# Patient Record
Sex: FEMALE | Race: WHITE | Employment: OTHER | URBAN - NONMETROPOLITAN AREA
[De-identification: names, ages, dates, MRNs, and addresses within clinical notes are randomized per-mention and may not be internally consistent; named-entity substitution may affect disease eponyms.]

---

## 2017-01-30 ENCOUNTER — OFFICE VISIT (OUTPATIENT)
Dept: CARDIOLOGY | Age: 72
End: 2017-01-30
Payer: MEDICARE

## 2017-01-30 VITALS
HEIGHT: 63 IN | SYSTOLIC BLOOD PRESSURE: 130 MMHG | WEIGHT: 96.2 LBS | DIASTOLIC BLOOD PRESSURE: 80 MMHG | HEART RATE: 78 BPM | BODY MASS INDEX: 17.05 KG/M2

## 2017-01-30 DIAGNOSIS — I15.9 SECONDARY HYPERTENSION: Primary | ICD-10-CM

## 2017-01-30 PROCEDURE — 93000 ELECTROCARDIOGRAM COMPLETE: CPT | Performed by: INTERNAL MEDICINE

## 2017-01-30 PROCEDURE — 99215 OFFICE O/P EST HI 40 MIN: CPT | Performed by: INTERNAL MEDICINE

## 2017-01-30 RX ORDER — LEVOTHYROXINE SODIUM 0.07 MG/1
50 TABLET ORAL
COMMUNITY
Start: 2016-12-29 | End: 2017-12-04 | Stop reason: DRUGHIGH

## 2017-03-14 ENCOUNTER — OFFICE VISIT (OUTPATIENT)
Dept: CARDIOTHORACIC SURGERY | Age: 72
End: 2017-03-14
Payer: MEDICARE

## 2017-03-14 VITALS
HEART RATE: 81 BPM | SYSTOLIC BLOOD PRESSURE: 192 MMHG | OXYGEN SATURATION: 96 % | BODY MASS INDEX: 17.01 KG/M2 | DIASTOLIC BLOOD PRESSURE: 110 MMHG | HEIGHT: 63 IN | WEIGHT: 96 LBS

## 2017-03-14 DIAGNOSIS — J90 UNSPECIFIED PLEURAL EFFUSION: Primary | ICD-10-CM

## 2017-03-14 PROCEDURE — 99212 OFFICE O/P EST SF 10 MIN: CPT | Performed by: THORACIC SURGERY (CARDIOTHORACIC VASCULAR SURGERY)

## 2017-05-01 ENCOUNTER — HOSPITAL ENCOUNTER (OUTPATIENT)
Dept: VASCULAR LAB | Age: 72
Discharge: HOME | End: 2017-05-01
Payer: MEDICARE

## 2017-05-01 ENCOUNTER — OFFICE VISIT (OUTPATIENT)
Dept: VASCULAR SURGERY | Age: 72
End: 2017-05-01
Payer: MEDICARE

## 2017-05-01 VITALS
HEART RATE: 74 BPM | TEMPERATURE: 98.6 F | SYSTOLIC BLOOD PRESSURE: 140 MMHG | OXYGEN SATURATION: 88 % | DIASTOLIC BLOOD PRESSURE: 88 MMHG

## 2017-05-01 DIAGNOSIS — I73.9 PVD (PERIPHERAL VASCULAR DISEASE) (HCC): ICD-10-CM

## 2017-05-01 DIAGNOSIS — I73.9 PVD (PERIPHERAL VASCULAR DISEASE) (HCC): Primary | ICD-10-CM

## 2017-05-01 PROCEDURE — 93923 UPR/LXTR ART STDY 3+ LVLS: CPT

## 2017-05-01 PROCEDURE — 99213 OFFICE O/P EST LOW 20 MIN: CPT | Performed by: PHYSICIAN ASSISTANT

## 2017-05-31 ENCOUNTER — OFFICE VISIT (OUTPATIENT)
Dept: CARDIOLOGY | Age: 72
End: 2017-05-31
Payer: MEDICARE

## 2017-05-31 VITALS
WEIGHT: 94.2 LBS | HEART RATE: 86 BPM | DIASTOLIC BLOOD PRESSURE: 80 MMHG | SYSTOLIC BLOOD PRESSURE: 136 MMHG | BODY MASS INDEX: 16.69 KG/M2 | HEIGHT: 63 IN

## 2017-05-31 DIAGNOSIS — I15.9 SECONDARY HYPERTENSION: Primary | ICD-10-CM

## 2017-05-31 PROCEDURE — 99214 OFFICE O/P EST MOD 30 MIN: CPT | Performed by: INTERNAL MEDICINE

## 2017-05-31 PROCEDURE — 93000 ELECTROCARDIOGRAM COMPLETE: CPT | Performed by: INTERNAL MEDICINE

## 2017-05-31 ASSESSMENT — ENCOUNTER SYMPTOMS: SHORTNESS OF BREATH: 1

## 2017-07-19 ENCOUNTER — HOSPITAL ENCOUNTER (OUTPATIENT)
Dept: WOUND CARE | Age: 72
Discharge: HOME | End: 2017-07-19
Payer: MEDICARE

## 2017-07-19 VITALS
HEIGHT: 63 IN | HEART RATE: 71 BPM | BODY MASS INDEX: 16.66 KG/M2 | TEMPERATURE: 98.6 F | SYSTOLIC BLOOD PRESSURE: 165 MMHG | WEIGHT: 94 LBS | RESPIRATION RATE: 16 BRPM | DIASTOLIC BLOOD PRESSURE: 71 MMHG

## 2017-07-19 DIAGNOSIS — I70.238 ATHEROSCLEROSIS OF NATIVE ARTERIES OF RIGHT LEG WITH ULCERATION OF OTHER PART OF LOWER RIGHT LEG: ICD-10-CM

## 2017-07-19 DIAGNOSIS — I70.233 ATHEROSCLEROSIS OF NATIVE ARTERY OF RIGHT LOWER EXTREMITY WITH ULCERATION OF ANKLE (HCC): ICD-10-CM

## 2017-07-19 DIAGNOSIS — L97.312 NON-PRESSURE CHRONIC ULCER OF RIGHT ANKLE WITH FAT LAYER EXPOSED (HCC): ICD-10-CM

## 2017-07-19 PROCEDURE — G0463 HOSPITAL OUTPT CLINIC VISIT: HCPCS

## 2017-07-19 PROCEDURE — 99999 PR OFFICE/OUTPT VISIT,PROCEDURE ONLY: CPT | Performed by: SURGERY

## 2017-07-19 PROCEDURE — 97597 DBRDMT OPN WND 1ST 20 CM/<: CPT

## 2017-07-19 PROCEDURE — 97597 DBRDMT OPN WND 1ST 20 CM/<: CPT | Performed by: SURGERY

## 2017-07-19 PROCEDURE — 99214 OFFICE O/P EST MOD 30 MIN: CPT

## 2017-07-19 ASSESSMENT — PAIN SCALES - GENERAL: PAINLEVEL_OUTOF10: 0

## 2017-07-26 ENCOUNTER — OFFICE VISIT (OUTPATIENT)
Dept: OTOLARYNGOLOGY | Age: 72
End: 2017-07-26
Payer: MEDICARE

## 2017-07-26 VITALS
RESPIRATION RATE: 20 BRPM | DIASTOLIC BLOOD PRESSURE: 70 MMHG | OXYGEN SATURATION: 96 % | BODY MASS INDEX: 16.87 KG/M2 | SYSTOLIC BLOOD PRESSURE: 126 MMHG | HEART RATE: 70 BPM | WEIGHT: 95.2 LBS | HEIGHT: 63 IN

## 2017-07-26 DIAGNOSIS — R22.1 MASS OF RIGHT SIDE OF NECK: Primary | ICD-10-CM

## 2017-07-26 DIAGNOSIS — H61.21 IMPACTED CERUMEN OF RIGHT EAR: ICD-10-CM

## 2017-07-26 PROCEDURE — 31575 DIAGNOSTIC LARYNGOSCOPY: CPT | Performed by: OTOLARYNGOLOGY

## 2017-07-26 PROCEDURE — 99203 OFFICE O/P NEW LOW 30 MIN: CPT | Performed by: OTOLARYNGOLOGY

## 2017-07-26 ASSESSMENT — ENCOUNTER SYMPTOMS
RESPIRATORY NEGATIVE: 1
ALLERGIC/IMMUNOLOGIC NEGATIVE: 1
EYES NEGATIVE: 1
GASTROINTESTINAL NEGATIVE: 1

## 2017-07-31 RX ORDER — LISINOPRIL 10 MG/1
10 TABLET ORAL DAILY
Qty: 30 TABLET | Refills: 3 | Status: ON HOLD | OUTPATIENT
Start: 2017-07-31 | End: 2018-01-22 | Stop reason: HOSPADM

## 2017-07-31 RX ORDER — METOPROLOL TARTRATE 100 MG/1
50 TABLET ORAL 2 TIMES DAILY
Qty: 60 TABLET | Refills: 3 | Status: SHIPPED | OUTPATIENT
Start: 2017-07-31 | End: 2017-12-04 | Stop reason: SDUPTHER

## 2017-08-04 ENCOUNTER — HOSPITAL ENCOUNTER (OUTPATIENT)
Dept: ULTRASOUND IMAGING | Age: 72
Discharge: HOME | End: 2017-08-04
Payer: MEDICARE

## 2017-08-04 DIAGNOSIS — R22.1 MASS OF RIGHT SIDE OF NECK: ICD-10-CM

## 2017-08-04 PROCEDURE — 60100 BIOPSY OF THYROID: CPT

## 2017-08-18 ENCOUNTER — TELEPHONE (OUTPATIENT)
Dept: OTOLARYNGOLOGY | Age: 72
End: 2017-08-18

## 2017-08-22 ENCOUNTER — OFFICE VISIT (OUTPATIENT)
Dept: OTOLARYNGOLOGY | Age: 72
End: 2017-08-22
Payer: MEDICARE

## 2017-08-22 VITALS
DIASTOLIC BLOOD PRESSURE: 70 MMHG | HEIGHT: 63 IN | WEIGHT: 94 LBS | SYSTOLIC BLOOD PRESSURE: 130 MMHG | BODY MASS INDEX: 16.66 KG/M2

## 2017-08-22 DIAGNOSIS — R22.1 MASS OF RIGHT SIDE OF NECK: Primary | ICD-10-CM

## 2017-08-22 PROCEDURE — 99213 OFFICE O/P EST LOW 20 MIN: CPT | Performed by: OTOLARYNGOLOGY

## 2017-08-23 ENCOUNTER — HOSPITAL ENCOUNTER (OUTPATIENT)
Dept: WOUND CARE | Age: 72
Discharge: HOME | End: 2017-08-23

## 2017-09-06 ENCOUNTER — HOSPITAL ENCOUNTER (OUTPATIENT)
Dept: WOUND CARE | Age: 72
Discharge: HOME | End: 2017-09-06
Payer: MEDICARE

## 2017-09-06 VITALS
SYSTOLIC BLOOD PRESSURE: 148 MMHG | HEART RATE: 90 BPM | DIASTOLIC BLOOD PRESSURE: 98 MMHG | BODY MASS INDEX: 16.66 KG/M2 | TEMPERATURE: 97.6 F | HEIGHT: 63 IN | WEIGHT: 94 LBS | RESPIRATION RATE: 18 BRPM

## 2017-09-06 DIAGNOSIS — I70.233 ATHEROSCLEROSIS OF NATIVE ARTERY OF RIGHT LOWER EXTREMITY WITH ULCERATION OF ANKLE (HCC): ICD-10-CM

## 2017-09-06 DIAGNOSIS — I70.238 ATHEROSCLEROSIS OF NATIVE ARTERIES OF RIGHT LEG WITH ULCERATION OF OTHER PART OF LOWER RIGHT LEG: ICD-10-CM

## 2017-09-06 DIAGNOSIS — L97.312 NON-PRESSURE CHRONIC ULCER OF RIGHT ANKLE WITH FAT LAYER EXPOSED (HCC): ICD-10-CM

## 2017-09-06 PROCEDURE — 99212 OFFICE O/P EST SF 10 MIN: CPT | Performed by: SURGERY

## 2017-09-06 PROCEDURE — 99212 OFFICE O/P EST SF 10 MIN: CPT

## 2017-10-25 ENCOUNTER — OFFICE VISIT (OUTPATIENT)
Dept: OTOLARYNGOLOGY | Age: 72
End: 2017-10-25
Payer: MEDICARE

## 2017-10-25 VITALS
TEMPERATURE: 96 F | BODY MASS INDEX: 17.01 KG/M2 | HEART RATE: 80 BPM | DIASTOLIC BLOOD PRESSURE: 74 MMHG | OXYGEN SATURATION: 92 % | WEIGHT: 96 LBS | SYSTOLIC BLOOD PRESSURE: 130 MMHG | RESPIRATION RATE: 20 BRPM | HEIGHT: 63 IN

## 2017-10-25 DIAGNOSIS — R22.1 NECK MASS: Primary | ICD-10-CM

## 2017-10-25 PROCEDURE — 1123F ACP DISCUSS/DSCN MKR DOCD: CPT | Performed by: OTOLARYNGOLOGY

## 2017-10-25 PROCEDURE — G8400 PT W/DXA NO RESULTS DOC: HCPCS | Performed by: OTOLARYNGOLOGY

## 2017-10-25 PROCEDURE — 1036F TOBACCO NON-USER: CPT | Performed by: OTOLARYNGOLOGY

## 2017-10-25 PROCEDURE — 4040F PNEUMOC VAC/ADMIN/RCVD: CPT | Performed by: OTOLARYNGOLOGY

## 2017-10-25 PROCEDURE — 99213 OFFICE O/P EST LOW 20 MIN: CPT | Performed by: OTOLARYNGOLOGY

## 2017-10-25 PROCEDURE — G8598 ASA/ANTIPLAT THER USED: HCPCS | Performed by: OTOLARYNGOLOGY

## 2017-10-25 PROCEDURE — G8484 FLU IMMUNIZE NO ADMIN: HCPCS | Performed by: OTOLARYNGOLOGY

## 2017-10-25 PROCEDURE — 1090F PRES/ABSN URINE INCON ASSESS: CPT | Performed by: OTOLARYNGOLOGY

## 2017-10-25 PROCEDURE — G8427 DOCREV CUR MEDS BY ELIG CLIN: HCPCS | Performed by: OTOLARYNGOLOGY

## 2017-10-25 PROCEDURE — G8418 CALC BMI BLW LOW PARAM F/U: HCPCS | Performed by: OTOLARYNGOLOGY

## 2017-10-25 PROCEDURE — 3014F SCREEN MAMMO DOC REV: CPT | Performed by: OTOLARYNGOLOGY

## 2017-10-25 PROCEDURE — 3017F COLORECTAL CA SCREEN DOC REV: CPT | Performed by: OTOLARYNGOLOGY

## 2017-11-02 ENCOUNTER — HOSPITAL ENCOUNTER (OUTPATIENT)
Dept: VASCULAR LAB | Age: 72
Discharge: HOME | End: 2017-11-02
Payer: MEDICARE

## 2017-11-02 ENCOUNTER — OFFICE VISIT (OUTPATIENT)
Dept: VASCULAR SURGERY | Age: 72
End: 2017-11-02
Payer: MEDICARE

## 2017-11-02 VITALS
HEART RATE: 60 BPM | RESPIRATION RATE: 18 BRPM | TEMPERATURE: 97.3 F | DIASTOLIC BLOOD PRESSURE: 60 MMHG | SYSTOLIC BLOOD PRESSURE: 100 MMHG

## 2017-11-02 DIAGNOSIS — I65.23 BILATERAL CAROTID ARTERY STENOSIS: ICD-10-CM

## 2017-11-02 DIAGNOSIS — I73.9 PVD (PERIPHERAL VASCULAR DISEASE) (HCC): ICD-10-CM

## 2017-11-02 DIAGNOSIS — I70.213 ATHEROSCLEROSIS OF NATIVE ARTERY OF BOTH LOWER EXTREMITIES WITH INTERMITTENT CLAUDICATION (HCC): Primary | ICD-10-CM

## 2017-11-02 PROCEDURE — G8484 FLU IMMUNIZE NO ADMIN: HCPCS | Performed by: NURSE PRACTITIONER

## 2017-11-02 PROCEDURE — 1090F PRES/ABSN URINE INCON ASSESS: CPT | Performed by: NURSE PRACTITIONER

## 2017-11-02 PROCEDURE — G8400 PT W/DXA NO RESULTS DOC: HCPCS | Performed by: NURSE PRACTITIONER

## 2017-11-02 PROCEDURE — G8418 CALC BMI BLW LOW PARAM F/U: HCPCS | Performed by: NURSE PRACTITIONER

## 2017-11-02 PROCEDURE — G8598 ASA/ANTIPLAT THER USED: HCPCS | Performed by: NURSE PRACTITIONER

## 2017-11-02 PROCEDURE — 4040F PNEUMOC VAC/ADMIN/RCVD: CPT | Performed by: NURSE PRACTITIONER

## 2017-11-02 PROCEDURE — 93923 UPR/LXTR ART STDY 3+ LVLS: CPT

## 2017-11-02 PROCEDURE — 99212 OFFICE O/P EST SF 10 MIN: CPT | Performed by: NURSE PRACTITIONER

## 2017-11-02 PROCEDURE — 3014F SCREEN MAMMO DOC REV: CPT | Performed by: NURSE PRACTITIONER

## 2017-11-02 PROCEDURE — G8427 DOCREV CUR MEDS BY ELIG CLIN: HCPCS | Performed by: NURSE PRACTITIONER

## 2017-11-02 PROCEDURE — 3017F COLORECTAL CA SCREEN DOC REV: CPT | Performed by: NURSE PRACTITIONER

## 2017-11-02 PROCEDURE — 1123F ACP DISCUSS/DSCN MKR DOCD: CPT | Performed by: NURSE PRACTITIONER

## 2017-11-02 PROCEDURE — 1036F TOBACCO NON-USER: CPT | Performed by: NURSE PRACTITIONER

## 2017-11-02 NOTE — PROGRESS NOTES
Patient Care Team:  Angle Gibson MD as PCP - General (Internal Medicine)  Liliana Cabrera MD (Cardiology)  Gavi Ortega MD (Rheumatology)  Ambreen Guerrero MD as Consulting Physician (Vascular Surgery)  Roxana Romero MD as Consulting Physician (Interventional Cardiology)      Jennifer Mistry has a history of peripheral vascular disease of the lower extremities. She has had this for 1 - 5 years. Current treatment includes ASA EC daily. Arian Mistry has not had new wounds. Recently, she reports claudication at a distance which varies. Arian Mistry reports that the right leg is equal to the left. She reports claudication is not changed and is mostly in the form of crampy type pain starting in the calves. She has a short recovery time. This is reproducible in nature. She reports ischemic rest pain 0 times per night. She reports walking with cart does not help. She presents for follow up of carotid artery stenosis. She has a known history of carotid artery stenosis for 1 - 5 years. Her current treatment includes ASA EC daily. She denies a history of CVA. She reports no TIA's, episodes of lateralizing weakness and episodes of amaurosis fugax.       Marv Mosqueda is a 67 y.o. female with the following history reviewed and recorded in [facility]:  Patient Active Problem List    Diagnosis Date Noted    Acute superficial venous thrombosis of right lower extremity 04/25/2016     Priority: High     With large RLE bulla lateral foot skin violaceous discoloration, acutely POA (venous backpressure)      Severe mitral stenosis by prior echocardiogram 03/29/2016     Priority: High    Severe mitral regurgitation by prior echocardiogram 03/29/2016     Priority: High    PUD (peptic ulcer disease) 03/29/2016     Priority: Medium    Atherosclerosis of native artery of extremity with intermittent claudication ([address] 07/25/2011     Priority: Low    Wound of sacral region 06/16/2016    Elevated TSH 06/16/2016    Acute blood loss anemia 06/15/2016    CHF (congestive heart failure) ([address] 06/15/2016    CKD (chronic kidney disease), stage III 06/15/2016    Pulmonary emphysema ([address] 06/15/2016    COPD, severe (HCC)     PVD (peripheral vascular disease) ([address]     Atherosclerosis of native artery of right lower extremity with ulceration of ankle ([address] 06/05/2016    Atherosclerosis of native arteries of right leg with ulceration of other part of lower right leg ([address] 06/05/2016     Replacing Inactive Diagnoses      Atherosclerosis of native arteries of right leg with ulceration of other part of foot 06/05/2016     Replacing Inactive Diagnoses      Nonhealing ulcer of right lower leg with fat layer exposed ([address] 06/05/2016    Non-pressure chronic ulcer of right ankle with fat layer exposed ([address] 06/05/2016    Neuropathic ulcer of right foot with fat layer exposed ([address] 06/05/2016    Hypervolemia     Nonhealing nonsurgical wound with fat layer exposed 06/03/2016    Acute blood loss anemia     Atherosclerosis of native arteries of left leg with ulceration of calf (HCC)     Severe malnutrition (HCC)     Diastolic dysfunction     Anemia in chronic kidney disease (CKD)     Non-pressure chronic ulcer of right calf with fat layer exposed ([address] 05/27/2016    Decubitus ulcer of right buttock, stage 3 ([address] 05/27/2016    Nocturnal hypoxia 03/30/2016     With associated mitral stenosis / regurgitation and pulmonary hypertension      Pulmonary hypertension 03/29/2016    PAD (peripheral artery disease) (HCC)     Calculus of gallbladder without cholecystitis without obstruction     Carotid artery stenosis 02/08/2012    Atherosclerosis of native artery of extremity with intermittent claudication ([address] 07/25/2011     Replacing Inactive Diagnoses      Hyperlipidemia     Hypertension     Arthritis     CAD (coronary artery disease)      Current Outpatient Prescriptions   Medication Sig Dispense Refill    No blood in stool. No severe constipation, diarrhea, nausea, or vomiting. Genitourinary  No difficulty urinating, dysuria, frequency, or urgency. No flank pain or hematuria. Musculoskeletal  no back pain, gait disturbance, or myalgia. Skin  no color change, rash, pallor, or new wound. Neurologic  no dizziness, facial asymmetry, or light headedness. No seizures. No speech difficulty or lateralizing weakness. Hematologic  no easy bruising or excessive bleeding. Psychiatric  no severe anxiety or nervousness. No confusion. All other review of systems are negative. Physical Exam    /60 (Site: Left Arm, Position: Sitting, Cuff Size: Medium Adult) Comment: left  Pulse 60   Temp 97.3 °F (36.3 °C)   Resp 18     Constitutional  well developed, well nourished. No diaphoresis or acute distress. HENT  head normocephalic. Right external ear canal appears normal.  Left external ear canal appears normal.  Septum appears midline. Eyes  conjunctiva normal.  EOMS normal.  No exudate. No icterus. Neck- ROM appears normal, no tracheal deviation. Cardiovascular  Regular rate and rhythm. Heart sounds are normal.  No murmur, rub, or gallop. Carotid pulses are 2+ to palpation bilaterally without bruit. Extremities - Radial and brachial pulses are 2+ to palpation bilaterally. Right femoral pulse: present 2+; Right popliteal pulse: absent Right DP: absent; Right PT absent; Left femoral pulse: present 2+; Left popliteal pulse: absent; Left DP: absent; Left PT: absent No cyanosis, clubbing, or significant edema. No signs atheroembolic event. Pulmonary  effort appears normal.  No respiratory distress. Lungs - Breath sounds normal. No wheezes or rales. GI - Abdomen  soft, non tender, bowel sounds X 4 quadrants. No guarding or rebound tenderness. No distension or palpable mass. Genitourinary  deferred. Musculoskeletal  ROM appears normal.  No significant edema.   Neurologic  alert and oriented X 3. Physiologic. Skin  warm, dry, and intact. No rash, erythema, or pallor. Psychiatric  mood, affect, and behavior appear normal.  Judgment and thought processes appear normal.    Risk factors for atherosclerosis of all vascular beds have been reviewed with the patient including:  Family history, tobacco abuse in all forms, elevated cholesterol, hyperlipidemia, and diabetes. Lower extremity arterial study: Right MARGY 1.11, Left MARGY 0.94  Individual films reviewed: Yes. Test results were reviewed with the patient. Disease process is stable    Options have been discussed with the patient including continued medical management. Patient has opted to proceed with continued medical management. Assessment    1. Atherosclerosis of native artery of both lower extremities with intermittent claudication ([address]    2.  Bilateral carotid artery stenosis          Plan    Start/Continue ASA EC 81 mg daily  Education about caludication causes and treatment discussed  Call with any new wound development or progressive claudication  Walking program  Leg elevation  Good moisturization  Good skin care    Recommend no smoking  Strongly encourage statin therapy  Family to bring films from [facility] reports from [facility] about \"lump\" in neck

## 2017-11-09 ENCOUNTER — TELEPHONE (OUTPATIENT)
Dept: VASCULAR SURGERY | Age: 72
End: 2017-11-09

## 2017-11-09 DIAGNOSIS — I65.23 BILATERAL CAROTID ARTERY STENOSIS: Primary | ICD-10-CM

## 2017-11-16 ENCOUNTER — HOSPITAL ENCOUNTER (OUTPATIENT)
Dept: VASCULAR LAB | Age: 72
Discharge: HOME | End: 2017-11-16
Payer: MEDICARE

## 2017-11-16 ENCOUNTER — OFFICE VISIT (OUTPATIENT)
Dept: VASCULAR SURGERY | Age: 72
End: 2017-11-16
Payer: MEDICARE

## 2017-11-16 VITALS
HEART RATE: 84 BPM | TEMPERATURE: 96.3 F | SYSTOLIC BLOOD PRESSURE: 130 MMHG | RESPIRATION RATE: 18 BRPM | DIASTOLIC BLOOD PRESSURE: 80 MMHG

## 2017-11-16 DIAGNOSIS — I65.23 BILATERAL CAROTID ARTERY STENOSIS: ICD-10-CM

## 2017-11-16 PROCEDURE — 4040F PNEUMOC VAC/ADMIN/RCVD: CPT | Performed by: NURSE PRACTITIONER

## 2017-11-16 PROCEDURE — 1036F TOBACCO NON-USER: CPT | Performed by: NURSE PRACTITIONER

## 2017-11-16 PROCEDURE — G8400 PT W/DXA NO RESULTS DOC: HCPCS | Performed by: NURSE PRACTITIONER

## 2017-11-16 PROCEDURE — G8427 DOCREV CUR MEDS BY ELIG CLIN: HCPCS | Performed by: NURSE PRACTITIONER

## 2017-11-16 PROCEDURE — G8598 ASA/ANTIPLAT THER USED: HCPCS | Performed by: NURSE PRACTITIONER

## 2017-11-16 PROCEDURE — 93880 EXTRACRANIAL BILAT STUDY: CPT

## 2017-11-16 PROCEDURE — 1123F ACP DISCUSS/DSCN MKR DOCD: CPT | Performed by: NURSE PRACTITIONER

## 2017-11-16 PROCEDURE — 3014F SCREEN MAMMO DOC REV: CPT | Performed by: NURSE PRACTITIONER

## 2017-11-16 PROCEDURE — 99213 OFFICE O/P EST LOW 20 MIN: CPT | Performed by: NURSE PRACTITIONER

## 2017-11-16 PROCEDURE — 1090F PRES/ABSN URINE INCON ASSESS: CPT | Performed by: NURSE PRACTITIONER

## 2017-11-16 PROCEDURE — G8418 CALC BMI BLW LOW PARAM F/U: HCPCS | Performed by: NURSE PRACTITIONER

## 2017-11-16 PROCEDURE — 3017F COLORECTAL CA SCREEN DOC REV: CPT | Performed by: NURSE PRACTITIONER

## 2017-11-16 PROCEDURE — G8484 FLU IMMUNIZE NO ADMIN: HCPCS | Performed by: NURSE PRACTITIONER

## 2017-11-19 PROBLEM — I65.23 BILATERAL CAROTID ARTERY STENOSIS: Status: ACTIVE | Noted: 2017-11-19

## 2017-11-19 NOTE — PROGRESS NOTES
Patient Care Team:  Sebastian Merlin, MD as PCP - General (Internal Medicine)  Jimena Montes MD (Cardiology)  Alexander Saldaña MD (Rheumatology)  Deonna Cabrera MD as Consulting Physician (Vascular Surgery)  Jonathon Bridges MD as Consulting Physician (Interventional Cardiology)      History and Physical    She presents for follow-up of carotid occlusive disease. She has prior history of carotid artery stenosis for 1 - 5 years. Her current treatment includes ASA EC po daily. She denies a history of CVA. She had a Right Carotid Endarterectomy done. She reports no TIA's, episodes of amaurosis fugax and episodes of lateralizing weakness.     Dom Georges is a 67 y.o. female with the following history reviewed and recorded in [facility]:  Patient Active Problem List    Diagnosis Date Noted    Acute superficial venous thrombosis of right lower extremity 04/25/2016     Priority: High     With large RLE bulla lateral foot skin violaceous discoloration, acutely POA (venous backpressure)      Severe mitral stenosis by prior echocardiogram 03/29/2016     Priority: High    Severe mitral regurgitation by prior echocardiogram 03/29/2016     Priority: High    PUD (peptic ulcer disease) 03/29/2016     Priority: Medium    Atherosclerosis of native artery of extremity with intermittent claudication ([address] 07/25/2011     Priority: Low    Bilateral carotid artery stenosis 11/19/2017    Wound of sacral region 06/16/2016    Elevated TSH 06/16/2016    Acute blood loss anemia 06/15/2016    CHF (congestive heart failure) ([address] 06/15/2016    CKD (chronic kidney disease), stage III 06/15/2016    Pulmonary emphysema ([address] 06/15/2016    COPD, severe ([address]     PVD (peripheral vascular disease) ([address]     Atherosclerosis of native artery of right lower extremity with ulceration of ankle ([address] 06/05/2016    Atherosclerosis of native arteries of right leg with ulceration of other part of lower right leg ([address] nebulization every 8 hours as needed for Wheezing      atorvastatin (LIPITOR) 40 MG tablet Take 40 mg by mouth daily      aspirin EC 81 MG EC tablet Take 81 mg by mouth daily. No current facility-administered medications for this visit. Allergies: Levaquin [levofloxacin in d5w]  Past Medical History:   Diagnosis Date    Aortic stenosis     Arthritis     Atherosclerosis of native arteries of the extremities with intermittent claudication 7/25/2011    CAD (coronary artery disease)     Carotid artery occlusion     CHF (congestive heart failure) ([facility])     History of blood transfusion     Hyperlipidemia     Hypertension     MI (myocardial infarction) 2009    x2    Mitral valve stenosis     PUD (peptic ulcer disease) 2009    Renal failure 2009    Wound infection after surgery     right foot     Past Surgical History:   Procedure Laterality Date    ABDOMEN SURGERY  2009    perforated ulcer resection of 1/3 stomach    CARDIAC SURGERY      CAROTID ENDARTERECTOMY  11/08/07 [facility]    Right  with Dacron patch angioplasty    CARPAL TUNNEL RELEASE Right early 1990's    long ago   [address] [facility]  2 weeks ago    DILATION AND CURETTAGE OF UTERUS      ILIO-FEMORAL BYPASS GRAFT N/A 6/2/2016    OPEN TRANSLUMINAL BALLOON ANGIOPLASTY AND STENTING OF RIGHT COMMON AND EXTERNAL  ILIAC ARTERIES; RIGHT FEMORAL ENDARTERECTOMY WITH VEIN PATCH ANGIOPLASTY performed by Mónica Noyola MD at [facility] N/A 4/7/2016    MITRAL VALVE  REPLACEMENT LUONG-MAZE ABLATION WITH CRYO PROCEDURE, CORONARY ARTERY BYPASS GRAFT X 1 WITH ENDOSCOPIC VEIN HARVESTING WITH PERFUSION TRANSESOPHAGEAL ECHOCARDIOGRAM performed by Bhavani Soni MD at [address] [place] [address] [facility]      SKIN GRAFT Right 7/22/2016    Skin graft split thickness foot,ankle,and leg. Right leg 26x8cm and 12x6cm total area 280cm squared. TJR    UPPER GASTROINTESTINAL ENDOSCOPY  3/15/16    Dr Rebecca Rosa ENDOSCOPY  3/15/2016    EGD BIOPSY performed by Radha Waggoner DO at [facility]    VASCULAR SURGERY  5/27/16 [facility]    Aortagram and right leg runoff,right leg runoff,right common iliac artery selection for right leg run off views.  VASCULAR SURGERY      . Open transluminal angioplasty and stenting of the external iliac artery. TJR     Family History   Problem Relation Age of Onset    Diabetes Mother     Heart Disease Mother     Heart Failure Mother     Diabetes Sister     Heart Disease Sister     High Blood Pressure Sister      Social History   Substance Use Topics    Smoking status: Former Smoker     Years: 2.00     Quit date: 1/1/1980    Smokeless tobacco: Never Used    Alcohol use Yes      Comment: rarely maybe twice a year           Review of Systems    Constitutional  no significant activity change, appetite change, or unexpected weight change. No fever or chills. No diaphoresis or significant fatigue. HENT  no significant rhinorrhea or epistaxis. No tinnitus or significant hearing loss. Eyes  no sudden vision change or amaurosis. Respiratory  no significant shortness of breath, wheezing, or stridor. No apnea, cough, or chest tightness associated with shortness of breath. Cardiovascular  no chest pain, syncope, or significant dizziness. No palpitations or significant leg swelling. No claudication. Gastrointestinal  no abdominal swelling or pain. No blood in stool. No severe constipation, diarrhea, nausea, or vomiting. Genitourinary  No difficulty urinating, dysuria, frequency, or urgency. No flank pain or hematuria. Musculoskeletal  no back pain, gait disturbance, or myalgia. Skin  no color change, rash, pallor, or new wound. Neurologic  no dizziness, facial asymmetry, or light headedness. No seizures. No speech difficulty or lateralizing weakness.   Hematologic  no easy bruising or excessive patient. Disease process is stable     Options have been discussed with the patient including continued medical management and CTA. Patient has opted to proceed with CTA. Assessment    1. Bilateral carotid artery stenosis          Plan    CTA neck -   There is high-grade stenosis of the left internal carotid artery at   and just distal to the left carotid bifurcation. There is high-grade   stenosis at the origin of the external carotid artery as well on the   left side. There is left common carotid artery plaque with no   significant left common carotid artery stenosis. 2. Prior carotid endarterectomy on the right side. 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 There is plaque in the right carotid   bifurcation. The distal right common carotid artery is narrowed about   20%. There is an ulcerative plaque within the proximal right internal   carotid artery. There appears to be less than 30% diameter narrowing   of the proximal right internal carotid artery. 3. High-grade stenosis at the origin of the right vertebral artery. There is also at least 50-60% diameter narrowing of the right   vertebral artery at the C6 level. There is intermittent left vertebral   artery plaque without significant stenosis seen. 4. There appears to be a small aneurysm projecting posteriorly from   the tip of the basilar artery measuring about 3 mm in size. 5. There is plaque involving the cavernous carotid arteries   bilaterally intracranially with less than 50% diameter stenosis seen. 6. There is mediastinal lymphadenopathy. There is a large right   pleural effusion. Follow-up chest CT is recommended to further   evaluate and rule out malignancy. Groundglass opacity in both   visualized lungs may be result of poor inspiration. Pertinent results were discussed with [facility] [place].      Individual images were reviewed by myself and

## 2017-11-21 DIAGNOSIS — I65.23 BILATERAL CAROTID ARTERY STENOSIS: Primary | ICD-10-CM

## 2017-11-27 ENCOUNTER — HOSPITAL ENCOUNTER (OUTPATIENT)
Dept: CT IMAGING | Age: 72
Discharge: HOME | End: 2017-11-27
Payer: MEDICARE

## 2017-11-27 DIAGNOSIS — I65.23 BILATERAL CAROTID ARTERY STENOSIS: ICD-10-CM

## 2017-11-27 LAB
GFR NON-AFRICAN AMERICAN: 49
PERFORMED ON: ABNORMAL
POC CREATININE: 1.1 MG/DL (ref 0.3–1.3)
POC SAMPLE TYPE: ABNORMAL

## 2017-11-27 PROCEDURE — 70498 CT ANGIOGRAPHY NECK: CPT

## 2017-11-27 PROCEDURE — 6360000004 HC RX CONTRAST MEDICATION: Performed by: NURSE PRACTITIONER

## 2017-11-27 PROCEDURE — 82565 ASSAY OF CREATININE: CPT

## 2017-11-27 RX ADMIN — IOPAMIDOL 90 ML: 755 INJECTION, SOLUTION INTRAVENOUS at 10:17

## 2017-11-30 ENCOUNTER — TELEPHONE (OUTPATIENT)
Dept: VASCULAR SURGERY | Age: 72
End: 2017-11-30

## 2017-12-01 ENCOUNTER — TELEPHONE (OUTPATIENT)
Dept: VASCULAR SURGERY | Age: 72
End: 2017-12-01

## 2017-12-01 NOTE — TELEPHONE ENCOUNTER
Zachary Fuller with 's office called stating that [facility] took a look at the patient's CT scan and determined that they needed to repeat another CT scan and compare the two. [facility] stated that the patient would need to proceed with any Vascular surgery first, that anything related to the pt's Carotid would take priority over this CT scan and follow up with Pulmonology. This message was routed to [facility] for review.

## 2017-12-04 ENCOUNTER — OFFICE VISIT (OUTPATIENT)
Dept: CARDIOLOGY | Age: 72
End: 2017-12-04
Payer: MEDICARE

## 2017-12-04 VITALS
HEIGHT: 64 IN | DIASTOLIC BLOOD PRESSURE: 64 MMHG | WEIGHT: 95 LBS | BODY MASS INDEX: 16.22 KG/M2 | HEART RATE: 80 BPM | SYSTOLIC BLOOD PRESSURE: 134 MMHG

## 2017-12-04 DIAGNOSIS — I05.0 SEVERE MITRAL STENOSIS BY PRIOR ECHOCARDIOGRAM: Primary | ICD-10-CM

## 2017-12-04 PROCEDURE — 4040F PNEUMOC VAC/ADMIN/RCVD: CPT | Performed by: INTERNAL MEDICINE

## 2017-12-04 PROCEDURE — 3014F SCREEN MAMMO DOC REV: CPT | Performed by: INTERNAL MEDICINE

## 2017-12-04 PROCEDURE — G8419 CALC BMI OUT NRM PARAM NOF/U: HCPCS | Performed by: INTERNAL MEDICINE

## 2017-12-04 PROCEDURE — 1123F ACP DISCUSS/DSCN MKR DOCD: CPT | Performed by: INTERNAL MEDICINE

## 2017-12-04 PROCEDURE — G8427 DOCREV CUR MEDS BY ELIG CLIN: HCPCS | Performed by: INTERNAL MEDICINE

## 2017-12-04 PROCEDURE — G8484 FLU IMMUNIZE NO ADMIN: HCPCS | Performed by: INTERNAL MEDICINE

## 2017-12-04 PROCEDURE — 99214 OFFICE O/P EST MOD 30 MIN: CPT | Performed by: INTERNAL MEDICINE

## 2017-12-04 PROCEDURE — 1036F TOBACCO NON-USER: CPT | Performed by: INTERNAL MEDICINE

## 2017-12-04 PROCEDURE — 1090F PRES/ABSN URINE INCON ASSESS: CPT | Performed by: INTERNAL MEDICINE

## 2017-12-04 PROCEDURE — 3017F COLORECTAL CA SCREEN DOC REV: CPT | Performed by: INTERNAL MEDICINE

## 2017-12-04 PROCEDURE — 93000 ELECTROCARDIOGRAM COMPLETE: CPT | Performed by: INTERNAL MEDICINE

## 2017-12-04 PROCEDURE — G8400 PT W/DXA NO RESULTS DOC: HCPCS | Performed by: INTERNAL MEDICINE

## 2017-12-04 PROCEDURE — G8598 ASA/ANTIPLAT THER USED: HCPCS | Performed by: INTERNAL MEDICINE

## 2017-12-04 RX ORDER — METOPROLOL TARTRATE 50 MG/1
50 TABLET, FILM COATED ORAL 2 TIMES DAILY
Qty: 60 TABLET | Refills: 3 | Status: SHIPPED | OUTPATIENT
Start: 2017-12-04 | End: 2018-02-19 | Stop reason: DRUGHIGH

## 2017-12-04 RX ORDER — LEVOTHYROXINE SODIUM 50 MCG
50 TABLET ORAL DAILY
COMMUNITY
End: 2019-09-25

## 2017-12-04 ASSESSMENT — ENCOUNTER SYMPTOMS: SHORTNESS OF BREATH: 1

## 2017-12-04 NOTE — PROGRESS NOTES
Dear Dr. Cristel Joseph MD,    Thank you for allowing me to participate in the care of Ms. Justina Aggarwal. She presents today at the [address] [place] [address] in the LT[facility] located [facility] With her daughter. As you know, Ms. Demar Crandall is a 67 y.o. female with history of hypertension, hyperlipidemia,coronary artery disease s/p MI and CABG ( 1V PDA), grade 3 diastolic dysfunction and MVR ( 23 mm [employer])  who presents with the chief complaint of chronic cardiac issues. She had a one-vessel bypass surgery and mitral valve replacement for mitral stenosis in April 2016. She follows with Dr. Rosemarie Allison as she had a pulmonary function tests that she says showed restrictive physiology assuming related to her heart surgery. Since last seen, she says that the albuterol nebulizers have improved her shortness of air but the other inhalers she's not sure about. She has had no chest pain. Her blood pressures been controlled in the 120s to low 130s. She was found to have significant stenosis of her left internal carotid. She's been followed by vascular. She is able to do majority what she wants to do. She can be limited by her breathing. She denies chest pain, palpitations, PND, orthopnea, near-syncope, or syncope. She has no other complaints. Review of Systems   Constitutional: Negative for malaise/fatigue. Respiratory: Positive for shortness of breath. Cardiovascular: Negative for chest pain. Neurological: Negative for weakness. All other systems reviewed and are negative.         Past Medical History:   Diagnosis Date    Aortic stenosis     Arthritis     Atherosclerosis of native arteries of the extremities with intermittent claudication 7/25/2011    CAD (coronary artery disease)     Carotid artery occlusion     [facility]     History of blood transfusion     Hyperlipidemia     Hypertension     MI (myocardial infarction) 2009    x2    Mitral of children: N/A    Years of education: N/A     Occupational History    Not on file. Social History Main Topics    Smoking status: Former Smoker     Years: 2.00     Quit date: 1/1/1980    Smokeless tobacco: Never Used    Alcohol use Yes      Comment: rarely maybe twice a year    Drug use: No    Sexual activity: Not on file     Other Topics Concern    Not on file     Social History Narrative    No narrative on file       Allergies   Allergen Reactions    Levaquin [Levofloxacin In D5w] Nausea And Vomiting       Current Outpatient Prescriptions   Medication Sig Dispense Refill    levothyroxine (SYNTHROID) 50 MCG tablet Take 50 mcg by mouth Daily      lisinopril (PRINIVIL;ZESTRIL) 10 MG tablet Take 1 tablet by mouth daily 30 tablet 3    metoprolol (LOPRESSOR) 100 MG tablet Take 0.5 tablets by mouth 2 times daily 60 tablet 3    Probiotic Product (PROBIOTIC ADVANCED PO) Take by mouth daily      Biotin 5000 MCG TABS Take by mouth daily      levalbuterol (XOPENEX) 0.31 MG/3ML nebulization Take 1 ampule by nebulization every 8 hours as needed for Wheezing      atorvastatin (LIPITOR) 40 MG tablet Take 40 mg by mouth daily      aspirin EC 81 MG EC tablet Take 81 mg by mouth daily. No current facility-administered medications for this visit. PE:  Vitals:    12/04/17 0902   BP: 134/64   Pulse: 80       Estimated body mass index is 16.56 kg/m² as calculated from the following:    Height as of this encounter: 5' 3.5\" (1.613 m). Weight as of this encounter: 95 lb (43.1 kg).     Constitutional - No acute distress  Eyes - PERRL  ENMT - Mucous membranes moist, oropharynx clear  Cardio - No jugular venous distension or bruit,                 Clear s1 s2, no gallop, rub, murmur                 No edema, normal pulses  Resp - Normal effort, Clear to auscultation bilaterally  GI - abdomen soft, non-tender, non-distended  Skin - warm and dry  Neuro - No focal defect      Lab Results   Component Value Date    CREATININE 1.1 11/27/2017    CREATININE 1.2 08/29/2016    CREATININE 1.5 08/01/2016    CREATININE 0.9 07/26/2016    CREATININE 1.1 05/03/2012    HGB 10.7 08/29/2016    HGB 8.1 08/01/2016    HGB 9.2 07/26/2016    PROBNP 6,493 06/06/2016    PROBNP 6,039 06/06/2016    PROBNP 8,980 06/05/2016       ECG 12/4/17   Sinus rhythm, occasional PAC     TTE May 2016  1. Concentric LVH  2. Normal LV systolic function (estimated EF, 55-60%)  3. Grade 3 diastolic dysfunction  4. Normal functioning bioprosthetic valve in the mitral position. 5. Echolucency near preserved posterior mitral leaflet, likely chordae  6. Large pleural effusion  7. Moderate tricuspid regurgitation; estimated RVSP of at least 72 mmHg    Cath April 2016  1. Coronary angiography. This is the right dominant system. The patient had previously undergone stent placement of the right coronary artery. 2. The left main coronary artery bifurcates into the LAD and left circumflex coronary arteries without obstruction. 3. The LAD was a relatively large vessel giving rise to 2 moderate sized diagonal branches. The LAD proper contained mild plaque. The 1st diagonal branch contained tandem 60% and 50% stenosis proximally. The 2nd diagonal branch contained a 70% ostial stenosis. 4. The left circumflex coronary artery gave rise to total 4 obtuse marginal branches. The 1st 2 were very small. The 3rd and 4th were both moderate. There was moderate plaque. No high-grade obstruction of the left circumflex  distribution. 5. The right coronary was a dominant vessel of normal caliber. The previous stent was widely patent. The right coronary artery contained a 70% ostial stenosis. There was 1 moderate size acute marginal branch. The distal rightcoronary artery angulated into a normal size PDA.      Assessment, Recommendations, & Plan[de-identified]  67 y.o. female with coronary artery disease s/p one-vessel CABG, mitral valve replacement, diastolic dysfunction, hypertension    CAD - patient is currently on aspirin and statin, she is asymptomatic. She does have unrevascularized disease of diagonal arteries. Asymptomatic. Monitor. No changes    MVR - I explained that they will need antibiotic prophylaxis for bacterial endocarditis for procedures including dental cleaning 1 hour prior to the procedure (Amoxil 2gm x1 or Clindamycin 600mg x1 [if pen allergic]). We will obtain a transthoracic echo in May 2021, and yearly thereafter. Diastolic dysfunction - currently on ACE inhibitor and beta blocker for cardiac remodeling, continue, no changes  (we will change the formulation of the metoprolol from 100 mg tablets to 50 mg tablets as she's taking 50 mg twice daily and she is having a hard time cutting the tablets in half)    Hypertension - well controlled no changes    Disposition - return to clinic in 9 months or sooner if needed    Thank you very much for allowing me to participate in this patient's care. Please do not hesitate to contact me for any questions or concerns. Sincerely yours,    Rashad Allen MD, MSc  Structural Heart Disease Interventions  [facility]

## 2017-12-12 ENCOUNTER — OFFICE VISIT (OUTPATIENT)
Dept: VASCULAR SURGERY | Age: 72
End: 2017-12-12
Payer: MEDICARE

## 2017-12-12 VITALS
SYSTOLIC BLOOD PRESSURE: 159 MMHG | TEMPERATURE: 96.3 F | HEART RATE: 105 BPM | RESPIRATION RATE: 18 BRPM | DIASTOLIC BLOOD PRESSURE: 91 MMHG

## 2017-12-12 DIAGNOSIS — I65.23 BILATERAL CAROTID ARTERY STENOSIS: Primary | ICD-10-CM

## 2017-12-12 PROCEDURE — 1123F ACP DISCUSS/DSCN MKR DOCD: CPT | Performed by: NURSE PRACTITIONER

## 2017-12-12 PROCEDURE — 1090F PRES/ABSN URINE INCON ASSESS: CPT | Performed by: NURSE PRACTITIONER

## 2017-12-12 PROCEDURE — G8427 DOCREV CUR MEDS BY ELIG CLIN: HCPCS | Performed by: NURSE PRACTITIONER

## 2017-12-12 PROCEDURE — 1036F TOBACCO NON-USER: CPT | Performed by: NURSE PRACTITIONER

## 2017-12-12 PROCEDURE — G8484 FLU IMMUNIZE NO ADMIN: HCPCS | Performed by: NURSE PRACTITIONER

## 2017-12-12 PROCEDURE — 99213 OFFICE O/P EST LOW 20 MIN: CPT | Performed by: NURSE PRACTITIONER

## 2017-12-12 PROCEDURE — G8400 PT W/DXA NO RESULTS DOC: HCPCS | Performed by: NURSE PRACTITIONER

## 2017-12-12 PROCEDURE — 3014F SCREEN MAMMO DOC REV: CPT | Performed by: NURSE PRACTITIONER

## 2017-12-12 PROCEDURE — G8598 ASA/ANTIPLAT THER USED: HCPCS | Performed by: NURSE PRACTITIONER

## 2017-12-12 PROCEDURE — G8419 CALC BMI OUT NRM PARAM NOF/U: HCPCS | Performed by: NURSE PRACTITIONER

## 2017-12-12 PROCEDURE — 3017F COLORECTAL CA SCREEN DOC REV: CPT | Performed by: NURSE PRACTITIONER

## 2017-12-12 PROCEDURE — 4040F PNEUMOC VAC/ADMIN/RCVD: CPT | Performed by: NURSE PRACTITIONER

## 2017-12-12 NOTE — PROGRESS NOTES
 atorvastatin (LIPITOR) 40 MG tablet Take 40 mg by mouth daily      aspirin EC 81 MG EC tablet Take 81 mg by mouth daily. No current facility-administered medications for this visit. Allergies: Levaquin [levofloxacin in d5w]  Past Medical History:   Diagnosis Date    Aortic stenosis     Arthritis     Atherosclerosis of native arteries of the extremities with intermittent claudication 7/25/2011    CAD (coronary artery disease)     Carotid artery occlusion     CH[facility]congestive [facility] failure) [facility])     History of blood transfusion     Hyperlipidemia     Hypertension     MI (myocardial infarction) 2009    x2    Mitral valve stenosis     PUD (peptic ulcer disease) 2009    Renal failure 2009    Wound infection after surgery     right foot     Past Surgical History:   Procedure Laterality Date    ABDOMEN SURGERY  2009    perforated ulcer resection of 1/3 stomach    CARDIAC SURGERY      CAROTID ENDARTERECTOMY  11/08/07 [facility]    Right  with Dacron patch angioplasty    CARPAL TUNNEL RELEASE Right early 1990's    long ago   [address] [facility]  2 weeks ago    DILATION AND CURETTAGE OF UTERUS      ILIO-FEMORAL BYPASS GRAFT N/A 6/2/2016    OPEN TRANSLUMINAL BALLOON ANGIOPLASTY AND STENTING OF RIGHT COMMON AND EXTERNAL  ILIAC ARTERIES; RIGHT FEMORAL ENDARTERECTOMY WITH VEIN PATCH ANGIOPLASTY performed by Eder Nielson MD at [facility] N/A 4/7/2016    MITRAL VALVE  REPLACEMENT LUONG-MAZE ABLATION WITH CRYO PROCEDURE, CORONARY ARTERY BYPASS GRAFT X 1 WITH ENDOSCOPIC VEIN HARVESTING WITH PERFUSION TRANSESOPHAGEAL ECHOCARDIOGRAM performed by Amol Miranda MD at [address] [place] [address] Road [facility]      SKIN GRAFT Right 7/22/2016    Skin graft split thickness foot,ankle,and leg. Right leg 26x8cm and 12x6cm total area 280cm squared. TJR    UPPER GASTROINTESTINAL ENDOSCOPY  3/15/16    Dr Hyacinth Andre GASTROINTESTINAL ENDOSCOPY  3/15/2016    EGD BIOPSY performed by Nani Lafleur DO at [facility]    VASCULAR SURGERY  5/27/16 [facility]    Aortagram and right leg runoff,right leg runoff,right common iliac artery selection for right leg run off views.  VASCULAR SURGERY      . Open transluminal angioplasty and stenting of the external iliac artery. TJR     Family History   Problem Relation Age of Onset    Diabetes Mother     Heart Disease Mother     Heart Failure Mother     Diabetes Sister     Heart Disease Sister     High Blood Pressure Sister      Social History   Substance Use Topics    Smoking status: Former Smoker     Years: 2.00     Quit date: 1/1/1980    Smokeless tobacco: Never Used    Alcohol use Yes      Comment: rarely maybe twice a year           Review of Systems    Constitutional  no significant activity change, appetite change, or unexpected weight change. No fever or chills. No diaphoresis or significant fatigue. HENT  no significant rhinorrhea or epistaxis. No tinnitus or significant hearing loss. Eyes  no sudden vision change or amaurosis. Respiratory  no significant shortness of breath, wheezing, or stridor. No apnea, cough, or chest tightness associated with shortness of breath. Cardiovascular  no chest pain, syncope, or significant dizziness. No palpitations or significant leg swelling. No claudication. Gastrointestinal  no abdominal swelling or pain. No blood in stool. No severe constipation, diarrhea, nausea, or vomiting. Genitourinary  No difficulty urinating, dysuria, frequency, or urgency. No flank pain or hematuria. Musculoskeletal  no back pain, gait disturbance, or myalgia. Skin  no color change, rash, pallor, or new wound. Neurologic  no dizziness, facial asymmetry, or light headedness. No seizures. No speech difficulty or lateralizing weakness. Hematologic  no easy bruising or excessive bleeding. Psychiatric  no severe anxiety or nervousness. carotid artery at   and just distal to the left carotid bifurcation. There is high-grade   stenosis at the origin of the external carotid artery as well on the   left side. There is left common carotid artery plaque with no   significant left common carotid artery stenosis. 2. Prior carotid endarterectomy on the right side. 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 There is plaque in the right carotid   bifurcation. The distal right common carotid artery is narrowed about   20%. There is an ulcerative plaque within the proximal right internal   carotid artery. There appears to be less than 30% diameter narrowing   of the proximal right internal carotid artery. 3. High-grade stenosis at the origin of the right vertebral artery. There is also at least 50-60% diameter narrowing of the right   vertebral artery at the C6 level. There is intermittent left vertebral   artery plaque without significant stenosis seen. 4. There appears to be a small aneurysm projecting posteriorly from   the tip of the basilar artery measuring about 3 mm in size. 5. There is plaque involving the cavernous carotid arteries   bilaterally intracranially with less than 50% diameter stenosis seen. 6. There is mediastinal lymphadenopathy. There is a large right   pleural effusion. Follow-up chest CT is recommended to further   evaluate and rule out malignancy. Groundglass opacity in both   visualized lungs may be result of poor inspiration. Individual films reviewed: Yes. Results were reviewed with the patient. Seen and examined with Dr. Baudilio Velasco have been discussed with the patient including continued medical management and operative intervention. Patient has opted to proceed with operative intervention.   Risks have been discussed with the patient including but not limited to MI, death, stroke, nerve injury, infection and bleed.    Assessment    1.  Bilateral carotid artery stenosis          Plan    Proceed with left CE  Recommend no smoking  Will await CT from Dr. 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

## 2017-12-21 ENCOUNTER — PREP FOR PROCEDURE (OUTPATIENT)
Dept: VASCULAR SURGERY | Age: 72
End: 2017-12-21

## 2017-12-21 ENCOUNTER — TELEPHONE (OUTPATIENT)
Dept: VASCULAR SURGERY | Age: 72
End: 2017-12-21

## 2017-12-21 RX ORDER — SODIUM CHLORIDE 0.9 % (FLUSH) 0.9 %
10 SYRINGE (ML) INJECTION EVERY 12 HOURS SCHEDULED
Status: CANCELLED | OUTPATIENT
Start: 2017-12-21

## 2017-12-21 RX ORDER — SODIUM CHLORIDE 0.9 % (FLUSH) 0.9 %
10 SYRINGE (ML) INJECTION PRN
Status: CANCELLED | OUTPATIENT
Start: 2017-12-21

## 2017-12-21 RX ORDER — ASPIRIN 81 MG/1
81 TABLET ORAL ONCE
Status: CANCELLED | OUTPATIENT
Start: 2017-12-21 | End: 2017-12-21

## 2017-12-21 RX ORDER — CLOPIDOGREL BISULFATE 75 MG/1
75 TABLET ORAL DAILY
Qty: 30 TABLET | Refills: 1 | Status: SHIPPED | OUTPATIENT
Start: 2017-12-21 | End: 2018-01-25 | Stop reason: SDUPTHER

## 2017-12-21 NOTE — TELEPHONE ENCOUNTER
----- Message from Massiel Shannon LPN sent at 17/81/2005 11:38 AM CST -----  This is the conversation I had with 's nurse giving the \"okay\" to proceed.

## 2017-12-26 ENCOUNTER — TELEPHONE (OUTPATIENT)
Dept: VASCULAR SURGERY | Age: 72
End: 2017-12-26

## 2017-12-26 ENCOUNTER — TELEPHONE (OUTPATIENT)
Dept: OTOLARYNGOLOGY | Age: 72
End: 2017-12-26

## 2017-12-26 DIAGNOSIS — I72.5 BASILAR ARTERY ANEURYSM (HCC): Primary | ICD-10-CM

## 2017-12-26 NOTE — TELEPHONE ENCOUNTER
Spoke with patient, informed her of appointment with Jin Vilchis NP at Dr. Jose Daniel Heaton office on 1/17/18 at 10:30am. She will need to arrive at 10:00am for paperwork. Patient was informed of appointment date, time, location, and phone number. Informed her that their office will be giving her a call.

## 2017-12-26 NOTE — TELEPHONE ENCOUNTER
----- Message from BEBO Weber sent at 12/26/2017  7:24 AM CST -----  Refer to Dr. Trung Chakraborty for basilar aneurysm.   Can be after CE 1/11

## 2018-01-03 ENCOUNTER — HOSPITAL ENCOUNTER (OUTPATIENT)
Dept: GENERAL RADIOLOGY | Age: 73
Discharge: HOME | End: 2018-01-03
Payer: MEDICARE

## 2018-01-03 ENCOUNTER — HOSPITAL ENCOUNTER (OUTPATIENT)
Dept: PREADMISSION TESTING | Age: 73
Discharge: HOME | End: 2018-01-03
Payer: MEDICARE

## 2018-01-03 VITALS — WEIGHT: 97 LBS | HEIGHT: 63 IN | BODY MASS INDEX: 17.19 KG/M2

## 2018-01-03 LAB
ANION GAP SERPL CALCULATED.3IONS-SCNC: 13 MMOL/L (ref 7–19)
APTT: 32.5 SEC (ref 26–36.2)
BASOPHILS ABSOLUTE: 0.1 K/UL (ref 0–0.2)
BASOPHILS RELATIVE PERCENT: 0.9 % (ref 0–1)
BUN BLDV-MCNC: 20 MG/DL (ref 8–23)
CALCIUM SERPL-MCNC: 9.1 MG/DL (ref 8.8–10.2)
CHLORIDE BLD-SCNC: 94 MMOL/L (ref 98–111)
CO2: 27 MMOL/L (ref 22–29)
CREAT SERPL-MCNC: 1.1 MG/DL (ref 0.5–0.9)
EKG P AXIS: -3 DEGREES
EKG P-R INTERVAL: 158 MS
EKG Q-T INTERVAL: 358 MS
EKG QRS DURATION: 84 MS
EKG QTC CALCULATION (BAZETT): 435 MS
EKG T AXIS: 48 DEGREES
EOSINOPHILS ABSOLUTE: 0.4 K/UL (ref 0–0.6)
EOSINOPHILS RELATIVE PERCENT: 3.8 % (ref 0–5)
GFR NON-AFRICAN AMERICAN: 49
GLUCOSE BLD-MCNC: 84 MG/DL (ref 74–109)
HCT VFR BLD CALC: 39.2 % (ref 37–47)
HEMOGLOBIN: 11.7 G/DL (ref 12–16)
INR BLD: 1.1 (ref 0.88–1.18)
LYMPHOCYTES ABSOLUTE: 1.1 K/UL (ref 1.1–4.5)
LYMPHOCYTES RELATIVE PERCENT: 12.3 % (ref 20–40)
MCH RBC QN AUTO: 24.6 PG (ref 27–31)
MCHC RBC AUTO-ENTMCNC: 29.8 G/DL (ref 33–37)
MCV RBC AUTO: 82.5 FL (ref 81–99)
MONOCYTES ABSOLUTE: 1.1 K/UL (ref 0–0.9)
MONOCYTES RELATIVE PERCENT: 11.5 % (ref 0–10)
NEUTROPHILS ABSOLUTE: 6.5 K/UL (ref 1.5–7.5)
NEUTROPHILS RELATIVE PERCENT: 71.1 % (ref 50–65)
PDW BLD-RTO: 17.5 % (ref 11.5–14.5)
PLATELET # BLD: 370 K/UL (ref 130–400)
PMV BLD AUTO: 9.5 FL (ref 9.4–12.3)
POTASSIUM SERPL-SCNC: 4.7 MMOL/L (ref 3.5–5)
PROTHROMBIN TIME: 14.1 SEC (ref 12–14.6)
RBC # BLD: 4.75 M/UL (ref 4.2–5.4)
SODIUM BLD-SCNC: 134 MMOL/L (ref 136–145)
WBC # BLD: 9.2 K/UL (ref 4.8–10.8)

## 2018-01-03 PROCEDURE — 93005 ELECTROCARDIOGRAM TRACING: CPT

## 2018-01-03 PROCEDURE — 85730 THROMBOPLASTIN TIME PARTIAL: CPT

## 2018-01-03 PROCEDURE — 85610 PROTHROMBIN TIME: CPT

## 2018-01-03 PROCEDURE — 85025 COMPLETE CBC W/AUTO DIFF WBC: CPT

## 2018-01-03 PROCEDURE — 80048 BASIC METABOLIC PNL TOTAL CA: CPT

## 2018-01-03 PROCEDURE — 71046 X-RAY EXAM CHEST 2 VIEWS: CPT

## 2018-01-03 RX ORDER — GLUCOSAMINE SULFATE 500 MG
500 CAPSULE ORAL DAILY
Status: ON HOLD | COMMUNITY
End: 2019-07-11

## 2018-01-03 NOTE — TELEPHONE ENCOUNTER
Have patient come in and see me now for exam and then will repeat US at 6 months when insurance will allow. Make appt.   Kim Handler

## 2018-01-11 ENCOUNTER — HOSPITAL ENCOUNTER (INPATIENT)
Age: 73
LOS: 2 days | Discharge: HOME | DRG: 038 | End: 2018-01-13
Attending: SURGERY | Admitting: SURGERY
Payer: MEDICARE

## 2018-01-11 ENCOUNTER — ANESTHESIA (OUTPATIENT)
Dept: OPERATING ROOM | Age: 73
DRG: 038 | End: 2018-01-11
Payer: MEDICARE

## 2018-01-11 ENCOUNTER — ANESTHESIA EVENT (OUTPATIENT)
Dept: OPERATING ROOM | Age: 73
DRG: 038 | End: 2018-01-11
Payer: MEDICARE

## 2018-01-11 VITALS
RESPIRATION RATE: 2 BRPM | SYSTOLIC BLOOD PRESSURE: 92 MMHG | OXYGEN SATURATION: 100 % | DIASTOLIC BLOOD PRESSURE: 29 MMHG

## 2018-01-11 VITALS — TEMPERATURE: 98 F | OXYGEN SATURATION: 90 % | RESPIRATION RATE: 1 BRPM

## 2018-01-11 DIAGNOSIS — I65.23 BILATERAL CAROTID ARTERY STENOSIS: Primary | ICD-10-CM

## 2018-01-11 PROBLEM — I65.22: Status: ACTIVE | Noted: 2018-01-11

## 2018-01-11 LAB
ABO/RH: NORMAL
ABO/RH: NORMAL
ANTIBODY SCREEN: NORMAL
HCT VFR BLD CALC: 34.1 % (ref 37–47)
HEMOGLOBIN: 10.3 G/DL (ref 12–16)
MCH RBC QN AUTO: 24.9 PG (ref 27–31)
MCHC RBC AUTO-ENTMCNC: 30.2 G/DL (ref 33–37)
MCV RBC AUTO: 82.6 FL (ref 81–99)
PDW BLD-RTO: 17.6 % (ref 11.5–14.5)
PLATELET # BLD: 337 K/UL (ref 130–400)
PMV BLD AUTO: 9.4 FL (ref 9.4–12.3)
RBC # BLD: 4.13 M/UL (ref 4.2–5.4)
WBC # BLD: 10.8 K/UL (ref 4.8–10.8)

## 2018-01-11 PROCEDURE — 85027 COMPLETE CBC AUTOMATED: CPT

## 2018-01-11 PROCEDURE — 36415 COLL VENOUS BLD VENIPUNCTURE: CPT

## 2018-01-11 PROCEDURE — 86900 BLOOD TYPING SEROLOGIC ABO: CPT

## 2018-01-11 PROCEDURE — 03CN0ZZ EXTIRPATION OF MATTER FROM LEFT EXTERNAL CAROTID ARTERY, OPEN APPROACH: ICD-10-PCS | Performed by: SURGERY

## 2018-01-11 PROCEDURE — 6360000002 HC RX W HCPCS: Performed by: NURSE ANESTHETIST, CERTIFIED REGISTERED

## 2018-01-11 PROCEDURE — 2580000003 HC RX 258: Performed by: SURGERY

## 2018-01-11 PROCEDURE — 6360000002 HC RX W HCPCS: Performed by: NURSE PRACTITIONER

## 2018-01-11 PROCEDURE — 7100000001 HC PACU RECOVERY - ADDTL 15 MIN: Performed by: SURGERY

## 2018-01-11 PROCEDURE — 2780000010 HC IMPLANT OTHER: Performed by: SURGERY

## 2018-01-11 PROCEDURE — C1729 CATH, DRAINAGE: HCPCS | Performed by: SURGERY

## 2018-01-11 PROCEDURE — 94664 DEMO&/EVAL PT USE INHALER: CPT

## 2018-01-11 PROCEDURE — 3700000000 HC ANESTHESIA ATTENDED CARE: Performed by: SURGERY

## 2018-01-11 PROCEDURE — 6360000002 HC RX W HCPCS: Performed by: SURGERY

## 2018-01-11 PROCEDURE — 6360000002 HC RX W HCPCS: Performed by: ANESTHESIOLOGY

## 2018-01-11 PROCEDURE — 3700000001 HC ADD 15 MINUTES (ANESTHESIA): Performed by: SURGERY

## 2018-01-11 PROCEDURE — 3600000015 HC SURGERY LEVEL 5 ADDTL 15MIN: Performed by: SURGERY

## 2018-01-11 PROCEDURE — 6370000000 HC RX 637 (ALT 250 FOR IP): Performed by: SURGERY

## 2018-01-11 PROCEDURE — 03CL0ZZ EXTIRPATION OF MATTER FROM LEFT INTERNAL CAROTID ARTERY, OPEN APPROACH: ICD-10-PCS | Performed by: SURGERY

## 2018-01-11 PROCEDURE — 6370000000 HC RX 637 (ALT 250 FOR IP): Performed by: ANESTHESIOLOGY

## 2018-01-11 PROCEDURE — 2700000000 HC OXYGEN THERAPY PER DAY

## 2018-01-11 PROCEDURE — 35800 EXPLORE NECK VESSELS: CPT | Performed by: SURGERY

## 2018-01-11 PROCEDURE — 3600000005 HC SURGERY LEVEL 5 BASE: Performed by: SURGERY

## 2018-01-11 PROCEDURE — 6360000002 HC RX W HCPCS

## 2018-01-11 PROCEDURE — 35301 RECHANNELING OF ARTERY: CPT | Performed by: SURGERY

## 2018-01-11 PROCEDURE — 2580000003 HC RX 258: Performed by: ANESTHESIOLOGY

## 2018-01-11 PROCEDURE — 2500000003 HC RX 250 WO HCPCS: Performed by: ANESTHESIOLOGY

## 2018-01-11 PROCEDURE — 2500000003 HC RX 250 WO HCPCS: Performed by: NURSE ANESTHETIST, CERTIFIED REGISTERED

## 2018-01-11 PROCEDURE — 0W360ZZ CONTROL BLEEDING IN NECK, OPEN APPROACH: ICD-10-PCS | Performed by: SURGERY

## 2018-01-11 PROCEDURE — 2720000001 HC MISC SURG SUPPLY STERILE $51-500: Performed by: SURGERY

## 2018-01-11 PROCEDURE — 93882 EXTRACRANIAL UNI/LTD STUDY: CPT

## 2018-01-11 PROCEDURE — 2500000003 HC RX 250 WO HCPCS: Performed by: SURGERY

## 2018-01-11 PROCEDURE — 7100000000 HC PACU RECOVERY - FIRST 15 MIN: Performed by: SURGERY

## 2018-01-11 PROCEDURE — 03CJ0ZZ EXTIRPATION OF MATTER FROM LEFT COMMON CAROTID ARTERY, OPEN APPROACH: ICD-10-PCS | Performed by: SURGERY

## 2018-01-11 PROCEDURE — 86850 RBC ANTIBODY SCREEN: CPT

## 2018-01-11 PROCEDURE — 6370000000 HC RX 637 (ALT 250 FOR IP): Performed by: NURSE PRACTITIONER

## 2018-01-11 PROCEDURE — 86901 BLOOD TYPING SEROLOGIC RH(D): CPT

## 2018-01-11 PROCEDURE — 1210000000 HC MED SURG R&B

## 2018-01-11 RX ORDER — ACETAMINOPHEN 325 MG/1
650 TABLET ORAL EVERY 4 HOURS PRN
Status: DISCONTINUED | OUTPATIENT
Start: 2018-01-11 | End: 2018-01-13 | Stop reason: HOSPADM

## 2018-01-11 RX ORDER — DIPHENHYDRAMINE HYDROCHLORIDE 50 MG/ML
12.5 INJECTION INTRAMUSCULAR; INTRAVENOUS
Status: DISCONTINUED | OUTPATIENT
Start: 2018-01-11 | End: 2018-01-11 | Stop reason: HOSPADM

## 2018-01-11 RX ORDER — METOCLOPRAMIDE HYDROCHLORIDE 5 MG/ML
10 INJECTION INTRAMUSCULAR; INTRAVENOUS
Status: DISCONTINUED | OUTPATIENT
Start: 2018-01-11 | End: 2018-01-11 | Stop reason: HOSPADM

## 2018-01-11 RX ORDER — PROMETHAZINE HYDROCHLORIDE 25 MG/ML
6.25 INJECTION, SOLUTION INTRAMUSCULAR; INTRAVENOUS
Status: DISCONTINUED | OUTPATIENT
Start: 2018-01-11 | End: 2018-01-11 | Stop reason: HOSPADM

## 2018-01-11 RX ORDER — MORPHINE SULFATE 4 MG/ML
2 INJECTION, SOLUTION INTRAMUSCULAR; INTRAVENOUS EVERY 5 MIN PRN
Status: DISCONTINUED | OUTPATIENT
Start: 2018-01-11 | End: 2018-01-11 | Stop reason: HOSPADM

## 2018-01-11 RX ORDER — LISINOPRIL 10 MG/1
10 TABLET ORAL DAILY
Status: DISCONTINUED | OUTPATIENT
Start: 2018-01-11 | End: 2018-01-11

## 2018-01-11 RX ORDER — ALBUTEROL SULFATE 2.5 MG/3ML
0.63 SOLUTION RESPIRATORY (INHALATION) 3 TIMES DAILY PRN
Status: DISCONTINUED | OUTPATIENT
Start: 2018-01-11 | End: 2018-01-13 | Stop reason: HOSPADM

## 2018-01-11 RX ORDER — MORPHINE SULFATE 4 MG/ML
4 INJECTION, SOLUTION INTRAMUSCULAR; INTRAVENOUS EVERY 5 MIN PRN
Status: DISCONTINUED | OUTPATIENT
Start: 2018-01-11 | End: 2018-01-11 | Stop reason: HOSPADM

## 2018-01-11 RX ORDER — HYDROMORPHONE HCL 110MG/55ML
0.25 PATIENT CONTROLLED ANALGESIA SYRINGE INTRAVENOUS EVERY 5 MIN PRN
Status: DISCONTINUED | OUTPATIENT
Start: 2018-01-11 | End: 2018-01-11 | Stop reason: HOSPADM

## 2018-01-11 RX ORDER — LIDOCAINE HYDROCHLORIDE 10 MG/ML
INJECTION, SOLUTION INFILTRATION; PERINEURAL PRN
Status: DISCONTINUED | OUTPATIENT
Start: 2018-01-11 | End: 2018-01-11 | Stop reason: SDUPTHER

## 2018-01-11 RX ORDER — HYDROCODONE BITARTRATE AND ACETAMINOPHEN 5; 325 MG/1; MG/1
1 TABLET ORAL EVERY 4 HOURS PRN
Status: DISCONTINUED | OUTPATIENT
Start: 2018-01-11 | End: 2018-01-13 | Stop reason: HOSPADM

## 2018-01-11 RX ORDER — MORPHINE SULFATE 10 MG/ML
INJECTION, SOLUTION INTRAMUSCULAR; INTRAVENOUS PRN
Status: DISCONTINUED | OUTPATIENT
Start: 2018-01-11 | End: 2018-01-11 | Stop reason: SDUPTHER

## 2018-01-11 RX ORDER — PROPOFOL 10 MG/ML
INJECTION, EMULSION INTRAVENOUS PRN
Status: DISCONTINUED | OUTPATIENT
Start: 2018-01-11 | End: 2018-01-11 | Stop reason: SDUPTHER

## 2018-01-11 RX ORDER — HYDRALAZINE HYDROCHLORIDE 20 MG/ML
5 INJECTION INTRAMUSCULAR; INTRAVENOUS EVERY 10 MIN PRN
Status: DISCONTINUED | OUTPATIENT
Start: 2018-01-11 | End: 2018-01-11 | Stop reason: HOSPADM

## 2018-01-11 RX ORDER — SODIUM CHLORIDE 9 MG/ML
INJECTION, SOLUTION INTRAVENOUS CONTINUOUS
Status: DISCONTINUED | OUTPATIENT
Start: 2018-01-11 | End: 2018-01-13 | Stop reason: HOSPADM

## 2018-01-11 RX ORDER — CLONIDINE HYDROCHLORIDE 0.1 MG/1
0.1 TABLET ORAL EVERY 4 HOURS PRN
Status: DISCONTINUED | OUTPATIENT
Start: 2018-01-11 | End: 2018-01-13 | Stop reason: HOSPADM

## 2018-01-11 RX ORDER — METOPROLOL TARTRATE 50 MG/1
50 TABLET, FILM COATED ORAL 2 TIMES DAILY
Status: DISCONTINUED | OUTPATIENT
Start: 2018-01-11 | End: 2018-01-13 | Stop reason: HOSPADM

## 2018-01-11 RX ORDER — SODIUM CHLORIDE, SODIUM LACTATE, POTASSIUM CHLORIDE, CALCIUM CHLORIDE 600; 310; 30; 20 MG/100ML; MG/100ML; MG/100ML; MG/100ML
INJECTION, SOLUTION INTRAVENOUS CONTINUOUS
Status: DISCONTINUED | OUTPATIENT
Start: 2018-01-11 | End: 2018-01-11

## 2018-01-11 RX ORDER — SODIUM CHLORIDE 0.9 % (FLUSH) 0.9 %
10 SYRINGE (ML) INJECTION EVERY 12 HOURS SCHEDULED
Status: DISCONTINUED | OUTPATIENT
Start: 2018-01-11 | End: 2018-01-11 | Stop reason: HOSPADM

## 2018-01-11 RX ORDER — SODIUM CHLORIDE 0.9 % (FLUSH) 0.9 %
10 SYRINGE (ML) INJECTION PRN
Status: DISCONTINUED | OUTPATIENT
Start: 2018-01-11 | End: 2018-01-11 | Stop reason: HOSPADM

## 2018-01-11 RX ORDER — HEPARIN SODIUM 1000 [USP'U]/ML
INJECTION, SOLUTION INTRAVENOUS; SUBCUTANEOUS PRN
Status: DISCONTINUED | OUTPATIENT
Start: 2018-01-11 | End: 2018-01-11 | Stop reason: SDUPTHER

## 2018-01-11 RX ORDER — CLOPIDOGREL BISULFATE 75 MG/1
75 TABLET ORAL DAILY
Status: DISCONTINUED | OUTPATIENT
Start: 2018-01-12 | End: 2018-01-13 | Stop reason: HOSPADM

## 2018-01-11 RX ORDER — MIDAZOLAM HYDROCHLORIDE 1 MG/ML
2 INJECTION INTRAMUSCULAR; INTRAVENOUS
Status: COMPLETED | OUTPATIENT
Start: 2018-01-11 | End: 2018-01-11

## 2018-01-11 RX ORDER — MEPERIDINE HYDROCHLORIDE 50 MG/ML
12.5 INJECTION INTRAMUSCULAR; INTRAVENOUS; SUBCUTANEOUS EVERY 5 MIN PRN
Status: DISCONTINUED | OUTPATIENT
Start: 2018-01-11 | End: 2018-01-11 | Stop reason: HOSPADM

## 2018-01-11 RX ORDER — EPHEDRINE SULFATE 50 MG/ML
INJECTION, SOLUTION INTRAVENOUS PRN
Status: DISCONTINUED | OUTPATIENT
Start: 2018-01-11 | End: 2018-01-11 | Stop reason: SDUPTHER

## 2018-01-11 RX ORDER — DEXAMETHASONE SODIUM PHOSPHATE 4 MG/ML
INJECTION, SOLUTION INTRA-ARTICULAR; INTRALESIONAL; INTRAMUSCULAR; INTRAVENOUS; SOFT TISSUE PRN
Status: DISCONTINUED | OUTPATIENT
Start: 2018-01-11 | End: 2018-01-11 | Stop reason: SDUPTHER

## 2018-01-11 RX ORDER — MORPHINE SULFATE 4 MG/ML
4 INJECTION, SOLUTION INTRAMUSCULAR; INTRAVENOUS ONCE
Status: DISCONTINUED | OUTPATIENT
Start: 2018-01-11 | End: 2018-01-13 | Stop reason: HOSPADM

## 2018-01-11 RX ORDER — LABETALOL HYDROCHLORIDE 5 MG/ML
10 INJECTION, SOLUTION INTRAVENOUS EVERY 4 HOURS PRN
Status: DISCONTINUED | OUTPATIENT
Start: 2018-01-11 | End: 2018-01-13 | Stop reason: HOSPADM

## 2018-01-11 RX ORDER — DIPHENHYDRAMINE HYDROCHLORIDE 50 MG/ML
25 INJECTION INTRAMUSCULAR; INTRAVENOUS EVERY 6 HOURS PRN
Status: DISCONTINUED | OUTPATIENT
Start: 2018-01-11 | End: 2018-01-13 | Stop reason: HOSPADM

## 2018-01-11 RX ORDER — FENTANYL CITRATE 50 UG/ML
50 INJECTION, SOLUTION INTRAMUSCULAR; INTRAVENOUS
Status: DISCONTINUED | OUTPATIENT
Start: 2018-01-11 | End: 2018-01-11 | Stop reason: HOSPADM

## 2018-01-11 RX ORDER — LABETALOL HYDROCHLORIDE 5 MG/ML
5 INJECTION, SOLUTION INTRAVENOUS EVERY 10 MIN PRN
Status: DISCONTINUED | OUTPATIENT
Start: 2018-01-11 | End: 2018-01-11 | Stop reason: HOSPADM

## 2018-01-11 RX ORDER — HYDROCODONE BITARTRATE AND ACETAMINOPHEN 5; 325 MG/1; MG/1
2 TABLET ORAL EVERY 4 HOURS PRN
Status: DISCONTINUED | OUTPATIENT
Start: 2018-01-11 | End: 2018-01-13 | Stop reason: HOSPADM

## 2018-01-11 RX ORDER — ONDANSETRON 2 MG/ML
4 INJECTION INTRAMUSCULAR; INTRAVENOUS EVERY 6 HOURS PRN
Status: DISCONTINUED | OUTPATIENT
Start: 2018-01-11 | End: 2018-01-13 | Stop reason: HOSPADM

## 2018-01-11 RX ORDER — LISINOPRIL 10 MG/1
10 TABLET ORAL DAILY
Status: DISCONTINUED | OUTPATIENT
Start: 2018-01-11 | End: 2018-01-13 | Stop reason: HOSPADM

## 2018-01-11 RX ORDER — LIDOCAINE HYDROCHLORIDE 10 MG/ML
INJECTION, SOLUTION EPIDURAL; INFILTRATION; INTRACAUDAL; PERINEURAL PRN
Status: DISCONTINUED | OUTPATIENT
Start: 2018-01-11 | End: 2018-01-11 | Stop reason: HOSPADM

## 2018-01-11 RX ORDER — FENTANYL CITRATE 50 UG/ML
INJECTION, SOLUTION INTRAMUSCULAR; INTRAVENOUS PRN
Status: DISCONTINUED | OUTPATIENT
Start: 2018-01-11 | End: 2018-01-11 | Stop reason: SDUPTHER

## 2018-01-11 RX ORDER — ASPIRIN 81 MG/1
81 TABLET ORAL DAILY
Status: DISCONTINUED | OUTPATIENT
Start: 2018-01-11 | End: 2018-01-13 | Stop reason: HOSPADM

## 2018-01-11 RX ORDER — SCOLOPAMINE TRANSDERMAL SYSTEM 1 MG/1
1 PATCH, EXTENDED RELEASE TRANSDERMAL ONCE
Status: DISCONTINUED | OUTPATIENT
Start: 2018-01-11 | End: 2018-01-11

## 2018-01-11 RX ORDER — LEVOTHYROXINE SODIUM 0.05 MG/1
50 TABLET ORAL DAILY
Status: DISCONTINUED | OUTPATIENT
Start: 2018-01-12 | End: 2018-01-13 | Stop reason: HOSPADM

## 2018-01-11 RX ORDER — DIPHENHYDRAMINE HYDROCHLORIDE 50 MG/ML
INJECTION INTRAMUSCULAR; INTRAVENOUS
Status: COMPLETED
Start: 2018-01-11 | End: 2018-01-11

## 2018-01-11 RX ORDER — ONDANSETRON 2 MG/ML
INJECTION INTRAMUSCULAR; INTRAVENOUS PRN
Status: DISCONTINUED | OUTPATIENT
Start: 2018-01-11 | End: 2018-01-11 | Stop reason: SDUPTHER

## 2018-01-11 RX ORDER — METOPROLOL TARTRATE 50 MG/1
50 TABLET, FILM COATED ORAL 2 TIMES DAILY
Status: DISCONTINUED | OUTPATIENT
Start: 2018-01-11 | End: 2018-01-11

## 2018-01-11 RX ORDER — HYDROMORPHONE HCL 110MG/55ML
0.5 PATIENT CONTROLLED ANALGESIA SYRINGE INTRAVENOUS EVERY 5 MIN PRN
Status: DISCONTINUED | OUTPATIENT
Start: 2018-01-11 | End: 2018-01-11 | Stop reason: HOSPADM

## 2018-01-11 RX ORDER — LIDOCAINE HYDROCHLORIDE 10 MG/ML
1 INJECTION, SOLUTION EPIDURAL; INFILTRATION; INTRACAUDAL; PERINEURAL
Status: COMPLETED | OUTPATIENT
Start: 2018-01-11 | End: 2018-01-11

## 2018-01-11 RX ORDER — BUPIVACAINE HYDROCHLORIDE 5 MG/ML
INJECTION, SOLUTION PERINEURAL PRN
Status: DISCONTINUED | OUTPATIENT
Start: 2018-01-11 | End: 2018-01-11 | Stop reason: HOSPADM

## 2018-01-11 RX ORDER — SODIUM CHLORIDE 0.9 % (FLUSH) 0.9 %
10 SYRINGE (ML) INJECTION EVERY 12 HOURS SCHEDULED
Status: DISCONTINUED | OUTPATIENT
Start: 2018-01-11 | End: 2018-01-13 | Stop reason: HOSPADM

## 2018-01-11 RX ORDER — ASPIRIN 81 MG/1
81 TABLET ORAL ONCE
Status: COMPLETED | OUTPATIENT
Start: 2018-01-11 | End: 2018-01-11

## 2018-01-11 RX ORDER — SODIUM CHLORIDE 0.9 % (FLUSH) 0.9 %
10 SYRINGE (ML) INJECTION PRN
Status: DISCONTINUED | OUTPATIENT
Start: 2018-01-11 | End: 2018-01-13 | Stop reason: HOSPADM

## 2018-01-11 RX ORDER — ROCURONIUM BROMIDE 10 MG/ML
INJECTION, SOLUTION INTRAVENOUS PRN
Status: DISCONTINUED | OUTPATIENT
Start: 2018-01-11 | End: 2018-01-11 | Stop reason: SDUPTHER

## 2018-01-11 RX ORDER — ATORVASTATIN CALCIUM 40 MG/1
40 TABLET, FILM COATED ORAL DAILY
Status: DISCONTINUED | OUTPATIENT
Start: 2018-01-11 | End: 2018-01-13 | Stop reason: HOSPADM

## 2018-01-11 RX ORDER — MORPHINE SULFATE 4 MG/ML
INJECTION, SOLUTION INTRAMUSCULAR; INTRAVENOUS
Status: DISPENSED
Start: 2018-01-11 | End: 2018-01-12

## 2018-01-11 RX ORDER — MORPHINE SULFATE 4 MG/ML
4 INJECTION, SOLUTION INTRAMUSCULAR; INTRAVENOUS
Status: DISCONTINUED | OUTPATIENT
Start: 2018-01-11 | End: 2018-01-11 | Stop reason: HOSPADM

## 2018-01-11 RX ADMIN — HYDRALAZINE HYDROCHLORIDE 5 MG: 20 INJECTION INTRAMUSCULAR; INTRAVENOUS at 20:53

## 2018-01-11 RX ADMIN — ASPIRIN 81 MG: 81 TABLET, COATED ORAL at 08:31

## 2018-01-11 RX ADMIN — ROCURONIUM BROMIDE 40 MG: 10 INJECTION INTRAVENOUS at 11:05

## 2018-01-11 RX ADMIN — DIPHENHYDRAMINE HYDROCHLORIDE 25 MG: 50 INJECTION INTRAMUSCULAR; INTRAVENOUS at 17:54

## 2018-01-11 RX ADMIN — PROPOFOL 20 MG: 10 INJECTION, EMULSION INTRAVENOUS at 13:22

## 2018-01-11 RX ADMIN — HYDROMORPHONE HYDROCHLORIDE 0.5 MG: 2 INJECTION INTRAMUSCULAR; INTRAVENOUS; SUBCUTANEOUS at 21:05

## 2018-01-11 RX ADMIN — HYDROMORPHONE HYDROCHLORIDE 0.5 MG: 2 INJECTION INTRAMUSCULAR; INTRAVENOUS; SUBCUTANEOUS at 20:59

## 2018-01-11 RX ADMIN — EPHEDRINE SULFATE 10 MG: 50 INJECTION, SOLUTION INTRAMUSCULAR; INTRAVENOUS; SUBCUTANEOUS at 13:14

## 2018-01-11 RX ADMIN — SODIUM CHLORIDE: 9 INJECTION, SOLUTION INTRAVENOUS at 16:59

## 2018-01-11 RX ADMIN — FENTANYL CITRATE 100 MCG: 50 INJECTION, SOLUTION INTRAMUSCULAR; INTRAVENOUS at 11:05

## 2018-01-11 RX ADMIN — LIDOCAINE HYDROCHLORIDE 50 ML: 10 INJECTION, SOLUTION INFILTRATION; PERINEURAL at 11:05

## 2018-01-11 RX ADMIN — ONDANSETRON HYDROCHLORIDE 4 MG: 2 SOLUTION INTRAMUSCULAR; INTRAVENOUS at 20:19

## 2018-01-11 RX ADMIN — SODIUM CHLORIDE, SODIUM LACTATE, POTASSIUM CHLORIDE, AND CALCIUM CHLORIDE: 600; 310; 30; 20 INJECTION, SOLUTION INTRAVENOUS at 12:55

## 2018-01-11 RX ADMIN — EPHEDRINE SULFATE 5 MG: 50 INJECTION, SOLUTION INTRAMUSCULAR; INTRAVENOUS; SUBCUTANEOUS at 13:18

## 2018-01-11 RX ADMIN — CLONIDINE HYDROCHLORIDE 0.1 MG: 0.1 TABLET ORAL at 16:44

## 2018-01-11 RX ADMIN — ONDANSETRON 4 MG: 2 INJECTION INTRAMUSCULAR; INTRAVENOUS at 17:00

## 2018-01-11 RX ADMIN — PROPOFOL 20 MG: 10 INJECTION, EMULSION INTRAVENOUS at 13:35

## 2018-01-11 RX ADMIN — LISINOPRIL 10 MG: 10 TABLET ORAL at 17:33

## 2018-01-11 RX ADMIN — PROPOFOL 100 MG: 10 INJECTION, EMULSION INTRAVENOUS at 19:33

## 2018-01-11 RX ADMIN — ONDANSETRON HYDROCHLORIDE 4 MG: 2 SOLUTION INTRAMUSCULAR; INTRAVENOUS at 12:33

## 2018-01-11 RX ADMIN — EPHEDRINE SULFATE 5 MG: 50 INJECTION, SOLUTION INTRAMUSCULAR; INTRAVENOUS; SUBCUTANEOUS at 13:10

## 2018-01-11 RX ADMIN — CEFAZOLIN SODIUM 2 G: 2 SOLUTION INTRAVENOUS at 11:09

## 2018-01-11 RX ADMIN — MORPHINE SULFATE 5 MG: 10 INJECTION INTRAMUSCULAR; INTRAVENOUS; SUBCUTANEOUS at 13:00

## 2018-01-11 RX ADMIN — PHENYLEPHRINE HYDROCHLORIDE 50 MCG: 10 INJECTION INTRAVENOUS at 12:01

## 2018-01-11 RX ADMIN — METOPROLOL TARTRATE 50 MG: 50 TABLET, FILM COATED ORAL at 17:02

## 2018-01-11 RX ADMIN — CEFAZOLIN 1 G: 1 INJECTION, POWDER, FOR SOLUTION INTRAMUSCULAR; INTRAVENOUS at 17:00

## 2018-01-11 RX ADMIN — MORPHINE SULFATE 4 MG: 4 INJECTION, SOLUTION INTRAMUSCULAR; INTRAVENOUS at 20:46

## 2018-01-11 RX ADMIN — FENTANYL CITRATE 25 MCG: 50 INJECTION, SOLUTION INTRAMUSCULAR; INTRAVENOUS at 19:45

## 2018-01-11 RX ADMIN — PHENYLEPHRINE HYDROCHLORIDE 100 MCG: 10 INJECTION INTRAVENOUS at 11:46

## 2018-01-11 RX ADMIN — DEXAMETHASONE SODIUM PHOSPHATE 10 MG: 4 INJECTION, SOLUTION INTRAMUSCULAR; INTRAVENOUS at 11:17

## 2018-01-11 RX ADMIN — SODIUM CHLORIDE: 9 INJECTION, SOLUTION INTRAVENOUS at 23:01

## 2018-01-11 RX ADMIN — EPHEDRINE SULFATE 5 MG: 50 INJECTION, SOLUTION INTRAMUSCULAR; INTRAVENOUS; SUBCUTANEOUS at 11:12

## 2018-01-11 RX ADMIN — SODIUM CHLORIDE, SODIUM LACTATE, POTASSIUM CHLORIDE, AND CALCIUM CHLORIDE: 600; 310; 30; 20 INJECTION, SOLUTION INTRAVENOUS at 08:31

## 2018-01-11 RX ADMIN — MIDAZOLAM 1 MG: 1 INJECTION INTRAMUSCULAR; INTRAVENOUS at 09:54

## 2018-01-11 RX ADMIN — ROCURONIUM BROMIDE 10 MG: 10 INJECTION INTRAVENOUS at 12:32

## 2018-01-11 RX ADMIN — ATORVASTATIN CALCIUM 40 MG: 40 TABLET, FILM COATED ORAL at 17:15

## 2018-01-11 RX ADMIN — DIPHENHYDRAMINE HYDROCHLORIDE 25 MG: 50 INJECTION, SOLUTION INTRAMUSCULAR; INTRAVENOUS at 17:54

## 2018-01-11 RX ADMIN — SUGAMMADEX 100 MG: 100 INJECTION, SOLUTION INTRAVENOUS at 12:50

## 2018-01-11 RX ADMIN — PROPOFOL 150 MG: 10 INJECTION, EMULSION INTRAVENOUS at 11:05

## 2018-01-11 RX ADMIN — LIDOCAINE HYDROCHLORIDE 5 ML: 10 INJECTION, SOLUTION INFILTRATION; PERINEURAL at 19:33

## 2018-01-11 RX ADMIN — EPHEDRINE SULFATE 5 MG: 50 INJECTION, SOLUTION INTRAMUSCULAR; INTRAVENOUS; SUBCUTANEOUS at 11:10

## 2018-01-11 RX ADMIN — MORPHINE SULFATE 5 MG: 10 INJECTION INTRAMUSCULAR; INTRAVENOUS; SUBCUTANEOUS at 13:14

## 2018-01-11 RX ADMIN — HEPARIN SODIUM 5000 UNITS: 1000 INJECTION, SOLUTION INTRAVENOUS; SUBCUTANEOUS at 11:46

## 2018-01-11 RX ADMIN — LIDOCAINE HYDROCHLORIDE 1 ML: 10 INJECTION, SOLUTION EPIDURAL; INFILTRATION; INTRACAUDAL; PERINEURAL at 08:31

## 2018-01-11 RX ADMIN — FENTANYL CITRATE 50 MCG: 50 INJECTION, SOLUTION INTRAMUSCULAR; INTRAVENOUS at 11:17

## 2018-01-11 RX ADMIN — CEFAZOLIN 1 G: 1 INJECTION, POWDER, FOR SOLUTION INTRAMUSCULAR; INTRAVENOUS at 23:48

## 2018-01-11 ASSESSMENT — PAIN SCALES - GENERAL
PAINLEVEL_OUTOF10: 7
PAINLEVEL_OUTOF10: 0
PAINLEVEL_OUTOF10: 0
PAINLEVEL_OUTOF10: 7
PAINLEVEL_OUTOF10: 7
PAINLEVEL_OUTOF10: 0
PAINLEVEL_OUTOF10: 7
PAINLEVEL_OUTOF10: 0
PAINLEVEL_OUTOF10: 0

## 2018-01-11 ASSESSMENT — ENCOUNTER SYMPTOMS
SHORTNESS OF BREATH: 0
SHORTNESS OF BREATH: 0

## 2018-01-11 ASSESSMENT — LIFESTYLE VARIABLES
SMOKING_STATUS: 0
SMOKING_STATUS: 0

## 2018-01-11 NOTE — ANESTHESIA PRE PROCEDURE
Allergies   Allergen Reactions    Levaquin [Levofloxacin In D5w] Nausea And Vomiting       Problem List:    Patient Active Problem List   Diagnosis Code    Hyperlipidemia E78.5    Hypertension I10    Arthritis M19.90    CAD (coronary artery disease) I25.10    Carotid artery stenosis I65.29    Calculus of gallbladder without cholecystitis without obstruction K80.20    Severe mitral stenosis by prior echocardiogram I05.0    Severe mitral regurgitation by prior echocardiogram I34.0    PUD (peptic ulcer disease) K27.9    Atherosclerosis of native artery of extremity with intermittent claudication ([facility]) I70.219    Pulmonary hypertension I27.20    PAD (peripheral artery disease) ([facility]) I73.9    Nocturnal hypoxia G47.34    Acute superficial venous thrombosis of right lower extremity I82.811    Non-pressure chronic ulcer of right calf with fat layer exposed ([address] L97.212    Decubitus ulcer of right buttock, stage 3 ([facility]) L89.313    Severe malnutrition ([address] Z17    Diastolic dysfunction B82.7    Anemia in chronic kidney disease (CKD) N18.9, D63.1    Acute blood loss anemia D62    Nonhealing nonsurgical wound with fat layer exposed T14. 8XXA    Atherosclerosis of native arteries of left leg with ulceration of calf ([facility]) I70.242    Atherosclerosis of native artery of right lower extremity with ulceration of ankle ([facility]) I70.233    Atherosclerosis of native arteries of right leg with ulceration of other part of lower right leg ([facility]) I70.238    Atherosclerosis of native arteries of right leg with ulceration of other part of foot I70.235    Nonhealing ulcer of right lower leg with fat layer exposed ([address] L97.912    Non-pressure chronic ulcer of right ankle with fat layer exposed ([address] L97.312    Neuropathic ulcer of right foot with fat layer exposed ([address] L97.512    Hypervolemia E87.70    Acute blood loss anemia D62    Atherosclerosis of native artery of extremity with intermittent claudication 113 08/29/2016     08/29/2016    ALT 61 08/29/2016       POC Tests: No results for input(s): POCGLU, POCNA, POCK, POCCL, POCBUN, POCHEMO, POCHCT in the last 72 hours. Coags:   Lab Results   Component Value Date    PROTIME 14.1 01/03/2018    INR 1.10 01/03/2018    APTT 32.5 01/03/2018       HCG (If Applicable): No results found for: PREGTESTUR, PREGSERUM, HCG, HCGQUANT     ABGs:   Lab Results   Component Value Date    PHART 7.380 06/02/2016    PO2ART 72.0 06/02/2016    MIT9ILZ 46.0 06/02/2016    ZFQ9ENU 27.2 06/02/2016    BEART 1.7 06/02/2016    J6HLCRYV 94.3 06/02/2016        Type & Screen (If Applicable):  No results found for: LABABO, [address]    Anesthesia Evaluation  Patient summary reviewed and Nursing notes reviewed no history of anesthetic complications:   Airway: Mallampati: II  TM distance: >3 FB   Neck ROM: full  Mouth opening: > = 3 FB Dental: normal exam   (+) upper dentures and lower dentures      Pulmonary:Negative Pulmonary ROS and normal exam  breath sounds clear to auscultation  (+) COPD:      (-) shortness of breath and not a current smoker          Patient did not smoke on day of surgery. ROS comment:   Mechanical Ventilation Data  SETTINGS (Comprehensive)          ABG   No results found for: PH, PCO2, PO2, HCO3, O2SAT  No results found for: IFIO2, MODE, SETTIDVOL, SETPEEP       Cardiovascular:    (+) valvular problems/murmurs: MR, past MI:, CAD: obstructive, CABG/stent:, dysrhythmias:, pulmonary hypertension:, hyperlipidemia    (-) hypertension,  angina and  CHF    NYHA Classification: I  ECG reviewed  Rhythm: regular  Rate: normal  Echocardiogram reviewed  Stress test reviewed  Cleared by cardiology     Beta Blocker:  Dose within 24 Hrs      ROS comment:  Conclusions      Summary   1. Concentric LVH   2. Normal LV systolic function (estimated EF, 55-60%)   3. Grade 3 diastolic dysfunction   4. Normal functioning bioprosthetic valve in the mitral position.    5. Echolucency near preserved posterior mitral leaflet, likely chordae   6. Large pleural effusion   7. Moderate tricuspid regurgitation; estimated RVSP of at least 72 mmHg. Recommendation     Neuro/Psych:   Negative Neuro/Psych ROS     (-) seizures, CVA and depression/anxiety            GI/Hepatic/Renal: Neg GI/Hepatic/Renal ROS  (+) PUD, renal disease: CRI,      (-) hiatal hernia and GERD       Endo/Other: Negative Endo/Other ROS   (+) Type II DM, , hypothyroidism::., .          Pt had PAT visit. Abdominal:       Abdomen: soft. Vascular:                                      Anesthesia Plan      general     ASA 4       Induction: intravenous. arterial line    Anesthetic plan and risks discussed with patient. Plan discussed with CRNA.                   Macho Dowell MD   1/11/2018

## 2018-01-11 NOTE — H&P (VIEW-ONLY)
 atorvastatin (LIPITOR) 40 MG tablet Take 40 mg by mouth daily      aspirin EC 81 MG EC tablet Take 81 mg by mouth daily. No current facility-administered medications for this visit. Allergies: Levaquin [levofloxacin in d5w]  Past Medical History:   Diagnosis Date    Aortic stenosis     Arthritis     Atherosclerosis of native arteries of the extremities with intermittent claudication 7/25/2011    CAD (coronary artery disease)     Carotid artery occlusion     [facility]     History of blood transfusion     Hyperlipidemia     Hypertension     MI (myocardial infarction) 2009    x2    Mitral valve stenosis     PUD (peptic ulcer disease) 2009    Renal failure 2009    Wound infection after surgery     right foot     Past Surgical History:   Procedure Laterality Date    ABDOMEN SURGERY  2009    perforated ulcer resection of 1/3 stomach    CARDIAC SURGERY      CAROTID ENDARTERECTOMY  11/08/07 [facility]    Right  with Dacron patch angioplasty    CARPAL TUNNEL RELEASE Right early 1990's    long ago   [facility]  2 weeks ago    DILATION AND CURETTAGE OF UTERUS      ILIO-FEMORAL BYPASS GRAFT N/A 6/2/2016    OPEN TRANSLUMINAL BALLOON ANGIOPLASTY AND STENTING OF RIGHT COMMON AND EXTERNAL  ILIAC ARTERIES; RIGHT FEMORAL ENDARTERECTOMY WITH VEIN PATCH ANGIOPLASTY performed by Karen Cuellar MD at [address] [facility] N/A 4/7/2016    MITRAL VALVE  REPLACEMENT LUONG-MAZE ABLATION WITH CRYO PROCEDURE, CORONARY ARTERY BYPASS GRAFT X 1 WITH ENDOSCOPIC VEIN HARVESTING WITH PERFUSION TRANSESOPHAGEAL ECHOCARDIOGRAM performed by Jaclyn Navarrete MD at [address] [place] [address] Road [facility]      SKIN GRAFT Right 7/22/2016    Skin graft split thickness foot,ankle,and leg. Right leg 26x8cm and 12x6cm total area 280cm squared. TJR    UPPER GASTROINTESTINAL ENDOSCOPY  3/15/16    Dr Carlos Casillas GASTROINTESTINAL ENDOSCOPY  3/15/2016    EGD BIOPSY performed by Josephine Pierre DO at [facility]    VASCULAR SURGERY  5/27/16 [facility]    Aortagram and right leg runoff,right leg runoff,right common iliac artery selection for right leg run off views.  VASCULAR SURGERY      . Open transluminal angioplasty and stenting of the external iliac artery. TJR     Family History   Problem Relation Age of Onset    Diabetes Mother     Heart Disease Mother     Heart Failure Mother     Diabetes Sister     Heart Disease Sister     High Blood Pressure Sister      Social History   Substance Use Topics    Smoking status: Former Smoker     Years: 2.00     Quit date: 1/1/1980    Smokeless tobacco: Never Used    Alcohol use Yes      Comment: rarely maybe twice a year           Review of Systems    Constitutional  no significant activity change, appetite change, or unexpected weight change. No fever or chills. No diaphoresis or significant fatigue. HENT  no significant rhinorrhea or epistaxis. No tinnitus or significant hearing loss. Eyes  no sudden vision change or amaurosis. Respiratory  no significant shortness of breath, wheezing, or stridor. No apnea, cough, or chest tightness associated with shortness of breath. Cardiovascular  no chest pain, syncope, or significant dizziness. No palpitations or significant leg swelling. No claudication. Gastrointestinal  no abdominal swelling or pain. No blood in stool. No severe constipation, diarrhea, nausea, or vomiting. Genitourinary  No difficulty urinating, dysuria, frequency, or urgency. No flank pain or hematuria. Musculoskeletal  no back pain, gait disturbance, or myalgia. Skin  no color change, rash, pallor, or new wound. Neurologic  no dizziness, facial asymmetry, or light headedness. No seizures. No speech difficulty or lateralizing weakness. Hematologic  no easy bruising or excessive bleeding. Psychiatric  no severe anxiety or nervousness. No confusion. All other review of systems are negative. Physical Exam    BP (!) 159/91 Comment: right  Pulse 105   Temp 96.3 °F (35.7 °C)   Resp 18     Constitutional  well developed, well nourished. No diaphoresis or acute distress. HENT  head normocephalic. Right external ear canal appears normal.  Left external ear canal appears normal.  Septum appears midline. Eyes  conjunctiva normal.  EOMS normal.  No exudate. No icterus. Neck- ROM appears normal, no tracheal deviation. Cardiovascular  Regular rate and rhythm. Heart sounds are normal.  No murmur, rub, or gallop. Carotid pulses are 2+ to palpation bilaterally without bruit. Extremities - Radial and brachial pulses are 2+ to palpation bilaterally. Right femoral pulse: present 2+; Right popliteal pulse: absent Right DP: absent; Right PT absent; Left femoral pulse: present 2+; Left popliteal pulse: absent; Left DP: absent; Left PT: absent  No cyanosis, clubbing, or significant edema. No signs atheroembolic event. Pulmonary  effort appears normal.  No respiratory distress. Lungs - Breath sounds normal. No wheezes or rales. GI - Abdomen  soft, non tender, bowel sounds X 4 quadrants. No guarding or rebound tenderness. No distension or palpable mass. Genitourinary  deferred. Musculoskeletal  ROM appears normal.  No significant edema. Neurologic  alert and oriented X 3. Physiologic. Tongue midline. Face symmetric. Skin  warm, dry, and intact. No rash, erythema, or pallor. Psychiatric  mood, affect, and behavior appear normal.  Judgment and thought processes appear normal.    Doppler results:   Right CCA/ICA <50% stenotic  Left CCA/ICA 70-99% stenotic  Right vertebral artery flow is antegrade  Left vertebral artery flow is antegrade  Individual velocities reviewed: Yes. Results were reviewed with the patient. Disease process is unstable       Radiology results:     There is high-grade stenosis of the left internal

## 2018-01-11 NOTE — ANESTHESIA POSTPROCEDURE EVALUATION
Department of Anesthesiology  Postprocedure Note    Patient: Kristen Farrell  MRN: [id number]  YOB: 1945  Date of evaluation: 1/11/2018  Time:  2:00 PM     Procedure Summary     Date:  01/11/18 Room / Location:  [facility] OR  / [facility] OR    Anesthesia Start:  1056 Anesthesia Stop:      Procedure:  LEFT CAROTID ENDARTERECTOMY WITH EEG MONITORING AND COMPLETION DUPLEX ULTRASOUND (Left ) Diagnosis:  (R51.07)    Surgeon:  Caridad Alston MD Responsible Provider:  Verito Lafleur CRNA    Anesthesia Type:  general ASA Status:  4          Anesthesia Type: general    Latesha Phase I: Latesha Score: 10    Ltaesha Phase II:      Last vitals: Reviewed and per EMR flowsheets.        Anesthesia Post Evaluation    Patient location during evaluation: PACU  Patient participation: complete - patient participated  Level of consciousness: awake and responsive to verbal stimuli  Pain score: 0  Airway patency: patent  Nausea & Vomiting: no nausea and no vomiting  Complications: no  Cardiovascular status: blood pressure returned to baseline and hemodynamically stable  Respiratory status: acceptable, nasal cannula and spontaneous ventilation  Hydration status: stable

## 2018-01-12 LAB
ANION GAP SERPL CALCULATED.3IONS-SCNC: 14 MMOL/L (ref 7–19)
BASOPHILS ABSOLUTE: 0 K/UL (ref 0–0.2)
BASOPHILS RELATIVE PERCENT: 0.1 % (ref 0–1)
BUN BLDV-MCNC: 26 MG/DL (ref 8–23)
CALCIUM SERPL-MCNC: 8.3 MG/DL (ref 8.8–10.2)
CHLORIDE BLD-SCNC: 99 MMOL/L (ref 98–111)
CO2: 24 MMOL/L (ref 22–29)
CREAT SERPL-MCNC: 1.2 MG/DL (ref 0.5–0.9)
EOSINOPHILS ABSOLUTE: 0 K/UL (ref 0–0.6)
EOSINOPHILS RELATIVE PERCENT: 0 % (ref 0–5)
GFR NON-AFRICAN AMERICAN: 44
GLUCOSE BLD-MCNC: 107 MG/DL (ref 74–109)
HCT VFR BLD CALC: 29.4 % (ref 37–47)
HCT VFR BLD CALC: 29.5 % (ref 37–47)
HEMOGLOBIN: 8.3 G/DL (ref 12–16)
HEMOGLOBIN: 8.5 G/DL (ref 12–16)
LYMPHOCYTES ABSOLUTE: 0.9 K/UL (ref 1.1–4.5)
LYMPHOCYTES RELATIVE PERCENT: 6.7 % (ref 20–40)
MCH RBC QN AUTO: 24.3 PG (ref 27–31)
MCH RBC QN AUTO: 24.4 PG (ref 27–31)
MCHC RBC AUTO-ENTMCNC: 28.2 G/DL (ref 33–37)
MCHC RBC AUTO-ENTMCNC: 28.8 G/DL (ref 33–37)
MCV RBC AUTO: 84.8 FL (ref 81–99)
MCV RBC AUTO: 86.2 FL (ref 81–99)
MONOCYTES ABSOLUTE: 1.9 K/UL (ref 0–0.9)
MONOCYTES RELATIVE PERCENT: 14.3 % (ref 0–10)
NEUTROPHILS ABSOLUTE: 10.3 K/UL (ref 1.5–7.5)
NEUTROPHILS RELATIVE PERCENT: 78.4 % (ref 50–65)
PDW BLD-RTO: 17.8 % (ref 11.5–14.5)
PDW BLD-RTO: 17.9 % (ref 11.5–14.5)
PLATELET # BLD: 289 K/UL (ref 130–400)
PLATELET # BLD: 296 K/UL (ref 130–400)
PMV BLD AUTO: 9.1 FL (ref 9.4–12.3)
PMV BLD AUTO: 9.1 FL (ref 9.4–12.3)
POTASSIUM SERPL-SCNC: 4.7 MMOL/L (ref 3.5–5)
RBC # BLD: 3.41 M/UL (ref 4.2–5.4)
RBC # BLD: 3.48 M/UL (ref 4.2–5.4)
SODIUM BLD-SCNC: 137 MMOL/L (ref 136–145)
WBC # BLD: 13.1 K/UL (ref 4.8–10.8)
WBC # BLD: 15.3 K/UL (ref 4.8–10.8)

## 2018-01-12 PROCEDURE — 1210000000 HC MED SURG R&B

## 2018-01-12 PROCEDURE — 85027 COMPLETE CBC AUTOMATED: CPT

## 2018-01-12 PROCEDURE — 6360000002 HC RX W HCPCS: Performed by: SURGERY

## 2018-01-12 PROCEDURE — 36415 COLL VENOUS BLD VENIPUNCTURE: CPT

## 2018-01-12 PROCEDURE — 99024 POSTOP FOLLOW-UP VISIT: CPT | Performed by: SURGERY

## 2018-01-12 PROCEDURE — 85025 COMPLETE CBC W/AUTO DIFF WBC: CPT

## 2018-01-12 PROCEDURE — 6370000000 HC RX 637 (ALT 250 FOR IP): Performed by: SURGERY

## 2018-01-12 PROCEDURE — 2700000000 HC OXYGEN THERAPY PER DAY

## 2018-01-12 PROCEDURE — 80048 BASIC METABOLIC PNL TOTAL CA: CPT

## 2018-01-12 RX ADMIN — Medication 81 MG: at 10:00

## 2018-01-12 RX ADMIN — ATORVASTATIN CALCIUM 40 MG: 40 TABLET, FILM COATED ORAL at 10:00

## 2018-01-12 RX ADMIN — LEVOTHYROXINE SODIUM 50 MCG: 50 TABLET ORAL at 05:53

## 2018-01-12 RX ADMIN — ONDANSETRON 4 MG: 2 INJECTION INTRAMUSCULAR; INTRAVENOUS at 13:17

## 2018-01-12 RX ADMIN — CLOPIDOGREL BISULFATE 75 MG: 75 TABLET ORAL at 09:59

## 2018-01-12 RX ADMIN — ONDANSETRON 4 MG: 2 INJECTION INTRAMUSCULAR; INTRAVENOUS at 21:58

## 2018-01-12 ASSESSMENT — PAIN SCALES - GENERAL
PAINLEVEL_OUTOF10: 0

## 2018-01-12 NOTE — ANESTHESIA PRE PROCEDURE
[MAR Hold] albuterol (PROVENTIL) nebulizer solution 0.63 mg  0.63 mg Nebulization TID PRN Adolfo Klein MD        [MAR Hold] levothyroxine (SYNTHROID) tablet 50 mcg  50 mcg Oral Daily Adolfo Klein MD        [facility] Hold] 0.9 % sodium chloride infusion   Intravenous Continuous Adolfo Klein MD 50 mL/hr at 01/11/18 1736      [MAR Hold] sodium chloride flush 0.9 % injection 10 mL  10 mL Intravenous PRN Adolfo Klein MD        [MAR Hold] acetaminophen (TYLENOL) tablet 650 mg  650 mg Oral Q4H PRN Adolfo Klein MD        [facility] Hold] HYDROcodone-acetaminophen (NORCO) 5-325 MG per tablet 1 tablet  1 tablet Oral Q4H PRN Adolfo Klein MD        Or   [facility] Hold] HYDROcodone-acetaminophen (NORCO) 5-325 MG per tablet 2 tablet  2 tablet Oral Q4H PRN Adolfo Klein MD        [facility] Hold] sodium chloride flush 0.9 % injection 10 mL  10 mL Intravenous 2 times per day Adolfo Klein MD       [facility] Hold] ceFAZolin (ANCEF) 1 g in sterile water 10 mL IV syringe  1 g Intravenous Q8H Adolfo Klein MD   1 g at 01/11/18 1700    [MAR Hold] enoxaparin (LOVENOX) injection 40 mg  40 mg Subcutaneous Daily dAolfo Klein MD        [facility] Hold] cloNIDine (CATAPRES) tablet 0.1 mg  0.1 mg Oral Q4H PRN Adolfo Klein MD   0.1 mg at 01/11/18 1644    [MAR Hold] labetalol (NORMODYNE;TRANDATE) injection 10 mg  10 mg Intravenous Q4H PRN Adolfo Klein MD        [facility] Hold] metoprolol tartrate (LOPRESSOR) tablet 50 mg  50 mg Oral BID Adolfo Klein MD   50 mg at 01/11/18 1702    [MAR Hold] ondansetron (ZOFRAN) injection 4 mg  4 mg Intravenous Q6H PRN Adolfo Klein MD   4 mg at 01/11/18 1700    [MAR Hold] lisinopril (PRINIVIL;ZESTRIL) tablet 10 mg  10 mg Oral Daily Adolfo Klein MD   10 mg at 01/11/18 1733    [MAR Hold] morphine injection 4 mg  4 mg Intravenous Once Adolfo Klein MD        [MAR Hold] diphenhydrAMINE (BENADRYL) injection 25 mg  25 mg Intravenous Q6H PRN Adolfo Klein MD 05/03/2012    CL 94 01/03/2018     05/03/2012    CO2 27 01/03/2018    BUN 20 01/03/2018    CREATININE 1.1 01/03/2018    CREATININE 1.1 05/03/2012    LABGLOM 49 01/03/2018    GLUCOSE 84 01/03/2018    PROT 8.4 08/29/2016    CALCIUM 9.1 01/03/2018    BILITOT 0.5 08/29/2016    ALKPHOS 113 08/29/2016     08/29/2016    ALT 61 08/29/2016       POC Tests: No results for input(s): POCGLU, POCNA, POCK, POCCL, POCBUN, POCHEMO, POCHCT in the last 72 hours. Coags:   Lab Results   Component Value Date    PROTIME 14.1 01/03/2018    INR 1.10 01/03/2018    APTT 32.5 01/03/2018       HCG (If Applicable): No results found for: PREGTESTUR, PREGSERUM, HCG, HCGQUANT     ABGs:   Lab Results   Component Value Date    PHART 7.380 06/02/2016    PO2ART 72.0 06/02/2016    MYT6XDF 46.0 06/02/2016    VIZ8SZN 27.2 06/02/2016    BEART 1.7 06/02/2016    U8XGIXXD 94.3 06/02/2016        Type & Screen (If Applicable):  No results found for: LABABO, [address]    Anesthesia Evaluation  Patient summary reviewed and Nursing notes reviewed no history of anesthetic complications:   Airway: Mallampati: II  TM distance: >3 FB   Neck ROM: full  Mouth opening: > = 3 FB Dental: normal exam   (+) upper dentures and lower dentures      Pulmonary:Negative Pulmonary ROS and normal exam  breath sounds clear to auscultation  (+) COPD:      (-) shortness of breath and not a current smoker          Patient did not smoke on day of surgery.                 ROS comment:   Mechanical Ventilation Data  SETTINGS (Comprehensive)          ABG   No results found for: PH, PCO2, PO2, HCO3, O2SAT  No results found for: IFIO2, MODE, SETTIDVOL, SETPEEP       Cardiovascular:    (+) valvular problems/murmurs: MR, past MI:, CAD: obstructive, CABG/stent:, dysrhythmias:, pulmonary hypertension:, hyperlipidemia    (-) hypertension,  angina and  CHF    NYHA Classification: I  ECG reviewed  Rhythm: regular  Rate: normal  Echocardiogram reviewed  Stress test reviewed  Cleared by cardiology     Beta Blocker:  Dose within 24 Hrs      ROS comment:  Conclusions      Summary   1. Concentric LVH   2. Normal LV systolic function (estimated EF, 55-60%)   3. Grade 3 diastolic dysfunction   4. Normal functioning bioprosthetic valve in the mitral position. 5. Echolucency near preserved posterior mitral leaflet, likely chordae   6. Large pleural effusion   7. Moderate tricuspid regurgitation; estimated RVSP of at least 72 mmHg. Recommendation     Neuro/Psych:   Negative Neuro/Psych ROS     (-) seizures, CVA and depression/anxiety            GI/Hepatic/Renal: Neg GI/Hepatic/Renal ROS  (+) PUD, renal disease: CRI,      (-) hiatal hernia and GERD       Endo/Other: Negative Endo/Other ROS   (+) Type II DM, , hypothyroidism::., .          Pt had PAT visit. Abdominal:       Abdomen: soft. Vascular:                                          Anesthesia Plan      general     ASA 4 - emergent     (No airway compromise, )  Induction: intravenous. arterial line    Anesthetic plan and risks discussed with patient. Plan discussed with CRNA.                   Sheree Morales MD   1/11/2018

## 2018-01-12 NOTE — PLAN OF CARE
Problem: Nutrition  Goal: Optimal nutrition therapy  Nutrition Problem: Underweight  Intervention: Food and/or Nutrient Delivery: Continue current diet  Nutritional Goals: po intake of 50% or more.    Outcome: Ongoing

## 2018-01-12 NOTE — PROGRESS NOTES
Dr. Yani Ortiz at bedside. Notified of neck measuring 16 inches, which is 1 inch larger than previous measurement. EDILIA drain milked and output recorded. Mepilex to neck changed. Manual blood pressure repeat was 150/82. Plan is to take patient back to OR. Will continue to monitor.

## 2018-01-12 NOTE — PLAN OF CARE
Problem: Pain:  Goal: Pain level will decrease  Pain level will decrease   Outcome: Ongoing    Goal: Control of acute pain  Control of acute pain   Outcome: Ongoing    Goal: Control of chronic pain  Control of chronic pain   Outcome: Ongoing      Problem: Falls - Risk of  Goal: Absence of falls  Outcome: Ongoing      Problem: SAFETY  Goal: Free from accidental physical injury  Outcome: Ongoing    Goal: Free from intentional harm  Outcome: Ongoing      Problem: DAILY CARE  Goal: Daily care needs are met  Outcome: Ongoing      Problem: PAIN  Goal: Patient's pain/discomfort is manageable  Outcome: Ongoing      Problem: SKIN INTEGRITY  Goal: Skin integrity is maintained or improved  Outcome: Ongoing      Problem: KNOWLEDGE DEFICIT  Goal: Patient/S.O. demonstrates understanding of disease process, treatment plan, medications, and discharge instructions.   Outcome: Ongoing      Problem: DISCHARGE BARRIERS  Goal: Patient's continuum of care needs are met  Outcome: Ongoing

## 2018-01-12 NOTE — OP NOTE
to look for any  bleeding points. We found a couple of small areas of oozing, particularly  in the upper portion of dissection near the digastric muscle. These were  carefully clipped with attention to make sure that we did not disturb the  hypoglossal or the vagus nerve. We then looked through the remainder of the wound. There was a little bit  of oozing from the omohyoid muscle which had been cut for exposure. Multiple clips were placed here as well. We did not find any obvious  bleeding sources other than literally minute oozing places. I then  irrigated out with copious amounts of saline and then we used sterile water  to make sure that we had no other areas of bleeding. I then used some snow  with thrombin around the areas where the carotid had been dissected. I  also used some Avitene powder. A 10 mm fully fluted Silastic drain was  placed in the deep portion of the wound. The wound was then closed with the platysmal layer of buried 3-0 Vicryl  sutures. I then used some dermal sutures of 4-0 Vicryl and skin staples to  reapproximate the skin. The patient was awoke, at neurologic baseline, and able to be taken to the  recovery room in stable condition. FINDINGS OF THE CASE:  No obvious source of bleeding, just multiple tiny  areas of oozing.         Dilma Hoang MD    D: 01/11/2018 21:39:03       T: 01/11/2018 21:41:25     [email or web address]  Job#: [id number]     Doc#: [id number]    CC:

## 2018-01-12 NOTE — OP NOTE
the  patient. I elected to do an eversion-type endarterectomy. Therefore, I  disconnected the internal carotid artery from the common carotid artery at  the bifurcation and then proceeded to do an eversion endarterectomy, where  the plaque feathered out very nicely about 2 cm beyond the flow divider. I  cleaned up all with some media. I then slightly extended my incision in  the common carotid artery distally and performed an endarterectomy of the  distal common carotid artery as well as an eversion endarterectomy of the  external carotid artery. We cleaned up all with some media. I then  reanastomosed the internal carotid artery to the common carotid using a  two-suture technique of 6-0 Prolene quartering the anastomosis. Just prior  to completing the anastomosis, we allowed for backbleeding and flushing and  then tied our sutures. I did allow the internal carotid to backbleed any air as I was throwing the  last few sutures of our arterotomy closure so as to remove any air from the  endarterectomy site. Finger pressure was held at the internal carotid and then the external  carotid was opened for 10 beats _____ common carotid artery and then  finally we opened up the internal carotid. Again, we had no changes in our  EEG monitoring. At this point, we brought the intraoperative duplex probe into the field  and examined the endarterectomy site and color flow duplex imaging  gray-scale and with Doppler waveforms, all these were satisfactory showing  no technical defects. At this point, we closed the wound over a 10 mm Silastic drain brought out  through a separate stab incision at the base of neck. We closed the wound  with a deep interrupted layer of 3-0 Vicryl in the platysma layer and a  running subcuticular stitch of 4-0 Vicryl. A 0.5% Marcaine with  epinephrine was injected in the skin edges to help with postoperative pain  control as well as skin edge oozing.     Upon awakening, it was noted

## 2018-01-13 ENCOUNTER — APPOINTMENT (OUTPATIENT)
Dept: GENERAL RADIOLOGY | Age: 73
DRG: 871 | End: 2018-01-13
Payer: MEDICARE

## 2018-01-13 ENCOUNTER — HOSPITAL ENCOUNTER (INPATIENT)
Age: 73
LOS: 9 days | Discharge: HOME HEALTH | DRG: 871 | End: 2018-01-22
Attending: EMERGENCY MEDICINE | Admitting: HOSPITALIST
Payer: MEDICARE

## 2018-01-13 VITALS
TEMPERATURE: 97.4 F | RESPIRATION RATE: 18 BRPM | HEART RATE: 65 BPM | HEIGHT: 63 IN | BODY MASS INDEX: 17.19 KG/M2 | DIASTOLIC BLOOD PRESSURE: 61 MMHG | WEIGHT: 97 LBS | OXYGEN SATURATION: 90 % | SYSTOLIC BLOOD PRESSURE: 147 MMHG

## 2018-01-13 DIAGNOSIS — I21.4 NSTEMI (NON-ST ELEVATED MYOCARDIAL INFARCTION) (HCC): Primary | ICD-10-CM

## 2018-01-13 DIAGNOSIS — R74.8 ELEVATED LIVER ENZYMES: ICD-10-CM

## 2018-01-13 DIAGNOSIS — N17.9 ACUTE RENAL FAILURE, UNSPECIFIED ACUTE RENAL FAILURE TYPE (HCC): ICD-10-CM

## 2018-01-13 DIAGNOSIS — J18.9 PNEUMONIA DUE TO ORGANISM: ICD-10-CM

## 2018-01-13 DIAGNOSIS — R55 SYNCOPE AND COLLAPSE: ICD-10-CM

## 2018-01-13 DIAGNOSIS — R00.1 BRADYCARDIA: ICD-10-CM

## 2018-01-13 DIAGNOSIS — E87.5 HYPERKALEMIA: ICD-10-CM

## 2018-01-13 PROBLEM — R65.20 SEPSIS WITH ORGAN DYSFUNCTION (HCC): Status: ACTIVE | Noted: 2018-01-13

## 2018-01-13 PROBLEM — A41.9 SEPSIS WITH ORGAN DYSFUNCTION (HCC): Status: ACTIVE | Noted: 2018-01-13

## 2018-01-13 LAB
ABO/RH: NORMAL
ALBUMIN SERPL-MCNC: 3.6 G/DL (ref 3.5–5.2)
ALP BLD-CCNC: 90 U/L (ref 35–104)
ALT SERPL-CCNC: 443 U/L (ref 5–33)
ANION GAP SERPL CALCULATED.3IONS-SCNC: 15 MMOL/L (ref 7–19)
ANION GAP SERPL CALCULATED.3IONS-SCNC: 16 MMOL/L (ref 7–19)
ANION GAP SERPL CALCULATED.3IONS-SCNC: 18 MMOL/L (ref 7–19)
ANISOCYTOSIS: ABNORMAL
ANTIBODY SCREEN: NORMAL
APTT: 32.8 SEC (ref 26–36.2)
AST SERPL-CCNC: 1201 U/L (ref 5–32)
BASOPHILS ABSOLUTE: 0 K/UL (ref 0–0.2)
BASOPHILS MANUAL: 0 %
BASOPHILS RELATIVE PERCENT: 0 % (ref 0–1)
BILIRUB SERPL-MCNC: 0.7 MG/DL (ref 0.2–1.2)
BUN BLDV-MCNC: 48 MG/DL (ref 8–23)
BUN BLDV-MCNC: 59 MG/DL (ref 8–23)
BUN BLDV-MCNC: 62 MG/DL (ref 8–23)
CALCIUM SERPL-MCNC: 7.9 MG/DL (ref 8.8–10.2)
CALCIUM SERPL-MCNC: 8.2 MG/DL (ref 8.8–10.2)
CALCIUM SERPL-MCNC: 8.2 MG/DL (ref 8.8–10.2)
CHLORIDE BLD-SCNC: 96 MMOL/L (ref 98–111)
CHLORIDE BLD-SCNC: 99 MMOL/L (ref 98–111)
CHLORIDE BLD-SCNC: 99 MMOL/L (ref 98–111)
CO2: 19 MMOL/L (ref 22–29)
CO2: 20 MMOL/L (ref 22–29)
CO2: 22 MMOL/L (ref 22–29)
CREAT SERPL-MCNC: 2.6 MG/DL (ref 0.5–0.9)
CREAT SERPL-MCNC: 2.9 MG/DL (ref 0.5–0.9)
CREAT SERPL-MCNC: 2.9 MG/DL (ref 0.5–0.9)
EOSINOPHILS ABSOLUTE: 0 K/UL (ref 0–0.6)
EOSINOPHILS RELATIVE PERCENT: 0 % (ref 0–5)
GFR NON-AFRICAN AMERICAN: 16
GFR NON-AFRICAN AMERICAN: 16
GFR NON-AFRICAN AMERICAN: 18
GLUCOSE BLD-MCNC: 107 MG/DL (ref 74–109)
GLUCOSE BLD-MCNC: 110 MG/DL (ref 74–109)
GLUCOSE BLD-MCNC: 118 MG/DL (ref 74–109)
HCT VFR BLD CALC: 28.8 % (ref 37–47)
HCT VFR BLD CALC: 28.9 % (ref 37–47)
HEMOGLOBIN: 8.5 G/DL (ref 12–16)
HEMOGLOBIN: 8.6 G/DL (ref 12–16)
HYPOCHROMIA: ABNORMAL
INR BLD: 1.74 (ref 0.88–1.18)
LACTIC ACID: 1.2 MMOL/L (ref 0.5–1.9)
LACTIC ACID: 3.9 MMOL/L (ref 0.5–1.9)
LIPASE: 38 U/L (ref 13–60)
LYMPHOCYTES ABSOLUTE: 2 K/UL (ref 1.1–4.5)
LYMPHOCYTES RELATIVE PERCENT: 10 % (ref 20–40)
MCH RBC QN AUTO: 24.8 PG (ref 27–31)
MCH RBC QN AUTO: 24.9 PG (ref 27–31)
MCHC RBC AUTO-ENTMCNC: 29.4 G/DL (ref 33–37)
MCHC RBC AUTO-ENTMCNC: 29.9 G/DL (ref 33–37)
MCV RBC AUTO: 83.2 FL (ref 81–99)
MCV RBC AUTO: 84.3 FL (ref 81–99)
MONOCYTES ABSOLUTE: 1.4 K/UL (ref 0–0.9)
MONOCYTES RELATIVE PERCENT: 7 % (ref 0–10)
NEUTROPHILS ABSOLUTE: 16.5 K/UL (ref 1.5–7.5)
NEUTROPHILS MANUAL: 83 %
NEUTROPHILS RELATIVE PERCENT: 83 % (ref 50–65)
OVALOCYTES: ABNORMAL
PDW BLD-RTO: 17.8 % (ref 11.5–14.5)
PDW BLD-RTO: 17.9 % (ref 11.5–14.5)
PERFORMED ON: ABNORMAL
PLATELET # BLD: 283 K/UL (ref 130–400)
PLATELET # BLD: 309 K/UL (ref 130–400)
PLATELET SLIDE REVIEW: ADEQUATE
PMV BLD AUTO: 10.1 FL (ref 9.4–12.3)
PMV BLD AUTO: 9.9 FL (ref 9.4–12.3)
POC TROPONIN I: 5.27 NG/ML (ref 0–0.08)
POLYCHROMASIA: ABNORMAL
POTASSIUM SERPL-SCNC: 4.8 MMOL/L (ref 3.5–5)
POTASSIUM SERPL-SCNC: 5.5 MMOL/L (ref 3.5–5)
POTASSIUM SERPL-SCNC: 5.9 MMOL/L (ref 3.5–5)
PROTHROMBIN TIME: 20.4 SEC (ref 12–14.6)
RBC # BLD: 3.43 M/UL (ref 4.2–5.4)
RBC # BLD: 3.46 M/UL (ref 4.2–5.4)
SODIUM BLD-SCNC: 134 MMOL/L (ref 136–145)
SODIUM BLD-SCNC: 134 MMOL/L (ref 136–145)
SODIUM BLD-SCNC: 136 MMOL/L (ref 136–145)
TEAR DROP CELLS: ABNORMAL
TOTAL PROTEIN: 6.5 G/DL (ref 6.6–8.7)
TROPONIN: 1.29 NG/ML (ref 0–0.03)
TSH SERPL DL<=0.05 MIU/L-ACNC: 1.22 UIU/ML (ref 0.27–4.2)
WBC # BLD: 19 K/UL (ref 4.8–10.8)
WBC # BLD: 19.9 K/UL (ref 4.8–10.8)

## 2018-01-13 PROCEDURE — 84443 ASSAY THYROID STIM HORMONE: CPT

## 2018-01-13 PROCEDURE — 99024 POSTOP FOLLOW-UP VISIT: CPT | Performed by: SURGERY

## 2018-01-13 PROCEDURE — 85027 COMPLETE CBC AUTOMATED: CPT

## 2018-01-13 PROCEDURE — 2100000000 HC CCU R&B

## 2018-01-13 PROCEDURE — 86850 RBC ANTIBODY SCREEN: CPT

## 2018-01-13 PROCEDURE — 99223 1ST HOSP IP/OBS HIGH 75: CPT | Performed by: INTERNAL MEDICINE

## 2018-01-13 PROCEDURE — 99233 SBSQ HOSP IP/OBS HIGH 50: CPT | Performed by: INTERNAL MEDICINE

## 2018-01-13 PROCEDURE — 83690 ASSAY OF LIPASE: CPT

## 2018-01-13 PROCEDURE — 2580000003 HC RX 258: Performed by: EMERGENCY MEDICINE

## 2018-01-13 PROCEDURE — 83605 ASSAY OF LACTIC ACID: CPT

## 2018-01-13 PROCEDURE — 93005 ELECTROCARDIOGRAM TRACING: CPT

## 2018-01-13 PROCEDURE — 85025 COMPLETE CBC W/AUTO DIFF WBC: CPT

## 2018-01-13 PROCEDURE — 6370000000 HC RX 637 (ALT 250 FOR IP): Performed by: INTERNAL MEDICINE

## 2018-01-13 PROCEDURE — 85730 THROMBOPLASTIN TIME PARTIAL: CPT

## 2018-01-13 PROCEDURE — 99291 CRITICAL CARE FIRST HOUR: CPT

## 2018-01-13 PROCEDURE — 2700000000 HC OXYGEN THERAPY PER DAY

## 2018-01-13 PROCEDURE — 87040 BLOOD CULTURE FOR BACTERIA: CPT

## 2018-01-13 PROCEDURE — 86900 BLOOD TYPING SEROLOGIC ABO: CPT

## 2018-01-13 PROCEDURE — 6360000002 HC RX W HCPCS: Performed by: EMERGENCY MEDICINE

## 2018-01-13 PROCEDURE — 84484 ASSAY OF TROPONIN QUANT: CPT

## 2018-01-13 PROCEDURE — 71045 X-RAY EXAM CHEST 1 VIEW: CPT

## 2018-01-13 PROCEDURE — 6370000000 HC RX 637 (ALT 250 FOR IP): Performed by: EMERGENCY MEDICINE

## 2018-01-13 PROCEDURE — 94640 AIRWAY INHALATION TREATMENT: CPT

## 2018-01-13 PROCEDURE — 96374 THER/PROPH/DIAG INJ IV PUSH: CPT

## 2018-01-13 PROCEDURE — 86901 BLOOD TYPING SEROLOGIC RH(D): CPT

## 2018-01-13 PROCEDURE — 85610 PROTHROMBIN TIME: CPT

## 2018-01-13 PROCEDURE — 6370000000 HC RX 637 (ALT 250 FOR IP): Performed by: SURGERY

## 2018-01-13 PROCEDURE — 80053 COMPREHEN METABOLIC PANEL: CPT

## 2018-01-13 PROCEDURE — 99291 CRITICAL CARE FIRST HOUR: CPT | Performed by: EMERGENCY MEDICINE

## 2018-01-13 PROCEDURE — 36415 COLL VENOUS BLD VENIPUNCTURE: CPT

## 2018-01-13 PROCEDURE — 2580000003 HC RX 258: Performed by: INTERNAL MEDICINE

## 2018-01-13 RX ORDER — SODIUM POLYSTYRENE SULFONATE 15 G/60ML
15 SUSPENSION ORAL; RECTAL ONCE
Status: COMPLETED | OUTPATIENT
Start: 2018-01-13 | End: 2018-01-13

## 2018-01-13 RX ORDER — LEVOFLOXACIN 5 MG/ML
250 INJECTION, SOLUTION INTRAVENOUS ONCE
Status: DISCONTINUED | OUTPATIENT
Start: 2018-01-13 | End: 2018-01-13

## 2018-01-13 RX ORDER — IPRATROPIUM BROMIDE AND ALBUTEROL SULFATE 2.5; .5 MG/3ML; MG/3ML
1 SOLUTION RESPIRATORY (INHALATION) 4 TIMES DAILY
Status: DISCONTINUED | OUTPATIENT
Start: 2018-01-13 | End: 2018-01-14

## 2018-01-13 RX ORDER — DEXTROSE MONOHYDRATE 25 G/50ML
25 INJECTION, SOLUTION INTRAVENOUS PRN
Status: DISCONTINUED | OUTPATIENT
Start: 2018-01-13 | End: 2018-01-22 | Stop reason: HOSPADM

## 2018-01-13 RX ORDER — SODIUM CHLORIDE 9 MG/ML
INJECTION, SOLUTION INTRAVENOUS CONTINUOUS
Status: DISCONTINUED | OUTPATIENT
Start: 2018-01-13 | End: 2018-01-18

## 2018-01-13 RX ORDER — DOPAMINE HYDROCHLORIDE 160 MG/100ML
5 INJECTION, SOLUTION INTRAVENOUS CONTINUOUS
Status: DISCONTINUED | OUTPATIENT
Start: 2018-01-13 | End: 2018-01-15

## 2018-01-13 RX ORDER — 0.9 % SODIUM CHLORIDE 0.9 %
1000 INTRAVENOUS SOLUTION INTRAVENOUS ONCE
Status: COMPLETED | OUTPATIENT
Start: 2018-01-13 | End: 2018-01-13

## 2018-01-13 RX ORDER — ATROPINE SULFATE 0.1 MG/ML
0.5 INJECTION INTRAVENOUS ONCE
Status: DISCONTINUED | OUTPATIENT
Start: 2018-01-13 | End: 2018-01-15

## 2018-01-13 RX ORDER — HEPARIN SODIUM 5000 [USP'U]/ML
5000 INJECTION, SOLUTION INTRAVENOUS; SUBCUTANEOUS EVERY 8 HOURS SCHEDULED
Status: DISCONTINUED | OUTPATIENT
Start: 2018-01-13 | End: 2018-01-14

## 2018-01-13 RX ORDER — SODIUM CHLORIDE 0.9 % (FLUSH) 0.9 %
10 SYRINGE (ML) INJECTION PRN
Status: DISCONTINUED | OUTPATIENT
Start: 2018-01-13 | End: 2018-01-22 | Stop reason: HOSPADM

## 2018-01-13 RX ORDER — ACETAMINOPHEN 325 MG/1
650 TABLET ORAL EVERY 4 HOURS PRN
Status: DISCONTINUED | OUTPATIENT
Start: 2018-01-13 | End: 2018-01-22 | Stop reason: HOSPADM

## 2018-01-13 RX ORDER — CALCIUM GLUCONATE 94 MG/ML
1 INJECTION, SOLUTION INTRAVENOUS ONCE
Status: COMPLETED | OUTPATIENT
Start: 2018-01-13 | End: 2018-01-13

## 2018-01-13 RX ORDER — ASPIRIN 81 MG/1
324 TABLET, CHEWABLE ORAL ONCE
Status: COMPLETED | OUTPATIENT
Start: 2018-01-13 | End: 2018-01-13

## 2018-01-13 RX ORDER — ONDANSETRON 2 MG/ML
4 INJECTION INTRAMUSCULAR; INTRAVENOUS EVERY 6 HOURS PRN
Status: DISCONTINUED | OUTPATIENT
Start: 2018-01-13 | End: 2018-01-22 | Stop reason: HOSPADM

## 2018-01-13 RX ORDER — SODIUM CHLORIDE 0.9 % (FLUSH) 0.9 %
10 SYRINGE (ML) INJECTION EVERY 12 HOURS SCHEDULED
Status: DISCONTINUED | OUTPATIENT
Start: 2018-01-13 | End: 2018-01-22 | Stop reason: HOSPADM

## 2018-01-13 RX ADMIN — CALCIUM GLUCONATE 1 G: 94 INJECTION, SOLUTION INTRAVENOUS at 18:28

## 2018-01-13 RX ADMIN — METOPROLOL TARTRATE 50 MG: 50 TABLET, FILM COATED ORAL at 10:28

## 2018-01-13 RX ADMIN — PIPERACILLIN SODIUM,TAZOBACTAM SODIUM 2.25 G: 2; .25 INJECTION, POWDER, FOR SOLUTION INTRAVENOUS at 18:43

## 2018-01-13 RX ADMIN — DEXTROSE MONOHYDRATE 25 G: 25 INJECTION, SOLUTION INTRAVENOUS at 18:24

## 2018-01-13 RX ADMIN — CLOPIDOGREL BISULFATE 75 MG: 75 TABLET ORAL at 09:11

## 2018-01-13 RX ADMIN — ATORVASTATIN CALCIUM 40 MG: 40 TABLET, FILM COATED ORAL at 09:11

## 2018-01-13 RX ADMIN — DOPAMINE HYDROCHLORIDE 5 MCG/KG/MIN: 160 INJECTION, SOLUTION INTRAVENOUS at 17:38

## 2018-01-13 RX ADMIN — SODIUM CHLORIDE 1000 ML: 9 INJECTION, SOLUTION INTRAVENOUS at 18:17

## 2018-01-13 RX ADMIN — IPRATROPIUM BROMIDE AND ALBUTEROL SULFATE 1 AMPULE: .5; 3 SOLUTION RESPIRATORY (INHALATION) at 19:58

## 2018-01-13 RX ADMIN — Medication 10 ML: at 21:14

## 2018-01-13 RX ADMIN — Medication 81 MG: at 09:11

## 2018-01-13 RX ADMIN — LISINOPRIL 10 MG: 10 TABLET ORAL at 09:11

## 2018-01-13 RX ADMIN — INSULIN HUMAN 10 UNITS: 100 INJECTION, SOLUTION PARENTERAL at 18:27

## 2018-01-13 RX ADMIN — SODIUM POLYSTYRENE SULFONATE 15 G: 15 SUSPENSION ORAL; RECTAL at 18:40

## 2018-01-13 RX ADMIN — HYDROCODONE BITARTRATE AND ACETAMINOPHEN 2 TABLET: 5; 325 TABLET ORAL at 01:36

## 2018-01-13 RX ADMIN — ASPIRIN 81 MG CHEWABLE TABLET 324 MG: 81 TABLET CHEWABLE at 17:39

## 2018-01-13 RX ADMIN — LEVOTHYROXINE SODIUM 50 MCG: 50 TABLET ORAL at 01:36

## 2018-01-13 ASSESSMENT — PAIN SCALES - GENERAL
PAINLEVEL_OUTOF10: 0
PAINLEVEL_OUTOF10: 0
PAINLEVEL_OUTOF10: 3
PAINLEVEL_OUTOF10: 0
PAINLEVEL_OUTOF10: 0
PAINLEVEL_OUTOF10: 7
PAINLEVEL_OUTOF10: 0

## 2018-01-13 ASSESSMENT — ENCOUNTER SYMPTOMS
DIARRHEA: 0
SHORTNESS OF BREATH: 0
VOMITING: 0
CHEST TIGHTNESS: 0
BACK PAIN: 0
COUGH: 0
ABDOMINAL PAIN: 1
WHEEZING: 0

## 2018-01-13 ASSESSMENT — PAIN SCALES - WONG BAKER: WONGBAKER_NUMERICALRESPONSE: 2

## 2018-01-13 NOTE — ED NOTES
Pt to ED per EMS and placed in room Pt begins having Bradycardia at rate of 35-39 EKG repeated physician notified      Leann Jackson RN  01/13/18 [phone]

## 2018-01-13 NOTE — CONSULTS
[facility]       Cardiology Consultation      Date of Admission:  1/13/2018  4:38 PM    Date of Initially Being Seen / Consultation:  1/13/18    Cardiologist:  Vanesa Butt MD     Cardiology Attending: [facility] Attending: Hospitalist Service      PCP:  Silas Santos MD    Reason for Consultation or Admission / Chief Complaint:  dizzyness / weakness after getting out of car from leaving hospital today     [address]:    Source of the history:  Patient, family, previous inpatient and outpatient records in [address] is a 67 y.o. female who presents to [facility] ED with symptoms / signs / problem or diagnosis of weakness. She was just discharged today after a CEA. She had to go back to the OR with the CEA for a hematoma and perhaps got reintubated. The weakness occurred after she was getting out of the car. She reported abdominal discomfort at that time and also some vague left shoulder discomfort and also a head ache. Upon arrival to the ED, the initial ekg showed NSR rate 75, LBBB and a MT of 189. The second EKG showed (only 9 minutes later) NSR, rate 37, LBBB, and a MT of 455.   She is not particularly conversational now but does not report chest discomfort    Family present:  yes      CARDIAC RISK PROFILE:    Risk Factor Yes / No / Unknown       Gender Female   Cigarette Use No, but did use in the past    Family History of Cardiovascular Disease Yes: diabetes, heart disease, heart failure, high blood pressure   Diabetes Mellitus no   Hypercholesteremia yes   Hypertension yes          Cardiac Specific Problems:    Specialty Problems        Cardiology Problems    Severe mitral regurgitation by prior echocardiogram        Severe mitral stenosis by prior echocardiogram        Acute superficial venous thrombosis of right lower extremity        Atherosclerosis of native artery of extremity with intermittent claudication ulcer resection of 1/3 stomach    CARDIAC SURGERY      artificial valve and bypass    CAROTID ENDARTERECTOMY      Right  with Dacron patch angioplasty    CARPAL TUNNEL RELEASE Right early 1990's    long ago   [facility]      CORONARY ARTERY BYPASS GRAFT  2 weeks ago    DILATION AND CURETTAGE OF UTERUS      ILIO-FEMORAL BYPASS GRAFT N/A 6/2/2016    OPEN TRANSLUMINAL BALLOON ANGIOPLASTY AND STENTING OF RIGHT COMMON AND EXTERNAL  ILIAC ARTERIES; RIGHT FEMORAL ENDARTERECTOMY WITH VEIN PATCH ANGIOPLASTY performed by Jessee Bill MD at [facility] N/A 4/7/2016    MITRAL VALVE  REPLACEMENT LUONG-MAZE ABLATION WITH CRYO PROCEDURE, CORONARY ARTERY BYPASS GRAFT X 1 WITH ENDOSCOPIC VEIN HARVESTING WITH PERFUSION TRANSESOPHAGEAL ECHOCARDIOGRAM performed by Debby Thrasher MD at [address]      [place] REOPER, CAROTID ENDARTEC>1 MON Left 1/11/2018    REMOVAL OF HEMATOMA, LEFT CAROTID ARTERY performed by Jessee Bill MD at [address] [place] Left 1/11/2018    LEFT CAROTID ENDARTERECTOMY WITH EEG MONITORING AND COMPLETION DUPLEX ULTRASOUND performed by Jessee Bill MD at [facility]      SKIN GRAFT Right 7/22/2016    Skin graft split thickness foot,ankle,and leg. Right leg 26x8cm and 12x6cm total area 280cm squared. TJR    UPPER GASTROINTESTINAL ENDOSCOPY  3/15/16    Dr Rachel Goodwin  3/15/2016    EGD BIOPSY performed by Chilo Harris DO at [address] [facility]    VASCULAR SURGERY  5/27/16 TJR    Aortagram and right leg runoff,right leg runoff,right common iliac artery selection for right leg run off views.  VASCULAR SURGERY      . Open transluminal angioplasty and stenting of the external iliac artery. TJR         Home Medications:   Prior to Admission medications    Medication Sig Start Date End Date Taking?  Authorizing Provider   Glucosamine 500 MG CAPS Take 500 mg by mouth   Yes PT/INR:   Recent Labs      01/13/18   1645   PROTIME  20.4*   INR  1.74*     APTT:   Recent Labs      01/13/18   1645   APTT  32.8     Liver Profile:  Lab Results   Component Value Date    AST 1,201 01/13/2018     01/13/2018    BILIDIR 0.6 03/16/2016    BILITOT 0.7 01/13/2018    ALKPHOS 90 01/13/2018     03/14/2016     03/14/2016     Lab Results   Component Value Date    CHOL 125 03/15/2016    HDL 11 03/15/2016    TRIG 123 03/15/2016     TSH:  Lab Results   Component Value Date    TSH 11.40 06/16/2016     UA:   Lab Results   Component Value Date    COLORU YELLOW 08/02/2016    PHUR 7.0 08/02/2016    LABCAST 0-1 Hyaline 03/14/2016    WBCUA 1 08/02/2016    RBCUA 1 08/02/2016    MUCUS 1+ 03/14/2016    BACTERIA NEGATIVE 08/02/2016    CLARITYU Clear 08/02/2016    SPECGRAV 1.008 08/02/2016    LEUKOCYTESUR Negative 08/02/2016    UROBILINOGEN 0.2 08/02/2016    BILIRUBINUR Negative 08/02/2016    BLOODU Negative 08/02/2016    GLUCOSEU Negative 08/02/2016    AMORPHOUS Rare 03/14/2016             ALL THE CARDIOLOGY PROBLEMS ARE LISTED ABOVE; HOWEVER, THE FOLLOWING SPECIFIC CARDIAC PROBLEMS WERE ADDRESSED AND TREATED DURING THE HOSPITAL VISIT TODAY:                                                                                                                                                                                                                                            MEDICAL DECISION MAKING             Cardiac Specific Problem / Diagnosis  Discussion and Data Reviewed Diagnostic Procedures Ordered Management Options Selected           1. Presenting problem / symptom    Weakness  are worsening   Just discharged from the hospital today    Quite bradycardic, will begin dopamine Yes: echo Continue current medications:     No           2.  Elevated troponin Initial presentation during this evaluation   Review and summation of old records:    3/16/2016  Echo  Severe MS, moderate to severe MR, RVSP

## 2018-01-13 NOTE — ED NOTES
Pacer pads placed per order Atropine at bedside Pt continues talking with staff and family      Cornel Ge RN  01/13/18 [phone]

## 2018-01-13 NOTE — PROGRESS NOTES
Imelda Patterson M.D. Daily Progress Note    Pt Name: [id number] Record Number: [id number]  Date of Birth 1945   Today's Date: 1/13/2018        SUBJECTIVE:     Patient was seen and examined. Pain is  controlled  has not had TIA, CVA, or amaurosis symptoms  has not difficulty swallowing  has not hoarse voice  No complaints     OBJECTIVE:     Patient Vitals for the past 24 hrs:   BP Temp Temp src Pulse Resp SpO2   01/13/18 0908 - - - 72 - 94 %   01/13/18 0741 (!) 100/58 97.8 °F (36.6 °C) Temporal 74 18 (!) 75 %   01/12/18 2359 (!) 109/59 98.5 °F (36.9 °C) Temporal 71 20 93 %   01/12/18 1934 (!) 107/49 98.1 °F (36.7 °C) Temporal 74 20 -   01/12/18 1120 (!) 97/47 97.8 °F (36.6 °C) - 71 18 96 %       In: 1180.8 [P.O.:100;  I.V.:1080.8]  Out: 302.5 [Urine:300; Drains:2.5]    Wt Readings from Last 3 Encounters:   01/12/18 97 lb (44 kg)   01/03/18 97 lb (44 kg)   12/04/17 95 lb (43.1 kg)              MEDS:     Scheduled Meds:   pneumococcal 13-valent conjugate  0.5 mL Intramuscular Once    influenza virus vaccine  0.5 mL Intramuscular Once    aspirin EC  81 mg Oral Daily    atorvastatin  40 mg Oral Daily    clopidogrel  75 mg Oral Daily    levothyroxine  50 mcg Oral Daily    sodium chloride flush  10 mL Intravenous 2 times per day    metoprolol tartrate  50 mg Oral BID    lisinopril  10 mg Oral Daily    morphine  4 mg Intravenous Once     Continuous Infusions:   sodium chloride 50 mL/hr at 01/13/18 0430     PRN Meds:  albuterol 0.63 mg TID PRN   sodium chloride flush 10 mL PRN   acetaminophen 650 mg Q4H PRN   HYDROcodone 5 mg - acetaminophen 1 tablet Q4H PRN   Or     HYDROcodone 5 mg - acetaminophen 2 tablet Q4H PRN   cloNIDine 0.1 mg Q4H PRN   labetalol 10 mg Q4H PRN   ondansetron 4 mg Q6H PRN   diphenhydrAMINE 25 mg Q6H PRN         PHYSICAL EXAM:     CONSTITUTIONAL: awake, alert, cooperative, no apparent distress, and appears stated age and Alert and oriented times 3, no of right ankle with fat layer exposed ([facility] [address]    Neuropathic ulcer of right foot with fat layer exposed ([address]    Hypervolemia    Acute blood loss anemia    Atherosclerosis of native artery of extremity with intermittent claudication (HCC)    CHF (congestive heart failure) (HCC)    CKD (chronic kidney disease), stage III    Pulmonary emphysema (HCC)    COPD, severe (HCC)    [facility]peripheral vascular disease) [facility]    Wound of sacral region    Elevated TSH    Bilateral carotid artery stenosis    Obstruction of left carotid artery   2. PLAN:      1. Remove EDILIA drain  2. Ambulate  3. Discharge home later today if ambulating and no bleeding after EDILIA removed  4. Instructions given. No driving one week. Riding in car OK. No strenuous activity 2 weeks. Shower OK tomorrow. Bandaid daily over EDILIA drain site. No dressing needed over CEA scar. 5. Call for symptoms of TIA, CVA or amaurosis fugax, increased swelling in right neck, or signs of infection. 6. F/U in office 2-3 weeks. 7. Continue current medications. Rx given for pain. Lavone Olszewski, M.D.    Electronically signed 1/13/2018 at 10:30 AM

## 2018-01-13 NOTE — ED PROVIDER NOTES
Musculoskeletal: Negative for back pain. Neurological: Positive for syncope, weakness and headaches. All other systems reviewed and are negative.            PAST MEDICAL HISTORY     Past Medical History:   Diagnosis Date    Aortic stenosis     Arthritis     Atherosclerosis of native arteries of the extremities with intermittent claudication 2011    CAD (coronary artery disease)     Carotid artery occlusion     CHF (congestive heart failure) ([facility] [address]     History of blood transfusion 2016    Post op, was taking blood thinner    Hyperlipidemia     Hypertension     MI (myocardial infarction) 2009    x2    Mitral valve stenosis     PUD (peptic ulcer disease) 2009    Renal failure 2009    Wound infection after surgery     right foot         SURGICAL HISTORY       Past Surgical History:   Procedure Laterality Date    ABDOMEN SURGERY  2009    perforated ulcer resection of 1/3 stomach    CARDIAC SURGERY      artificial valve and bypass    CAROTID ENDARTERECTOMY      Right  with Dacron patch angioplasty    CARPAL TUNNEL RELEASE Right early 's    long ago     SECTION  1972    COLONOSCOPY      CORONARY ARTERY BYPASS GRAFT  2 weeks ago    DILATION AND CURETTAGE OF UTERUS      ILIO-FEMORAL BYPASS GRAFT N/A 2016    OPEN TRANSLUMINAL BALLOON ANGIOPLASTY AND STENTING OF RIGHT COMMON AND EXTERNAL  ILIAC ARTERIES; RIGHT FEMORAL ENDARTERECTOMY WITH VEIN PATCH ANGIOPLASTY performed by Brenda Thapa MD at [address] [place] [address] N/A 2016    MITRAL VALVE  REPLACEMENT LUONG-MAZE ABLATION WITH CRYO PROCEDURE, CORONARY ARTERY BYPASS GRAFT X 1 WITH ENDOSCOPIC VEIN HARVESTING WITH PERFUSION TRANSESOPHAGEAL ECHOCARDIOGRAM performed by Bia Hines MD at [address] [facility], CAROTID ENDARTEC>1 MON Left 2018    REMOVAL OF HEMATOMA, LEFT CAROTID ARTERY performed by Brenda Thapa MD at [facility] Left 2018 LEFT CAROTID ENDARTERECTOMY WITH EEG MONITORING AND COMPLETION DUPLEX ULTRASOUND performed by Brian De Paz MD at [facility]      SKIN GRAFT Right 7/22/2016    Skin graft split thickness foot,ankle,and leg. Right leg 26x8cm and 12x6cm total area 280cm squared. TJR    UPPER GASTROINTESTINAL ENDOSCOPY  3/15/16    Dr Rosendo Camp  3/15/2016    EGD BIOPSY performed by Ralph Harris DO at [address] [facility]    VASCULAR SURGERY  5/27/16 TJR    Aortagram and right leg runoff,right leg runoff,right common iliac artery selection for right leg run off views.  VASCULAR SURGERY      . Open transluminal angioplasty and stenting of the external iliac artery. TJR         CURRENT MEDICATIONS       Previous Medications    ASPIRIN EC 81 MG EC TABLET    Take 81 mg by mouth daily.       ATORVASTATIN (LIPITOR) 40 MG TABLET    Take 40 mg by mouth daily    BIOTIN 5000 MCG TABS    Take by mouth daily    CLOPIDOGREL (PLAVIX) 75 MG TABLET    Take 1 tablet by mouth daily    GLUCOSAMINE 500 MG CAPS    Take 500 mg by mouth    LEVALBUTEROL (XOPENEX) 0.31 MG/3ML NEBULIZATION    Take 1 ampule by nebulization every 8 hours as needed for Wheezing    LEVOTHYROXINE (SYNTHROID) 50 MCG TABLET    Take 50 mcg by mouth Daily    LISINOPRIL (PRINIVIL;ZESTRIL) 10 MG TABLET    Take 1 tablet by mouth daily    METOPROLOL (LOPRESSOR) 50 MG TABLET    Take 1 tablet by mouth 2 times daily    PROBIOTIC PRODUCT (PROBIOTIC ADVANCED PO)    Take by mouth daily       ALLERGIES     Levaquin [levofloxacin in d5w] and Morphine    FAMILY HISTORY       Family History   Problem Relation Age of Onset    Diabetes Mother     Heart Disease Mother     Heart Failure Mother     Diabetes Sister     Heart Disease Sister     High Blood Pressure Sister           SOCIAL HISTORY       Social History     Social History    Marital status:      Spouse name: N/A    Number of children: N/A    Years of education: N/A     Social History Co-signs this chart in the absence of a cardiologist.    [id number]: NSR @ 75, no ST changes  1647: Sinus Hai @ 37    RADIOLOGY:   Non-plain film images such as CT, Ultrasound and MRI are read by the radiologist. Plain radiographic images are visualized and preliminarily interpreted by the emergency physician with the below findings:        XR CHEST PORTABLE   Final Result   Increasing left basilar atelectasis with persistent right   basilar consolidation and effusion, probable underlying pneumonia. There is evidence of pulmonary venous hypertension with mild   cardiomegaly. A new staple line is seen over the base of the left   neck. No other changes seen. Signed by Dr Alon Black on 1/13/2018 5:31 PM              LABS:  Labs Reviewed   CBC WITH AUTO DIFFERENTIAL - Abnormal; Notable for the following:        Result Value    WBC 19.9 (*)     RBC 3.46 (*)     Hemoglobin 8.6 (*)     Hematocrit 28.8 (*)     MCH 24.9 (*)     MCHC 29.9 (*)     RDW 17.9 (*)     Neutrophils % 83.0 (*)     Lymphocytes % 10.0 (*)     Neutrophils # 16.5 (*)     Monocytes # 1.40 (*)     Anisocytosis 1+ (*)     Polychromasia 1+ (*)     Hypochromia 2+ (*)     Ovalocytes Occasional (*)     Tear Drop Cells Occasional (*)     All other components within normal limits   COMPREHENSIVE METABOLIC PANEL - Abnormal; Notable for the following:     Sodium 134 (*)     Potassium 5.9 (*)     Chloride 96 (*)     CO2 20 (*)     Glucose 110 (*)     BUN 59 (*)     CREATININE 2.9 (*)     GFR Non-African American 16 (*)     Calcium 8.2 (*)     Total Protein 6.5 (*)      (*)     AST 1,201 (*)     All other components within normal limits   LACTIC ACID, PLASMA - Abnormal; Notable for the following:     Lactic Acid 3.9 (*)     All other components within normal limits    Narrative:     CALL  [facility]  MAGGIE tel. ,  Chemistry results called to and read back by Alan Hall RN in ED, 01/13/2018  17:51, by Phil Daniel - Abnormal; Notable for the following: Protime 20.4 (*)     INR 1.74 (*)     All other components within normal limits   POCT VENOUS - Abnormal; Notable for the following:     POC Troponin I 5.27 (*)     All other components within normal limits   CULTURE BLOOD #1   CULTURE BLOOD #2   LIPASE   APTT   TSH WITHOUT REFLEX   POCT TROPONIN   TYPE AND SCREEN       All other labs were within normal range or not returned as of this dictation. EMERGENCY DEPARTMENT COURSE and DIFFERENTIAL DIAGNOSIS/MDM:   Vitals:    Vitals:    01/13/18 1632   BP: (!) 147/48   Pulse: 81   Resp: 20   Temp: 98 °F (36.7 °C)   TempSrc: Tympanic   SpO2: 90%   Weight: 98 lb (44.5 kg)   Height: 5' 3\" (1.6 m)       MDM  Number of Diagnoses or Management Options     Amount and/or Complexity of Data Reviewed  Clinical lab tests: ordered and reviewed  Tests in the radiology section of CPT®: ordered and reviewed  Tests in the medicine section of CPT®: ordered and reviewed  Obtain history from someone other than the patient: yes  Review and summarize past medical records: yes  Discuss the patient with other providers: yes  Independent visualization of images, tracings, or specimens: yes    Risk of Complications, Morbidity, and/or Mortality  Presenting problems: high  Diagnostic procedures: high  Management options: high    Critical Care  Total time providing critical care: 30-74 minutes    CRITICAL CARE TIME   Total Critical Care time was 39 minutes, excluding separately reportable procedures. There was a high probability of clinically significant/life threatening deterioration in the patient's condition which required my urgent intervention. Patient arrived to the ED with complaints of generalized weakness and syncope, postoperative day 2. I was called to patient's bedside for acute onset of bradycardia with a heart rate in the 30s.  I immediately began to direct resuscitation to include establishment of additional IV access, cardiac monitoring monitoring, rapid attainment of EKG and

## 2018-01-13 NOTE — ED NOTES
Bed: 11  Expected date:   Expected time:   Means of arrival:   Comments:  Herman Steven RN  01/13/18 9543

## 2018-01-13 NOTE — ED TRIAGE NOTES
Pt to ED with c/o lethargy and possible AMS post carotid surgery 1/11/2018.  Pt released from this facility today Pt family reports pt c/o pain in left arm and stomach Pt reports headache

## 2018-01-13 NOTE — PLAN OF CARE
Problem: Pain:  Goal: Pain level will decrease  Pain level will decrease   Outcome: Met This Shift      Problem: Falls - Risk of  Goal: Absence of falls  Outcome: Met This Shift      Problem: SAFETY  Goal: Free from accidental physical injury  Outcome: Met This Shift    Goal: Free from intentional harm  Outcome: Met This Shift      Problem: DAILY CARE  Goal: Daily care needs are met  Outcome: Met This Shift      Problem: PAIN  Goal: Patient's pain/discomfort is manageable  Outcome: Met This Shift      Problem: SKIN INTEGRITY  Goal: Skin integrity is maintained or improved  Outcome: Met This Shift      Problem: KNOWLEDGE DEFICIT  Goal: Patient/S.O. demonstrates understanding of disease process, treatment plan, medications, and discharge instructions. Outcome: Met This Shift      Problem: DISCHARGE BARRIERS  Goal: Patient's continuum of care needs are met  Outcome: Met This Shift      Problem: Nutrition  Goal: Optimal nutrition therapy  Nutrition Problem: Underweight  Intervention: Food and/or Nutrient Delivery: Continue current diet  Nutritional Goals: po intake of 50% or more.     Outcome: Met This Shift

## 2018-01-14 PROBLEM — I21.4 NSTEMI (NON-ST ELEVATED MYOCARDIAL INFARCTION) (HCC): Status: ACTIVE | Noted: 2018-01-14

## 2018-01-14 PROBLEM — N17.9 ACUTE RENAL FAILURE (ARF) (HCC): Status: ACTIVE | Noted: 2018-01-14

## 2018-01-14 PROBLEM — J18.9 HCAP (HEALTHCARE-ASSOCIATED PNEUMONIA): Status: ACTIVE | Noted: 2018-01-14

## 2018-01-14 LAB
BASOPHILS ABSOLUTE: 0 K/UL (ref 0–0.2)
BASOPHILS RELATIVE PERCENT: 0.1 % (ref 0–1)
EOSINOPHILS ABSOLUTE: 0 K/UL (ref 0–0.6)
EOSINOPHILS RELATIVE PERCENT: 0 % (ref 0–5)
FERRITIN: 359.7 NG/ML (ref 13–150)
FOLATE: >20 NG/ML (ref 4.8–37.3)
HCT VFR BLD CALC: 23.1 % (ref 37–47)
HCT VFR BLD CALC: 23.5 % (ref 37–47)
HCT VFR BLD CALC: 24.5 % (ref 37–47)
HEMOGLOBIN: 7 G/DL (ref 12–16)
HEMOGLOBIN: 7.2 G/DL (ref 12–16)
IRON SATURATION: 7 % (ref 14–50)
IRON: 18 UG/DL (ref 37–145)
LV EF: 45 %
LVEF MODALITY: NORMAL
LYMPHOCYTES ABSOLUTE: 0.9 K/UL (ref 1.1–4.5)
LYMPHOCYTES RELATIVE PERCENT: 5.6 % (ref 20–40)
MCH RBC QN AUTO: 24.9 PG (ref 27–31)
MCHC RBC AUTO-ENTMCNC: 30.3 G/DL (ref 33–37)
MCV RBC AUTO: 82.2 FL (ref 81–99)
MONOCYTES ABSOLUTE: 1.5 K/UL (ref 0–0.9)
MONOCYTES RELATIVE PERCENT: 9.6 % (ref 0–10)
NEUTROPHILS ABSOLUTE: 13.2 K/UL (ref 1.5–7.5)
NEUTROPHILS RELATIVE PERCENT: 83.9 % (ref 50–65)
PDW BLD-RTO: 17.7 % (ref 11.5–14.5)
PLATELET # BLD: 220 K/UL (ref 130–400)
PMV BLD AUTO: 9.8 FL (ref 9.4–12.3)
RBC # BLD: 2.81 M/UL (ref 4.2–5.4)
RETICULOCYTE ABSOLUTE COUNT: 0.06 M/UL (ref 0.03–0.12)
RETICULOCYTE COUNT PCT: 2.06 % (ref 0.5–1.5)
TOTAL IRON BINDING CAPACITY: 249 UG/DL (ref 250–400)
TROPONIN: 1.29 NG/ML (ref 0–0.03)
TROPONIN: 1.32 NG/ML (ref 0–0.03)
TROPONIN: 1.37 NG/ML (ref 0–0.03)
TROPONIN: 1.44 NG/ML (ref 0–0.03)
VANCOMYCIN RANDOM: 7.9 UG/ML
VITAMIN B-12: >2000 PG/ML (ref 211–946)
WBC # BLD: 15.8 K/UL (ref 4.8–10.8)

## 2018-01-14 PROCEDURE — 82746 ASSAY OF FOLIC ACID SERUM: CPT

## 2018-01-14 PROCEDURE — 6370000000 HC RX 637 (ALT 250 FOR IP): Performed by: INTERNAL MEDICINE

## 2018-01-14 PROCEDURE — 6360000002 HC RX W HCPCS: Performed by: INTERNAL MEDICINE

## 2018-01-14 PROCEDURE — 85025 COMPLETE CBC W/AUTO DIFF WBC: CPT

## 2018-01-14 PROCEDURE — 94640 AIRWAY INHALATION TREATMENT: CPT

## 2018-01-14 PROCEDURE — 85045 AUTOMATED RETICULOCYTE COUNT: CPT

## 2018-01-14 PROCEDURE — 93306 TTE W/DOPPLER COMPLETE: CPT

## 2018-01-14 PROCEDURE — 80202 ASSAY OF VANCOMYCIN: CPT

## 2018-01-14 PROCEDURE — 84484 ASSAY OF TROPONIN QUANT: CPT

## 2018-01-14 PROCEDURE — 36415 COLL VENOUS BLD VENIPUNCTURE: CPT

## 2018-01-14 PROCEDURE — 82728 ASSAY OF FERRITIN: CPT

## 2018-01-14 PROCEDURE — 85014 HEMATOCRIT: CPT

## 2018-01-14 PROCEDURE — 6360000002 HC RX W HCPCS: Performed by: HOSPITALIST

## 2018-01-14 PROCEDURE — 99291 CRITICAL CARE FIRST HOUR: CPT | Performed by: HOSPITALIST

## 2018-01-14 PROCEDURE — 99232 SBSQ HOSP IP/OBS MODERATE 35: CPT | Performed by: INTERNAL MEDICINE

## 2018-01-14 PROCEDURE — 6370000000 HC RX 637 (ALT 250 FOR IP): Performed by: HOSPITALIST

## 2018-01-14 PROCEDURE — 2700000000 HC OXYGEN THERAPY PER DAY

## 2018-01-14 PROCEDURE — 83540 ASSAY OF IRON: CPT

## 2018-01-14 PROCEDURE — 82607 VITAMIN B-12: CPT

## 2018-01-14 PROCEDURE — 2580000003 HC RX 258: Performed by: INTERNAL MEDICINE

## 2018-01-14 PROCEDURE — 2100000000 HC CCU R&B

## 2018-01-14 PROCEDURE — 99024 POSTOP FOLLOW-UP VISIT: CPT | Performed by: SURGERY

## 2018-01-14 PROCEDURE — 85018 HEMOGLOBIN: CPT

## 2018-01-14 PROCEDURE — 2580000003 HC RX 258: Performed by: HOSPITALIST

## 2018-01-14 PROCEDURE — 83550 IRON BINDING TEST: CPT

## 2018-01-14 RX ORDER — GUAIFENESIN 600 MG/1
600 TABLET, EXTENDED RELEASE ORAL 4 TIMES DAILY
Status: DISCONTINUED | OUTPATIENT
Start: 2018-01-14 | End: 2018-01-22 | Stop reason: HOSPADM

## 2018-01-14 RX ORDER — LISINOPRIL 10 MG/1
10 TABLET ORAL DAILY
Status: DISCONTINUED | OUTPATIENT
Start: 2018-01-14 | End: 2018-01-15

## 2018-01-14 RX ORDER — CLOPIDOGREL BISULFATE 75 MG/1
75 TABLET ORAL DAILY
Status: DISCONTINUED | OUTPATIENT
Start: 2018-01-14 | End: 2018-01-22 | Stop reason: HOSPADM

## 2018-01-14 RX ORDER — LEVALBUTEROL INHALATION SOLUTION 0.63 MG/3ML
0.5 SOLUTION RESPIRATORY (INHALATION) EVERY 8 HOURS PRN
Status: DISCONTINUED | OUTPATIENT
Start: 2018-01-14 | End: 2018-01-15

## 2018-01-14 RX ORDER — ATORVASTATIN CALCIUM 40 MG/1
40 TABLET, FILM COATED ORAL DAILY
Status: DISCONTINUED | OUTPATIENT
Start: 2018-01-14 | End: 2018-01-22 | Stop reason: HOSPADM

## 2018-01-14 RX ORDER — LEVOTHYROXINE SODIUM 0.05 MG/1
50 TABLET ORAL DAILY
Status: DISCONTINUED | OUTPATIENT
Start: 2018-01-14 | End: 2018-01-22 | Stop reason: HOSPADM

## 2018-01-14 RX ORDER — METOPROLOL TARTRATE 50 MG/1
50 TABLET, FILM COATED ORAL 2 TIMES DAILY
Status: DISCONTINUED | OUTPATIENT
Start: 2018-01-14 | End: 2018-01-22 | Stop reason: HOSPADM

## 2018-01-14 RX ORDER — LEVALBUTEROL INHALATION SOLUTION 0.31 MG/3ML
1 SOLUTION RESPIRATORY (INHALATION) EVERY 8 HOURS PRN
Status: DISCONTINUED | OUTPATIENT
Start: 2018-01-14 | End: 2018-01-14

## 2018-01-14 RX ORDER — ASPIRIN 81 MG/1
81 TABLET ORAL DAILY
Status: DISCONTINUED | OUTPATIENT
Start: 2018-01-14 | End: 2018-01-22 | Stop reason: HOSPADM

## 2018-01-14 RX ADMIN — LISINOPRIL 10 MG: 10 TABLET ORAL at 11:13

## 2018-01-14 RX ADMIN — GUAIFENESIN 600 MG: 600 TABLET, EXTENDED RELEASE ORAL at 20:47

## 2018-01-14 RX ADMIN — Medication 10 ML: at 20:47

## 2018-01-14 RX ADMIN — IPRATROPIUM BROMIDE AND ALBUTEROL SULFATE 1 AMPULE: .5; 3 SOLUTION RESPIRATORY (INHALATION) at 06:31

## 2018-01-14 RX ADMIN — Medication 81 MG: at 11:13

## 2018-01-14 RX ADMIN — METOPROLOL TARTRATE 50 MG: 50 TABLET ORAL at 11:13

## 2018-01-14 RX ADMIN — LEVALBUTEROL INHALATION 0.63MG/3ML 0.63 MG: 0.63 SOLUTION RESPIRATORY (INHALATION) at 22:01

## 2018-01-14 RX ADMIN — CLOPIDOGREL BISULFATE 75 MG: 75 TABLET ORAL at 11:13

## 2018-01-14 RX ADMIN — ATORVASTATIN CALCIUM 40 MG: 40 TABLET, FILM COATED ORAL at 11:13

## 2018-01-14 RX ADMIN — PIPERACILLIN SODIUM,TAZOBACTAM SODIUM 2.25 G: 2; .25 INJECTION, POWDER, FOR SOLUTION INTRAVENOUS at 21:39

## 2018-01-14 RX ADMIN — VANCOMYCIN HYDROCHLORIDE 750 MG: 10 INJECTION, POWDER, LYOPHILIZED, FOR SOLUTION INTRAVENOUS at 05:11

## 2018-01-14 RX ADMIN — LEVOTHYROXINE SODIUM 50 MCG: 100 TABLET ORAL at 11:14

## 2018-01-14 RX ADMIN — SODIUM CHLORIDE: 9 INJECTION, SOLUTION INTRAVENOUS at 15:49

## 2018-01-14 RX ADMIN — ACETAMINOPHEN 650 MG: 325 TABLET, FILM COATED ORAL at 11:16

## 2018-01-14 RX ADMIN — PIPERACILLIN SODIUM,TAZOBACTAM SODIUM 2.25 G: 2; .25 INJECTION, POWDER, FOR SOLUTION INTRAVENOUS at 07:46

## 2018-01-14 RX ADMIN — METOPROLOL TARTRATE 50 MG: 50 TABLET ORAL at 20:47

## 2018-01-14 RX ADMIN — GUAIFENESIN 600 MG: 600 TABLET, EXTENDED RELEASE ORAL at 19:01

## 2018-01-14 ASSESSMENT — ENCOUNTER SYMPTOMS
VOMITING: 0
COUGH: 0
EYE PAIN: 0
BACK PAIN: 0
DOUBLE VISION: 0
ABDOMINAL PAIN: 0
BLURRED VISION: 0
PHOTOPHOBIA: 0
NAUSEA: 0
HEMOPTYSIS: 0
ORTHOPNEA: 0
HEARTBURN: 0

## 2018-01-14 ASSESSMENT — PAIN SCALES - GENERAL
PAINLEVEL_OUTOF10: 6
PAINLEVEL_OUTOF10: 0

## 2018-01-14 NOTE — PROGRESS NOTES
Hospitalist Progress Note  1/14/2018 10:26 AM  Subjective:   Admit Date: 1/13/2018  PCP: Rodrigo Fair MD    Chief Complaint: Weak and passed out    Subjective: Went home yesterday and when trying to get out of the car at home was lightheaded, fainted, had mild HA, mild abdominal pain (chronic)and was brought to ER. She feels better now, is hoarse since her surgery, her L neck feels OK after OR x 2 1/11/18. No other complaints at present, no NVD, CP, SOA. Interval History:    66 yo female readmitted via [facility] to Hospitalists 1/13/18 for syncope on getting out of her car after arriving at home and collapsing into a family member's arms, HA, abdominal pain, encephalopathy, and weakness having been discharged from the Vascular service that day following L CEA 1/11/18 with same day return to OR for bleeding / hematoma at operative site. She reported L shoulder discomfort, HA. In ER had EKG with bradycardia of 37 just 9 minutes after initial EKG with rate of 75, her [place] interval had increased to .45 from . 19 on her tracings I viewed. She has bioprosthetic MVR for MS, RVSP 73, EF preserved by echo 5/7/16, DD III, BP mitral valve, pleural effusion . Cath 3/31/16: .7 RCA, EF normal.    ROS: 14 point review of systems is negative except as specifically addressed above.     Diet NPO Effective Now    Intake/Output Summary (Last 24 hours) at 01/14/18 1026  Last data filed at 01/14/18 0400   Gross per 24 hour   Intake              750 ml   Output                0 ml   Net              750 ml     Medications:   DOPamine 0.75 mcg/kg/min (01/13/18 2057)    sodium chloride 150 mL/hr at 01/14/18 0848     Current Facility-Administered Medications   Medication Dose Route Frequency Provider Last Rate Last Dose    piperacillin-tazobactam (ZOSYN) 2.25 g in dextrose 5 % 50 mL IVPB (mini-bag)  2.25 g Intravenous Pop Mari MD   Stopped at 01/14/18 0816    aspirin EC tablet 81 mg  81 mg Oral Daily Fermin Toro MD  atorvastatin (LIPITOR) tablet 40 mg  40 mg Oral Daily Dar Swanson MD        clopidogrel (PLAVIX) tablet 75 mg  75 mg Oral Daily Dar Swanson MD        levothyroxine (SYNTHROID) tablet 50 mcg  50 mcg Oral Daily Dar Swanson MD        lisinopril (PRINIVIL;ZESTRIL) tablet 10 mg  10 mg Oral Daily Dar Swanson MD        metoprolol tartrate (LOPRESSOR) tablet 50 mg  50 mg Oral BID Dar Swanson MD        levalbuterol [facility] [place]) nebulization 0.315 mg  0.5 ampule Nebulization Q8H PRN Dar Swanson MD        guaiFENesin [facility]) extended release tablet 600 mg  600 mg Oral 4x Daily Dar Swanson MD        atropine injection 0.5 mg  0.5 mg Intravenous Once Norris Sevilla MD        DOPamine (INTROPIN) 400 mg in dextrose 5 % 250 mL infusion  5 mcg/kg/min Intravenous Continuous Norris Sevilla MD 1.3 mL/hr at 01/13/18 2057 0.75 mcg/kg/min at 01/13/18 2057    dextrose 50 % solution 25 g  25 g Intravenous PRN Norris Sevilla MD   25 g at 01/13/18 1824    sodium chloride flush 0.9 % injection 10 mL  10 mL Intravenous 2 times per day Arya Aldrich MD   10 mL at 01/13/18 2114    sodium chloride flush 0.9 % injection 10 mL  10 mL Intravenous PRN Arya Aldrich MD        acetaminophen (TYLENOL) tablet 650 mg  650 mg Oral Q4H PRN Kike Mahoney MD        ondansetron (ZOFRAN) injection 4 mg  4 mg Intravenous Q6H PRN Arya Aldrich MD        vancomycin (VANCOCIN) intermittent dosing (placeholder)   Other RX Placeholder Arya Aldrich MD        0.9 % sodium chloride infusion   Intravenous Continuous Dar Swanson  mL/hr at 01/14/18 0848          Labs:     Recent Labs      01/13/18   0443  01/13/18   1705  01/14/18   0135  01/14/18   0713  01/14/18   0815   WBC  19.0*  19.9*  15.8*   --    --    RBC  3.43*  3.46*  2.81*   --    --    HGB  8.5*  8.6*  7.0*  7.2*   --    HCT  28.9*  28.8*  23.1*  23.5*  24.5*   MCV  84.3  83.2  82.2   --    --    MCH  24.8*  24.9* position. 5. Echolucency near preserved posterior mitral leaflet, likely chordae   6. Large pleural effusion   7. Moderate tricuspid regurgitation; estimated RVSP of at least 72 mmHg. Recommendation: MELISSA for better evaluation of echolucency if clinically indicated   Electronically signed by Kristina Ricketts MD (Interpreting physician) on 05/09/2016 01:05 AM      Objective:   Vitals: BP (!) 132/57   Pulse 77   Temp 98 °F (36.7 °C) (Temporal)   Resp 13   Ht 5' 3\" (1.6 m)   Wt 106 lb (48.1 kg)   SpO2 (!) 86%   BMI 18.78 kg/m²   24HR INTAKE/OUTPUT:      Intake/Output Summary (Last 24 hours) at 01/14/18 1026  Last data filed at 01/14/18 0400   Gross per 24 hour   Intake              750 ml   Output                0 ml   Net              750 ml     General appearance: alert and cooperative with exam, pleasant, no distress, mildly hoarse  HEENT: atraumatic, eyes with clear conjunctiva and normal lids, pupils and irises normal, external ears and nose are normal, lips normal. Neck without masses, lympadenopathy, bruit, thyroid normal  Lungs: no increased work of breathing, \"clear to auscultation bilaterally\" without rales, rhonchi or wheezes  Heart: regular rate and rhythm, S1, S2 normal, no murmur, click, rub or gallop  Abdomen: soft, non-tender; bowel sounds normal; no masses,  no organomegaly  Extremities: extremities normal, atraumatic, no cyanosis or edema  Neurologic: No focal neurologic deficits, normal sensation, alert and oriented, affect and mood appropriate. Skin: no rashes, nodules, L CEA incision looks just fine, no palpable hematioma.     Assessment and Plan:   Principal Problem:    Sepsis with organ dysfunction - treat initially with Zosyn Vancomycin pending cultures  Active Problems:    Elevated tropoponin - Cardiology consulted, Trend troponins, Echo requested    HCAP - Zosyn / Marino Arreola (consider Anthony Holly / Marino Arreola) empirically and trene - her exam is (-)    Acute renal failure - trend, may need renal consult

## 2018-01-14 NOTE — PLAN OF CARE
Problem: Airway Clearance - Ineffective:  Goal: Ability to maintain a clear airway will improve  Ability to maintain a clear airway will improve   Outcome: Ongoing

## 2018-01-14 NOTE — PROGRESS NOTES
[address]    Daily HOSPITAL Progress Note                            Date:  1/14/18  Patient: Juventino Mabry  Admission:  1/13/2018  4:38 PM  Admit DX: Sepsis with organ dysfunction ([email or web address] [A41.9, R65.20]  Age:  67 y.o., 1945     LOS: 1 day       Reason for initial evaluation or the patient's initial complaint    bradycardia      SUBJECTIVE:      1/13/2018    Chief Complaint / Reason for the Visit   Follow up of:  Weakness / bradycardia and elevated troponin and multiple metabolic abnormalities    Family present:  no      Specialty Problems        Cardiology Problems    Severe mitral regurgitation by prior echocardiogram        Severe mitral stenosis by prior echocardiogram        Acute superficial venous thrombosis of right lower extremity        Atherosclerosis of native artery of extremity with intermittent claudication (HCC)        Atherosclerosis of native artery of extremity with intermittent claudication ([facility])        CAD (coronary artery disease)        Hypertension        Carotid artery stenosis        PAD (peripheral artery disease) ([facility])        Pulmonary hypertension        Atherosclerosis of native arteries of left leg with ulceration of calf (HCC)        Atherosclerosis of native arteries of right leg with ulceration of other part of lower right leg (HCC)        Atherosclerosis of native arteries of right leg with ulceration of other part of foot        Atherosclerosis of native artery of right lower extremity with ulceration of ankle (HCC)        CHF (congestive heart failure) (HCC)        PVD (peripheral vascular disease) ([facility])        Bilateral carotid artery stenosis        Obstruction of left carotid artery        NSTEMI (non-ST elevated myocardial infarction) ([email or web address]        Syncope and collapse              Current Status Today According to the patient:  \"better\"    Subjective:  Ms. Juventino Mabry is generally feeling unchanged.   Creatinine is up from 2.6 to right leg ([facility] 06/05/2016     Priority: Low    Atherosclerosis of native arteries of right leg with ulceration of other part of foot 06/05/2016     Priority: Low    Nonhealing ulcer of right lower leg with fat layer exposed ([facility] 06/05/2016     Priority: Low    Non-pressure chronic ulcer of right ankle with fat layer exposed ([facility] 06/05/2016     Priority: Low    Neuropathic ulcer of right foot with fat layer exposed ([facility] 06/05/2016     Priority: Low    Hypervolemia      Priority: Low    Nonhealing nonsurgical wound with fat layer exposed 06/03/2016     Priority: Low    Acute blood loss anemia      Priority: Low    Atherosclerosis of native arteries of left leg with ulceration of calf (HCC)      Priority: Low    Severe malnutrition (HCC)      Priority: Low    Diastolic dysfunction      Priority: Low    Anemia in chronic kidney disease (CKD)      Priority: Low    Non-pressure chronic ulcer of right calf with fat layer exposed ([facility] 05/27/2016     Priority: Low    Decubitus ulcer of right buttock, stage 3 ([facility] 05/27/2016     Priority: Low    Nocturnal hypoxia 03/30/2016     Priority: Low    Pulmonary hypertension 03/29/2016     Priority: Low    PAD (peripheral artery disease) (HCC)      Priority: Low    Calculus of gallbladder without cholecystitis without obstruction      Priority: Low    Carotid artery stenosis 02/08/2012     Priority: Low    Atherosclerosis of native artery of extremity with intermittent claudication (HCC) 07/25/2011     Priority: Low    Atherosclerosis of native artery of extremity with intermittent claudication [facility] 07/25/2011     Priority: Low    Hyperlipidemia      Priority: Low    Hypertension      Priority: Low    Arthritis      Priority: Low    CAD (coronary artery disease)      Priority: Low     Current Facility-Administered Medications   Medication Dose Route Frequency Provider Last Rate Last Dose    piperacillin-tazobactam (ZOSYN) 2.25 g in dextrose 5 % 50 mL IVPB (mini-bag)  2.25 g Intravenous Q8H Spenser Martinez MD   Stopped at 01/14/18 0816    aspirin EC tablet 81 mg  81 mg Oral Daily Magnolia Del Rio MD        atorvastatin (LIPITOR) tablet 40 mg  40 mg Oral Daily Magnolia Del Rio MD        clopidogrel (PLAVIX) tablet 75 mg  75 mg Oral Daily Magnolia Del Rio MD        levothyroxine (SYNTHROID) tablet 50 mcg  50 mcg Oral Daily Magnolia Del Rio MD        lisinopril (PRINIVIL;ZESTRIL) tablet 10 mg  10 mg Oral Daily Magnolia Del Rio MD        metoprolol tartrate (LOPRESSOR) tablet 50 mg  50 mg Oral BID Magnolia Del Rio MD        levalbuterol Lucio Costain) nebulization 0.315 mg  0.5 ampule Nebulization Q8H PRN Magnolia Del Rio MD        guaiFENesin [facility]) extended release tablet 600 mg  600 mg Oral 4x Daily Magnolia Del Rio MD        atropine injection 0.5 mg  0.5 mg Intravenous Once Zeus Prather MD        DOPamine (INTROPIN) 400 mg in dextrose 5 % 250 mL infusion  5 mcg/kg/min Intravenous Continuous Zeus Prather MD 1.3 mL/hr at 01/13/18 2057 0.75 mcg/kg/min at 01/13/18 2057    dextrose 50 % solution 25 g  25 g Intravenous PRN Zeus Prather MD   25 g at 01/13/18 1824    sodium chloride flush 0.9 % injection 10 mL  10 mL Intravenous 2 times per day Spenser Martinez MD   10 mL at 01/13/18 2114    sodium chloride flush 0.9 % injection 10 mL  10 mL Intravenous PRN Spenser Martinez MD        acetaminophen (TYLENOL) tablet 650 mg  650 mg Oral Q4H PRN Kike Mahoney MD        ondansetron (ZOFRAN) injection 4 mg  4 mg Intravenous Q6H PRN Spenser Martinez MD        vancomycin (VANCOCIN) intermittent dosing (placeholder)   Other RX Placeholder Spenser Martinez MD        0.9 % sodium chloride infusion   Intravenous Continuous Magnolia Del Rio  mL/hr at 01/14/18 0848       Allergies: Levaquin [levofloxacin in d5w] and Morphine  Past Medical History:   Diagnosis Date    Aortic stenosis     Arthritis     Atherosclerosis of native arteries of the extremities with intermittent claudication 7/25/2011    CAD (coronary artery disease)     Carotid artery occlusion     CHF (congestive heart failure) ([facility]     History of blood transfusion 2016    Post op, was taking blood thinner    Hyperlipidemia     Hypertension     MI (myocardial infarction) 2009    x2    Mitral valve stenosis     PUD (peptic ulcer disease) 2009    Renal failure 2009    Wound infection after surgery     right foot     Past Surgical History:   Procedure Laterality Date    ABDOMEN SURGERY  2009    perforated ulcer resection of 1/3 stomach    CARDIAC SURGERY      artificial valve and bypass    CAROTID ENDARTERECTOMY      Right  with Dacron patch angioplasty    CAROTID ENDARTERECTOMY Left     CARPAL TUNNEL RELEASE Right early 1990's    long ago   [address] GRAFT  2 weeks ago    DILATION AND CURETTAGE OF UTERUS      ILIO-FEMORAL BYPASS GRAFT N/A 6/2/2016    OPEN TRANSLUMINAL BALLOON ANGIOPLASTY AND STENTING OF RIGHT COMMON AND EXTERNAL  ILIAC ARTERIES; RIGHT FEMORAL ENDARTERECTOMY WITH VEIN PATCH ANGIOPLASTY performed by Rufino De La Rosa MD at [address] N/A 4/7/2016    MITRAL VALVE  REPLACEMENT LUONG-MAZE ABLATION WITH CRYO PROCEDURE, CORONARY ARTERY BYPASS GRAFT X 1 WITH ENDOSCOPIC VEIN HARVESTING WITH PERFUSION TRANSESOPHAGEAL ECHOCARDIOGRAM performed by Sharron Lugo MD at [address] [facility], CAROTID ENDARTEC>1 MON Left 1/11/2018    REMOVAL OF HEMATOMA, LEFT CAROTID ARTERY performed by Rufino De La Rosa MD at [address] Left 1/11/2018    LEFT CAROTID ENDARTERECTOMY WITH EEG MONITORING AND COMPLETION DUPLEX ULTRASOUND performed by Rufino De La Rosa MD at [address] [facility]      SKIN GRAFT Right 7/22/2016    Skin graft split thickness foot,ankle,and leg. Right leg 26x8cm and 12x6cm total area 280cm squared. TJR    UPPER rebound, no hepatomegaly or splenomegaly  MUSCULOSKELETAL   Prone/Supine, digitals and nails are without clubbing or cyanosis  EXTREMITIES - trace edema  NEUROLOGIC - cranial nerves, II-XII, are normal  SKIN - turgor is normal, no rash  PSYCHIATRIC - normal mood and affect, alert and orientated x 3, judgement and insight appear appropriate      ASSESSMENT:    ALL THE CARDIOLOGY PROBLEMS ARE LISTED ABOVE; HOWEVER, THE FOLLOWING SPECIFIC CARDIAC PROBLEMS / CONDITIONS WERE ADDRESSED AND TREATED DURING THE OFFICE VISIT TODAY:                                                                                            MEDICAL DECISION MAKING                   Cardiac Specific Problem / Diagnosis   Discussion and Data Reviewed Diagnostic Procedures Ordered Management Options Selected                 1. Presenting problem / symptom     Weakness  are worsening Just discharged from the hospital today     Quite bradycardic, will begin dopamine Yes: echo Continue current medications:      No                 2. Elevated troponin Initial presentation during this evaluation Review and summation of old records:     3/16/2016  Echo  Severe MS, moderate to severe MR, RVSP 73 mmHg, normal LVFX  3/31/2016  Cath  70% osteal RCA, severe MR, MVA 1.9, normal LVFX  4/7//2016   MVR (23 mm [employer] Mosaic) VG-PDAMichaeleen Blew)  5/7/2016  Echo  Normal LVFX, large pleural effusion, echolucency near preserved posterior Mitral leaflet, likely chordae, RVSP 72 mmHg    Yes: ekgs and troponin Continue current medications:     Yes:                  3. Multiple metabolic abnormalities Initial presentation during this evaluation Elevated creatinine, K, liver enzymes Yes: as per hospitalist Continue current medications:        yes      4. Pneumonia ? 5. Elevated WBC        PLAN:    1. Continue present medications except for changes as noted above  2. Continue to monitor rhythm  3. Further orders per clinical course.   4. Will check echo

## 2018-01-14 NOTE — PROGRESS NOTES
 Pulmonary emphysema (HCC)    COPD, severe (HCC)    PVD (peripheral vascular disease) (HCC)    Wound of sacral region    Elevated TSH    Bilateral carotid artery stenosis    Obstruction of left carotid artery    Bradycardia    Sepsis with organ dysfunction (HCC)       Allergies:  Levaquin [levofloxacin in d5w] and Morphine     Temp max: 98.3    Recent Labs      01/13/18   0443  01/13/18   1645   BUN  48*  59*       Recent Labs      01/13/18   0443  01/13/18   1645   CREATININE  2.6*  2.9*       Recent Labs      01/13/18   0443  01/13/18   1705   WBC  19.0*  19.9*       No intake or output data in the 24 hours ending 01/13/18 1948    Culture Date Source Results   01/13/18 Blood Sent       Ht Readings from Last 1 Encounters:   01/13/18 5' 3\" (1.6 m)        Wt Readings from Last 1 Encounters:   01/13/18 98 lb (44.5 kg)         Body mass index is 17.36 kg/m². CrCl cannot be calculated (Unknown ideal weight. ). Assessment/Plan:  Will initiate vancomycin pulse dosing. Will give 750 mg IV x 1. Timing of trough level will be determined based on culture results, renal function, and clinical response. Thank you for the consult. Will continue to follow.     Electronically signed by Antonella Steward [facility] on 1/13/2018 at 7:48 PM

## 2018-01-14 NOTE — PROGRESS NOTES
Pt arrived from ER via stretcher. No acute distress noted. Pt A&O x3. Denies pain/N, some dyspnea. Pt states she hasnt had her breathing treatments for a few days. Daughters at bedside. Dopamine gtt @ 2.9 mcg/kg/min /74, HR NSR 80's.  .Electronically signed by Gabino Houston RN on 1/13/2018 at 9:06 PM

## 2018-01-15 ENCOUNTER — APPOINTMENT (OUTPATIENT)
Dept: GENERAL RADIOLOGY | Age: 73
DRG: 871 | End: 2018-01-15
Payer: MEDICARE

## 2018-01-15 LAB
ANION GAP SERPL CALCULATED.3IONS-SCNC: 11 MMOL/L (ref 7–19)
BASOPHILS ABSOLUTE: 0 K/UL (ref 0–0.2)
BASOPHILS RELATIVE PERCENT: 0.1 % (ref 0–1)
BLOOD BANK DISPENSE STATUS: NORMAL
BLOOD BANK PRODUCT CODE: NORMAL
BPU ID: NORMAL
BUN BLDV-MCNC: 54 MG/DL (ref 8–23)
CALCIUM SERPL-MCNC: 7.4 MG/DL (ref 8.8–10.2)
CHLORIDE BLD-SCNC: 104 MMOL/L (ref 98–111)
CO2: 21 MMOL/L (ref 22–29)
CREAT SERPL-MCNC: 1.8 MG/DL (ref 0.5–0.9)
DESCRIPTION BLOOD BANK: NORMAL
EKG P AXIS: 101 DEGREES
EKG P AXIS: 85 DEGREES
EKG P-R INTERVAL: 182 MS
EKG P-R INTERVAL: 396 MS
EKG Q-T INTERVAL: 382 MS
EKG Q-T INTERVAL: 536 MS
EKG QRS DURATION: 104 MS
EKG QRS DURATION: 114 MS
EKG QTC CALCULATION (BAZETT): 434 MS
EKG QTC CALCULATION (BAZETT): 496 MS
EKG T AXIS: -38 DEGREES
EKG T AXIS: 80 DEGREES
EOSINOPHILS ABSOLUTE: 0 K/UL (ref 0–0.6)
EOSINOPHILS RELATIVE PERCENT: 0.3 % (ref 0–5)
GFR NON-AFRICAN AMERICAN: 28
GLUCOSE BLD-MCNC: 131 MG/DL (ref 74–109)
HCT VFR BLD CALC: 25.3 % (ref 37–47)
HEMOGLOBIN: 7.3 G/DL (ref 12–16)
LYMPHOCYTES ABSOLUTE: 1 K/UL (ref 1.1–4.5)
LYMPHOCYTES RELATIVE PERCENT: 8.3 % (ref 20–40)
MCH RBC QN AUTO: 24.6 PG (ref 27–31)
MCHC RBC AUTO-ENTMCNC: 28.9 G/DL (ref 33–37)
MCV RBC AUTO: 85.2 FL (ref 81–99)
MONOCYTES ABSOLUTE: 1.6 K/UL (ref 0–0.9)
MONOCYTES RELATIVE PERCENT: 13.6 % (ref 0–10)
NEUTROPHILS ABSOLUTE: 8.8 K/UL (ref 1.5–7.5)
NEUTROPHILS RELATIVE PERCENT: 77 % (ref 50–65)
PDW BLD-RTO: 18.3 % (ref 11.5–14.5)
PLATELET # BLD: 203 K/UL (ref 130–400)
PMV BLD AUTO: 10.8 FL (ref 9.4–12.3)
POTASSIUM SERPL-SCNC: 4.4 MMOL/L (ref 3.5–5)
RBC # BLD: 2.97 M/UL (ref 4.2–5.4)
SODIUM BLD-SCNC: 136 MMOL/L (ref 136–145)
TROPONIN: 1.39 NG/ML (ref 0–0.03)
TROPONIN: 1.43 NG/ML (ref 0–0.03)
TROPONIN: 1.46 NG/ML (ref 0–0.03)
WBC # BLD: 11.4 K/UL (ref 4.8–10.8)

## 2018-01-15 PROCEDURE — 6360000002 HC RX W HCPCS

## 2018-01-15 PROCEDURE — 80048 BASIC METABOLIC PNL TOTAL CA: CPT

## 2018-01-15 PROCEDURE — 2500000003 HC RX 250 WO HCPCS: Performed by: INTERNAL MEDICINE

## 2018-01-15 PROCEDURE — 99291 CRITICAL CARE FIRST HOUR: CPT | Performed by: HOSPITALIST

## 2018-01-15 PROCEDURE — 87077 CULTURE AEROBIC IDENTIFY: CPT

## 2018-01-15 PROCEDURE — 2100000000 HC CCU R&B

## 2018-01-15 PROCEDURE — 6360000002 HC RX W HCPCS: Performed by: INTERNAL MEDICINE

## 2018-01-15 PROCEDURE — 6370000000 HC RX 637 (ALT 250 FOR IP): Performed by: INTERNAL MEDICINE

## 2018-01-15 PROCEDURE — 36430 TRANSFUSION BLD/BLD COMPNT: CPT

## 2018-01-15 PROCEDURE — 99231 SBSQ HOSP IP/OBS SF/LOW 25: CPT | Performed by: INTERNAL MEDICINE

## 2018-01-15 PROCEDURE — 87070 CULTURE OTHR SPECIMN AEROBIC: CPT

## 2018-01-15 PROCEDURE — 93005 ELECTROCARDIOGRAM TRACING: CPT | Performed by: NURSE PRACTITIONER

## 2018-01-15 PROCEDURE — 87205 SMEAR GRAM STAIN: CPT

## 2018-01-15 PROCEDURE — P9016 RBC LEUKOCYTES REDUCED: HCPCS

## 2018-01-15 PROCEDURE — 94640 AIRWAY INHALATION TREATMENT: CPT

## 2018-01-15 PROCEDURE — 2580000003 HC RX 258: Performed by: INTERNAL MEDICINE

## 2018-01-15 PROCEDURE — 85025 COMPLETE CBC W/AUTO DIFF WBC: CPT

## 2018-01-15 PROCEDURE — 2700000000 HC OXYGEN THERAPY PER DAY

## 2018-01-15 PROCEDURE — 6370000000 HC RX 637 (ALT 250 FOR IP): Performed by: HOSPITALIST

## 2018-01-15 PROCEDURE — 36415 COLL VENOUS BLD VENIPUNCTURE: CPT

## 2018-01-15 PROCEDURE — 87086 URINE CULTURE/COLONY COUNT: CPT

## 2018-01-15 PROCEDURE — 84484 ASSAY OF TROPONIN QUANT: CPT

## 2018-01-15 PROCEDURE — 99024 POSTOP FOLLOW-UP VISIT: CPT | Performed by: SURGERY

## 2018-01-15 PROCEDURE — 6360000002 HC RX W HCPCS: Performed by: HOSPITALIST

## 2018-01-15 PROCEDURE — 87186 SC STD MICRODIL/AGAR DIL: CPT

## 2018-01-15 PROCEDURE — 71046 X-RAY EXAM CHEST 2 VIEWS: CPT

## 2018-01-15 RX ORDER — ATROPINE SULFATE 0.4 MG/ML
0.4 AMPUL (ML) INJECTION
Status: ACTIVE | OUTPATIENT
Start: 2018-01-15 | End: 2018-01-15

## 2018-01-15 RX ORDER — LEVALBUTEROL INHALATION SOLUTION 0.63 MG/3ML
0.5 SOLUTION RESPIRATORY (INHALATION) EVERY 6 HOURS
Status: DISCONTINUED | OUTPATIENT
Start: 2018-01-15 | End: 2018-01-22 | Stop reason: HOSPADM

## 2018-01-15 RX ORDER — 0.9 % SODIUM CHLORIDE 0.9 %
250 INTRAVENOUS SOLUTION INTRAVENOUS ONCE
Status: DISCONTINUED | OUTPATIENT
Start: 2018-01-15 | End: 2018-01-22 | Stop reason: HOSPADM

## 2018-01-15 RX ORDER — LISINOPRIL 10 MG/1
10 TABLET ORAL 2 TIMES DAILY
Status: DISCONTINUED | OUTPATIENT
Start: 2018-01-15 | End: 2018-01-18

## 2018-01-15 RX ORDER — LEVALBUTEROL INHALATION SOLUTION 0.63 MG/3ML
SOLUTION RESPIRATORY (INHALATION)
Status: COMPLETED
Start: 2018-01-15 | End: 2018-01-15

## 2018-01-15 RX ORDER — NITROGLYCERIN 20 MG/100ML
5 INJECTION INTRAVENOUS CONTINUOUS
Status: DISCONTINUED | OUTPATIENT
Start: 2018-01-15 | End: 2018-01-18

## 2018-01-15 RX ADMIN — GUAIFENESIN 600 MG: 600 TABLET, EXTENDED RELEASE ORAL at 14:30

## 2018-01-15 RX ADMIN — GUAIFENESIN 600 MG: 600 TABLET, EXTENDED RELEASE ORAL at 20:16

## 2018-01-15 RX ADMIN — CLOPIDOGREL BISULFATE 75 MG: 75 TABLET ORAL at 09:07

## 2018-01-15 RX ADMIN — ACETAMINOPHEN 650 MG: 325 TABLET, FILM COATED ORAL at 10:24

## 2018-01-15 RX ADMIN — ACETAMINOPHEN 650 MG: 325 TABLET, FILM COATED ORAL at 00:10

## 2018-01-15 RX ADMIN — NITROGLYCERIN 5 MCG/MIN: 20 INJECTION INTRAVENOUS at 01:35

## 2018-01-15 RX ADMIN — GUAIFENESIN 600 MG: 600 TABLET, EXTENDED RELEASE ORAL at 17:15

## 2018-01-15 RX ADMIN — PIPERACILLIN SODIUM,TAZOBACTAM SODIUM 2.25 G: 2; .25 INJECTION, POWDER, FOR SOLUTION INTRAVENOUS at 21:51

## 2018-01-15 RX ADMIN — METOPROLOL TARTRATE 50 MG: 50 TABLET ORAL at 20:16

## 2018-01-15 RX ADMIN — LISINOPRIL 10 MG: 10 TABLET ORAL at 09:07

## 2018-01-15 RX ADMIN — ATORVASTATIN CALCIUM 40 MG: 40 TABLET, FILM COATED ORAL at 09:07

## 2018-01-15 RX ADMIN — VANCOMYCIN HYDROCHLORIDE 750 MG: 500 INJECTION, POWDER, LYOPHILIZED, FOR SOLUTION INTRAVENOUS at 00:06

## 2018-01-15 RX ADMIN — LEVOTHYROXINE SODIUM 50 MCG: 100 TABLET ORAL at 05:20

## 2018-01-15 RX ADMIN — GUAIFENESIN 600 MG: 600 TABLET, EXTENDED RELEASE ORAL at 09:07

## 2018-01-15 RX ADMIN — LISINOPRIL 10 MG: 10 TABLET ORAL at 20:16

## 2018-01-15 RX ADMIN — LEVALBUTEROL HYDROCHLORIDE 0.32 MG: 0.63 SOLUTION RESPIRATORY (INHALATION) at 10:36

## 2018-01-15 RX ADMIN — Medication 81 MG: at 09:07

## 2018-01-15 RX ADMIN — PIPERACILLIN SODIUM,TAZOBACTAM SODIUM 2.25 G: 2; .25 INJECTION, POWDER, FOR SOLUTION INTRAVENOUS at 05:20

## 2018-01-15 RX ADMIN — LEVALBUTEROL HYDROCHLORIDE 0.32 MG: 0.63 SOLUTION RESPIRATORY (INHALATION) at 14:18

## 2018-01-15 RX ADMIN — PIPERACILLIN SODIUM,TAZOBACTAM SODIUM 2.25 G: 2; .25 INJECTION, POWDER, FOR SOLUTION INTRAVENOUS at 15:00

## 2018-01-15 RX ADMIN — LEVALBUTEROL HYDROCHLORIDE 0.63 MG: 0.63 SOLUTION RESPIRATORY (INHALATION) at 23:37

## 2018-01-15 RX ADMIN — LEVALBUTEROL 0.63 MG: 0.63 SOLUTION RESPIRATORY (INHALATION) at 18:54

## 2018-01-15 RX ADMIN — LEVALBUTEROL HYDROCHLORIDE 0.63 MG: 0.63 SOLUTION RESPIRATORY (INHALATION) at 18:54

## 2018-01-15 RX ADMIN — LEVALBUTEROL INHALATION 0.63MG/3ML 0.32 MG: 0.63 SOLUTION RESPIRATORY (INHALATION) at 06:26

## 2018-01-15 RX ADMIN — METOPROLOL TARTRATE 50 MG: 50 TABLET ORAL at 09:07

## 2018-01-15 RX ADMIN — Medication 10 ML: at 21:52

## 2018-01-15 ASSESSMENT — PAIN SCALES - GENERAL
PAINLEVEL_OUTOF10: 6
PAINLEVEL_OUTOF10: 5
PAINLEVEL_OUTOF10: 3

## 2018-01-15 NOTE — PROGRESS NOTES
Nutrition Assessment    Type and Reason for Visit: Initial    Nutrition Recommendations: start ONS    Malnutrition Assessment:  · Malnutrition Status: At risk for malnutrition  · Context: Acute illness or injury    Nutrition Diagnosis:   · Problem: Increased nutrient needs  · Etiology: related to Increased demand for energy/nutrients due to     Signs and symptoms:  as evidenced by  (dx-sepsis)    Nutrition Assessment:  · Subjective Assessment: Positive nutrition screen for decreased po intake. PO intake is decreased intake of 0-25%. Will start Ensure with B & D  · Nutrition-Focused Physical Findings: Thin appearing female  · Wound Type: Surgical Wound  · Current Nutrition Therapies:  · Oral Diet Orders: General   · Oral Diet intake: 1-25%  · Oral Nutrition Supplement (ONS) Orders: None-start Ensure Enlive at B & D  · ONS intake:   NA    · Anthropometric Measures:  · Ht: 5' 3\" (160 cm)   · Current Body Wt: 106 lb (48.1 kg)  · Admission Body Wt: 97 lb (44 kg) (stated)  · Ideal Body Wt: 115 lb (52.2 kg),  · BMI Classification: BMI 18.5 - 24.9 Normal Weight    Estimated Intake vs Estimated Needs: Intake Less Than Needs    Nutrition Risk Level: High    Nutrition Interventions:   Continue current diet, Start ONS  Continued Inpatient Monitoring    Nutrition Evaluation:   · Evaluation: Goals set   · Goals: po intake 50% or greater. Weight stable    · Monitoring: Meal Intake, Supplement Intake, Diet Tolerance, Skin Integrity, Wound Healing, Weight, Pertinent Labs    See Adult Nutrition Doc Flowsheet for more detail.      Electronically signed by Opal Day MS, RD, LD on 1/15/18 at 1:20 PM    Contact Number: [phone]

## 2018-01-15 NOTE — PROGRESS NOTES
1/14/18  Summary   The mitral valve leaflets are severely thickened with depressed mobility.   There appears to be at least moderate mitral stenosis   Aortic valve leaflets are severely thickened.   Left ventricular ejection fraction is visually estimated at 45%. Electronically signed by Reynaldo Cedillo MD (Interpreting physician) on 01/15/2018 09:02 AM    Prior Echo 5/9/16  Summary   1. Concentric LVH   2. Normal LV systolic function (estimated EF, 55-60%)   3. Grade 3 diastolic dysfunction   4. Normal functioning bioprosthetic valve in the mitral position. 5. Echolucency near preserved posterior mitral leaflet, likely chordae   6. Large pleural effusion   7. Moderate tricuspid regurgitation; estimated RVSP of at least 72 mmHg.     Recommendation: MELISSA for better evaluation of echolucency if clinically indicated   Electronically signed by Vikash Chester MD (Interpreting physician) on 05/09/2016 01:05 AM      Objective:   Vitals: BP (!) 127/44   Pulse 75   Temp 98.4 °F (36.9 °C) (Temporal)   Resp 12   Ht 5' 3\" (1.6 m)   Wt 106 lb (48.1 kg)   SpO2 97%   BMI 18.78 kg/m²   24HR INTAKE/OUTPUT:      Intake/Output Summary (Last 24 hours) at 01/15/18 1224  Last data filed at 01/15/18 0400   Gross per 24 hour   Intake          3463.63 ml   Output              725 ml   Net          2738.63 ml     Net I/O since admission (+2.73L)    General appearance: alert and cooperative with exam, pleasant, no distress, mildly hoarse  HEENT: atraumatic, eyes with clear conjunctiva and normal lids, pupils and irises normal, external ears and nose are normal, lips normal. Neck without masses, lympadenopathy, bruit, thyroid normal  Lungs: no increased work of breathing on O2, she has R sided rhonchi anteriorly and posteriorly - more prominent  Heart: regular rate and rhythm, S1, S2 normal, no murmur, click, rub or gallop  Abdomen: soft, non-tender; bowel sounds normal; no masses,  no organomegaly  Extremities: extremities normal, WHETHER SHE MAY HAVE HAD A CARDIAC EVENT - APPRECIATE CARDIOLOGY INPUT. SUPPORT FOR NOW. Reviewed with Dr Chastity Bradford and shared concerns with him at Nursing station.      Critical Care exceeds 30 minutes    Oxana Jimenez MD  Rounding Hospitalist    Adjust nebs, increase lisinopril, check CXR

## 2018-01-15 NOTE — DISCHARGE SUMMARY
Physician Discharge Summary          Patient ID:  Ambreen Valencia  [id number]  54 y.o.  1945    Date of Admission:  1/11/2018    Date of Discharge:  1/13/2018  2:34 PM     Admitting Physician:  Angelina Carolina M.D. Consults:  none    Primary Diagnosis:    1. Asymptomatic left Carotid Stenosis    Other Diagnoses:    1. HTN  2. CAD  3. CHF  4. PUD  5. Hyperlipidemia    Operative Procedures:    DATE OF OPERATION:  01/11/2018     PREOPERATIVE DIAGNOSIS:  Critical left internal carotid stenosis.     POSTOPERATIVE DIAGNOSIS:  Critical left internal carotid stenosis.     PROCEDURE PERFORMED:  Left carotid endarterectomy (eversion type).     SURGEON:  Marisol Landa MD     FIRST ASSISTANT:  Jaclyn Tran.     PROCEDURE:  The patient was brought into the operative room. Appropriate  checklist was performed. Prophylactic antibiotics were given and  monitoring lines were in place. General endotracheal anesthesia was  induced. The left side of her neck, chest and shoulder area were all  prepped and draped in usual sterile manner using ChloraPrep and Ioban.     We initially made an incision along the anterior border of the  sternocleidomastoid muscle using sharp and Bovie dissection. We carried  this down and entered the carotid sheath. Crossing veins were doubly  ligated and divided with 3-0 silk. We gained circumferential control of  the common carotid artery at the level of the omohyoid muscle. Her  bifurcation was so low we had to cut the omohyoid muscle. We then gained  circumferential control of the superior thyroid, external carotid artery  and internal carotid artery. The patient was given 5000 units of  intravenous heparin, which was allowed to circulate. We then occluded the  internal, external and common carotid as well as the superior thyroid.     We had no changes in our EEG monitoring.   When I checked for backbleeding  in internal carotid artery, it was vigorous and we did not shunt taken to the  recovery room in stable condition.     FINDINGS OF THE CASE:  No obvious source of bleeding, just multiple tiny  areas of oozing. History and Physical:    See History and Physical on admission without additions nor deletions. Hospital Course:    She was admitted after the above stated procedure (please see operative note for details of procedure). The EDILIA drain was removed on POD#2 with minimal output. On physical exam, Her voice sounds ok after CEA and general endotracheal anesthesia. She is alert and oriented and Her speech is normal,  Her left neck wound is c/d/i without hematoma,  PERRL, EOMI, face symmetric, tongue midline, DOMINGUEZ 5/5 strength. Discharge Instructions:    Discharge instructions were discussed with the patient prior to discharge. The patient was also given written discharge instructions. Discharge Medications:    See Epic for discharge medication list, including any new prescriptions. Follow-Up:    She will follow-up with our office in 2-3 weeks. She can call with any questions or concerns.      Signed:  Sara Newby  1/15/2018  9:08 AM

## 2018-01-16 LAB
ANION GAP SERPL CALCULATED.3IONS-SCNC: 11 MMOL/L (ref 7–19)
BASOPHILS ABSOLUTE: 0 K/UL (ref 0–0.2)
BASOPHILS RELATIVE PERCENT: 0.3 % (ref 0–1)
BUN BLDV-MCNC: 32 MG/DL (ref 8–23)
CALCIUM SERPL-MCNC: 7.5 MG/DL (ref 8.8–10.2)
CHLORIDE BLD-SCNC: 107 MMOL/L (ref 98–111)
CO2: 21 MMOL/L (ref 22–29)
CREAT SERPL-MCNC: 1.1 MG/DL (ref 0.5–0.9)
EOSINOPHILS ABSOLUTE: 0.1 K/UL (ref 0–0.6)
EOSINOPHILS RELATIVE PERCENT: 1 % (ref 0–5)
GFR NON-AFRICAN AMERICAN: 49
GLUCOSE BLD-MCNC: 123 MG/DL (ref 74–109)
HCT VFR BLD CALC: 29 % (ref 37–47)
HEMOGLOBIN: 8.8 G/DL (ref 12–16)
LYMPHOCYTES ABSOLUTE: 0.9 K/UL (ref 1.1–4.5)
LYMPHOCYTES RELATIVE PERCENT: 8.2 % (ref 20–40)
MAGNESIUM: 1.9 MG/DL (ref 1.6–2.4)
MCH RBC QN AUTO: 25.7 PG (ref 27–31)
MCHC RBC AUTO-ENTMCNC: 30.3 G/DL (ref 33–37)
MCV RBC AUTO: 84.5 FL (ref 81–99)
MONOCYTES ABSOLUTE: 1.8 K/UL (ref 0–0.9)
MONOCYTES RELATIVE PERCENT: 16.6 % (ref 0–10)
NEUTROPHILS ABSOLUTE: 7.9 K/UL (ref 1.5–7.5)
NEUTROPHILS RELATIVE PERCENT: 73.1 % (ref 50–65)
PDW BLD-RTO: 17.1 % (ref 11.5–14.5)
PLATELET # BLD: 193 K/UL (ref 130–400)
PMV BLD AUTO: 9.9 FL (ref 9.4–12.3)
POTASSIUM SERPL-SCNC: 3.5 MMOL/L (ref 3.5–5)
RBC # BLD: 3.43 M/UL (ref 4.2–5.4)
SODIUM BLD-SCNC: 139 MMOL/L (ref 136–145)
VANCOMYCIN RANDOM: 9.5 UG/ML
WBC # BLD: 10.8 K/UL (ref 4.8–10.8)

## 2018-01-16 PROCEDURE — 2700000000 HC OXYGEN THERAPY PER DAY

## 2018-01-16 PROCEDURE — 2500000003 HC RX 250 WO HCPCS: Performed by: INTERNAL MEDICINE

## 2018-01-16 PROCEDURE — 2100000000 HC CCU R&B

## 2018-01-16 PROCEDURE — 6370000000 HC RX 637 (ALT 250 FOR IP): Performed by: HOSPITALIST

## 2018-01-16 PROCEDURE — 80202 ASSAY OF VANCOMYCIN: CPT

## 2018-01-16 PROCEDURE — 6370000000 HC RX 637 (ALT 250 FOR IP): Performed by: INTERNAL MEDICINE

## 2018-01-16 PROCEDURE — 6360000002 HC RX W HCPCS: Performed by: HOSPITALIST

## 2018-01-16 PROCEDURE — 83735 ASSAY OF MAGNESIUM: CPT

## 2018-01-16 PROCEDURE — 6360000002 HC RX W HCPCS: Performed by: INTERNAL MEDICINE

## 2018-01-16 PROCEDURE — 2580000003 HC RX 258: Performed by: INTERNAL MEDICINE

## 2018-01-16 PROCEDURE — 80048 BASIC METABOLIC PNL TOTAL CA: CPT

## 2018-01-16 PROCEDURE — 99231 SBSQ HOSP IP/OBS SF/LOW 25: CPT | Performed by: INTERNAL MEDICINE

## 2018-01-16 PROCEDURE — 99233 SBSQ HOSP IP/OBS HIGH 50: CPT | Performed by: INTERNAL MEDICINE

## 2018-01-16 PROCEDURE — 36415 COLL VENOUS BLD VENIPUNCTURE: CPT

## 2018-01-16 PROCEDURE — 85025 COMPLETE CBC W/AUTO DIFF WBC: CPT

## 2018-01-16 PROCEDURE — 94640 AIRWAY INHALATION TREATMENT: CPT

## 2018-01-16 RX ORDER — NIFEDIPINE 30 MG/1
30 TABLET, EXTENDED RELEASE ORAL ONCE
Status: COMPLETED | OUTPATIENT
Start: 2018-01-16 | End: 2018-01-16

## 2018-01-16 RX ORDER — DOXYCYCLINE HYCLATE 100 MG/1
100 CAPSULE ORAL EVERY 12 HOURS SCHEDULED
Status: COMPLETED | OUTPATIENT
Start: 2018-01-16 | End: 2018-01-19

## 2018-01-16 RX ORDER — NIFEDIPINE 30 MG/1
30 TABLET, EXTENDED RELEASE ORAL DAILY
Status: DISCONTINUED | OUTPATIENT
Start: 2018-01-16 | End: 2018-01-17

## 2018-01-16 RX ORDER — HYDRALAZINE HYDROCHLORIDE 20 MG/ML
5 INJECTION INTRAMUSCULAR; INTRAVENOUS ONCE
Status: COMPLETED | OUTPATIENT
Start: 2018-01-16 | End: 2018-01-16

## 2018-01-16 RX ADMIN — GUAIFENESIN 600 MG: 600 TABLET, EXTENDED RELEASE ORAL at 08:23

## 2018-01-16 RX ADMIN — GUAIFENESIN 600 MG: 600 TABLET, EXTENDED RELEASE ORAL at 17:05

## 2018-01-16 RX ADMIN — METOPROLOL TARTRATE 50 MG: 50 TABLET ORAL at 08:22

## 2018-01-16 RX ADMIN — NIFEDIPINE 30 MG: 30 TABLET, FILM COATED, EXTENDED RELEASE ORAL at 08:23

## 2018-01-16 RX ADMIN — GUAIFENESIN 600 MG: 600 TABLET, EXTENDED RELEASE ORAL at 12:39

## 2018-01-16 RX ADMIN — LISINOPRIL 10 MG: 10 TABLET ORAL at 08:23

## 2018-01-16 RX ADMIN — GUAIFENESIN 600 MG: 600 TABLET, EXTENDED RELEASE ORAL at 20:13

## 2018-01-16 RX ADMIN — NIFEDIPINE 30 MG: 30 TABLET, FILM COATED, EXTENDED RELEASE ORAL at 17:05

## 2018-01-16 RX ADMIN — LEVALBUTEROL HYDROCHLORIDE 0.63 MG: 0.63 SOLUTION RESPIRATORY (INHALATION) at 19:25

## 2018-01-16 RX ADMIN — Medication 81 MG: at 08:23

## 2018-01-16 RX ADMIN — ATORVASTATIN CALCIUM 40 MG: 40 TABLET, FILM COATED ORAL at 08:23

## 2018-01-16 RX ADMIN — PIPERACILLIN SODIUM,TAZOBACTAM SODIUM 2.25 G: 2; .25 INJECTION, POWDER, FOR SOLUTION INTRAVENOUS at 05:59

## 2018-01-16 RX ADMIN — CLOPIDOGREL BISULFATE 75 MG: 75 TABLET ORAL at 08:23

## 2018-01-16 RX ADMIN — LEVALBUTEROL HYDROCHLORIDE 0.63 MG: 0.63 SOLUTION RESPIRATORY (INHALATION) at 23:22

## 2018-01-16 RX ADMIN — LEVALBUTEROL HYDROCHLORIDE 0.32 MG: 0.63 SOLUTION RESPIRATORY (INHALATION) at 06:26

## 2018-01-16 RX ADMIN — Medication 10 ML: at 20:15

## 2018-01-16 RX ADMIN — LISINOPRIL 10 MG: 10 TABLET ORAL at 20:13

## 2018-01-16 RX ADMIN — Medication 10 ML: at 08:23

## 2018-01-16 RX ADMIN — VANCOMYCIN HYDROCHLORIDE 750 MG: 10 INJECTION, POWDER, LYOPHILIZED, FOR SOLUTION INTRAVENOUS at 04:19

## 2018-01-16 RX ADMIN — LEVALBUTEROL HYDROCHLORIDE 0.32 MG: 0.63 SOLUTION RESPIRATORY (INHALATION) at 10:12

## 2018-01-16 RX ADMIN — SODIUM CHLORIDE 1 G: 9 INJECTION INTRAMUSCULAR; INTRAVENOUS; SUBCUTANEOUS at 12:40

## 2018-01-16 RX ADMIN — NITROGLYCERIN 20 MCG/MIN: 20 INJECTION INTRAVENOUS at 21:18

## 2018-01-16 RX ADMIN — LEVOTHYROXINE SODIUM 50 MCG: 100 TABLET ORAL at 06:00

## 2018-01-16 RX ADMIN — HYDRALAZINE HYDROCHLORIDE 5 MG: 20 INJECTION INTRAMUSCULAR; INTRAVENOUS at 04:18

## 2018-01-16 RX ADMIN — METOPROLOL TARTRATE 50 MG: 50 TABLET ORAL at 20:13

## 2018-01-16 RX ADMIN — DOXYCYCLINE HYCLATE 100 MG: 100 CAPSULE, GELATIN COATED ORAL at 12:39

## 2018-01-16 RX ADMIN — DOXYCYCLINE HYCLATE 100 MG: 100 CAPSULE, GELATIN COATED ORAL at 20:13

## 2018-01-16 ASSESSMENT — PAIN SCALES - GENERAL: PAINLEVEL_OUTOF10: 0

## 2018-01-16 NOTE — PROGRESS NOTES
[facility]  Daily Progress Note      Chief Complaint: f/u NSTEMI, bradycardia    Interval Hx: No chest pain or SOA. No bradycardia.  Feeling better today after transfusion    ROS:  The rest of the systems are negative except Interval Hx    Current Inpatient Medications:   NIFEdipine  30 mg Oral Daily    cefTRIAXone (ROCEPHIN) IV  1 g Intravenous Q24H    doxycycline hyclate  100 mg Oral 2 times per day    lisinopril  10 mg Oral BID    levalbuterol  0.5 ampule Nebulization Q6H    sodium chloride  250 mL Intravenous Once    aspirin EC  81 mg Oral Daily    atorvastatin  40 mg Oral Daily    clopidogrel  75 mg Oral Daily    levothyroxine  50 mcg Oral Daily    metoprolol tartrate  50 mg Oral BID    guaiFENesin  600 mg Oral 4x Daily    sodium chloride flush  10 mL Intravenous 2 times per day       IV Infusions (if any):   nitroGLYCERIN 25 mcg/min (01/16/18 0800)    sodium chloride 25 mL/hr at 01/15/18 1226       Physical Exam:   BP (!) 161/57   Pulse 72   Temp 98.5 °F (36.9 °C) (Temporal)   Resp 14   Ht 5' 3\" (1.6 m)   Wt 106 lb (48.1 kg)   SpO2 97%   BMI 18.78 kg/m²       Intake/Output Summary (Last 24 hours) at 01/16/18 1324  Last data filed at 01/16/18 1000   Gross per 24 hour   Intake          2303.82 ml   Output             1500 ml   Net           803.82 ml       Gen - NAD, pale  Neck - Supple  ENMT - MMM, OP Clear  Cardio - No JVD     Clear s1 s2, no gallop, rub, murmur                No edema, 2+ radials  Resp - Normal effort, CTA Bilat  GI - soft, non-tender, no HSM  Psych - A+O x 3, normal affect     Diagnostics:      Recent Labs      01/14/18   0135  01/14/18   0713  01/15/18   0039  01/16/18   0248   WBC  15.8*   --   11.4*  10.8   HGB  7.0*  7.2*  7.3*  8.8*   PLT  220   --   203  193      Recent Labs      01/13/18   2248  01/15/18   0039  01/16/18   0853   NA  136  136  139   K  4.8  4.4  3.5   CL  99  104  107   CO2  22  21*  21*   BUN  62*  54*  32*   CREATININE  2.9*

## 2018-01-16 NOTE — PROGRESS NOTES
Daily Ant Healy MD   75 mg at 01/16/18 6772    levothyroxine (SYNTHROID) tablet 50 mcg  50 mcg Oral Daily Ant Healy MD   50 mcg at 01/16/18 0600    metoprolol tartrate (LOPRESSOR) tablet 50 mg  50 mg Oral BID Ant Healy MD   50 mg at 01/16/18 0000    guaiFENesin [facility]) extended release tablet 600 mg  600 mg Oral 4x Daily Ant Healy MD   600 mg at 01/16/18 7922    dextrose 50 % solution 25 g  25 g Intravenous PRN Leidy Kelley MD   25 g at 01/13/18 1824    sodium chloride flush 0.9 % injection 10 mL  10 mL Intravenous 2 times per day Lori Spencer MD   10 mL at 01/16/18 7240    sodium chloride flush 0.9 % injection 10 mL  10 mL Intravenous PRN Lori Spencer MD        acetaminophen (TYLENOL) tablet 650 mg  650 mg Oral Q4H PRN Lori Spencer MD   650 mg at 01/15/18 1024    ondansetron (ZOFRAN) injection 4 mg  4 mg Intravenous Q6H PRN Lori Spencer MD        vancomycin (VANCOCIN) intermittent dosing (placeholder)   Other RX Placeholder Lori Spencer MD        0.9 % sodium chloride infusion   Intravenous Continuous Ant Healy MD 25 mL/hr at 01/15/18 1226          Labs:     Recent Labs      01/14/18   0135  01/14/18   0713  01/14/18   0815  01/15/18   0039  01/16/18   0248   WBC  15.8*   --    --   11.4*  10.8   RBC  2.81*   --    --   2.97*  3.43*   HGB  7.0*  7.2*   --   7.3*  8.8*   HCT  23.1*  23.5*  24.5*  25.3*  29.0*   MCV  82.2   --    --   85.2  84.5   MCH  24.9*   --    --   24.6*  25.7*   MCHC  30.3*   --    --   28.9*  30.3*   PLT  220   --    --   203  193     Recent Labs      01/13/18   1645  01/13/18   2248  01/15/18   0039   NA  134*  136  136   K  5.9*  4.8  4.4   ANIONGAP  18  15  11   CL  96*  99  104   CO2  20*  22  21*   BUN  59*  62*  54*   CREATININE  2.9*  2.9*  1.8*   GLUCOSE  110*  118*  131*   CALCIUM  8.2*  8.2*  7.4*     No results for input(s): MG, PHOS in the last 72 hours.   Recent Labs      01/13/18   1645   AST  1,201*

## 2018-01-16 NOTE — PROGRESS NOTES
[facility]  Daily Progress Note      Chief Complaint: f/u NSTEMI, bradycardia    Interval Hx: No chest pain or SOA. No bradycardia. Feeling weak today.     ROS:  The rest of the systems are negative except Interval Hx    Current Inpatient Medications:   lisinopril  10 mg Oral BID    levalbuterol  0.5 ampule Nebulization Q6H    sodium chloride  250 mL Intravenous Once    piperacillin-tazobactam  2.25 g Intravenous Q8H    aspirin EC  81 mg Oral Daily    atorvastatin  40 mg Oral Daily    clopidogrel  75 mg Oral Daily    levothyroxine  50 mcg Oral Daily    metoprolol tartrate  50 mg Oral BID    guaiFENesin  600 mg Oral 4x Daily    sodium chloride flush  10 mL Intravenous 2 times per day    vancomycin (VANCOCIN) intermittent dosing (placeholder)   Other RX Placeholder       IV Infusions (if any):   nitroGLYCERIN 20 mcg/min (01/16/18 0300)    sodium chloride 25 mL/hr at 01/15/18 1226       Physical Exam:   BP (!) 183/82   Pulse 70   Temp 98.4 °F (36.9 °C) (Temporal)   Resp 15   Ht 5' 3\" (1.6 m)   Wt 106 lb (48.1 kg)   SpO2 97%   BMI 18.78 kg/m²       Intake/Output Summary (Last 24 hours) at 01/16/18 0726  Last data filed at 01/16/18 0300   Gross per 24 hour   Intake          2046.82 ml   Output             1800 ml   Net           246.82 ml       Gen - NAD, pale  Neck - Supple  ENMT - MMM, OP Clear  Cardio - No JVD     Clear s1 s2, no gallop, rub, murmur                No edema, 2+ radials  Resp - Normal effort, CTA Bilat  GI - soft, non-tender, no HSM  Psych - A+O x 3, normal affect     Diagnostics:      Recent Labs      01/14/18   0135  01/14/18   0713  01/15/18   0039  01/16/18   0248   WBC  15.8*   --   11.4*  10.8   HGB  7.0*  7.2*  7.3*  8.8*   PLT  220   --   203  193      Recent Labs      01/13/18   1645  01/13/18   2248  01/15/18   0039   NA  134*  136  136   K  5.9*  4.8  4.4   CL  96*  99  104   CO2  20*  22  21*   BUN  59*  62*  54*   CREATININE  2.9*  2.9*  1.8*   LABGLOM 16*  16*  28*   CALCIUM  8.2*  8.2*  7.4*      Recent Labs      01/15/18   0039  01/15/18   0811  01/15/18   1251   TROPONINI  1.43*  1.39*  1.46*            Assessment:     67 y.o. female with NSTEMI, bradycardia, likely shock liver, CAD, ALEXIS, recent CEA with hematoma, COPD    Plan:     NSTEMI - continue DAPT and atorvstatin, hold beta-blocker, has unrevascularized disease. Will need a cath at some time when renal function and HGb improves, will give 1U pRBCs today    Bradycardia - Either RCA stenosis, pressure on the [place] receptors from hematoma, or hypotension was cause of this. Monitor. Hold beta-blocker. CEA - Spoke with Dr. Jacinta Monae and he said it is okay to anti-coagulate    Continue to support care for now till renal function improves.     Rafiq Robins MD  [facility] [place]

## 2018-01-17 LAB
ANION GAP SERPL CALCULATED.3IONS-SCNC: 11 MMOL/L (ref 7–19)
BASOPHILS ABSOLUTE: 0 K/UL (ref 0–0.2)
BASOPHILS RELATIVE PERCENT: 0.3 % (ref 0–1)
BUN BLDV-MCNC: 25 MG/DL (ref 8–23)
CALCIUM SERPL-MCNC: 7.5 MG/DL (ref 8.8–10.2)
CHLORIDE BLD-SCNC: 107 MMOL/L (ref 98–111)
CO2: 21 MMOL/L (ref 22–29)
CREAT SERPL-MCNC: 1.2 MG/DL (ref 0.5–0.9)
EOSINOPHILS ABSOLUTE: 0.2 K/UL (ref 0–0.6)
EOSINOPHILS RELATIVE PERCENT: 2.2 % (ref 0–5)
GFR NON-AFRICAN AMERICAN: 44
GLUCOSE BLD-MCNC: 126 MG/DL (ref 74–109)
HCT VFR BLD CALC: 26.2 % (ref 37–47)
HEMOGLOBIN: 8.2 G/DL (ref 12–16)
LYMPHOCYTES ABSOLUTE: 1.2 K/UL (ref 1.1–4.5)
LYMPHOCYTES RELATIVE PERCENT: 10.8 % (ref 20–40)
MCH RBC QN AUTO: 26.3 PG (ref 27–31)
MCHC RBC AUTO-ENTMCNC: 31.3 G/DL (ref 33–37)
MCV RBC AUTO: 84 FL (ref 81–99)
MONOCYTES ABSOLUTE: 1.9 K/UL (ref 0–0.9)
MONOCYTES RELATIVE PERCENT: 17.2 % (ref 0–10)
NEUTROPHILS ABSOLUTE: 7.4 K/UL (ref 1.5–7.5)
NEUTROPHILS RELATIVE PERCENT: 68.7 % (ref 50–65)
PDW BLD-RTO: 17.6 % (ref 11.5–14.5)
PLATELET # BLD: 205 K/UL (ref 130–400)
PMV BLD AUTO: 9.8 FL (ref 9.4–12.3)
POTASSIUM SERPL-SCNC: 3.6 MMOL/L (ref 3.5–5)
RBC # BLD: 3.12 M/UL (ref 4.2–5.4)
SODIUM BLD-SCNC: 139 MMOL/L (ref 136–145)
URINE CULTURE, ROUTINE: NORMAL
WBC # BLD: 10.8 K/UL (ref 4.8–10.8)

## 2018-01-17 PROCEDURE — 2580000003 HC RX 258: Performed by: INTERNAL MEDICINE

## 2018-01-17 PROCEDURE — 6360000002 HC RX W HCPCS: Performed by: HOSPITALIST

## 2018-01-17 PROCEDURE — 6360000002 HC RX W HCPCS: Performed by: INTERNAL MEDICINE

## 2018-01-17 PROCEDURE — 6370000000 HC RX 637 (ALT 250 FOR IP): Performed by: INTERNAL MEDICINE

## 2018-01-17 PROCEDURE — 6370000000 HC RX 637 (ALT 250 FOR IP): Performed by: HOSPITALIST

## 2018-01-17 PROCEDURE — 99231 SBSQ HOSP IP/OBS SF/LOW 25: CPT | Performed by: INTERNAL MEDICINE

## 2018-01-17 PROCEDURE — 2140000000 HC CCU INTERMEDIATE R&B

## 2018-01-17 PROCEDURE — 99232 SBSQ HOSP IP/OBS MODERATE 35: CPT | Performed by: INTERNAL MEDICINE

## 2018-01-17 PROCEDURE — 80048 BASIC METABOLIC PNL TOTAL CA: CPT

## 2018-01-17 PROCEDURE — 2700000000 HC OXYGEN THERAPY PER DAY

## 2018-01-17 PROCEDURE — 85025 COMPLETE CBC W/AUTO DIFF WBC: CPT

## 2018-01-17 PROCEDURE — 36415 COLL VENOUS BLD VENIPUNCTURE: CPT

## 2018-01-17 PROCEDURE — 94640 AIRWAY INHALATION TREATMENT: CPT

## 2018-01-17 RX ORDER — NIFEDIPINE 60 MG/1
60 TABLET, EXTENDED RELEASE ORAL 2 TIMES DAILY
Status: DISCONTINUED | OUTPATIENT
Start: 2018-01-17 | End: 2018-01-22 | Stop reason: HOSPADM

## 2018-01-17 RX ORDER — FERROUS SULFATE 325(65) MG
325 TABLET ORAL 2 TIMES DAILY WITH MEALS
Status: DISCONTINUED | OUTPATIENT
Start: 2018-01-17 | End: 2018-01-17

## 2018-01-17 RX ORDER — FERROUS SULFATE 325(65) MG
325 TABLET ORAL 2 TIMES DAILY WITH MEALS
Status: DISCONTINUED | OUTPATIENT
Start: 2018-01-20 | End: 2018-01-22 | Stop reason: HOSPADM

## 2018-01-17 RX ADMIN — LEVOTHYROXINE SODIUM 50 MCG: 100 TABLET ORAL at 06:06

## 2018-01-17 RX ADMIN — DOXYCYCLINE HYCLATE 100 MG: 100 CAPSULE, GELATIN COATED ORAL at 20:41

## 2018-01-17 RX ADMIN — NIFEDIPINE 60 MG: 60 TABLET, FILM COATED, EXTENDED RELEASE ORAL at 20:41

## 2018-01-17 RX ADMIN — FERROUS SULFATE TAB 325 MG (65 MG ELEMENTAL FE) 325 MG: 325 (65 FE) TAB at 07:53

## 2018-01-17 RX ADMIN — ATORVASTATIN CALCIUM 40 MG: 40 TABLET, FILM COATED ORAL at 07:51

## 2018-01-17 RX ADMIN — Medication 81 MG: at 07:51

## 2018-01-17 RX ADMIN — LEVALBUTEROL HYDROCHLORIDE: 0.63 SOLUTION RESPIRATORY (INHALATION) at 19:20

## 2018-01-17 RX ADMIN — CLOPIDOGREL BISULFATE 75 MG: 75 TABLET ORAL at 07:51

## 2018-01-17 RX ADMIN — LEVALBUTEROL HYDROCHLORIDE: 0.63 SOLUTION RESPIRATORY (INHALATION) at 22:39

## 2018-01-17 RX ADMIN — IRON SUCROSE 200 MG: 20 INJECTION, SOLUTION INTRAVENOUS at 09:59

## 2018-01-17 RX ADMIN — GUAIFENESIN 600 MG: 600 TABLET, EXTENDED RELEASE ORAL at 17:20

## 2018-01-17 RX ADMIN — GUAIFENESIN 600 MG: 600 TABLET, EXTENDED RELEASE ORAL at 08:00

## 2018-01-17 RX ADMIN — METOPROLOL TARTRATE 50 MG: 50 TABLET ORAL at 08:00

## 2018-01-17 RX ADMIN — LEVALBUTEROL HYDROCHLORIDE 0.32 MG: 0.63 SOLUTION RESPIRATORY (INHALATION) at 06:44

## 2018-01-17 RX ADMIN — LISINOPRIL 10 MG: 10 TABLET ORAL at 20:41

## 2018-01-17 RX ADMIN — METOPROLOL TARTRATE 50 MG: 50 TABLET ORAL at 20:41

## 2018-01-17 RX ADMIN — NIFEDIPINE 60 MG: 60 TABLET, FILM COATED, EXTENDED RELEASE ORAL at 08:00

## 2018-01-17 RX ADMIN — DOXYCYCLINE HYCLATE 100 MG: 100 CAPSULE, GELATIN COATED ORAL at 08:30

## 2018-01-17 RX ADMIN — GUAIFENESIN 600 MG: 600 TABLET, EXTENDED RELEASE ORAL at 20:41

## 2018-01-17 RX ADMIN — SODIUM CHLORIDE 1 G: 9 INJECTION INTRAMUSCULAR; INTRAVENOUS; SUBCUTANEOUS at 12:17

## 2018-01-17 RX ADMIN — GUAIFENESIN 600 MG: 600 TABLET, EXTENDED RELEASE ORAL at 12:13

## 2018-01-17 RX ADMIN — LEVALBUTEROL HYDROCHLORIDE 0.32 MG: 0.63 SOLUTION RESPIRATORY (INHALATION) at 10:43

## 2018-01-17 RX ADMIN — LISINOPRIL 10 MG: 10 TABLET ORAL at 08:00

## 2018-01-17 RX ADMIN — Medication 10 ML: at 20:42

## 2018-01-17 ASSESSMENT — PAIN SCALES - GENERAL
PAINLEVEL_OUTOF10: 0

## 2018-01-17 NOTE — PROGRESS NOTES
[facility]  Daily Progress Note      Chief Complaint: f/u NSTEMI, bradycardia    Interval Hx: No chest pain or SOA. No bradycardia.  Continues to feel \"like a truck hit\" her    ROS:  The rest of the systems are negative except Interval Hx    Current Inpatient Medications:   ferrous sulfate  325 mg Oral BID WC    NIFEdipine  60 mg Oral BID    cefTRIAXone (ROCEPHIN) IV  1 g Intravenous Q24H    doxycycline hyclate  100 mg Oral 2 times per day    lisinopril  10 mg Oral BID    levalbuterol  0.5 ampule Nebulization Q6H    sodium chloride  250 mL Intravenous Once    aspirin EC  81 mg Oral Daily    atorvastatin  40 mg Oral Daily    clopidogrel  75 mg Oral Daily    levothyroxine  50 mcg Oral Daily    metoprolol tartrate  50 mg Oral BID    guaiFENesin  600 mg Oral 4x Daily    sodium chloride flush  10 mL Intravenous 2 times per day       IV Infusions (if any):   nitroGLYCERIN 20 mcg/min (01/16/18 2118)    sodium chloride 25 mL/hr at 01/15/18 1226       Physical Exam:   BP (!) 167/52   Pulse 79   Temp 98.2 °F (36.8 °C) (Temporal)   Resp 18   Ht 5' 3\" (1.6 m)   Wt 106 lb (48.1 kg)   SpO2 95%   BMI 18.78 kg/m²       Intake/Output Summary (Last 24 hours) at 01/17/18 0824  Last data filed at 01/17/18 0400   Gross per 24 hour   Intake          1079.95 ml   Output             1500 ml   Net          -420.05 ml       Gen - NAD, pale  Neck - Supple  ENMT - MMM, OP Clear  Cardio - No JVD     Clear s1 s2, no gallop, rub, murmur                No edema, 2+ radials  Resp - Normal effort, CTA Bilat  GI - soft, non-tender, no HSM  Psych - A+O x 3, normal affect     Diagnostics:      Recent Labs      01/15/18   0039  01/16/18   0248  01/17/18   0212   WBC  11.4*  10.8  10.8   HGB  7.3*  8.8*  8.2*   PLT  203  193  205      Recent Labs      01/15/18   0039  01/16/18   0853  01/17/18   0212   NA  136  139  139   K  4.4  3.5  3.6   CL  104  107  107   CO2  21*  21*  21*   BUN  54*  32*  25*   CREATININE

## 2018-01-17 NOTE — PROGRESS NOTES
Nutrition Assessment    Type and Reason for Visit: Reassess    Nutrition Recommendations: continue current nutritional interventions    Malnutrition Assessment:  · Malnutrition Status: At risk for malnutrition  · Context: Acute illness or injury    Nutrition Diagnosis:   · Problem: Increased nutrient needs  · Etiology: related to Increased demand for energy/nutrients due to     Signs and symptoms:  as evidenced by  (dx-sepsis)    Nutrition Assessment:  · Subjective Assessment: PO intake remains decreased 0-25%. Will take a sip or tow of ONS  · Nutrition-Focused Physical Findings: Thin appearing female  · Wound Type: Surgical Wound  · Current Nutrition Therapies:  · Oral Diet Orders: General   · Oral Diet intake: 1-25%  · Oral Nutrition Supplement (ONS) Orders: Standard High Calorie Oral Supplement  · ONS intake: 1-25%     · Anthropometric Measures:  · Ht: 5' 3\" (160 cm)   · Current Body Wt: 106 lb (48.1 kg)  · Admission Body Wt: 97 lb (44 kg) (stated)  · Ideal Body Wt: 115 lb (52.2 kg),   · BMI Classification: BMI 18.5 - 24.9 Normal Weight    Estimated Intake vs Estimated Needs: Intake Less Than Needs    Nutrition Risk Level: High    Nutrition Interventions:   Continue current diet, Continue current ONS  Continued Inpatient Monitoring    Nutrition Evaluation:   · Evaluation: No progress toward goals   · Goals: po intake 50% or greater. Weight stable    · Monitoring: Meal Intake, Supplement Intake, Diet Tolerance, Skin Integrity, Wound Healing, Weight, Pertinent Labs    See Adult Nutrition Doc Flowsheet for more detail.      Electronically signed by Timmy Matt MS, RD, LD on 1/17/18 at 11:29 AM    Contact Number: [phone]

## 2018-01-17 NOTE — PROGRESS NOTES
Dr. Claudia Ma in unit; advised of pt -365'E systolic. Dr. Claudia Ma will review and possibly order prn blood pressure medication. Gave nifedipine around 1700 (see MAR), however, bp still 229'D systolic. PT warm, dry, asymptomatic.

## 2018-01-18 LAB
ANION GAP SERPL CALCULATED.3IONS-SCNC: 13 MMOL/L (ref 7–19)
BASOPHILS ABSOLUTE: 0 K/UL (ref 0–0.2)
BASOPHILS RELATIVE PERCENT: 0.4 % (ref 0–1)
BLOOD CULTURE, ROUTINE: NORMAL
BUN BLDV-MCNC: 18 MG/DL (ref 8–23)
CALCIUM SERPL-MCNC: 8.1 MG/DL (ref 8.8–10.2)
CHLORIDE BLD-SCNC: 107 MMOL/L (ref 98–111)
CO2: 20 MMOL/L (ref 22–29)
CREAT SERPL-MCNC: 1 MG/DL (ref 0.5–0.9)
CULTURE, BLOOD 2: NORMAL
EOSINOPHILS ABSOLUTE: 0.3 K/UL (ref 0–0.6)
EOSINOPHILS RELATIVE PERCENT: 3.1 % (ref 0–5)
GFR NON-AFRICAN AMERICAN: 54
GLUCOSE BLD-MCNC: 103 MG/DL (ref 74–109)
HCT VFR BLD CALC: 28.7 % (ref 37–47)
HEMOGLOBIN: 8.7 G/DL (ref 12–16)
LYMPHOCYTES ABSOLUTE: 1.1 K/UL (ref 1.1–4.5)
LYMPHOCYTES RELATIVE PERCENT: 10.7 % (ref 20–40)
MAGNESIUM: 1.8 MG/DL (ref 1.6–2.4)
MCH RBC QN AUTO: 26.3 PG (ref 27–31)
MCHC RBC AUTO-ENTMCNC: 30.3 G/DL (ref 33–37)
MCV RBC AUTO: 86.7 FL (ref 81–99)
MONOCYTES ABSOLUTE: 1.9 K/UL (ref 0–0.9)
MONOCYTES RELATIVE PERCENT: 17.6 % (ref 0–10)
NEUTROPHILS ABSOLUTE: 7.1 K/UL (ref 1.5–7.5)
NEUTROPHILS RELATIVE PERCENT: 67.3 % (ref 50–65)
PDW BLD-RTO: 18.4 % (ref 11.5–14.5)
PLATELET # BLD: 247 K/UL (ref 130–400)
PMV BLD AUTO: 10.1 FL (ref 9.4–12.3)
POTASSIUM SERPL-SCNC: 3.6 MMOL/L (ref 3.5–5)
RBC # BLD: 3.31 M/UL (ref 4.2–5.4)
SODIUM BLD-SCNC: 140 MMOL/L (ref 136–145)
WBC # BLD: 10.6 K/UL (ref 4.8–10.8)

## 2018-01-18 PROCEDURE — 99231 SBSQ HOSP IP/OBS SF/LOW 25: CPT | Performed by: INTERNAL MEDICINE

## 2018-01-18 PROCEDURE — 2700000000 HC OXYGEN THERAPY PER DAY

## 2018-01-18 PROCEDURE — 2140000000 HC CCU INTERMEDIATE R&B

## 2018-01-18 PROCEDURE — 6370000000 HC RX 637 (ALT 250 FOR IP): Performed by: INTERNAL MEDICINE

## 2018-01-18 PROCEDURE — 2580000003 HC RX 258: Performed by: INTERNAL MEDICINE

## 2018-01-18 PROCEDURE — 6360000002 HC RX W HCPCS: Performed by: INTERNAL MEDICINE

## 2018-01-18 PROCEDURE — 6370000000 HC RX 637 (ALT 250 FOR IP): Performed by: HOSPITALIST

## 2018-01-18 PROCEDURE — 80048 BASIC METABOLIC PNL TOTAL CA: CPT

## 2018-01-18 PROCEDURE — 99232 SBSQ HOSP IP/OBS MODERATE 35: CPT | Performed by: INTERNAL MEDICINE

## 2018-01-18 PROCEDURE — 36415 COLL VENOUS BLD VENIPUNCTURE: CPT

## 2018-01-18 PROCEDURE — 94640 AIRWAY INHALATION TREATMENT: CPT

## 2018-01-18 PROCEDURE — 6360000002 HC RX W HCPCS: Performed by: HOSPITALIST

## 2018-01-18 PROCEDURE — 2580000003 HC RX 258: Performed by: HOSPITALIST

## 2018-01-18 PROCEDURE — 85025 COMPLETE CBC W/AUTO DIFF WBC: CPT

## 2018-01-18 PROCEDURE — 83735 ASSAY OF MAGNESIUM: CPT

## 2018-01-18 RX ORDER — HYDROCHLOROTHIAZIDE 25 MG/1
25 TABLET ORAL DAILY
Status: DISCONTINUED | OUTPATIENT
Start: 2018-01-18 | End: 2018-01-22 | Stop reason: HOSPADM

## 2018-01-18 RX ORDER — LISINOPRIL 20 MG/1
20 TABLET ORAL 2 TIMES DAILY
Status: DISCONTINUED | OUTPATIENT
Start: 2018-01-18 | End: 2018-01-22 | Stop reason: HOSPADM

## 2018-01-18 RX ORDER — FUROSEMIDE 10 MG/ML
40 INJECTION INTRAMUSCULAR; INTRAVENOUS ONCE
Status: COMPLETED | OUTPATIENT
Start: 2018-01-18 | End: 2018-01-18

## 2018-01-18 RX ADMIN — DOXYCYCLINE HYCLATE 100 MG: 100 CAPSULE, GELATIN COATED ORAL at 08:13

## 2018-01-18 RX ADMIN — ATORVASTATIN CALCIUM 40 MG: 40 TABLET, FILM COATED ORAL at 08:13

## 2018-01-18 RX ADMIN — SODIUM CHLORIDE 1 G: 9 INJECTION INTRAMUSCULAR; INTRAVENOUS; SUBCUTANEOUS at 12:23

## 2018-01-18 RX ADMIN — LISINOPRIL 10 MG: 10 TABLET ORAL at 08:12

## 2018-01-18 RX ADMIN — IRON SUCROSE 200 MG: 20 INJECTION, SOLUTION INTRAVENOUS at 08:12

## 2018-01-18 RX ADMIN — LEVALBUTEROL HYDROCHLORIDE: 0.63 SOLUTION RESPIRATORY (INHALATION) at 22:49

## 2018-01-18 RX ADMIN — METOPROLOL TARTRATE 50 MG: 50 TABLET ORAL at 08:13

## 2018-01-18 RX ADMIN — FUROSEMIDE 40 MG: 10 INJECTION, SOLUTION INTRAVENOUS at 12:23

## 2018-01-18 RX ADMIN — GUAIFENESIN 600 MG: 600 TABLET, EXTENDED RELEASE ORAL at 17:36

## 2018-01-18 RX ADMIN — Medication 10 ML: at 08:13

## 2018-01-18 RX ADMIN — LEVALBUTEROL HYDROCHLORIDE 0.32 MG: 0.63 SOLUTION RESPIRATORY (INHALATION) at 06:59

## 2018-01-18 RX ADMIN — Medication 10 ML: at 21:07

## 2018-01-18 RX ADMIN — LISINOPRIL 20 MG: 20 TABLET ORAL at 21:05

## 2018-01-18 RX ADMIN — Medication 81 MG: at 08:12

## 2018-01-18 RX ADMIN — GUAIFENESIN 600 MG: 600 TABLET, EXTENDED RELEASE ORAL at 08:13

## 2018-01-18 RX ADMIN — METOPROLOL TARTRATE 50 MG: 50 TABLET ORAL at 21:05

## 2018-01-18 RX ADMIN — GUAIFENESIN 600 MG: 600 TABLET, EXTENDED RELEASE ORAL at 12:23

## 2018-01-18 RX ADMIN — LEVALBUTEROL HYDROCHLORIDE 0.32 MG: 0.63 SOLUTION RESPIRATORY (INHALATION) at 11:03

## 2018-01-18 RX ADMIN — SODIUM CHLORIDE: 9 INJECTION, SOLUTION INTRAVENOUS at 08:19

## 2018-01-18 RX ADMIN — DOXYCYCLINE HYCLATE 100 MG: 100 CAPSULE, GELATIN COATED ORAL at 21:05

## 2018-01-18 RX ADMIN — GUAIFENESIN 600 MG: 600 TABLET, EXTENDED RELEASE ORAL at 21:05

## 2018-01-18 RX ADMIN — NIFEDIPINE 60 MG: 60 TABLET, FILM COATED, EXTENDED RELEASE ORAL at 08:12

## 2018-01-18 RX ADMIN — LEVALBUTEROL HYDROCHLORIDE: 0.63 SOLUTION RESPIRATORY (INHALATION) at 18:58

## 2018-01-18 RX ADMIN — HYDROCHLOROTHIAZIDE 25 MG: 25 TABLET ORAL at 15:11

## 2018-01-18 RX ADMIN — CLOPIDOGREL BISULFATE 75 MG: 75 TABLET ORAL at 08:12

## 2018-01-18 RX ADMIN — LEVOTHYROXINE SODIUM 50 MCG: 100 TABLET ORAL at 06:18

## 2018-01-18 RX ADMIN — NIFEDIPINE 60 MG: 60 TABLET, FILM COATED, EXTENDED RELEASE ORAL at 21:05

## 2018-01-18 ASSESSMENT — PAIN SCALES - GENERAL
PAINLEVEL_OUTOF10: 0

## 2018-01-18 NOTE — PROGRESS NOTES
MD Zahira   40 mg at 01/18/18 0813    clopidogrel (PLAVIX) tablet 75 mg  75 mg Oral Daily Princess Scott MD   75 mg at 01/18/18 6010    levothyroxine (SYNTHROID) tablet 50 mcg  50 mcg Oral Daily Princess Scott MD   50 mcg at 01/18/18 0618    metoprolol tartrate (LOPRESSOR) tablet 50 mg  50 mg Oral BID Princess Scott MD   50 mg at 01/18/18 0813    guaiFENesin [facility]) extended release tablet 600 mg  600 mg Oral 4x Daily Princess Scott MD   600 mg at 01/18/18 0813    dextrose 50 % solution 25 g  25 g Intravenous PRN Srinivasan Olivas MD   25 g at 01/13/18 1824    sodium chloride flush 0.9 % injection 10 mL  10 mL Intravenous 2 times per day Nydia Ibarra MD   10 mL at 01/18/18 0813    sodium chloride flush 0.9 % injection 10 mL  10 mL Intravenous PRN Nydia Ibarra MD        acetaminophen (TYLENOL) tablet 650 mg  650 mg Oral Q4H PRN Nydia Ibarra MD   650 mg at 01/15/18 1024    ondansetron (ZOFRAN) injection 4 mg  4 mg Intravenous Q6H PRN Nydia Ibarra MD        0.9 % sodium chloride infusion   Intravenous Continuous Princess Scott MD 25 mL/hr at 01/18/18 0819          Labs:     Recent Labs      01/16/18   0248  01/17/18   0212  01/18/18   0211   WBC  10.8  10.8  10.6   RBC  3.43*  3.12*  3.31*   HGB  8.8*  8.2*  8.7*   HCT  29.0*  26.2*  28.7*   MCV  84.5  84.0  86.7   MCH  25.7*  26.3*  26.3*   MCHC  30.3*  31.3*  30.3*   PLT  193  205  247     Recent Labs      01/16/18   0853  01/17/18   0212  01/18/18   0211   NA  139  139  140   K  3.5  3.6  3.6   ANIONGAP  11  11  13   CL  107  107  107   CO2  21*  21*  20*   BUN  32*  25*  18   CREATININE  1.1*  1.2*  1.0*   GLUCOSE  123*  126*  103   CALCIUM  7.5*  7.5*  8.1*     Recent Labs      01/16/18   0853  01/18/18   0211   MG  1.9  1.8     No results for input(s): AST, ALT, ALB, BILITOT, ALKPHOS, ALB in the last 72 hours.   ABGs:No results for input(s): PHART, FFQ3IFY, PO2ART, UJC5DJX, BEART, HGBAE, O6NYESPH, CARBOXHGBART,

## 2018-01-19 ENCOUNTER — APPOINTMENT (OUTPATIENT)
Dept: GENERAL RADIOLOGY | Age: 73
DRG: 871 | End: 2018-01-19
Payer: MEDICARE

## 2018-01-19 LAB
ALBUMIN SERPL-MCNC: 2.8 G/DL (ref 3.5–5.2)
ALP BLD-CCNC: 102 U/L (ref 35–104)
ALT SERPL-CCNC: 127 U/L (ref 5–33)
ANION GAP SERPL CALCULATED.3IONS-SCNC: 12 MMOL/L (ref 7–19)
AST SERPL-CCNC: 88 U/L (ref 5–32)
BASOPHILS ABSOLUTE: 0.1 K/UL (ref 0–0.2)
BASOPHILS RELATIVE PERCENT: 0.4 % (ref 0–1)
BILIRUB SERPL-MCNC: 0.5 MG/DL (ref 0.2–1.2)
BUN BLDV-MCNC: 16 MG/DL (ref 8–23)
CALCIUM SERPL-MCNC: 8.6 MG/DL (ref 8.8–10.2)
CHLORIDE BLD-SCNC: 104 MMOL/L (ref 98–111)
CO2: 24 MMOL/L (ref 22–29)
CREAT SERPL-MCNC: 0.9 MG/DL (ref 0.5–0.9)
CULTURE, RESPIRATORY: ABNORMAL
EOSINOPHILS ABSOLUTE: 0.4 K/UL (ref 0–0.6)
EOSINOPHILS RELATIVE PERCENT: 3.5 % (ref 0–5)
GFR NON-AFRICAN AMERICAN: >60
GLUCOSE BLD-MCNC: 102 MG/DL (ref 74–109)
GRAM STAIN RESULT: ABNORMAL
HCT VFR BLD CALC: 29.9 % (ref 37–47)
HEMOGLOBIN: 9.2 G/DL (ref 12–16)
LYMPHOCYTES ABSOLUTE: 1.1 K/UL (ref 1.1–4.5)
LYMPHOCYTES RELATIVE PERCENT: 8.7 % (ref 20–40)
MAGNESIUM: 1.6 MG/DL (ref 1.6–2.4)
MCH RBC QN AUTO: 25.5 PG (ref 27–31)
MCHC RBC AUTO-ENTMCNC: 30.8 G/DL (ref 33–37)
MCV RBC AUTO: 82.8 FL (ref 81–99)
MONOCYTES ABSOLUTE: 2.1 K/UL (ref 0–0.9)
MONOCYTES RELATIVE PERCENT: 16.8 % (ref 0–10)
NEUTROPHILS ABSOLUTE: 8.7 K/UL (ref 1.5–7.5)
NEUTROPHILS RELATIVE PERCENT: 69.2 % (ref 50–65)
ORGANISM: ABNORMAL
ORGANISM: ABNORMAL
PDW BLD-RTO: 19.1 % (ref 11.5–14.5)
PLATELET # BLD: 272 K/UL (ref 130–400)
PMV BLD AUTO: 9.4 FL (ref 9.4–12.3)
POTASSIUM REFLEX MAGNESIUM: 3.1 MMOL/L (ref 3.5–5)
RBC # BLD: 3.61 M/UL (ref 4.2–5.4)
SODIUM BLD-SCNC: 140 MMOL/L (ref 136–145)
TOTAL PROTEIN: 5.8 G/DL (ref 6.6–8.7)
WBC # BLD: 12.6 K/UL (ref 4.8–10.8)

## 2018-01-19 PROCEDURE — 85025 COMPLETE CBC W/AUTO DIFF WBC: CPT

## 2018-01-19 PROCEDURE — 94664 DEMO&/EVAL PT USE INHALER: CPT

## 2018-01-19 PROCEDURE — 83735 ASSAY OF MAGNESIUM: CPT

## 2018-01-19 PROCEDURE — 2700000000 HC OXYGEN THERAPY PER DAY

## 2018-01-19 PROCEDURE — 6370000000 HC RX 637 (ALT 250 FOR IP): Performed by: INTERNAL MEDICINE

## 2018-01-19 PROCEDURE — 99233 SBSQ HOSP IP/OBS HIGH 50: CPT | Performed by: INTERNAL MEDICINE

## 2018-01-19 PROCEDURE — 2580000003 HC RX 258: Performed by: INTERNAL MEDICINE

## 2018-01-19 PROCEDURE — 6360000002 HC RX W HCPCS: Performed by: INTERNAL MEDICINE

## 2018-01-19 PROCEDURE — 36415 COLL VENOUS BLD VENIPUNCTURE: CPT

## 2018-01-19 PROCEDURE — 2140000000 HC CCU INTERMEDIATE R&B

## 2018-01-19 PROCEDURE — 6360000002 HC RX W HCPCS: Performed by: HOSPITALIST

## 2018-01-19 PROCEDURE — 97116 GAIT TRAINING THERAPY: CPT

## 2018-01-19 PROCEDURE — G8978 MOBILITY CURRENT STATUS: HCPCS

## 2018-01-19 PROCEDURE — G8979 MOBILITY GOAL STATUS: HCPCS

## 2018-01-19 PROCEDURE — 80053 COMPREHEN METABOLIC PANEL: CPT

## 2018-01-19 PROCEDURE — 99231 SBSQ HOSP IP/OBS SF/LOW 25: CPT | Performed by: INTERNAL MEDICINE

## 2018-01-19 PROCEDURE — 94640 AIRWAY INHALATION TREATMENT: CPT

## 2018-01-19 PROCEDURE — 97161 PT EVAL LOW COMPLEX 20 MIN: CPT

## 2018-01-19 PROCEDURE — 6370000000 HC RX 637 (ALT 250 FOR IP): Performed by: HOSPITALIST

## 2018-01-19 PROCEDURE — 71046 X-RAY EXAM CHEST 2 VIEWS: CPT

## 2018-01-19 RX ORDER — FUROSEMIDE 10 MG/ML
40 INJECTION INTRAMUSCULAR; INTRAVENOUS ONCE
Status: COMPLETED | OUTPATIENT
Start: 2018-01-19 | End: 2018-01-19

## 2018-01-19 RX ORDER — HYDRALAZINE HYDROCHLORIDE 25 MG/1
25 TABLET, FILM COATED ORAL EVERY 8 HOURS SCHEDULED
Status: DISCONTINUED | OUTPATIENT
Start: 2018-01-19 | End: 2018-01-21

## 2018-01-19 RX ORDER — POTASSIUM CHLORIDE 20 MEQ/1
40 TABLET, EXTENDED RELEASE ORAL ONCE
Status: COMPLETED | OUTPATIENT
Start: 2018-01-19 | End: 2018-01-19

## 2018-01-19 RX ADMIN — GUAIFENESIN 600 MG: 600 TABLET, EXTENDED RELEASE ORAL at 08:33

## 2018-01-19 RX ADMIN — LEVALBUTEROL HYDROCHLORIDE: 0.63 SOLUTION RESPIRATORY (INHALATION) at 07:42

## 2018-01-19 RX ADMIN — HYDRALAZINE HYDROCHLORIDE 25 MG: 25 TABLET, FILM COATED ORAL at 14:17

## 2018-01-19 RX ADMIN — ATORVASTATIN CALCIUM 40 MG: 40 TABLET, FILM COATED ORAL at 08:33

## 2018-01-19 RX ADMIN — Medication 10 ML: at 08:34

## 2018-01-19 RX ADMIN — LEVALBUTEROL HYDROCHLORIDE 0.32 MG: 0.63 SOLUTION RESPIRATORY (INHALATION) at 20:30

## 2018-01-19 RX ADMIN — GUAIFENESIN 600 MG: 600 TABLET, EXTENDED RELEASE ORAL at 14:17

## 2018-01-19 RX ADMIN — POTASSIUM CHLORIDE 40 MEQ: 20 TABLET, EXTENDED RELEASE ORAL at 14:17

## 2018-01-19 RX ADMIN — METOPROLOL TARTRATE 50 MG: 50 TABLET ORAL at 20:04

## 2018-01-19 RX ADMIN — LISINOPRIL 20 MG: 20 TABLET ORAL at 08:34

## 2018-01-19 RX ADMIN — NIFEDIPINE 60 MG: 60 TABLET, FILM COATED, EXTENDED RELEASE ORAL at 20:04

## 2018-01-19 RX ADMIN — Medication 81 MG: at 08:33

## 2018-01-19 RX ADMIN — LISINOPRIL 20 MG: 20 TABLET ORAL at 20:04

## 2018-01-19 RX ADMIN — DOXYCYCLINE HYCLATE 100 MG: 100 CAPSULE, GELATIN COATED ORAL at 08:33

## 2018-01-19 RX ADMIN — HYDRALAZINE HYDROCHLORIDE 25 MG: 25 TABLET, FILM COATED ORAL at 20:03

## 2018-01-19 RX ADMIN — SODIUM CHLORIDE 1 G: 9 INJECTION INTRAMUSCULAR; INTRAVENOUS; SUBCUTANEOUS at 14:17

## 2018-01-19 RX ADMIN — METOPROLOL TARTRATE 50 MG: 50 TABLET ORAL at 08:34

## 2018-01-19 RX ADMIN — FUROSEMIDE 40 MG: 10 INJECTION, SOLUTION INTRAMUSCULAR; INTRAVENOUS at 19:01

## 2018-01-19 RX ADMIN — LEVOTHYROXINE SODIUM 50 MCG: 100 TABLET ORAL at 06:10

## 2018-01-19 RX ADMIN — HYDROCHLOROTHIAZIDE 25 MG: 25 TABLET ORAL at 08:33

## 2018-01-19 RX ADMIN — DOXYCYCLINE HYCLATE 100 MG: 100 CAPSULE, GELATIN COATED ORAL at 20:04

## 2018-01-19 RX ADMIN — LEVALBUTEROL HYDROCHLORIDE: 0.63 SOLUTION RESPIRATORY (INHALATION) at 11:32

## 2018-01-19 RX ADMIN — Medication 10 ML: at 20:05

## 2018-01-19 RX ADMIN — NIFEDIPINE 60 MG: 60 TABLET, FILM COATED, EXTENDED RELEASE ORAL at 08:34

## 2018-01-19 RX ADMIN — CLOPIDOGREL BISULFATE 75 MG: 75 TABLET ORAL at 08:33

## 2018-01-19 RX ADMIN — IRON SUCROSE 200 MG: 20 INJECTION, SOLUTION INTRAVENOUS at 08:34

## 2018-01-19 ASSESSMENT — PAIN SCALES - GENERAL
PAINLEVEL_OUTOF10: 0

## 2018-01-19 NOTE — PROGRESS NOTES
Progress Note  1/19/2018 10:01 AM  Subjective:   Admit Date: 1/13/2018  PCP: Silas Santos MD      Subjective: pt seen and examined. Still JONES. Still feels weak but has not been up out of bed much. ROS: 14 point review of systems is negative except as specifically addressed above. DIET GENERAL;     Intake/Output Summary (Last 24 hours) at 01/19/18 1001  Last data filed at 01/19/18 0914   Gross per 24 hour   Intake             1440 ml   Output             5425 ml   Net            -3985 ml     Medications:     Current Facility-Administered Medications   Medication Dose Route Frequency Provider Last Rate Last Dose    potassium chloride (KLOR-CON M) extended release tablet 40 mEq  40 mEq Oral Once Pedro Murphy DO        lisinopril (PRINIVIL;ZESTRIL) tablet 20 mg  20 mg Oral BID Darrel Bence, MD   20 mg at 01/19/18 0834    hydrochlorothiazide (HYDRODIURIL) tablet 25 mg  25 mg Oral Daily Alejandro Murphy DO   25 mg at 01/19/18 3545    NIFEdipine (PROCARDIA XL) extended release tablet 60 mg  60 mg Oral BID Darrel Bence, MD   60 mg at 01/19/18 0834    [START ON 1/20/2018] ferrous sulfate tablet 325 mg  325 mg Oral BID WC Alejandro Murphy DO        cefTRIAXone (ROCEPHIN) 1 g in sodium chloride (PF) 10 mL IV syringe  1 g Intravenous Q24H Alejandro Murphy DO   1 g at 01/18/18 1223    doxycycline hyclate (VIBRAMYCIN) capsule 100 mg  100 mg Oral 2 times per day Emir Choi DO   100 mg at 01/19/18 7212    levalbuterol (XOPENEX) nebulization 0.315 mg  0.5 ampule Nebulization Q6H Memo Phelps MD        0.9 % sodium chloride bolus  250 mL Intravenous Once Darrel Bence, MD        aspirin EC tablet 81 mg  81 mg Oral Daily Memo Phelps MD   81 mg at 01/19/18 5424    atorvastatin (LIPITOR) tablet 40 mg  40 mg Oral Daily Memo Phelps MD   40 mg at 01/19/18 2223    clopidogrel (PLAVIX) tablet 75 mg  75 mg Oral Daily Memo Phelps MD   75 mg at 01/19/18 9034    levothyroxine (SYNTHROID) tablet 50 mcg  50 mcg Oral Daily Anusha Joseph MD   50 mcg at 01/19/18 0610    metoprolol tartrate (LOPRESSOR) tablet 50 mg  50 mg Oral BID Anusha Joseph MD   50 mg at 01/19/18 1985    guaiFENesin [facility]) extended release tablet 600 mg  600 mg Oral 4x Daily Anusha Joseph MD   600 mg at 01/19/18 3079    dextrose 50 % solution 25 g  25 g Intravenous PRN Saleem Rand MD   25 g at 01/13/18 1824    sodium chloride flush 0.9 % injection 10 mL  10 mL Intravenous 2 times per day Dyllan Garcia MD   10 mL at 01/19/18 0834    sodium chloride flush 0.9 % injection 10 mL  10 mL Intravenous PRN Dyllan Garcia MD        acetaminophen (TYLENOL) tablet 650 mg  650 mg Oral Q4H PRN Dyllan Garcia MD   650 mg at 01/15/18 1024    ondansetron (ZOFRAN) injection 4 mg  4 mg Intravenous Q6H PRN Dyllan Garcia MD            Labs:     Recent Labs      01/17/18   0212  01/18/18   0211  01/19/18   0246   WBC  10.8  10.6  12.6*   RBC  3.12*  3.31*  3.61*   HGB  8.2*  8.7*  9.2*   HCT  26.2*  28.7*  29.9*   MCV  84.0  86.7  82.8   MCH  26.3*  26.3*  25.5*   MCHC  31.3*  30.3*  30.8*   PLT  205  247  272     Recent Labs      01/17/18   0212  01/18/18   0211  01/19/18   0246   NA  139  140  140   K  3.6  3.6   --    ANIONGAP  11  13  12   CL  107  107  104   CO2  21*  20*  24   BUN  25*  18  16   CREATININE  1.2*  1.0*  0.9   GLUCOSE  126*  103  102   CALCIUM  7.5*  8.1*  8.6*     Recent Labs      01/18/18   0211  01/19/18   0246   MG  1.8  1.6     Recent Labs      01/19/18   0246   AST  88*   ALT  127*   BILITOT  0.5   ALKPHOS  102     ABGs:No results for input(s): PHART, VJP4JIM, PO2ART, PZJ0VJE, BEART, HGBAE, A9WNZMRB, CARBOXHGBART, 02THERAPY in the last 72 hours. HgBA1c: No results for input(s): LABA1C in the last 72 hours.   FLP:    Lab Results   Component Value Date    TRIG 123 03/15/2016    HDL 11 03/15/2016    LDLCALC 89 03/15/2016     TSH:    Lab Results   Component Value Date    TSH 1.220 01/13/2018 initially called possible PNA by xray on arrival w/ atelectasis and pleural effusion, repeat not much changed but read as some vasc congestion and no mention of pna. Pt has chronic RLL pleural effusion. Has been afebrile. No change in cough or sputum from chronic copd. Cont abx as mentioned above. Will f/u on repeat CXR      Acute renal failure (ARF) ([address] - renal function improved. Will give another dose of lasix and monitor. Syncope and collapse - likely due to anemia and hypovolemia - resolved, cards following    Acute blood loss anemia - hematoma evac x 2 post CEA, given unit of blood with h/h improvement, and now stable/increasing. Iron studies low from the other day. On day 3/3 of venofer 200 daily. Start oral iron tomorrow. Essential htn - bp improved with addition of HCTZ, continue along with Nifedipine, bb, and ACEi. COPD - chronic, stable, no exacerbation.        Advance Directive: Full Code    Awaiting PT eval    Pt not yet ready for d/c      Electronically signed by Chauncey Johnson DO on 1/19/2018 at 10:01 AM

## 2018-01-19 NOTE — PROGRESS NOTES
Physical Therapy  Name: Sanam Husain  MRN:  [id number]  Date of service:  1/19/2018 01/19/18 1525   General   Family / Caregiver Present Yes   Subjective   Subjective Yeah let's go (walk)   Oxygen Therapy   O2 Device Nasal cannula   O2 Flow Rate (L/min) 3 L/min   Transfers   Sit to Stand Stand by assistance   Stand to sit Stand by assistance   Bed to Chair Stand by assistance   Ambulation   Ambulation? Yes   Ambulation 1   Surface level tile   Device No Device   Other Apparatus O2   Assistance Stand by assistance;Contact guard assistance   Quality of Gait quick cadance to start and slower at end. Distance 175'   Balance   Sitting - Static Good   Sitting - Dynamic Good   Standing - Static Good   Standing - Dynamic Good;-   Short term goals   Time Frame for Short term goals 14 DAYS   Short term goal 1 BED MOB INDEPENDENT   Short term goal 2 TRANSFERS INDEPENDENT   Short term goal 3 ' INDEPENDENT   Conditions Requiring Skilled Therapeutic Intervention   Body structures, Functions, Activity limitations Decreased functional mobility ; Decreased balance   REQUIRES PT FOLLOW UP Yes   Discharge Recommendations Continue to assess pending progress   Activity Tolerance   Activity Tolerance Patient Tolerated treatment well   Plan   Times per week AT LEAST 6-7   Current Treatment Recommendations Balance Training;Functional Mobility Training;Transfer Training;Gait Training;Patient/Caregiver Education & Training; Safety Education & Training   Safety Devices   Type of devices Left in chair;Call light within reach   PT Whiteboard Notes   Therapy Whiteboard RE 2/2  NSTEMI, CEA, O2       Electronically signed by Froylan Singleton PTA on 1/19/2018 at 3:29 PM

## 2018-01-19 NOTE — PROGRESS NOTES
Physical Therapy    Liza Onofre  [id number]       01/19/18 1311   General   Diagnosis NSTEMI, CEA   Subjective   Subjective Pt willing to walk in [place] and sit up in chair   Transfers   Sit to Stand Stand by assistance   Bed to Chair Stand by assistance   Ambulation 1   Device No Device   Other Apparatus O2   Assistance Stand by assistance;Contact guard assistance   Quality of Gait GOOD PACE, NO LOB   Distance 150'   Balance   Sitting - Dynamic Good   Standing - Dynamic Good;-   Short term goals   Time Frame for Short term goals 14 DAYS   Short term goal 1 BED MOB INDEPENDENT   Short term goal 2 TRANSFERS INDEPENDENT   Short term goal 3 ' INDEPENDENT   Conditions Requiring Skilled Therapeutic Intervention   Body structures, Functions, Activity limitations Decreased functional mobility ; Decreased balance   Assessment 1-2 slight sways in r[place] but no LOB. Up to chair for lunch. Discharge Recommendations Continue to assess pending progress   Activity Tolerance   Activity Tolerance Patient Tolerated treatment well   Plan   Times per week AT LEAST 6-7   Current Treatment Recommendations Balance Training;Functional Mobility Training;Transfer Training;Gait Training;Patient/Caregiver Education & Training; Safety Education & Training   Safety Devices   Type of devices Left in chair;Call light within reach     Electronically signed by Valentina Ramsey PT on 1/19/2018 at 1:19 PM

## 2018-01-20 ENCOUNTER — APPOINTMENT (OUTPATIENT)
Dept: GENERAL RADIOLOGY | Age: 73
DRG: 871 | End: 2018-01-20
Payer: MEDICARE

## 2018-01-20 LAB
ANION GAP SERPL CALCULATED.3IONS-SCNC: 14 MMOL/L (ref 7–19)
BASOPHILS ABSOLUTE: 0 K/UL (ref 0–0.2)
BASOPHILS RELATIVE PERCENT: 0.3 % (ref 0–1)
BUN BLDV-MCNC: 18 MG/DL (ref 8–23)
CALCIUM SERPL-MCNC: 9.3 MG/DL (ref 8.8–10.2)
CHLORIDE BLD-SCNC: 97 MMOL/L (ref 98–111)
CO2: 29 MMOL/L (ref 22–29)
CREAT SERPL-MCNC: 1.1 MG/DL (ref 0.5–0.9)
EOSINOPHILS ABSOLUTE: 0.4 K/UL (ref 0–0.6)
EOSINOPHILS RELATIVE PERCENT: 2.9 % (ref 0–5)
GFR NON-AFRICAN AMERICAN: 49
GLUCOSE BLD-MCNC: 96 MG/DL (ref 74–109)
HCT VFR BLD CALC: 32 % (ref 37–47)
HEMOGLOBIN: 9.8 G/DL (ref 12–16)
LYMPHOCYTES ABSOLUTE: 1.5 K/UL (ref 1.1–4.5)
LYMPHOCYTES RELATIVE PERCENT: 10.3 % (ref 20–40)
MAGNESIUM: 1.5 MG/DL (ref 1.6–2.4)
MCH RBC QN AUTO: 25.8 PG (ref 27–31)
MCHC RBC AUTO-ENTMCNC: 30.6 G/DL (ref 33–37)
MCV RBC AUTO: 84.2 FL (ref 81–99)
MONOCYTES ABSOLUTE: 2.4 K/UL (ref 0–0.9)
MONOCYTES RELATIVE PERCENT: 16.1 % (ref 0–10)
NEUTROPHILS ABSOLUTE: 10 K/UL (ref 1.5–7.5)
NEUTROPHILS RELATIVE PERCENT: 68.8 % (ref 50–65)
PDW BLD-RTO: 19.9 % (ref 11.5–14.5)
PLATELET # BLD: 306 K/UL (ref 130–400)
PMV BLD AUTO: 9.4 FL (ref 9.4–12.3)
POTASSIUM SERPL-SCNC: 3.7 MMOL/L (ref 3.5–5)
RBC # BLD: 3.8 M/UL (ref 4.2–5.4)
SODIUM BLD-SCNC: 140 MMOL/L (ref 136–145)
WBC # BLD: 14.6 K/UL (ref 4.8–10.8)

## 2018-01-20 PROCEDURE — 80048 BASIC METABOLIC PNL TOTAL CA: CPT

## 2018-01-20 PROCEDURE — 94640 AIRWAY INHALATION TREATMENT: CPT

## 2018-01-20 PROCEDURE — 36415 COLL VENOUS BLD VENIPUNCTURE: CPT

## 2018-01-20 PROCEDURE — 2140000000 HC CCU INTERMEDIATE R&B

## 2018-01-20 PROCEDURE — 6370000000 HC RX 637 (ALT 250 FOR IP): Performed by: INTERNAL MEDICINE

## 2018-01-20 PROCEDURE — 85025 COMPLETE CBC W/AUTO DIFF WBC: CPT

## 2018-01-20 PROCEDURE — 6360000002 HC RX W HCPCS: Performed by: INTERNAL MEDICINE

## 2018-01-20 PROCEDURE — 2580000003 HC RX 258: Performed by: INTERNAL MEDICINE

## 2018-01-20 PROCEDURE — 99231 SBSQ HOSP IP/OBS SF/LOW 25: CPT | Performed by: INTERNAL MEDICINE

## 2018-01-20 PROCEDURE — 71045 X-RAY EXAM CHEST 1 VIEW: CPT

## 2018-01-20 PROCEDURE — 6360000002 HC RX W HCPCS: Performed by: HOSPITALIST

## 2018-01-20 PROCEDURE — 97116 GAIT TRAINING THERAPY: CPT

## 2018-01-20 PROCEDURE — 99232 SBSQ HOSP IP/OBS MODERATE 35: CPT | Performed by: INTERNAL MEDICINE

## 2018-01-20 PROCEDURE — 83735 ASSAY OF MAGNESIUM: CPT

## 2018-01-20 PROCEDURE — 2700000000 HC OXYGEN THERAPY PER DAY

## 2018-01-20 PROCEDURE — 6370000000 HC RX 637 (ALT 250 FOR IP): Performed by: HOSPITALIST

## 2018-01-20 RX ORDER — MAGNESIUM OXIDE 400 MG/1
800 TABLET ORAL ONCE
Status: DISCONTINUED | OUTPATIENT
Start: 2018-01-20 | End: 2018-01-20 | Stop reason: SDUPTHER

## 2018-01-20 RX ORDER — FUROSEMIDE 10 MG/ML
40 INJECTION INTRAMUSCULAR; INTRAVENOUS ONCE
Status: COMPLETED | OUTPATIENT
Start: 2018-01-20 | End: 2018-01-20

## 2018-01-20 RX ADMIN — Medication 10 ML: at 21:40

## 2018-01-20 RX ADMIN — HYDRALAZINE HYDROCHLORIDE 25 MG: 25 TABLET, FILM COATED ORAL at 06:10

## 2018-01-20 RX ADMIN — HYDROCHLOROTHIAZIDE 25 MG: 25 TABLET ORAL at 09:34

## 2018-01-20 RX ADMIN — FERROUS SULFATE TAB 325 MG (65 MG ELEMENTAL FE) 325 MG: 325 (65 FE) TAB at 16:38

## 2018-01-20 RX ADMIN — LEVALBUTEROL HYDROCHLORIDE 0.63 MG: 0.63 SOLUTION RESPIRATORY (INHALATION) at 19:10

## 2018-01-20 RX ADMIN — HYDRALAZINE HYDROCHLORIDE 25 MG: 25 TABLET, FILM COATED ORAL at 22:00

## 2018-01-20 RX ADMIN — LEVOTHYROXINE SODIUM 50 MCG: 100 TABLET ORAL at 06:10

## 2018-01-20 RX ADMIN — NIFEDIPINE 60 MG: 60 TABLET, FILM COATED, EXTENDED RELEASE ORAL at 09:34

## 2018-01-20 RX ADMIN — FUROSEMIDE 40 MG: 10 INJECTION, SOLUTION INTRAMUSCULAR; INTRAVENOUS at 16:38

## 2018-01-20 RX ADMIN — NIFEDIPINE 60 MG: 60 TABLET, FILM COATED, EXTENDED RELEASE ORAL at 21:40

## 2018-01-20 RX ADMIN — LEVALBUTEROL HYDROCHLORIDE: 0.63 SOLUTION RESPIRATORY (INHALATION) at 11:15

## 2018-01-20 RX ADMIN — LISINOPRIL 20 MG: 20 TABLET ORAL at 09:34

## 2018-01-20 RX ADMIN — LISINOPRIL 20 MG: 20 TABLET ORAL at 21:40

## 2018-01-20 RX ADMIN — CLOPIDOGREL BISULFATE 75 MG: 75 TABLET ORAL at 09:34

## 2018-01-20 RX ADMIN — ATORVASTATIN CALCIUM 40 MG: 40 TABLET, FILM COATED ORAL at 09:34

## 2018-01-20 RX ADMIN — LEVALBUTEROL HYDROCHLORIDE 0.32 MG: 0.63 SOLUTION RESPIRATORY (INHALATION) at 00:18

## 2018-01-20 RX ADMIN — Medication 800 MG: at 21:00

## 2018-01-20 RX ADMIN — LEVALBUTEROL HYDROCHLORIDE: 0.63 SOLUTION RESPIRATORY (INHALATION) at 07:13

## 2018-01-20 RX ADMIN — Medication 81 MG: at 09:34

## 2018-01-20 RX ADMIN — METOPROLOL TARTRATE 50 MG: 50 TABLET ORAL at 21:40

## 2018-01-20 RX ADMIN — Medication 10 ML: at 09:36

## 2018-01-20 RX ADMIN — GUAIFENESIN 600 MG: 600 TABLET, EXTENDED RELEASE ORAL at 21:40

## 2018-01-20 RX ADMIN — METOPROLOL TARTRATE 50 MG: 50 TABLET ORAL at 09:34

## 2018-01-20 RX ADMIN — FERROUS SULFATE TAB 325 MG (65 MG ELEMENTAL FE) 325 MG: 325 (65 FE) TAB at 09:34

## 2018-01-20 ASSESSMENT — ENCOUNTER SYMPTOMS
HEMOPTYSIS: 0
ABDOMINAL PAIN: 0
BLURRED VISION: 0
NAUSEA: 0
VOMITING: 0
ORTHOPNEA: 0

## 2018-01-20 ASSESSMENT — PAIN SCALES - GENERAL: PAINLEVEL_OUTOF10: 0

## 2018-01-20 NOTE — PROGRESS NOTES
Progress Note  1/20/2018 2:27 PM  Subjective:   Admit Date: 1/13/2018  PCP: Jareth Roach MD      Subjective: pt seen and examined. Feeling a little better today. Was -3L I/o yesterday after lasix. 02 requirements down today. No chest pain. No fever/chills. ROS: 14 point review of systems is negative except as specifically addressed above. DIET GENERAL;     Intake/Output Summary (Last 24 hours) at 01/20/18 1427  Last data filed at 01/20/18 1202   Gross per 24 hour   Intake             1320 ml   Output             5200 ml   Net            -3880 ml     Medications:     Current Facility-Administered Medications   Medication Dose Route Frequency Provider Last Rate Last Dose    furosemide (LASIX) injection 40 mg  40 mg Intravenous Once Pedro Murphy DO        hydrALAZINE (APRESOLINE) tablet 25 mg  25 mg Oral 3 times per day Nuris Toledo MD   25 mg at 01/20/18 0610    lisinopril (PRINIVIL;ZESTRIL) tablet 20 mg  20 mg Oral BID Nuris Toledo MD   20 mg at 01/20/18 0934    hydrochlorothiazide (HYDRODIURIL) tablet 25 mg  25 mg Oral Daily Antwon Murphy DO   25 mg at 01/20/18 0934    NIFEdipine (PROCARDIA XL) extended release tablet 60 mg  60 mg Oral BID Nuris Toledo MD   60 mg at 01/20/18 0934    ferrous sulfate tablet 325 mg  325 mg Oral BID  Antwon Murphy DO   325 mg at 01/20/18 1592    levalbuterol (XOPENEX) nebulization 0.315 mg  0.5 ampule Nebulization Q6H Sintia Claudio MD        0.9 % sodium chloride bolus  250 mL Intravenous Once Nuris Toledo MD        aspirin EC tablet 81 mg  81 mg Oral Daily Sintia Claudio MD   81 mg at 01/20/18 0934    atorvastatin (LIPITOR) tablet 40 mg  40 mg Oral Daily Sintia Claudio MD   40 mg at 01/20/18 0934    clopidogrel (PLAVIX) tablet 75 mg  75 mg Oral Daily Sintia Claudio MD   75 mg at 01/20/18 0934    levothyroxine (SYNTHROID) tablet 50 mcg  50 mcg Oral Daily Sintia Claudio MD   50 mcg at 01/20/18 0610    metoprolol tartrate (LOPRESSOR)

## 2018-01-20 NOTE — PROGRESS NOTES
18 1113   General   Chart Reviewed Yes   Family / Caregiver Present Yes   Subjective   Subjective Patient in chair agrees to walk   Pain Screening   Patient Currently in Pain No   Vital Signs   Level of Consciousness 0   Oxygen Therapy   O2 Device Nasal cannula   O2 Flow Rate (L/min) 2 L/min   Orientation   Overall Orientation Status WNL   Transfers   Sit to Stand Independent   Stand to sit Independent   Ambulation   Ambulation? Yes   Ambulation 1   Surface level tile   Device No Device   Other Apparatus O2   Assistance Supervision   Quality of Gait good pace and steady   Distance 350'   Comments Patient returned to chair post gait   Balance   Sitting - Static Good   Sitting - Dynamic Good   Standing - Static Good   Standing - Dynamic Good;-   Short term goals   Time Frame for Short term goals 14 DAYS   Short term goal 1 BED MOB INDEPENDENT   Short term goal 2 TRANSFERS INDEPENDENT   Short term goal 3 ' INDEPENDENT   Conditions Requiring Skilled Therapeutic Intervention   Body structures, Functions, Activity limitations Decreased functional mobility ; Decreased balance   Activity Tolerance   Activity Tolerance Patient Tolerated treatment well   Safety Devices   Type of devices Left in chair;Call light within reach   Physical Therapy  Facility/Department: [facility] PROGRESSIVE CARE  Daily Treatment Note  NAME: Onel Kirkland  : 1945  MRN: [id number]    Date of Service: 2018    Patient Diagnosis(es):   Patient Active Problem List    Diagnosis Date Noted    Bradycardia      Priority: High    Acute superficial venous thrombosis of right lower extremity 2016     Priority: High    Severe mitral stenosis by prior echocardiogram 2016     Priority: High    Severe mitral regurgitation by prior echocardiogram 2016     Priority: High    Elevated troponin 2016     Priority: Medium    PUD (peptic ulcer disease) 2016     Priority: Medium    Atherosclerosis of native without cholecystitis without obstruction     Carotid artery stenosis 02/08/2012    Atherosclerosis of native artery of extremity with intermittent claudication ([facility] [address] 07/25/2011    Hyperlipidemia     Hypertension     Arthritis     CAD (coronary artery disease)        Past Medical History:   Diagnosis Date    Aortic stenosis     Arthritis     Atherosclerosis of native arteries of the extremities with intermittent claudication 7/25/2011    CAD (coronary artery disease)     Carotid artery occlusion     CHF (congestive heart failure) ([facility] [address]     History of blood transfusion 2016    Post op, was taking blood thinner    Hyperlipidemia     Hypertension     MI (myocardial infarction) 2009    x2    Mitral valve stenosis     PUD (peptic ulcer disease) 2009    Renal failure 2009    Wound infection after surgery     right foot     Past Surgical History:   Procedure Laterality Date    ABDOMEN SURGERY  2009    perforated ulcer resection of 1/3 stomach    CARDIAC SURGERY      artificial valve and bypass    CAROTID ENDARTERECTOMY      Right  with Dacron patch angioplasty    CAROTID ENDARTERECTOMY Left     CARPAL TUNNEL RELEASE Right early 1990's    long ago   [address] [facility]  2 weeks ago    DILATION AND CURETTAGE OF UTERUS      ILIO-FEMORAL BYPASS GRAFT N/A 6/2/2016    OPEN TRANSLUMINAL BALLOON ANGIOPLASTY AND STENTING OF RIGHT COMMON AND EXTERNAL  ILIAC ARTERIES; RIGHT FEMORAL ENDARTERECTOMY WITH VEIN PATCH ANGIOPLASTY performed by Tomasa Villar MD at [address] [facility] N/A 4/7/2016    MITRAL VALVE  REPLACEMENT LUONG-MAZE ABLATION WITH CRYO PROCEDURE, CORONARY ARTERY BYPASS GRAFT X 1 WITH ENDOSCOPIC VEIN HARVESTING WITH PERFUSION TRANSESOPHAGEAL ECHOCARDIOGRAM performed by Mychal Jang MD at [address] [facility], CAROTID ENDARTEC>1 MON Left 1/11/2018    REMOVAL OF HEMATOMA, LEFT CAROTID ARTERY performed by Cindy Mcclain MD at [address] Left 1/11/2018    LEFT CAROTID ENDARTERECTOMY WITH EEG MONITORING AND COMPLETION DUPLEX ULTRASOUND performed by Cindy Mcclain MD at [address] [facility]      SKIN GRAFT Right 7/22/2016    Skin graft split thickness foot,ankle,and leg. Right leg 26x8cm and 12x6cm total area 280cm squared. TJR    UPPER GASTROINTESTINAL ENDOSCOPY  3/15/16    Dr Gemma Bang  3/15/2016    EGD BIOPSY performed by Maurisio Harris DO at [facility]    VASCULAR SURGERY  5/27/16 TJR    Aortagram and right leg runoff,right leg runoff,right common iliac artery selection for right leg run off views.  VASCULAR SURGERY      . Open transluminal angioplasty and stenting of the external iliac artery. TJR       Restrictions     Subjective   General  Chart Reviewed: (P) Yes  Family / Caregiver Present: (P) Yes  Subjective  Subjective: (P) Patient in chair agrees to walk  Pain Screening  Patient Currently in Pain: (P) No  Vital Signs  Level of Consciousness: (P) Alert  Patient Currently in Pain: (P) No  Oxygen Therapy  O2 Device: (P) Nasal cannula  O2 Flow Rate (L/min): (P) 2 L/min       Orientation  Orientation  Overall Orientation Status: (P) Within Normal Limits  Objective      Transfers  Sit to Stand: (P) Independent  Stand to sit: (P) Independent  Ambulation  Ambulation?: (P) Yes  Ambulation 1  Surface: (P) level tile  Device: (P) No Device  Other Apparatus: (P) O2  Assistance: (P) Supervision  Quality of Gait: (P) good pace and steady  Distance: (P) 350'  Comments: (P) Patient returned to chair post gait     Balance  Sitting - Static: (P) Good  Sitting - Dynamic: (P) Good  Standing - Static: (P) Good  Standing - Dynamic: (P) Good;-                           Assessment   Body structures, Functions, Activity limitations: (P) Decreased functional mobility ; Decreased balance  Activity Tolerance  Activity Tolerance: (P) Patient Tolerated treatment well       Discharge Recommendations:  Continue to assess pending progress    G-Code     OutComes Score                                                    AM-PAC Score             Goals  Short term goals  Time Frame for Short term goals: (P) 14 DAYS  Short term goal 1: (P) BED MOB INDEPENDENT  Short term goal 2: (P) TRANSFERS INDEPENDENT  Short term goal 3: (P) ' [address]  Times per week: AT LEAST 6-7  Current Treatment Recommendations: Balance Training, Functional Mobility Training, Transfer Training, Gait Training, Patient/Caregiver Education & Training, Safety Education & Training  Safety Devices  Type of devices: (P) Left in chair, Call light within reach     Therapy Time   Individual Concurrent Group Co-treatment   Time In           Time Out           Minutes                   Nick Solorio PTA     Electronically signed by Nick Solorio PTA on 1/20/2018 at 11:18 AM

## 2018-01-20 NOTE — PROGRESS NOTES
for input(s): Steffanie Lopez in the last 72 hours. Tele - No events    Assessment:     67 y.o. female with NSTEMI, JONES, bradycardia, likely shock liver, CAD, ALEXIS, recent CEA with hematoma, COPD    Plan:     NSTEMI - continue DAPT and atorvstatin, beta-blocker, has unrevascularized disease. Hgb back up to 9.8 . I do think she needs a cath, but we may wait due to her anemia. I discussed this with her and she is in agreement. The MI likely is a Type II MI related to hypotension, especially in light the of the worsening LFTs and ALEXIS    Bradycardia - Has been stable on home dose of Lopressor, continue to monitor. CEA - We are holding anti-coagulation due to anemia, though this could be related to FE deficiency    HTN - not controlled, continue nifedipine to 60mg BID,  lisinopril to 20mg BID monitor, will add hydralazine 25mg TID    JONES - Patient has mild LV Systolic dysfunction, mild Mitral stenosis of the bioprosthesis, mild aortic stenosis, and unrevascularized CAD. She also has restrictive lung disease and WBC going up. Chest x-ray suggestive of venous congestion as well as moderate size pleural effusion on the right. She received Lasix IV yesterday and is now net -3 L. The patient has requested the pulmonology be on board. She is followed by Dr. Marcial Walker.   I've taken liberty  to place a consult    Shalini Sunshine MD  [facility] [place]

## 2018-01-21 LAB
ANION GAP SERPL CALCULATED.3IONS-SCNC: 9 MMOL/L (ref 7–19)
BASOPHILS ABSOLUTE: 0.1 K/UL (ref 0–0.2)
BASOPHILS RELATIVE PERCENT: 0.4 % (ref 0–1)
BUN BLDV-MCNC: 23 MG/DL (ref 8–23)
CALCIUM SERPL-MCNC: 9.1 MG/DL (ref 8.8–10.2)
CHLORIDE BLD-SCNC: 92 MMOL/L (ref 98–111)
CO2: 36 MMOL/L (ref 22–29)
CREAT SERPL-MCNC: 1.2 MG/DL (ref 0.5–0.9)
EOSINOPHILS ABSOLUTE: 0.5 K/UL (ref 0–0.6)
EOSINOPHILS RELATIVE PERCENT: 3.7 % (ref 0–5)
GFR NON-AFRICAN AMERICAN: 44
GLUCOSE BLD-MCNC: 94 MG/DL (ref 74–109)
HCT VFR BLD CALC: 31.1 % (ref 37–47)
HEMOGLOBIN: 9.6 G/DL (ref 12–16)
LYMPHOCYTES ABSOLUTE: 1.6 K/UL (ref 1.1–4.5)
LYMPHOCYTES RELATIVE PERCENT: 11.5 % (ref 20–40)
MAGNESIUM: 1.5 MG/DL (ref 1.6–2.4)
MCH RBC QN AUTO: 26.2 PG (ref 27–31)
MCHC RBC AUTO-ENTMCNC: 30.9 G/DL (ref 33–37)
MCV RBC AUTO: 85 FL (ref 81–99)
MONOCYTES ABSOLUTE: 2.2 K/UL (ref 0–0.9)
MONOCYTES RELATIVE PERCENT: 15.6 % (ref 0–10)
NEUTROPHILS ABSOLUTE: 9.6 K/UL (ref 1.5–7.5)
NEUTROPHILS RELATIVE PERCENT: 67.6 % (ref 50–65)
PDW BLD-RTO: 20.5 % (ref 11.5–14.5)
PHOSPHORUS: 3.5 MG/DL (ref 2.5–4.5)
PLATELET # BLD: 318 K/UL (ref 130–400)
PMV BLD AUTO: 9.8 FL (ref 9.4–12.3)
POTASSIUM SERPL-SCNC: 3.3 MMOL/L (ref 3.5–5)
RBC # BLD: 3.66 M/UL (ref 4.2–5.4)
SODIUM BLD-SCNC: 137 MMOL/L (ref 136–145)
WBC # BLD: 14.2 K/UL (ref 4.8–10.8)

## 2018-01-21 PROCEDURE — 6370000000 HC RX 637 (ALT 250 FOR IP): Performed by: HOSPITALIST

## 2018-01-21 PROCEDURE — 36415 COLL VENOUS BLD VENIPUNCTURE: CPT

## 2018-01-21 PROCEDURE — 6370000000 HC RX 637 (ALT 250 FOR IP): Performed by: INTERNAL MEDICINE

## 2018-01-21 PROCEDURE — 85025 COMPLETE CBC W/AUTO DIFF WBC: CPT

## 2018-01-21 PROCEDURE — 84100 ASSAY OF PHOSPHORUS: CPT

## 2018-01-21 PROCEDURE — 94761 N-INVAS EAR/PLS OXIMETRY MLT: CPT

## 2018-01-21 PROCEDURE — 80048 BASIC METABOLIC PNL TOTAL CA: CPT

## 2018-01-21 PROCEDURE — 99232 SBSQ HOSP IP/OBS MODERATE 35: CPT | Performed by: INTERNAL MEDICINE

## 2018-01-21 PROCEDURE — 99231 SBSQ HOSP IP/OBS SF/LOW 25: CPT | Performed by: INTERNAL MEDICINE

## 2018-01-21 PROCEDURE — 94640 AIRWAY INHALATION TREATMENT: CPT

## 2018-01-21 PROCEDURE — 2580000003 HC RX 258: Performed by: INTERNAL MEDICINE

## 2018-01-21 PROCEDURE — 2140000000 HC CCU INTERMEDIATE R&B

## 2018-01-21 PROCEDURE — 83735 ASSAY OF MAGNESIUM: CPT

## 2018-01-21 PROCEDURE — 6360000002 HC RX W HCPCS: Performed by: HOSPITALIST

## 2018-01-21 PROCEDURE — 2700000000 HC OXYGEN THERAPY PER DAY

## 2018-01-21 PROCEDURE — 97116 GAIT TRAINING THERAPY: CPT

## 2018-01-21 RX ORDER — POTASSIUM CHLORIDE 20 MEQ/1
40 TABLET, EXTENDED RELEASE ORAL ONCE
Status: COMPLETED | OUTPATIENT
Start: 2018-01-21 | End: 2018-01-21

## 2018-01-21 RX ORDER — HYDRALAZINE HYDROCHLORIDE 25 MG/1
50 TABLET, FILM COATED ORAL EVERY 8 HOURS SCHEDULED
Status: DISCONTINUED | OUTPATIENT
Start: 2018-01-21 | End: 2018-01-22 | Stop reason: HOSPADM

## 2018-01-21 RX ADMIN — Medication 10 ML: at 08:52

## 2018-01-21 RX ADMIN — ACETAMINOPHEN 650 MG: 325 TABLET, FILM COATED ORAL at 09:25

## 2018-01-21 RX ADMIN — GUAIFENESIN 600 MG: 600 TABLET, EXTENDED RELEASE ORAL at 21:07

## 2018-01-21 RX ADMIN — GUAIFENESIN 600 MG: 600 TABLET, EXTENDED RELEASE ORAL at 08:52

## 2018-01-21 RX ADMIN — GUAIFENESIN 600 MG: 600 TABLET, EXTENDED RELEASE ORAL at 17:47

## 2018-01-21 RX ADMIN — Medication 800 MG: at 13:22

## 2018-01-21 RX ADMIN — LEVALBUTEROL HYDROCHLORIDE 0.63 MG: 0.63 SOLUTION RESPIRATORY (INHALATION) at 23:50

## 2018-01-21 RX ADMIN — HYDRALAZINE HYDROCHLORIDE 50 MG: 25 TABLET, FILM COATED ORAL at 21:07

## 2018-01-21 RX ADMIN — LEVOTHYROXINE SODIUM 50 MCG: 100 TABLET ORAL at 06:40

## 2018-01-21 RX ADMIN — METOPROLOL TARTRATE 50 MG: 50 TABLET ORAL at 21:11

## 2018-01-21 RX ADMIN — LEVALBUTEROL HYDROCHLORIDE 0.32 MG: 0.63 SOLUTION RESPIRATORY (INHALATION) at 07:15

## 2018-01-21 RX ADMIN — HYDROCHLOROTHIAZIDE 25 MG: 25 TABLET ORAL at 08:53

## 2018-01-21 RX ADMIN — LEVALBUTEROL HYDROCHLORIDE 0.63 MG: 0.63 SOLUTION RESPIRATORY (INHALATION) at 19:02

## 2018-01-21 RX ADMIN — FERROUS SULFATE TAB 325 MG (65 MG ELEMENTAL FE) 325 MG: 325 (65 FE) TAB at 08:52

## 2018-01-21 RX ADMIN — HYDRALAZINE HYDROCHLORIDE 25 MG: 25 TABLET, FILM COATED ORAL at 06:40

## 2018-01-21 RX ADMIN — HYDRALAZINE HYDROCHLORIDE 50 MG: 25 TABLET, FILM COATED ORAL at 13:20

## 2018-01-21 RX ADMIN — ATORVASTATIN CALCIUM 40 MG: 40 TABLET, FILM COATED ORAL at 08:52

## 2018-01-21 RX ADMIN — CLOPIDOGREL BISULFATE 75 MG: 75 TABLET ORAL at 08:52

## 2018-01-21 RX ADMIN — Medication 81 MG: at 08:52

## 2018-01-21 RX ADMIN — NIFEDIPINE 60 MG: 60 TABLET, FILM COATED, EXTENDED RELEASE ORAL at 21:07

## 2018-01-21 RX ADMIN — NIFEDIPINE 60 MG: 60 TABLET, FILM COATED, EXTENDED RELEASE ORAL at 08:52

## 2018-01-21 RX ADMIN — GUAIFENESIN 600 MG: 600 TABLET, EXTENDED RELEASE ORAL at 13:20

## 2018-01-21 RX ADMIN — FERROUS SULFATE TAB 325 MG (65 MG ELEMENTAL FE) 325 MG: 325 (65 FE) TAB at 17:47

## 2018-01-21 RX ADMIN — LEVALBUTEROL HYDROCHLORIDE 0.32 MG: 0.63 SOLUTION RESPIRATORY (INHALATION) at 11:19

## 2018-01-21 RX ADMIN — POTASSIUM CHLORIDE 40 MEQ: 20 TABLET, EXTENDED RELEASE ORAL at 13:22

## 2018-01-21 RX ADMIN — METOPROLOL TARTRATE 50 MG: 50 TABLET ORAL at 08:52

## 2018-01-21 RX ADMIN — Medication 10 ML: at 21:08

## 2018-01-21 RX ADMIN — LISINOPRIL 20 MG: 20 TABLET ORAL at 08:52

## 2018-01-21 RX ADMIN — LISINOPRIL 20 MG: 20 TABLET ORAL at 21:07

## 2018-01-21 ASSESSMENT — PAIN SCALES - GENERAL: PAINLEVEL_OUTOF10: 7

## 2018-01-21 NOTE — PROGRESS NOTES
07/25/2011     Priority: Low    Benign essential HTN     NSTEMI (non-ST elevated myocardial infarction) ([address] 01/14/2018    HCAP (healthcare-associated pneumonia) 01/14/2018    Acute renal failure (ARF) ([address] 01/14/2018    Syncope and collapse     Sepsis with organ dysfunction ([address] 01/13/2018    Obstruction of left carotid artery 01/11/2018    Bilateral carotid artery stenosis 11/19/2017    Wound of sacral region 06/16/2016    Elevated TSH 06/16/2016    Acute blood loss anemia 06/15/2016    CHF (congestive heart failure) ([address] 06/15/2016    CKD (chronic kidney disease), stage III 06/15/2016    Pulmonary emphysema ([address] 06/15/2016    COPD without exacerbation (HCC)     PVD (peripheral vascular disease) ([address]     Atherosclerosis of native artery of right lower extremity with ulceration of ankle ([address] 06/05/2016    Atherosclerosis of native arteries of right leg with ulceration of other part of lower right leg ([address] 06/05/2016    Atherosclerosis of native arteries of right leg with ulceration of other part of foot 06/05/2016    Nonhealing ulcer of right lower leg with fat layer exposed ([address] 06/05/2016    Non-pressure chronic ulcer of right ankle with fat layer exposed ([address] 06/05/2016    Neuropathic ulcer of right foot with fat layer exposed ([address] 06/05/2016    Hypervolemia     Nonhealing nonsurgical wound with fat layer exposed 06/03/2016    Acute blood loss anemia     Atherosclerosis of native arteries of left leg with ulceration of calf (HCC)     Severe malnutrition ([facility])     Diastolic dysfunction     Anemia in chronic kidney disease (CKD)     Non-pressure chronic ulcer of right calf with fat layer exposed ([address] 05/27/2016    Decubitus ulcer of right buttock, stage 3 ([address] 05/27/2016    Nocturnal hypoxia 03/30/2016    Pulmonary hypertension 03/29/2016    PAD (peripheral artery disease) (HCC)     Calculus of gallbladder without cholecystitis without obstruction     Carotid artery

## 2018-01-21 NOTE — PROGRESS NOTES
Progress Note  1/21/2018 11:08 AM  Subjective:   Admit Date: 1/13/2018  PCP: Caity Flores MD      Subjective: pt seen and examined. Feeling a little better today. -5L after 3 days of diuretics. Feeling much better today with 02 requirements down. No chest pain. No fever/chills. ROS: 14 point review of systems is negative except as specifically addressed above. DIET GENERAL;     Intake/Output Summary (Last 24 hours) at 01/21/18 1108  Last data filed at 01/21/18 2875   Gross per 24 hour   Intake             1020 ml   Output             2850 ml   Net            -1830 ml     Medications:     Current Facility-Administered Medications   Medication Dose Route Frequency Provider Last Rate Last Dose    hydrALAZINE (APRESOLINE) tablet 50 mg  50 mg Oral 3 times per day Zach Burciaga MD        potassium chloride (KLOR-CON M) extended release tablet 40 mEq  40 mEq Oral Once Pedro Murphy DO        magnesium oxide (MAG-OX) tablet 800 mg  800 mg Oral Once Pedro Murphy DO        lisinopril (PRINIVIL;ZESTRIL) tablet 20 mg  20 mg Oral BID Zach Burciaga MD   20 mg at 01/21/18 4984    hydrochlorothiazide (HYDRODIURIL) tablet 25 mg  25 mg Oral Daily Aida Murphy DO   25 mg at 01/21/18 0853    NIFEdipine (PROCARDIA XL) extended release tablet 60 mg  60 mg Oral BID Zach Burciaga MD   60 mg at 01/21/18 4294    ferrous sulfate tablet 325 mg  325 mg Oral BID  Aida Murphy DO   325 mg at 01/21/18 0015    levalbuterol (XOPENEX) nebulization 0.315 mg  0.5 ampule Nebulization Q6H Oscar Madden MD   0.315 mg at 01/21/18 0715    0.9 % sodium chloride bolus  250 mL Intravenous Once Zach Burciaga MD        aspirin EC tablet 81 mg  81 mg Oral Daily Oscar Madden MD   81 mg at 01/21/18 5084    atorvastatin (LIPITOR) tablet 40 mg  40 mg Oral Daily Oscar Madden MD   40 mg at 01/21/18 5271    clopidogrel (PLAVIX) tablet 75 mg  75 mg Oral Daily Oscar Madden MD   75 mg at 01/21/18 8135    levothyroxine (SYNTHROID) tablet 50 mcg  50 mcg Oral Daily Kiara Stevenson MD   50 mcg at 01/21/18 0640    metoprolol tartrate (LOPRESSOR) tablet 50 mg  50 mg Oral BID Kiara Stevenson MD   50 mg at 01/21/18 6851    guaiFENesin [facility]) extended release tablet 600 mg  600 mg Oral 4x Daily Kiara Stevenson MD   600 mg at 01/21/18 0429    dextrose 50 % solution 25 g  25 g Intravenous PRN Laly Quintanilla MD   25 g at 01/13/18 1824    sodium chloride flush 0.9 % injection 10 mL  10 mL Intravenous 2 times per day Miles Conde MD   10 mL at 01/21/18 3309    sodium chloride flush 0.9 % injection 10 mL  10 mL Intravenous PRN Miles Conde MD        acetaminophen (TYLENOL) tablet 650 mg  650 mg Oral Q4H PRN Miles Conde MD   650 mg at 01/15/18 1024    ondansetron (ZOFRAN) injection 4 mg  4 mg Intravenous Q6H PRN Miles Conde MD            Labs:     Recent Labs      01/19/18   0246  01/20/18   0209  01/21/18   0150   WBC  12.6*  14.6*  14.2*   RBC  3.61*  3.80*  3.66*   HGB  9.2*  9.8*  9.6*   HCT  29.9*  32.0*  31.1*   MCV  82.8  84.2  85.0   MCH  25.5*  25.8*  26.2*   MCHC  30.8*  30.6*  30.9*   PLT  272  306  318     Recent Labs      01/19/18   0246  01/20/18   0209  01/21/18   0150   NA  140  140  137   K   --   3.7  3.3*   ANIONGAP  12  14  9   CL  104  97*  92*   CO2  24  29  36*   BUN  16  18  23   CREATININE  0.9  1.1*  1.2*   GLUCOSE  102  96  94   CALCIUM  8.6*  9.3  9.1     Recent Labs      01/19/18   0246  01/20/18   0209  01/21/18   0150   MG  1.6  1.5*  1.5*   PHOS   --    --   3.5     Recent Labs      01/19/18   0246   AST  88*   ALT  127*   BILITOT  0.5   ALKPHOS  102     ABGs:No results for input(s): PHART, REF1QRG, PO2ART, LBG5TIU, BEART, HGBAE, N1LAOTAG, CARBOXHGBART, 02THERAPY in the last 72 hours. HgBA1c: No results for input(s): LABA1C in the last 72 hours.   FLP:    Lab Results   Component Value Date    TRIG 123 03/15/2016    HDL 11 03/15/2016    LDLCALC 89 03/15/2016

## 2018-01-21 NOTE — PROGRESS NOTES
Pulmonary and Critical Care Progress Note [address] [facility]    Patient: Ning Purvis  1945   MR# [id number]   Acct# [de-identified]  01/21/18   1:43 PM  Referring Provider: Dean Quiñonez DO  Problem list:   Patient Active Problem List   Diagnosis    Hyperlipidemia    Hypertension    Arthritis    CAD (coronary artery disease)    Carotid artery stenosis    Calculus of gallbladder without cholecystitis without obstruction    Severe mitral stenosis by prior echocardiogram    Severe mitral regurgitation by prior echocardiogram    PUD (peptic ulcer disease)    Atherosclerosis of native artery of extremity with intermittent claudication ([address]    Pulmonary hypertension    PAD (peripheral artery disease) (HCC)    Nocturnal hypoxia    Elevated troponin    Acute superficial venous thrombosis of right lower extremity    Non-pressure chronic ulcer of right calf with fat layer exposed ([address]    Decubitus ulcer of right buttock, stage 3 (HCC)    Severe malnutrition (HCC)    Diastolic dysfunction    Anemia in chronic kidney disease (CKD)    Acute blood loss anemia    Nonhealing nonsurgical wound with fat layer exposed    Atherosclerosis of native arteries of left leg with ulceration of calf ([address]    Atherosclerosis of native artery of right lower extremity with ulceration of ankle ([address]    Atherosclerosis of native arteries of right leg with ulceration of other part of lower right leg ([address]    Atherosclerosis of native arteries of right leg with ulceration of other part of foot    Nonhealing ulcer of right lower leg with fat layer exposed ([address]    Non-pressure chronic ulcer of right ankle with fat layer exposed ([address]    Neuropathic ulcer of right foot with fat layer exposed ([address]    Hypervolemia    Acute blood loss anemia    Atherosclerosis of native artery of extremity with intermittent claudication ([address]    CHF (congestive heart failure) ([address]    CKD (chronic kidney disease), stage

## 2018-01-21 NOTE — CONSULTS
mcg Oral Daily   metoprolol tartrate 50 mg Oral BID   guaiFENesin 600 mg Oral 4x Daily   sodium chloride flush 10 mL Intravenous 2 times per day        Social History   reports that she quit smoking about 38 years ago. She quit after 2.00 years of use. She has never used smokeless tobacco. She reports that she drinks alcohol. She reports that she does not use drugs. Family History  family history includes Diabetes in her mother and sister; Heart Disease in her mother and sister; Heart Failure in her mother; High Blood Pressure in her sister. Review of Systems:  Review of Systems   Constitutional: Negative for chills, fever and malaise/fatigue. HENT: Negative for hearing loss and nosebleeds. Eyes: Negative for blurred vision. Respiratory: Negative for hemoptysis. Cardiovascular: Negative for chest pain and orthopnea. Gastrointestinal: Negative for abdominal pain, nausea and vomiting. Genitourinary: Negative. Musculoskeletal: Negative. Skin: Negative for rash. Neurological: Negative. Psychiatric/Behavioral: Negative. Physical Exam:   height is 5' 3\" (1.6 m) and weight is 106 lb 12.8 oz (48.4 kg). Her temperature is 98.6 °F (37 °C). Her blood pressure is 124/48 (abnormal) and her pulse is 82. Her respiration is 16 and oxygen saturation is 92%. Intake/Output Summary (Last 24 hours) at 01/20/18 1836  Last data filed at 01/20/18 1433   Gross per 24 hour   Intake             1080 ml   Output             3900 ml   Net            -2820 ml     Physical Exam   Constitutional: She appears well-developed and well-nourished. No distress. HENT:   Head: Normocephalic and atraumatic. Eyes: EOM are normal. Pupils are equal, round, and reactive to light. No scleral icterus. Cardiovascular: Normal rate and regular rhythm. Pulmonary/Chest: No tachypnea. No respiratory distress.  She has decreased breath sounds in the right middle field, the right lower field, the left middle field and the left

## 2018-01-22 VITALS
DIASTOLIC BLOOD PRESSURE: 62 MMHG | HEART RATE: 91 BPM | HEIGHT: 63 IN | WEIGHT: 99 LBS | TEMPERATURE: 98 F | RESPIRATION RATE: 20 BRPM | OXYGEN SATURATION: 93 % | BODY MASS INDEX: 17.54 KG/M2 | SYSTOLIC BLOOD PRESSURE: 158 MMHG

## 2018-01-22 LAB
ANION GAP SERPL CALCULATED.3IONS-SCNC: 9 MMOL/L (ref 7–19)
ANISOCYTOSIS: ABNORMAL
ATYPICAL LYMPHOCYTE RELATIVE PERCENT: 1 % (ref 0–8)
BASOPHILS ABSOLUTE: 0.4 K/UL (ref 0–0.2)
BASOPHILS MANUAL: 3 %
BASOPHILS RELATIVE PERCENT: 3 % (ref 0–1)
BUN BLDV-MCNC: 26 MG/DL (ref 8–23)
CALCIUM SERPL-MCNC: 9 MG/DL (ref 8.8–10.2)
CHLORIDE BLD-SCNC: 95 MMOL/L (ref 98–111)
CO2: 34 MMOL/L (ref 22–29)
CREAT SERPL-MCNC: 1 MG/DL (ref 0.5–0.9)
EOSINOPHILS ABSOLUTE: 0.97 K/UL (ref 0–0.6)
EOSINOPHILS RELATIVE PERCENT: 7 % (ref 0–5)
GFR NON-AFRICAN AMERICAN: 54
GLUCOSE BLD-MCNC: 99 MG/DL (ref 74–109)
HCT VFR BLD CALC: 31.8 % (ref 37–47)
HEMOGLOBIN: 9.7 G/DL (ref 12–16)
HYPOCHROMIA: ABNORMAL
LYMPHOCYTES ABSOLUTE: 1.4 K/UL (ref 1.1–4.5)
LYMPHOCYTES RELATIVE PERCENT: 9 % (ref 20–40)
MAGNESIUM: 1.6 MG/DL (ref 1.6–2.4)
MCH RBC QN AUTO: 26.3 PG (ref 27–31)
MCHC RBC AUTO-ENTMCNC: 30.5 G/DL (ref 33–37)
MCV RBC AUTO: 86.2 FL (ref 81–99)
MONOCYTES ABSOLUTE: 2.1 K/UL (ref 0–0.9)
MONOCYTES RELATIVE PERCENT: 15 % (ref 0–10)
NEUTROPHILS ABSOLUTE: 9 K/UL (ref 1.5–7.5)
NEUTROPHILS MANUAL: 65 %
NEUTROPHILS RELATIVE PERCENT: 65 % (ref 50–65)
PDW BLD-RTO: 21.1 % (ref 11.5–14.5)
PLATELET # BLD: 317 K/UL (ref 130–400)
PLATELET SLIDE REVIEW: ADEQUATE
PMV BLD AUTO: 9.7 FL (ref 9.4–12.3)
POTASSIUM SERPL-SCNC: 4.3 MMOL/L (ref 3.5–5)
RBC # BLD: 3.69 M/UL (ref 4.2–5.4)
SODIUM BLD-SCNC: 138 MMOL/L (ref 136–145)
WBC # BLD: 13.9 K/UL (ref 4.8–10.8)

## 2018-01-22 PROCEDURE — 97116 GAIT TRAINING THERAPY: CPT

## 2018-01-22 PROCEDURE — 83735 ASSAY OF MAGNESIUM: CPT

## 2018-01-22 PROCEDURE — 2580000003 HC RX 258: Performed by: INTERNAL MEDICINE

## 2018-01-22 PROCEDURE — 80048 BASIC METABOLIC PNL TOTAL CA: CPT

## 2018-01-22 PROCEDURE — 2700000000 HC OXYGEN THERAPY PER DAY

## 2018-01-22 PROCEDURE — 6370000000 HC RX 637 (ALT 250 FOR IP): Performed by: HOSPITALIST

## 2018-01-22 PROCEDURE — 6370000000 HC RX 637 (ALT 250 FOR IP): Performed by: INTERNAL MEDICINE

## 2018-01-22 PROCEDURE — 36415 COLL VENOUS BLD VENIPUNCTURE: CPT

## 2018-01-22 PROCEDURE — 94640 AIRWAY INHALATION TREATMENT: CPT

## 2018-01-22 PROCEDURE — 85025 COMPLETE CBC W/AUTO DIFF WBC: CPT

## 2018-01-22 PROCEDURE — 6360000002 HC RX W HCPCS: Performed by: HOSPITALIST

## 2018-01-22 PROCEDURE — 99239 HOSP IP/OBS DSCHRG MGMT >30: CPT | Performed by: INTERNAL MEDICINE

## 2018-01-22 RX ORDER — HYDROCHLOROTHIAZIDE 25 MG/1
25 TABLET ORAL DAILY
Qty: 30 TABLET | Refills: 0 | Status: SHIPPED | OUTPATIENT
Start: 2018-01-23 | End: 2018-02-19 | Stop reason: SDUPTHER

## 2018-01-22 RX ORDER — FERROUS SULFATE 325(65) MG
325 TABLET ORAL 2 TIMES DAILY WITH MEALS
Qty: 60 TABLET | Refills: 0 | Status: SHIPPED | OUTPATIENT
Start: 2018-01-22 | End: 2018-02-19 | Stop reason: ALTCHOICE

## 2018-01-22 RX ORDER — HYDRALAZINE HYDROCHLORIDE 50 MG/1
50 TABLET, FILM COATED ORAL EVERY 8 HOURS SCHEDULED
Qty: 90 TABLET | Refills: 0 | Status: SHIPPED | OUTPATIENT
Start: 2018-01-22 | End: 2018-02-19

## 2018-01-22 RX ORDER — NIFEDIPINE 60 MG/1
60 TABLET, FILM COATED, EXTENDED RELEASE ORAL 2 TIMES DAILY
Qty: 60 TABLET | Refills: 0 | Status: SHIPPED | OUTPATIENT
Start: 2018-01-22 | End: 2018-02-19

## 2018-01-22 RX ORDER — LISINOPRIL 20 MG/1
20 TABLET ORAL 2 TIMES DAILY
Qty: 60 TABLET | Refills: 0 | Status: SHIPPED | OUTPATIENT
Start: 2018-01-22 | End: 2018-02-19 | Stop reason: DRUGHIGH

## 2018-01-22 RX ORDER — FUROSEMIDE 20 MG/1
20 TABLET ORAL DAILY
Qty: 30 TABLET | Refills: 0 | Status: SHIPPED | OUTPATIENT
Start: 2018-01-22 | End: 2018-02-19 | Stop reason: ALTCHOICE

## 2018-01-22 RX ADMIN — ATORVASTATIN CALCIUM 40 MG: 40 TABLET, FILM COATED ORAL at 07:56

## 2018-01-22 RX ADMIN — HYDRALAZINE HYDROCHLORIDE 50 MG: 25 TABLET, FILM COATED ORAL at 06:10

## 2018-01-22 RX ADMIN — METOPROLOL TARTRATE 50 MG: 50 TABLET ORAL at 07:57

## 2018-01-22 RX ADMIN — LEVALBUTEROL HYDROCHLORIDE 0.32 MG: 0.63 SOLUTION RESPIRATORY (INHALATION) at 10:42

## 2018-01-22 RX ADMIN — LEVALBUTEROL HYDROCHLORIDE 0.32 MG: 0.63 SOLUTION RESPIRATORY (INHALATION) at 07:25

## 2018-01-22 RX ADMIN — HYDROCHLOROTHIAZIDE 25 MG: 25 TABLET ORAL at 07:56

## 2018-01-22 RX ADMIN — LEVOTHYROXINE SODIUM 50 MCG: 100 TABLET ORAL at 06:11

## 2018-01-22 RX ADMIN — HYDRALAZINE HYDROCHLORIDE 50 MG: 25 TABLET, FILM COATED ORAL at 13:25

## 2018-01-22 RX ADMIN — Medication 10 ML: at 07:57

## 2018-01-22 RX ADMIN — NIFEDIPINE 60 MG: 60 TABLET, FILM COATED, EXTENDED RELEASE ORAL at 08:01

## 2018-01-22 RX ADMIN — FERROUS SULFATE TAB 325 MG (65 MG ELEMENTAL FE) 325 MG: 325 (65 FE) TAB at 07:56

## 2018-01-22 RX ADMIN — CLOPIDOGREL BISULFATE 75 MG: 75 TABLET ORAL at 07:57

## 2018-01-22 RX ADMIN — GUAIFENESIN 600 MG: 600 TABLET, EXTENDED RELEASE ORAL at 13:25

## 2018-01-22 RX ADMIN — GUAIFENESIN 600 MG: 600 TABLET, EXTENDED RELEASE ORAL at 07:56

## 2018-01-22 RX ADMIN — LISINOPRIL 20 MG: 20 TABLET ORAL at 07:56

## 2018-01-22 RX ADMIN — Medication 81 MG: at 07:56

## 2018-01-22 ASSESSMENT — PAIN SCALES - GENERAL
PAINLEVEL_OUTOF10: 0

## 2018-01-22 NOTE — PROGRESS NOTES
%.    Intake/Output Summary (Last 24 hours) at 01/22/18 1144  Last data filed at 01/22/18 0857   Gross per 24 hour   Intake             1070 ml   Output             1200 ml   Net             -130 ml     Physical Exam   Constitutional: She appears well-developed and well-nourished. No distress. She is thin. HENT: NC O2 in place. Steri-strips to left neck with healing post surgical wound. Head: Normocephalic and atraumatic. Eyes: EOM are normal. Pupils are equal, round, and reactive to light. No scleral icterus. Cardiovascular: Normal rate and regular rhythm. Pulmonary/Chest: No tachypnea. No respiratory distress. She has decreased breath sounds in the right middle field, the right lower field, the left middle field and the left lower field. She has no wheezes. She exhibits no tenderness. Abdominal: Soft. Bowel sounds are normal.   Musculoskeletal: Normal range of motion. She exhibits no edema. Neurological: Moves all extremities. Generalized weakness. Skin: Skin is warm and dry. Psychiatric: She has a normal mood and affect. Vitals reviewed. Recent Labs      01/20/18   0209 01/21/18   0150  01/22/18 0226   WBC  14.6*  14.2*  13.9*   RBC  3.80*  3.66*  3.69*   HGB  9.8*  9.6*  9.7*   HCT  32.0*  31.1*  31.8*   PLT  306  318  317   MCV  84.2  85.0  86.2   MCH  25.8*  26.2*  26.3*   MCHC  30.6*  30.9*  30.5*   RDW  19.9*  20.5*  21.1*      Recent Labs      01/20/18   0209 01/21/18   0150  01/22/18 0226   NA  140  137  138   K  3.7  3.3*  4.3   CL  97*  92*  95*   CO2  29  36*  34*   BUN  18  23  26*   CREATININE  1.1*  1.2*  1.0*   CALCIUM  9.3  9.1  9.0   GLUCOSE  96  94  99   MG  1.5*  1.5*  1.6   PHOS   --   3.5   --       No results for input(s): BC, LABGRAM, CULTRESP, BFCX in the last 72 hours. Radiograph:   My radiograph interpretation: No new today    Pulmonary Assessment:  1. COPD with ongoing dyspnea   2. S/p left carotid endarterectomy  3.  Chronic right sided pleural effusion

## 2018-01-22 NOTE — PROGRESS NOTES
claudication ([address] 07/25/2011     Priority: Low    Benign essential HTN     NSTEMI (non-ST elevated myocardial infarction) ([address] 01/14/2018    HCAP (healthcare-associated pneumonia) 01/14/2018    Acute renal failure (ARF) ([address] 01/14/2018    Syncope and collapse     Sepsis with organ dysfunction ([address] 01/13/2018    Obstruction of left carotid artery 01/11/2018    Bilateral carotid artery stenosis 11/19/2017    Wound of sacral region 06/16/2016    Elevated TSH 06/16/2016    Acute blood loss anemia 06/15/2016    CHF (congestive heart failure) ([address] 06/15/2016    CKD (chronic kidney disease), stage III 06/15/2016    Pulmonary emphysema ([address] 06/15/2016    COPD without exacerbation (HCC)     PVD (peripheral vascular disease) ([facility])     Atherosclerosis of native artery of right lower extremity with ulceration of ankle ([address] 06/05/2016    Atherosclerosis of native arteries of right leg with ulceration of other part of lower right leg ([address] 06/05/2016    Atherosclerosis of native arteries of right leg with ulceration of other part of foot 06/05/2016    Nonhealing ulcer of right lower leg with fat layer exposed ([address] 06/05/2016    Non-pressure chronic ulcer of right ankle with fat layer exposed ([address] 06/05/2016    Neuropathic ulcer of right foot with fat layer exposed ([address] 06/05/2016    Hypervolemia     Nonhealing nonsurgical wound with fat layer exposed 06/03/2016    Acute blood loss anemia     Atherosclerosis of native arteries of left leg with ulceration of calf (HCC)     Severe malnutrition ([facility])     Diastolic dysfunction     Anemia in chronic kidney disease (CKD)     Non-pressure chronic ulcer of right calf with fat layer exposed ([address] 05/27/2016    Decubitus ulcer of right buttock, stage 3 ([address] 05/27/2016    Nocturnal hypoxia 03/30/2016    Pulmonary hypertension 03/29/2016    PAD (peripheral artery disease) (HCC)     Calculus of gallbladder without cholecystitis without obstruction  Carotid artery stenosis 02/08/2012    Atherosclerosis of native artery of extremity with intermittent claudication ([facility] 07/25/2011    Hyperlipidemia     Hypertension     Arthritis     CAD (coronary artery disease)        Past Medical History:   Diagnosis Date    Aortic stenosis     Arthritis     Atherosclerosis of native arteries of the extremities with intermittent claudication 7/25/2011    CAD (coronary artery disease)     Carotid artery occlusion     CHF (congestive heart failure) ([address]     History of blood transfusion 2016    Post op, was taking blood thinner    Hyperlipidemia     Hypertension     MI (myocardial infarction) 2009    x2    Mitral valve stenosis     PUD (peptic ulcer disease) 2009    Renal failure 2009    Wound infection after surgery     right foot     Past Surgical History:   Procedure Laterality Date    ABDOMEN SURGERY  2009    perforated ulcer resection of 1/3 stomach    CARDIAC SURGERY      artificial valve and bypass    CAROTID ENDARTERECTOMY      Right  with Dacron patch angioplasty    CAROTID ENDARTERECTOMY Left     CARPAL TUNNEL RELEASE Right early 1990's    long ago   [facility]  2 weeks ago    DILATION AND CURETTAGE OF UTERUS      ILIO-FEMORAL BYPASS GRAFT N/A 6/2/2016    OPEN TRANSLUMINAL BALLOON ANGIOPLASTY AND STENTING OF RIGHT COMMON AND EXTERNAL  ILIAC ARTERIES; RIGHT FEMORAL ENDARTERECTOMY WITH VEIN PATCH ANGIOPLASTY performed by Leanne Anand MD at [facility] N/A 4/7/2016    MITRAL VALVE  REPLACEMENT LUONG-MAZE ABLATION WITH CRYO PROCEDURE, CORONARY ARTERY BYPASS GRAFT X 1 WITH ENDOSCOPIC VEIN HARVESTING WITH PERFUSION TRANSESOPHAGEAL ECHOCARDIOGRAM performed by Alana Vidal MD at [address] [facility], CAROTID ENDARTEC>1 MON Left 1/11/2018    REMOVAL OF HEMATOMA, LEFT CAROTID ARTERY performed by Leanne Anand MD at [facility] 43 THROMBOENDARTECTMY NECK,NECK INCIS Left 1/11/2018    LEFT CAROTID ENDARTERECTOMY WITH EEG MONITORING AND COMPLETION DUPLEX ULTRASOUND performed by Earl Nuñez MD at [address] [place] [address] [facility]      SKIN GRAFT Right 7/22/2016    Skin graft split thickness foot,ankle,and leg. Right leg 26x8cm and 12x6cm total area 280cm squared. TJR    UPPER GASTROINTESTINAL ENDOSCOPY  3/15/16    Dr Bob Gleason  3/15/2016    EGD BIOPSY performed by Makenna Harris DO at [facility]    VASCULAR SURGERY  5/27/16 TJR    Aortagram and right leg runoff,right leg runoff,right common iliac artery selection for right leg run off views.  VASCULAR SURGERY      . Open transluminal angioplasty and stenting of the external iliac artery. TJR       Restrictions     Subjective   General  Chart Reviewed: (P) Yes  Family / Caregiver Present: (P) No  Subjective  Subjective: (P) Patient up in chair agrees to walk  Pain Screening  Patient Currently in Pain: (P) No  Vital Signs  Patient Currently in Pain: (P) No  Oxygen Therapy  SpO2: 96 %  O2 Device: (P) Nasal cannula  O2 Flow Rate (L/min): (P) 2 L/min       Orientation  Orientation  Overall Orientation Status: (P) Within Normal Limits  Objective      Transfers  Sit to Stand: (P) Independent  Stand to sit: (P) Independent  Ambulation  Ambulation?: (P) Yes  Ambulation 1  Surface: (P) level tile  Device: (P) No Device  Other Apparatus: (P) O2  Assistance: (P) Supervision  Quality of Gait: (P) good pace and steady  Distance: (P) 375'  Comments: (P) Patient in chair post gait     Balance  Posture: (P) Good  Sitting - Static: (P) Good  Sitting - Dynamic: (P) Good  Standing - Static: (P) Good  Standing - Dynamic: (P) Good                           Assessment   Body structures, Functions, Activity limitations: (P) Decreased functional mobility ; Decreased balance  Activity Tolerance  Activity Tolerance: (P) Patient Tolerated treatment well       Discharge Recommendations:  Continue to assess pending progress    G-Code     OutComes Score                                                    AM-PAC Score             Goals  Short term goals  Time Frame for Short term goals: (P) 14 DAYS  Short term goal 1: (P) BED MOB INDEPENDENT  Short term goal 2: (P) TRANSFERS INDEPENDENT  Short term goal 3: (P) ' [address] per week: AT LEAST 6-7  Current Treatment Recommendations: Balance Training, Functional Mobility Training, Transfer Training, Gait Training, Patient/Caregiver Education & Training, Safety Education & Training  Safety Devices  Type of devices: (P) Left in chair, Call light within reach     Therapy Time   Individual Concurrent Group Co-treatment   Time In           Time Out           Minutes                   Teodoro Fuentes PTA    Electronically signed by Teodoro Fuentes PTA on 1/22/2018 at 2:55 PM

## 2018-01-22 NOTE — PLAN OF CARE
Problem: Cardiac Output - Decreased:  Goal: Hemodynamic stability will improve  Hemodynamic stability will improve   Outcome: Met This Shift      Problem: Pain:  Goal: Pain level will decrease  Pain level will decrease   Outcome: Met This Shift      Problem: Fluid Volume:  Goal: Ability to maintain a balanced intake and output will improve  Ability to maintain a balanced intake and output will improve   Outcome: Met This Shift

## 2018-01-22 NOTE — DISCHARGE SUMMARY
01/22/2018    BUN 26 01/22/2018    CREATININE 1.0 01/22/2018    CREATININE 1.1 05/03/2012    CALCIUM 9.0 01/22/2018    PHOS 3.5 01/21/2018       Discharge Medications:   Current Discharge Medication List           Details   hydrALAZINE (APRESOLINE) 50 MG tablet Take 1 tablet by mouth every 8 hours  Qty: 90 tablet, Refills: 0      NIFEdipine (ADALAT CC) 60 MG extended release tablet Take 1 tablet by mouth 2 times daily  Qty: 60 tablet, Refills: 0      hydrochlorothiazide (HYDRODIURIL) 25 MG tablet Take 1 tablet by mouth daily  Qty: 30 tablet, Refills: 0      ferrous sulfate 325 (65 Fe) MG tablet Take 1 tablet by mouth 2 times daily (with meals)  Qty: 60 tablet, Refills: 0              Details   lisinopril (PRINIVIL;ZESTRIL) 20 MG tablet Take 1 tablet by mouth 2 times daily  Qty: 60 tablet, Refills: 0              Details   Glucosamine 500 MG CAPS Take 500 mg by mouth      clopidogrel (PLAVIX) 75 MG tablet Take 1 tablet by mouth daily  Qty: 30 tablet, Refills: 1      levothyroxine (SYNTHROID) 50 MCG tablet Take 50 mcg by mouth Daily      metoprolol (LOPRESSOR) 50 MG tablet Take 1 tablet by mouth 2 times daily  Qty: 60 tablet, Refills: 3      Probiotic Product (PROBIOTIC ADVANCED PO) Take by mouth daily      Biotin 5000 MCG TABS Take by mouth daily      levalbuterol (XOPENEX) 0.31 MG/3ML nebulization Take 1 ampule by nebulization every 8 hours as needed for Wheezing      atorvastatin (LIPITOR) 40 MG tablet Take 40 mg by mouth daily      aspirin EC 81 MG EC tablet Take 81 mg by mouth daily.              Current Facility-Administered Medications   Medication Dose Route Frequency Provider Last Rate Last Dose    hydrALAZINE (APRESOLINE) tablet 50 mg  50 mg Oral 3 times per day Nuris Toledo MD   50 mg at 01/22/18 0610    lisinopril (PRINIVIL;ZESTRIL) tablet 20 mg  20 mg Oral BID Nuris Toledo MD   20 mg at 01/22/18 0756    hydrochlorothiazide (HYDRODIURIL) tablet 25 mg  25 mg Oral Daily Pedro Murphy DO   25 mg at to contact me at [phone]

## 2018-01-22 NOTE — CARE COORDINATION
Pt intends to dc home with her spouse and refuses any further rehab services. Per PT notes, pt ambulated 350' with supervision only and no device. Pt does require home 02 temporarily and agrees to [facility] as the provider. KAYLEY faxed referral to [employer] and requested equipment to be delivered to [facility] for pt dc.      [facility] Ph: [phone]  Fa: [phone]

## 2018-01-22 NOTE — PROGRESS NOTES
18 1123   Subjective   Subjective Patient up in chair agrees to walk   Pain Screening   Patient Currently in Pain No   Oxygen Therapy   SpO2 96 %   O2 Device Nasal cannula   O2 Flow Rate (L/min) 2 L/min   Orientation   Overall Orientation Status WNL   Transfers   Sit to Stand Independent   Stand to sit Independent   Ambulation   Ambulation? Yes   Ambulation 1   Surface level tile   Device No Device   Other Apparatus O2   Assistance Supervision   Quality of Gait good pace and steady   Distance 350'   Comments Patient in chair post gait   Balance   Posture Good   Sitting - Static Good   Sitting - Dynamic Good   Standing - Static Good   Standing - Dynamic Good   Short term goals   Time Frame for Short term goals 14 DAYS   Short term goal 1 BED MOB INDEPENDENT   Short term goal 2 TRANSFERS INDEPENDENT   Short term goal 3 ' INDEPENDENT   Conditions Requiring Skilled Therapeutic Intervention   Body structures, Functions, Activity limitations Decreased functional mobility ; Decreased balance   Safety Devices   Type of devices Left in chair;Call light within reach   Physical Therapy  Facility/Department: [facility] PROGRESSIVE CARE  Daily Treatment Note  NAME: Kristen Farrell  : 1945  MRN: [id number]    Date of Service: 2018    Patient Diagnosis(es):   Patient Active Problem List    Diagnosis Date Noted    Bradycardia      Priority: High    Acute superficial venous thrombosis of right lower extremity 2016     Priority: High    Severe mitral stenosis by prior echocardiogram 2016     Priority: High    Severe mitral regurgitation by prior echocardiogram 2016     Priority: High    Elevated troponin 2016     Priority: Medium    PUD (peptic ulcer disease) 2016     Priority: Medium    Atherosclerosis of native artery of extremity with intermittent claudication ([facility] 2011     Priority: Low    Benign essential HTN     NSTEMI (non-ST elevated myocardial infarction)

## 2018-02-01 ENCOUNTER — OFFICE VISIT (OUTPATIENT)
Dept: VASCULAR SURGERY | Age: 73
End: 2018-02-01

## 2018-02-01 VITALS
HEART RATE: 87 BPM | DIASTOLIC BLOOD PRESSURE: 62 MMHG | TEMPERATURE: 97.1 F | RESPIRATION RATE: 18 BRPM | SYSTOLIC BLOOD PRESSURE: 120 MMHG

## 2018-02-01 DIAGNOSIS — I65.23 BILATERAL CAROTID ARTERY STENOSIS: Primary | ICD-10-CM

## 2018-02-01 PROCEDURE — 99024 POSTOP FOLLOW-UP VISIT: CPT | Performed by: PHYSICIAN ASSISTANT

## 2018-02-19 ENCOUNTER — OFFICE VISIT (OUTPATIENT)
Dept: CARDIOLOGY | Age: 73
End: 2018-02-19
Payer: MEDICARE

## 2018-02-19 VITALS
HEIGHT: 63 IN | HEART RATE: 98 BPM | BODY MASS INDEX: 16.48 KG/M2 | SYSTOLIC BLOOD PRESSURE: 120 MMHG | WEIGHT: 93 LBS | DIASTOLIC BLOOD PRESSURE: 60 MMHG

## 2018-02-19 DIAGNOSIS — N18.30 CKD (CHRONIC KIDNEY DISEASE), STAGE III (HCC): ICD-10-CM

## 2018-02-19 DIAGNOSIS — T82.03XD PROSTHETIC MITRAL VALVE REGURGITATION, SUBSEQUENT ENCOUNTER: ICD-10-CM

## 2018-02-19 DIAGNOSIS — I05.0 MILD MITRAL STENOSIS: ICD-10-CM

## 2018-02-19 DIAGNOSIS — I35.0 MILD AORTIC STENOSIS: ICD-10-CM

## 2018-02-19 DIAGNOSIS — I27.20 PULMONARY HYPERTENSION (HCC): ICD-10-CM

## 2018-02-19 DIAGNOSIS — I10 ESSENTIAL HYPERTENSION: ICD-10-CM

## 2018-02-19 DIAGNOSIS — I51.89 DIASTOLIC DYSFUNCTION: ICD-10-CM

## 2018-02-19 DIAGNOSIS — I50.22 CHRONIC SYSTOLIC CONGESTIVE HEART FAILURE (HCC): Primary | ICD-10-CM

## 2018-02-19 PROCEDURE — 1111F DSCHRG MED/CURRENT MED MERGE: CPT | Performed by: NURSE PRACTITIONER

## 2018-02-19 PROCEDURE — G8427 DOCREV CUR MEDS BY ELIG CLIN: HCPCS | Performed by: NURSE PRACTITIONER

## 2018-02-19 PROCEDURE — G8419 CALC BMI OUT NRM PARAM NOF/U: HCPCS | Performed by: NURSE PRACTITIONER

## 2018-02-19 PROCEDURE — 93000 ELECTROCARDIOGRAM COMPLETE: CPT | Performed by: NURSE PRACTITIONER

## 2018-02-19 PROCEDURE — G8484 FLU IMMUNIZE NO ADMIN: HCPCS | Performed by: NURSE PRACTITIONER

## 2018-02-19 PROCEDURE — 1123F ACP DISCUSS/DSCN MKR DOCD: CPT | Performed by: NURSE PRACTITIONER

## 2018-02-19 PROCEDURE — 1090F PRES/ABSN URINE INCON ASSESS: CPT | Performed by: NURSE PRACTITIONER

## 2018-02-19 PROCEDURE — G8400 PT W/DXA NO RESULTS DOC: HCPCS | Performed by: NURSE PRACTITIONER

## 2018-02-19 PROCEDURE — 1036F TOBACCO NON-USER: CPT | Performed by: NURSE PRACTITIONER

## 2018-02-19 PROCEDURE — 3017F COLORECTAL CA SCREEN DOC REV: CPT | Performed by: NURSE PRACTITIONER

## 2018-02-19 PROCEDURE — 99214 OFFICE O/P EST MOD 30 MIN: CPT | Performed by: NURSE PRACTITIONER

## 2018-02-19 PROCEDURE — 3014F SCREEN MAMMO DOC REV: CPT | Performed by: NURSE PRACTITIONER

## 2018-02-19 PROCEDURE — G8598 ASA/ANTIPLAT THER USED: HCPCS | Performed by: NURSE PRACTITIONER

## 2018-02-19 PROCEDURE — 4040F PNEUMOC VAC/ADMIN/RCVD: CPT | Performed by: NURSE PRACTITIONER

## 2018-02-19 RX ORDER — METOPROLOL TARTRATE 50 MG/1
75 TABLET, FILM COATED ORAL 2 TIMES DAILY
Qty: 60 TABLET | Refills: 3 | Status: SHIPPED | OUTPATIENT
Start: 2018-02-19 | End: 2018-03-19 | Stop reason: DRUGHIGH

## 2018-02-19 RX ORDER — LISINOPRIL 20 MG/1
10 TABLET ORAL 2 TIMES DAILY
Qty: 60 TABLET | Refills: 0 | Status: SHIPPED | OUTPATIENT
Start: 2018-02-19 | End: 2018-03-19 | Stop reason: DRUGHIGH

## 2018-02-19 RX ORDER — HYDRALAZINE HYDROCHLORIDE 50 MG/1
50 TABLET, FILM COATED ORAL 3 TIMES DAILY
COMMUNITY
End: 2018-02-19

## 2018-02-19 RX ORDER — HYDROCHLOROTHIAZIDE 25 MG/1
25 TABLET ORAL DAILY
Qty: 30 TABLET | Refills: 2 | Status: SHIPPED | OUTPATIENT
Start: 2018-02-19 | End: 2018-04-26 | Stop reason: SDUPTHER

## 2018-02-19 NOTE — PROGRESS NOTES
Dear Alfonso Donald MD & Rachel Fischer MD,    Thank you for allowing me to participate in the care of Ms. Juliette Vilchis. She presents today at the [address] [place] [address] in the [facility]. As you know, Ms. Simona Diaz is a 67 y.o. female with history of hypertension, hyperlipidemia, renal failure, PUD, Mitral Valve stenosis s/p MVR, carotid artery stenosis s/p CEA, aortic stenosis, iron def anemia, restrictive lung disease, diastolic dysfunction, s/p Hicks Maze , NSTEMI and CAD s/p 1V CABG who presents with the chief complaint of hospital follow up. She is a patient of Dr. Anjelica Ho. She was hospitalized from 1/13/18-1/22/18 and treated for sepsis with organ dysfunction, HLD, Pulmonary Hypertension, Diastolic dysfunction, NSTEMI, HCAP, Acute renal failure, HTN, and syncope. Prior to this last hospitalization she underwent a L CEA and then ended up having 2 evacuations of hematomas post procedure. She presented back with complaints of fatigue, weakness, and possibly syncopal event. She was felt to be be in acute on chronic diastolic HF and was diuresed. She has known RLL pleural effusion she has dealt with for 2 years. Her Echo showed EF 45%, severely thickened and depressed mobility of the mitral valve leaflets, and severely thickened aortic valve leaflets. She was placed on hydralazine and nifedipine for which she stopped on her own a week ago. She was getting dizzy and light headed. Her BP has been running 100/40 prior to stopping the medications. Her BP last night was 93/43. Her HR has been running >100. She takes the Lasix now PRN and has not had any swelling. She is weak and was told her hemoglobin was still low. She was advised per Dr. Stephanie frazier to stop Plavix as she was having constant nose bleeds. She has had a \"twinge\" or cramp of chest pain that lasts only a few seconds when moving. No associated symptoms.      She otherwise denies SOA, JONES, PND, UTERUS      ILIO-FEMORAL BYPASS GRAFT N/A 6/2/2016    OPEN TRANSLUMINAL BALLOON ANGIOPLASTY AND STENTING OF RIGHT COMMON AND EXTERNAL  ILIAC ARTERIES; RIGHT FEMORAL ENDARTERECTOMY WITH VEIN PATCH ANGIOPLASTY performed by Drew Michael MD at [address] N/A 4/7/2016    MITRAL VALVE  REPLACEMENT ULONG-MAZE ABLATION WITH CRYO PROCEDURE, CORONARY ARTERY BYPASS GRAFT X 1 WITH ENDOSCOPIC VEIN HARVESTING WITH PERFUSION TRANSESOPHAGEAL ECHOCARDIOGRAM performed by Maria Dolores Kothari MD at [address]      [place] REOPER, CAROTID ENDARTEC>1 MON Left 1/11/2018    REMOVAL OF HEMATOMA, LEFT CAROTID ARTERY performed by Drew Michael MD at [address] Left 1/11/2018    LEFT CAROTID ENDARTERECTOMY WITH EEG MONITORING AND COMPLETION DUPLEX ULTRASOUND performed by Drew Michael MD at [facility]      SKIN GRAFT Right 7/22/2016    Skin graft split thickness foot,ankle,and leg. Right leg 26x8cm and 12x6cm total area 280cm squared. TJR    UPPER GASTROINTESTINAL ENDOSCOPY  3/15/16    Dr Derek Boothe  3/15/2016    EGD BIOPSY performed by Micheal Harris DO at [facility]    VASCULAR SURGERY  5/27/16 TJR    Aortagram and right leg runoff,right leg runoff,right common iliac artery selection for right leg run off views.  VASCULAR SURGERY      . Open transluminal angioplasty and stenting of the external iliac artery. TJR       Family History   Problem Relation Age of Onset    Diabetes Mother     Heart Disease Mother     Heart Failure Mother     Diabetes Sister     Heart Disease Sister     High Blood Pressure Sister        Social History     Social History    Marital status:      Spouse name: N/A    Number of children: N/A    Years of education: N/A     Occupational History    Not on file.      Social History Main Topics    Smoking status: Former Smoker     Years: 2.00     Quit date: 1/1/1980    Smokeless

## 2018-02-20 PROBLEM — I35.0 MILD AORTIC STENOSIS: Status: ACTIVE | Noted: 2018-02-20

## 2018-03-19 ENCOUNTER — OFFICE VISIT (OUTPATIENT)
Dept: CARDIOLOGY | Age: 73
End: 2018-03-19
Payer: MEDICARE

## 2018-03-19 VITALS
HEART RATE: 84 BPM | HEIGHT: 63 IN | DIASTOLIC BLOOD PRESSURE: 58 MMHG | SYSTOLIC BLOOD PRESSURE: 108 MMHG | BODY MASS INDEX: 16.83 KG/M2 | WEIGHT: 95 LBS

## 2018-03-19 DIAGNOSIS — I51.89 DIASTOLIC DYSFUNCTION: ICD-10-CM

## 2018-03-19 DIAGNOSIS — I05.0 MILD MITRAL STENOSIS: Primary | ICD-10-CM

## 2018-03-19 DIAGNOSIS — I10 ESSENTIAL HYPERTENSION: ICD-10-CM

## 2018-03-19 DIAGNOSIS — I25.10 CORONARY ARTERY DISEASE INVOLVING NATIVE CORONARY ARTERY OF NATIVE HEART WITHOUT ANGINA PECTORIS: ICD-10-CM

## 2018-03-19 PROCEDURE — 99213 OFFICE O/P EST LOW 20 MIN: CPT | Performed by: NURSE PRACTITIONER

## 2018-03-19 PROCEDURE — G8598 ASA/ANTIPLAT THER USED: HCPCS | Performed by: NURSE PRACTITIONER

## 2018-03-19 PROCEDURE — G8484 FLU IMMUNIZE NO ADMIN: HCPCS | Performed by: NURSE PRACTITIONER

## 2018-03-19 PROCEDURE — 1123F ACP DISCUSS/DSCN MKR DOCD: CPT | Performed by: NURSE PRACTITIONER

## 2018-03-19 PROCEDURE — 1036F TOBACCO NON-USER: CPT | Performed by: NURSE PRACTITIONER

## 2018-03-19 PROCEDURE — 93000 ELECTROCARDIOGRAM COMPLETE: CPT | Performed by: NURSE PRACTITIONER

## 2018-03-19 PROCEDURE — G8419 CALC BMI OUT NRM PARAM NOF/U: HCPCS | Performed by: NURSE PRACTITIONER

## 2018-03-19 PROCEDURE — G8400 PT W/DXA NO RESULTS DOC: HCPCS | Performed by: NURSE PRACTITIONER

## 2018-03-19 PROCEDURE — 3017F COLORECTAL CA SCREEN DOC REV: CPT | Performed by: NURSE PRACTITIONER

## 2018-03-19 PROCEDURE — 3014F SCREEN MAMMO DOC REV: CPT | Performed by: NURSE PRACTITIONER

## 2018-03-19 PROCEDURE — 4040F PNEUMOC VAC/ADMIN/RCVD: CPT | Performed by: NURSE PRACTITIONER

## 2018-03-19 PROCEDURE — G8427 DOCREV CUR MEDS BY ELIG CLIN: HCPCS | Performed by: NURSE PRACTITIONER

## 2018-03-19 PROCEDURE — 1090F PRES/ABSN URINE INCON ASSESS: CPT | Performed by: NURSE PRACTITIONER

## 2018-03-19 RX ORDER — LISINOPRIL 5 MG/1
5 TABLET ORAL 2 TIMES DAILY
Qty: 60 TABLET | Refills: 3 | Status: SHIPPED | OUTPATIENT
Start: 2018-03-19 | End: 2018-06-20 | Stop reason: SDUPTHER

## 2018-03-19 RX ORDER — METOPROLOL TARTRATE 100 MG/1
100 TABLET ORAL 2 TIMES DAILY
Qty: 60 TABLET | Refills: 3 | Status: SHIPPED | OUTPATIENT
Start: 2018-03-19 | End: 2018-06-20 | Stop reason: SDUPTHER

## 2018-04-18 ENCOUNTER — OFFICE VISIT (OUTPATIENT)
Dept: CARDIOLOGY | Age: 73
End: 2018-04-18
Payer: MEDICARE

## 2018-04-18 VITALS
SYSTOLIC BLOOD PRESSURE: 122 MMHG | BODY MASS INDEX: 16.66 KG/M2 | WEIGHT: 94 LBS | HEART RATE: 68 BPM | HEIGHT: 63 IN | DIASTOLIC BLOOD PRESSURE: 54 MMHG

## 2018-04-18 DIAGNOSIS — I05.0 MILD MITRAL STENOSIS: Primary | ICD-10-CM

## 2018-04-18 PROCEDURE — 1090F PRES/ABSN URINE INCON ASSESS: CPT | Performed by: INTERNAL MEDICINE

## 2018-04-18 PROCEDURE — G8400 PT W/DXA NO RESULTS DOC: HCPCS | Performed by: INTERNAL MEDICINE

## 2018-04-18 PROCEDURE — 99214 OFFICE O/P EST MOD 30 MIN: CPT | Performed by: INTERNAL MEDICINE

## 2018-04-18 PROCEDURE — G8419 CALC BMI OUT NRM PARAM NOF/U: HCPCS | Performed by: INTERNAL MEDICINE

## 2018-04-18 PROCEDURE — 3014F SCREEN MAMMO DOC REV: CPT | Performed by: INTERNAL MEDICINE

## 2018-04-18 PROCEDURE — 4040F PNEUMOC VAC/ADMIN/RCVD: CPT | Performed by: INTERNAL MEDICINE

## 2018-04-18 PROCEDURE — 93000 ELECTROCARDIOGRAM COMPLETE: CPT | Performed by: INTERNAL MEDICINE

## 2018-04-18 PROCEDURE — 3017F COLORECTAL CA SCREEN DOC REV: CPT | Performed by: INTERNAL MEDICINE

## 2018-04-18 PROCEDURE — 1123F ACP DISCUSS/DSCN MKR DOCD: CPT | Performed by: INTERNAL MEDICINE

## 2018-04-18 PROCEDURE — G8598 ASA/ANTIPLAT THER USED: HCPCS | Performed by: INTERNAL MEDICINE

## 2018-04-18 PROCEDURE — 1036F TOBACCO NON-USER: CPT | Performed by: INTERNAL MEDICINE

## 2018-04-18 PROCEDURE — G8427 DOCREV CUR MEDS BY ELIG CLIN: HCPCS | Performed by: INTERNAL MEDICINE

## 2018-04-18 ASSESSMENT — ENCOUNTER SYMPTOMS: SHORTNESS OF BREATH: 0

## 2018-04-26 RX ORDER — HYDROCHLOROTHIAZIDE 25 MG/1
TABLET ORAL
Qty: 90 TABLET | Refills: 3 | Status: SHIPPED | OUTPATIENT
Start: 2018-04-26 | End: 2019-05-22 | Stop reason: SDUPTHER

## 2018-05-07 ENCOUNTER — HOSPITAL ENCOUNTER (OUTPATIENT)
Dept: NON INVASIVE DIAGNOSTICS | Age: 73
Discharge: HOME | End: 2018-05-07
Payer: MEDICARE

## 2018-05-07 ENCOUNTER — OFFICE VISIT (OUTPATIENT)
Dept: VASCULAR SURGERY | Age: 73
End: 2018-05-07
Payer: MEDICARE

## 2018-05-07 VITALS
BODY MASS INDEX: 17.72 KG/M2 | HEART RATE: 88 BPM | SYSTOLIC BLOOD PRESSURE: 138 MMHG | DIASTOLIC BLOOD PRESSURE: 84 MMHG | HEIGHT: 63 IN | RESPIRATION RATE: 18 BRPM | WEIGHT: 100 LBS

## 2018-05-07 DIAGNOSIS — I65.23 BILATERAL CAROTID ARTERY STENOSIS: Primary | ICD-10-CM

## 2018-05-07 DIAGNOSIS — I65.23 BILATERAL CAROTID ARTERY STENOSIS: ICD-10-CM

## 2018-05-07 PROCEDURE — 93880 EXTRACRANIAL BILAT STUDY: CPT

## 2018-05-07 PROCEDURE — 1036F TOBACCO NON-USER: CPT | Performed by: PHYSICIAN ASSISTANT

## 2018-05-07 PROCEDURE — 3017F COLORECTAL CA SCREEN DOC REV: CPT | Performed by: PHYSICIAN ASSISTANT

## 2018-05-07 PROCEDURE — 1123F ACP DISCUSS/DSCN MKR DOCD: CPT | Performed by: PHYSICIAN ASSISTANT

## 2018-05-07 PROCEDURE — 4040F PNEUMOC VAC/ADMIN/RCVD: CPT | Performed by: PHYSICIAN ASSISTANT

## 2018-05-07 PROCEDURE — 1090F PRES/ABSN URINE INCON ASSESS: CPT | Performed by: PHYSICIAN ASSISTANT

## 2018-05-07 PROCEDURE — G8418 CALC BMI BLW LOW PARAM F/U: HCPCS | Performed by: PHYSICIAN ASSISTANT

## 2018-05-07 PROCEDURE — G8598 ASA/ANTIPLAT THER USED: HCPCS | Performed by: PHYSICIAN ASSISTANT

## 2018-05-07 PROCEDURE — G8400 PT W/DXA NO RESULTS DOC: HCPCS | Performed by: PHYSICIAN ASSISTANT

## 2018-05-07 PROCEDURE — G8427 DOCREV CUR MEDS BY ELIG CLIN: HCPCS | Performed by: PHYSICIAN ASSISTANT

## 2018-05-07 PROCEDURE — 99213 OFFICE O/P EST LOW 20 MIN: CPT | Performed by: PHYSICIAN ASSISTANT

## 2018-06-20 RX ORDER — METOPROLOL TARTRATE 100 MG/1
100 TABLET ORAL 2 TIMES DAILY
Qty: 60 TABLET | Refills: 5 | Status: SHIPPED | OUTPATIENT
Start: 2018-06-20 | End: 2018-11-19 | Stop reason: SDUPTHER

## 2018-06-20 RX ORDER — LISINOPRIL 5 MG/1
5 TABLET ORAL 2 TIMES DAILY
Qty: 60 TABLET | Refills: 5 | Status: SHIPPED | OUTPATIENT
Start: 2018-06-20 | End: 2018-11-19 | Stop reason: SDUPTHER

## 2018-08-22 ENCOUNTER — OFFICE VISIT (OUTPATIENT)
Dept: CARDIOLOGY | Age: 73
End: 2018-08-22
Payer: MEDICARE

## 2018-08-22 VITALS
DIASTOLIC BLOOD PRESSURE: 70 MMHG | HEART RATE: 78 BPM | HEIGHT: 64 IN | WEIGHT: 99 LBS | SYSTOLIC BLOOD PRESSURE: 120 MMHG | RESPIRATION RATE: 18 BRPM | BODY MASS INDEX: 16.9 KG/M2

## 2018-08-22 DIAGNOSIS — I34.0 SEVERE MITRAL REGURGITATION BY PRIOR ECHOCARDIOGRAM: Primary | ICD-10-CM

## 2018-08-22 DIAGNOSIS — I35.0 MILD AORTIC STENOSIS: ICD-10-CM

## 2018-08-22 DIAGNOSIS — I10 BENIGN ESSENTIAL HTN: ICD-10-CM

## 2018-08-22 DIAGNOSIS — I05.0 MILD MITRAL STENOSIS: ICD-10-CM

## 2018-08-22 PROCEDURE — 3017F COLORECTAL CA SCREEN DOC REV: CPT | Performed by: INTERNAL MEDICINE

## 2018-08-22 PROCEDURE — G8400 PT W/DXA NO RESULTS DOC: HCPCS | Performed by: INTERNAL MEDICINE

## 2018-08-22 PROCEDURE — 1090F PRES/ABSN URINE INCON ASSESS: CPT | Performed by: INTERNAL MEDICINE

## 2018-08-22 PROCEDURE — 93000 ELECTROCARDIOGRAM COMPLETE: CPT | Performed by: INTERNAL MEDICINE

## 2018-08-22 PROCEDURE — 1036F TOBACCO NON-USER: CPT | Performed by: INTERNAL MEDICINE

## 2018-08-22 PROCEDURE — G8419 CALC BMI OUT NRM PARAM NOF/U: HCPCS | Performed by: INTERNAL MEDICINE

## 2018-08-22 PROCEDURE — 1123F ACP DISCUSS/DSCN MKR DOCD: CPT | Performed by: INTERNAL MEDICINE

## 2018-08-22 PROCEDURE — 99214 OFFICE O/P EST MOD 30 MIN: CPT | Performed by: INTERNAL MEDICINE

## 2018-08-22 PROCEDURE — 4040F PNEUMOC VAC/ADMIN/RCVD: CPT | Performed by: INTERNAL MEDICINE

## 2018-08-22 PROCEDURE — 1101F PT FALLS ASSESS-DOCD LE1/YR: CPT | Performed by: INTERNAL MEDICINE

## 2018-08-22 PROCEDURE — G8598 ASA/ANTIPLAT THER USED: HCPCS | Performed by: INTERNAL MEDICINE

## 2018-08-22 PROCEDURE — G8427 DOCREV CUR MEDS BY ELIG CLIN: HCPCS | Performed by: INTERNAL MEDICINE

## 2018-08-22 NOTE — PROGRESS NOTES
Dear Dr. Lakhwinder Griffin MD,    Thank you for allowing me to participate in the care of Ms. Haresh Birmingham. She presents today at the [address] [place] [address] in the [facility] with her daughter. As you know, Ms. Rob Aguirre is a 67 y.o. female with history of hypertension, hyperlipidemia,coronary artery disease s/p MI and CABG ( 1V PDA), grade 3 diastolic dysfunction and MVR ( 23 mm [employer] Mosaic)  who presents with the chief complaint of chronic cardiac issues. Since last being seen, she is done well. She's had an active summer mowing her yard and traveling. She was able to walk around [facility] access hospital without any limitations. She's been compliant with her medications and her blood pressures been controlled. She denies chest pain, palpitations, PND, orthopnea, near-syncope, or syncope. She has no other complaints. Review of Systems   Constitutional: Negative for malaise/fatigue. Respiratory: Negative for shortness of breath. Cardiovascular: Negative for chest pain. Neurological: Negative for weakness. All other systems reviewed and are negative.        Past Medical History:   Diagnosis Date    Aortic stenosis     Arthritis     Atherosclerosis of native arteries of the extremities with intermittent claudication 7/25/2011    CAD (coronary artery disease)     Carotid artery occlusion     CHF (congestive heart failure) ([address]     History of blood transfusion 2016    Post op, was taking blood thinner    Hyperlipidemia     Hypertension     MI (myocardial infarction) ([address] 2009    x2    Mitral valve stenosis     PUD (peptic ulcer disease) 2009    Renal failure 2009    Wound infection after surgery     right foot       Past Surgical History:   Procedure Laterality Date    ABDOMEN SURGERY  2009    perforated ulcer resection of 1/3 stomach    CARDIAC SURGERY      artificial valve and bypass    CAROTID ENDARTERECTOMY      Right  with Dacron patch angioplasty normal pulses  Resp - Normal effort, Clear to auscultation bilaterally  GI - abdomen soft, non-tender, non-distended  Skin - warm and dry  Neuro - No focal defect      Lab Results   Component Value Date    CREATININE 1.0 01/22/2018    CREATININE 1.2 01/21/2018    CREATININE 1.1 01/20/2018    CREATININE 1.1 05/03/2012    HGB 9.7 01/22/2018    HGB 9.6 01/21/2018    HGB 9.8 01/20/2018    PROBNP 6,493 06/06/2016    PROBNP 6,039 06/06/2016    PROBNP 8,980 06/05/2016       ECG 8/22/18   Sinus rhythm, occasional PAC     TTE May 2016  1. Concentric LVH  2. Normal LV systolic function (estimated EF, 55-60%)  3. Grade 3 diastolic dysfunction  4. Normal functioning bioprosthetic valve in the mitral position. 5. Echolucency near preserved posterior mitral leaflet, likely chordae  6. Large pleural effusion  7. Moderate tricuspid regurgitation; estimated RVSP of at least 72 mmHg    TTE 1/18   The mitral valve leaflets are severely thickened with depressed mobility.   There appears to be at least moderate mitral stenosis   Aortic valve leaflets are severely thickened.   Left ventricular ejection fraction is visually estimated at 45%. Cath April 2016  1. Coronary angiography. This is the right dominant system. The patient had previously undergone stent placement of the right coronary artery. 2. The left main coronary artery bifurcates into the LAD and left circumflex coronary arteries without obstruction. 3. The LAD was a relatively large vessel giving rise to 2 moderate sized diagonal branches. The LAD proper contained mild plaque. The 1st diagonal branch contained tandem 60% and 50% stenosis proximally. The 2nd diagonal branch contained a 70% ostial stenosis. 4. The left circumflex coronary artery gave rise to total 4 obtuse marginal branches. The 1st 2 were very small. The 3rd and 4th were both moderate. There was moderate plaque. No high-grade obstruction of the left circumflex  distribution.   5. The right coronary was

## 2018-09-06 ENCOUNTER — APPOINTMENT (OUTPATIENT)
Dept: LAB | Facility: HOSPITAL | Age: 73
End: 2018-09-06
Attending: INTERNAL MEDICINE

## 2018-09-06 ENCOUNTER — TRANSCRIBE ORDERS (OUTPATIENT)
Dept: ADMINISTRATIVE | Facility: HOSPITAL | Age: 73
End: 2018-09-06

## 2018-09-06 DIAGNOSIS — I73.9 PERIPHERAL VASCULAR DISEASE (HCC): ICD-10-CM

## 2018-09-06 DIAGNOSIS — E03.9 HYPOTHYROIDISM, ADULT: ICD-10-CM

## 2018-09-06 DIAGNOSIS — E63.8 NUTRITION DEFICIENCY DUE TO A PARTICULAR KIND OF FOOD: ICD-10-CM

## 2018-09-06 DIAGNOSIS — N18.30 CHRONIC KIDNEY DISEASE, STAGE III (MODERATE) (HCC): ICD-10-CM

## 2018-09-06 DIAGNOSIS — Z00.00 ENCOUNTER FOR GENERAL ADULT MEDICAL EXAMINATION W/O ABNORMAL FINDINGS: ICD-10-CM

## 2018-09-06 DIAGNOSIS — I25.10 CVD (CARDIOVASCULAR DISEASE): ICD-10-CM

## 2018-09-06 DIAGNOSIS — I65.21 OCCLUSION AND STENOSIS OF RIGHT CAROTID ARTERY: ICD-10-CM

## 2018-09-06 DIAGNOSIS — I12.9 HYPERTENSIVE CHRONIC KIDNEY DISEASE W STG 1-4/UNSP CHR KDNY: Primary | ICD-10-CM

## 2018-09-06 DIAGNOSIS — I34.9 NONRHEUMATIC MITRAL VALVE DISORDER: ICD-10-CM

## 2018-09-06 DIAGNOSIS — I65.22 OCCLUSION AND STENOSIS OF LEFT CAROTID ARTERY: ICD-10-CM

## 2018-09-06 LAB
ALBUMIN SERPL-MCNC: 4.6 G/DL (ref 3.5–5)
ALBUMIN/GLOB SERPL: 1.2 G/DL (ref 1.1–2.5)
ALP SERPL-CCNC: 105 U/L (ref 24–120)
ALT SERPL W P-5'-P-CCNC: 21 U/L (ref 0–54)
ANION GAP SERPL CALCULATED.3IONS-SCNC: 10 MMOL/L (ref 4–13)
ARTICHOKE IGE QN: 111 MG/DL (ref 0–99)
AST SERPL-CCNC: 40 U/L (ref 7–45)
BACTERIA UR QL AUTO: ABNORMAL /HPF
BASOPHILS # BLD AUTO: 0.1 10*3/MM3 (ref 0–0.2)
BASOPHILS NFR BLD AUTO: 1 % (ref 0–2)
BILIRUB SERPL-MCNC: 0.7 MG/DL (ref 0.1–1)
BILIRUB UR QL STRIP: NEGATIVE
BUN BLD-MCNC: 29 MG/DL (ref 5–21)
BUN/CREAT SERPL: 24.2 (ref 7–25)
CALCIUM SPEC-SCNC: 10.1 MG/DL (ref 8.4–10.4)
CHLORIDE SERPL-SCNC: 97 MMOL/L (ref 98–110)
CHOLEST SERPL-MCNC: 214 MG/DL (ref 130–200)
CLARITY UR: CLEAR
CO2 SERPL-SCNC: 31 MMOL/L (ref 24–31)
COLOR UR: YELLOW
CREAT BLD-MCNC: 1.2 MG/DL (ref 0.5–1.4)
DEPRECATED RDW RBC AUTO: 49.4 FL (ref 40–54)
EOSINOPHIL # BLD AUTO: 0.55 10*3/MM3 (ref 0–0.7)
EOSINOPHIL NFR BLD AUTO: 5.4 % (ref 0–4)
ERYTHROCYTE [DISTWIDTH] IN BLOOD BY AUTOMATED COUNT: 16.6 % (ref 12–15)
GFR SERPL CREATININE-BSD FRML MDRD: 44 ML/MIN/1.73
GLOBULIN UR ELPH-MCNC: 4 GM/DL
GLUCOSE BLD-MCNC: 87 MG/DL (ref 70–100)
GLUCOSE UR STRIP-MCNC: NEGATIVE MG/DL
HCT VFR BLD AUTO: 40.7 % (ref 37–47)
HDLC SERPL-MCNC: 61 MG/DL
HGB BLD-MCNC: 12.9 G/DL (ref 12–16)
HGB UR QL STRIP.AUTO: ABNORMAL
HYALINE CASTS UR QL AUTO: ABNORMAL /LPF
IMM GRANULOCYTES # BLD: 0.04 10*3/MM3 (ref 0–0.03)
IMM GRANULOCYTES NFR BLD: 0.4 % (ref 0–5)
KETONES UR QL STRIP: NEGATIVE
LDLC/HDLC SERPL: 2.18 {RATIO}
LEUKOCYTE ESTERASE UR QL STRIP.AUTO: ABNORMAL
LYMPHOCYTES # BLD AUTO: 1.58 10*3/MM3 (ref 0.72–4.86)
LYMPHOCYTES NFR BLD AUTO: 15.5 % (ref 15–45)
MCH RBC QN AUTO: 26.2 PG (ref 28–32)
MCHC RBC AUTO-ENTMCNC: 31.7 G/DL (ref 33–36)
MCV RBC AUTO: 82.7 FL (ref 82–98)
MONOCYTES # BLD AUTO: 1.38 10*3/MM3 (ref 0.19–1.3)
MONOCYTES NFR BLD AUTO: 13.6 % (ref 4–12)
NEUTROPHILS # BLD AUTO: 6.53 10*3/MM3 (ref 1.87–8.4)
NEUTROPHILS NFR BLD AUTO: 64.1 % (ref 39–78)
NITRITE UR QL STRIP: NEGATIVE
NRBC BLD MANUAL-RTO: 0 /100 WBC (ref 0–0)
PH UR STRIP.AUTO: 7 [PH] (ref 5–8)
PLATELET # BLD AUTO: 323 10*3/MM3 (ref 130–400)
PMV BLD AUTO: 9.7 FL (ref 6–12)
POTASSIUM BLD-SCNC: 4.3 MMOL/L (ref 3.5–5.3)
PROT SERPL-MCNC: 8.6 G/DL (ref 6.3–8.7)
PROT UR QL STRIP: NEGATIVE
RBC # BLD AUTO: 4.92 10*6/MM3 (ref 4.2–5.4)
RBC # UR: ABNORMAL /HPF
REF LAB TEST METHOD: ABNORMAL
SODIUM BLD-SCNC: 138 MMOL/L (ref 135–145)
SP GR UR STRIP: 1.01 (ref 1–1.03)
SQUAMOUS #/AREA URNS HPF: ABNORMAL /HPF
T4 FREE SERPL-MCNC: 2.19 NG/DL (ref 0.78–2.19)
TRIGL SERPL-MCNC: 100 MG/DL (ref 0–149)
TSH SERPL DL<=0.05 MIU/L-ACNC: 0.13 MIU/ML (ref 0.47–4.68)
UROBILINOGEN UR QL STRIP: ABNORMAL
WBC NRBC COR # BLD: 10.18 10*3/MM3 (ref 4.8–10.8)
WBC UR QL AUTO: ABNORMAL /HPF

## 2018-09-06 PROCEDURE — 80053 COMPREHEN METABOLIC PANEL: CPT | Performed by: INTERNAL MEDICINE

## 2018-09-06 PROCEDURE — 81001 URINALYSIS AUTO W/SCOPE: CPT | Performed by: INTERNAL MEDICINE

## 2018-09-06 PROCEDURE — 36415 COLL VENOUS BLD VENIPUNCTURE: CPT

## 2018-09-06 PROCEDURE — 84443 ASSAY THYROID STIM HORMONE: CPT | Performed by: INTERNAL MEDICINE

## 2018-09-06 PROCEDURE — 84439 ASSAY OF FREE THYROXINE: CPT | Performed by: INTERNAL MEDICINE

## 2018-09-06 PROCEDURE — 80061 LIPID PANEL: CPT | Performed by: INTERNAL MEDICINE

## 2018-09-06 PROCEDURE — 85025 COMPLETE CBC W/AUTO DIFF WBC: CPT | Performed by: INTERNAL MEDICINE

## 2018-10-18 RX ORDER — GLUCOSAMINE SULFATE 500 MG
500 CAPSULE ORAL
COMMUNITY
End: 2019-05-01

## 2018-10-18 RX ORDER — ASPIRIN 81 MG/1
81 TABLET ORAL
COMMUNITY

## 2018-10-18 RX ORDER — ATORVASTATIN CALCIUM 40 MG/1
40 TABLET, FILM COATED ORAL
COMMUNITY

## 2018-10-18 RX ORDER — LEVALBUTEROL INHALATION SOLUTION 0.31 MG/3ML
SOLUTION RESPIRATORY (INHALATION)
COMMUNITY
End: 2019-05-01 | Stop reason: SDUPTHER

## 2018-10-18 RX ORDER — HYDROCHLOROTHIAZIDE 25 MG/1
TABLET ORAL
COMMUNITY
Start: 2018-04-26 | End: 2020-01-02 | Stop reason: ALTCHOICE

## 2018-10-18 RX ORDER — LISINOPRIL 5 MG/1
5 TABLET ORAL DAILY
COMMUNITY
Start: 2018-06-20 | End: 2020-09-04 | Stop reason: ALTCHOICE

## 2018-10-18 RX ORDER — LEVOTHYROXINE SODIUM 0.05 MG/1
TABLET ORAL
COMMUNITY
End: 2020-01-02 | Stop reason: SDUPTHER

## 2018-10-18 RX ORDER — ANTIARTHRITIC COMBINATION NO.2 900 MG
TABLET ORAL
COMMUNITY

## 2018-10-18 RX ORDER — METOPROLOL TARTRATE 100 MG/1
50 TABLET ORAL 2 TIMES DAILY
COMMUNITY
Start: 2018-06-20

## 2018-10-30 ENCOUNTER — OFFICE VISIT (OUTPATIENT)
Dept: PULMONOLOGY | Facility: CLINIC | Age: 73
End: 2018-10-30

## 2018-10-30 VITALS
OXYGEN SATURATION: 95 % | BODY MASS INDEX: 17.42 KG/M2 | DIASTOLIC BLOOD PRESSURE: 88 MMHG | RESPIRATION RATE: 16 BRPM | WEIGHT: 102 LBS | HEART RATE: 76 BPM | HEIGHT: 64 IN | SYSTOLIC BLOOD PRESSURE: 152 MMHG

## 2018-10-30 DIAGNOSIS — R63.6 UNDERWEIGHT: ICD-10-CM

## 2018-10-30 DIAGNOSIS — Z95.2 H/O PROSTHETIC HEART VALVE: ICD-10-CM

## 2018-10-30 DIAGNOSIS — J44.9 STAGE 3 SEVERE COPD BY GOLD CLASSIFICATION (HCC): Primary | ICD-10-CM

## 2018-10-30 PROCEDURE — 99213 OFFICE O/P EST LOW 20 MIN: CPT | Performed by: INTERNAL MEDICINE

## 2018-10-30 NOTE — PATIENT INSTRUCTIONS
The patient is counseled regarding her low BMI.  She may use over-the-counter Flonase or Nasonex nasal spray for her rhinitis complaints.  She can also use over-the-counter antihistamine such as Claritin plain or Zyrtec plain.

## 2018-10-31 NOTE — PROGRESS NOTES
Subjective   Steffi Michelle is a 73 y.o. female.     Chief Complaint   Patient presents with   • COPD        History of Present Illness   The patient returns today for follow-up.  She is doing very well this time.  She is gaining weight and her appetite is improving and she has minimal dyspnea at present.    Medical History   has a past medical history of Arthritis; CAD (coronary artery disease); Colon polyp; Elevated cholesterol; History of heart artery stent; and Hypertension.  family history includes Cancer in her other; Diabetes in her mother; Drug abuse in her other; Heart disease in her mother and other; Hypertension in her other.   reports that she has never smoked. She has never used smokeless tobacco. She reports that she does not drink alcohol or use drugs.  Review of Systems   Constitutional: Positive for appetite change. Negative for activity change.        Her appetite is improving and she is gaining weight.   HENT: Negative for congestion.    Eyes: Negative for visual disturbance.   Respiratory: Positive for shortness of breath.         She has mild dyspnea but is dramatically improved over the past several months and she is actually mowing her lawn with a riding mower.   Gastrointestinal: Negative for vomiting.   Genitourinary: Negative for difficulty urinating.   Musculoskeletal: Negative for arthralgias.   Skin: Negative for rash.   Neurological: Negative for headache.   Psychiatric/Behavioral: The patient is not nervous/anxious.      ------------------------------------  Objective   General: She is thin white female who appears in no acute distress.  HEENT: She is normocephalic.  Neck: No JVD noted.  Physical Exam  General: She is thin white female who appears in no acute distress.  HENT: She is normocephalic.   Eyes: Pupils are equal round and reactive to light.  Neck: No JVD noted.  She does have a left carotid bruit.  Chest: He has fair air movement bilaterally with no adventitious sounds  heard.  Cardiac: She has a 2/6 systolic murmur heard along the left sternal border.  Abdomen: Soft.  Extremities: No cyanosis clubbing or edema noted.  Musculoskeletal: No joint deformities noted.  Dermatologic: She has a scar from a skin graft on the right ankle.  Are logical: No focal deficits noted.  Psychiatric: She has appropriate mood and affect.  Lymphatic: No adenopathy palpated.  Assessment/Plan   Steffi was seen today for copd.    Diagnoses and all orders for this visit:    Stage 3 severe COPD by GOLD classification (CMS/[facility])    H/O prosthetic heart valve    Underweight    Other orders  -     Full Pulmonary Function Test Without Bronchodilator; Future      Patient's Body mass index is 17.51 kg/m². BMI is below normal parameters. Recommendations include: treating the underlying disease process.  Again, her appetite is improving and she is actually gaining weight.  She will return for follow-up with pulmonary functions in 6 months.  She is on long-acting bronchodilators in the form of Spiriva.

## 2018-11-07 ENCOUNTER — HOSPITAL ENCOUNTER (OUTPATIENT)
Dept: VASCULAR LAB | Age: 73
Discharge: HOME | End: 2018-11-07
Payer: MEDICARE

## 2018-11-07 ENCOUNTER — OFFICE VISIT (OUTPATIENT)
Dept: VASCULAR SURGERY | Age: 73
End: 2018-11-07
Payer: MEDICARE

## 2018-11-07 VITALS
DIASTOLIC BLOOD PRESSURE: 84 MMHG | OXYGEN SATURATION: 98 % | SYSTOLIC BLOOD PRESSURE: 168 MMHG | HEART RATE: 77 BPM | RESPIRATION RATE: 18 BRPM

## 2018-11-07 DIAGNOSIS — I65.23 BILATERAL CAROTID ARTERY STENOSIS: ICD-10-CM

## 2018-11-07 DIAGNOSIS — I70.213 ATHEROSCLEROSIS OF NATIVE ARTERY OF BOTH LOWER EXTREMITIES WITH INTERMITTENT CLAUDICATION (HCC): ICD-10-CM

## 2018-11-07 DIAGNOSIS — I65.23 BILATERAL CAROTID ARTERY STENOSIS: Primary | ICD-10-CM

## 2018-11-07 PROCEDURE — 99212 OFFICE O/P EST SF 10 MIN: CPT | Performed by: NURSE PRACTITIONER

## 2018-11-07 PROCEDURE — 1090F PRES/ABSN URINE INCON ASSESS: CPT | Performed by: NURSE PRACTITIONER

## 2018-11-07 PROCEDURE — G8419 CALC BMI OUT NRM PARAM NOF/U: HCPCS | Performed by: NURSE PRACTITIONER

## 2018-11-07 PROCEDURE — 93880 EXTRACRANIAL BILAT STUDY: CPT

## 2018-11-07 PROCEDURE — G8598 ASA/ANTIPLAT THER USED: HCPCS | Performed by: NURSE PRACTITIONER

## 2018-11-07 PROCEDURE — 4040F PNEUMOC VAC/ADMIN/RCVD: CPT | Performed by: NURSE PRACTITIONER

## 2018-11-07 PROCEDURE — 1101F PT FALLS ASSESS-DOCD LE1/YR: CPT | Performed by: NURSE PRACTITIONER

## 2018-11-07 PROCEDURE — G8484 FLU IMMUNIZE NO ADMIN: HCPCS | Performed by: NURSE PRACTITIONER

## 2018-11-07 PROCEDURE — 3017F COLORECTAL CA SCREEN DOC REV: CPT | Performed by: NURSE PRACTITIONER

## 2018-11-07 PROCEDURE — G8400 PT W/DXA NO RESULTS DOC: HCPCS | Performed by: NURSE PRACTITIONER

## 2018-11-07 PROCEDURE — G8427 DOCREV CUR MEDS BY ELIG CLIN: HCPCS | Performed by: NURSE PRACTITIONER

## 2018-11-07 PROCEDURE — 1123F ACP DISCUSS/DSCN MKR DOCD: CPT | Performed by: NURSE PRACTITIONER

## 2018-11-07 PROCEDURE — 1036F TOBACCO NON-USER: CPT | Performed by: NURSE PRACTITIONER
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
## 2018-11-19 RX ORDER — METOPROLOL TARTRATE 100 MG/1
100 TABLET ORAL 2 TIMES DAILY
Qty: 60 TABLET | Refills: 5 | Status: SHIPPED | OUTPATIENT
Start: 2018-11-19 | End: 2018-12-17 | Stop reason: SDUPTHER

## 2018-11-19 RX ORDER — LISINOPRIL 5 MG/1
5 TABLET ORAL 2 TIMES DAILY
Qty: 60 TABLET | Refills: 5 | Status: SHIPPED | OUTPATIENT
Start: 2018-11-19 | End: 2018-12-17 | Stop reason: SDUPTHER

## 2018-12-17 RX ORDER — METOPROLOL TARTRATE 100 MG/1
100 TABLET ORAL 2 TIMES DAILY
Qty: 60 TABLET | Refills: 5 | Status: SHIPPED | OUTPATIENT
Start: 2018-12-17 | End: 2019-05-22 | Stop reason: SDUPTHER

## 2018-12-17 RX ORDER — LISINOPRIL 5 MG/1
5 TABLET ORAL 2 TIMES DAILY
Qty: 60 TABLET | Refills: 5 | Status: SHIPPED | OUTPATIENT
Start: 2018-12-17 | End: 2019-05-22 | Stop reason: SDUPTHER

## 2019-03-18 ENCOUNTER — TELEPHONE (OUTPATIENT)
Dept: PULMONOLOGY | Facility: CLINIC | Age: 74
End: 2019-03-18

## 2019-03-18 DIAGNOSIS — J44.9 STAGE 3 SEVERE COPD BY GOLD CLASSIFICATION (HCC): Primary | ICD-10-CM

## 2019-03-18 RX ORDER — CEFDINIR 300 MG/1
300 CAPSULE ORAL 2 TIMES DAILY
Qty: 20 CAPSULE | Refills: 0 | Status: SHIPPED | OUTPATIENT
Start: 2019-03-18 | End: 2019-05-01

## 2019-03-18 NOTE — TELEPHONE ENCOUNTER
Okay with me to send her in generic Omnicef 300 mg twice daily for 10 days.  If you will fill out the prescription information and send it back to me I will approve it.

## 2019-03-18 NOTE — TELEPHONE ENCOUNTER
Patient is coughing up clear sputum. Chest is hurting and no fever. She is using her nebulizer three times a day. Her daughter states you usually send her some antibiotics.    Daughter notified to check with pharmacy around 4:30.

## 2019-03-20 ENCOUNTER — OFFICE VISIT (OUTPATIENT)
Dept: CARDIOLOGY | Age: 74
End: 2019-03-20
Payer: MEDICARE

## 2019-03-20 VITALS
HEIGHT: 63 IN | SYSTOLIC BLOOD PRESSURE: 134 MMHG | HEART RATE: 75 BPM | BODY MASS INDEX: 18.07 KG/M2 | WEIGHT: 102 LBS | DIASTOLIC BLOOD PRESSURE: 64 MMHG

## 2019-03-20 DIAGNOSIS — I51.89 DIASTOLIC DYSFUNCTION: ICD-10-CM

## 2019-03-20 DIAGNOSIS — I25.10 CORONARY ARTERY DISEASE INVOLVING NATIVE CORONARY ARTERY OF NATIVE HEART WITHOUT ANGINA PECTORIS: Primary | ICD-10-CM

## 2019-03-20 DIAGNOSIS — I35.0 MILD AORTIC STENOSIS: ICD-10-CM

## 2019-03-20 DIAGNOSIS — E78.2 MIXED HYPERLIPIDEMIA: ICD-10-CM

## 2019-03-20 DIAGNOSIS — I05.0 MILD MITRAL STENOSIS: ICD-10-CM

## 2019-03-20 DIAGNOSIS — I10 ESSENTIAL HYPERTENSION: ICD-10-CM

## 2019-03-20 DIAGNOSIS — I10 BENIGN ESSENTIAL HTN: ICD-10-CM

## 2019-03-20 PROCEDURE — 4040F PNEUMOC VAC/ADMIN/RCVD: CPT | Performed by: NURSE PRACTITIONER

## 2019-03-20 PROCEDURE — G8598 ASA/ANTIPLAT THER USED: HCPCS | Performed by: NURSE PRACTITIONER

## 2019-03-20 PROCEDURE — G8427 DOCREV CUR MEDS BY ELIG CLIN: HCPCS | Performed by: NURSE PRACTITIONER

## 2019-03-20 PROCEDURE — 1036F TOBACCO NON-USER: CPT | Performed by: NURSE PRACTITIONER

## 2019-03-20 PROCEDURE — 3017F COLORECTAL CA SCREEN DOC REV: CPT | Performed by: NURSE PRACTITIONER

## 2019-03-20 PROCEDURE — G8484 FLU IMMUNIZE NO ADMIN: HCPCS | Performed by: NURSE PRACTITIONER

## 2019-03-20 PROCEDURE — G8419 CALC BMI OUT NRM PARAM NOF/U: HCPCS | Performed by: NURSE PRACTITIONER

## 2019-03-20 PROCEDURE — G8400 PT W/DXA NO RESULTS DOC: HCPCS | Performed by: NURSE PRACTITIONER

## 2019-03-20 PROCEDURE — 1090F PRES/ABSN URINE INCON ASSESS: CPT | Performed by: NURSE PRACTITIONER

## 2019-03-20 PROCEDURE — 1101F PT FALLS ASSESS-DOCD LE1/YR: CPT | Performed by: NURSE PRACTITIONER

## 2019-03-20 PROCEDURE — 1123F ACP DISCUSS/DSCN MKR DOCD: CPT | Performed by: NURSE PRACTITIONER

## 2019-03-20 PROCEDURE — 99213 OFFICE O/P EST LOW 20 MIN: CPT | Performed by: NURSE PRACTITIONER

## 2019-03-20 PROCEDURE — 93000 ELECTROCARDIOGRAM COMPLETE: CPT | Performed by: NURSE PRACTITIONER

## 2019-04-04 DIAGNOSIS — J44.9 STAGE 3 SEVERE COPD BY GOLD CLASSIFICATION (HCC): Primary | ICD-10-CM

## 2019-04-04 RX ORDER — LEVALBUTEROL INHALATION SOLUTION 1.25 MG/3ML
3 SOLUTION RESPIRATORY (INHALATION) 4 TIMES DAILY
Qty: 360 ML | Refills: 11 | Status: SHIPPED | OUTPATIENT
Start: 2019-04-04 | End: 2020-05-26

## 2019-04-15 ENCOUNTER — TELEPHONE (OUTPATIENT)
Dept: PULMONOLOGY | Facility: CLINIC | Age: 74
End: 2019-04-15

## 2019-04-15 RX ORDER — DOXYCYCLINE HYCLATE 100 MG/1
100 CAPSULE ORAL 2 TIMES DAILY
Qty: 20 CAPSULE | Refills: 0 | Status: SHIPPED | OUTPATIENT
Start: 2019-04-15 | End: 2019-05-01

## 2019-04-15 NOTE — TELEPHONE ENCOUNTER
Advised the patient we can call in another round of antibiotics and we can try different one such as doxycycline to see if that works better than the Omnicef.  If she would prefer the doxycycline the dose is 100 mg twice daily and if he can put the order and I will approve or get the office.  Otherwise she would need to go to urgent care or emergency room if she worsens.

## 2019-04-15 NOTE — TELEPHONE ENCOUNTER
Patient does want to try the antibiotics. Per Dr. Syed send in doxycycline 100mg bid x 10 days.  Script sent to [facility].

## 2019-04-15 NOTE — TELEPHONE ENCOUNTER
Patient called to say she has had a problem with her breathing. She is short of air with short distances. She states she has a productive cough with green sputum. She thinks she may have had fever Friday and Saturday.  She finished all of the Omnicef at the end of March.

## 2019-05-01 ENCOUNTER — OFFICE VISIT (OUTPATIENT)
Dept: PULMONOLOGY | Facility: CLINIC | Age: 74
End: 2019-05-01

## 2019-05-01 VITALS
BODY MASS INDEX: 16.9 KG/M2 | HEIGHT: 64 IN | SYSTOLIC BLOOD PRESSURE: 132 MMHG | WEIGHT: 99 LBS | HEART RATE: 71 BPM | DIASTOLIC BLOOD PRESSURE: 68 MMHG | OXYGEN SATURATION: 91 %

## 2019-05-01 DIAGNOSIS — J44.9 STAGE 3 SEVERE COPD BY GOLD CLASSIFICATION (HCC): Primary | ICD-10-CM

## 2019-05-01 DIAGNOSIS — Z95.2 H/O PROSTHETIC HEART VALVE: ICD-10-CM

## 2019-05-01 DIAGNOSIS — J44.1 COPD EXACERBATION (HCC): ICD-10-CM

## 2019-05-01 DIAGNOSIS — R63.6 UNDERWEIGHT: ICD-10-CM

## 2019-05-01 LAB
DIFF CAP.CO: NORMAL ML/MMHG SEC
FEV1/FVC: NORMAL %
FEV1: NORMAL LITERS
FVC VOL RESPIRATORY: NORMAL LITERS
TLC: NORMAL LITERS

## 2019-05-01 PROCEDURE — 94010 BREATHING CAPACITY TEST: CPT | Performed by: NURSE PRACTITIONER

## 2019-05-01 PROCEDURE — 94727 GAS DIL/WSHOT DETER LNG VOL: CPT | Performed by: NURSE PRACTITIONER

## 2019-05-01 PROCEDURE — 94729 DIFFUSING CAPACITY: CPT | Performed by: NURSE PRACTITIONER

## 2019-05-01 PROCEDURE — 99214 OFFICE O/P EST MOD 30 MIN: CPT | Performed by: NURSE PRACTITIONER

## 2019-05-01 RX ORDER — ARFORMOTEROL TARTRATE 15 UG/2ML
15 SOLUTION RESPIRATORY (INHALATION)
Qty: 160 ML | Refills: 0 | COMMUNITY
Start: 2019-05-01 | End: 2019-06-05 | Stop reason: SDUPTHER

## 2019-05-01 RX ORDER — PREDNISOLONE ACETATE 10 MG/ML
SUSPENSION/ DROPS OPHTHALMIC
COMMUNITY
Start: 2019-02-14 | End: 2020-01-02 | Stop reason: ALTCHOICE

## 2019-05-01 RX ORDER — LEVALBUTEROL INHALATION SOLUTION 1.25 MG/3ML
3 SOLUTION RESPIRATORY (INHALATION) 4 TIMES DAILY
Qty: 125 AMPULE | Refills: 11 | Status: SHIPPED | OUTPATIENT
Start: 2019-05-01 | End: 2019-05-31

## 2019-05-01 RX ORDER — NEPAFENAC 0.3 %
SUSPENSION, DROPS(FINAL DOSAGE FORM)(ML) OPHTHALMIC (EYE)
COMMUNITY
Start: 2019-02-14 | End: 2020-01-02 | Stop reason: ALTCHOICE

## 2019-05-01 NOTE — PATIENT INSTRUCTIONS
START MUCINEX TWICE A DAY     Arformoterol nebulizer solution  What is this medicine?  ARFORMOTEROL (AR for DAXA ter ol) is a slow-acting bronchodilator. It helps to open up the airways of your lungs. This medicine is used to treat COPD. It should not be used alone for asthma. Do NOT use for an acute asthma attack. Do NOT use for a COPD attack.  This medicine may be used for other purposes; ask your health care provider or pharmacist if you have questions.  COMMON BRAND NAME(S): Brown  What should I tell my health care provider before I take this medicine?  They need to know if you have any of the following conditions:  -diabetes  -have asthma and are not taking any other asthma medicine  -heart disease or irregular heartbeat  -high blood pressure  -pheochromocytoma  -seizures  -thyroid disease  -worsening asthma  -an unusual or allergic reaction to arformoterol, other medicines, food, dyes, or preservatives  -pregnant or trying to get pregnant  -breast-feeding  How should I use this medicine?  This medicine is used in a nebulizer. Nebulizers make a liquid into an aerosol so that you can breathe it in through your mouth and nose into your lungs. You will be taught how to use your nebulizer. Follow the directions on your prescription label. Take your medicine at regular intervals. Do not use more often than directed.  A special MedGuide will be given to you by the pharmacist with each prescription and refill. Be sure to read this information carefully each time.  Talk to your pediatrician regarding the use of this medicine in children. This medicine is not approved for use in children.  Overdosage: If you think you have taken too much of this medicine contact a poison control center or emergency room at once.  NOTE: This medicine is only for you. Do not share this medicine with others.  What if I miss a dose?  If you miss a dose, take it as soon as you can. If it is almost time for your next dose, take only that  dose. Do not take double or extra doses.  What may interact with this medicine?  Do not take this medicine with any of the following medications:  -MAOIs like Carbex, Eldepryl, Marplan, Nardil, and Parnate  -procarbazine  This medicine may also interact with the following medications:  -caffeine  -diuretics  -formoterol  -medicines for colds  -medicines for depression, anxiety, or psychotic disturbances  -medicines for weight loss including some herbal products  -methadone  -salmeterol  -some antibiotics like clarithromycin, erythromycin, levofloxacin, and linezolid  -some heart medicines  -steroid hormones like dexamethasone, cortisone, hydrocortisone  -theophylline  This list may not describe all possible interactions. Give your health care provider a list of all the medicines, herbs, non-prescription drugs, or dietary supplements you use. Also tell them if you smoke, drink alcohol, or use illegal drugs. Some items may interact with your medicine.  What should I watch for while using this medicine?  Visit your doctor for regular check ups. Tell your doctor or health care professional if your symptoms do not get better. This medicine may increase the possibility of dying from breathing problems. If your symptoms get worse or if you need your short-acting inhalers more often, call your doctor right away. Do not use this medicine more than every 12 hours. NEVER use this medicine for an acute bronchospasm.  If you are going to have surgery tell your doctor or health care professional that you are using this medicine.  What side effects may I notice from receiving this medicine?  Side effects that you should report to your doctor or health care professional as soon as possible:  -allergic reactions such as skin rash or itching, hives, swelling of the face, lips or tongue  -chest pain  -difficulty breathing or wheezing that increases or does not go away  -dizziness or fainting  -fever  -high blood pressure  -irregular  heartbeat  -nervousness  -tremors  -unusually weak or tired  Side effects that usually do not require medical attention (report to your doctor or health care professional if they continue or are bothersome):  -cough  -headache  -nausea, vomiting  -sore throat  -stuffy nose  -upset stomach  This list may not describe all possible side effects. Call your doctor for medical advice about side effects. You may report side effects to FDA at 1-951-HKE-5261.  Where should I keep my medicine?  Keep out of the reach of children.  Ideally, store in the refrigerator between 2 and 8 degrees C (36 and 46 degrees F) before use. Keep this medicine in the foil pouches until you are ready to use. The medicine should be clear. Do not use any discolored solution. You may store this medicine at room temperature between 20 and 25 degrees C (68 and 77 degrees F) for up to 6 weeks. Throw away any unused medicine after the expiration date.  NOTE: This sheet is a summary. It may not cover all possible information. If you have questions about this medicine, talk to your doctor, pharmacist, or health care provider.  © 2019 [employer] (2010-07-22 21:53:44)

## 2019-05-02 ENCOUNTER — TELEPHONE (OUTPATIENT)
Dept: PULMONOLOGY | Facility: CLINIC | Age: 74
End: 2019-05-02

## 2019-05-02 DIAGNOSIS — J44.1 COPD EXACERBATION (HCC): ICD-10-CM

## 2019-05-02 NOTE — TELEPHONE ENCOUNTER
Yolis called to ask about patient's chest xray report. And she went to the pharmacy and picked her prescription up  And it was filled wrong again.    Gave patient chest xray results. No problems with the Brovana so far.    Daughter is wanting you to call patient.

## 2019-05-03 NOTE — TELEPHONE ENCOUNTER
Please call the patient and check on her.  Tell her I apologize for not being able to call her back at the end of the week but we were very shorthanded and busy.  Ask her what was wrong with the prescription that I sent and see how she is doing on the Brovana that I sent her home with.  Thank you.

## 2019-05-06 NOTE — TELEPHONE ENCOUNTER
Patient states she is doing much better with the Brovana.   She said her insurance will only let her get #75 of the Xopenex at a time instead of the #120 you wrote. But since she is doing so well she is only using the Xopenex twice a day.

## 2019-05-06 NOTE — TELEPHONE ENCOUNTER
There's nothing we can do about the Xopenex then. I'm glad she's doing better with the addition of Brovana.

## 2019-05-15 ENCOUNTER — HOSPITAL ENCOUNTER (OUTPATIENT)
Dept: VASCULAR LAB | Age: 74
Discharge: HOME | End: 2019-05-15
Payer: MEDICARE

## 2019-05-15 DIAGNOSIS — I70.213 ATHEROSCLEROSIS OF NATIVE ARTERY OF BOTH LOWER EXTREMITIES WITH INTERMITTENT CLAUDICATION (HCC): ICD-10-CM

## 2019-05-15 DIAGNOSIS — I65.23 BILATERAL CAROTID ARTERY STENOSIS: ICD-10-CM

## 2019-05-15 PROCEDURE — 93923 UPR/LXTR ART STDY 3+ LVLS: CPT

## 2019-05-15 PROCEDURE — 93880 EXTRACRANIAL BILAT STUDY: CPT

## 2019-05-20 ENCOUNTER — TELEPHONE (OUTPATIENT)
Dept: VASCULAR SURGERY | Age: 74
End: 2019-05-20

## 2019-05-20 NOTE — TELEPHONE ENCOUNTER
----- Message from BEBO Nguyen sent at 5/16/2019  5:58 AM CDT -----  Let her know there has been a little change in her studies. We would like to see her. Please schedule her on a Tuesday morning when Dr. Patricia Guevara is there. ?  This tuesday

## 2019-05-20 NOTE — TELEPHONE ENCOUNTER
----- Message from BEBO Matthew sent at 5/16/2019  5:58 AM CDT -----  Let her know there has been a little change in her studies. We would like to see her. Please schedule her on a Tuesday morning when Dr. Greg Lockwood is there. ?  This tuesday

## 2019-05-20 NOTE — TELEPHONE ENCOUNTER
Tried to call Mrs Noah Roberts and the person and answered stated she was her daughter and that the number I needed to call was [phone].

## 2019-05-21 ENCOUNTER — OFFICE VISIT (OUTPATIENT)
Dept: VASCULAR SURGERY | Age: 74
End: 2019-05-21
Payer: MEDICARE

## 2019-05-21 VITALS
HEART RATE: 76 BPM | RESPIRATION RATE: 18 BRPM | DIASTOLIC BLOOD PRESSURE: 74 MMHG | OXYGEN SATURATION: 98 % | SYSTOLIC BLOOD PRESSURE: 150 MMHG

## 2019-05-21 DIAGNOSIS — Z01.818 PRE-OP TESTING: Primary | ICD-10-CM

## 2019-05-21 DIAGNOSIS — I72.0 PSEUDOANEURYSM OF CAROTID ARTERY (HCC): ICD-10-CM

## 2019-05-21 DIAGNOSIS — I65.23 BILATERAL CAROTID ARTERY STENOSIS: Primary | ICD-10-CM

## 2019-05-21 PROCEDURE — 99213 OFFICE O/P EST LOW 20 MIN: CPT | Performed by: NURSE PRACTITIONER

## 2019-05-21 PROCEDURE — G8427 DOCREV CUR MEDS BY ELIG CLIN: HCPCS | Performed by: NURSE PRACTITIONER

## 2019-05-21 PROCEDURE — G8400 PT W/DXA NO RESULTS DOC: HCPCS | Performed by: NURSE PRACTITIONER

## 2019-05-21 PROCEDURE — 4040F PNEUMOC VAC/ADMIN/RCVD: CPT | Performed by: NURSE PRACTITIONER

## 2019-05-21 PROCEDURE — 1036F TOBACCO NON-USER: CPT | Performed by: NURSE PRACTITIONER

## 2019-05-21 PROCEDURE — 1123F ACP DISCUSS/DSCN MKR DOCD: CPT | Performed by: NURSE PRACTITIONER

## 2019-05-21 PROCEDURE — G8419 CALC BMI OUT NRM PARAM NOF/U: HCPCS | Performed by: NURSE PRACTITIONER

## 2019-05-21 PROCEDURE — 3017F COLORECTAL CA SCREEN DOC REV: CPT | Performed by: NURSE PRACTITIONER

## 2019-05-21 PROCEDURE — 1090F PRES/ABSN URINE INCON ASSESS: CPT | Performed by: NURSE PRACTITIONER

## 2019-05-21 PROCEDURE — G8598 ASA/ANTIPLAT THER USED: HCPCS | Performed by: NURSE PRACTITIONER

## 2019-05-21 RX ORDER — ARFORMOTEROL TARTRATE 15 UG/2ML
15 SOLUTION RESPIRATORY (INHALATION) 2 TIMES DAILY
Status: ON HOLD | COMMUNITY
Start: 2019-05-01 | End: 2019-07-11

## 2019-05-21 RX ORDER — CLOPIDOGREL BISULFATE 75 MG/1
75 TABLET ORAL DAILY
Qty: 30 TABLET | Refills: 0 | Status: SHIPPED | OUTPATIENT
Start: 2019-05-21 | End: 2019-05-22 | Stop reason: SDUPTHER

## 2019-05-22 RX ORDER — METOPROLOL TARTRATE 100 MG/1
100 TABLET ORAL 2 TIMES DAILY
Qty: 60 TABLET | Refills: 5 | Status: ON HOLD | OUTPATIENT
Start: 2019-05-22 | End: 2019-11-20 | Stop reason: SDUPTHER

## 2019-05-22 RX ORDER — LISINOPRIL 5 MG/1
5 TABLET ORAL 2 TIMES DAILY
Qty: 60 TABLET | Refills: 5 | Status: ON HOLD | OUTPATIENT
Start: 2019-05-22 | End: 2019-11-16 | Stop reason: SDUPTHER

## 2019-05-22 RX ORDER — HYDROCHLOROTHIAZIDE 25 MG/1
TABLET ORAL
Qty: 90 TABLET | Refills: 3 | Status: ON HOLD | OUTPATIENT
Start: 2019-05-22 | End: 2019-07-11

## 2019-05-22 RX ORDER — CLOPIDOGREL BISULFATE 75 MG/1
75 TABLET ORAL DAILY
Qty: 30 TABLET | Refills: 5 | Status: SHIPPED | OUTPATIENT
Start: 2019-05-22 | End: 2019-09-25

## 2019-05-22 RX ORDER — ATORVASTATIN CALCIUM 40 MG/1
40 TABLET, FILM COATED ORAL DAILY
Qty: 30 TABLET | Refills: 0 | Status: ON HOLD | OUTPATIENT
Start: 2019-05-22 | End: 2019-11-20 | Stop reason: SDUPTHER
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
## 2019-06-05 DIAGNOSIS — J44.9 STAGE 3 SEVERE COPD BY GOLD CLASSIFICATION (HCC): ICD-10-CM

## 2019-06-05 RX ORDER — ARFORMOTEROL TARTRATE 15 UG/2ML
15 SOLUTION RESPIRATORY (INHALATION)
Qty: 120 ML | Refills: 11 | Status: SHIPPED | OUTPATIENT
Start: 2019-06-05 | End: 2020-01-02 | Stop reason: ALTCHOICE

## 2019-06-05 NOTE — TELEPHONE ENCOUNTER
Patient has been using the Brovana and states she feels like it is helping. Please send in script to [facility].

## 2019-06-06 ENCOUNTER — TELEPHONE (OUTPATIENT)
Dept: PULMONOLOGY | Facility: CLINIC | Age: 74
End: 2019-06-06

## 2019-06-06 NOTE — TELEPHONE ENCOUNTER
"Received a fax from [facility]. Patient needs a PA on the [employer] that was sent in. Per pharmacy she has an \"all inclusive Medicare\" and does not have part B.  Per Kwabena patient does not have the lung capacity to do inhalers. She also has arthritis and is no longer able to \"work\" the inhalers.  Try to do the PA.  "

## 2019-06-21 ENCOUNTER — HOSPITAL ENCOUNTER (OUTPATIENT)
Dept: GENERAL RADIOLOGY | Age: 74
Discharge: HOME | End: 2019-06-21
Payer: MEDICARE

## 2019-06-21 ENCOUNTER — TELEPHONE (OUTPATIENT)
Dept: VASCULAR SURGERY | Age: 74
End: 2019-06-21

## 2019-06-21 ENCOUNTER — HOSPITAL ENCOUNTER (OUTPATIENT)
Dept: PREADMISSION TESTING | Age: 74
Discharge: HOME | End: 2019-06-25
Payer: MEDICARE

## 2019-06-21 VITALS — WEIGHT: 98 LBS | BODY MASS INDEX: 17.36 KG/M2 | HEIGHT: 63 IN

## 2019-06-21 DIAGNOSIS — Z01.818 PRE-OP TESTING: ICD-10-CM

## 2019-06-21 LAB
ANION GAP SERPL CALCULATED.3IONS-SCNC: 14 MMOL/L (ref 7–19)
APTT: 30.3 SEC (ref 26–36.2)
BUN BLDV-MCNC: 29 MG/DL (ref 8–23)
CALCIUM SERPL-MCNC: 10.4 MG/DL (ref 8.8–10.2)
CHLORIDE BLD-SCNC: 92 MMOL/L (ref 98–111)
CO2: 29 MMOL/L (ref 22–29)
CREAT SERPL-MCNC: 1.2 MG/DL (ref 0.5–0.9)
EKG P AXIS: 86 DEGREES
EKG P-R INTERVAL: 150 MS
EKG Q-T INTERVAL: 408 MS
EKG QRS DURATION: 88 MS
EKG QTC CALCULATION (BAZETT): 436 MS
EKG T AXIS: 83 DEGREES
GFR NON-AFRICAN AMERICAN: 44
GLUCOSE BLD-MCNC: 92 MG/DL (ref 74–109)
HCT VFR BLD CALC: 36.6 % (ref 37–47)
HEMOGLOBIN: 11 G/DL (ref 12–16)
INR BLD: 1.2 (ref 0.88–1.18)
MCH RBC QN AUTO: 25.9 PG (ref 27–31)
MCHC RBC AUTO-ENTMCNC: 30.1 G/DL (ref 33–37)
MCV RBC AUTO: 86.3 FL (ref 81–99)
PDW BLD-RTO: 16.5 % (ref 11.5–14.5)
PLATELET # BLD: 306 K/UL (ref 130–400)
PMV BLD AUTO: 9.7 FL (ref 9.4–12.3)
POTASSIUM SERPL-SCNC: 4.1 MMOL/L (ref 3.5–5)
PROTHROMBIN TIME: 14.6 SEC (ref 12–14.6)
RBC # BLD: 4.24 M/UL (ref 4.2–5.4)
SODIUM BLD-SCNC: 135 MMOL/L (ref 136–145)
WBC # BLD: 9.8 K/UL (ref 4.8–10.8)

## 2019-06-21 PROCEDURE — 85610 PROTHROMBIN TIME: CPT

## 2019-06-21 PROCEDURE — 85730 THROMBOPLASTIN TIME PARTIAL: CPT

## 2019-06-21 PROCEDURE — 80048 BASIC METABOLIC PNL TOTAL CA: CPT

## 2019-06-21 PROCEDURE — 93005 ELECTROCARDIOGRAM TRACING: CPT

## 2019-06-21 PROCEDURE — 71046 X-RAY EXAM CHEST 2 VIEWS: CPT

## 2019-06-21 PROCEDURE — 85027 COMPLETE CBC AUTOMATED: CPT

## 2019-06-21 PROCEDURE — 87081 CULTURE SCREEN ONLY: CPT

## 2019-06-21 NOTE — TELEPHONE ENCOUNTER
----- Message from BEBO Borjas sent at 6/21/2019  3:07 PM CDT -----  Please forward labs to Dr. Juan Larson

## 2019-06-22 LAB — MRSA CULTURE ONLY: NORMAL

## 2019-06-24 NOTE — PROGRESS NOTES
Dr. Rachid Chakraborty reviewed patient's cardiac read EKG done in [facility] and stated patient is ok to proceed with anesthesia.

## 2019-07-10 ENCOUNTER — PREP FOR PROCEDURE (OUTPATIENT)
Dept: VASCULAR SURGERY | Age: 74
End: 2019-07-10

## 2019-07-10 RX ORDER — SODIUM CHLORIDE 0.9 % (FLUSH) 0.9 %
10 SYRINGE (ML) INJECTION EVERY 12 HOURS SCHEDULED
Status: CANCELLED | OUTPATIENT
Start: 2019-07-10

## 2019-07-10 RX ORDER — SODIUM CHLORIDE 0.9 % (FLUSH) 0.9 %
10 SYRINGE (ML) INJECTION PRN
Status: CANCELLED | OUTPATIENT
Start: 2019-07-10

## 2019-07-10 RX ORDER — ASPIRIN 81 MG/1
81 TABLET ORAL ONCE
Status: CANCELLED | OUTPATIENT
Start: 2019-07-10 | End: 2019-07-10
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
## 2019-07-10 NOTE — H&P (VIEW-ONLY)
Patient Care Team:  López Mobley MD as PCP - General (Internal Medicine)  Vadim Morocho MD (Rheumatology)  Vivian Cisse MD as Consulting Physician (Vascular Surgery)        History and Physical     She presents for follow-up of carotid occlusive disease. She has prior history of carotid artery stenosis for > 5 years. Her current treatment includes ASA EC po daily. She denies a history of CVA. She had a Right Carotid Endarterectomy done and had a Left Carotid Endarterectomy done .  She reports no TIA's, episodes of amaurosis fugax and episodes of lateralizing weakness.     Sampson Maldonado is a 68 y.o. female with the following history reviewed and [facility] in [facility]:        Patient Active Problem List     Diagnosis Date Noted    Bradycardia         Priority: High    Acute superficial venous thrombosis of right lower extremity 04/25/2016       Priority: High       With large RLE bulla lateral foot skin violaceous discoloration, acutely POA (venous backpressure)       Mild mitral stenosis 03/29/2016       Priority: High    Severe mitral regurgitation by prior echocardiogram 03/29/2016       Priority: High    PUD (peptic ulcer disease) 03/29/2016       Priority: Medium    Atherosclerosis of native artery of extremity with intermittent claudication (HCC) 07/25/2011       Priority: Low    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    Mild aortic stenosis 02/20/2018    Benign essential HTN      NSTEMI (non-ST elevated myocardial infarction) ([address] 01/14/2018    HCAP (healthcare-associated pneumonia) 01/14/2018    Acute renal failure (ARF) ([address] 01/14/2018    Syncope and collapse      Sepsis with organ dysfunction ([address] 01/13/2018    Obstruction of left carotid artery 01/11/2018    Bilateral carotid artery stenosis 11/19/2017    Wound of sacral region 06/16/2016    Elevated TSH 06/16/2016    Acute blood loss anemia 06/15/2016    CHF (congestive heart failure) ([address] transfusion 2016     Post op, was taking blood thinner    Hyperlipidemia      Hypertension      MI (myocardial infarction) ([address] 2009     x2    Mitral valve stenosis      PUD (peptic ulcer disease) 2009    Renal failure 2009    Wound infection after surgery       right foot         Past Surgical History         Past Surgical History:   Procedure Laterality Date    ABDOMEN SURGERY   2009     perforated ulcer resection of 1/3 stomach    CARDIAC SURGERY         artificial valve and bypass    CAROTID ENDARTERECTOMY         Right  with Dacron patch angioplasty    CAROTID ENDARTERECTOMY Left      CARPAL TUNNEL RELEASE Right early 's     long ago     SECTION   1972    COLONOSCOPY        CORONARY ARTERY BYPASS GRAFT   2 weeks ago    DILATION AND CURETTAGE OF UTERUS        ILIO-FEMORAL BYPASS GRAFT N/A 2016     OPEN TRANSLUMINAL BALLOON ANGIOPLASTY AND STENTING OF RIGHT COMMON AND EXTERNAL  ILIAC ARTERIES; RIGHT FEMORAL ENDARTERECTOMY WITH VEIN PATCH ANGIOPLASTY performed by Song Centeno MD at [facility] N/A 2016     MITRAL VALVE  REPLACEMENT LUONG-MAZE ABLATION WITH CRYO PROCEDURE, CORONARY ARTERY BYPASS GRAFT X 1 WITH ENDOSCOPIC VEIN HARVESTING WITH PERFUSION TRANSESOPHAGEAL ECHOCARDIOGRAM performed by Andre Day MD at [address]        [place] RE[place], CAROTID ENDARTEC>1 MON Left 2018     REMOVAL OF HEMATOMA, LEFT CAROTID ARTERY performed by Song Centeno MD at [address] Left 2018     LEFT CAROTID ENDARTERECTOMY WITH EEG MONITORING AND COMPLETION DUPLEX ULTRASOUND performed by Song Centeno MD at [address]        SKIN GRAFT Right 2016     Skin graft split thickness foot,ankle,and leg. Right leg 26x8cm and 12x6cm total area 280cm squared. TJR    UPPER GASTROINTESTINAL ENDOSCOPY   3/15/16     Dr Josep Patterson   3/15/2016     EGD BIOPSY performed by Dae Vazquez DO at [facility]    VASCULAR SURGERY   16 [address]     Aortagram and right leg runoff,right leg runoff,right common iliac artery selection for right leg run off views.  VASCULAR SURGERY         .  Open transluminal angioplasty and stenting of the external iliac artery. TJR         Family History         Family History   Problem Relation Age of Onset    Diabetes Mother      Heart Disease Mother      Heart Failure Mother      Diabetes Sister      Heart Disease Sister      High Blood Pressure Sister           Social History            Tobacco Use    Smoking status: Former Smoker       Years: 2.00       Last attempt to quit: 1980       Years since quittin.4    Smokeless tobacco: Never Used   Substance Use Topics    Alcohol use: Yes       Comment: rarely maybe twice a year               Review of Systems     Constitutional - no significant activity change, appetite change, or unexpected weight change. No fever or chills. No diaphoresis or significant fatigue. HENT - no significant rhinorrhea or epistaxis. No tinnitus or significant hearing loss. Eyes - no sudden vision change or amaurosis. Respiratory - no significant shortness of breath, wheezing, or stridor. No apnea, cough, or chest tightness associated with shortness of breath. Cardiovascular - no chest pain, syncope, or significant dizziness. No palpitations or significant leg swelling. No claudication. Gastrointestinal - no abdominal swelling or pain. No blood in stool. No severe constipation, diarrhea, nausea, or vomiting. Genitourinary - No difficulty urinating, dysuria, frequency, or urgency. No flank pain or hematuria. Musculoskeletal - no back pain, gait disturbance, or myalgia. Skin - no color change, rash, pallor, or new wound. Neurologic - no dizziness, facial asymmetry, or light headedness. No seizures. No speech difficulty or lateralizing weakness.   Hematologic - no easy bruising or excessive recurrent stenosis or    residual plaque.    There is normal antegrade flow in the bilateral vertebral artery(ies).           Right vertebral artery flow is antegrade  Left vertebral artery flow is antegrade  Individual velocities reviewed: Yes. Results were reviewed with the patient. Disease process is unstable        Lower extremity arterial study:   Based on ankle-brachial indices and doppler waveforms, the patient has    relatively normal flow to the right lower extremity arterial system at   [facility] on ankle brachial indices and doppler waveforms, the patient has    mildly diminished flow to the left lower extremity arterial system at   [facility] on segmental pressures and doppler waveforms, the patient likely has    disease in the left tibial arterial segments.         Individual films reviewed: Yes. These results were reviewed with the patient. Disease process is stable           Options have been discussed with the patient including continued medical management and operative intervention. Patient has opted to proceed with operative intervention. Risks have been discussed with the patient including but not limited to MI, death, stroke, nerve injury, infection and bleed.     Assessment     1. Bilateral carotid artery stenosis    2. 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HCC)             Plan     Proceed with resection of right CCA pseudoaneurysm.   She wants to wait until July due to family obligations  Will start plavix 4-5 days prior to    Recommend no smoking

## 2019-07-11 ENCOUNTER — ANESTHESIA (OUTPATIENT)
Dept: OPERATING ROOM | Age: 74
DRG: 253 | End: 2019-07-11
Payer: MEDICARE

## 2019-07-11 ENCOUNTER — ANESTHESIA EVENT (OUTPATIENT)
Dept: OPERATING ROOM | Age: 74
DRG: 253 | End: 2019-07-11
Payer: MEDICARE

## 2019-07-11 ENCOUNTER — HOSPITAL ENCOUNTER (INPATIENT)
Age: 74
LOS: 2 days | Discharge: HOME | DRG: 253 | End: 2019-07-13
Attending: SURGERY | Admitting: SURGERY
Payer: MEDICARE

## 2019-07-11 VITALS — TEMPERATURE: 95.5 F | OXYGEN SATURATION: 100 % | RESPIRATION RATE: 3 BRPM

## 2019-07-11 DIAGNOSIS — I65.23 BILATERAL CAROTID ARTERY STENOSIS: Primary | ICD-10-CM

## 2019-07-11 PROBLEM — I72.0 CAROTID ANEURYSM, RIGHT (HCC): Status: ACTIVE | Noted: 2019-07-11

## 2019-07-11 LAB
ABO/RH: NORMAL
ANTIBODY SCREEN: NORMAL
HCT VFR BLD CALC: 28.6 % (ref 37–47)
HEMOGLOBIN: 8.7 G/DL (ref 12–16)
MCH RBC QN AUTO: 26.4 PG (ref 27–31)
MCHC RBC AUTO-ENTMCNC: 30.4 G/DL (ref 33–37)
MCV RBC AUTO: 86.7 FL (ref 81–99)
PDW BLD-RTO: 16.3 % (ref 11.5–14.5)
PLATELET # BLD: 344 K/UL (ref 130–400)
PMV BLD AUTO: 9.7 FL (ref 9.4–12.3)
RBC # BLD: 3.3 M/UL (ref 4.2–5.4)
WBC # BLD: 13.4 K/UL (ref 4.8–10.8)

## 2019-07-11 PROCEDURE — 36415 COLL VENOUS BLD VENIPUNCTURE: CPT

## 2019-07-11 PROCEDURE — 93882 EXTRACRANIAL UNI/LTD STUDY: CPT

## 2019-07-11 PROCEDURE — 2500000003 HC RX 250 WO HCPCS: Performed by: NURSE ANESTHETIST, CERTIFIED REGISTERED

## 2019-07-11 PROCEDURE — 87075 CULTR BACTERIA EXCEPT BLOOD: CPT

## 2019-07-11 PROCEDURE — 6360000002 HC RX W HCPCS

## 2019-07-11 PROCEDURE — 86850 RBC ANTIBODY SCREEN: CPT

## 2019-07-11 PROCEDURE — 3600000005 HC SURGERY LEVEL 5 BASE: Performed by: SURGERY

## 2019-07-11 PROCEDURE — 031H09J BYPASS RIGHT COMMON CAROTID ARTERY TO RIGHT EXTRACRANIAL ARTERY WITH AUTOLOGOUS VENOUS TISSUE, OPEN APPROACH: ICD-10-PCS | Performed by: SURGERY

## 2019-07-11 PROCEDURE — 06BQ0ZZ EXCISION OF LEFT SAPHENOUS VEIN, OPEN APPROACH: ICD-10-PCS | Performed by: SURGERY

## 2019-07-11 PROCEDURE — 7100000000 HC PACU RECOVERY - FIRST 15 MIN: Performed by: SURGERY

## 2019-07-11 PROCEDURE — 2500000003 HC RX 250 WO HCPCS: Performed by: SURGERY

## 2019-07-11 PROCEDURE — 6360000002 HC RX W HCPCS: Performed by: NURSE ANESTHETIST, CERTIFIED REGISTERED

## 2019-07-11 PROCEDURE — 2580000003 HC RX 258: Performed by: SURGERY

## 2019-07-11 PROCEDURE — 03RH07Z REPLACEMENT OF RIGHT COMMON CAROTID ARTERY WITH AUTOLOGOUS TISSUE SUBSTITUTE, OPEN APPROACH: ICD-10-PCS | Performed by: SURGERY

## 2019-07-11 PROCEDURE — 3700000000 HC ANESTHESIA ATTENDED CARE: Performed by: SURGERY

## 2019-07-11 PROCEDURE — 86900 BLOOD TYPING SEROLOGIC ABO: CPT

## 2019-07-11 PROCEDURE — 7100000001 HC PACU RECOVERY - ADDTL 15 MIN: Performed by: SURGERY

## 2019-07-11 PROCEDURE — 1210000000 HC MED SURG R&B

## 2019-07-11 PROCEDURE — 86901 BLOOD TYPING SEROLOGIC RH(D): CPT

## 2019-07-11 PROCEDURE — 35501 ART BYP GRFT IPSILAT CAROTID: CPT | Performed by: SURGERY

## 2019-07-11 PROCEDURE — 2580000003 HC RX 258: Performed by: NURSE ANESTHETIST, CERTIFIED REGISTERED

## 2019-07-11 PROCEDURE — 6370000000 HC RX 637 (ALT 250 FOR IP): Performed by: SURGERY

## 2019-07-11 PROCEDURE — 6370000000 HC RX 637 (ALT 250 FOR IP): Performed by: NURSE PRACTITIONER

## 2019-07-11 PROCEDURE — 03PY0JZ REMOVAL OF SYNTHETIC SUBSTITUTE FROM UPPER ARTERY, OPEN APPROACH: ICD-10-PCS | Performed by: SURGERY

## 2019-07-11 PROCEDURE — 6360000002 HC RX W HCPCS: Performed by: SURGERY

## 2019-07-11 PROCEDURE — 2580000003 HC RX 258: Performed by: ANESTHESIOLOGY

## 2019-07-11 PROCEDURE — 85027 COMPLETE CBC AUTOMATED: CPT

## 2019-07-11 PROCEDURE — 87070 CULTURE OTHR SPECIMN AEROBIC: CPT

## 2019-07-11 PROCEDURE — 6360000002 HC RX W HCPCS: Performed by: NURSE PRACTITIONER

## 2019-07-11 PROCEDURE — 2709999900 HC NON-CHARGEABLE SUPPLY: Performed by: SURGERY

## 2019-07-11 PROCEDURE — 3600000015 HC SURGERY LEVEL 5 ADDTL 15MIN: Performed by: SURGERY

## 2019-07-11 PROCEDURE — 2700000000 HC OXYGEN THERAPY PER DAY

## 2019-07-11 PROCEDURE — 3700000001 HC ADD 15 MINUTES (ANESTHESIA): Performed by: SURGERY

## 2019-07-11 RX ORDER — HYDROCHLOROTHIAZIDE 25 MG/1
25 TABLET ORAL DAILY
Status: ON HOLD | COMMUNITY
End: 2019-11-16 | Stop reason: HOSPADM

## 2019-07-11 RX ORDER — SODIUM CHLORIDE, SODIUM LACTATE, POTASSIUM CHLORIDE, CALCIUM CHLORIDE 600; 310; 30; 20 MG/100ML; MG/100ML; MG/100ML; MG/100ML
INJECTION, SOLUTION INTRAVENOUS CONTINUOUS
Status: DISCONTINUED | OUTPATIENT
Start: 2019-07-11 | End: 2019-07-11

## 2019-07-11 RX ORDER — SODIUM CHLORIDE 0.9 % (FLUSH) 0.9 %
10 SYRINGE (ML) INJECTION EVERY 12 HOURS SCHEDULED
Status: DISCONTINUED | OUTPATIENT
Start: 2019-07-11 | End: 2019-07-11 | Stop reason: HOSPADM

## 2019-07-11 RX ORDER — LABETALOL 20 MG/4 ML (5 MG/ML) INTRAVENOUS SYRINGE
5 EVERY 10 MIN PRN
Status: DISCONTINUED | OUTPATIENT
Start: 2019-07-11 | End: 2019-07-11 | Stop reason: HOSPADM

## 2019-07-11 RX ORDER — FENTANYL CITRATE 50 UG/ML
INJECTION, SOLUTION INTRAMUSCULAR; INTRAVENOUS PRN
Status: DISCONTINUED | OUTPATIENT
Start: 2019-07-11 | End: 2019-07-11 | Stop reason: SDUPTHER

## 2019-07-11 RX ORDER — ONDANSETRON 2 MG/ML
INJECTION INTRAMUSCULAR; INTRAVENOUS PRN
Status: DISCONTINUED | OUTPATIENT
Start: 2019-07-11 | End: 2019-07-11 | Stop reason: SDUPTHER

## 2019-07-11 RX ORDER — LIDOCAINE HYDROCHLORIDE 10 MG/ML
INJECTION, SOLUTION EPIDURAL; INFILTRATION; INTRACAUDAL; PERINEURAL PRN
Status: DISCONTINUED | OUTPATIENT
Start: 2019-07-11 | End: 2019-07-11 | Stop reason: HOSPADM

## 2019-07-11 RX ORDER — HEPARIN SODIUM 1000 [USP'U]/ML
INJECTION, SOLUTION INTRAVENOUS; SUBCUTANEOUS PRN
Status: DISCONTINUED | OUTPATIENT
Start: 2019-07-11 | End: 2019-07-11 | Stop reason: SDUPTHER

## 2019-07-11 RX ORDER — HYDROCODONE BITARTRATE AND ACETAMINOPHEN 5; 325 MG/1; MG/1
1 TABLET ORAL EVERY 4 HOURS PRN
Status: DISCONTINUED | OUTPATIENT
Start: 2019-07-11 | End: 2019-07-13 | Stop reason: HOSPADM

## 2019-07-11 RX ORDER — SODIUM CHLORIDE 0.9 % (FLUSH) 0.9 %
10 SYRINGE (ML) INJECTION PRN
Status: DISCONTINUED | OUTPATIENT
Start: 2019-07-11 | End: 2019-07-11 | Stop reason: HOSPADM

## 2019-07-11 RX ORDER — CLONIDINE HYDROCHLORIDE 0.1 MG/1
0.1 TABLET ORAL
Status: DISCONTINUED | OUTPATIENT
Start: 2019-07-11 | End: 2019-07-13 | Stop reason: HOSPADM

## 2019-07-11 RX ORDER — MEPERIDINE HYDROCHLORIDE 50 MG/ML
12.5 INJECTION INTRAMUSCULAR; INTRAVENOUS; SUBCUTANEOUS EVERY 5 MIN PRN
Status: DISCONTINUED | OUTPATIENT
Start: 2019-07-11 | End: 2019-07-11 | Stop reason: HOSPADM

## 2019-07-11 RX ORDER — PROMETHAZINE HYDROCHLORIDE 25 MG/ML
6.25 INJECTION, SOLUTION INTRAMUSCULAR; INTRAVENOUS
Status: DISCONTINUED | OUTPATIENT
Start: 2019-07-11 | End: 2019-07-11 | Stop reason: HOSPADM

## 2019-07-11 RX ORDER — HYDROCHLOROTHIAZIDE 25 MG/1
25 TABLET ORAL DAILY
Status: DISCONTINUED | OUTPATIENT
Start: 2019-07-11 | End: 2019-07-13 | Stop reason: HOSPADM

## 2019-07-11 RX ORDER — HYDRALAZINE HYDROCHLORIDE 20 MG/ML
5 INJECTION INTRAMUSCULAR; INTRAVENOUS EVERY 10 MIN PRN
Status: DISCONTINUED | OUTPATIENT
Start: 2019-07-11 | End: 2019-07-11 | Stop reason: HOSPADM

## 2019-07-11 RX ORDER — ENALAPRILAT 2.5 MG/2ML
1.25 INJECTION INTRAVENOUS
Status: DISCONTINUED | OUTPATIENT
Start: 2019-07-11 | End: 2019-07-11 | Stop reason: HOSPADM

## 2019-07-11 RX ORDER — LEVALBUTEROL INHALATION SOLUTION 0.63 MG/3ML
0.31 SOLUTION RESPIRATORY (INHALATION) EVERY 8 HOURS PRN
Status: DISCONTINUED | OUTPATIENT
Start: 2019-07-11 | End: 2019-07-12

## 2019-07-11 RX ORDER — MIDAZOLAM HYDROCHLORIDE 1 MG/ML
2 INJECTION INTRAMUSCULAR; INTRAVENOUS ONCE
Status: DISCONTINUED | OUTPATIENT
Start: 2019-07-11 | End: 2019-07-11

## 2019-07-11 RX ORDER — ASPIRIN 81 MG/1
81 TABLET ORAL DAILY
Status: DISCONTINUED | OUTPATIENT
Start: 2019-07-11 | End: 2019-07-12

## 2019-07-11 RX ORDER — ATORVASTATIN CALCIUM 40 MG/1
40 TABLET, FILM COATED ORAL DAILY
Status: DISCONTINUED | OUTPATIENT
Start: 2019-07-11 | End: 2019-07-13 | Stop reason: HOSPADM

## 2019-07-11 RX ORDER — ACETAMINOPHEN 325 MG/1
650 TABLET ORAL EVERY 4 HOURS PRN
Status: DISCONTINUED | OUTPATIENT
Start: 2019-07-11 | End: 2019-07-13 | Stop reason: HOSPADM

## 2019-07-11 RX ORDER — KETAMINE HYDROCHLORIDE 100 MG/ML
INJECTION, SOLUTION INTRAMUSCULAR; INTRAVENOUS PRN
Status: DISCONTINUED | OUTPATIENT
Start: 2019-07-11 | End: 2019-07-11 | Stop reason: SDUPTHER

## 2019-07-11 RX ORDER — NITROGLYCERIN 20 MG/100ML
INJECTION INTRAVENOUS PRN
Status: DISCONTINUED | OUTPATIENT
Start: 2019-07-11 | End: 2019-07-11 | Stop reason: SDUPTHER

## 2019-07-11 RX ORDER — MIDAZOLAM HYDROCHLORIDE 1 MG/ML
INJECTION INTRAMUSCULAR; INTRAVENOUS
Status: COMPLETED
Start: 2019-07-11 | End: 2019-07-11

## 2019-07-11 RX ORDER — HYDRALAZINE HYDROCHLORIDE 20 MG/ML
INJECTION INTRAMUSCULAR; INTRAVENOUS PRN
Status: DISCONTINUED | OUTPATIENT
Start: 2019-07-11 | End: 2019-07-11 | Stop reason: SDUPTHER

## 2019-07-11 RX ORDER — BUPIVACAINE HYDROCHLORIDE AND EPINEPHRINE 5; 5 MG/ML; UG/ML
INJECTION, SOLUTION PERINEURAL PRN
Status: DISCONTINUED | OUTPATIENT
Start: 2019-07-11 | End: 2019-07-11 | Stop reason: HOSPADM

## 2019-07-11 RX ORDER — SCOLOPAMINE TRANSDERMAL SYSTEM 1 MG/1
1 PATCH, EXTENDED RELEASE TRANSDERMAL ONCE
Status: DISCONTINUED | OUTPATIENT
Start: 2019-07-11 | End: 2019-07-11 | Stop reason: HOSPADM

## 2019-07-11 RX ORDER — SODIUM CHLORIDE 0.9 % (FLUSH) 0.9 %
10 SYRINGE (ML) INJECTION PRN
Status: DISCONTINUED | OUTPATIENT
Start: 2019-07-11 | End: 2019-07-13 | Stop reason: HOSPADM

## 2019-07-11 RX ORDER — SODIUM CHLORIDE 9 MG/ML
INJECTION, SOLUTION INTRAVENOUS CONTINUOUS
Status: DISCONTINUED | OUTPATIENT
Start: 2019-07-11 | End: 2019-07-13 | Stop reason: HOSPADM

## 2019-07-11 RX ORDER — LISINOPRIL 5 MG/1
5 TABLET ORAL 2 TIMES DAILY
Status: DISCONTINUED | OUTPATIENT
Start: 2019-07-11 | End: 2019-07-13 | Stop reason: HOSPADM

## 2019-07-11 RX ORDER — ASPIRIN 81 MG/1
81 TABLET ORAL ONCE
Status: COMPLETED | OUTPATIENT
Start: 2019-07-11 | End: 2019-07-11

## 2019-07-11 RX ORDER — DIPHENHYDRAMINE HYDROCHLORIDE 50 MG/ML
12.5 INJECTION INTRAMUSCULAR; INTRAVENOUS
Status: DISCONTINUED | OUTPATIENT
Start: 2019-07-11 | End: 2019-07-11 | Stop reason: HOSPADM

## 2019-07-11 RX ORDER — PROPOFOL 10 MG/ML
INJECTION, EMULSION INTRAVENOUS PRN
Status: DISCONTINUED | OUTPATIENT
Start: 2019-07-11 | End: 2019-07-11 | Stop reason: SDUPTHER

## 2019-07-11 RX ORDER — METOCLOPRAMIDE HYDROCHLORIDE 5 MG/ML
10 INJECTION INTRAMUSCULAR; INTRAVENOUS
Status: DISCONTINUED | OUTPATIENT
Start: 2019-07-11 | End: 2019-07-11 | Stop reason: HOSPADM

## 2019-07-11 RX ORDER — LIDOCAINE HYDROCHLORIDE 10 MG/ML
1 INJECTION, SOLUTION EPIDURAL; INFILTRATION; INTRACAUDAL; PERINEURAL
Status: DISCONTINUED | OUTPATIENT
Start: 2019-07-11 | End: 2019-07-11 | Stop reason: HOSPADM

## 2019-07-11 RX ORDER — LEVOTHYROXINE SODIUM 0.05 MG/1
50 TABLET ORAL DAILY
Status: DISCONTINUED | OUTPATIENT
Start: 2019-07-11 | End: 2019-07-13 | Stop reason: HOSPADM

## 2019-07-11 RX ORDER — ONDANSETRON 2 MG/ML
4 INJECTION INTRAMUSCULAR; INTRAVENOUS EVERY 6 HOURS PRN
Status: DISCONTINUED | OUTPATIENT
Start: 2019-07-11 | End: 2019-07-13 | Stop reason: HOSPADM

## 2019-07-11 RX ORDER — LABETALOL 20 MG/4 ML (5 MG/ML) INTRAVENOUS SYRINGE
10
Status: DISCONTINUED | OUTPATIENT
Start: 2019-07-11 | End: 2019-07-13 | Stop reason: HOSPADM

## 2019-07-11 RX ORDER — SODIUM CHLORIDE 0.9 % (FLUSH) 0.9 %
10 SYRINGE (ML) INJECTION EVERY 12 HOURS SCHEDULED
Status: DISCONTINUED | OUTPATIENT
Start: 2019-07-11 | End: 2019-07-13 | Stop reason: HOSPADM

## 2019-07-11 RX ORDER — FENTANYL CITRATE 50 UG/ML
50 INJECTION, SOLUTION INTRAMUSCULAR; INTRAVENOUS
Status: DISCONTINUED | OUTPATIENT
Start: 2019-07-11 | End: 2019-07-11 | Stop reason: HOSPADM

## 2019-07-11 RX ORDER — CLOPIDOGREL BISULFATE 75 MG/1
75 TABLET ORAL DAILY
Status: DISCONTINUED | OUTPATIENT
Start: 2019-07-11 | End: 2019-07-12

## 2019-07-11 RX ORDER — DEXAMETHASONE SODIUM PHOSPHATE 10 MG/ML
INJECTION INTRAMUSCULAR; INTRAVENOUS PRN
Status: DISCONTINUED | OUTPATIENT
Start: 2019-07-11 | End: 2019-07-11 | Stop reason: SDUPTHER

## 2019-07-11 RX ORDER — HYDROCODONE BITARTRATE AND ACETAMINOPHEN 5; 325 MG/1; MG/1
2 TABLET ORAL EVERY 4 HOURS PRN
Status: DISCONTINUED | OUTPATIENT
Start: 2019-07-11 | End: 2019-07-13 | Stop reason: HOSPADM

## 2019-07-11 RX ORDER — LIDOCAINE HYDROCHLORIDE 10 MG/ML
INJECTION, SOLUTION INFILTRATION; PERINEURAL PRN
Status: DISCONTINUED | OUTPATIENT
Start: 2019-07-11 | End: 2019-07-11 | Stop reason: SDUPTHER

## 2019-07-11 RX ORDER — ROCURONIUM BROMIDE 10 MG/ML
INJECTION, SOLUTION INTRAVENOUS PRN
Status: DISCONTINUED | OUTPATIENT
Start: 2019-07-11 | End: 2019-07-11 | Stop reason: SDUPTHER

## 2019-07-11 RX ORDER — MIDAZOLAM HYDROCHLORIDE 1 MG/ML
2 INJECTION INTRAMUSCULAR; INTRAVENOUS
Status: DISCONTINUED | OUTPATIENT
Start: 2019-07-11 | End: 2019-07-11 | Stop reason: HOSPADM

## 2019-07-11 RX ORDER — METOPROLOL TARTRATE 50 MG/1
100 TABLET, FILM COATED ORAL 2 TIMES DAILY
Status: DISCONTINUED | OUTPATIENT
Start: 2019-07-11 | End: 2019-07-13 | Stop reason: HOSPADM

## 2019-07-11 RX ORDER — PROTAMINE SULFATE 10 MG/ML
INJECTION, SOLUTION INTRAVENOUS PRN
Status: DISCONTINUED | OUTPATIENT
Start: 2019-07-11 | End: 2019-07-11 | Stop reason: SDUPTHER

## 2019-07-11 RX ADMIN — HEPARIN SODIUM 5000 UNITS: 1000 INJECTION, SOLUTION INTRAVENOUS; SUBCUTANEOUS at 08:21

## 2019-07-11 RX ADMIN — ROCURONIUM BROMIDE 50 MG: 10 INJECTION INTRAVENOUS at 07:45

## 2019-07-11 RX ADMIN — SODIUM CHLORIDE, SODIUM LACTATE, POTASSIUM CHLORIDE, AND CALCIUM CHLORIDE: 600; 310; 30; 20 INJECTION, SOLUTION INTRAVENOUS at 09:00

## 2019-07-11 RX ADMIN — NITROGLYCERIN 50 MCG: 20 INJECTION INTRAVENOUS at 08:46

## 2019-07-11 RX ADMIN — HYDROMORPHONE HYDROCHLORIDE 0.5 MG: 1 INJECTION, SOLUTION INTRAMUSCULAR; INTRAVENOUS; SUBCUTANEOUS at 10:48

## 2019-07-11 RX ADMIN — NITROGLYCERIN 20 MCG: 20 INJECTION INTRAVENOUS at 09:58

## 2019-07-11 RX ADMIN — HYDROCODONE BITARTRATE AND ACETAMINOPHEN 2 TABLET: 5; 325 TABLET ORAL at 13:38

## 2019-07-11 RX ADMIN — ONDANSETRON 4 MG: 2 INJECTION INTRAMUSCULAR; INTRAVENOUS at 15:51

## 2019-07-11 RX ADMIN — ASPIRIN 81 MG: 81 TABLET, COATED ORAL at 06:56

## 2019-07-11 RX ADMIN — SODIUM CHLORIDE, SODIUM LACTATE, POTASSIUM CHLORIDE, AND CALCIUM CHLORIDE: 600; 310; 30; 20 INJECTION, SOLUTION INTRAVENOUS at 06:58

## 2019-07-11 RX ADMIN — PROTAMINE SULFATE 10 MG: 10 INJECTION, SOLUTION INTRAVENOUS at 10:09

## 2019-07-11 RX ADMIN — FENTANYL CITRATE 50 MCG: 50 INJECTION INTRAMUSCULAR; INTRAVENOUS at 08:08

## 2019-07-11 RX ADMIN — ATORVASTATIN CALCIUM 40 MG: 40 TABLET, FILM COATED ORAL at 21:27

## 2019-07-11 RX ADMIN — LISINOPRIL 5 MG: 5 TABLET ORAL at 13:38

## 2019-07-11 RX ADMIN — HYDROCODONE BITARTRATE AND ACETAMINOPHEN 1 TABLET: 5; 325 TABLET ORAL at 21:27

## 2019-07-11 RX ADMIN — ONDANSETRON HYDROCHLORIDE 4 MG: 2 INJECTION, SOLUTION INTRAMUSCULAR; INTRAVENOUS at 10:09

## 2019-07-11 RX ADMIN — PHENYLEPHRINE HYDROCHLORIDE 25 MCG/MIN: 10 INJECTION INTRAVENOUS at 07:50

## 2019-07-11 RX ADMIN — PROPOFOL 100 MG: 10 INJECTION, EMULSION INTRAVENOUS at 07:45

## 2019-07-11 RX ADMIN — MIDAZOLAM HYDROCHLORIDE: 2 INJECTION, SOLUTION INTRAMUSCULAR; INTRAVENOUS at 06:58

## 2019-07-11 RX ADMIN — DEXAMETHASONE SODIUM PHOSPHATE 10 MG: 10 INJECTION INTRAMUSCULAR; INTRAVENOUS at 07:53

## 2019-07-11 RX ADMIN — HYDRALAZINE HYDROCHLORIDE 5 MG: 20 INJECTION INTRAMUSCULAR; INTRAVENOUS at 09:58

## 2019-07-11 RX ADMIN — LISINOPRIL 5 MG: 5 TABLET ORAL at 21:27

## 2019-07-11 RX ADMIN — METOPROLOL TARTRATE 100 MG: 50 TABLET, FILM COATED ORAL at 21:27

## 2019-07-11 RX ADMIN — SODIUM CHLORIDE: 9 INJECTION, SOLUTION INTRAVENOUS at 16:29

## 2019-07-11 RX ADMIN — Medication 10 MG: at 08:47

## 2019-07-11 RX ADMIN — CLONIDINE HYDROCHLORIDE 0.1 MG: 0.1 TABLET ORAL at 21:27

## 2019-07-11 RX ADMIN — Medication 2 G: at 07:51

## 2019-07-11 RX ADMIN — NITROGLYCERIN 50 MCG: 20 INJECTION INTRAVENOUS at 08:55

## 2019-07-11 RX ADMIN — LIDOCAINE HYDROCHLORIDE 50 MG: 10 INJECTION, SOLUTION INFILTRATION; PERINEURAL at 07:45

## 2019-07-11 RX ADMIN — ROCURONIUM BROMIDE 20 MG: 10 INJECTION INTRAVENOUS at 09:17

## 2019-07-11 RX ADMIN — HYDROMORPHONE HYDROCHLORIDE 0.5 MG: 1 INJECTION, SOLUTION INTRAMUSCULAR; INTRAVENOUS; SUBCUTANEOUS at 10:41

## 2019-07-11 RX ADMIN — FENTANYL CITRATE 50 MCG: 50 INJECTION INTRAMUSCULAR; INTRAVENOUS at 08:57

## 2019-07-11 RX ADMIN — FENTANYL CITRATE 50 MCG: 50 INJECTION INTRAMUSCULAR; INTRAVENOUS at 07:39

## 2019-07-11 RX ADMIN — Medication 20 MG: at 07:45

## 2019-07-11 RX ADMIN — SUGAMMADEX 200 MG: 100 INJECTION, SOLUTION INTRAVENOUS at 10:22

## 2019-07-11 RX ADMIN — FENTANYL CITRATE 50 MCG: 50 INJECTION INTRAMUSCULAR; INTRAVENOUS at 07:45

## 2019-07-11 ASSESSMENT — PAIN SCALES - GENERAL
PAINLEVEL_OUTOF10: 9
PAINLEVEL_OUTOF10: 1
PAINLEVEL_OUTOF10: 9

## 2019-07-11 ASSESSMENT — PAIN SCALES - WONG BAKER: WONGBAKER_NUMERICALRESPONSE: 0

## 2019-07-11 ASSESSMENT — LIFESTYLE VARIABLES: SMOKING_STATUS: 0

## 2019-07-11 NOTE — PROGRESS NOTES
Patient is post op resection of right CCA pseudoaneurysm and repair with reversed left GSV common carotid to ICA bypass graft per  earlier today  Right neck dressing is CDI, EDILIA X 1 with bloody drainage noted in bulb. Left thigh with Mepilex dressing CDI. Patient A&O X3, moving all extremities without difficulty, speech clear.

## 2019-07-11 NOTE — PROGRESS NOTES
4 Eyes Skin Assessment    Cecelia Jordan is being assessed upon: Admission    I agree that Cande Avalos, along with Nawaf Duggan (either 2 RN's or 1 LPN and 1 RN) have performed a thorough Head to Toe Skin Assessment on the patient. ALL assessment sites listed below have been assessed. Areas assessed by both nurses:     [x]   Head, Face, and Ears   [x]   Shoulders, Back, and Chest  [x]   Arms, Elbows, and Hands   [x]   Coccyx, Sacrum, and Ischium  [x]   Legs, Feet, and Heels    Does the Patient have Skin Breakdown? Yes, wound(s) noted upon assessment. It is the responsibility of the Primary Nurse to assure that the following documentation, preventions, orders, and consults are complete on the above noted wound(s): Wound LDA initiated. LDA Flowsheet Documentation includes the Denise-wound, Wound Assessment, Measurements, Dressing Treatment, Drainage, and Color.     Gabino Prevention initiated: No  Wound Care Orders initiated: No    WOC nurse consulted for Pressure Injury (Stage 3,4, Unstageable, DTI, NWPT, and Complex wounds) and New or Established Ostomies: No        Primary Nurse eSignature: Carrol Brady RN on 7/11/2019 at 1:20 PM      Co-Signer eSignature: Electronically signed by Nawaf Duggan RN on 7/11/19 at 2:58 PM

## 2019-07-11 NOTE — ANESTHESIA PRE PROCEDURE
external iliac artery. TJR       Social History:    Social History     Tobacco Use    Smoking status: Former Smoker     Years: 2.00     Last attempt to quit: 1980     Years since quittin.5    Smokeless tobacco: Never Used   Substance Use Topics    Alcohol use: Yes     Comment: rarely maybe twice a year                                Counseling given: Not Answered      Vital Signs (Current): There were no vitals filed for this visit. BP Readings from Last 3 Encounters:   19 (!) 150/74   19 134/64   18 (!) 168/84       NPO Status:                                                                                 BMI:   Wt Readings from Last 3 Encounters:   19 98 lb (44.5 kg)   19 102 lb (46.3 kg)   18 99 lb (44.9 kg)     There is no height or weight on file to calculate BMI.    CBC:   Lab Results   Component Value Date    WBC 9.8 2019    RBC 4.24 2019    HGB 11.0 2019    HCT 36.6 2019    HCT 35.8 2012    MCV 86.3 2019    RDW 16.5 2019     2019     2012       CMP:   Lab Results   Component Value Date     2019     2012    K 4.1 2019    K 3.1 2018    K 4.0 2012    CL 92 2019     2012    CO2 29 2019    BUN 29 2019    CREATININE 1.2 2019    CREATININE 1.1 2012    LABGLOM 44 2019    GLUCOSE 92 2019    PROT 5.8 2018    CALCIUM 10.4 2019    BILITOT 0.5 2018    ALKPHOS 102 2018    AST 88 2018     2018       POC Tests: No results for input(s): POCGLU, POCNA, POCK, POCCL, POCBUN, POCHEMO, POCHCT in the last 72 hours.     Coags:   Lab Results   Component Value Date    PROTIME 14.6 2019    INR 1.20 2019    APTT 30.3 2019       HCG (If Applicable): No results found for: PREGTESTUR, PREGSERUM, HCG, HCGQUANT     ABGs:   Lab Results   Component Value Date    PHART 7.380 06/02/2016    PO2ART 72.0 06/02/2016    RFA1VCC 46.0 06/02/2016    YJB6KAA 27.2 06/02/2016    BEART 1.7 06/02/2016    W7SPXNXQ 94.3 06/02/2016        Type & Screen (If Applicable):  No results found for: [facility]    Anesthesia Evaluation  Patient summary reviewed and Nursing notes reviewed no history of anesthetic complications:   Airway: Mallampati: I  TM distance: >3 FB   Neck ROM: full  Mouth opening: > = 3 FB Dental:    (+) upper dentures and lower dentures      Pulmonary:normal exam    (+) COPD:      (-) not a current smoker                           Cardiovascular:    (+) hypertension:, past MI:, CAD:, CABG/stent:,       ECG reviewed               Beta Blocker:  Dose within 24 Hrs         Neuro/Psych:      (-) seizures and CVA           GI/Hepatic/Renal:   (+) PUD,      (-) GERD, liver disease and no renal disease       Endo/Other:    (+) hypothyroidism::., .    (-) diabetes mellitus               Abdominal:           Vascular:   + PVD, aortic or cerebral, . Anesthesia Plan      general     ASA 3       Induction: intravenous. arterial line  MIPS: Postoperative opioids intended and Prophylactic antiemetics administered. Anesthetic plan and risks discussed with patient. Plan discussed with CRNA.                   Carina Mullins MD   7/11/2019

## 2019-07-12 PROBLEM — I35.0 MILD AORTIC STENOSIS: Chronic | Status: ACTIVE | Noted: 2018-02-20

## 2019-07-12 PROBLEM — D62 POSTOPERATIVE ANEMIA DUE TO ACUTE BLOOD LOSS: Status: ACTIVE | Noted: 2019-07-12

## 2019-07-12 LAB
ANION GAP SERPL CALCULATED.3IONS-SCNC: 10 MMOL/L (ref 7–19)
BASOPHILS ABSOLUTE: 0 K/UL (ref 0–0.2)
BASOPHILS RELATIVE PERCENT: 0.1 % (ref 0–1)
BLOOD BANK DISPENSE STATUS: NORMAL
BLOOD BANK PRODUCT CODE: NORMAL
BPU ID: NORMAL
BUN BLDV-MCNC: 34 MG/DL (ref 8–23)
CALCIUM SERPL-MCNC: 8.5 MG/DL (ref 8.8–10.2)
CHLORIDE BLD-SCNC: 95 MMOL/L (ref 98–111)
CO2: 28 MMOL/L (ref 22–29)
CREAT SERPL-MCNC: 1.2 MG/DL (ref 0.5–0.9)
DESCRIPTION BLOOD BANK: NORMAL
EOSINOPHILS ABSOLUTE: 0 K/UL (ref 0–0.6)
EOSINOPHILS RELATIVE PERCENT: 0 % (ref 0–5)
GFR NON-AFRICAN AMERICAN: 44
GLUCOSE BLD-MCNC: 126 MG/DL (ref 74–109)
HCT VFR BLD CALC: 20.4 % (ref 37–47)
HEMOGLOBIN: 6.2 G/DL (ref 12–16)
LYMPHOCYTES ABSOLUTE: 1.2 K/UL (ref 1.1–4.5)
LYMPHOCYTES RELATIVE PERCENT: 9.1 % (ref 20–40)
MCH RBC QN AUTO: 26.8 PG (ref 27–31)
MCHC RBC AUTO-ENTMCNC: 30.4 G/DL (ref 33–37)
MCV RBC AUTO: 88.3 FL (ref 81–99)
MONOCYTES ABSOLUTE: 1.9 K/UL (ref 0–0.9)
MONOCYTES RELATIVE PERCENT: 15.1 % (ref 0–10)
NEUTROPHILS ABSOLUTE: 9.6 K/UL (ref 1.5–7.5)
NEUTROPHILS RELATIVE PERCENT: 75.2 % (ref 50–65)
PDW BLD-RTO: 16.3 % (ref 11.5–14.5)
PLATELET # BLD: 241 K/UL (ref 130–400)
PMV BLD AUTO: 9.9 FL (ref 9.4–12.3)
POTASSIUM SERPL-SCNC: 4.2 MMOL/L (ref 3.5–5)
RBC # BLD: 2.31 M/UL (ref 4.2–5.4)
SODIUM BLD-SCNC: 133 MMOL/L (ref 136–145)
WBC # BLD: 12.8 K/UL (ref 4.8–10.8)

## 2019-07-12 PROCEDURE — 36430 TRANSFUSION BLD/BLD COMPNT: CPT

## 2019-07-12 PROCEDURE — 2700000000 HC OXYGEN THERAPY PER DAY

## 2019-07-12 PROCEDURE — 36415 COLL VENOUS BLD VENIPUNCTURE: CPT

## 2019-07-12 PROCEDURE — 85025 COMPLETE CBC W/AUTO DIFF WBC: CPT

## 2019-07-12 PROCEDURE — 6370000000 HC RX 637 (ALT 250 FOR IP): Performed by: SURGERY

## 2019-07-12 PROCEDURE — 6360000002 HC RX W HCPCS: Performed by: SURGERY

## 2019-07-12 PROCEDURE — 99024 POSTOP FOLLOW-UP VISIT: CPT | Performed by: NURSE PRACTITIONER

## 2019-07-12 PROCEDURE — 2580000003 HC RX 258: Performed by: SURGERY

## 2019-07-12 PROCEDURE — 6360000002 HC RX W HCPCS: Performed by: NURSE PRACTITIONER

## 2019-07-12 PROCEDURE — 80048 BASIC METABOLIC PNL TOTAL CA: CPT

## 2019-07-12 PROCEDURE — P9016 RBC LEUKOCYTES REDUCED: HCPCS

## 2019-07-12 PROCEDURE — 94640 AIRWAY INHALATION TREATMENT: CPT

## 2019-07-12 PROCEDURE — 86923 COMPATIBILITY TEST ELECTRIC: CPT

## 2019-07-12 PROCEDURE — 1210000000 HC MED SURG R&B

## 2019-07-12 RX ORDER — LEVALBUTEROL INHALATION SOLUTION 0.63 MG/3ML
0.31 SOLUTION RESPIRATORY (INHALATION)
Status: DISCONTINUED | OUTPATIENT
Start: 2019-07-12 | End: 2019-07-12

## 2019-07-12 RX ORDER — LEVALBUTEROL INHALATION SOLUTION 0.63 MG/3ML
0.31 SOLUTION RESPIRATORY (INHALATION) EVERY 6 HOURS
Status: DISCONTINUED | OUTPATIENT
Start: 2019-07-12 | End: 2019-07-13 | Stop reason: HOSPADM

## 2019-07-12 RX ORDER — 0.9 % SODIUM CHLORIDE 0.9 %
250 INTRAVENOUS SOLUTION INTRAVENOUS ONCE
Status: COMPLETED | OUTPATIENT
Start: 2019-07-12 | End: 2019-07-12

## 2019-07-12 RX ADMIN — ATORVASTATIN CALCIUM 40 MG: 40 TABLET, FILM COATED ORAL at 08:34

## 2019-07-12 RX ADMIN — SODIUM CHLORIDE: 9 INJECTION, SOLUTION INTRAVENOUS at 05:26

## 2019-07-12 RX ADMIN — LISINOPRIL 5 MG: 5 TABLET ORAL at 21:07

## 2019-07-12 RX ADMIN — LISINOPRIL 5 MG: 5 TABLET ORAL at 08:34

## 2019-07-12 RX ADMIN — LEVALBUTEROL 0.31 MG: 0.63 SOLUTION RESPIRATORY (INHALATION) at 15:06

## 2019-07-12 RX ADMIN — HYDROCODONE BITARTRATE AND ACETAMINOPHEN 1 TABLET: 5; 325 TABLET ORAL at 08:40

## 2019-07-12 RX ADMIN — SODIUM CHLORIDE 250 ML: 9 INJECTION, SOLUTION INTRAVENOUS at 10:42

## 2019-07-12 RX ADMIN — HYDROCHLOROTHIAZIDE 25 MG: 25 TABLET ORAL at 08:34

## 2019-07-12 RX ADMIN — HYDROCODONE BITARTRATE AND ACETAMINOPHEN 1 TABLET: 5; 325 TABLET ORAL at 21:08

## 2019-07-12 RX ADMIN — LEVALBUTEROL 0.63 MG: 0.63 SOLUTION RESPIRATORY (INHALATION) at 07:09

## 2019-07-12 RX ADMIN — METOPROLOL TARTRATE 100 MG: 50 TABLET, FILM COATED ORAL at 21:07

## 2019-07-12 RX ADMIN — LEVALBUTEROL: 0.63 SOLUTION RESPIRATORY (INHALATION) at 18:39

## 2019-07-12 RX ADMIN — LEVOTHYROXINE SODIUM 50 MCG: 50 TABLET ORAL at 05:24

## 2019-07-12 RX ADMIN — METOPROLOL TARTRATE 100 MG: 50 TABLET, FILM COATED ORAL at 08:34

## 2019-07-12 ASSESSMENT — PAIN SCALES - GENERAL
PAINLEVEL_OUTOF10: 8
PAINLEVEL_OUTOF10: 5

## 2019-07-12 NOTE — PLAN OF CARE
Problem: Bleeding:  Goal: Will show no signs and symptoms of excessive bleeding  Description  Will show no signs and symptoms of excessive bleeding  Outcome: Ongoing     Problem: Falls - Risk of:  Goal: Will remain free from falls  Description  Will remain free from falls  Outcome: Ongoing  Goal: Absence of physical injury  Description  Absence of physical injury  Outcome: Ongoing     Problem: Nutritional:  Goal: Nutritional status will improve  Description  Nutritional status will improve  Outcome: Ongoing     Problem: Skin Integrity:  Goal: Demonstration of wound healing without infection will improve  Description  Demonstration of wound healing without infection will improve  Outcome: Ongoing  Goal: Complications related to intravenous access or infusion will be avoided or minimized  Description  Complications related to intravenous access or infusion will be avoided or minimized  Outcome: Ongoing     Problem: Pain:  Goal: Pain level will decrease  Description  Pain level will decrease  Outcome: Ongoing  Goal: Control of acute pain  Description  Control of acute pain  Outcome: Ongoing  Goal: Control of chronic pain  Description  Control of chronic pain  Outcome: Ongoing

## 2019-07-12 NOTE — PROGRESS NOTES
Vascular Surgery Rounding Note                                                     Dr.Timothy Ospina    7/12/2019  7:14 AM    Post op day #1  Resection of the pseudoaneurysm of the right common carotid artery with removal of all of the Dacron patch from old endarterectomy site and interpositional bypass from the right common carotid artery to the right internal carotid artery. Subjective: Resting in bed. Complaining of soreness, left inner thigh. Objective: Invalid input(s): 24H    Intake/Output Summary (Last 24 hours) at 7/12/2019 0714  Last data filed at 7/12/2019 0526  Gross per 24 hour   Intake 2384 ml   Output 1650 ml   Net 734 ml     No intake/output data recorded. Physical Exam:  Awake, alert, appropriate Yes  /62   Pulse 80   Temp 97.5 °F (36.4 °C)   Resp 16   Ht 5' 3\" (1.6 m)   Wt 98 lb (44.5 kg)   SpO2 99%   BMI 17.36 kg/m²   Heart - Normal HT with RRR. 2/6 systolic ejection murmur. No gallops or rubs. Lung - Clear, but diminished bilateral breath sounds. No wheezes or rhonchi. Neck - Supple. No JVD. Trachea midline. Right EDILIA to bulb suction with scant amount of serosanguinous drainage. Right neck dressing with mepilex dressing dry and intact. Abdomen - Soft, non-tender with active bowel sounds. Extremities - Feet warm to touch with palpable pedal pulses. Neurologic - Grossly intact. Wound surgical incisions - Right neck incision with EDILIA to bulb suction with total of 150 ml serosanguinous drainage since OR. Mild ecchymosis without swelling. Left inner thigh incision with staples intact. Moderate amount of sanguinous drainage requiring multiple dressing changes throughout the night. Moderate amount of ecchymosis around incision. Assessment:    1.  Carotid Aneurysm, Right - S/P Resection of the pseudoaneurysm of the right common carotid artery with removal of all of the Dacron patch from old endarterectomy site and interpositional

## 2019-07-13 VITALS
BODY MASS INDEX: 17.36 KG/M2 | DIASTOLIC BLOOD PRESSURE: 41 MMHG | OXYGEN SATURATION: 96 % | RESPIRATION RATE: 16 BRPM | TEMPERATURE: 98.9 F | HEART RATE: 71 BPM | WEIGHT: 98 LBS | SYSTOLIC BLOOD PRESSURE: 111 MMHG | HEIGHT: 63 IN

## 2019-07-13 LAB
HCT VFR BLD CALC: 24.7 % (ref 37–47)
HCT VFR BLD CALC: 25.7 % (ref 37–47)
HEMOGLOBIN: 7.5 G/DL (ref 12–16)
HEMOGLOBIN: 7.8 G/DL (ref 12–16)
MCH RBC QN AUTO: 26.6 PG (ref 27–31)
MCHC RBC AUTO-ENTMCNC: 30.4 G/DL (ref 33–37)
MCV RBC AUTO: 87.7 FL (ref 81–99)
PDW BLD-RTO: 15.8 % (ref 11.5–14.5)
PLATELET # BLD: 212 K/UL (ref 130–400)
PMV BLD AUTO: 9.9 FL (ref 9.4–12.3)
RBC # BLD: 2.93 M/UL (ref 4.2–5.4)
WBC # BLD: 9.4 K/UL (ref 4.8–10.8)

## 2019-07-13 PROCEDURE — 85027 COMPLETE CBC AUTOMATED: CPT

## 2019-07-13 PROCEDURE — 85018 HEMOGLOBIN: CPT

## 2019-07-13 PROCEDURE — 99024 POSTOP FOLLOW-UP VISIT: CPT | Performed by: SURGERY

## 2019-07-13 PROCEDURE — 6360000002 HC RX W HCPCS: Performed by: SURGERY

## 2019-07-13 PROCEDURE — 2500000003 HC RX 250 WO HCPCS: Performed by: SURGERY

## 2019-07-13 PROCEDURE — 36415 COLL VENOUS BLD VENIPUNCTURE: CPT

## 2019-07-13 PROCEDURE — 85014 HEMATOCRIT: CPT

## 2019-07-13 PROCEDURE — 2700000000 HC OXYGEN THERAPY PER DAY

## 2019-07-13 PROCEDURE — 94640 AIRWAY INHALATION TREATMENT: CPT

## 2019-07-13 PROCEDURE — 6370000000 HC RX 637 (ALT 250 FOR IP): Performed by: SURGERY

## 2019-07-13 RX ORDER — CLOPIDOGREL BISULFATE 75 MG/1
75 TABLET ORAL DAILY
Status: DISCONTINUED | OUTPATIENT
Start: 2019-07-13 | End: 2019-07-13 | Stop reason: HOSPADM

## 2019-07-13 RX ORDER — ARFORMOTEROL TARTRATE 15 UG/2ML
15 SOLUTION RESPIRATORY (INHALATION) 2 TIMES DAILY
Qty: 120 ML | Refills: 3 | Status: ON HOLD | OUTPATIENT
Start: 2019-07-13 | End: 2019-11-16 | Stop reason: HOSPADM

## 2019-07-13 RX ORDER — ASPIRIN 81 MG/1
81 TABLET ORAL ONCE
Status: DISCONTINUED | OUTPATIENT
Start: 2019-07-13 | End: 2019-07-13 | Stop reason: HOSPADM

## 2019-07-13 RX ADMIN — LEVALBUTEROL 0.63 MG: 0.63 SOLUTION RESPIRATORY (INHALATION) at 07:16

## 2019-07-13 RX ADMIN — HYDROCHLOROTHIAZIDE 25 MG: 25 TABLET ORAL at 10:07

## 2019-07-13 RX ADMIN — HYDROCODONE BITARTRATE AND ACETAMINOPHEN 1 TABLET: 5; 325 TABLET ORAL at 12:03

## 2019-07-13 RX ADMIN — ATORVASTATIN CALCIUM 40 MG: 40 TABLET, FILM COATED ORAL at 10:07

## 2019-07-13 RX ADMIN — LEVALBUTEROL: 0.63 SOLUTION RESPIRATORY (INHALATION) at 02:30

## 2019-07-13 RX ADMIN — LEVOTHYROXINE SODIUM 50 MCG: 50 TABLET ORAL at 07:10

## 2019-07-13 RX ADMIN — METOPROLOL TARTRATE 100 MG: 50 TABLET, FILM COATED ORAL at 09:00

## 2019-07-13 RX ADMIN — CLOPIDOGREL BISULFATE 75 MG: 75 TABLET, FILM COATED ORAL at 10:07

## 2019-07-13 RX ADMIN — LISINOPRIL 5 MG: 5 TABLET ORAL at 09:00

## 2019-07-13 RX ADMIN — HYDROCODONE BITARTRATE AND ACETAMINOPHEN 1 TABLET: 5; 325 TABLET ORAL at 16:13

## 2019-07-13 RX ADMIN — LABETALOL 20 MG/4 ML (5 MG/ML) INTRAVENOUS SYRINGE 10 MG: at 02:39

## 2019-07-13 ASSESSMENT — PAIN SCALES - GENERAL
PAINLEVEL_OUTOF10: 8
PAINLEVEL_OUTOF10: 5

## 2019-07-13 NOTE — DISCHARGE SUMMARY
TONYHILDA BARAJAS [facility] QAMAR Mueller [address] [facility]                               DISCHARGE SUMMARY    PATIENT NAME: Jie Lay                 :        1945  MED REC NO:   [id number]                              ROOM:       [facility]  ACCOUNT NO:   [de-identified]                           ADMIT DATE: 2019  PROVIDER:     Bhavna Burton MD                  [facility] DATE:      REASON FOR ADMISSION:  Right common carotid artery pseudoaneurysm. BRIEF HISTORY:  The patient is a 77-year-old female who years ago  underwent a carotid endarterectomy with Dacron patch on the right side. She has developed a aneurysm/pseudoaneurysm of the distal common carotid  artery. She comes in for resection of that. PAST MEDICAL HISTORY:  Her past medical history and ongoing medical  problems that were addressed during her stay was her hypertension, was  treated with medications; her aortic valvular disease, which was treated  with close attention to fluid status and close attention to her cardiac  medications. For details of her presenting history and physical, please refer to the  history and physical for admission. PROCEDURE AND TREATMENT/HOSPITAL COURSE:  The patient was brought in as  an outpatient admission. She was taken to the operating room where she  underwent resection of the aneurysm. This was a pseudoaneurysm and a  portion of the patch was unincorporated and because there was some  possibility that this was infected Dacron causing the pseudoaneurysm,  decision was made to remove all of the Dacron. Because of some recurrent plaquing with removal of the Dacron, it became  much more appropriate to replace that whole segment of the removed  artery. The external carotid artery was oversewn at its origin and an  inner positional bypass was placed between the right common carotid  artery and the right internal carotid end-to-end.   This was done

## 2019-07-17 ENCOUNTER — TELEPHONE (OUTPATIENT)
Dept: PULMONOLOGY | Facility: CLINIC | Age: 74
End: 2019-07-17

## 2019-07-17 NOTE — TELEPHONE ENCOUNTER
Informed patient she could take the xopenex neb tx up to 4 times a day and the Brovana, she was given in the hospital, twice a day per Kwabena's office note in May.

## 2019-07-19 LAB
ANAEROBIC CULTURE: NORMAL
CULTURE SURGICAL: NORMAL
GRAM STAIN RESULT: NORMAL

## 2019-07-23 ENCOUNTER — OFFICE VISIT (OUTPATIENT)
Dept: VASCULAR SURGERY | Age: 74
End: 2019-07-23

## 2019-07-23 VITALS
HEART RATE: 83 BPM | DIASTOLIC BLOOD PRESSURE: 68 MMHG | TEMPERATURE: 98.8 F | RESPIRATION RATE: 18 BRPM | SYSTOLIC BLOOD PRESSURE: 138 MMHG

## 2019-07-23 DIAGNOSIS — I72.0 CAROTID ANEURYSM, RIGHT (HCC): ICD-10-CM

## 2019-07-23 DIAGNOSIS — I65.23 BILATERAL CAROTID ARTERY STENOSIS: Primary | ICD-10-CM

## 2019-07-23 PROCEDURE — 99024 POSTOP FOLLOW-UP VISIT: CPT | Performed by: NURSE PRACTITIONER

## 2019-07-23 RX ORDER — HYDROCODONE BITARTRATE AND ACETAMINOPHEN 5; 325 MG/1; MG/1
1 TABLET ORAL EVERY 4 HOURS PRN
Refills: 0 | COMMUNITY
Start: 2019-07-13 | End: 2019-09-25

## 2019-07-31 ENCOUNTER — TELEPHONE (OUTPATIENT)
Dept: PULMONOLOGY | Facility: CLINIC | Age: 74
End: 2019-07-31

## 2019-07-31 RX ORDER — PREDNISONE 10 MG/1
TABLET ORAL
Qty: 31 TABLET | Refills: 0 | Status: SHIPPED | OUTPATIENT
Start: 2019-07-31 | End: 2019-09-03 | Stop reason: ALTCHOICE

## 2019-07-31 NOTE — TELEPHONE ENCOUNTER
"Patient was in the hospital recently and missed some of her neb treatments. She states she developed some chest congestion while in the hospital and it has gotten worse since she got home. She states she does not have a cough or wheezing. Her chest just feels \"heavy\" like she has \"junk\" in there that needs to come up. No fever.She is on her nebulizer treatments, which includes Brovana bid. She is only taking mucinex 600mg bid.  "

## 2019-07-31 NOTE — TELEPHONE ENCOUNTER
She should increase Mucinex to 1200 mg twice a day until the chest congestion improves.  I am sending in a prescription for steroids as well.

## 2019-08-20 ENCOUNTER — TELEPHONE (OUTPATIENT)
Dept: PULMONOLOGY | Facility: CLINIC | Age: 74
End: 2019-08-20

## 2019-08-20 DIAGNOSIS — J44.1 COPD EXACERBATION (HCC): Primary | ICD-10-CM

## 2019-08-20 RX ORDER — AMOXICILLIN AND CLAVULANATE POTASSIUM 500; 125 MG/1; MG/1
1 TABLET, FILM COATED ORAL 2 TIMES DAILY
Qty: 20 TABLET | Refills: 0 | Status: SHIPPED | OUTPATIENT
Start: 2019-08-20 | End: 2019-08-30

## 2019-08-20 NOTE — TELEPHONE ENCOUNTER
I want her to go get a chest x-ray at [facility] please (I have ordered it).  A prescription for antibiotics was sent to pharmacy as well.  She should continue her breathing treatments and Mucinex.

## 2019-08-20 NOTE — TELEPHONE ENCOUNTER
Patient took all of the steroids you sent her on 7/29/19. She states her chest and back feels like it did when she had pneumonia. She is coughing up small amounts of sputum with no color. She has no fever. She has only been taking mucinex 1200 daily, she will increase to twice a day. But she wonders if there is anything else she needs to take? She said she was going to start taking her levalbuterol neb treatments twice a day also.

## 2019-08-21 DIAGNOSIS — J44.1 COPD EXACERBATION (HCC): ICD-10-CM

## 2019-08-22 ENCOUNTER — TELEPHONE (OUTPATIENT)
Dept: PULMONOLOGY | Facility: CLINIC | Age: 74
End: 2019-08-22

## 2019-08-22 RX ORDER — FUROSEMIDE 20 MG/1
20 TABLET ORAL 2 TIMES DAILY
Qty: 14 TABLET | Refills: 3 | Status: SHIPPED | OUTPATIENT
Start: 2019-08-22 | End: 2019-08-29

## 2019-08-22 NOTE — TELEPHONE ENCOUNTER
I sent in a prescription for Lasix that she can take up to 2 times a day as needed for the next 7 days.  However, if she does not have a positive response to additional diuretic in the next 2 or 3 days, I would want her to be seen at the emergency room.

## 2019-09-01 NOTE — PROGRESS NOTES
" ASHER Cabello  [facility]   Respiratory Disease Clinic  [address] [place] [address]  Phone: [phone]  Fax: [phone]     Steffi Michelle is a 74 y.o. female.   CC:   Chief Complaint   Patient presents with   • COPD        HPI: She was having some ongoing issues with \"chest congestion, cough and shortness of breath\" over the last few weeks.  On August 21, I had her get a chest x-ray at [facility] that showed some cardiomegaly and mild pulmonary edema with a possible small right pleural effusion.  The patient was already taking HCTZ and we added a couple of days of 20 mg of Lasix.  The patient diuresed well from that but did have a few episodes of feeling some mild dizziness and I suspect her blood pressure might have gotten low.  She has a history of severe COPD, coronary artery disease, previous mitral valve replacement and being a former smoker.  She is accompanied by her daughter Ruth today and they both report that she is feeling better in regards to the chest congestion and cough but the patient reports that she has very little energy and feels fatigued.  She is scheduled to see [facility] cardiology later this month and I encouraged her to keep that appointment as she may need an updated echocardiogram.  She continues using Xopenex and Brovana and taking Mucinex daily for her COPD.  She stays up-to-date on her flu and pneumonia vaccines and is followed by Dr. Gupta's office for routine medical care.    The following portions of the patient's history were reviewed and updated as appropriate: allergies, current medications, past family history, past medical history, past social history, past surgical history and problem list.    Past Medical History:   Diagnosis Date   • Arthritis    • CAD (coronary artery disease)    • Colon polyp    • Elevated cholesterol    • History of heart artery stent    • Hypertension        Family History   Problem Relation Age of Onset   • " "Diabetes Mother    • Heart disease Mother    • Cancer Other    • Heart disease Other    • Hypertension Other    • Drug abuse Other        Social History     Socioeconomic History   • Marital status:      Spouse name: Not on file   • Number of children: Not on file   • Years of education: Not on file   • Highest education level: Not on file   Tobacco Use   • Smoking status: Former Smoker     Packs/day: 0.25     Years: 10.00     Pack years: 2.50     Types: Cigarettes     Last attempt to quit:      Years since quittin.6   • Smokeless tobacco: Never Used   Substance and Sexual Activity   • Alcohol use: No   • Drug use: No   • Sexual activity: Defer       Review of Systems   Constitutional: Positive for fatigue. Negative for appetite change, chills and fever.        Very poor exercise tolerance   HENT: Negative for swollen glands, trouble swallowing and voice change.    Eyes: Negative for visual disturbance.   Respiratory: Positive for shortness of breath. Negative for cough and wheezing.    Cardiovascular: Negative for chest pain and leg swelling.   Gastrointestinal: Negative for abdominal distention, abdominal pain, nausea and vomiting.   Genitourinary: Negative.    Musculoskeletal: Negative for gait problem and myalgias.   Skin: Negative.    Neurological: Negative for weakness.   Hematological: Negative.    Psychiatric/Behavioral: The patient is not nervous/anxious.        /70   Pulse 62   Ht 162.6 cm (64\")   Wt 43.1 kg (95 lb)   SpO2 94% Comment: RA  Breastfeeding? No   BMI 16.31 kg/m²     Physical Exam  Constitutional: She is oriented to person, place, and time. She appears well-developed. No distress.   thin   HENT:   Head: Normocephalic and atraumatic.   Eyes: Pupils are equal, round, and reactive to light. No scleral icterus.   Neck: Normal range of motion. Neck supple.   Cardiovascular: Normal rate, regular rhythm, S1 normal and S2 normal.   Murmur heard.   Systolic murmur is " present.  Pulmonary/Chest: Effort normal. No tachypnea. She has decreased breath sounds (At the bases). She has no wheezes or rhonchi.  She has no rales.   Abdominal: Soft. Bowel sounds are normal. She exhibits no distension.   Musculoskeletal: Normal range of motion. She exhibits no edema.   Lymphadenopathy:     She has no cervical adenopathy.   Neurological: She is alert and oriented to person, place, and time.   Skin: Skin is warm and dry. No rash noted. No cyanosis. Nails show no clubbing.   Scar tissue to right ankle from previous grafting with chronic venous stasis changes.   Psychiatric: She has a normal mood and affect. Her behavior is normal.   Vitals reviewed.    Steffi was seen today for copd.    Diagnoses and all orders for this visit:    Stage 3 severe COPD by GOLD classification (CMS/[facility])    H/O prosthetic heart valve    Underweight    Former smoker      Patient's Body mass index is 16.31 kg/m². BMI is below normal parameters. Recommendations include: treating the underlying disease process.      Follow-up/Plan: She will continue on her current pulmonary regimen of Xopenex, Brovana and Mucinex.  I recommend she keep her follow-up with ASHER Griffith at [facility] cardiology later this month.  We will see her back in clinic in approximately 6 months with flow volume loop on return.    ASHER Cabello  9/3/2019  12:56 PM

## 2019-09-03 ENCOUNTER — OFFICE VISIT (OUTPATIENT)
Dept: PULMONOLOGY | Facility: CLINIC | Age: 74
End: 2019-09-03

## 2019-09-03 VITALS
OXYGEN SATURATION: 94 % | WEIGHT: 95 LBS | HEIGHT: 64 IN | HEART RATE: 62 BPM | BODY MASS INDEX: 16.22 KG/M2 | DIASTOLIC BLOOD PRESSURE: 70 MMHG | SYSTOLIC BLOOD PRESSURE: 102 MMHG

## 2019-09-03 DIAGNOSIS — R63.6 UNDERWEIGHT: ICD-10-CM

## 2019-09-03 DIAGNOSIS — Z95.2 H/O PROSTHETIC HEART VALVE: ICD-10-CM

## 2019-09-03 DIAGNOSIS — J44.9 STAGE 3 SEVERE COPD BY GOLD CLASSIFICATION (HCC): Primary | ICD-10-CM

## 2019-09-03 DIAGNOSIS — Z87.891 FORMER SMOKER: ICD-10-CM

## 2019-09-03 PROCEDURE — 99214 OFFICE O/P EST MOD 30 MIN: CPT | Performed by: NURSE PRACTITIONER

## 2019-09-03 NOTE — PATIENT INSTRUCTIONS
Continue on Brovana and Xopenex nebs.     I recommend you talk to cardiology about an updated echo      Echocardiogram  An echocardiogram is a procedure that uses painless sound waves (ultrasound) to produce an image of the heart. Images from an echocardiogram can provide important information about:  · Signs of coronary artery disease (CAD).  · Aneurysm detection. An aneurysm is a weak or damaged part of an artery wall that bulges out from the normal force of blood pumping through the body.  · Heart size and shape. Changes in the size or shape of the heart can be associated with certain conditions, including heart failure, aneurysm, and CAD.  · Heart muscle function.  · Heart valve function.  · Signs of a past heart attack.  · Fluid buildup around the heart.  · Thickening of the heart muscle.  · A tumor or infectious growth around the heart valves.  Tell a health care provider about:  · Any allergies you have.  · All medicines you are taking, including vitamins, herbs, eye drops, creams, and over-the-counter medicines.  · Any blood disorders you have.  · Any surgeries you have had.  · Any medical conditions you have.  · Whether you are pregnant or may be pregnant.  What are the risks?  Generally, this is a safe procedure. However, problems may occur, including:  · Allergic reaction to dye (contrast) that may be used during the procedure.  What happens before the procedure?  No specific preparation is needed. You may eat and drink normally.  What happens during the procedure?    · An IV tube may be inserted into one of your veins.  · You may receive contrast through this tube. A contrast is an injection that improves the quality of the pictures from your heart.  · A gel will be applied to your chest.  · A wand-like tool (transducer) will be moved over your chest. The gel will help to transmit the sound waves from the transducer.  · The sound waves will harmlessly bounce off of your heart to allow the heart images to  be captured in real-time motion. The images will be recorded on a computer.  The procedure may vary among health care providers and hospitals.  What happens after the procedure?  · You may return to your normal, everyday life, including diet, activities, and medicines, unless your health care provider tells you not to do that.  Summary  · An echocardiogram is a procedure that uses painless sound waves (ultrasound) to produce an image of the heart.  · Images from an echocardiogram can provide important information about the size and shape of your heart, heart muscle function, heart valve function, and fluid buildup around your heart.  · You do not need to do anything to prepare before this procedure. You may eat and drink normally.  · After the echocardiogram is completed, you may return to your normal, everyday life, unless your health care provider tells you not to do that.  This information is not intended to replace advice given to you by your health care provider. Make sure you discuss any questions you have with your health care provider.  Document Released: 12/15/2001 Document Revised: 01/20/2018 Document Reviewed: 01/20/2018  [email or web address] Interactive Patient Education © 2019 [employer]

## 2019-09-25 ENCOUNTER — OFFICE VISIT (OUTPATIENT)
Dept: CARDIOLOGY | Age: 74
End: 2019-09-25
Payer: MEDICARE

## 2019-09-25 VITALS
HEART RATE: 64 BPM | SYSTOLIC BLOOD PRESSURE: 136 MMHG | BODY MASS INDEX: 17.72 KG/M2 | DIASTOLIC BLOOD PRESSURE: 70 MMHG | WEIGHT: 100 LBS | HEIGHT: 63 IN

## 2019-09-25 DIAGNOSIS — Z95.1 S/P CORONARY ARTERY BYPASS GRAFT X 1: ICD-10-CM

## 2019-09-25 DIAGNOSIS — Z98.890 STATUS POST MAZE OPERATION FOR ATRIAL FIBRILLATION: ICD-10-CM

## 2019-09-25 DIAGNOSIS — Z95.2 S/P MITRAL VALVE REPLACEMENT: ICD-10-CM

## 2019-09-25 DIAGNOSIS — Z86.79 STATUS POST MAZE OPERATION FOR ATRIAL FIBRILLATION: ICD-10-CM

## 2019-09-25 DIAGNOSIS — I48.0 PAF (PAROXYSMAL ATRIAL FIBRILLATION) (HCC): ICD-10-CM

## 2019-09-25 DIAGNOSIS — E78.2 MIXED HYPERLIPIDEMIA: ICD-10-CM

## 2019-09-25 DIAGNOSIS — I10 BENIGN ESSENTIAL HTN: Chronic | ICD-10-CM

## 2019-09-25 DIAGNOSIS — I25.10 CORONARY ARTERY DISEASE INVOLVING NATIVE CORONARY ARTERY OF NATIVE HEART, ANGINA PRESENCE UNSPECIFIED: Primary | ICD-10-CM

## 2019-09-25 DIAGNOSIS — I35.0 MILD AORTIC STENOSIS: Chronic | ICD-10-CM

## 2019-09-25 DIAGNOSIS — R06.09 DOE (DYSPNEA ON EXERTION): ICD-10-CM

## 2019-09-25 DIAGNOSIS — I34.0 MITRAL VALVE INSUFFICIENCY, UNSPECIFIED ETIOLOGY: ICD-10-CM

## 2019-09-25 DIAGNOSIS — R53.83 FATIGUE, UNSPECIFIED TYPE: ICD-10-CM

## 2019-09-25 DIAGNOSIS — R07.9 CHEST PAIN, UNSPECIFIED TYPE: ICD-10-CM

## 2019-09-25 DIAGNOSIS — I05.0 MITRAL VALVE STENOSIS, UNSPECIFIED ETIOLOGY: ICD-10-CM

## 2019-09-25 PROCEDURE — G8427 DOCREV CUR MEDS BY ELIG CLIN: HCPCS | Performed by: NURSE PRACTITIONER

## 2019-09-25 PROCEDURE — 1036F TOBACCO NON-USER: CPT | Performed by: NURSE PRACTITIONER

## 2019-09-25 PROCEDURE — 93000 ELECTROCARDIOGRAM COMPLETE: CPT | Performed by: NURSE PRACTITIONER

## 2019-09-25 PROCEDURE — 1090F PRES/ABSN URINE INCON ASSESS: CPT | Performed by: NURSE PRACTITIONER

## 2019-09-25 PROCEDURE — 4040F PNEUMOC VAC/ADMIN/RCVD: CPT | Performed by: NURSE PRACTITIONER

## 2019-09-25 PROCEDURE — G8400 PT W/DXA NO RESULTS DOC: HCPCS | Performed by: NURSE PRACTITIONER

## 2019-09-25 PROCEDURE — 3017F COLORECTAL CA SCREEN DOC REV: CPT | Performed by: NURSE PRACTITIONER

## 2019-09-25 PROCEDURE — G8419 CALC BMI OUT NRM PARAM NOF/U: HCPCS | Performed by: NURSE PRACTITIONER

## 2019-09-25 PROCEDURE — G8598 ASA/ANTIPLAT THER USED: HCPCS | Performed by: NURSE PRACTITIONER

## 2019-09-25 PROCEDURE — 1123F ACP DISCUSS/DSCN MKR DOCD: CPT | Performed by: NURSE PRACTITIONER

## 2019-09-25 PROCEDURE — 99214 OFFICE O/P EST MOD 30 MIN: CPT | Performed by: NURSE PRACTITIONER

## 2019-09-25 RX ORDER — LEVOTHYROXINE SODIUM 0.03 MG/1
50 TABLET ORAL DAILY
Status: ON HOLD | COMMUNITY
End: 2019-11-15

## 2019-09-25 NOTE — PATIENT INSTRUCTIONS
New instructions for today:  Echocardiogram -  No prep. [place] at the [facility] and [address] located on the first floor of [place] [address] through hospital main entrance and turn immediately to your left. Date/Time:   Takes approximately 30 min. An echocardiogram uses sound waves to produce images of your heart. This commonly used test allows your doctor to see how your heart is beating and pumping blood. Your doctor can use the images from an echocardiogram to identify various abnormalities in the heart muscle and valves. This test has 2 parts:   Ø You will be asked to disrobe from the waist up and given a gown to wear. The technologist will then hook up an EKG monitor to you for the entire exam.   Ø You will then have an ultrasound of your heart (echocardiogram) to assess the heart muscle, heart valves and heart function. You may eat and take any medicines before the exam.   If you need to change your appointment, please call outpatient scheduling at [phone]. Patient Instructions:  Continue current medications as prescribed. Always keep a current medication list. Bring your medications to every office visit. Continue to follow up with primary care provider for non cardiac medical problems. Call the office with any problems, questions or concerns at [phone]. If you have been asked to keep a blood pressure log, do so for 2 weeks. Call the office to report readings to the triage nurse at [phone]. Follow up with cardiologist as scheduled. The following educational material has been included in this after visit summary for your review: Life simple 7. Mitral stenosis      Life simple 7  1) Manage blood pressure - high blood pressure is a major risk factor for heart disease and stroke. Keeping blood pressure in health range reduces strain on your heart, arteries and kidneys. Blood pressure goal is less than 130/80.    2) Control cholesterol - contributes getting better within 5 minutes after you take a dose of nitroglycerin.     · You have symptoms of a heart attack. These may include:  ? Chest pain or pressure, or a strange feeling in the chest.  ? Sweating. ? Shortness of breath. ? Nausea or vomiting. ? Pain, pressure, or a strange feeling in the back, neck, jaw, or upper belly or in one or both shoulders or arms. ? Dizziness or lightheadedness. ? A fast or irregular pulse. After you call 911, the  may tell you to chew 1 adult-strength or 2 to 4 low-dose aspirin. Wait for an ambulance. Do not try to drive yourself.     · You have severe shortness of breath.     · You passed out (lost consciousness).     · You have symptoms of a stroke. These may include:  ? Sudden numbness, tingling, weakness, or loss of movement in your face, arm, or leg, especially on only one side of your body. ? Sudden vision changes. ? Sudden trouble speaking. ? Sudden confusion or trouble understanding simple statements. ? Sudden problems with walking or balance. ? A sudden, severe headache that is different from past headaches.    Call your doctor now or seek immediate medical care if:    · You are short of breath and cough up foamy, pink mucus.     · You feel very tired.     · You are dizzy or lightheaded, or you feel like you may faint.     · You have new or increased shortness of breath.     · You have a fever.     · You have sudden weight gain, such as more than 2 to 3 pounds in a day or 5 pounds in a week. (Your doctor may suggest a different range of weight gain.)     · You have increased swelling in your legs, ankles, or feet.    Watch closely for changes in your health, and be sure to contact your doctor if you have any problems. Where can you learn more? Go to [email or web address] org and sign in to your [employer] account. Enter H661 in the Yovigo box to learn more about \"Mitral Valve Stenosis: Care Instructions. \"     If you do

## 2019-09-25 NOTE — PROGRESS NOTES
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 [place] Right early 1990's    long ago   [address]    COLONOSCOPY      CORONARY ARTERY BYPASS GRAFT  2016    DILATION AND CURETTAGE OF UTERUS      ILIO-FEMORAL BYPASS GRAFT N/A 6/2/2016    OPEN TRANSLUMINAL BALLOON ANGIOPLASTY AND STENTING OF RIGHT COMMON AND EXTERNAL  ILIAC ARTERIES; RIGHT FEMORAL ENDARTERECTOMY WITH VEIN PATCH ANGIOPLASTY performed by Courtney Peraza MD at [address] [facility] N/A 4/7/2016    MITRAL VALVE  REPLACEMENT LUONG-MAZE ABLATION WITH CRYO PROCEDURE, CORONARY ARTERY BYPASS GRAFT X 1 WITH ENDOSCOPIC VEIN HARVESTING WITH PERFUSION TRANSESOPHAGEAL ECHOCARDIOGRAM performed by Emma Burnette MD at [address] [facility] MITRAL VALVE REPLACEMENT  2016    [place] REOPER, CAROTID ENDARTEC>1 MON Left 1/11/2018    REMOVAL OF HEMATOMA, LEFT CAROTID ARTERY performed by Courtney Peraza MD at [address] Left 1/11/2018    LEFT CAROTID ENDARTERECTOMY WITH EEG MONITORING AND COMPLETION DUPLEX ULTRASOUND performed by Courtney Peraza MD at [address] [facility] SKIN GRAFT Right 7/22/2016    Skin graft split thickness foot,ankle,and leg. Right leg 26x8cm and 12x6cm total area 280cm squared. [facility]    UPPER GASTROINTESTINAL ENDOSCOPY  3/15/16    Dr Nadege Lorenzo  3/15/2016    EGD BIOPSY performed by Daniel Ac DO at [address] [facility]    VASCULAR SURGERY  5/27/16 [facility]    Aortagram and right leg runoff,right leg runoff,right common iliac artery selection for right leg run off views.  VASCULAR SURGERY      . Open transluminal angioplasty and stenting of the external iliac artery. [facility]    VASCULAR SURGERY  2019    [facility]. 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 Family History   Problem Relation Age of Onset    Diabetes Mother     Heart Disease Mother     Heart Failure Mother     Diabetes Sister     Heart Disease Sister     High Blood Pressure Sister      Social History     Tobacco Use    Smoking status: Former Smoker     Years: 2.00     Last attempt to quit: 1980     Years since quittin.7    Smokeless tobacco: Never Used   Substance Use Topics    Alcohol use: Yes     Comment: rarely maybe twice a year      Current Outpatient Medications   Medication Sig Dispense Refill    levothyroxine (SYNTHROID) 25 MCG tablet Take 25 mcg by mouth Daily      Arformoterol Tartrate (BROVANA) 15 MCG/2ML NEBU Take 2 mLs by nebulization 2 times daily 120 mL 3    hydrochlorothiazide (HYDRODIURIL) 25 MG tablet Take 25 mg by mouth daily      atorvastatin (LIPITOR) 40 MG tablet Take 1 tablet by mouth daily 30 tablet 0    lisinopril (PRINIVIL;ZESTRIL) 5 MG tablet Take 1 tablet by mouth 2 times daily 60 tablet 5    metoprolol (LOPRESSOR) 100 MG tablet Take 1 tablet by mouth 2 times daily 60 tablet 5    calcium-cholecalciferol (CALCIUM 500+D) 500-200 MG-UNIT per tablet Take 1 tablet by mouth daily      Probiotic Product (PROBIOTIC ADVANCED PO) Take 1 capsule by mouth daily       Biotin 5000 MCG TABS Take 1 tablet by mouth daily       aspirin EC 81 MG EC tablet Take 81 mg by mouth daily. No current facility-administered medications for this visit.         Allergies: Levaquin

## 2019-10-14 ENCOUNTER — HOSPITAL ENCOUNTER (OUTPATIENT)
Dept: NON INVASIVE DIAGNOSTICS | Age: 74
Discharge: HOME | End: 2019-10-14
Payer: MEDICARE

## 2019-10-14 DIAGNOSIS — I05.0 MITRAL VALVE STENOSIS, UNSPECIFIED ETIOLOGY: ICD-10-CM

## 2019-10-14 DIAGNOSIS — I35.0 MILD AORTIC STENOSIS: Chronic | ICD-10-CM

## 2019-10-14 LAB
LV EF: 58 %
LVEF MODALITY: NORMAL

## 2019-10-14 PROCEDURE — 93306 TTE W/DOPPLER COMPLETE: CPT

## 2019-10-29 ENCOUNTER — OFFICE VISIT (OUTPATIENT)
Dept: VASCULAR SURGERY | Age: 74
End: 2019-10-29
Payer: MEDICARE

## 2019-10-29 ENCOUNTER — HOSPITAL ENCOUNTER (OUTPATIENT)
Dept: VASCULAR LAB | Age: 74
Discharge: HOME | End: 2019-10-29
Payer: MEDICARE

## 2019-10-29 VITALS — HEART RATE: 65 BPM | SYSTOLIC BLOOD PRESSURE: 148 MMHG | DIASTOLIC BLOOD PRESSURE: 96 MMHG | TEMPERATURE: 97.4 F

## 2019-10-29 DIAGNOSIS — I70.213 ATHEROSCLEROSIS OF NATIVE ARTERY OF BOTH LOWER EXTREMITIES WITH INTERMITTENT CLAUDICATION (HCC): Primary | ICD-10-CM

## 2019-10-29 DIAGNOSIS — I65.23 BILATERAL CAROTID ARTERY STENOSIS: ICD-10-CM

## 2019-10-29 PROCEDURE — 93880 EXTRACRANIAL BILAT STUDY: CPT

## 2019-10-29 PROCEDURE — 4040F PNEUMOC VAC/ADMIN/RCVD: CPT | Performed by: NURSE PRACTITIONER

## 2019-10-29 PROCEDURE — 1090F PRES/ABSN URINE INCON ASSESS: CPT | Performed by: NURSE PRACTITIONER

## 2019-10-29 PROCEDURE — 1123F ACP DISCUSS/DSCN MKR DOCD: CPT | Performed by: NURSE PRACTITIONER

## 2019-10-29 PROCEDURE — G8427 DOCREV CUR MEDS BY ELIG CLIN: HCPCS | Performed by: NURSE PRACTITIONER

## 2019-10-29 PROCEDURE — G8400 PT W/DXA NO RESULTS DOC: HCPCS | Performed by: NURSE PRACTITIONER

## 2019-10-29 PROCEDURE — G8484 FLU IMMUNIZE NO ADMIN: HCPCS | Performed by: NURSE PRACTITIONER

## 2019-10-29 PROCEDURE — 3017F COLORECTAL CA SCREEN DOC REV: CPT | Performed by: NURSE PRACTITIONER

## 2019-10-29 PROCEDURE — G8419 CALC BMI OUT NRM PARAM NOF/U: HCPCS | Performed by: NURSE PRACTITIONER

## 2019-10-29 PROCEDURE — 1036F TOBACCO NON-USER: CPT | Performed by: NURSE PRACTITIONER

## 2019-10-29 PROCEDURE — 99212 OFFICE O/P EST SF 10 MIN: CPT | Performed by: NURSE PRACTITIONER

## 2019-10-29 PROCEDURE — G8598 ASA/ANTIPLAT THER USED: HCPCS | Performed by: NURSE PRACTITIONER

## 2019-10-30 ENCOUNTER — TELEPHONE (OUTPATIENT)
Dept: CARDIOLOGY | Age: 74
End: 2019-10-30

## 2019-10-30 ENCOUNTER — OFFICE VISIT (OUTPATIENT)
Dept: CARDIOLOGY | Age: 74
End: 2019-10-30
Payer: MEDICARE

## 2019-10-30 VITALS
WEIGHT: 105 LBS | SYSTOLIC BLOOD PRESSURE: 142 MMHG | DIASTOLIC BLOOD PRESSURE: 88 MMHG | BODY MASS INDEX: 18.61 KG/M2 | HEIGHT: 63 IN | HEART RATE: 68 BPM

## 2019-10-30 DIAGNOSIS — I25.10 CORONARY ARTERY DISEASE INVOLVING NATIVE CORONARY ARTERY OF NATIVE HEART, ANGINA PRESENCE UNSPECIFIED: ICD-10-CM

## 2019-10-30 DIAGNOSIS — E78.2 MIXED HYPERLIPIDEMIA: ICD-10-CM

## 2019-10-30 DIAGNOSIS — M79.605 PAIN IN BOTH LOWER EXTREMITIES: ICD-10-CM

## 2019-10-30 DIAGNOSIS — R53.83 FATIGUE, UNSPECIFIED TYPE: ICD-10-CM

## 2019-10-30 DIAGNOSIS — R07.89 OTHER CHEST PAIN: Primary | ICD-10-CM

## 2019-10-30 DIAGNOSIS — Z95.2 S/P MITRAL VALVE REPLACEMENT: ICD-10-CM

## 2019-10-30 DIAGNOSIS — Z98.890 STATUS POST MAZE OPERATION FOR ATRIAL FIBRILLATION: ICD-10-CM

## 2019-10-30 DIAGNOSIS — I48.0 PAF (PAROXYSMAL ATRIAL FIBRILLATION) (HCC): ICD-10-CM

## 2019-10-30 DIAGNOSIS — I10 ESSENTIAL HYPERTENSION: ICD-10-CM

## 2019-10-30 DIAGNOSIS — Z86.79 STATUS POST MAZE OPERATION FOR ATRIAL FIBRILLATION: ICD-10-CM

## 2019-10-30 DIAGNOSIS — M79.604 PAIN IN BOTH LOWER EXTREMITIES: ICD-10-CM

## 2019-10-30 PROCEDURE — G8427 DOCREV CUR MEDS BY ELIG CLIN: HCPCS | Performed by: NURSE PRACTITIONER

## 2019-10-30 PROCEDURE — 3017F COLORECTAL CA SCREEN DOC REV: CPT | Performed by: NURSE PRACTITIONER

## 2019-10-30 PROCEDURE — 4040F PNEUMOC VAC/ADMIN/RCVD: CPT | Performed by: NURSE PRACTITIONER

## 2019-10-30 PROCEDURE — G8420 CALC BMI NORM PARAMETERS: HCPCS | Performed by: NURSE PRACTITIONER

## 2019-10-30 PROCEDURE — 99214 OFFICE O/P EST MOD 30 MIN: CPT | Performed by: NURSE PRACTITIONER

## 2019-10-30 PROCEDURE — G8598 ASA/ANTIPLAT THER USED: HCPCS | Performed by: NURSE PRACTITIONER

## 2019-10-30 PROCEDURE — 1090F PRES/ABSN URINE INCON ASSESS: CPT | Performed by: NURSE PRACTITIONER

## 2019-10-30 PROCEDURE — 1036F TOBACCO NON-USER: CPT | Performed by: NURSE PRACTITIONER

## 2019-10-30 PROCEDURE — 1123F ACP DISCUSS/DSCN MKR DOCD: CPT | Performed by: NURSE PRACTITIONER

## 2019-10-30 PROCEDURE — G8400 PT W/DXA NO RESULTS DOC: HCPCS | Performed by: NURSE PRACTITIONER

## 2019-10-30 PROCEDURE — G8484 FLU IMMUNIZE NO ADMIN: HCPCS | Performed by: NURSE PRACTITIONER

## 2019-10-30 RX ORDER — LEVOTHYROXINE SODIUM 0.05 MG/1
50 TABLET ORAL DAILY
Status: ON HOLD | COMMUNITY
End: 2019-11-20 | Stop reason: SDUPTHER

## 2019-10-30 RX ORDER — NITROGLYCERIN 0.4 MG/1
0.4 TABLET SUBLINGUAL EVERY 5 MIN PRN
Qty: 25 TABLET | Refills: 3 | Status: ON HOLD | OUTPATIENT
Start: 2019-10-30 | End: 2019-11-20 | Stop reason: SDUPTHER

## 2019-10-30 RX ORDER — ISOSORBIDE MONONITRATE 30 MG/1
30 TABLET, EXTENDED RELEASE ORAL NIGHTLY
Qty: 30 TABLET | Refills: 5 | Status: ON HOLD | OUTPATIENT
Start: 2019-10-30 | End: 2019-11-20 | Stop reason: SDUPTHER

## 2019-11-08 ENCOUNTER — HOSPITAL ENCOUNTER (OUTPATIENT)
Dept: NUCLEAR MEDICINE | Age: 74
Discharge: HOME | End: 2019-11-10
Payer: MEDICARE

## 2019-11-08 ENCOUNTER — HOSPITAL ENCOUNTER (OUTPATIENT)
Dept: NON INVASIVE DIAGNOSTICS | Age: 74
Discharge: HOME | End: 2019-11-08
Payer: MEDICARE

## 2019-11-08 DIAGNOSIS — R07.89 OTHER CHEST PAIN: ICD-10-CM

## 2019-11-08 PROCEDURE — 3430000000 HC RX DIAGNOSTIC RADIOPHARMACEUTICAL: Performed by: CLINICAL NURSE SPECIALIST

## 2019-11-08 PROCEDURE — 6360000002 HC RX W HCPCS: Performed by: NURSE PRACTITIONER

## 2019-11-08 PROCEDURE — 93017 CV STRESS TEST TRACING ONLY: CPT

## 2019-11-08 PROCEDURE — 78452 HT MUSCLE IMAGE SPECT MULT: CPT

## 2019-11-08 PROCEDURE — A9500 TC99M SESTAMIBI: HCPCS | Performed by: CLINICAL NURSE SPECIALIST

## 2019-11-08 RX ADMIN — REGADENOSON 0.4 MG: 0.08 INJECTION, SOLUTION INTRAVENOUS at 13:49

## 2019-11-08 RX ADMIN — TETRAKIS(2-METHOXYISOBUTYLISOCYANIDE)COPPER(I) TETRAFLUOROBORATE 10 MILLICURIE: 1 INJECTION, POWDER, LYOPHILIZED, FOR SOLUTION INTRAVENOUS at 15:33

## 2019-11-08 RX ADMIN — TETRAKIS(2-METHOXYISOBUTYLISOCYANIDE)COPPER(I) TETRAFLUOROBORATE 30 MILLICURIE: 1 INJECTION, POWDER, LYOPHILIZED, FOR SOLUTION INTRAVENOUS at 15:33

## 2019-11-11 ENCOUNTER — TELEPHONE (OUTPATIENT)
Dept: CARDIOLOGY | Age: 74
End: 2019-11-11

## 2019-11-11 LAB
LV EF: 59 %
LVEF MODALITY: NORMAL

## 2019-11-15 ENCOUNTER — HOSPITAL ENCOUNTER (INPATIENT)
Dept: CARDIAC CATH/INVASIVE PROCEDURES | Age: 74
LOS: 5 days | Discharge: HOME | DRG: 246 | End: 2019-11-20
Attending: INTERNAL MEDICINE | Admitting: INTERNAL MEDICINE
Payer: MEDICARE

## 2019-11-15 PROBLEM — I25.10 CAD IN NATIVE ARTERY: Status: ACTIVE | Noted: 2019-11-15

## 2019-11-15 LAB
ALBUMIN SERPL-MCNC: 3.8 G/DL (ref 3.5–5.2)
ALP BLD-CCNC: 83 U/L (ref 35–104)
ALT SERPL-CCNC: 13 U/L (ref 5–33)
ANION GAP SERPL CALCULATED.3IONS-SCNC: 14 MMOL/L (ref 7–19)
AST SERPL-CCNC: 33 U/L (ref 5–32)
BILIRUB SERPL-MCNC: 0.6 MG/DL (ref 0.2–1.2)
BUN BLDV-MCNC: 34 MG/DL (ref 8–23)
CALCIUM SERPL-MCNC: 11.3 MG/DL (ref 8.8–10.2)
CHLORIDE BLD-SCNC: 95 MMOL/L (ref 98–111)
CO2: 29 MMOL/L (ref 22–29)
CREAT SERPL-MCNC: 1.7 MG/DL (ref 0.5–0.9)
GFR NON-AFRICAN AMERICAN: 29
GLUCOSE BLD-MCNC: 142 MG/DL (ref 74–109)
HCT VFR BLD CALC: 38.4 % (ref 37–47)
HEMOGLOBIN: 10.7 G/DL (ref 12–16)
MCH RBC QN AUTO: 22.1 PG (ref 27–31)
MCHC RBC AUTO-ENTMCNC: 27.9 G/DL (ref 33–37)
MCV RBC AUTO: 79.3 FL (ref 81–99)
PDW BLD-RTO: 19.3 % (ref 11.5–14.5)
PLATELET # BLD: 309 K/UL (ref 130–400)
PMV BLD AUTO: 9.8 FL (ref 9.4–12.3)
POTASSIUM SERPL-SCNC: 3.8 MMOL/L (ref 3.5–5)
RBC # BLD: 4.84 M/UL (ref 4.2–5.4)
SODIUM BLD-SCNC: 138 MMOL/L (ref 136–145)
TOTAL PROTEIN: 6.9 G/DL (ref 6.6–8.7)
WBC # BLD: 9.2 K/UL (ref 4.8–10.8)

## 2019-11-15 PROCEDURE — 2700000000 HC OXYGEN THERAPY PER DAY

## 2019-11-15 PROCEDURE — 92928 PRQ TCAT PLMT NTRAC ST 1 LES: CPT

## 2019-11-15 PROCEDURE — C1725 CATH, TRANSLUMIN NON-LASER: HCPCS

## 2019-11-15 PROCEDURE — 93459 L HRT ART/GRFT ANGIO: CPT | Performed by: INTERNAL MEDICINE

## 2019-11-15 PROCEDURE — 99153 MOD SED SAME PHYS/QHP EA: CPT

## 2019-11-15 PROCEDURE — 80053 COMPREHEN METABOLIC PANEL: CPT

## 2019-11-15 PROCEDURE — 4A023N7 MEASUREMENT OF CARDIAC SAMPLING AND PRESSURE, LEFT HEART, PERCUTANEOUS APPROACH: ICD-10-PCS | Performed by: INTERNAL MEDICINE

## 2019-11-15 PROCEDURE — G0378 HOSPITAL OBSERVATION PER HR: HCPCS

## 2019-11-15 PROCEDURE — 2500000003 HC RX 250 WO HCPCS: Performed by: INTERNAL MEDICINE

## 2019-11-15 PROCEDURE — B2111ZZ FLUOROSCOPY OF MULTIPLE CORONARY ARTERIES USING LOW OSMOLAR CONTRAST: ICD-10-PCS | Performed by: INTERNAL MEDICINE

## 2019-11-15 PROCEDURE — 2709999900 HC NON-CHARGEABLE SUPPLY

## 2019-11-15 PROCEDURE — 6360000004 HC RX CONTRAST MEDICATION: Performed by: INTERNAL MEDICINE

## 2019-11-15 PROCEDURE — C1887 CATHETER, GUIDING: HCPCS

## 2019-11-15 PROCEDURE — 2100000000 HC CCU R&B

## 2019-11-15 PROCEDURE — 6360000002 HC RX W HCPCS

## 2019-11-15 PROCEDURE — 6370000000 HC RX 637 (ALT 250 FOR IP)

## 2019-11-15 PROCEDURE — 93459 L HRT ART/GRFT ANGIO: CPT

## 2019-11-15 PROCEDURE — C1760 CLOSURE DEV, VASC: HCPCS

## 2019-11-15 PROCEDURE — 6370000000 HC RX 637 (ALT 250 FOR IP): Performed by: INTERNAL MEDICINE

## 2019-11-15 PROCEDURE — 99152 MOD SED SAME PHYS/QHP 5/>YRS: CPT | Performed by: INTERNAL MEDICINE

## 2019-11-15 PROCEDURE — C1894 INTRO/SHEATH, NON-LASER: HCPCS

## 2019-11-15 PROCEDURE — 36415 COLL VENOUS BLD VENIPUNCTURE: CPT

## 2019-11-15 PROCEDURE — 2580000003 HC RX 258: Performed by: INTERNAL MEDICINE

## 2019-11-15 PROCEDURE — B2151ZZ FLUOROSCOPY OF LEFT HEART USING LOW OSMOLAR CONTRAST: ICD-10-PCS | Performed by: INTERNAL MEDICINE

## 2019-11-15 PROCEDURE — 85027 COMPLETE CBC AUTOMATED: CPT

## 2019-11-15 PROCEDURE — 99152 MOD SED SAME PHYS/QHP 5/>YRS: CPT

## 2019-11-15 PROCEDURE — 92928 PRQ TCAT PLMT NTRAC ST 1 LES: CPT | Performed by: INTERNAL MEDICINE

## 2019-11-15 PROCEDURE — B2121ZZ FLUOROSCOPY OF SINGLE CORONARY ARTERY BYPASS GRAFT USING LOW OSMOLAR CONTRAST: ICD-10-PCS | Performed by: INTERNAL MEDICINE

## 2019-11-15 PROCEDURE — 027135Z DILATION OF CORONARY ARTERY, TWO ARTERIES WITH TWO DRUG-ELUTING INTRALUMINAL DEVICES, PERCUTANEOUS APPROACH: ICD-10-PCS | Performed by: INTERNAL MEDICINE

## 2019-11-15 PROCEDURE — C1874 STENT, COATED/COV W/DEL SYS: HCPCS

## 2019-11-15 PROCEDURE — 2500000003 HC RX 250 WO HCPCS

## 2019-11-15 PROCEDURE — 6360000002 HC RX W HCPCS: Performed by: INTERNAL MEDICINE

## 2019-11-15 PROCEDURE — 93005 ELECTROCARDIOGRAM TRACING: CPT | Performed by: INTERNAL MEDICINE

## 2019-11-15 PROCEDURE — 94640 AIRWAY INHALATION TREATMENT: CPT

## 2019-11-15 PROCEDURE — C1769 GUIDE WIRE: HCPCS

## 2019-11-15 RX ORDER — HYDROCHLOROTHIAZIDE 25 MG/1
25 TABLET ORAL DAILY
Status: DISCONTINUED | OUTPATIENT
Start: 2019-11-16 | End: 2019-11-20

## 2019-11-15 RX ORDER — HYDROCODONE BITARTRATE AND ACETAMINOPHEN 5; 325 MG/1; MG/1
1 TABLET ORAL EVERY 4 HOURS PRN
Status: DISCONTINUED | OUTPATIENT
Start: 2019-11-15 | End: 2019-11-20 | Stop reason: HOSPADM

## 2019-11-15 RX ORDER — SODIUM CHLORIDE 0.9 % (FLUSH) 0.9 %
10 SYRINGE (ML) INJECTION PRN
Status: DISCONTINUED | OUTPATIENT
Start: 2019-11-15 | End: 2019-11-20 | Stop reason: HOSPADM

## 2019-11-15 RX ORDER — SODIUM CHLORIDE 9 MG/ML
INJECTION, SOLUTION INTRAVENOUS CONTINUOUS
Status: DISCONTINUED | OUTPATIENT
Start: 2019-11-15 | End: 2019-11-17

## 2019-11-15 RX ORDER — METOPROLOL TARTRATE 50 MG/1
100 TABLET, FILM COATED ORAL 2 TIMES DAILY
Status: DISCONTINUED | OUTPATIENT
Start: 2019-11-15 | End: 2019-11-20 | Stop reason: HOSPADM

## 2019-11-15 RX ORDER — ALPRAZOLAM 0.5 MG/1
0.5 TABLET ORAL
Status: ACTIVE | OUTPATIENT
Start: 2019-11-15 | End: 2019-11-15

## 2019-11-15 RX ORDER — GUAIFENESIN AND DEXTROMETHORPHAN HYDROBROMIDE 1200; 60 MG/1; MG/1
1200 TABLET, EXTENDED RELEASE ORAL
Status: ON HOLD | COMMUNITY
End: 2019-11-20 | Stop reason: SDUPTHER

## 2019-11-15 RX ORDER — ASPIRIN 81 MG/1
81 TABLET ORAL NIGHTLY
Status: DISCONTINUED | OUTPATIENT
Start: 2019-11-15 | End: 2019-11-20 | Stop reason: HOSPADM

## 2019-11-15 RX ORDER — ATROPINE SULFATE 0.4 MG/ML
0.5 AMPUL (ML) INJECTION
Status: ACTIVE | OUTPATIENT
Start: 2019-11-15 | End: 2019-11-15

## 2019-11-15 RX ORDER — ACETAMINOPHEN 325 MG/1
650 TABLET ORAL EVERY 4 HOURS PRN
Status: DISCONTINUED | OUTPATIENT
Start: 2019-11-15 | End: 2019-11-20 | Stop reason: HOSPADM

## 2019-11-15 RX ORDER — HYDROCODONE BITARTRATE AND ACETAMINOPHEN 5; 325 MG/1; MG/1
2 TABLET ORAL EVERY 4 HOURS PRN
Status: DISCONTINUED | OUTPATIENT
Start: 2019-11-15 | End: 2019-11-20 | Stop reason: HOSPADM

## 2019-11-15 RX ORDER — ATORVASTATIN CALCIUM 40 MG/1
40 TABLET, FILM COATED ORAL DAILY
Status: DISCONTINUED | OUTPATIENT
Start: 2019-11-15 | End: 2019-11-20 | Stop reason: HOSPADM

## 2019-11-15 RX ORDER — LISINOPRIL 5 MG/1
5 TABLET ORAL 2 TIMES DAILY
Status: DISCONTINUED | OUTPATIENT
Start: 2019-11-15 | End: 2019-11-20 | Stop reason: HOSPADM

## 2019-11-15 RX ORDER — FORMOTEROL FUMARATE 20 UG/2ML
20 SOLUTION RESPIRATORY (INHALATION) NIGHTLY
Status: DISCONTINUED | OUTPATIENT
Start: 2019-11-15 | End: 2019-11-16

## 2019-11-15 RX ORDER — FENTANYL CITRATE 50 UG/ML
25 INJECTION, SOLUTION INTRAMUSCULAR; INTRAVENOUS
Status: ACTIVE | OUTPATIENT
Start: 2019-11-15 | End: 2019-11-15

## 2019-11-15 RX ORDER — IODIXANOL 320 MG/ML
200 INJECTION, SOLUTION INTRAVASCULAR
Status: COMPLETED | OUTPATIENT
Start: 2019-11-15 | End: 2019-11-15

## 2019-11-15 RX ORDER — SODIUM CHLORIDE 0.9 % (FLUSH) 0.9 %
10 SYRINGE (ML) INJECTION EVERY 12 HOURS SCHEDULED
Status: DISCONTINUED | OUTPATIENT
Start: 2019-11-15 | End: 2019-11-20 | Stop reason: HOSPADM

## 2019-11-15 RX ORDER — ISOSORBIDE MONONITRATE 30 MG/1
30 TABLET, EXTENDED RELEASE ORAL NIGHTLY
Status: DISCONTINUED | OUTPATIENT
Start: 2019-11-15 | End: 2019-11-20 | Stop reason: HOSPADM

## 2019-11-15 RX ORDER — NITROGLYCERIN 0.4 MG/1
0.4 TABLET SUBLINGUAL EVERY 5 MIN PRN
Status: DISCONTINUED | OUTPATIENT
Start: 2019-11-15 | End: 2019-11-20 | Stop reason: HOSPADM

## 2019-11-15 RX ORDER — CLOPIDOGREL BISULFATE 75 MG/1
75 TABLET ORAL DAILY
Status: DISCONTINUED | OUTPATIENT
Start: 2019-11-16 | End: 2019-11-20 | Stop reason: HOSPADM

## 2019-11-15 RX ORDER — ASPIRIN 325 MG
325 TABLET ORAL ONCE
Status: COMPLETED | OUTPATIENT
Start: 2019-11-15 | End: 2019-11-15

## 2019-11-15 RX ORDER — LEVOTHYROXINE SODIUM 0.05 MG/1
50 TABLET ORAL DAILY
Status: DISCONTINUED | OUTPATIENT
Start: 2019-11-16 | End: 2019-11-20 | Stop reason: HOSPADM

## 2019-11-15 RX ADMIN — HYDROCODONE BITARTRATE AND ACETAMINOPHEN 2 TABLET: 5; 325 TABLET ORAL at 17:05

## 2019-11-15 RX ADMIN — Medication: at 12:48

## 2019-11-15 RX ADMIN — METOPROLOL TARTRATE 100 MG: 50 TABLET ORAL at 20:30

## 2019-11-15 RX ADMIN — SODIUM CHLORIDE: 9 INJECTION, SOLUTION INTRAVENOUS at 17:00

## 2019-11-15 RX ADMIN — ATORVASTATIN CALCIUM 40 MG: 40 TABLET, FILM COATED ORAL at 19:28

## 2019-11-15 RX ADMIN — Medication: at 21:04

## 2019-11-15 RX ADMIN — FORMOTEROL FUMARATE DIHYDRATE 20 MCG: 20 SOLUTION RESPIRATORY (INHALATION) at 21:58

## 2019-11-15 RX ADMIN — IODIXANOL 150 ML: 320 INJECTION, SOLUTION INTRAVASCULAR at 15:46

## 2019-11-15 RX ADMIN — LISINOPRIL 5 MG: 5 TABLET ORAL at 20:30

## 2019-11-15 RX ADMIN — Medication 10 ML: at 21:04

## 2019-11-15 RX ADMIN — ASPIRIN 325 MG: 325 TABLET, FILM COATED ORAL at 12:48

## 2019-11-15 RX ADMIN — ISOSORBIDE MONONITRATE 30 MG: 30 TABLET, EXTENDED RELEASE ORAL at 20:30

## 2019-11-15 RX ADMIN — SODIUM CHLORIDE: 9 INJECTION, SOLUTION INTRAVENOUS at 12:46

## 2019-11-15 ASSESSMENT — ENCOUNTER SYMPTOMS
COUGH: 0
DIARRHEA: 0
CONSTIPATION: 0
SHORTNESS OF BREATH: 1
WHEEZING: 0
ABDOMINAL DISTENTION: 0
BLOOD IN STOOL: 0
VOMITING: 0
BACK PAIN: 0
EYE DISCHARGE: 0

## 2019-11-15 ASSESSMENT — PAIN SCALES - GENERAL
PAINLEVEL_OUTOF10: 10
PAINLEVEL_OUTOF10: 10
PAINLEVEL_OUTOF10: 0

## 2019-11-15 NOTE — H&P
hypoxia 03/30/2016     Priority: Low     Overview Note:     With associated mitral stenosis / regurgitation and pulmonary hypertension      Pulmonary hypertension 03/29/2016     Priority: Low    PAD (peripheral artery disease) ([facility])      Priority: Low    Calculus of gallbladder without cholecystitis without obstruction      Priority: Low    Acute renal failure (HCC)      Priority: Low    Carotid artery stenosis 02/08/2012     Priority: Low    Atherosclerosis of native artery of extremity with intermittent claudication ([facility]) 07/25/2011     Priority: Low    Atherosclerosis of native artery of extremity with intermittent claudication ([facility]) 07/25/2011     Priority: Low     Overview Note:     Replacing Inactive Diagnoses      Mixed hyperlipidemia      Priority: Low    Arthritis      Priority: Low    Coronary artery disease involving native coronary artery of native heart      Priority: Low     Overview Note:     3/16/2016  Echo  Severe MS, moderate to severe MR, RVSP 73 mmHg, normal LVFX  3/31/2016  Cath  70% osteal RCA, severe MR, MVA 1.9, normal LVFX  4/7//2016   MVR (23 mm [employer] Mosaic) VG-PDAEarlean Cools)  5/7/2016  Echo  Normal LVFX, large pleural effusion, echolucency near preserved posterior Mitral leaflet, likely chordae, RVSP 72 mmHg         PRESENTATION: Joshua Billingsley is a 76y.o. year old female who presents with history of bioprosthetic mitral valve replacement 4/2016 with one-vessel CABG with SVG to RCA, peripheral arterial disease with bilateral complicated CEA, atrial fibrillation status post maze procedure, COPD, CKD stage III with new chest pain and positive stress test here for cardiac catheterization. REVIEW OF SYSTEMS:  Review of Systems   Constitutional: Negative for activity change, fatigue and fever. HENT: Negative for ear pain, hearing loss and tinnitus. Eyes: Negative for discharge and visual disturbance. Respiratory: Positive for shortness of breath.  Negative for cough and wheezing. Cardiovascular: Positive for chest pain. Negative for palpitations and leg swelling. Gastrointestinal: Negative for abdominal distention, blood in stool, constipation, diarrhea and vomiting. Endocrine: Negative for cold intolerance, heat intolerance, polydipsia and polyuria. Genitourinary: Negative for dysuria and hematuria. Musculoskeletal: Negative for arthralgias, back pain and myalgias. Skin: Negative for pallor and rash. Neurological: Negative for seizures, syncope, weakness and headaches. Psychiatric/Behavioral: Negative for behavioral problems and dysphoric mood. Past Medical History:      Diagnosis Date    Aortic stenosis     Arthritis     Atherosclerosis of native arteries of the extremities with intermittent claudication 7/25/2011    CAD (coronary artery disease)     sees [facility]ology    Carotid aneurysm, right ([facility] [address]     Carotid artery occlusion     CHF (congestive heart failure) ([facility] [address] [facility]     COPD (chronic obstructive pulmonary disease) ([facility] [address] [facility]     History of blood transfusion 2016    Post op, was taking blood thinner    Hyperlipidemia     Hypertension     MI (myocardial infarction) ([facility] [address] 2009    x2    Mitral valve stenosis     Pneumonia     Post-menopausal     PUD (peptic ulcer disease) 2009    Renal failure 2009    after ulcer perforation sepsis.     Thyroid disease     Wound infection after surgery     right foot infection after cabg       Past Surgical History:      Procedure Laterality Date    ABDOMEN SURGERY  2009    perforated ulcer resection of 1/3 stomach    CARDIAC SURGERY      artificial valve and bypass    CAROTID ENDARTERECTOMY      Right  with Dacron patch angioplasty    CAROTID ENDARTERECTOMY Left     CAROTID ENDARTERECTOMY Right 7/11/2019    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 AND REPAIR WITH REVERSED LEFT GREATER SAPHENOUS VEIN INTERPOSITIONAL BYPASS GRAFT performed by Conchis Llamas MD at [address] TUNNEL RELEASE Right early 's    long ago    CATARACT REMOVAL WITH IMPLANT Bilateral      SECTION  1972    COLONOSCOPY      CORONARY ARTERY BYPASS GRAFT  2016    DILATION AND CURETTAGE OF UTERUS      ILIO-FEMORAL BYPASS GRAFT N/A 2016    OPEN TRANSLUMINAL BALLOON ANGIOPLASTY AND STENTING OF RIGHT COMMON AND EXTERNAL  ILIAC ARTERIES; RIGHT FEMORAL ENDARTERECTOMY WITH VEIN PATCH ANGIOPLASTY performed by Leanna Page MD at [address] N/A 2016    MITRAL VALVE  REPLACEMENT LUONG-MAZE ABLATION WITH CRYO PROCEDURE, CORONARY ARTERY BYPASS GRAFT X 1 WITH ENDOSCOPIC VEIN HARVESTING WITH PERFUSION TRANSESOPHAGEAL ECHOCARDIOGRAM performed by Miguel Bermudez MD at [address] [place] [address] MITRAL VALVE REPLACEMENT  2016    ME REOPER, CAROTID ENDARTEC>1 MON Left 2018    REMOVAL OF HEMATOMA, LEFT CAROTID ARTERY performed by Leanna Page MD at [address] Left 2018    LEFT CAROTID ENDARTERECTOMY WITH EEG MONITORING AND COMPLETION DUPLEX ULTRASOUND performed by Leanna Page MD at [address] [place] [address] SKIN GRAFT Right 2016    Skin graft split thickness foot,ankle,and leg. Right leg 26x8cm and 12x6cm total area 280cm squared. [facility]    UPPER GASTROINTESTINAL ENDOSCOPY  3/15/16    Dr Roger Mims  3/15/2016    EGD BIOPSY performed by Maru Harris DO at [address] [facility]    VASCULAR SURGERY  16 [facility]    Aortagram and right leg runoff,right leg runoff,right common iliac artery selection for right leg run off views.  VASCULAR SURGERY      . Open transluminal angioplasty and stenting of the external iliac artery. T[facility]    VASCULAR SURGERY  2019    [facility]. 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        Medications Prior to Admission:    Prior to Admission medications    Medication Sig Start Date End Date Taking? Authorizing Provider   Dextromethorphan-guaiFENesin (MUCINEX DM MAXIMUM STRENGTH)  MG TB12 Take 1,200 mg by mouth   Yes Historical Provider, MD   levothyroxine (SYNTHROID) 50 MCG tablet Take 50 mcg by mouth Daily   Yes Historical Provider, MD   isosorbide mononitrate (IMDUR) 30 MG extended release tablet Take 1 tablet by mouth nightly 10/30/19  Yes BEBO Mata   Arformoterol Tartrate (BROVANA) 15 MCG/2ML NEBU Take 2 mLs by nebulization 2 times daily 7/13/19  Yes Isiah Rodriguez MD   hydrochlorothiazide (HYDRODIURIL) 25 MG tablet Take 25 mg by mouth daily   Yes Historical Provider, MD   atorvastatin (LIPITOR) 40 MG tablet Take 1 tablet by mouth daily 5/22/19  Yes BEBO Mata   lisinopril (PRINIVIL;ZESTRIL) 5 MG tablet Take 1 tablet by mouth 2 times daily 5/22/19  Yes BEBO Mata   metoprolol (LOPRESSOR) 100 MG tablet Take 1 tablet by mouth 2 times daily 5/22/19  Yes BEBO Mata   calcium-cholecalciferol (CALCIUM 500+D) 500-200 MG-UNIT per tablet Take 2 tablets by mouth daily    Yes Historical Provider, MD   Probiotic Product (PROBIOTIC ADVANCED PO) Take 1 capsule by mouth daily    Yes Historical Provider, MD   Biotin 5000 MCG TABS Take 1 tablet by mouth daily    Yes Historical Provider, MD   aspirin EC 81 MG EC tablet Take 81 mg by mouth daily. Yes Historical Provider, MD   nitroGLYCERIN (NITROSTAT) 0.4 MG SL tablet Place 1 tablet under the tongue every 5 minutes as needed for Chest pain 10/30/19   BEBO Mata       Allergies:  Levaquin [levofloxacin in d5w];  Xarelto [rivaroxaban]; and Morphine    Past Social History:  Social History     Socioeconomic History    Marital status:      Spouse name: Not on file    Number of children: Not on file    Years of education: Not on file    Highest education level: Not on file   Occupational History    Not on file   Social NEGATIVE 08/02/2016    RBCUA 1 08/02/2016    BLOODU Negative 08/02/2016    SPECGRAV 1.008 08/02/2016    GLUCOSEU Negative 08/02/2016     -----------------------------------------------------------------  IMAGING:  No orders to display     [facility] Nuclear Stress Test Report   Procedure date:  11/8/2019     Indications: CAD and chest pain   Procedure: Stress was performed with injection of 0.4 mg Lexiscan. Vital signs and EKG were monitored. Technetium-99 sestamibi was   injected in divided doses, approximately 10 mCi and 30 mCi   respectively for rest and stress imaging. The patient was discharged   in stable condition. Results: Patient had symptoms of dyspnea during infusion that resolved   in recovery. Baseline EKG showed sinus bradycardia. During stress   there were no significant EKG changes or rhythm changes. Baseline and   peak blood pressures were 95/53, and 138/58 respectively.  Baseline   and peak heart rates were 57 and  63 respectively. Lexiscan/Cardiolyte Nuclear Medicine Report   Date of Procedure: 11/8/2019   The patient was injected with 5.00 millicuries (mCi) of Technetium   (Tc99m).  After an appropriate level of stress the patient was   re-injected with 93.72 millicuries (mCi) of Technetium (Tc99m). Repeat gated images were then performed per standard protocol. Findings:   1.  Analysis of the the stress and rest images reveals moderate size   inferolateral predominantly fixed defect with mild reversibility. 2.  Analysis of the gated images reveals grossly normal left   ventricular function with a calculated ejection fraction of 59 %.         Impression   Impression:   There is inferolateral infarct with ischemia, with a calculated   ejection fraction of 59 %. Suggest: Clinical correlation and cardiac catheterization if   indicated. Signed by Dr Rose Smith on 11/8/2019 8:50 PM         Assessment and Recommendations:     This is a 76y.o. year old female with past medical history of coronary artery disease, single-vessel CABG with SVG to RCA, bioprosthetic mitral valve replacement for MS/MR 4/2016, extensive peripheral axial disease with bilateral CEA, CKD stage III, COPD with recent onset of chest pain with positive stress test referred for cardiac catheterization. Risks, benefits, alternatives of cardiac catheterization/PCI discussed with the patient and full informed consent obtained.   Acceptable Mallampati score  Consent for moderate conscious sedation  ASA 3          Electronically signed by Lore Baltazar MD on 11/15/2019 at 12:42 PM

## 2019-11-16 ENCOUNTER — APPOINTMENT (OUTPATIENT)
Dept: GENERAL RADIOLOGY | Age: 74
DRG: 246 | End: 2019-11-16
Attending: INTERNAL MEDICINE
Payer: MEDICARE

## 2019-11-16 ENCOUNTER — OUTSIDE FACILITY SERVICE (OUTPATIENT)
Dept: PULMONOLOGY | Facility: CLINIC | Age: 74
End: 2019-11-16

## 2019-11-16 LAB
ANION GAP SERPL CALCULATED.3IONS-SCNC: 13 MMOL/L (ref 7–19)
BASE EXCESS ARTERIAL: 16.5 MMOL/L (ref -2–2)
BUN BLDV-MCNC: 25 MG/DL (ref 8–23)
CALCIUM SERPL-MCNC: 9.7 MG/DL (ref 8.8–10.2)
CARBOXYHEMOGLOBIN ARTERIAL: 2.5 % (ref 0–5)
CHLORIDE BLD-SCNC: 89 MMOL/L (ref 98–111)
CO2: 35 MMOL/L (ref 22–29)
CREAT SERPL-MCNC: 1.2 MG/DL (ref 0.5–0.9)
GFR NON-AFRICAN AMERICAN: 44
GLUCOSE BLD-MCNC: 127 MG/DL (ref 74–109)
HCO3 ARTERIAL: 41.9 MMOL/L (ref 22–26)
HCT VFR BLD CALC: 33.7 % (ref 37–47)
HEMOGLOBIN, ART, EXTENDED: 9.6 G/DL (ref 12–16)
HEMOGLOBIN: 9.4 G/DL (ref 12–16)
MCH RBC QN AUTO: 22.3 PG (ref 27–31)
MCHC RBC AUTO-ENTMCNC: 27.9 G/DL (ref 33–37)
MCV RBC AUTO: 80 FL (ref 81–99)
METHEMOGLOBIN ARTERIAL: 1.2 %
O2 CONTENT ARTERIAL: 12.5 ML/DL
O2 SAT, ARTERIAL: 92.4 %
O2 THERAPY: ABNORMAL
PCO2 ARTERIAL: 55 MMHG (ref 35–45)
PDW BLD-RTO: 19 % (ref 11.5–14.5)
PH ARTERIAL: 7.49 (ref 7.35–7.45)
PLATELET # BLD: 263 K/UL (ref 130–400)
PMV BLD AUTO: 10.1 FL (ref 9.4–12.3)
PO2 ARTERIAL: 64 MMHG (ref 80–100)
POTASSIUM SERPL-SCNC: 2.7 MMOL/L (ref 3.5–5)
POTASSIUM SERPL-SCNC: 3.7 MMOL/L (ref 3.5–5)
POTASSIUM, WHOLE BLOOD: 3.9
PRO-BNP: ABNORMAL PG/ML (ref 0–900)
RBC # BLD: 4.21 M/UL (ref 4.2–5.4)
SODIUM BLD-SCNC: 137 MMOL/L (ref 136–145)
WBC # BLD: 7.5 K/UL (ref 4.8–10.8)

## 2019-11-16 PROCEDURE — 71045 X-RAY EXAM CHEST 1 VIEW: CPT

## 2019-11-16 PROCEDURE — 36415 COLL VENOUS BLD VENIPUNCTURE: CPT

## 2019-11-16 PROCEDURE — 2580000003 HC RX 258: Performed by: INTERNAL MEDICINE

## 2019-11-16 PROCEDURE — 84132 ASSAY OF SERUM POTASSIUM: CPT

## 2019-11-16 PROCEDURE — 99223 1ST HOSP IP/OBS HIGH 75: CPT | Performed by: INTERNAL MEDICINE

## 2019-11-16 PROCEDURE — 94640 AIRWAY INHALATION TREATMENT: CPT

## 2019-11-16 PROCEDURE — 36600 WITHDRAWAL OF ARTERIAL BLOOD: CPT

## 2019-11-16 PROCEDURE — 85027 COMPLETE CBC AUTOMATED: CPT

## 2019-11-16 PROCEDURE — 6360000002 HC RX W HCPCS: Performed by: INTERNAL MEDICINE

## 2019-11-16 PROCEDURE — G0378 HOSPITAL OBSERVATION PER HR: HCPCS

## 2019-11-16 PROCEDURE — 2700000000 HC OXYGEN THERAPY PER DAY

## 2019-11-16 PROCEDURE — 82803 BLOOD GASES ANY COMBINATION: CPT

## 2019-11-16 PROCEDURE — 2140000000 HC CCU INTERMEDIATE R&B

## 2019-11-16 PROCEDURE — 80048 BASIC METABOLIC PNL TOTAL CA: CPT

## 2019-11-16 PROCEDURE — 93005 ELECTROCARDIOGRAM TRACING: CPT | Performed by: INTERNAL MEDICINE

## 2019-11-16 PROCEDURE — 6370000000 HC RX 637 (ALT 250 FOR IP): Performed by: INTERNAL MEDICINE

## 2019-11-16 PROCEDURE — 83880 ASSAY OF NATRIURETIC PEPTIDE: CPT

## 2019-11-16 RX ORDER — CLOPIDOGREL BISULFATE 75 MG/1
75 TABLET ORAL DAILY
Qty: 30 TABLET | Refills: 3 | Status: SHIPPED | OUTPATIENT
Start: 2019-11-17 | End: 2019-11-20

## 2019-11-16 RX ORDER — MAGNESIUM SULFATE IN WATER 40 MG/ML
2 INJECTION, SOLUTION INTRAVENOUS ONCE
Status: COMPLETED | OUTPATIENT
Start: 2019-11-16 | End: 2019-11-16

## 2019-11-16 RX ORDER — POTASSIUM CHLORIDE 20 MEQ/1
40 TABLET, EXTENDED RELEASE ORAL 2 TIMES DAILY
Status: DISCONTINUED | OUTPATIENT
Start: 2019-11-16 | End: 2019-11-17

## 2019-11-16 RX ORDER — LEVALBUTEROL INHALATION SOLUTION 0.63 MG/3ML
0.63 SOLUTION RESPIRATORY (INHALATION) EVERY 4 HOURS PRN
Status: DISCONTINUED | OUTPATIENT
Start: 2019-11-16 | End: 2019-11-17

## 2019-11-16 RX ORDER — HYDRALAZINE HYDROCHLORIDE 20 MG/ML
10 INJECTION INTRAMUSCULAR; INTRAVENOUS EVERY 4 HOURS PRN
Status: DISCONTINUED | OUTPATIENT
Start: 2019-11-16 | End: 2019-11-18

## 2019-11-16 RX ORDER — LISINOPRIL 5 MG/1
10 TABLET ORAL 2 TIMES DAILY
Qty: 60 TABLET | Refills: 5 | Status: SHIPPED | OUTPATIENT
Start: 2019-11-16 | End: 2019-11-20 | Stop reason: SDUPTHER

## 2019-11-16 RX ORDER — IPRATROPIUM BROMIDE AND ALBUTEROL SULFATE 2.5; .5 MG/3ML; MG/3ML
1 SOLUTION RESPIRATORY (INHALATION) EVERY 4 HOURS PRN
Status: DISCONTINUED | OUTPATIENT
Start: 2019-11-16 | End: 2019-11-16

## 2019-11-16 RX ORDER — FORMOTEROL FUMARATE 20 UG/2ML
20 SOLUTION RESPIRATORY (INHALATION) 2 TIMES DAILY
Status: DISCONTINUED | OUTPATIENT
Start: 2019-11-16 | End: 2019-11-20 | Stop reason: HOSPADM

## 2019-11-16 RX ADMIN — CLOPIDOGREL BISULFATE 75 MG: 75 TABLET ORAL at 09:08

## 2019-11-16 RX ADMIN — MAGNESIUM SULFATE HEPTAHYDRATE 2 G: 40 INJECTION, SOLUTION INTRAVENOUS at 03:25

## 2019-11-16 RX ADMIN — HYDRALAZINE HYDROCHLORIDE 10 MG: 20 INJECTION INTRAMUSCULAR; INTRAVENOUS at 02:47

## 2019-11-16 RX ADMIN — POTASSIUM CHLORIDE 40 MEQ: 20 TABLET, EXTENDED RELEASE ORAL at 09:09

## 2019-11-16 RX ADMIN — POTASSIUM CHLORIDE 40 MEQ: 20 TABLET, EXTENDED RELEASE ORAL at 20:34

## 2019-11-16 RX ADMIN — METOPROLOL TARTRATE 100 MG: 50 TABLET ORAL at 09:10

## 2019-11-16 RX ADMIN — Medication 10 ML: at 20:34

## 2019-11-16 RX ADMIN — HYDROCHLOROTHIAZIDE 25 MG: 25 TABLET ORAL at 09:10

## 2019-11-16 RX ADMIN — METOPROLOL TARTRATE 100 MG: 50 TABLET ORAL at 20:34

## 2019-11-16 RX ADMIN — ASPIRIN 81 MG: 81 TABLET, COATED ORAL at 20:34

## 2019-11-16 RX ADMIN — LEVOTHYROXINE SODIUM 50 MCG: 50 TABLET ORAL at 07:14

## 2019-11-16 RX ADMIN — LISINOPRIL 5 MG: 5 TABLET ORAL at 20:35

## 2019-11-16 RX ADMIN — FORMOTEROL FUMARATE DIHYDRATE 20 MCG: 20 SOLUTION RESPIRATORY (INHALATION) at 19:13

## 2019-11-16 RX ADMIN — Medication 10 ML: at 10:03

## 2019-11-16 RX ADMIN — POTASSIUM CHLORIDE 40 MEQ: 20 TABLET, EXTENDED RELEASE ORAL at 03:25

## 2019-11-16 RX ADMIN — LISINOPRIL 5 MG: 5 TABLET ORAL at 09:10

## 2019-11-16 RX ADMIN — FORMOTEROL FUMARATE DIHYDRATE 20 MCG: 20 SOLUTION RESPIRATORY (INHALATION) at 08:14

## 2019-11-16 RX ADMIN — ISOSORBIDE MONONITRATE 30 MG: 30 TABLET, EXTENDED RELEASE ORAL at 20:34

## 2019-11-16 RX ADMIN — ATORVASTATIN CALCIUM 40 MG: 40 TABLET, FILM COATED ORAL at 09:08

## 2019-11-16 ASSESSMENT — ENCOUNTER SYMPTOMS
VOMITING: 0
CHOKING: 0
STRIDOR: 0
SINUS PAIN: 0
PHOTOPHOBIA: 0
TROUBLE SWALLOWING: 0
COUGH: 1
SINUS PRESSURE: 0
SHORTNESS OF BREATH: 1
NAUSEA: 0

## 2019-11-16 ASSESSMENT — PAIN SCALES - GENERAL
PAINLEVEL_OUTOF10: 0
PAINLEVEL_OUTOF10: 0

## 2019-11-16 NOTE — DISCHARGE SUMMARY
Discharge Summary    Debbie Perez  :  1945  MRN:  [id number]    Admit date:  11/15/2019  Discharge date:      Admitting Physician:  Agnes Galvan MD    Advance Directive: Full Code    Consults: none    Primary Care Physician:  Ashish Nevarez MD    Discharge Diagnoses: Active Problems:    CAD in native artery  Resolved Problems:    * No resolved hospital problems. *      Cardiology Specific Data:  Specialty Problems        Cardiology Problems    Mitral valve insufficiency        Mitral valve stenosis        Acute superficial venous thrombosis of right lower extremity        CAD in native artery        Coronary artery disease involving native coronary artery of native heart        Carotid artery stenosis        PAD (peripheral artery disease) ([facility])        Pulmonary hypertension        Atherosclerosis of native arteries of left leg with ulceration of calf ([facility])        Atherosclerosis of native arteries of right leg with ulceration of other part of lower right leg        Atherosclerosis of native arteries of right leg with ulceration of other part of foot        Atherosclerosis of native artery of right lower extremity with ulceration of ankle ([facility])        CHF (congestive heart failure) ([facility])        PVD (peripheral vascular disease) ([facility])        Bilateral carotid artery stenosis        Obstruction of left carotid artery        NSTEMI (non-ST elevated myocardial infarction) ([facility])        Syncope and collapse        Essential hypertension        Mild aortic stenosis        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     Pertinent Labs: reviewed  CBC:   Recent Labs     11/15/19  1205 19  0201   WBC 9.2 7.5   HGB 10.7* 9.4*    263     BMP:    Recent Labs     11/15/19  1205 19  0201 19  1055    137  --    K 3.8 2.7* 3.7   CL 95* 89*  --    CO2

## 2019-11-16 NOTE — PROGRESS NOTES
Pt femstop not registering pressure. Reapplied higher due to pt oozing from site. Will continue to monitor.  Electronically signed by Nacho Langford RN on 11/16/2019 at 1:17 AM

## 2019-11-16 NOTE — PROGRESS NOTES
Pt had short run of SVT, informed (oncall). Will continue to monitor. Strip in paper chart.  Electronically signed by aNcho Langford RN on 11/15/2019 at 10:14 PM

## 2019-11-16 NOTE — PROGRESS NOTES
Removed 2 ml from tr band. No oozing or hematoma. Removed some air from femstop. No oozing or hematoma.  Electronically signed by Gavino Quiroga RN on 11/16/2019 at 1:18 AM

## 2019-11-16 NOTE — CONSULTS
TJR    Aortagram and right leg runoff,right leg runoff,right common iliac artery selection for right leg run off views.  VASCULAR SURGERY      . Open transluminal angioplasty and stenting of the external iliac artery. TJR    VASCULAR SURGERY  07/11/2019    TJR. 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 Allergies  Allergies   Allergen Reactions    Levaquin [Levofloxacin In D5w] Nausea And Vomiting    Xarelto [Rivaroxaban] Other (See Comments)     Made anemic     Morphine Itching and Rash     Medications  potassium chloride, 40 mEq, Oral, BID    formoterol, 20 mcg, Nebulization, BID    aspirin EC, 81 mg, Oral, Nightly    atorvastatin, 40 mg, Oral, Daily    lisinopril, 5 mg, Oral, BID    metoprolol, 100 mg, Oral, BID    hydrochlorothiazide, 25 mg, Oral, Daily    levothyroxine, 50 mcg, Oral, Daily    isosorbide mononitrate, 30 mg, Oral, Nightly    sodium chloride flush, 10 mL, Intravenous, 2 times per day    clopidogrel, 75 mg, Oral, Daily   Social History   reports that she quit smoking about 39 years ago. Her smoking use included cigarettes. She quit after 2.00 years of use. She has never used smokeless tobacco. She reports current alcohol use. She reports that she does not use drugs. Family History  family history includes Diabetes in her mother and sister; Heart Disease in her mother and sister; Heart Failure in her mother; High Blood Pressure in her sister. Review of Systems:  Review of Systems   Constitutional: Negative for chills and fever. HENT: Negative for sinus pressure, sinus pain and trouble swallowing. Eyes: Negative for photophobia and visual disturbance. Respiratory: Positive for cough (chronic, intermittent with occasional sputum production) and shortness of breath.  Negative for choking and stridor. Cardiovascular: Positive for leg swelling. Negative for chest pain and palpitations. Gastrointestinal: Negative for nausea and vomiting. Endocrine: Negative for cold intolerance. Genitourinary: Negative for difficulty urinating. Musculoskeletal: Negative for arthralgias and joint swelling. Neurological: Negative for tremors, speech difficulty and light-headedness. Psychiatric/Behavioral: Negative for agitation. The patient is not nervous/anxious. Physical Exam:   height is 5' 3\" (1.6 m) and weight is 98 lb (44.5 kg). Her temporal temperature is 98.8 °F (37.1 °C). Her blood pressure is 134/52 (abnormal) and her pulse is 66. Her respiration is 25 and oxygen saturation is 94%. Intake/Output Summary (Last 24 hours) at 11/16/2019 1440  Last data filed at 11/16/2019 0742  Gross per 24 hour   Intake 2822.5 ml   Output 750 ml   Net 2072.5 ml     Physical Exam  Vitals signs reviewed. Constitutional:       General: She is not in acute distress. Appearance: She is well-developed. HENT:      Head: Normocephalic and atraumatic. Eyes:      General: No scleral icterus. Pupils: Pupils are equal, round, and reactive to light. Cardiovascular:      Rate and Rhythm: Normal rate and regular rhythm. Pulmonary:      Effort: Accessory muscle usage and prolonged expiration present. No respiratory distress or retractions. Breath sounds: Decreased breath sounds and rales (basilar) present. No wheezing. Chest:      Chest wall: No tenderness. Abdominal:      General: Bowel sounds are normal.      Palpations: Abdomen is soft. Musculoskeletal: Normal range of motion. Right lower leg: Edema (2+) present. Left lower leg: Edema (2+) present. Skin:     General: Skin is warm and dry. Coloration: Skin is not jaundiced. Findings: No erythema. Neurological:      General: No focal deficit present.        Recent Labs     11/15/19  1205 11/16/19  0201   WBC 9.2 7. 5   RBC 4.84 4.21   HGB 10.7* 9.4*   HCT 38.4 33.7*    263   MCV 79.3* 80.0*   MCH 22.1* 22.3*   MCHC 27.9* 27.9*   RDW 19.3* 19.0*      Recent Labs     11/15/19  1205 11/16/19  0201 11/16/19  1055    137  --    K 3.8 2.7* 3.7   CL 95* 89*  --    CO2 29 35*  --    BUN 34* 25*  --    CREATININE 1.7* 1.2*  --    CALCIUM 11.3* 9.7  --    GLUCOSE 142* 127*  --       Recent Labs     11/15/19  1205 11/16/19  0201   AST 33*  --    ALT 13  --    ALKPHOS 83  --    BILITOT 0.6  --    CALCIUM 11.3* 9.7     Radiograph:     Nm Myocardial Spect Rest Exercise Or Rx  Result Date: 11/11/2019  Lexiscan Nuclear Stress Test Report Procedure date:  11/8/2019  Indications: CAD and chest pain Procedure: Stress was performed with injection of 0.4 mg Lexiscan. Vital signs and EKG were monitored. Technetium-99 sestamibi was injected in divided doses, approximately 10 mCi and 30 mCi respectively for rest and stress imaging. The patient was discharged in stable condition. Results: Patient had symptoms of dyspnea during infusion that resolved in recovery. Baseline EKG showed sinus bradycardia. During stress there were no significant EKG changes or rhythm changes. Baseline and peak blood pressures were 95/53, and 138/58 respectively. Baseline and peak heart rates were 57 and  63 respectively. Lexiscan/Cardiolyte Nuclear Medicine Report Date of Procedure: 11/8/2019 The patient was injected with 9.35 millicuries (mCi) of Technetium (Tc99m). After an appropriate level of stress the patient was re-injected with 65.66 millicuries (mCi) of Technetium (Tc99m). Repeat gated images were then performed per standard protocol. Findings: 1. Analysis of the the stress and rest images reveals moderate size inferolateral predominantly fixed defect with mild reversibility. 2.  Analysis of the gated images reveals grossly normal left ventricular function with a calculated ejection fraction of 59 %.       Impression: There is inferolateral infarct with ischemia, with a calculated ejection fraction of 59 %. Suggest: Clinical correlation and cardiac catheterization if indicated. Signed by Dr Marinda Meigs on 2019 8:50 PM    My radiograph interpretation/independent review of imaging: most recent cxr available is from 19 showing copd changes with fibrotic/atelectatic change in right mid field  Other test results (not lab or imaging): echo:  LV appears normal in size with normal systolic function. LV ejection   fraction estimated at 55-60%. RV appears mildly dilated with normal systolic function. Mild left atrial enlargement. Mild right atrial enlargement. Bioprosthetic mitral valve replacement with no significant mitral   regurgitation and maintained of 3 mmHg consistent with normal function. Aortic valve appears moderate to severely thickened with moderately   reduced leaflet separation. Mild aortic stenosis is present. No   significant aortic regurgitation. Moderate (2+) tricuspid regurgitation.       Signature      ----------------------------------------------------------------   Electronically signed by Keila Holly MD(Interpreting physician)   on 10/14/2019 11:19 PM  Independent review of ek/25 SR, pac, ant q wave  Problem list generated by Yu Riggs, which contains active and historical problems:  Patient Active Problem List   Diagnosis    Mixed hyperlipidemia    Arthritis    Coronary artery disease involving native coronary artery of native heart    Carotid artery stenosis    Calculus of gallbladder without cholecystitis without obstruction    Acute renal failure ([address]    Mitral valve stenosis    Mitral valve insufficiency    PUD (peptic ulcer disease)    Atherosclerosis of native artery of extremity with intermittent claudication ([address]    Pulmonary hypertension    PAD (peripheral artery disease) ([address]    Nocturnal hypoxia    Acute superficial venous thrombosis of right lower extremity    Non-pressure chronic ulcer of right calf with fat layer exposed ([address]    Decubitus ulcer of right buttock, stage 3 ([facility])    Severe malnutrition ([facility])    Diastolic dysfunction    Anemia in chronic kidney disease (CKD)    Acute blood loss anemia    Nonhealing nonsurgical wound with fat layer exposed    Atherosclerosis of native arteries of left leg with ulceration of calf (HCC)    Atherosclerosis of native artery of right lower extremity with ulceration of ankle (HCC)    Atherosclerosis of native arteries of right leg with ulceration of other part of lower right leg    Atherosclerosis of native arteries of right leg with ulceration of other part of foot    Nonhealing ulcer of right lower leg with fat layer exposed ([address]    Non-pressure chronic ulcer of right ankle with fat layer exposed ([address]    Neuropathic ulcer of right foot with fat layer exposed ([address]    Hypervolemia    Acute blood loss anemia    Atherosclerosis of native artery of extremity with intermittent claudication ([facility])    CHF (congestive heart failure) ([facility])    CKD (chronic kidney disease), stage III ([facility])    Pulmonary emphysema ([facility])    COPD without exacerbation ([facility])    PVD (peripheral vascular disease) ([facility])    Wound of sacral region    Elevated TSH    Bilateral carotid artery stenosis    Obstruction of left carotid artery    Bradycardia    Sepsis with organ dysfunction ([facility])    NSTEMI (non-ST elevated myocardial infarction) ([address]    HCAP (healthcare-associated pneumonia)    Acute renal failure (ARF) ([facility])    Syncope and collapse    Essential hypertension    Mild aortic stenosis    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 Dx considered include copd-ae, acute chf, pneumothorax, pulmonary embolus, atelectasis, pneumonia  New problem (to me), no additional workup planned: CAD s/p stents  Other problems either stable, failing to improve or worsenin. Aortic stenosis, mitral valve disease with replacement, presumed stable  2. Paroxysmal afib, s/p maze  3. Chronic kidney disease, s/p 1 liter ivf around time of heart cath\  4. Anemia of chronic disease stable  5. Hypokalemia improved    Recommend/plan:   · Continue aerosol bronchodilators as she uses at home  · cxr  · bnp  · Will not add steroids/antibiotics as she does not appear to be in an inflammatory exacerbation of copd based on history and available data    Thank you for this consult.   We will follow  Electronically signed by Miguel Velez on 19 at 2:40 PM

## 2019-11-16 NOTE — PROGRESS NOTES
Removed 2 ml TR band RR, hematoma appeared, held pressure for 10 min and reinflated by2 ml. Will continue to monitor. Electronically signed by Kameron Gomez RN on 11/15/2019 at 11:47 PM

## 2019-11-17 ENCOUNTER — OUTSIDE FACILITY SERVICE (OUTPATIENT)
Dept: PULMONOLOGY | Facility: CLINIC | Age: 74
End: 2019-11-17

## 2019-11-17 LAB
ANION GAP SERPL CALCULATED.3IONS-SCNC: 10 MMOL/L (ref 7–19)
BUN BLDV-MCNC: 26 MG/DL (ref 8–23)
CALCIUM SERPL-MCNC: 9.5 MG/DL (ref 8.8–10.2)
CHLORIDE BLD-SCNC: 95 MMOL/L (ref 98–111)
CO2: 35 MMOL/L (ref 22–29)
CREAT SERPL-MCNC: 1.4 MG/DL (ref 0.5–0.9)
EKG P AXIS: 77 DEGREES
EKG P AXIS: 80 DEGREES
EKG P-R INTERVAL: 124 MS
EKG P-R INTERVAL: 134 MS
EKG Q-T INTERVAL: 416 MS
EKG Q-T INTERVAL: 438 MS
EKG QRS DURATION: 114 MS
EKG QRS DURATION: 98 MS
EKG QTC CALCULATION (BAZETT): 442 MS
EKG QTC CALCULATION (BAZETT): 453 MS
EKG T AXIS: 114 DEGREES
EKG T AXIS: 115 DEGREES
GFR NON-AFRICAN AMERICAN: 37
GLUCOSE BLD-MCNC: 99 MG/DL (ref 74–109)
HCT VFR BLD CALC: 30.6 % (ref 37–47)
HEMOGLOBIN: 8.7 G/DL (ref 12–16)
MCH RBC QN AUTO: 22.6 PG (ref 27–31)
MCHC RBC AUTO-ENTMCNC: 28.4 G/DL (ref 33–37)
MCV RBC AUTO: 79.5 FL (ref 81–99)
PDW BLD-RTO: 19.3 % (ref 11.5–14.5)
PLATELET # BLD: 235 K/UL (ref 130–400)
PMV BLD AUTO: 9.7 FL (ref 9.4–12.3)
POTASSIUM SERPL-SCNC: 4.7 MMOL/L (ref 3.5–5)
RBC # BLD: 3.85 M/UL (ref 4.2–5.4)
SODIUM BLD-SCNC: 140 MMOL/L (ref 136–145)
WBC # BLD: 11.2 K/UL (ref 4.8–10.8)

## 2019-11-17 PROCEDURE — 2580000003 HC RX 258: Performed by: INTERNAL MEDICINE

## 2019-11-17 PROCEDURE — 6360000002 HC RX W HCPCS: Performed by: INTERNAL MEDICINE

## 2019-11-17 PROCEDURE — 94760 N-INVAS EAR/PLS OXIMETRY 1: CPT

## 2019-11-17 PROCEDURE — G0378 HOSPITAL OBSERVATION PER HR: HCPCS

## 2019-11-17 PROCEDURE — 6370000000 HC RX 637 (ALT 250 FOR IP): Performed by: NURSE PRACTITIONER

## 2019-11-17 PROCEDURE — 2700000000 HC OXYGEN THERAPY PER DAY

## 2019-11-17 PROCEDURE — 36415 COLL VENOUS BLD VENIPUNCTURE: CPT

## 2019-11-17 PROCEDURE — 80048 BASIC METABOLIC PNL TOTAL CA: CPT

## 2019-11-17 PROCEDURE — 6370000000 HC RX 637 (ALT 250 FOR IP): Performed by: INTERNAL MEDICINE

## 2019-11-17 PROCEDURE — 85027 COMPLETE CBC AUTOMATED: CPT

## 2019-11-17 PROCEDURE — 6360000002 HC RX W HCPCS: Performed by: NURSE PRACTITIONER

## 2019-11-17 PROCEDURE — 2140000000 HC CCU INTERMEDIATE R&B

## 2019-11-17 PROCEDURE — 94640 AIRWAY INHALATION TREATMENT: CPT

## 2019-11-17 PROCEDURE — 99233 SBSQ HOSP IP/OBS HIGH 50: CPT | Performed by: INTERNAL MEDICINE

## 2019-11-17 RX ORDER — GUAIFENESIN 600 MG/1
600 TABLET, EXTENDED RELEASE ORAL 2 TIMES DAILY
Status: DISCONTINUED | OUTPATIENT
Start: 2019-11-17 | End: 2019-11-20 | Stop reason: HOSPADM

## 2019-11-17 RX ORDER — POTASSIUM CHLORIDE 20 MEQ/1
40 TABLET, EXTENDED RELEASE ORAL 2 TIMES DAILY PRN
Status: DISCONTINUED | OUTPATIENT
Start: 2019-11-17 | End: 2019-11-20 | Stop reason: HOSPADM

## 2019-11-17 RX ORDER — LEVALBUTEROL INHALATION SOLUTION 0.63 MG/3ML
0.63 SOLUTION RESPIRATORY (INHALATION) 4 TIMES DAILY
Status: DISCONTINUED | OUTPATIENT
Start: 2019-11-17 | End: 2019-11-20 | Stop reason: HOSPADM

## 2019-11-17 RX ORDER — FUROSEMIDE 10 MG/ML
20 INJECTION INTRAMUSCULAR; INTRAVENOUS ONCE
Status: COMPLETED | OUTPATIENT
Start: 2019-11-17 | End: 2019-11-17

## 2019-11-17 RX ADMIN — HYDROCHLOROTHIAZIDE 25 MG: 25 TABLET ORAL at 09:21

## 2019-11-17 RX ADMIN — FORMOTEROL FUMARATE DIHYDRATE 20 MCG: 20 SOLUTION RESPIRATORY (INHALATION) at 18:37

## 2019-11-17 RX ADMIN — GUAIFENESIN 600 MG: 600 TABLET, EXTENDED RELEASE ORAL at 13:29

## 2019-11-17 RX ADMIN — ISOSORBIDE MONONITRATE 30 MG: 30 TABLET, EXTENDED RELEASE ORAL at 20:53

## 2019-11-17 RX ADMIN — LEVALBUTEROL 0.63 MG: 0.63 SOLUTION RESPIRATORY (INHALATION) at 18:36

## 2019-11-17 RX ADMIN — LISINOPRIL 5 MG: 5 TABLET ORAL at 09:20

## 2019-11-17 RX ADMIN — FORMOTEROL FUMARATE DIHYDRATE 20 MCG: 20 SOLUTION RESPIRATORY (INHALATION) at 07:04

## 2019-11-17 RX ADMIN — GUAIFENESIN 600 MG: 600 TABLET, EXTENDED RELEASE ORAL at 20:53

## 2019-11-17 RX ADMIN — LEVOTHYROXINE SODIUM 50 MCG: 50 TABLET ORAL at 05:56

## 2019-11-17 RX ADMIN — Medication 10 ML: at 09:20

## 2019-11-17 RX ADMIN — ASPIRIN 81 MG: 81 TABLET, COATED ORAL at 20:53

## 2019-11-17 RX ADMIN — ATORVASTATIN CALCIUM 40 MG: 40 TABLET, FILM COATED ORAL at 09:20

## 2019-11-17 RX ADMIN — HYDRALAZINE HYDROCHLORIDE 10 MG: 20 INJECTION INTRAMUSCULAR; INTRAVENOUS at 16:00

## 2019-11-17 RX ADMIN — LEVALBUTEROL 0.63 MG: 0.63 SOLUTION RESPIRATORY (INHALATION) at 14:31

## 2019-11-17 RX ADMIN — FUROSEMIDE 20 MG: 10 INJECTION, SOLUTION INTRAMUSCULAR; INTRAVENOUS at 13:29

## 2019-11-17 RX ADMIN — METOPROLOL TARTRATE 100 MG: 50 TABLET ORAL at 09:20

## 2019-11-17 RX ADMIN — CLOPIDOGREL BISULFATE 75 MG: 75 TABLET ORAL at 09:20

## 2019-11-17 RX ADMIN — Medication 10 ML: at 20:53

## 2019-11-17 RX ADMIN — LISINOPRIL 5 MG: 5 TABLET ORAL at 20:53

## 2019-11-17 RX ADMIN — METOPROLOL TARTRATE 100 MG: 50 TABLET ORAL at 20:53

## 2019-11-17 ASSESSMENT — ENCOUNTER SYMPTOMS
GASTROINTESTINAL NEGATIVE: 1
SHORTNESS OF BREATH: 1
COUGH: 1
EYES NEGATIVE: 1

## 2019-11-17 ASSESSMENT — PAIN SCALES - GENERAL: PAINLEVEL_OUTOF10: 0

## 2019-11-17 NOTE — PROGRESS NOTES
Cardiology Daily Note Larry Rogers MD      Patient:  Kalpesh Centeno  [id number]    Patient Active Problem List    Diagnosis Date Noted    CAD in native artery 11/15/2019     Priority: High    Bradycardia      Priority: High    Acute superficial venous thrombosis of right lower extremity 04/25/2016     Priority: High    Mitral valve stenosis 03/29/2016     Priority: High    Mitral valve insufficiency 03/29/2016     Priority: High    PUD (peptic ulcer disease) 03/29/2016     Priority: Medium    Pain in both lower extremities 10/30/2019     Priority: Low    Status post Maze operation for atrial fibrillation 09/25/2019     Priority: Low    [facility] fibrillation) [facility] 09/25/2019     Priority: Low    S/P coronary artery bypass graft x 1 09/25/2019     Priority: Low    S/P mitral valve replacement 09/25/2019     Priority: Low    Chest pain 09/25/2019     Priority: Low    JONES (dyspnea on exertion) 09/25/2019     Priority: Low    Fatigue 09/25/2019     Priority: Low    Postoperative anemia due to acute blood loss 07/12/2019     Priority: Low    Carotid aneurysm, right ([facility] 07/11/2019     Priority: Low    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[facility] 05/21/2019     Priority: Low    Mild aortic stenosis 02/20/2018     Priority: Low    Essential hypertension      Priority: Low    NSTEMI (non-ST elevated myocardial infarction) [facility] 01/14/2018     Priority: Low    HCAP (healthcare-associated pneumonia) 01/14/2018     Priority: Low    Acute renal failure (ARF) ([facility] 01/14/2018     Priority: Low    Syncope and collapse      Priority: Low    Sepsis with organ dysfunction ([facility] 01/13/2018     Priority: Low    Obstruction of left carotid artery 01/11/2018     Priority: Low    Bilateral carotid artery stenosis 11/19/2017     Priority: Low    Wound of sacral region 06/16/2016     Priority: Low    Elevated TSH 06/16/2016     Priority: Low    Acute blood loss anemia 06/15/2016     Priority: Low  CHF (congestive heart failure) ([facility] 06/15/2016     Priority: Low    CKD (chronic kidney disease), stage III ([address] 06/15/2016     Priority: Low    Pulmonary emphysema ([address] 06/15/2016     Priority: Low    COPD without exacerbation (HCC)      Priority: Low    PVD (peripheral vascular disease) ([facility])      Priority: Low    Atherosclerosis of native artery of right lower extremity with ulceration of ankle ([facility] 06/05/2016     Priority: Low    Atherosclerosis of native arteries of right leg with ulceration of other part of lower right leg 06/05/2016     Priority: Low    Atherosclerosis of native arteries of right leg with ulceration of other part of foot 06/05/2016     Priority: Low    Nonhealing ulcer of right lower leg with fat layer exposed ([address] 06/05/2016     Priority: Low    Non-pressure chronic ulcer of right ankle with fat layer exposed ([facility] 06/05/2016     Priority: Low    Neuropathic ulcer of right foot with fat layer exposed ([facility] 06/05/2016     Priority: Low    Hypervolemia      Priority: Low    Nonhealing nonsurgical wound with fat layer exposed 06/03/2016     Priority: Low    Acute blood loss anemia      Priority: Low    Atherosclerosis of native arteries of left leg with ulceration of calf ([facility])      Priority: Low    Severe malnutrition ([facility])      Priority: Low    Diastolic dysfunction      Priority: Low    Anemia in chronic kidney disease (CKD)      Priority: Low    Non-pressure chronic ulcer of right calf with fat layer exposed ([facility] 05/27/2016     Priority: Low    Decubitus ulcer of right buttock, stage 3 ([address] 05/27/2016     Priority: Low    Nocturnal hypoxia 03/30/2016     Priority: Low    Pulmonary hypertension 03/29/2016     Priority: Low    PAD (peripheral artery disease) ([facility])      Priority: Low    Calculus of gallbladder without cholecystitis without obstruction      Priority: Low    Acute renal failure (HCC)      Priority: Low    Carotid artery stenosis 02/08/2012 kg)   SpO2 92%   BMI 18.78 kg/m²       Intake/Output Summary (Last 24 hours) at 11/17/2019 1148  Last data filed at 11/17/2019 4857  Gross per 24 hour   Intake 630 ml   Output 825 ml   Net -195 ml       Prior to Admission medications    Medication Sig Start Date End Date Taking? Authorizing Provider   clopidogrel (PLAVIX) 75 MG tablet Take 1 tablet by mouth daily 11/17/19  Yes Shannan Mckinney MD   lisinopril (PRINIVIL;ZESTRIL) 5 MG tablet Take 2 tablets by mouth 2 times daily 11/16/19  Yes Shannan Mckinney MD   Dextromethorphan-guaiFENesin ([address] DM MAXIMUM STRENGTH)  MG TB12 Take 1,200 mg by mouth   Yes Historical Provider, MD   levothyroxine (SYNTHROID) 50 MCG tablet Take 50 mcg by mouth Daily   Yes Historical Provider, MD   isosorbide mononitrate (IMDUR) 30 MG extended release tablet Take 1 tablet by mouth nightly 10/30/19  Yes Claudeen Force, APRN   atorvastatin (LIPITOR) 40 MG tablet Take 1 tablet by mouth daily 5/22/19  Yes Claudeen Force, APRN   metoprolol (LOPRESSOR) 100 MG tablet Take 1 tablet by mouth 2 times daily 5/22/19  Yes Claudeen Force, APRN   calcium-cholecalciferol (CALCIUM 500+D) 500-200 MG-UNIT per tablet Take 2 tablets by mouth daily    Yes Historical Provider, MD   Biotin 5000 MCG TABS Take 1 tablet by mouth daily    Yes Historical Provider, MD   aspirin EC 81 MG EC tablet Take 81 mg by mouth daily.      Yes Historical Provider, MD   nitroGLYCERIN (NITROSTAT) 0.4 MG SL tablet Place 1 tablet under the tongue every 5 minutes as needed for Chest pain 10/30/19   Claudeen Force, APRN        formoterol  20 mcg Nebulization BID    aspirin EC  81 mg Oral Nightly    atorvastatin  40 mg Oral Daily    lisinopril  5 mg Oral BID    metoprolol  100 mg Oral BID    hydrochlorothiazide  25 mg Oral Daily    levothyroxine  50 mcg Oral Daily    isosorbide mononitrate  30 mg Oral Nightly    sodium chloride flush  10 mL Intravenous 2 times per day    clopidogrel  75 mg Oral Daily TELEMETRY: Sinus     Physical Exam:      Physical Exam      General:  Awake, alert, NAD  Skin:  Warm and dry  Neck:  no jvd , no carotid bruits  Chest:bilateral diminished breath sounds, no wheezing or rales  Cardiovascular:  RRR Q6U3 no murmurs, clicks, gallups, or rubs  Abdomen:  Soft nontender, nondistended, bowel sounds present  Extremities:  Edema: Trace left Radial Artery withnl palpable pulses; Hematoma: No  Neuro: Intact neurovascular function Yes    Lab Data:  CBC:   Recent Labs     11/15/19  1205 11/16/19  0201 11/17/19  0146   WBC 9.2 7.5 11.2*   HGB 10.7* 9.4* 8.7*   HCT 38.4 33.7* 30.6*   MCV 79.3* 80.0* 79.5*    263 235     BMP:   Recent Labs     11/15/19  1205 11/16/19  0201 11/16/19  1055 11/16/19  1531 11/17/19  0146    137  --   --  140   K 3.8 2.7* 3.7 3.9 4.7   CL 95* 89*  --   --  95*   CO2 29 35*  --   --  35*   BUN 34* 25*  --   --  26*   CREATININE 1.7* 1.2*  --   --  1.4*     LIVER PROFILE:   Recent Labs     11/15/19  1205   AST 33*   ALT 13   BILITOT 0.6   ALKPHOS 83     PT/INR: No results for input(s): PROTIME, INR in the last 72 hours. APTT: No results for input(s): APTT in the last 72 hours. BNP:  No results for input(s): BNP in the last 72 hours. CK, CKMB, Troponin: @LABRCNT (CKTOTAL:3, CKMB:3, TROPONINI:3)@    IMAGING:  Xr Chest Portable    Result Date: 11/16/2019  XR CHEST PORTABLE 11/16/2019 2:00 PM HISTORY: Respiratory failure COMPARISON: 6/21/2019 CXR: A single frontal view of the chest is performed. Findings: There are new pleural effusions, left greater than right. Effusions appear overall small in volume. Left lower lobe consolidation. There is scarring of the right lateral basilar lung. The heart is enlarged. There is evidence prior mediastinal surgery. Similar prominence of the interstitial markings. There is thoracic aortic calcification. Surgical clips project over lower neck. There is no acute appearing bony pathology.     1. New small pleural effusions, left greater than right, with left basilar consolidation concerning for pneumonia. Scarring of the right inferolateral lung with stable prominence of the interstitial markings compared to 6/21/2019. 2. Mild cardiomegaly with evidence prior mediastinal surgery. 3. Extensive vascular calcifications. . Signed by Dr Lyndon Carvalho on 11/16/2019 3:15 PM    Vl Dup Carotid Bilateral    Result Date: 10/29/2019  Vascular Carotid Procedure  Demographics   Patient Name    Liza Lu  Age                  76                  J   Patient Number  [id number]            Gender               Female   Visit Number    [id number]         Interpreting         Emelina Brady MD                                    Physician   Date of Birth   1945        Referring Physician  Emelina Brady MD   Accession       [id number]         [id number] [facility] [address]  Number  Procedure Type of Study:   Cerebral:Carotid, VL CAROTID BILATERAL. Indications for Study:CVD. Risk Factors History of Disease +-------------------------+----+-------------------------------------------+ ! Diagnosis                ! Date! Comments                                   ! +-------------------------+----+-------------------------------------------+ ! Neuro                    ! !COD W/ RCE 2002                            ! +-------------------------+----+-------------------------------------------+ ! Circulatory->CAD         ! ! HTN                                        ! +-------------------------+----+-------------------------------------------+ ! Circulatory              ! !CORONARY PTCA/STENT X 2                    ! +-------------------------+----+-------------------------------------------+ ! Peripheral vascular      ! !RT COMMON ILIAC ARTERY STENOSIS - DX 3     ! !disease                  ! !WEEKS AGO.                                 !  +-------------------------+----+-------------------------------------------+   - The patient's risk factor(s) include: dyslipidemia, arterial     hypertension, prior MI and prior CABG. - The patient has a former tobacco history. - The patient's risk factor(s) include: Prior Carotid Surgery - RightPrior     Carotid Surgery - Left Allergies   - Other allergy:(NKDA OR NKFA). Impression   Well healed bilateral carotid endarterectomy site with no recurrent  stenosis or residual plaque. There is normal antegrade flow in the bilateral vertebral artery(ies). Signature   ----------------------------------------------------------------  Electronically signed by Maksim Brewer MD(Interpreting  physician) on 10/29/2019 12:38 PM  ----------------------------------------------------------------  Blood Pressure:Right arm 152/64 mmHg. Left arm 160/70 mmHg. Velocities are measured in cm/s ; Diameters are measured in mm Carotid Right Measurements +------------+-------+-------+--------+-------+------------+---------------+ ! Location    ! PSV    ! EDV    ! Angle   ! RI     !%Stenosis   ! Tortuosity     ! +------------+-------+-------+--------+-------+------------+---------------+ ! Prox CCA    !99.1   !12.3   !60      !0.88   !            !               ! +------------+-------+-------+--------+-------+------------+---------------+ ! Mid CCA     !72.7   !17.3   ! 60      !0.76   !            !               ! +------------+-------+-------+--------+-------+------------+---------------+ ! Prox ICA    !115    !28.3   ! 60      !0.75   !            !               ! +------------+-------+-------+--------+-------+------------+---------------+ ! Mid ICA     !99.1   !20.5   !60      !0.79   !            !               ! +------------+-------+-------+--------+-------+------------+---------------+ ! Dist ICA    !110    !28.7   ! 60      !0.74   !            !               ! +------------+-------+-------+--------+-------+------------+---------------+ ! Prox ECA    !46     !7.46   !60      !0.84   !            !               ! as OP    Gayle Dee MD 11/17/2019 11:48 AM

## 2019-11-17 NOTE — PROGRESS NOTES
Pulmonary and Critical Care Progress Note [address] [facility]    Patient: Caterina Quiñonez  1945   MR# [id number]   Acct# [de-identified]  11/17/19   12:25 PM  Referring Provider: Sakshi De La Rosa MD    Chief Complaint: Dyspnea    Interval history: Patient is still feeling very short of breath and has very poor tolerance for any physical activity. She is on 2 L nasal cannula oxygen. BNP was greater than 14,000 yesterday. She is inquiring about getting her Mucinex dose correctly. Currently, there is no family at bedside. Medications: formoterol, 20 mcg, Nebulization, BID    aspirin EC, 81 mg, Oral, Nightly    atorvastatin, 40 mg, Oral, Daily    lisinopril, 5 mg, Oral, BID    metoprolol, 100 mg, Oral, BID    hydrochlorothiazide, 25 mg, Oral, Daily    levothyroxine, 50 mcg, Oral, Daily    isosorbide mononitrate, 30 mg, Oral, Nightly    sodium chloride flush, 10 mL, Intravenous, 2 times per day    clopidogrel, 75 mg, Oral, Daily     Review of Systems: Review of Systems   Constitutional: Positive for fatigue. Negative for chills and fever. Eyes: Negative. Respiratory: Positive for cough and shortness of breath. Cardiovascular: Negative for chest pain and palpitations. Gastrointestinal: Negative. Genitourinary: Negative. Musculoskeletal: Negative. Skin: Negative for rash. Neurological: Positive for weakness. Negative for headaches. Psychiatric/Behavioral: Negative. Physical Exam:  Blood pressure 125/61, pulse 73, temperature 97.8 °F (36.6 °C), temperature source Temporal, resp. rate 16, height 5' 3\" (1.6 m), weight 106 lb (48.1 kg), SpO2 92 %. Intake/Output Summary (Last 24 hours) at 11/17/2019 1225  Last data filed at 11/17/2019 7334  Gross per 24 hour   Intake 630 ml   Output 825 ml   Net -195 ml     Physical Exam   Constitutional:       General: She is not in acute distress. Appearance: She is well-developed. HENT:      Head: Normocephalic and atraumatic.    Eyes: General: No scleral icterus. Pupils: Pupils are equal, round, and reactive to light. Cardiovascular:      Rate and Rhythm: Normal rate and regular rhythm. Pulmonary:      Effort: Accessory muscle usage and prolonged expiration present. No respiratory distress or retractions. Breath sounds: Decreased breath sounds and rales (basilar) present, a few expiratory wheezes. Chest:      Chest wall: No tenderness. Abdominal:      General: Bowel sounds are normal.      Palpations: Abdomen is soft. Musculoskeletal: Normal range of motion. Right lower leg: Trace edema present. Left lower leg: Trace edema present. Skin:     General: Skin is warm and dry. Coloration: Skin is not jaundiced. Findings: No erythema. Neurological:      General: No focal deficit present. Recent Labs     11/15/19  1205 11/16/19  0201 11/17/19  0146   WBC 9.2 7.5 11.2*   RBC 4.84 4.21 3.85*   HGB 10.7* 9.4* 8.7*   HCT 38.4 33.7* 30.6*    263 235   MCV 79.3* 80.0* 79.5*   MCH 22.1* 22.3* 22.6*   MCHC 27.9* 27.9* 28.4*   RDW 19.3* 19.0* 19.3*      Recent Labs     11/15/19  1205 11/16/19  0201 11/16/19  1055 11/16/19  1531 11/17/19  0146    137  --   --  140   K 3.8 2.7* 3.7 3.9 4.7   CL 95* 89*  --   --  95*   CO2 29 35*  --   --  35*   BUN 34* 25*  --   --  26*   CREATININE 1.7* 1.2*  --   --  1.4*   CALCIUM 11.3* 9.7  --   --  9.5   GLUCOSE 142* 127*  --   --  99   PHART  --   --   --  7.490*  --    [id number]  --   --   --  55.0*  --    PO2ART  --   --   --  64.0*  --    [id number]  --   --   --  41.9*  --    [id number]  --   --   --  92.4  --    BEART  --   --   --  16.5*  --    AST 33*  --   --   --   --    ALT 13  --   --   --   --    ALKPHOS 83  --   --   --   --    BILITOT 0.6  --   --   --   --       No results for input(s): BC, LABGRAM, CULTRESP, BFCX in the last 72 hours. Radiograph:   My radiograph interpretation: No new today    Pulmonary Assessment:    1.  Acute hypercapnic and hypoxemic

## 2019-11-18 ENCOUNTER — APPOINTMENT (OUTPATIENT)
Dept: CT IMAGING | Age: 74
DRG: 246 | End: 2019-11-18
Attending: INTERNAL MEDICINE
Payer: MEDICARE

## 2019-11-18 ENCOUNTER — OUTSIDE FACILITY SERVICE (OUTPATIENT)
Dept: PULMONOLOGY | Facility: CLINIC | Age: 74
End: 2019-11-18

## 2019-11-18 LAB
BASOPHILS ABSOLUTE: 0.1 K/UL (ref 0–0.2)
BASOPHILS RELATIVE PERCENT: 0.5 % (ref 0–1)
EOSINOPHILS ABSOLUTE: 0.3 K/UL (ref 0–0.6)
EOSINOPHILS RELATIVE PERCENT: 2.6 % (ref 0–5)
HCT VFR BLD CALC: 33.2 % (ref 37–47)
HEMOGLOBIN: 9.3 G/DL (ref 12–16)
HYPOCHROMIA: ABNORMAL
IMMATURE GRANULOCYTES #: 0.1 K/UL
LYMPHOCYTES ABSOLUTE: 1 K/UL (ref 1.1–4.5)
LYMPHOCYTES RELATIVE PERCENT: 9.8 % (ref 20–40)
MCH RBC QN AUTO: 22.3 PG (ref 27–31)
MCHC RBC AUTO-ENTMCNC: 28 G/DL (ref 33–37)
MCV RBC AUTO: 79.6 FL (ref 81–99)
MICROCYTES: ABNORMAL
MONOCYTES ABSOLUTE: 1.3 K/UL (ref 0–0.9)
MONOCYTES RELATIVE PERCENT: 13.2 % (ref 0–10)
NEUTROPHILS ABSOLUTE: 7.3 K/UL (ref 1.5–7.5)
NEUTROPHILS RELATIVE PERCENT: 73.3 % (ref 50–65)
PDW BLD-RTO: 19.6 % (ref 11.5–14.5)
PLATELET # BLD: 232 K/UL (ref 130–400)
PLATELET SLIDE REVIEW: ADEQUATE
PMV BLD AUTO: 9.3 FL (ref 9.4–12.3)
RBC # BLD: 4.17 M/UL (ref 4.2–5.4)
WBC # BLD: 10 K/UL (ref 4.8–10.8)

## 2019-11-18 PROCEDURE — 6360000002 HC RX W HCPCS: Performed by: INTERNAL MEDICINE

## 2019-11-18 PROCEDURE — 99232 SBSQ HOSP IP/OBS MODERATE 35: CPT | Performed by: INTERNAL MEDICINE

## 2019-11-18 PROCEDURE — 6370000000 HC RX 637 (ALT 250 FOR IP): Performed by: INTERNAL MEDICINE

## 2019-11-18 PROCEDURE — 2700000000 HC OXYGEN THERAPY PER DAY

## 2019-11-18 PROCEDURE — 2580000003 HC RX 258: Performed by: INTERNAL MEDICINE

## 2019-11-18 PROCEDURE — 94640 AIRWAY INHALATION TREATMENT: CPT

## 2019-11-18 PROCEDURE — 85025 COMPLETE CBC W/AUTO DIFF WBC: CPT

## 2019-11-18 PROCEDURE — 71275 CT ANGIOGRAPHY CHEST: CPT

## 2019-11-18 PROCEDURE — 93970 EXTREMITY STUDY: CPT

## 2019-11-18 PROCEDURE — 6360000002 HC RX W HCPCS: Performed by: NURSE PRACTITIONER

## 2019-11-18 PROCEDURE — 6370000000 HC RX 637 (ALT 250 FOR IP): Performed by: NURSE PRACTITIONER

## 2019-11-18 PROCEDURE — 36415 COLL VENOUS BLD VENIPUNCTURE: CPT

## 2019-11-18 PROCEDURE — 2140000000 HC CCU INTERMEDIATE R&B

## 2019-11-18 PROCEDURE — 6360000004 HC RX CONTRAST MEDICATION: Performed by: INTERNAL MEDICINE

## 2019-11-18 PROCEDURE — 93005 ELECTROCARDIOGRAM TRACING: CPT | Performed by: INTERNAL MEDICINE

## 2019-11-18 RX ORDER — AMLODIPINE BESYLATE 2.5 MG/1
2.5 TABLET ORAL 2 TIMES DAILY
Status: DISCONTINUED | OUTPATIENT
Start: 2019-11-18 | End: 2019-11-20 | Stop reason: HOSPADM

## 2019-11-18 RX ORDER — IODIXANOL 320 MG/ML
100 INJECTION, SOLUTION INTRAVASCULAR
Status: COMPLETED | OUTPATIENT
Start: 2019-11-18 | End: 2019-11-18

## 2019-11-18 RX ORDER — FUROSEMIDE 10 MG/ML
40 INJECTION INTRAMUSCULAR; INTRAVENOUS 2 TIMES DAILY
Status: DISCONTINUED | OUTPATIENT
Start: 2019-11-18 | End: 2019-11-19

## 2019-11-18 RX ADMIN — LEVALBUTEROL 0.63 MG: 0.63 SOLUTION RESPIRATORY (INHALATION) at 06:22

## 2019-11-18 RX ADMIN — METOPROLOL TARTRATE 100 MG: 50 TABLET ORAL at 19:57

## 2019-11-18 RX ADMIN — AMLODIPINE BESYLATE 2.5 MG: 2.5 TABLET ORAL at 19:57

## 2019-11-18 RX ADMIN — HYDROCHLOROTHIAZIDE 25 MG: 25 TABLET ORAL at 07:41

## 2019-11-18 RX ADMIN — FORMOTEROL FUMARATE DIHYDRATE 20 MCG: 20 SOLUTION RESPIRATORY (INHALATION) at 18:56

## 2019-11-18 RX ADMIN — Medication 10 ML: at 07:41

## 2019-11-18 RX ADMIN — LISINOPRIL 5 MG: 5 TABLET ORAL at 19:57

## 2019-11-18 RX ADMIN — GUAIFENESIN 600 MG: 600 TABLET, EXTENDED RELEASE ORAL at 07:40

## 2019-11-18 RX ADMIN — LEVALBUTEROL 0.63 MG: 0.63 SOLUTION RESPIRATORY (INHALATION) at 10:38

## 2019-11-18 RX ADMIN — FUROSEMIDE 40 MG: 10 INJECTION, SOLUTION INTRAMUSCULAR; INTRAVENOUS at 19:58

## 2019-11-18 RX ADMIN — ASPIRIN 81 MG: 81 TABLET, COATED ORAL at 19:57

## 2019-11-18 RX ADMIN — ISOSORBIDE MONONITRATE 30 MG: 30 TABLET, EXTENDED RELEASE ORAL at 19:57

## 2019-11-18 RX ADMIN — CLOPIDOGREL BISULFATE 75 MG: 75 TABLET ORAL at 07:40

## 2019-11-18 RX ADMIN — LEVOTHYROXINE SODIUM 50 MCG: 50 TABLET ORAL at 07:40

## 2019-11-18 RX ADMIN — IODIXANOL 100 ML: 320 INJECTION, SOLUTION INTRAVASCULAR at 16:06

## 2019-11-18 RX ADMIN — LEVALBUTEROL 0.63 MG: 0.63 SOLUTION RESPIRATORY (INHALATION) at 14:29

## 2019-11-18 RX ADMIN — ATORVASTATIN CALCIUM 40 MG: 40 TABLET, FILM COATED ORAL at 07:40

## 2019-11-18 RX ADMIN — AMLODIPINE BESYLATE 2.5 MG: 2.5 TABLET ORAL at 13:36

## 2019-11-18 RX ADMIN — METOPROLOL TARTRATE 100 MG: 50 TABLET ORAL at 07:40

## 2019-11-18 RX ADMIN — FUROSEMIDE 40 MG: 10 INJECTION, SOLUTION INTRAMUSCULAR; INTRAVENOUS at 13:36

## 2019-11-18 RX ADMIN — GUAIFENESIN 600 MG: 600 TABLET, EXTENDED RELEASE ORAL at 19:57

## 2019-11-18 RX ADMIN — LISINOPRIL 5 MG: 5 TABLET ORAL at 07:40

## 2019-11-18 RX ADMIN — LEVALBUTEROL 0.63 MG: 0.63 SOLUTION RESPIRATORY (INHALATION) at 18:46

## 2019-11-18 RX ADMIN — FORMOTEROL FUMARATE DIHYDRATE 20 MCG: 20 SOLUTION RESPIRATORY (INHALATION) at 06:30

## 2019-11-18 RX ADMIN — Medication 10 ML: at 19:58

## 2019-11-18 ASSESSMENT — ENCOUNTER SYMPTOMS
EYES NEGATIVE: 1
GASTROINTESTINAL NEGATIVE: 1
SHORTNESS OF BREATH: 1
COUGH: 1

## 2019-11-18 ASSESSMENT — PAIN SCALES - GENERAL: PAINLEVEL_OUTOF10: 0

## 2019-11-18 NOTE — PLAN OF CARE
Problem: Falls - Risk of:  Goal: Will remain free from falls  Description  Will remain free from falls  11/18/2019 1032 by Karie Lo RN  Outcome: Ongoing     Problem: Falls - Risk of:  Goal: Absence of physical injury  Description  Absence of physical injury  11/18/2019 1032 by Karie Lo RN  Outcome: Ongoing     Problem: Pain:  Goal: Pain level will decrease  Description  Pain level will decrease  11/18/2019 1032 by Karie Lo RN  Outcome: Ongoing     Problem: Pain:  Goal: Control of acute pain  Description  Control of acute pain  11/18/2019 1032 by Karie Lo RN  Outcome: Ongoing     Problem: Pain:  Goal: Control of chronic pain  Description  Control of chronic pain  11/18/2019 1032 by Karie Lo RN  Outcome: Ongoing

## 2019-11-18 NOTE — PROGRESS NOTES
Cardiovascular:      Rate and Rhythm: Normal rate and regular rhythm. Pulmonary:      Effort:  Prolonged expiration present. No respiratory distress or retractions. Breath sounds: Decreased breath sounds  with a few minimal rales at the bases. No wheezes are heard today. Chest:      Chest wall: No tenderness. Abdominal:      General: Bowel sounds are normal.      Palpations: Abdomen is soft. Musculoskeletal: Normal range of motion. Right lower leg: Trace edema present. Left lower leg: Trace edema present. Skin:     General: Skin is warm and dry. Coloration: Skin is not jaundiced. Findings: No erythema. Neurological:      General: No focal deficit present. Recent Labs     11/15/19  1205 11/16/19  0201 11/17/19  0146   WBC 9.2 7.5 11.2*   RBC 4.84 4.21 3.85*   HGB 10.7* 9.4* 8.7*   HCT 38.4 33.7* 30.6*    263 235   MCV 79.3* 80.0* 79.5*   MCH 22.1* 22.3* 22.6*   MCHC 27.9* 27.9* 28.4*   RDW 19.3* 19.0* 19.3*      Recent Labs     11/15/19  1205 11/16/19  0201 11/16/19  1055 11/16/19  1531 11/17/19  0146    137  --   --  140   K 3.8 2.7* 3.7 3.9 4.7   CL 95* 89*  --   --  95*   CO2 29 35*  --   --  35*   BUN 34* 25*  --   --  26*   CREATININE 1.7* 1.2*  --   --  1.4*   CALCIUM 11.3* 9.7  --   --  9.5   GLUCOSE 142* 127*  --   --  99   PHART  --   --   --  7.490*  --    [id number]  --   --   --  55.0*  --    PO2ART  --   --   --  64.0*  --    HQU1UIS  --   --   --  41.9*  --    [id number]  --   --   --  92.4  --    BEART  --   --   --  16.5*  --    AST 33*  --   --   --   --    ALT 13  --   --   --   --    ALKPHOS 83  --   --   --   --    BILITOT 0.6  --   --   --   --       No results for input(s): BC, LABGRAM, CULTRESP, BFCX in the last 72 hours. Radiograph:   My radiograph interpretation: No new today    Pulmonary Assessment:    1. Acute hypercapnic and hypoxemic respiratory failure  2. Coronary artery disease, status post stent placement  3.  Cardiac valvular

## 2019-11-18 NOTE — PROGRESS NOTES
Bilateral lower extremity venous duplex performed. There is no evidence of DVT, SVT, or REFLUX noted at this time. This is a preliminary report. Final report pending.

## 2019-11-19 ENCOUNTER — OUTSIDE FACILITY SERVICE (OUTPATIENT)
Dept: PULMONOLOGY | Facility: CLINIC | Age: 74
End: 2019-11-19

## 2019-11-19 LAB
ANION GAP SERPL CALCULATED.3IONS-SCNC: 10 MMOL/L (ref 7–19)
ANION GAP SERPL CALCULATED.3IONS-SCNC: 11 MMOL/L (ref 7–19)
ANION GAP SERPL CALCULATED.3IONS-SCNC: 12 MMOL/L (ref 7–19)
ANISOCYTOSIS: ABNORMAL
BASOPHILS ABSOLUTE: 0 K/UL (ref 0–0.2)
BASOPHILS RELATIVE PERCENT: 0.5 % (ref 0–1)
BUN BLDV-MCNC: 27 MG/DL (ref 8–23)
BUN BLDV-MCNC: 29 MG/DL (ref 8–23)
BUN BLDV-MCNC: 29 MG/DL (ref 8–23)
CALCIUM SERPL-MCNC: 8.8 MG/DL (ref 8.8–10.2)
CALCIUM SERPL-MCNC: 8.8 MG/DL (ref 8.8–10.2)
CALCIUM SERPL-MCNC: 9.3 MG/DL (ref 8.8–10.2)
CHLORIDE BLD-SCNC: 91 MMOL/L (ref 98–111)
CHLORIDE BLD-SCNC: 92 MMOL/L (ref 98–111)
CHLORIDE BLD-SCNC: 93 MMOL/L (ref 98–111)
CO2: 34 MMOL/L (ref 22–29)
CO2: 35 MMOL/L (ref 22–29)
CO2: 35 MMOL/L (ref 22–29)
CREAT SERPL-MCNC: 1.4 MG/DL (ref 0.5–0.9)
CREAT SERPL-MCNC: 1.6 MG/DL (ref 0.5–0.9)
CREAT SERPL-MCNC: 1.6 MG/DL (ref 0.5–0.9)
EKG P AXIS: 65 DEGREES
EKG P AXIS: 86 DEGREES
EKG P-R INTERVAL: 128 MS
EKG P-R INTERVAL: 132 MS
EKG Q-T INTERVAL: 366 MS
EKG Q-T INTERVAL: 412 MS
EKG QRS DURATION: 86 MS
EKG QRS DURATION: 90 MS
EKG QTC CALCULATION (BAZETT): 411 MS
EKG QTC CALCULATION (BAZETT): 429 MS
EKG T AXIS: 160 DEGREES
EKG T AXIS: 82 DEGREES
EOSINOPHILS ABSOLUTE: 0.3 K/UL (ref 0–0.6)
EOSINOPHILS RELATIVE PERCENT: 3 % (ref 0–5)
GFR NON-AFRICAN AMERICAN: 31
GFR NON-AFRICAN AMERICAN: 31
GFR NON-AFRICAN AMERICAN: 37
GLUCOSE BLD-MCNC: 96 MG/DL (ref 74–109)
GLUCOSE BLD-MCNC: 97 MG/DL (ref 74–109)
GLUCOSE BLD-MCNC: 99 MG/DL (ref 74–109)
HCT VFR BLD CALC: 30.8 % (ref 37–47)
HEMOGLOBIN: 8.8 G/DL (ref 12–16)
HYPOCHROMIA: ABNORMAL
IMMATURE GRANULOCYTES #: 0 K/UL
LYMPHOCYTES ABSOLUTE: 0.9 K/UL (ref 1.1–4.5)
LYMPHOCYTES RELATIVE PERCENT: 10.3 % (ref 20–40)
MCH RBC QN AUTO: 22.4 PG (ref 27–31)
MCHC RBC AUTO-ENTMCNC: 28.6 G/DL (ref 33–37)
MCV RBC AUTO: 78.6 FL (ref 81–99)
MICROCYTES: ABNORMAL
MONOCYTES ABSOLUTE: 1.2 K/UL (ref 0–0.9)
MONOCYTES RELATIVE PERCENT: 14.7 % (ref 0–10)
NEUTROPHILS ABSOLUTE: 5.9 K/UL (ref 1.5–7.5)
NEUTROPHILS RELATIVE PERCENT: 71 % (ref 50–65)
PDW BLD-RTO: 19.4 % (ref 11.5–14.5)
PLATELET # BLD: 237 K/UL (ref 130–400)
PMV BLD AUTO: 9.7 FL (ref 9.4–12.3)
POTASSIUM SERPL-SCNC: 3.6 MMOL/L (ref 3.5–5)
POTASSIUM SERPL-SCNC: 3.6 MMOL/L (ref 3.5–5)
POTASSIUM SERPL-SCNC: 4 MMOL/L (ref 3.5–5)
RBC # BLD: 3.92 M/UL (ref 4.2–5.4)
SODIUM BLD-SCNC: 137 MMOL/L (ref 136–145)
SODIUM BLD-SCNC: 138 MMOL/L (ref 136–145)
SODIUM BLD-SCNC: 138 MMOL/L (ref 136–145)
WBC # BLD: 8.3 K/UL (ref 4.8–10.8)

## 2019-11-19 PROCEDURE — 99232 SBSQ HOSP IP/OBS MODERATE 35: CPT | Performed by: INTERNAL MEDICINE

## 2019-11-19 PROCEDURE — 94640 AIRWAY INHALATION TREATMENT: CPT

## 2019-11-19 PROCEDURE — 36415 COLL VENOUS BLD VENIPUNCTURE: CPT

## 2019-11-19 PROCEDURE — 2700000000 HC OXYGEN THERAPY PER DAY

## 2019-11-19 PROCEDURE — 2580000003 HC RX 258: Performed by: INTERNAL MEDICINE

## 2019-11-19 PROCEDURE — 6370000000 HC RX 637 (ALT 250 FOR IP): Performed by: INTERNAL MEDICINE

## 2019-11-19 PROCEDURE — 2140000000 HC CCU INTERMEDIATE R&B

## 2019-11-19 PROCEDURE — 6360000002 HC RX W HCPCS: Performed by: INTERNAL MEDICINE

## 2019-11-19 PROCEDURE — 85025 COMPLETE CBC W/AUTO DIFF WBC: CPT

## 2019-11-19 PROCEDURE — 80048 BASIC METABOLIC PNL TOTAL CA: CPT

## 2019-11-19 PROCEDURE — 6370000000 HC RX 637 (ALT 250 FOR IP): Performed by: NURSE PRACTITIONER

## 2019-11-19 PROCEDURE — 6360000002 HC RX W HCPCS: Performed by: NURSE PRACTITIONER

## 2019-11-19 RX ORDER — FUROSEMIDE 40 MG/1
40 TABLET ORAL DAILY
Status: DISCONTINUED | OUTPATIENT
Start: 2019-11-20 | End: 2019-11-20 | Stop reason: HOSPADM

## 2019-11-19 RX ADMIN — CLOPIDOGREL BISULFATE 75 MG: 75 TABLET ORAL at 08:04

## 2019-11-19 RX ADMIN — LEVALBUTEROL 0.63 MG: 0.63 SOLUTION RESPIRATORY (INHALATION) at 06:37

## 2019-11-19 RX ADMIN — GUAIFENESIN 600 MG: 600 TABLET, EXTENDED RELEASE ORAL at 08:04

## 2019-11-19 RX ADMIN — ISOSORBIDE MONONITRATE 30 MG: 30 TABLET, EXTENDED RELEASE ORAL at 20:42

## 2019-11-19 RX ADMIN — LEVALBUTEROL 0.63 MG: 0.63 SOLUTION RESPIRATORY (INHALATION) at 14:28

## 2019-11-19 RX ADMIN — ASPIRIN 81 MG: 81 TABLET, COATED ORAL at 20:43

## 2019-11-19 RX ADMIN — LISINOPRIL 5 MG: 5 TABLET ORAL at 08:04

## 2019-11-19 RX ADMIN — LEVALBUTEROL 0.63 MG: 0.63 SOLUTION RESPIRATORY (INHALATION) at 10:16

## 2019-11-19 RX ADMIN — Medication 10 ML: at 08:05

## 2019-11-19 RX ADMIN — FUROSEMIDE 40 MG: 10 INJECTION, SOLUTION INTRAMUSCULAR; INTRAVENOUS at 08:04

## 2019-11-19 RX ADMIN — HYDROCHLOROTHIAZIDE 25 MG: 25 TABLET ORAL at 08:04

## 2019-11-19 RX ADMIN — LEVALBUTEROL 0.63 MG: 0.63 SOLUTION RESPIRATORY (INHALATION) at 18:59

## 2019-11-19 RX ADMIN — FORMOTEROL FUMARATE DIHYDRATE 20 MCG: 20 SOLUTION RESPIRATORY (INHALATION) at 19:09

## 2019-11-19 RX ADMIN — Medication 10 ML: at 20:43

## 2019-11-19 RX ADMIN — METOPROLOL TARTRATE 100 MG: 50 TABLET ORAL at 08:04

## 2019-11-19 RX ADMIN — LEVOTHYROXINE SODIUM 50 MCG: 50 TABLET ORAL at 05:56

## 2019-11-19 RX ADMIN — GUAIFENESIN 600 MG: 600 TABLET, EXTENDED RELEASE ORAL at 20:43

## 2019-11-19 RX ADMIN — LISINOPRIL 5 MG: 5 TABLET ORAL at 20:42

## 2019-11-19 RX ADMIN — FORMOTEROL FUMARATE DIHYDRATE 20 MCG: 20 SOLUTION RESPIRATORY (INHALATION) at 06:50

## 2019-11-19 RX ADMIN — ATORVASTATIN CALCIUM 40 MG: 40 TABLET, FILM COATED ORAL at 08:04

## 2019-11-19 RX ADMIN — METOPROLOL TARTRATE 100 MG: 50 TABLET ORAL at 20:42

## 2019-11-19 RX ADMIN — AMLODIPINE BESYLATE 2.5 MG: 2.5 TABLET ORAL at 20:42

## 2019-11-19 RX ADMIN — AMLODIPINE BESYLATE 2.5 MG: 2.5 TABLET ORAL at 08:04

## 2019-11-19 ASSESSMENT — ENCOUNTER SYMPTOMS
COUGH: 1
EYES NEGATIVE: 1
SHORTNESS OF BREATH: 1
GASTROINTESTINAL NEGATIVE: 1

## 2019-11-19 ASSESSMENT — PAIN SCALES - GENERAL
PAINLEVEL_OUTOF10: 0

## 2019-11-19 NOTE — CARE COORDINATION
Completed dc assessment with pt who reports resides at home with her spouse and intends to dc to the same location. Pt denies any in home services PTA. Pt denies any use/need of DME PTA. Pt has an 18% readmission risk score and [facility] dc, if the physician agrees services are necessary. Also, per documentation from pulmonology pt could benefit from a home O2 eval prior to dc to ensure no further services necessary. SW will continue to follow and assist as further dc needs are identified.

## 2019-11-19 NOTE — PROGRESS NOTES
Cardiology Progress Note Lindsay Allen MD      Patient:  Jj Friedman  [id number]    Patient Active Problem List    Diagnosis Date Noted    CAD in native artery 11/15/2019     Priority: High    Bradycardia      Priority: High    Acute superficial venous thrombosis of right lower extremity 04/25/2016     Priority: High     Overview Note:     With large RLE bulla lateral foot skin violaceous discoloration, acutely POA (venous backpressure)      Mitral valve stenosis 03/29/2016     Priority: High    Mitral valve insufficiency 03/29/2016     Priority: High    PUD (peptic ulcer disease) 03/29/2016     Priority: Medium    Pain in both lower extremities 10/30/2019     Priority: Low    Status post Maze operation for atrial fibrillation 09/25/2019     Priority: Low     Overview Note:     4/18/16      PAF (paroxysmal atrial fibrillation) ([facility] 09/25/2019     Priority: Low    S/P coronary artery bypass graft x 1 09/25/2019     Priority: Low     Overview Note:     4/8/16 SVT to RCA      S/P mitral valve replacement 09/25/2019     Priority: Low     Overview Note:     4/8/16 MVR 23 mm mosaic porcine by Dr. America Granda Chest pain 09/25/2019     Priority: Low    JONES (dyspnea on exertion) 09/25/2019     Priority: Low    Fatigue 09/25/2019     Priority: Low    Postoperative anemia due to acute blood loss 07/12/2019     Priority: Low    Carotid aneurysm, right ([facility] 07/11/2019     Priority: Low    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[facility] 05/21/2019     Priority: Low    Mild aortic stenosis 02/20/2018     Priority: Low    Essential hypertension      Priority: Low    NSTEMI (non-ST elevated myocardial infarction) ([facility] 01/14/2018     Priority: Low    HCAP (healthcare-associated pneumonia) 01/14/2018     Priority: Low    Acute renal failure (ARF) ([facility] 01/14/2018     Priority: Low    Syncope and collapse      Priority: Low    Sepsis with organ dysfunction ([facility] 01/13/2018     Priority: Low    Obstruction of left carotid artery 01/11/2018     Priority: Low    Bilateral carotid artery stenosis 11/19/2017     Priority: Low    Wound of sacral region 06/16/2016     Priority: Low    Elevated TSH 06/16/2016     Priority: Low    Acute blood loss anemia 06/15/2016     Priority: Low    CHF (congestive heart failure) ([address] 06/15/2016     Priority: Low    CKD (chronic kidney disease), stage III ([address] 06/15/2016     Priority: Low    Pulmonary emphysema ([address] 06/15/2016     Priority: Low    COPD without exacerbation (HCC)      Priority: Low    PVD (peripheral vascular disease) ([facility])      Priority: Low    Atherosclerosis of native artery of right lower extremity with ulceration of ankle ([address] 06/05/2016     Priority: Low    Atherosclerosis of native arteries of right leg with ulceration of other part of lower right leg 06/05/2016     Priority: Low     Overview Note:     Replacing Inactive Diagnoses      Atherosclerosis of native arteries of right leg with ulceration of other part of foot 06/05/2016     Priority: Low     Overview Note:     Replacing Inactive Diagnoses      Nonhealing ulcer of right lower leg with fat layer exposed ([address] 06/05/2016     Priority: Low    Non-pressure chronic ulcer of right ankle with fat layer exposed ([address] 06/05/2016     Priority: Low    Neuropathic ulcer of right foot with fat layer exposed ([address] 06/05/2016     Priority: Low    Hypervolemia      Priority: Low    Nonhealing nonsurgical wound with fat layer exposed 06/03/2016     Priority: Low    Acute blood loss anemia      Priority: Low    Atherosclerosis of native arteries of left leg with ulceration of calf ([facility])      Priority: Low    Severe malnutrition (HCC)      Priority: Low    Diastolic dysfunction      Priority: Low    Anemia in chronic kidney disease (CKD)      Priority: Low    Non-pressure chronic ulcer of right calf with fat layer exposed ([address] 05/27/2016     Priority: Low    Decubitus ulcer of right buttock, stage 3 ([address] 05/27/2016     Priority: Low    Nocturnal hypoxia 03/30/2016     Priority: Low     Overview Note:     With associated mitral stenosis / regurgitation and pulmonary hypertension      Pulmonary hypertension 03/29/2016     Priority: Low    PAD (peripheral artery disease) ([facility])      Priority: Low    Calculus of gallbladder without cholecystitis without obstruction      Priority: Low    Acute renal failure (HCC)      Priority: Low    Carotid artery stenosis 02/08/2012     Priority: Low    Atherosclerosis of native artery of extremity with intermittent claudication ([facility]) 07/25/2011     Priority: Low    Atherosclerosis of native artery of extremity with intermittent claudication ([facility] 07/25/2011     Priority: Low     Overview Note:     Replacing Inactive Diagnoses      Mixed hyperlipidemia      Priority: Low    Arthritis      Priority: Low    Coronary artery disease involving native coronary artery of native heart      Priority: Low     Overview Note:     3/16/2016  Echo  Severe MS, moderate to severe MR, RVSP 73 mmHg, normal LVFX  3/31/2016  Cath  70% osteal RCA, severe MR, MVA 1.9, normal LVFX  4/7//2016   MVR (23 mm [employer] Mosaic) VG-PDAMallie Sers)  5/7/2016  Echo  Normal LVFX, large pleural effusion, echolucency near preserved posterior Mitral leaflet, likely chordae, RVSP 72 mmHg         Admit Date:  11/15/2019    Admission Problem List: Present on Admission:   CAD in native artery      Cardiac Specific Data:  Specialty Problems        Cardiology Problems    Mitral valve insufficiency        Mitral valve stenosis        Acute superficial venous thrombosis of right lower extremity        CAD in native artery        Coronary artery disease involving native coronary artery of native heart        Carotid artery stenosis        PAD (peripheral artery disease) ([facility]        Pulmonary hypertension        Atherosclerosis of native arteries of left leg with ulceration of calf ([facility])        Atherosclerosis of native arteries of right leg with ulceration of other part of lower right leg        Atherosclerosis of native arteries of right leg with ulceration of other part of foot        Atherosclerosis of native artery of right lower extremity with ulceration of ankle (HCC)        CHF (congestive heart failure) ([facility])        PVD (peripheral vascular disease) ([facility])        Bilateral carotid artery stenosis        Obstruction of left carotid artery        NSTEMI (non-ST elevated myocardial infarction) [facility]        Syncope and collapse        Essential hypertension        Mild aortic stenosis        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HCC)        Carotid aneurysm, right ([facility])        PAF (paroxysmal atrial fibrillation) ([facility]              Subjective:  Ms. Nando Dinh had been complaining of shortness of breath that had worsened since her PCI 11/15/2019. Symptoms slightly better today. No fever. Objective:   BP (!) 127/59   Pulse 101   Temp 99.4 °F (37.4 °C) (Temporal)   Resp 18   Ht 5' 3\" (1.6 m)   Wt 107 lb 9.6 oz (48.8 kg)   SpO2 90%   BMI 19.06 kg/m²       Intake/Output Summary (Last 24 hours) at 11/18/2019 2047  Last data filed at 11/18/2019 2000  Gross per 24 hour   Intake 1440 ml   Output 2600 ml   Net -1160 ml       Prior to Admission medications    Medication Sig Start Date End Date Taking?  Authorizing Provider   clopidogrel (PLAVIX) 75 MG tablet Take 1 tablet by mouth daily 11/17/19  Yes Ar Almanzar MD   lisinopril (PRINIVIL;ZESTRIL) 5 MG tablet Take 2 tablets by mouth 2 times daily 11/16/19  Yes Ar Almanzar MD   Dextromethorphan-guaiFENesin (MUCINEX DM MAXIMUM STRENGTH)  MG TB12 Take 1,200 mg by mouth   Yes Historical Provider, MD   levothyroxine (SYNTHROID) 50 MCG tablet Take 50 mcg by mouth Daily   Yes Historical Provider, MD   isosorbide mononitrate (IMDUR) 30 MG extended release tablet Take 1 tablet by mouth nightly 10/30/19  Yes Severo Abernethy, APRN   atorvastatin (LIPITOR) 40 MG tablet Take 1 tablet by mouth daily 5/22/19  Yes Jerie Hammans, APRN   metoprolol (LOPRESSOR) 100 MG tablet Take 1 tablet by mouth 2 times daily 5/22/19  Yes Jerie Hammans, APRN   calcium-cholecalciferol (CALCIUM 500+D) 500-200 MG-UNIT per tablet Take 2 tablets by mouth daily    Yes Historical Provider, MD   Biotin 5000 MCG TABS Take 1 tablet by mouth daily    Yes Historical Provider, MD   aspirin EC 81 MG EC tablet Take 81 mg by mouth daily. Yes Historical Provider, MD   nitroGLYCERIN (NITROSTAT) 0.4 MG SL tablet Place 1 tablet under the tongue every 5 minutes as needed for Chest pain 10/30/19   Jerie Hammans, APRN        furosemide  40 mg Intravenous BID    amLODIPine  2.5 mg Oral BID    levalbuterol  0.63 mg Nebulization 4x daily    guaiFENesin  600 mg Oral BID    formoterol  20 mcg Nebulization BID    aspirin EC  81 mg Oral Nightly    atorvastatin  40 mg Oral Daily    lisinopril  5 mg Oral BID    metoprolol  100 mg Oral BID    hydrochlorothiazide  25 mg Oral Daily    levothyroxine  50 mcg Oral Daily    isosorbide mononitrate  30 mg Oral Nightly    sodium chloride flush  10 mL Intravenous 2 times per day    clopidogrel  75 mg Oral Daily       TELEMETRY: Sinus     Physical Exam:      Physical Exam  Constitutional:       General: She is not in acute distress. Appearance: She is not diaphoretic. HENT:      Mouth/Throat:      Pharynx: No oropharyngeal exudate. Eyes:      General: No scleral icterus. Right eye: No discharge. Left eye: No discharge. Neck:      Thyroid: No thyromegaly. Vascular: No JVD. Cardiovascular:      Rate and Rhythm: Normal rate and regular rhythm. No extrasystoles are present. Heart sounds: S1 normal and S2 normal. Murmur present. No systolic murmur. No diastolic murmur. No friction rub. No gallop. No S3 or S4 sounds. Comments: Jugular venous distention noted. Systolic murmur grade 3-1/7.  No gallop noted  Pulmonary:      Effort: Pulmonary lung. The heart is enlarged. There is evidence prior mediastinal surgery. Similar prominence of the interstitial markings. There is thoracic aortic calcification. Surgical clips project over lower neck. There is no acute appearing bony pathology. 1. New small pleural effusions, left greater than right, with left basilar consolidation concerning for pneumonia. Scarring of the right inferolateral lung with stable prominence of the interstitial markings compared to 6/21/2019. 2. Mild cardiomegaly with evidence prior mediastinal surgery. 3. Extensive vascular calcifications. . Signed by Dr Celina Bhardwaj on 11/16/2019 3:15 PM    Vl Dup Carotid Bilateral    Result Date: 10/29/2019  Vascular Carotid Procedure  Demographics   Patient Name    Mian Castillo  Age                  76                  J   Patient Number  [id number]            Gender               Female   Visit Number    [id number]         Interpreting         Fernanda Green MD                                    Physician   Date of Birth   1945        Referring Physician  Fernanda Green MD   Accession       [id number]         [address] [facility]  Number  Procedure Type of Study:   Cerebral:Carotid, VL CAROTID BILATERAL. Indications for Study:CVD. Risk Factors History of Disease +-------------------------+----+-------------------------------------------+ ! Diagnosis                ! Date! Comments                                   ! +-------------------------+----+-------------------------------------------+ ! Neuro                    ! !COD W/ RCE 2002                            ! +-------------------------+----+-------------------------------------------+ ! Circulatory->CAD         ! ! HTN                                        ! +-------------------------+----+-------------------------------------------+ ! Circulatory              ! !CORONARY PTCA/STENT X 2                    ! +------------+-------+-------+--------+-------+------------+---------------+   - There is antegrade vertebral flow noted on the left side. - Additional Measurements:ICAPSV/CCAPSV 1.12. ICAEDV/CCAEDV 1.22. Cta Pulmonary W Contrast    Result Date: 11/18/2019  Examination. CTA PULMONARY W CONTRAST 11/18/2019 11:45 AM History: Shortness of breath. DLP: 377 mGycm. The CT angiography of the chest is performed after intravenous contrast enhancement. The images are acquired in axial plane with subsequent reconstruction in coronal and sagittal plane. The comparison is made with the previous study dated 4/22/2016 and correlation is made with the chest radiograph dated 11/16/2019. There is good opacification of the pulmonary arteries and the branches bilaterally. There is no filling defect in the opacified pulmonary arterial bed. Severe atheromatous changes of the thoracic aorta are seen. No aneurysmal dilatation. Severe atheromatous changes of the brachiocephalic vessels. There is no evidence of mediastinal or hilar mass or lymphadenopathy. The thyroid gland is poorly visualized and evaluated. There is no axillary lymphadenopathy. There is centrilobular infiltrate in the lungs bilaterally. There is evidence of pulmonary congestion and pulmonary edema. There is a small bibasilar pleural effusion. There is a small noncalcified nodule in the right upper lobe posteriorly measuring 8 mm in diameter. There are bronchiectatic changes of the bilateral lung bases. There are discoid atelectatic changes of the left base. There is subcutaneous fat infiltration of the chest wall which may represent fluid overload. The visualized liver and spleen appear unremarkable. The liver is herniating through a defect in the upper abdominal wall along with herniation of the stomach and large bowel. There is cholelithiasis. Nonobstructing calculi are seen in the left kidney. The right kidney is small and atrophic.     No evidence of pulmonary embolism. Pulmonary congestion, pulmonary edema and diffuse nodular interstitial infiltrate in both lungs. A small noncalcified nodule in the right upper lobe posteriorly may be further evaluated by a follow-up examination. Cholelithiasis. Nonobstructing left renal calculi. A small atrophic right kidney. Diffuse infiltration of the subcutaneous soft tissues of the chest and abdominal wall may suggest fluid overload. Signed by Dr Bola Rizvi on 11/18/2019 5:00 PM    Nm Myocardial Spect Rest Exercise Or Rx    Result Date: 11/11/2019  Lexiscan Nuclear Stress Test Report Procedure date:  11/8/2019  Indications: CAD and chest pain Procedure: Stress was performed with injection of 0.4 mg Lexiscan. Vital signs and EKG were monitored. Technetium-99 sestamibi was injected in divided doses, approximately 10 mCi and 30 mCi respectively for rest and stress imaging. The patient was discharged in stable condition. Results: Patient had symptoms of dyspnea during infusion that resolved in recovery. Baseline EKG showed sinus bradycardia. During stress there were no significant EKG changes or rhythm changes. Baseline and peak blood pressures were 95/53, and 138/58 respectively. Baseline and peak heart rates were 57 and  63 respectively. Lexiscan/Cardiolyte Nuclear Medicine Report Date of Procedure: 11/8/2019 The patient was injected with 0.84 millicuries (mCi) of Technetium (Tc99m). After an appropriate level of stress the patient was re-injected with 90.18 millicuries (mCi) of Technetium (Tc99m). Repeat gated images were then performed per standard protocol. Findings: 1. Analysis of the the stress and rest images reveals moderate size inferolateral predominantly fixed defect with mild reversibility. 2.  Analysis of the gated images reveals grossly normal left ventricular function with a calculated ejection fraction of 59 %.       Impression: There is inferolateral infarct with ischemia, with a calculated ejection fraction of 59

## 2019-11-19 NOTE — PROGRESS NOTES
anemia  8. Pulmonary hypertension     Recommend:   · Continue IV diuresis with lasix  · Continue pulmonary regimen   · From a pulmonary standpoint, she should be ready for discharge soon. She'll need a  home O2 evaluation prior to discharge. · F/u with Dr. Jordana Smith or Yolande Cuenca in office in 4-5 weeks. Resume her home pulmonary medication regimen after discharge  · Pulmonology will sign off today. Electronically signed by Roxy Cartagena on 11/19/2019 at 7:31 AM  Physician's substantive portion:  Subjective: The patient continues to improve clinically but it does qualify for home oxygen. I told her that hopefully this will not be a permanent issue and may not be required after she recovers further from her problems with significant volume overload. Objective: Lung fields are clear. Assessment/recommendation: Pulmonary will sign off and she will follow-up in our office in several weeks. I have seen and examined patient personally, performing a face-to-face diagnostic evaluation with plan of care reviewed and developed with APRN. I have addended and/or modified the above history of present illness, physical examination, and assessment and plan to reflect my findings and impressions. Essential elements of the care plan were discussed with APRN above. Agree with findings and assessment/plan as documented above.     Electronically signed by Juana Becerril MD on 11/19/19 at 3:00 PM

## 2019-11-20 VITALS
OXYGEN SATURATION: 94 % | HEART RATE: 90 BPM | WEIGHT: 97 LBS | DIASTOLIC BLOOD PRESSURE: 64 MMHG | SYSTOLIC BLOOD PRESSURE: 141 MMHG | RESPIRATION RATE: 16 BRPM | HEIGHT: 63 IN | TEMPERATURE: 98.1 F | BODY MASS INDEX: 17.19 KG/M2

## 2019-11-20 LAB
ANION GAP SERPL CALCULATED.3IONS-SCNC: 13 MMOL/L (ref 7–19)
ANISOCYTOSIS: ABNORMAL
BASOPHILS ABSOLUTE: 0 K/UL (ref 0–0.2)
BASOPHILS RELATIVE PERCENT: 0.5 % (ref 0–1)
BUN BLDV-MCNC: 32 MG/DL (ref 8–23)
CALCIUM SERPL-MCNC: 8.5 MG/DL (ref 8.8–10.2)
CHLORIDE BLD-SCNC: 94 MMOL/L (ref 98–111)
CO2: 32 MMOL/L (ref 22–29)
CREAT SERPL-MCNC: 1.6 MG/DL (ref 0.5–0.9)
EOSINOPHILS ABSOLUTE: 0.4 K/UL (ref 0–0.6)
EOSINOPHILS RELATIVE PERCENT: 5.4 % (ref 0–5)
GFR NON-AFRICAN AMERICAN: 31
GLUCOSE BLD-MCNC: 103 MG/DL (ref 74–109)
HCT VFR BLD CALC: 30.7 % (ref 37–47)
HEMOGLOBIN: 8.8 G/DL (ref 12–16)
HYPOCHROMIA: ABNORMAL
IMMATURE GRANULOCYTES #: 0 K/UL
LYMPHOCYTES ABSOLUTE: 1 K/UL (ref 1.1–4.5)
LYMPHOCYTES RELATIVE PERCENT: 14 % (ref 20–40)
MCH RBC QN AUTO: 22.5 PG (ref 27–31)
MCHC RBC AUTO-ENTMCNC: 28.7 G/DL (ref 33–37)
MCV RBC AUTO: 78.5 FL (ref 81–99)
MICROCYTES: ABNORMAL
MONOCYTES ABSOLUTE: 1.3 K/UL (ref 0–0.9)
MONOCYTES RELATIVE PERCENT: 17.2 % (ref 0–10)
NEUTROPHILS ABSOLUTE: 4.6 K/UL (ref 1.5–7.5)
NEUTROPHILS RELATIVE PERCENT: 62.6 % (ref 50–65)
PDW BLD-RTO: 19.2 % (ref 11.5–14.5)
PLATELET # BLD: 255 K/UL (ref 130–400)
PLATELET SLIDE REVIEW: ADEQUATE
PMV BLD AUTO: 10.2 FL (ref 9.4–12.3)
POTASSIUM SERPL-SCNC: 4 MMOL/L (ref 3.5–5)
RBC # BLD: 3.91 M/UL (ref 4.2–5.4)
SODIUM BLD-SCNC: 139 MMOL/L (ref 136–145)
WBC # BLD: 7.4 K/UL (ref 4.8–10.8)

## 2019-11-20 PROCEDURE — 6370000000 HC RX 637 (ALT 250 FOR IP): Performed by: INTERNAL MEDICINE

## 2019-11-20 PROCEDURE — 2700000000 HC OXYGEN THERAPY PER DAY

## 2019-11-20 PROCEDURE — 93005 ELECTROCARDIOGRAM TRACING: CPT | Performed by: INTERNAL MEDICINE

## 2019-11-20 PROCEDURE — 85025 COMPLETE CBC W/AUTO DIFF WBC: CPT

## 2019-11-20 PROCEDURE — 6360000002 HC RX W HCPCS: Performed by: INTERNAL MEDICINE

## 2019-11-20 PROCEDURE — 99238 HOSP IP/OBS DSCHRG MGMT 30/<: CPT | Performed by: INTERNAL MEDICINE

## 2019-11-20 PROCEDURE — 6370000000 HC RX 637 (ALT 250 FOR IP): Performed by: NURSE PRACTITIONER

## 2019-11-20 PROCEDURE — 80048 BASIC METABOLIC PNL TOTAL CA: CPT

## 2019-11-20 PROCEDURE — 36415 COLL VENOUS BLD VENIPUNCTURE: CPT

## 2019-11-20 PROCEDURE — 6360000002 HC RX W HCPCS: Performed by: NURSE PRACTITIONER

## 2019-11-20 PROCEDURE — 93308 TTE F-UP OR LMTD: CPT

## 2019-11-20 PROCEDURE — 2580000003 HC RX 258: Performed by: INTERNAL MEDICINE

## 2019-11-20 PROCEDURE — 94640 AIRWAY INHALATION TREATMENT: CPT

## 2019-11-20 RX ORDER — ATORVASTATIN CALCIUM 40 MG/1
40 TABLET, FILM COATED ORAL DAILY
Qty: 90 TABLET | Refills: 3 | Status: SHIPPED | OUTPATIENT
Start: 2019-11-20

## 2019-11-20 RX ORDER — ISOSORBIDE MONONITRATE 30 MG/1
30 TABLET, EXTENDED RELEASE ORAL NIGHTLY
Qty: 90 TABLET | Refills: 3 | Status: SHIPPED | OUTPATIENT
Start: 2019-11-20 | End: 2020-12-03

## 2019-11-20 RX ORDER — ATORVASTATIN CALCIUM 40 MG/1
40 TABLET, FILM COATED ORAL DAILY
Qty: 30 TABLET | Refills: 0
Start: 2019-11-20 | End: 2019-11-20 | Stop reason: SDUPTHER

## 2019-11-20 RX ORDER — GUAIFENESIN AND DEXTROMETHORPHAN HYDROBROMIDE 1200; 60 MG/1; MG/1
1200 TABLET, EXTENDED RELEASE ORAL 2 TIMES DAILY
Qty: 28 TABLET | Refills: 3 | Status: ON HOLD
Start: 2019-11-20 | End: 2020-07-30

## 2019-11-20 RX ORDER — CLOPIDOGREL BISULFATE 75 MG/1
75 TABLET ORAL DAILY
Qty: 90 TABLET | Refills: 3 | Status: SHIPPED | OUTPATIENT
Start: 2019-11-20 | End: 2020-11-18

## 2019-11-20 RX ORDER — AMLODIPINE BESYLATE 2.5 MG/1
2.5 TABLET ORAL 2 TIMES DAILY
Qty: 180 TABLET | Refills: 3 | Status: SHIPPED
Start: 2019-11-20 | End: 2020-04-28 | Stop reason: ALTCHOICE

## 2019-11-20 RX ORDER — ISOSORBIDE MONONITRATE 30 MG/1
30 TABLET, EXTENDED RELEASE ORAL NIGHTLY
Qty: 30 TABLET | Refills: 5
Start: 2019-11-20 | End: 2019-11-20 | Stop reason: SDUPTHER

## 2019-11-20 RX ORDER — FUROSEMIDE 40 MG/1
40 TABLET ORAL DAILY
Qty: 90 TABLET | Refills: 3 | Status: SHIPPED | OUTPATIENT
Start: 2019-11-21 | End: 2020-11-10

## 2019-11-20 RX ORDER — METOPROLOL TARTRATE 100 MG/1
100 TABLET ORAL 2 TIMES DAILY
Qty: 180 TABLET | Refills: 3 | Status: SHIPPED | OUTPATIENT
Start: 2019-11-20 | End: 2020-07-27 | Stop reason: DRUGHIGH

## 2019-11-20 RX ORDER — ASPIRIN 81 MG/1
81 TABLET ORAL DAILY
Qty: 30 TABLET | Refills: 3 | Status: ON HOLD
Start: 2019-11-20 | End: 2022-01-01 | Stop reason: HOSPADM

## 2019-11-20 RX ORDER — LISINOPRIL 5 MG/1
5 TABLET ORAL 2 TIMES DAILY
Qty: 180 TABLET | Refills: 3 | Status: SHIPPED | OUTPATIENT
Start: 2019-11-20 | End: 2020-01-29 | Stop reason: DRUGHIGH

## 2019-11-20 RX ORDER — NITROGLYCERIN 0.4 MG/1
0.4 TABLET SUBLINGUAL EVERY 5 MIN PRN
Qty: 25 TABLET | Refills: 3
Start: 2019-11-20 | End: 2019-11-20 | Stop reason: SDUPTHER

## 2019-11-20 RX ORDER — LISINOPRIL 5 MG/1
5 TABLET ORAL 2 TIMES DAILY
Qty: 60 TABLET | Refills: 5 | Status: SHIPPED | OUTPATIENT
Start: 2019-11-20 | End: 2019-11-20 | Stop reason: SDUPTHER

## 2019-11-20 RX ORDER — ASPIRIN 81 MG/1
81 TABLET ORAL DAILY
Qty: 30 TABLET | Refills: 3
Start: 2019-11-20 | End: 2019-11-20 | Stop reason: SDUPTHER

## 2019-11-20 RX ORDER — LEVOTHYROXINE SODIUM 0.05 MG/1
50 TABLET ORAL DAILY
Qty: 30 TABLET | Refills: 3
Start: 2019-11-20 | End: 2019-11-20 | Stop reason: SDUPTHER

## 2019-11-20 RX ORDER — NITROGLYCERIN 0.4 MG/1
0.4 TABLET SUBLINGUAL EVERY 5 MIN PRN
Qty: 25 TABLET | Refills: 3 | Status: SHIPPED | OUTPATIENT
Start: 2019-11-20 | End: 2020-08-18 | Stop reason: SDUPTHER

## 2019-11-20 RX ORDER — LEVOTHYROXINE SODIUM 0.05 MG/1
50 TABLET ORAL DAILY
Qty: 30 TABLET | Refills: 3 | Status: SHIPPED | OUTPATIENT
Start: 2019-11-20 | End: 2020-07-14

## 2019-11-20 RX ORDER — METOPROLOL TARTRATE 100 MG/1
100 TABLET ORAL 2 TIMES DAILY
Qty: 60 TABLET | Refills: 5
Start: 2019-11-20 | End: 2019-11-20 | Stop reason: SDUPTHER

## 2019-11-20 RX ADMIN — LISINOPRIL 5 MG: 5 TABLET ORAL at 09:28

## 2019-11-20 RX ADMIN — LEVOTHYROXINE SODIUM 50 MCG: 50 TABLET ORAL at 06:17

## 2019-11-20 RX ADMIN — HYDROCHLOROTHIAZIDE 25 MG: 25 TABLET ORAL at 09:28

## 2019-11-20 RX ADMIN — AMLODIPINE BESYLATE 2.5 MG: 2.5 TABLET ORAL at 09:28

## 2019-11-20 RX ADMIN — Medication 10 ML: at 09:28

## 2019-11-20 RX ADMIN — GUAIFENESIN 600 MG: 600 TABLET, EXTENDED RELEASE ORAL at 09:28

## 2019-11-20 RX ADMIN — ATORVASTATIN CALCIUM 40 MG: 40 TABLET, FILM COATED ORAL at 09:28

## 2019-11-20 RX ADMIN — LEVALBUTEROL 0.63 MG: 0.63 SOLUTION RESPIRATORY (INHALATION) at 06:48

## 2019-11-20 RX ADMIN — METOPROLOL TARTRATE 100 MG: 50 TABLET ORAL at 09:28

## 2019-11-20 RX ADMIN — FUROSEMIDE 40 MG: 40 TABLET ORAL at 09:28

## 2019-11-20 RX ADMIN — LEVALBUTEROL 0.63 MG: 0.63 SOLUTION RESPIRATORY (INHALATION) at 10:56

## 2019-11-20 RX ADMIN — FORMOTEROL FUMARATE DIHYDRATE 20 MCG: 20 SOLUTION RESPIRATORY (INHALATION) at 07:00

## 2019-11-20 RX ADMIN — CLOPIDOGREL BISULFATE 75 MG: 75 TABLET ORAL at 09:28

## 2019-11-20 ASSESSMENT — PAIN SCALES - GENERAL
PAINLEVEL_OUTOF10: 0

## 2019-11-20 NOTE — DISCHARGE SUMMARY
Discharge Summary    Pravin Doran  :  1945  MRN:  [id number]    Admit date:  11/15/2019  Discharge date:      Admitting Physician:  Gilberto Dyer MD    Advance Directive: Full Code    Consults: pulmonary/intensive care    Primary Care Physician:  Judd Mays MD    Discharge Diagnoses: Active Problems:    CAD in native artery  Resolved Problems:    * No resolved hospital problems.  *      Problem List:   Patient Active Problem List    Diagnosis Date Noted    CAD in native artery 11/15/2019     Priority: High    Bradycardia      Priority: High    Acute superficial venous thrombosis of right lower extremity 2016     Priority: High     Overview Note:     With large RLE bulla lateral foot skin violaceous discoloration, acutely POA (venous backpressure)      Mitral valve stenosis 2016     Priority: High    Mitral valve insufficiency 2016     Priority: High    PUD (peptic ulcer disease) 2016     Priority: Medium    Pain in both lower extremities 10/30/2019     Priority: Low    Status post Maze operation for atrial fibrillation 2019     Priority: Low     Overview Note:     16      PAF (paroxysmal atrial fibrillation) ([facility] [address] 2019     Priority: Low    S/P coronary artery bypass graft x 1 2019     Priority: Low     Overview Note:     16 SVT to RCA      S/P mitral valve replacement 2019     Priority: Low     Overview Note:     16 MVR 23 mm mosaic porcine by Dr. Hilario Garland Chest pain 2019     Priority: Low    JONES (dyspnea on exertion) 2019     Priority: Low    Fatigue 2019     Priority: Low    Postoperative anemia due to acute blood loss 2019     Priority: Low    Carotid aneurysm, right ([address] 2019     Priority: Low    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[facility] [address] 2019     Priority: Low    Mild aortic stenosis 2018     Priority: Low    Essential hypertension      Priority: Low    NSTEMI (non-ST elevated of native heart        Carotid artery stenosis        PAD (peripheral artery disease) ([facility] [address]        Pulmonary hypertension        Atherosclerosis of native arteries of left leg with ulceration of calf (HCC)        Atherosclerosis of native arteries of right leg with ulceration of other part of lower right leg        Atherosclerosis of native arteries of right leg with ulceration of other part of foot        Atherosclerosis of native artery of right lower extremity with ulceration of ankle (HCC)        CHF (congestive heart failure) (HCC)        PVD (peripheral vascular disease) (HCC)        Bilateral carotid artery stenosis        Obstruction of left carotid artery        NSTEMI (non-ST elevated myocardial infarction) ([facility] [address]        Syncope and collapse        Essential hypertension        Mild aortic stenosis        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HCC)        Carotid aneurysm, right (HCC)        PAF (paroxysmal atrial fibrillation) ([facility] [address]              Significant Diagnostic Studies:   Xr Chest Portable    Result Date: 11/16/2019  XR CHEST PORTABLE 11/16/2019 2:00 PM HISTORY: Respiratory failure COMPARISON: 6/21/2019 CXR: A single frontal view of the chest is performed. Findings: There are new pleural effusions, left greater than right. Effusions appear overall small in volume. Left lower lobe consolidation. There is scarring of the right lateral basilar lung. The heart is enlarged. There is evidence prior mediastinal surgery. Similar prominence of the interstitial markings. There is thoracic aortic calcification. Surgical clips project over lower neck. There is no acute appearing bony pathology. 1. New small pleural effusions, left greater than right, with left basilar consolidation concerning for pneumonia. Scarring of the right inferolateral lung with stable prominence of the interstitial markings compared to 6/21/2019. 2. Mild cardiomegaly with evidence prior mediastinal surgery.  3. Extensive vascular calcifications. . Signed by Dr Melchor Barajas on 11/16/2019 3:15 PM      Pertinent Labs:   CBC:   Recent Labs     11/18/19  1435 11/19/19  0258 11/20/19  0325   WBC 10.0 8.3 7.4   HGB 9.3* 8.8* 8.8*    237 255     BMP:    Recent Labs     11/19/19  0258 11/19/19  1140 11/20/19  0325     138 138 139   K 3.6  3.6 4.0 4.0   CL 92*  93* 91* 94*   CO2 35*  34* 35* 32*   BUN 29*  29* 27* 32*   CREATININE 1.6*  1.6* 1.4* 1.6*   GLUCOSE 99  97 96 103     INR: No results for input(s): INR in the last 72 hours. Lipids: No results for input(s): CHOL, HDL in the last 72 hours. Invalid input(s): LDLCALCU  ABGs:No results for input(s): PHART, SQB4HGW, PO2ART, VNU1VNX, BEART, HGBAE, K4XLELOF, CARBOXHGBART, 02THERAPY in the last 72 hours. HgBA1c:  No results for input(s): LABA1C in the last 72 hours. Procedures: Cardiac catheterization/PCI  Procedure     Procedure Type      Diagnostic procedure:DCA, Coronary Angiogram, Coronary Bypass Graft   Angiogram, Left Heart Cath, Left Ventriculogram, Left Ventricular Pressure   Measurement      Conclusions      Double vessel disease involving RCA and circumflex. Severely stenotic SVG to distal RCA. Hyperdynamic left ventricle with severe left ventricular hypertrophy   consistent with hypertensive heart disease. In view of severe disease in graft to RCA, decision to treat native RCA as   opposed to SVG graft. Successful PCI to the ostial to proximal RCA (3.0 x 15 mm resolute   integrity) and proximal to mid RCA rinses 3.0 x 18 mm resolute integrity)   utilizing drug-eluting stents. Successful PCI to proximal circumflex (2.5 x 14 mm resolute integrity)   utilizing drug-eluting stent. Recommendations      Medical management.       Signatures      ----------------------------------------------------------------   Electronically signed by Yusef Holly MD(Performing Physician) on   11/15/2019 17:43

## 2019-11-20 NOTE — CARE COORDINATION
Pt qualifies for home O2 needs upon dc and report had [facility] in the past and requested [facility] again. SW sent order and documentation for pt needs to [facility] and requested delivery to the hospital prior to pt dc.      [facility] Ph: [phone]  Fa: [phone]

## 2019-11-20 NOTE — PROGRESS NOTES
carotid artery 01/11/2018     Priority: Low    Bilateral carotid artery stenosis 11/19/2017     Priority: Low    Wound of sacral region 06/16/2016     Priority: Low    Elevated TSH 06/16/2016     Priority: Low    Acute blood loss anemia 06/15/2016     Priority: Low    CHF (congestive heart failure) ([address] 06/15/2016     Priority: Low    CKD (chronic kidney disease), stage III ([address] 06/15/2016     Priority: Low    Pulmonary emphysema ([address] 06/15/2016     Priority: Low    COPD without exacerbation (HCC)      Priority: Low    PVD (peripheral vascular disease) ([facility])      Priority: Low    Atherosclerosis of native artery of right lower extremity with ulceration of ankle ([address] 06/05/2016     Priority: Low    Atherosclerosis of native arteries of right leg with ulceration of other part of lower right leg 06/05/2016     Priority: Low     Overview Note:     Replacing Inactive Diagnoses      Atherosclerosis of native arteries of right leg with ulceration of other part of foot 06/05/2016     Priority: Low     Overview Note:     Replacing Inactive Diagnoses      Nonhealing ulcer of right lower leg with fat layer exposed ([address] 06/05/2016     Priority: Low    Non-pressure chronic ulcer of right ankle with fat layer exposed ([address] 06/05/2016     Priority: Low    Neuropathic ulcer of right foot with fat layer exposed ([address] 06/05/2016     Priority: Low    Hypervolemia      Priority: Low    Nonhealing nonsurgical wound with fat layer exposed 06/03/2016     Priority: Low    Acute blood loss anemia      Priority: Low    Atherosclerosis of native arteries of left leg with ulceration of calf ([facility])      Priority: Low    Severe malnutrition (HCC)      Priority: Low    Diastolic dysfunction      Priority: Low    Anemia in chronic kidney disease (CKD)      Priority: Low    Non-pressure chronic ulcer of right calf with fat layer exposed ([address] 05/27/2016     Priority: Low    Decubitus ulcer of right buttock, stage 3 ([address] native arteries of right leg with ulceration of other part of lower right leg        Atherosclerosis of native arteries of right leg with ulceration of other part of foot        Atherosclerosis of native artery of right lower extremity with ulceration of ankle ([facility])        CHF (congestive heart failure) ([facility])        PVD (peripheral vascular disease) ([facility])        Bilateral carotid artery stenosis        Obstruction of left carotid artery        NSTEMI (non-ST elevated myocardial infarction) ([facility])        Syncope and collapse        Essential hypertension        Mild aortic stenosis        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[facility])        Carotid aneurysm, right ([facility])        PAF (paroxysmal atrial fibrillation) ([facility])              Subjective:  Ms. Nathan Alcantara reports no significant shortness of breath. She is using nasal cannula 2 L oxygen. She is able to ambulate without oxygen without any distress however her oxygen saturations do drop to 80%. She diuresed about 3 L negative net. Objective:   BP (!) 127/56   Pulse 89   Temp 98 °F (36.7 °C) (Temporal)   Resp 18   Ht 5' 3\" (1.6 m)   Wt 98 lb 3.2 oz (44.5 kg)   SpO2 96%   BMI 17.40 kg/m²       Intake/Output Summary (Last 24 hours) at 11/19/2019 1938  Last data filed at 11/19/2019 1900  Gross per 24 hour   Intake 610 ml   Output 2400 ml   Net -1790 ml       Prior to Admission medications    Medication Sig Start Date End Date Taking?  Authorizing Provider   clopidogrel (PLAVIX) 75 MG tablet Take 1 tablet by mouth daily 11/17/19  Yes Leilani Solo MD   lisinopril (PRINIVIL;ZESTRIL) 5 MG tablet Take 2 tablets by mouth 2 times daily 11/16/19  Yes Leilani Solo MD   Dextromethorphan-guaiFENesin (MUCINEX DM MAXIMUM STRENGTH)  MG TB12 Take 1,200 mg by mouth   Yes Historical Provider, MD   levothyroxine (SYNTHROID) 50 MCG tablet Take 50 mcg by mouth Daily   Yes Historical Provider, MD   isosorbide mononitrate (IMDUR) 30 MG extended release tablet Take 1 tablet Comments: No JVD. No gallop. Minimal edema. Pulmonary:      Effort: Pulmonary effort is normal. No respiratory distress. Breath sounds: Normal breath sounds. No wheezing or rales. Comments: Course bibasilar rales. Chest:      Chest wall: No tenderness. Abdominal:      General: Bowel sounds are normal. There is no distension. Palpations: Abdomen is soft. There is no mass. Tenderness: There is no tenderness. There is no guarding or rebound. Hernia: No hernia is present. Musculoskeletal: Normal range of motion. Skin:     General: Skin is warm. Coloration: Skin is not pale. Findings: No rash. Neurological:      Mental Status: She is alert and oriented to person, place, and time. Cranial Nerves: No cranial nerve deficit. Deep Tendon Reflexes: Reflexes normal.                 Lab Data:  CBC:   Recent Labs     11/17/19  0146 11/18/19  1435 11/19/19  0258   WBC 11.2* 10.0 8.3   HGB 8.7* 9.3* 8.8*   HCT 30.6* 33.2* 30.8*   MCV 79.5* 79.6* 78.6*    232 237     BMP:   Recent Labs     11/17/19  0146 11/19/19  0258 11/19/19  1140    137  138 138   K 4.7 3.6  3.6 4.0   CL 95* 92*  93* 91*   CO2 35* 35*  34* 35*   BUN 26* 29*  29* 27*   CREATININE 1.4* 1.6*  1.6* 1.4*     LIVER PROFILE: No results for input(s): AST, ALT, LIPASE, BILIDIR, BILITOT, ALKPHOS in the last 72 hours. Invalid input(s): AMYLASE,  ALB  PT/INR: No results for input(s): PROTIME, INR in the last 72 hours. APTT: No results for input(s): APTT in the last 72 hours. BNP:  No results for input(s): BNP in the last 72 hours. CK, CKMB, Troponin: @LABRCNT (CKTOTAL:3, CKMB:3, TROPONINI:3)@    IMAGING:  Xr Chest Portable    Result Date: 11/16/2019  XR CHEST PORTABLE 11/16/2019 2:00 PM HISTORY: Respiratory failure COMPARISON: 6/21/2019 CXR: A single frontal view of the chest is performed. Findings: There are new pleural effusions, left greater than right.  Effusions appear overall small in volume. Left lower lobe consolidation. There is scarring of the right lateral basilar lung. The heart is enlarged. There is evidence prior mediastinal surgery. Similar prominence of the interstitial markings. There is thoracic aortic calcification. Surgical clips project over lower neck. There is no acute appearing bony pathology. 1. New small pleural effusions, left greater than right, with left basilar consolidation concerning for pneumonia. Scarring of the right inferolateral lung with stable prominence of the interstitial markings compared to 6/21/2019. 2. Mild cardiomegaly with evidence prior mediastinal surgery. 3. Extensive vascular calcifications. . Signed by Dr Sudhakar Morris on 11/16/2019 3:15 PM    Vl Dup Carotid Bilateral    Result Date: 10/29/2019  Vascular Carotid Procedure  Demographics   Patient Name    Surinder Vivas  Age                  76                  J   Patient Number  [id number]            Gender               Female   Visit Number    [id number]         Interpreting         Laura Fernandez MD                                    Physician   Date of Birth   1945        Referring Physician  Laura Fernandez MD   Accession       [id number]         [address] [place]  Number  Procedure Type of Study:   Cerebral:Carotid, VL CAROTID BILATERAL. Indications for Study:CVD. Risk Factors History of Disease +-------------------------+----+-------------------------------------------+ ! Diagnosis                ! Date! Comments                                   ! +-------------------------+----+-------------------------------------------+ ! Neuro                    ! !COD W/ RCE 2002                            ! +-------------------------+----+-------------------------------------------+ ! Circulatory->CAD         ! ! HTN                                        ! +-------------------------+----+-------------------------------------------+ ! Circulatory              !     !CORONARY !0.79   !            !               ! +------------+-------+-------+--------+-------+------------+---------------+ ! Dist ICA    !110    !28.7   ! 60      !0.74   !            !               ! +------------+-------+-------+--------+-------+------------+---------------+ ! Prox ECA    !46     !7.46   !60      !0.84   !            !               ! +------------+-------+-------+--------+-------+------------+---------------+ ! Vertebral   !86.2   !24     !60      !0.72   !            !               ! +------------+-------+-------+--------+-------+------------+---------------+   - There is antegrade vertebral flow noted on the right side. - Additional Measurements:ICAPSV/CCAPSV 1.16. ICAEDV/CCAEDV 2.33. Carotid Left Measurements +------------+-------+-------+--------+-------+------------+---------------+ ! Location    ! PSV    ! EDV    ! Angle   ! RI     !%Stenosis   ! Tortuosity     ! +------------+-------+-------+--------+-------+------------+---------------+ ! Prox CCA    !103    !21.1   ! 60      !0.8    ! !               ! +------------+-------+-------+--------+-------+------------+---------------+ ! Mid CCA     !106    !14.7   !60      !0.86   !            !               ! +------------+-------+-------+--------+-------+------------+---------------+ ! Prox ICA    !115    !24     !60      !0.79   !            !               ! +------------+-------+-------+--------+-------+------------+---------------+ ! Mid ICA     !94.4   !19.9   !60      !0.79   !            !               ! +------------+-------+-------+--------+-------+------------+---------------+ ! Dist ICA    !105    !25.8   !60      !0.75   !            !               ! +------------+-------+-------+--------+-------+------------+---------------+ ! Prox ECA    !149    !       !61      !       !            !               ! +------------+-------+-------+--------+-------+------------+---------------+ ! Vertebral   !105    !19.3   !60      !0.82   !            ! ! +------------+-------+-------+--------+-------+------------+---------------+   - There is antegrade vertebral flow noted on the left side. - Additional Measurements:ICAPSV/CCAPSV 1.12. ICAEDV/CCAEDV 1.22. Vl Dup Lower Extremity Venous Bilateral    Result Date: 11/19/2019  Vascular Lower Extremities DVT Study Procedure  Demographics   Patient Name    Brendon Harrington  Age                   76                  J   Patient Number  [id number]            Gender                Female   Visit Number    [id number]         Interpreting          Prakash Kelley MD                                    Physician   Date of Birth   1945        Referring Physician   Tommie Robles   Accession       [id number]         [address] [place]  Number  Procedure Type of Study:   Veins:Lower Extremities DVT Study, VL LOWER EXTREMITY BILATERAL VENOUS  DUPLEX . Indications for Study:Shortness of breath and Edema, bilateral lower extremity. Risk Factors History of Disease +-------------------------+----+-------------------------------------------+ ! Diagnosis                ! Date! Comments                                   ! +-------------------------+----+-------------------------------------------+ ! Neuro                    ! !COD W/ RCE 2002                            ! +-------------------------+----+-------------------------------------------+ ! Circulatory->CAD         ! ! HTN                                        ! +-------------------------+----+-------------------------------------------+ ! Circulatory              ! !CORONARY PTCA/STENT X 2                    ! +-------------------------+----+-------------------------------------------+ ! Peripheral vascular      ! !RT COMMON ILIAC ARTERY STENOSIS - DX 3     ! !disease                  ! !WEEKS AGO.                                 !  +-------------------------+----+-------------------------------------------+   - The patient's last creatinine was 1.7 mg/dl. Allergies   - Levaquin. - Morphine.   - Other allergy:(Xarelto). Impression   Normal venous duplex study of the bilateral lower extremity(ies). There is  no evidence of deep or superficial venous thrombosis. Signature   ----------------------------------------------------------------  Electronically signed by Allen Moore MD(Interpreting  physician) on 11/19/2019 06:17 AM  ----------------------------------------------------------------  Velocities are measured in cm/s ; Diameters are measured in mm Right Lower Extremities DVT Study Measurements Right 2D Measurements +------------------------------------+----------+---------------+----------+ ! Location                            ! Visualized! Compressibility! Thrombosis! +------------------------------------+----------+---------------+----------+ ! Sapheno Femoral Junction            ! Yes       ! Yes            ! None      ! +------------------------------------+----------+---------------+----------+ ! Common Femoral                      !Yes       ! Yes            ! None      ! +------------------------------------+----------+---------------+----------+ ! Prox Femoral                        !Yes       ! Yes            ! None      ! +------------------------------------+----------+---------------+----------+ ! Mid Femoral                         !Yes       ! Yes            ! None      ! +------------------------------------+----------+---------------+----------+ ! Dist Femoral                        !Yes       ! Yes            ! None      ! +------------------------------------+----------+---------------+----------+ ! Popliteal                           !Yes       ! Yes            ! None      ! +------------------------------------+----------+---------------+----------+ ! SSV                                 ! Yes       ! Yes            ! None      ! +------------------------------------+----------+---------------+----------+ ! Gastroc !Yes       !Yes            ! None      ! +------------------------------------+----------+---------------+----------+ ! PTV                                 ! Yes       ! Yes            ! None      ! +------------------------------------+----------+---------------+----------+ ! GSV                                 ! Yes       ! Yes            ! None      ! +------------------------------------+----------+---------------+----------+ ! ATV                                 ! Yes       ! Yes            ! None      ! +------------------------------------+----------+---------------+----------+ ! Peroneal                            !Yes       ! Yes            ! None      ! +------------------------------------+----------+---------------+----------+ Left Lower Extremities DVT Study Measurements Left 2D Measurements +------------------------------------+----------+---------------+----------+ ! Location                            ! Visualized! Compressibility! Thrombosis! +------------------------------------+----------+---------------+----------+ ! Sapheno Femoral Junction            ! Yes       ! Yes            ! None      ! +------------------------------------+----------+---------------+----------+ ! Common Femoral                      !Yes       ! Yes            ! None      ! +------------------------------------+----------+---------------+----------+ ! Prox Femoral                        !Yes       ! Yes            ! None      ! +------------------------------------+----------+---------------+----------+ ! Mid Femoral                         !Yes       ! Yes            ! None      ! +------------------------------------+----------+---------------+----------+ ! Dist Femoral                        !Yes       ! Yes            ! None      ! +------------------------------------+----------+---------------+----------+ ! Popliteal                           !Yes       ! Yes            ! None      ! +------------------------------------+----------+---------------+----------+ ! SSV                                 ! Yes       ! Yes            ! None      ! +------------------------------------+----------+---------------+----------+ ! Gastroc                             ! Yes       ! Yes            ! None      ! +------------------------------------+----------+---------------+----------+ ! PTV                                 ! Yes       ! Yes            ! None      ! +------------------------------------+----------+---------------+----------+ ! GSV                                 ! Yes       ! Yes            ! None      ! +------------------------------------+----------+---------------+----------+ ! ATV                                 ! Yes       ! Yes            ! None      ! +------------------------------------+----------+---------------+----------+ ! Peroneal                            !Yes       ! Yes            ! None      ! +------------------------------------+----------+---------------+----------+    Cta Pulmonary W Contrast    Result Date: 11/18/2019  Examination. CTA PULMONARY W CONTRAST 11/18/2019 11:45 AM History: Shortness of breath. DLP: 377 mGycm. The CT angiography of the chest is performed after intravenous contrast enhancement. The images are acquired in axial plane with subsequent reconstruction in coronal and sagittal plane. The comparison is made with the previous study dated 4/22/2016 and correlation is made with the chest radiograph dated 11/16/2019. There is good opacification of the pulmonary arteries and the branches bilaterally. There is no filling defect in the opacified pulmonary arterial bed. Severe atheromatous changes of the thoracic aorta are seen. No aneurysmal dilatation. Severe atheromatous changes of the brachiocephalic vessels. There is no evidence of mediastinal or hilar mass or lymphadenopathy. The thyroid gland is poorly visualized and evaluated. There is no axillary lymphadenopathy.  There is and 138/58 respectively. Baseline and peak heart rates were 57 and  63 respectively. Lexiscan/Cardiolyte Nuclear Medicine Report Date of Procedure: 11/8/2019 The patient was injected with 3.15 millicuries (mCi) of Technetium (Tc99m). After an appropriate level of stress the patient was re-injected with 13.94 millicuries (mCi) of Technetium (Tc99m). Repeat gated images were then performed per standard protocol. Findings: 1. Analysis of the the stress and rest images reveals moderate size inferolateral predominantly fixed defect with mild reversibility. 2.  Analysis of the gated images reveals grossly normal left ventricular function with a calculated ejection fraction of 59 %. Impression: There is inferolateral infarct with ischemia, with a calculated ejection fraction of 59 %. Suggest: Clinical correlation and cardiac catheterization if indicated. Signed by Dr Maria D Titus on 11/8/2019 8:50 PM        Assessment and Plan:    75-year-old female patient with past medical history of coronary artery disease, single-vessel CABG with SVG to RCA, bioprosthetic mitral valve replacement for MS/MR/2016, extensive peripheral arterial disease with bilateral CEA, CK D stage III, COPD with recent onset of chest pain with positive stress test status post PCI to RCA and proximal circumflex 11/15/2019 with severely diseased SVG to RCA with post intervention shortness of breath. 1. Feeling much better post diuresis. Noted hypoxic numbers without oxygen. Patient qualifies for home oxygen therapy. She has severe chronic lung disease. 2. Can switch Lasix IV to by mouth 40 mg daily. 3. VQ scan negative for DVT. CTA negative for pulmonary embolism. CT consistent with volume overload.     Maria D Titus MD 11/19/2019 7:38 PM

## 2019-11-20 NOTE — PLAN OF CARE
Problem: Falls - Risk of:  Goal: Will remain free from falls  Description  Will remain free from falls  11/20/2019 1120 by Radha Downs RN  Outcome: Ongoing  11/20/2019 0154 by Isi Allison RN  Outcome: Ongoing  Goal: Absence of physical injury  Description  Absence of physical injury  11/20/2019 1120 by Radha Downs RN  Outcome: Ongoing  11/20/2019 0154 by Isi Allison RN  Outcome: Ongoing     Problem: Pain:  Goal: Pain level will decrease  Description  Pain level will decrease  11/20/2019 1120 by Radha Downs RN  Outcome: Ongoing  11/20/2019 0154 by Isi Allison RN  Outcome: Ongoing  Goal: Control of acute pain  Description  Control of acute pain  11/20/2019 1120 by Radha Downs RN  Outcome: Ongoing  11/20/2019 0154 by Isi Allison RN  Outcome: Ongoing  Goal: Control of chronic pain  Description  Control of chronic pain  11/20/2019 1120 by Radha Downs RN  Outcome: Ongoing  11/20/2019 0154 by Isi Allison RN  Outcome: Ongoing

## 2019-11-21 LAB
EKG P AXIS: 95 DEGREES
EKG P-R INTERVAL: 144 MS
EKG Q-T INTERVAL: 414 MS
EKG QRS DURATION: 88 MS
EKG QTC CALCULATION (BAZETT): 449 MS
EKG T AXIS: 112 DEGREES

## 2019-11-21 NOTE — CONSULTS
Patient was discharged prior to PTCA/STENT TEACHING being conducted. Cardiac Rehab education packet was sent to the patient's address on record. Handouts included were titled; \"Home Instructions Following a Cardiac Event\", \"Cardiac Home Exercise Program - Phase I\", \"Risk Factors for Heart Disease and Stroke\" and \"Cardiac Diet/Low Cholesterol\". Patient was instructed to contact Lizette Suárez should she choose to participate in Phase II Outpatient Cardiac Rehab.

## 2019-11-27 ENCOUNTER — OFFICE VISIT (OUTPATIENT)
Dept: CARDIOLOGY | Age: 74
End: 2019-11-27
Payer: MEDICARE

## 2019-11-27 VITALS
HEART RATE: 88 BPM | SYSTOLIC BLOOD PRESSURE: 136 MMHG | DIASTOLIC BLOOD PRESSURE: 60 MMHG | HEIGHT: 63 IN | WEIGHT: 96 LBS | BODY MASS INDEX: 17.01 KG/M2

## 2019-11-27 DIAGNOSIS — Z95.1 S/P CORONARY ARTERY BYPASS GRAFT X 1: ICD-10-CM

## 2019-11-27 DIAGNOSIS — I10 ESSENTIAL HYPERTENSION: Chronic | ICD-10-CM

## 2019-11-27 DIAGNOSIS — Z95.5 HISTORY OF CORONARY ARTERY STENT PLACEMENT: ICD-10-CM

## 2019-11-27 DIAGNOSIS — I48.0 PAF (PAROXYSMAL ATRIAL FIBRILLATION) (HCC): ICD-10-CM

## 2019-11-27 DIAGNOSIS — E78.2 MIXED HYPERLIPIDEMIA: ICD-10-CM

## 2019-11-27 DIAGNOSIS — I35.0 MILD AORTIC STENOSIS: Chronic | ICD-10-CM

## 2019-11-27 DIAGNOSIS — I25.10 CAD IN NATIVE ARTERY: Primary | ICD-10-CM

## 2019-11-27 DIAGNOSIS — I25.10 CAD IN NATIVE ARTERY: ICD-10-CM

## 2019-11-27 DIAGNOSIS — Z95.2 S/P MITRAL VALVE REPLACEMENT: ICD-10-CM

## 2019-11-27 DIAGNOSIS — Z98.890 STATUS POST MAZE OPERATION FOR ATRIAL FIBRILLATION: ICD-10-CM

## 2019-11-27 DIAGNOSIS — Z86.79 STATUS POST MAZE OPERATION FOR ATRIAL FIBRILLATION: ICD-10-CM

## 2019-11-27 LAB
ANION GAP SERPL CALCULATED.3IONS-SCNC: 16 MMOL/L (ref 7–19)
BUN BLDV-MCNC: 39 MG/DL (ref 8–23)
CALCIUM SERPL-MCNC: 11 MG/DL (ref 8.8–10.2)
CHLORIDE BLD-SCNC: 96 MMOL/L (ref 98–111)
CO2: 30 MMOL/L (ref 22–29)
CREAT SERPL-MCNC: 1.6 MG/DL (ref 0.5–0.9)
GFR NON-AFRICAN AMERICAN: 31
GLUCOSE BLD-MCNC: 65 MG/DL (ref 74–109)
HCT VFR BLD CALC: 40 % (ref 37–47)
HEMOGLOBIN: 11.6 G/DL (ref 12–16)
MCH RBC QN AUTO: 23.2 PG (ref 27–31)
MCHC RBC AUTO-ENTMCNC: 29 G/DL (ref 33–37)
MCV RBC AUTO: 79.8 FL (ref 81–99)
PDW BLD-RTO: 20.3 % (ref 11.5–14.5)
PLATELET # BLD: 427 K/UL (ref 130–400)
PMV BLD AUTO: 9.6 FL (ref 9.4–12.3)
POTASSIUM SERPL-SCNC: 4.7 MMOL/L (ref 3.5–5)
RBC # BLD: 5.01 M/UL (ref 4.2–5.4)
SODIUM BLD-SCNC: 142 MMOL/L (ref 136–145)
WBC # BLD: 9.6 K/UL (ref 4.8–10.8)

## 2019-11-27 PROCEDURE — 1111F DSCHRG MED/CURRENT MED MERGE: CPT | Performed by: NURSE PRACTITIONER

## 2019-11-27 PROCEDURE — G8419 CALC BMI OUT NRM PARAM NOF/U: HCPCS | Performed by: NURSE PRACTITIONER

## 2019-11-27 PROCEDURE — 4040F PNEUMOC VAC/ADMIN/RCVD: CPT | Performed by: NURSE PRACTITIONER

## 2019-11-27 PROCEDURE — 1036F TOBACCO NON-USER: CPT | Performed by: NURSE PRACTITIONER

## 2019-11-27 PROCEDURE — 1123F ACP DISCUSS/DSCN MKR DOCD: CPT | Performed by: NURSE PRACTITIONER

## 2019-11-27 PROCEDURE — 1090F PRES/ABSN URINE INCON ASSESS: CPT | Performed by: NURSE PRACTITIONER

## 2019-11-27 PROCEDURE — G8427 DOCREV CUR MEDS BY ELIG CLIN: HCPCS | Performed by: NURSE PRACTITIONER

## 2019-11-27 PROCEDURE — 3017F COLORECTAL CA SCREEN DOC REV: CPT | Performed by: NURSE PRACTITIONER

## 2019-11-27 PROCEDURE — 99213 OFFICE O/P EST LOW 20 MIN: CPT | Performed by: NURSE PRACTITIONER

## 2019-11-27 PROCEDURE — G8484 FLU IMMUNIZE NO ADMIN: HCPCS | Performed by: NURSE PRACTITIONER

## 2019-11-27 PROCEDURE — G8400 PT W/DXA NO RESULTS DOC: HCPCS | Performed by: NURSE PRACTITIONER

## 2019-11-27 PROCEDURE — G8598 ASA/ANTIPLAT THER USED: HCPCS | Performed by: NURSE PRACTITIONER

## 2019-11-27 RX ORDER — LEVALBUTEROL INHALATION SOLUTION 1.25 MG/3ML
3 SOLUTION RESPIRATORY (INHALATION)
Status: ON HOLD | COMMUNITY
Start: 2019-04-04 | End: 2021-01-01

## 2019-12-03 DIAGNOSIS — R79.89 ELEVATED SERUM CREATININE: Primary | ICD-10-CM

## 2019-12-20 ENCOUNTER — TELEPHONE (OUTPATIENT)
Dept: PULMONOLOGY | Facility: CLINIC | Age: 74
End: 2019-12-20

## 2019-12-20 DIAGNOSIS — J44.1 COPD EXACERBATION (HCC): Primary | ICD-10-CM

## 2019-12-20 RX ORDER — CEFDINIR 300 MG/1
300 CAPSULE ORAL 2 TIMES DAILY
Qty: 20 CAPSULE | Refills: 0 | Status: SHIPPED | OUTPATIENT
Start: 2019-12-20 | End: 2020-01-02

## 2019-12-20 NOTE — TELEPHONE ENCOUNTER
Call patient and let her know I will send in a prescription for Omnicef.  If she worsens despite the antibiotic she should go to urgent care or the emergency room.

## 2019-12-20 NOTE — TELEPHONE ENCOUNTER
Called daughter back to get more info on her mom.  She is coughing up yellow-brown sputum. No fever oxygen level running 97% on 1.5 L.

## 2020-01-02 ENCOUNTER — OFFICE VISIT (OUTPATIENT)
Dept: PULMONOLOGY | Facility: CLINIC | Age: 75
End: 2020-01-02

## 2020-01-02 VITALS
HEIGHT: 64 IN | DIASTOLIC BLOOD PRESSURE: 70 MMHG | SYSTOLIC BLOOD PRESSURE: 130 MMHG | HEART RATE: 103 BPM | OXYGEN SATURATION: 96 % | WEIGHT: 90.8 LBS | BODY MASS INDEX: 15.5 KG/M2

## 2020-01-02 DIAGNOSIS — Z95.2 H/O PROSTHETIC HEART VALVE: ICD-10-CM

## 2020-01-02 DIAGNOSIS — J44.9 STAGE 3 SEVERE COPD BY GOLD CLASSIFICATION (HCC): Primary | ICD-10-CM

## 2020-01-02 DIAGNOSIS — Z87.891 FORMER SMOKER: ICD-10-CM

## 2020-01-02 DIAGNOSIS — R63.6 UNDERWEIGHT: ICD-10-CM

## 2020-01-02 PROCEDURE — 99214 OFFICE O/P EST MOD 30 MIN: CPT | Performed by: NURSE PRACTITIONER

## 2020-01-02 RX ORDER — ISOSORBIDE MONONITRATE 30 MG/1
TABLET, EXTENDED RELEASE ORAL
COMMUNITY
Start: 2019-12-30

## 2020-01-02 RX ORDER — ARFORMOTEROL TARTRATE 15 UG/2ML
15 SOLUTION RESPIRATORY (INHALATION)
Qty: 120 ML | Refills: 11 | Status: SHIPPED | OUTPATIENT
Start: 2020-01-02 | End: 2020-12-29 | Stop reason: ALTCHOICE

## 2020-01-02 RX ORDER — FUROSEMIDE 40 MG/1
80 TABLET ORAL
COMMUNITY
Start: 2019-11-20

## 2020-01-02 RX ORDER — CLOPIDOGREL BISULFATE 75 MG/1
TABLET ORAL
COMMUNITY
Start: 2019-11-20

## 2020-01-02 RX ORDER — LEVOTHYROXINE SODIUM 0.03 MG/1
TABLET ORAL
COMMUNITY
Start: 2019-11-07

## 2020-01-02 RX ORDER — NITROGLYCERIN 0.4 MG/1
TABLET SUBLINGUAL
COMMUNITY
Start: 2019-11-20

## 2020-01-02 RX ORDER — AMLODIPINE BESYLATE 2.5 MG/1
TABLET ORAL DAILY
COMMUNITY
Start: 2019-11-20 | End: 2020-09-04 | Stop reason: ALTCHOICE

## 2020-01-02 NOTE — PATIENT INSTRUCTIONS
BROVAN/Arformoterol nebulizer solution  What is this medicine?  ARFORMOTEROL (AR for DAXA ter ol) is a slow-acting bronchodilator. It helps to open up the airways of your lungs. This medicine is used to treat COPD. It should not be used alone for asthma. Do NOT use for an acute asthma attack. Do NOT use for a COPD attack.  This medicine may be used for other purposes; ask your health care provider or pharmacist if you have questions.  COMMON BRAND NAME(S): Brown  What should I tell my health care provider before I take this medicine?  They need to know if you have any of the following conditions:  -diabetes  -have asthma and are not taking any other asthma medicine  -heart disease or irregular heartbeat  -high blood pressure  -pheochromocytoma  -seizures  -thyroid disease  -worsening asthma  -an unusual or allergic reaction to arformoterol, other medicines, food, dyes, or preservatives  -pregnant or trying to get pregnant  -breast-feeding  How should I use this medicine?  This medicine is used in a nebulizer. Nebulizers make a liquid into an aerosol so that you can breathe it in through your mouth and nose into your lungs. You will be taught how to use your nebulizer. Follow the directions on your prescription label. Take your medicine at regular intervals. Do not use more often than directed.  Talk to your pediatrician regarding the use of this medicine in children. This medicine is not approved for use in children.  Overdosage: If you think you have taken too much of this medicine contact a poison control center or emergency room at once.  NOTE: This medicine is only for you. Do not share this medicine with others.  What if I miss a dose?  If you miss a dose, take it as soon as you can. If it is almost time for your next dose, take only that dose. Do not take double or extra doses.  What may interact with this medicine?  Do not take this medicine with any of the following medications:  -MAOIs like Carbex,  Eldepryl, Marplan, Nardil, and Parnate  -procarbazine  This medicine may also interact with the following medications:  -caffeine  -diuretics  -formoterol  -medicines for colds  -medicines for depression, anxiety, or psychotic disturbances  -medicines for weight loss including some herbal products  -methadone  -salmeterol  -some antibiotics like clarithromycin, erythromycin, levofloxacin, and linezolid  -some heart medicines  -steroid hormones like dexamethasone, cortisone, hydrocortisone  -theophylline  This list may not describe all possible interactions. Give your health care provider a list of all the medicines, herbs, non-prescription drugs, or dietary supplements you use. Also tell them if you smoke, drink alcohol, or use illegal drugs. Some items may interact with your medicine.  What should I watch for while using this medicine?  Visit your doctor for regular check ups. Tell your doctor or health care professional if your symptoms do not get better. This medicine may increase the possibility of dying from breathing problems. If your symptoms get worse or if you need your short-acting inhalers more often, call your doctor right away. Do not use this medicine more than every 12 hours. NEVER use this medicine for an acute bronchospasm.  If you are going to have surgery tell your doctor or health care professional that you are using this medicine.  What side effects may I notice from receiving this medicine?  Side effects that you should report to your doctor or health care professional as soon as possible:  -allergic reactions such as skin rash or itching, hives, swelling of the face, lips or tongue  -chest pain  -difficulty breathing or wheezing that increases or does not go away  -dizziness or fainting  -fever  -high blood pressure  -irregular heartbeat  -nervousness  -tremors  -unusually weak or tired  Side effects that usually do not require medical attention (report to your doctor or health care  professional if they continue or are bothersome):  -cough  -headache  -nausea, vomiting  -sore throat  -stuffy nose  -upset stomach  This list may not describe all possible side effects. Call your doctor for medical advice about side effects. You may report side effects to FDA at 7-943-FDA-9356.  Where should I keep my medicine?  Keep out of the reach of children.  Ideally, store in the refrigerator between 2 and 8 degrees C (36 and 46 degrees F) before use. Keep this medicine in the foil pouches until you are ready to use. The medicine should be clear. Do not use any discolored solution. You may store this medicine at room temperature between 20 and 25 degrees C (68 and 77 degrees F) for up to 6 weeks. Throw away any unused medicine after the expiration date.  NOTE: This sheet is a summary. It may not cover all possible information. If you have questions about this medicine, talk to your doctor, pharmacist, or health care provider.  © 2019 Elsevier/Gold Standard (2019-05-31 11:06:03)

## 2020-01-29 ENCOUNTER — OFFICE VISIT (OUTPATIENT)
Dept: CARDIOLOGY | Age: 75
End: 2020-01-29
Payer: MEDICARE

## 2020-01-29 VITALS
HEART RATE: 76 BPM | WEIGHT: 93 LBS | BODY MASS INDEX: 16.48 KG/M2 | DIASTOLIC BLOOD PRESSURE: 50 MMHG | HEIGHT: 63 IN | SYSTOLIC BLOOD PRESSURE: 90 MMHG

## 2020-01-29 PROBLEM — Z95.5 HISTORY OF CORONARY ARTERY STENT PLACEMENT: Status: ACTIVE | Noted: 2020-01-29

## 2020-01-29 PROBLEM — M79.10 MYALGIA: Status: ACTIVE | Noted: 2020-01-29

## 2020-01-29 PROCEDURE — 1090F PRES/ABSN URINE INCON ASSESS: CPT | Performed by: NURSE PRACTITIONER

## 2020-01-29 PROCEDURE — 1036F TOBACCO NON-USER: CPT | Performed by: NURSE PRACTITIONER

## 2020-01-29 PROCEDURE — 3017F COLORECTAL CA SCREEN DOC REV: CPT | Performed by: NURSE PRACTITIONER

## 2020-01-29 PROCEDURE — G8400 PT W/DXA NO RESULTS DOC: HCPCS | Performed by: NURSE PRACTITIONER

## 2020-01-29 PROCEDURE — G8419 CALC BMI OUT NRM PARAM NOF/U: HCPCS | Performed by: NURSE PRACTITIONER

## 2020-01-29 PROCEDURE — 99214 OFFICE O/P EST MOD 30 MIN: CPT | Performed by: NURSE PRACTITIONER

## 2020-01-29 PROCEDURE — 4040F PNEUMOC VAC/ADMIN/RCVD: CPT | Performed by: NURSE PRACTITIONER

## 2020-01-29 PROCEDURE — G8427 DOCREV CUR MEDS BY ELIG CLIN: HCPCS | Performed by: NURSE PRACTITIONER

## 2020-01-29 PROCEDURE — G8484 FLU IMMUNIZE NO ADMIN: HCPCS | Performed by: NURSE PRACTITIONER

## 2020-01-29 PROCEDURE — 1123F ACP DISCUSS/DSCN MKR DOCD: CPT | Performed by: NURSE PRACTITIONER

## 2020-01-29 RX ORDER — ARFORMOTEROL TARTRATE 15 UG/2ML
15 SOLUTION RESPIRATORY (INHALATION) 2 TIMES DAILY
COMMUNITY
Start: 2020-01-02

## 2020-01-29 RX ORDER — LISINOPRIL 5 MG/1
5 TABLET ORAL NIGHTLY
Qty: 30 TABLET | Refills: 0 | Status: SHIPPED
Start: 2020-01-29 | End: 2020-04-28 | Stop reason: ALTCHOICE

## 2020-01-29 NOTE — PROGRESS NOTES
[facility], [place]. [address], [place] [address] [facility]  [phone] office  [phone] fax      OFFICE VISIT:  2020    Julieta Jones - : 1945  Reason For Visit:  Scarlett Starks is a 76 y.o. female who is here for Follow-up (Patient feels better after stent, with no cardiac symptoms. ); Coronary Artery Disease; and Congestive Heart Failure    History:   Diagnosis Orders   1. Coronary artery disease involving native coronary artery of native heart without angina pectoris     2. Diastolic dysfunction     3. PAF (paroxysmal atrial fibrillation) ([address]     4. Status post Maze operation for atrial fibrillation     5. S/P mitral valve replacement     6. S/P coronary artery bypass graft x 1     7. Essential hypertension     8. Mixed hyperlipidemia     9. History of coronary artery stent placement      11/15/ 2 stents to RCA and 1 to circumflex - Dr. Monae Garza     The patient presents today for cardiology follow up. On 11/15/19, the patient had cardiac cath by Dr. Monae Garza with 3 RM placed. She reports no angina since procedure. Her BP is low sometimes in the morning only. She reports \"it seems to come on up throughout the day. \"   The patient denies symptoms to suggest myocardial ischemia, heart failure or arrhythmia. The patient's PCP monitors cholesterol. The patient reports leg pain on Lipitor. She was on Lipitor at one time prior to PCI with same symptoms. Prior to PCI, was on Zocor with no myalgia. Recent lipid labs were done by PCP. Not available for review today. Denny Landau denies exertional chest pain, shortness of breath, orthopnea, paroxysmal nocturnal dyspnea, syncope, presyncope, sensed arrhythmia, edema and fatigue. The patient denies numbness or weakness to suggest cerebrovascular accident or transient ischemic attack. + myalgia legs.     Julieta Jones has the following history as recorded in [facility]:  Patient Active Problem List   Diagnosis Code    Mixed hyperlipidemia E78.2    Arthritis M19.90    Coronary artery disease involving native coronary artery of native heart I25.10    Carotid artery stenosis I65.29    Calculus of gallbladder without cholecystitis without obstruction K80.20    Acute renal failure (HCC) N17.9    Mitral valve stenosis I05.0    Mitral valve insufficiency I34.0    PUD (peptic ulcer disease) K27.9    Atherosclerosis of native artery of extremity with intermittent claudication ([facility]) I70.219    Pulmonary hypertension I27.20    PAD (peripheral artery disease) ([facility]) I73.9    Nocturnal hypoxia G47.34    Acute superficial venous thrombosis of right lower extremity I82.811    Non-pressure chronic ulcer of right calf with fat layer exposed ([address] L97.212    Decubitus ulcer of right buttock, stage 3 ([facility]) L89.313    Severe malnutrition ([facility]) W33    Diastolic dysfunction W89.03    Anemia in chronic kidney disease (CKD) N18.9, D63.1    Acute blood loss anemia D62    Nonhealing nonsurgical wound with fat layer exposed T14. 8XXA    Atherosclerosis of native arteries of left leg with ulceration of calf ([facility]) I70.242    Atherosclerosis of native artery of right lower extremity with ulceration of ankle ([facility]) I70.233    Atherosclerosis of native arteries of right leg with ulceration of other part of lower right leg I70.238    Atherosclerosis of native arteries of right leg with ulceration of other part of foot I70.235    Nonhealing ulcer of right lower leg with fat layer exposed ([address] L97.912    Non-pressure chronic ulcer of right ankle with fat layer exposed ([address] L97.312    Neuropathic ulcer of right foot with fat layer exposed ([address] L97.512    Hypervolemia E87.70    Acute blood loss anemia D62    Atherosclerosis of native artery of extremity with intermittent claudication ([facility]) I70.219    CHF (congestive heart failure) ([facility]) I50.9    CKD (chronic kidney disease), Laterality Date    ABDOMEN SURGERY  2009    perforated ulcer resection of 1/3 stomach    CARDIAC SURGERY      artificial valve and bypass    CAROTID ENDARTERECTOMY      Right  with Dacron patch angioplasty    CAROTID ENDARTERECTOMY Left     CAROTID ENDARTERECTOMY Right 2019    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 AND REPAIR WITH REVERSED LEFT GREATER SAPHENOUS VEIN INTERPOSITIONAL BYPASS GRAFT performed by Ildefonso Simpson MD at [address] [facility] Right early 's    long ago    CATARACT REMOVAL WITH IMPLANT Bilateral      SECTION  1972    COLONOSCOPY      CORONARY ARTERY BYPASS GRAFT  2016    DILATION AND CURETTAGE OF UTERUS      ILIO-FEMORAL BYPASS GRAFT N/A 2016    OPEN TRANSLUMINAL BALLOON ANGIOPLASTY AND STENTING OF RIGHT COMMON AND EXTERNAL  ILIAC ARTERIES; RIGHT FEMORAL ENDARTERECTOMY WITH VEIN PATCH ANGIOPLASTY performed by Ildefonso Simpson MD at [address] N/A 2016    MITRAL VALVE  REPLACEMENT LUONG-MAZE ABLATION WITH CRYO PROCEDURE, CORONARY ARTERY BYPASS GRAFT X 1 WITH ENDOSCOPIC VEIN HARVESTING WITH PERFUSION TRANSESOPHAGEAL ECHOCARDIOGRAM performed by Daniel Lindsay MD at [address] MITRAL VALVE REPLACEMENT  2016    ND REOPER, CAROTID ENDARTEC>1 MON Left 2018    REMOVAL OF HEMATOMA, LEFT CAROTID ARTERY performed by Ildefonso Simpson MD at [address] Left 2018    LEFT CAROTID ENDARTERECTOMY WITH EEG MONITORING AND COMPLETION DUPLEX ULTRASOUND performed by Ildefonso Simpson MD at [address] SKIN GRAFT Right 2016    Skin graft split thickness foot,ankle,and leg. Right leg 26x8cm and 12x6cm total area 280cm squared. TJR    UPPER GASTROINTESTINAL ENDOSCOPY  3/15/16    Dr Jose Bundy  3/15/2016    EGD BIOPSY performed by Smith Garcia DO at [facility]    VASCULAR SURGERY  16 TJR    Aortagram and right leg runoff,right leg 3    lisinopril (PRINIVIL;ZESTRIL) 5 MG tablet Take 1 tablet by mouth 2 times daily 180 tablet 3    metoprolol (LOPRESSOR) 100 MG tablet Take 1 tablet by mouth 2 times daily 180 tablet 3    Dextromethorphan-guaiFENesin (MUCINEX DM MAXIMUM STRENGTH)  MG TB12 Take 1,200 mg by mouth 2 times daily 28 tablet 3    calcium-cholecalciferol (CALCIUM 500+D) 500-200 MG-UNIT per tablet Take 2 tablets by mouth daily       Biotin 5000 MCG TABS Take 1 tablet by mouth daily        No current facility-administered medications for this visit. Allergies: Levaquin [levofloxacin in d5w]; Xarelto [rivaroxaban]; and Morphine    Review of Systems  Constitutional - no appetite change, or unexpected weight change. No fever, chills or diaphoresis. No significant change in activity level or new onset of fatigue. HEENT - no significant rhinorrhea or epistaxis. No tinnitus or significant hearing loss. Eyes - no sudden vision change or amaurosis. No corneal arcus, xantholasma, subconjunctival hemorrhage or discharge. Respiratory - no significant wheezing, stridor, apnea or cough. No dyspnea on exertion or shortness of air. Cardiovascular - no exertional chest pain to suggest myocardial ischemia. No orthopnea or PND. No sensation of sustained arrythmia. No occurrence of slow heart rate. No palpitations. No claudication. Gastrointestinal - no abdominal swelling or pain. No blood in stool. No severe constipation, diarrhea, nausea, or vomiting. Genitourinary - no dysuria, frequency, or urgency. No flank pain or hematuria. Musculoskeletal - no back pain. No problems with gait. + myalgia legs. Extremities - no clubbing, cyanosis or extremity edema. Skin - no color change or rash. No pallor. No new surgical incision. Neurologic - no speech difficulty, facial asymmetry or lateralizing weakness. No seizures, presyncope or syncope. No significant dizziness.   Hematologic - no easy bruising or excessive bleeding. Psychiatric - no severe anxiety or insomnia. No confusion. All other review of systems are negative. Objective  Vital Signs - BP (!) 78/58   Pulse 76   Ht 5' 3\" (1.6 m)   Wt 93 lb (42.2 kg)   BMI 16.47 kg/m²   General - Nilson Aguirre is alert, cooperative, and pleasant. Well groomed. No acute distress. Body habitus - Body mass index is 16.47 kg/m². HEENT - Head is normocephalic. No circumoral cyanosis. Dentition is normal.  EYES -   Lids normal without ptosis. No discharge, edema or subconjunctival hemorrhage. Neck - Symmetrical without apparent mass or lymphadenopathy. Respiratory - Normal respiratory effort without use of accessory muscles. Ausculatation reveals vesicular breath sounds without crackles, wheezes, rub or rhonchi. Cardiovascular - No jugular venous distention. Auscultation reveals regular rate and rhythm. No audible clicks, gallop or rub. No murmur. No lower extremity varicosities. No carotid bruits. Abdominal -  No visible distention, mass or pulsations. Extremities - No clubbing or cyanosis. No statis dermatitis or ulcers. No edema. Musculoskeletal -   No Osler's nodes. No kyphosis or scoliosis. Gait is even and regular without limp or shuffle. Ambulates without assistance. Skin -  Warm and dry; no rash or pallor. No new surgical wound. Neurological - No focal neurological deficits. Thought processes coherent. No apparent tremor. Oriented to person, place and time. Psychiatric -  Appropriate affect and mood. Assessment:     Diagnosis Orders   1. Coronary artery disease involving native coronary artery of native heart without angina pectoris     2. Diastolic dysfunction     3. PAF (paroxysmal atrial fibrillation) [facility]     4. Status post Maze operation for atrial fibrillation     5. S/P mitral valve replacement     6. S/P coronary artery bypass graft x 1     7. Essential hypertension     8. Mixed hyperlipidemia     9.  History of coronary artery stent placement      11/15/ 2 stents to RCA and 1 to circumflex - Dr. Papi Garcia   10. Myalgia       Data reviewed:  11/20/19 echo   Conclusions    Summary   Bioprosthetic mitral valve replacement with good leaflet mobility. Increased pressure gradients across valve with a max gradient of 29 mmHg   and a mean gradient of 11 mmHg. MVA by PHT is calculated to be 2.6 cm2. Mild mitral regurgitation is seen with color flow mapping. Signature    ----------------------------------------------------------------   Electronically signed by Art Worthy MD(Interpreting   physician) on 11/20/2019 07:22 PM    11/15/19 cath  Conclusions    Double vessel disease involving RCA and circumflex. Severely stenotic SVG to distal RCA. Hyperdynamic left ventricle with severe left ventricular hypertrophy   consistent with hypertensive heart disease. In view of severe disease in graft to RCA, decision to treat native RCA as   opposed to SVG graft. Successful PCI to the ostial to proximal RCA (3.0 x 15 mm resolute   integrity) and proximal to mid RCA rinses 3.0 x 18 mm resolute integrity)   utilizing drug-eluting stents. Successful PCI to proximal circumflex (2.5 x 14 mm resolute integrity)   utilizing drug-eluting stent. Recommendations    Medical management.     Signatures    ----------------------------------------------------------------   Electronically signed by Alf Holly MD(Performing Physician) on   11/15/2019 17:43   ----------------------------------------------------------------    Lab Results   Component Value Date    CHOL 125 (L) 03/15/2016     Lab Results   Component Value Date    TRIG 123 (L) 03/15/2016     Lab Results   Component Value Date    HDL 11 (L) 03/15/2016     Lab Results   Component Value Date    LDLCALC 89 03/15/2016     Lab Results   Component Value Date     11/27/2019    K 4.7 11/27/2019    CL 96 (L) 11/27/2019    CO2 30 (H) 11/27/2019    BUN 39 (H) 11/27/2019    CREATININE 1.6 (H)

## 2020-01-29 NOTE — PATIENT INSTRUCTIONS
a heart healthy diet, you improve your chances for feeling good and staying healthy for life. 6)  Lose weight - when you shed extra fat an unnecessary pounds, you reduce the burden on your hear, lungs, blood vessels and skeleton. You give yourself the gift of active living, you lower your blood pressure and help yourself feel better. 7) Stop smoking - cigarette smokers have a higher risk of developing cardiovascular disease. If  You smoke, quitting is the best thing you can do for your health. Check American Heart Association on line for more information on Life's Simple 7 and tips for healthy living. Patient Education        A Healthy Heart: Care Instructions  Your Care Instructions    Heart disease occurs when a substance called plaque builds up in the vessels that supply oxygen-rich blood to your heart. This can narrow the blood vessels and reduce blood flow. A heart attack happens when blood flow is completely blocked. A high-fat diet, smoking, and other factors increase the risk of heart disease. Your doctor has found that you have a chance of having heart disease. You can do lots of things to keep your heart healthy. It may not be easy, but you can change your diet, exercise more, and quit smoking. These steps really work to lower your chance of heart disease. Follow-up care is a key part of your treatment and safety. Be sure to make and go to all appointments, and call your doctor if you are having problems. It's also a good idea to know your test results and keep a list of the medicines you take. How can you care for yourself at home? Diet    · Use less salt when you cook and eat. This helps lower your blood pressure. Taste food before salting. Add only a little salt when you think you need it.  With time, your taste buds will adjust to less salt.     · Eat fewer snack items, fast foods, canned soups, and other high-salt, high-fat, processed foods.     · Read food labels and try to avoid saturated and trans fats. They increase your risk of heart disease by raising cholesterol levels.     · Limit the amount of solid fat-butter, margarine, and shortening-you eat. Use olive, peanut, or canola oil when you cook. Bake, broil, and steam foods instead of frying them.     · Eat a variety of fruit and vegetables every day. Dark green, deep orange, red, or yellow fruits and vegetables are especially good for you. Examples include spinach, carrots, peaches, and berries.     · Foods high in fiber can reduce your cholesterol and provide important vitamins and minerals. High-fiber foods include whole-grain cereals and breads, oatmeal, beans, brown rice, citrus fruits, and apples.     · Eat lean proteins. Heart-healthy proteins include seafood, lean meats and poultry, eggs, beans, peas, nuts, seeds, and soy products.     · Limit drinks and foods with added sugar. These include candy, desserts, and soda pop.    Lifestyle changes    · If your doctor recommends it, get more exercise. Walking is a good choice. Bit by bit, increase the amount you walk every day. Try for at least 30 minutes on most days of the week. You also may want to swim, bike, or do other activities.     · Do not smoke. If you need help quitting, talk to your doctor about stop-smoking programs and medicines. These can increase your chances of quitting for good. Quitting smoking may be the most important step you can take to protect your heart. It is never too late to quit. You will get health benefits right away.     · Limit alcohol to 2 drinks a day for men and 1 drink a day for women. Too much alcohol can cause health problems. Medicines    · Take your medicines exactly as prescribed. Call your doctor if you think you are having a problem with your medicine.     · If your doctor recommends aspirin, take the amount directed each day. Make sure you take aspirin and not another kind of pain reliever, such as acetaminophen (Tylenol).  If you take ibuprofen (such as Advil or Motrin) for other problems, take aspirin at least 2 hours before taking ibuprofen. When should you call for help? Call 911 if you have symptoms of a heart attack. These may include:    · Chest pain or pressure, or a strange feeling in the chest.     · Sweating.     · Shortness of breath.     · Pain, pressure, or a strange feeling in the back, neck, jaw, or upper belly or in one or both shoulders or arms.     · Lightheadedness or sudden weakness.     · A fast or irregular heartbeat.    After you call  911, the  may tell you to chew 1 adult-strength or 2 to 4 low-dose aspirin. Wait for an ambulance. Do not try to drive yourself.   Watch closely for changes in your health, and be sure to contact your doctor if you have any problems. Where can you learn more? Go to https://[place].com.[employer]. org and sign in to your [employer] account. Enter R299 in the [facility] box to learn more about \"A Healthy Heart: Care Instructions. \"     If you do not have an account, please click on the \"Sign Up Now\" link. Current as of: April 9, 2019  Content Version: 12.3  © 2407-8409 Healthwise, Incorporated. Care instructions adapted under license by [facility] [employer] [facility] If you have questions about a medical condition or this instruction, always ask your healthcare professional. [place] [address] any warranty or liability for your use of this information.

## 2020-03-03 ENCOUNTER — OFFICE VISIT (OUTPATIENT)
Dept: PULMONOLOGY | Facility: CLINIC | Age: 75
End: 2020-03-03

## 2020-03-03 VITALS
DIASTOLIC BLOOD PRESSURE: 70 MMHG | SYSTOLIC BLOOD PRESSURE: 100 MMHG | HEART RATE: 100 BPM | HEIGHT: 64 IN | BODY MASS INDEX: 15.36 KG/M2 | WEIGHT: 90 LBS | OXYGEN SATURATION: 92 %

## 2020-03-03 DIAGNOSIS — R63.6 UNDERWEIGHT: ICD-10-CM

## 2020-03-03 DIAGNOSIS — Z95.2 H/O PROSTHETIC HEART VALVE: ICD-10-CM

## 2020-03-03 DIAGNOSIS — I27.20 PULMONARY HYPERTENSION (HCC): Primary | ICD-10-CM

## 2020-03-03 DIAGNOSIS — J44.9 STAGE 3 SEVERE COPD BY GOLD CLASSIFICATION (HCC): ICD-10-CM

## 2020-03-03 DIAGNOSIS — R91.8 LUNG NODULES: ICD-10-CM

## 2020-03-03 PROCEDURE — 99214 OFFICE O/P EST MOD 30 MIN: CPT | Performed by: INTERNAL MEDICINE

## 2020-03-03 PROCEDURE — 94010 BREATHING CAPACITY TEST: CPT | Performed by: INTERNAL MEDICINE

## 2020-03-03 NOTE — PROCEDURES
Pulmonary Function Test  Performed by: Keyon Syed MD  Authorized by: Keyon Syed MD      Pre Drug    FVC: 60.62%   FEV1: 43.26%   FEF 25-75%: 27.11%   FEV1/FVC: 54%

## 2020-03-03 NOTE — PROGRESS NOTES
Subjective   Steffi Michelle is a 74 y.o. female.     Chief Complaint   Patient presents with   • COPD      No My Sticky Note on file.    History of Present Illness   The patient is doing raise well from a pulmonary standpoint.  She is having no acute respiratory tract symptomatology at present.  She does have shortness of breath chronically with exertion but does not have any acute respiratory tract symptoms at present.  She has had multiple chest CTs in the past most recently November of last year which did show a right upper lobe nodule approximate millimeters in size.  This nodule is clearly been present on previous scans dating back the past few years but was in somewhat different position due to coexisting effusion which move the nodule more anteriorly.  The nodule however clearly was present on previous scans.  A follow-up could be considered in a year but again it appears that this nodules been present on old scans and is now closer to the pleura because the previous noted pleural effusions have significantly improved.    Medical/Family/Social History   has a past medical history of Arthritis, CAD (coronary artery disease), Colon polyp, Elevated cholesterol, History of heart artery stent, and Hypertension.   has a past surgical history that includes Colonoscopy; Esophagogastroduodenoscopy; Other surgical history; Cardiac valve replacement; and Other surgical history.  family history includes Cancer in an other family member; Diabetes in her mother; Drug abuse in an other family member; Heart disease in her mother and another family member; Hypertension in an other family member.   reports that she quit smoking about 42 years ago. Her smoking use included cigarettes. She has a 2.50 pack-year smoking history. She has never used smokeless tobacco. She reports that she does not drink alcohol or use drugs.  Allergies   Allergen Reactions   • Levofloxacin Nausea And Vomiting   • Rivaroxaban Other (See Comments)  "    Made anemic    • Morphine Itching and Rash     Medications    Current Outpatient Medications:   •  amLODIPine (NORVASC) 2.5 MG tablet, Daily., Disp: , Rfl:   •  arformoterol (BROVANA) 15 MCG/2ML nebulizer solution, Take 2 mL by nebulization 2 (Two) Times a Day., Disp: 120 mL, Rfl: 11  •  aspirin 81 MG EC tablet, Take 81 mg by mouth., Disp: , Rfl:   •  atorvastatin (LIPITOR) 40 MG tablet, Take 40 mg by mouth., Disp: , Rfl:   •  Biotin 5000 MCG tablet, Take  by mouth., Disp: , Rfl:   •  clopidogrel (PLAVIX) 75 MG tablet, , Disp: , Rfl:   •  furosemide (LASIX) 40 MG tablet, , Disp: , Rfl:   •  isosorbide mononitrate (IMDUR) 30 MG 24 hr tablet, , Disp: , Rfl:   •  levalbuterol (XOPENEX) 1.25 MG/3ML nebulizer solution, Take 1 ampule by nebulization 4 (Four) Times a Day., Disp: 360 mL, Rfl: 11  •  levothyroxine (SYNTHROID, LEVOTHROID) 25 MCG tablet, , Disp: , Rfl:   •  lisinopril (PRINIVIL,ZESTRIL) 5 MG tablet, Take 5 mg by mouth Daily., Disp: , Rfl:   •  metoprolol tartrate (LOPRESSOR) 100 MG tablet, Take 100 mg by mouth., Disp: , Rfl:   •  nitroglycerin (NITROSTAT) 0.4 MG SL tablet, , Disp: , Rfl:   •  O2 (OXYGEN), Inhale 1.5 L/min Every Night., Disp: , Rfl:     Review of Systems   Constitutional: Negative for chills and fever.   HENT: Negative for congestion.    Eyes: Negative for visual disturbance.   Respiratory: Positive for shortness of breath. Negative for cough.    Cardiovascular: Negative for chest pain.   Gastrointestinal: Negative for diarrhea, nausea and vomiting.   Genitourinary: Negative for difficulty urinating.   Musculoskeletal: Negative for arthralgias.   Skin: Negative for rash.   Neurological: Negative for dizziness and speech difficulty.   Psychiatric/Behavioral: The patient is not nervous/anxious.      ------------------------------------  Objective   /70   Pulse 100   Ht 162.6 cm (64\")   Wt 40.8 kg (90 lb)   SpO2 92% Comment: RA  Breastfeeding No   BMI 15.45 kg/m²   Physical Exam "   Constitutional: She is oriented to person, place, and time. She appears well-developed.   She is a very thin white female.   HENT:   Head: Normocephalic and atraumatic.   Eyes: Pupils are equal, round, and reactive to light. EOM are normal.   Neck: Normal range of motion. Neck supple.   Cardiovascular: Normal rate and regular rhythm.   A faint 1/6 to 2/6 systolic murmur is heard along the upper left sternal border.   Pulmonary/Chest: Effort normal.   Breath sounds are diminished throughout.   Abdominal: Soft.   Musculoskeletal: Normal range of motion.   Neurological: She is alert and oriented to person, place, and time.   Skin: Skin is warm and dry.   Psychiatric: She has a normal mood and affect.   Nursing note and vitals reviewed.              Pulmonary Functions Testing Results:  PFT Values        Some values may be hidden. Unless noted otherwise, only the newest values recorded on each date are displayed.         Old Values PFT Results 5/1/19 3/3/20   FVC 68%    FEV1 37%    FEV1/FVC 42.56%    DLCO 35%    TLC 83%       Pre Drug PFT Results 5/1/19 3/3/20   FVC  60.62   FEV1  43.26   FEF 25-75%  27.11   FEV1/FVC  54      Post Drug PFT Results 5/1/19 3/3/20   No data to display.      Other Tests PFT Results 5/1/19 3/3/20   No data to display.               My interpretation of PFT:  1.  Spirometry is consistent with a severe obstructive ventilatory defect.  2.  When current studies are compared to studies from May of last year, her F VC is dropped somewhat but her FEV1 is actually improved.  Assessment/Plan   Steffi was seen today for copd.    Diagnoses and all orders for this visit:    Pulmonary hypertension (CMS/[facility])    Stage 3 severe COPD by GOLD classification (CMS/[facility])  -     Pulmonary Function Test    H/O prosthetic heart valve    Underweight      Patient's Body mass index is 15.45 kg/m². BMI is below normal parameters. Recommendations include: referral to primary care.      The patient is stable from a  pulmonary standpoint.  She wonders if there is something will be less expensive than Brovana I told her performance may be an option I wrote this out for her.  She does have some degree of pulmonary hypertension by previous 2D echocardiograms on the last echocardiogram in which I see an estimation of right ventricular systolic pressure revealed just mild pulmonary hypertension.  Her COPD appears stable from the standpoint of her FEV1 to actually slightly improved.  I will see her back in 6 months.  She does again have some lung nodules seen on previous scans but the most prominent nodule to my review appears stable dating back at least a few years with some change in position related to a decrease in the amount of pleural effusion on her most recent scan but clearly appears to be the same nodule located posteriorly in the right upper lobe up against the pleura.

## 2020-03-03 NOTE — PATIENT INSTRUCTIONS
The patient is doing well from a pulmonary standpoint.  Today's pulmonary functions show a slight drop in her FVC but her FEV1 has improved.  She is using Brovana and I told her that Perforomist might be an alternative.  She could check with her pharmacy to see if this might be a lower cost to her.  I will see her back in 6 months otherwise.

## 2020-03-25 PROBLEM — N17.9 ACUTE RENAL FAILURE (HCC): Status: RESOLVED | Noted: 2020-03-25 | Resolved: 2020-03-24

## 2020-03-25 PROBLEM — D62 ACUTE BLOOD LOSS ANEMIA: Status: RESOLVED | Noted: 2020-03-25 | Resolved: 2020-03-24

## 2020-04-02 ENCOUNTER — TELEPHONE (OUTPATIENT)
Dept: PULMONOLOGY | Facility: CLINIC | Age: 75
End: 2020-04-02

## 2020-04-02 DIAGNOSIS — J44.9 STAGE 3 SEVERE COPD BY GOLD CLASSIFICATION (HCC): Primary | ICD-10-CM

## 2020-04-27 ENCOUNTER — TELEPHONE (OUTPATIENT)
Dept: CARDIOLOGY | Age: 75
End: 2020-04-27

## 2020-04-27 NOTE — TELEPHONE ENCOUNTER
Pt called stating her PCP wants her to stop all BP meds due to Hypotension BP was been 90/37. Her PCP stopped Lisinopril, Norvasc, and lopressor. Pt called because she states she doesn't want to do anything to mess up her heart and wanted to make sure we were ok with her stopping all these before she does.

## 2020-05-04 ENCOUNTER — VIRTUAL VISIT (OUTPATIENT)
Dept: VASCULAR SURGERY | Age: 75
End: 2020-05-04
Payer: MEDICARE

## 2020-05-04 PROCEDURE — 99442 PR PHYS/QHP TELEPHONE EVALUATION 11-20 MIN: CPT | Performed by: NURSE PRACTITIONER

## 2020-05-04 NOTE — PROGRESS NOTES
Venita Meyer is a 76 y.o. female evaluated via telephone on 5/4/2020. Consent:  She and/or health care decision maker is aware that that she may receive a bill for this telephone service, depending on her insurance coverage, and has provided verbal consent to proceed: Yes    Patient is located at home  Provider is located at [facility]   Also present during call is     Zulay Cole has a history of peripheral vascular disease of the lower extremities. She has had this for 1 - 5 years. Current treatment includes clopidogrel 75 mg po qd, ASA EC daily. Zulay Cole has not had new wounds. Recently, she reports claudication at a distance of  200 feet. Zulay Cole reports that the right leg is equal to the left. She reports claudication is not changed and is mostly in the form of crampy type pain starting in the calves. She has a short recovery time. This is reproducible in nature. She reports ischemic rest pain a few times per night. She reports walking with cart does not help. She presents for follow up of carotid artery stenosis. She has a known history of carotid artery stenosis for 1 - 5 years. Her current treatment includes clopidogrel 75 mg po qd, ASA EC daily. She denies a history of CVA. She reports no TIA's, episodes of lateralizing weakness and episodes of amaurosis fugax.           Venita Meyer is a 76 y.o. 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    CARDIAC SURGERY      artificial valve and bypass    CAROTID ENDARTERECTOMY      Right  with Dacron patch angioplasty    CAROTID ENDARTERECTOMY Left     CAROTID ENDARTERECTOMY Right 2019    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 AND REPAIR WITH REVERSED LEFT GREATER SAPHENOUS VEIN INTERPOSITIONAL BYPASS GRAFT performed by Darryle Napoleon, MD at [address] [facility] Right early 's    long ago    CATARACT REMOVAL WITH IMPLANT Bilateral      SECTION  1972    COLONOSCOPY      CORONARY ARTERY BYPASS GRAFT  2016    DILATION AND CURETTAGE OF UTERUS      ILIO-FEMORAL BYPASS GRAFT N/A 2016    OPEN TRANSLUMINAL BALLOON ANGIOPLASTY AND STENTING OF RIGHT COMMON AND EXTERNAL  ILIAC ARTERIES; RIGHT FEMORAL ENDARTERECTOMY WITH VEIN PATCH ANGIOPLASTY performed by Darryle Napoleon, MD at [address] [facility] N/A 2016    MITRAL VALVE  REPLACEMENT LUONG-MAZE ABLATION WITH CRYO PROCEDURE, CORONARY ARTERY BYPASS GRAFT X 1 WITH ENDOSCOPIC VEIN HARVESTING WITH PERFUSION TRANSESOPHAGEAL ECHOCARDIOGRAM performed by Katerine Knapp MD at [facility]      IA REOPER, CAROTID ENDARTEC>1 MON Left 2018    REMOVAL OF HEMATOMA, LEFT CAROTID ARTERY performed by Darryle Napoleon, MD at [address] Left 2018    LEFT CAROTID ENDARTERECTOMY WITH EEG MONITORING AND COMPLETION DUPLEX ULTRASOUND performed by Darryle Napoleon, MD at [address] [facility]      SKIN GRAFT Right 2016    Skin graft split thickness foot,ankle,and leg. Right leg 26x8cm and 12x6cm total area 280cm squared. [facility]    UPPER GASTROINTESTINAL ENDOSCOPY  3/15/16    Dr Germaine Valdez  3/15/2016    EGD BIOPSY performed by Tiffany Harris DO at [facility]    VASCULAR SURGERY  16 [facility]    Aortagram and right leg runoff,right leg runoff,right common iliac artery selection for right leg run off views.     VASCULAR SURGERY      . Open transluminal angioplasty and stenting of the external iliac artery. TJR    VASCULAR SURGERY  2019    [facility]. 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 Family History   Problem Relation Age of Onset    Diabetes Mother     Heart Disease Mother     Heart Failure Mother     Diabetes Sister     Heart Disease Sister     High Blood Pressure Sister      Social History     Tobacco Use    Smoking status: Former Smoker     Years: 2.00     Types: Cigarettes     Last attempt to quit: 1980     Years since quittin.3    Smokeless tobacco: Never Used   Substance Use Topics    Alcohol use: Yes     Comment: rarely maybe twice a year         Review of Systems    Constitutional - no significant activity change, appetite change, or unexpected weight change. No fever or chills. No diaphoresis or significant fatigue. HENT - no significant rhinorrhea or epistaxis. No tinnitus or significant hearing loss. Eyes - no sudden vision change or amaurosis. Respiratory - has shortness of breath and is on oxygen, wheezing, or stridor. No apnea, cough, or chest tightness associated with shortness of breath. Cardiovascular - no chest pain, syncope, or significant dizziness. No palpitations or significant leg swelling. has had claudication. Gastrointestinal - has not had abdominal swelling or pain. No blood in stool. No severe constipation, diarrhea, nausea, or vomiting. Genitourinary - No difficulty urinating, dysuria, frequency, or urgency. No flank pain or hematuria. Musculoskeletal - has not had back pain, gait disturbance, or myalgia. Skin - no color change, rash, pallor, has not had new wound. Neurologic - no dizziness, facial asymmetry, or light headedness. No seizures.   No speech

## 2020-05-23 DIAGNOSIS — J44.9 STAGE 3 SEVERE COPD BY GOLD CLASSIFICATION (HCC): ICD-10-CM

## 2020-05-26 RX ORDER — LEVALBUTEROL INHALATION SOLUTION 1.25 MG/3ML
SOLUTION RESPIRATORY (INHALATION)
Qty: 225 ML | Refills: 5 | Status: SHIPPED | OUTPATIENT
Start: 2020-05-26 | End: 2021-03-05 | Stop reason: ALTCHOICE

## 2020-05-26 NOTE — TELEPHONE ENCOUNTER
Pharmacy sent a request for refills on Levalbuterol solution for the nebulizer. Refills sent to [facility].

## 2020-05-28 ENCOUNTER — TELEPHONE (OUTPATIENT)
Dept: PULMONOLOGY | Facility: CLINIC | Age: 75
End: 2020-05-28

## 2020-05-28 NOTE — TELEPHONE ENCOUNTER
For the short-term if she is having trouble with a lot of secretions would recommend she use some sort of over-the-counter plain Mucinex preparation.  Otherwise if she has increasing shortness of breath she probably needs to go to urgent care or the emergency room to get checked as this could relate to fluid buildup again and there is no way we can assess that in the office.

## 2020-05-28 NOTE — TELEPHONE ENCOUNTER
She is having trouble with shortness of breath for the last few weeks and has gotten worse.    02 level is fine around the upper 90's.  She does have oxygen to wear at night and she has been using it more.   She is coughing up some clear sputum.  No fever, no sore throat, no muscle aches.  She is doing her nebulizers treatment.   What do you suggest for her?

## 2020-07-13 ENCOUNTER — TELEPHONE (OUTPATIENT)
Dept: CARDIOLOGY | Age: 75
End: 2020-07-13

## 2020-07-13 NOTE — TELEPHONE ENCOUNTER
Patient states she has been having JONES x 3 weeks, no energy, and low BP. She saw her pulmonologist and lungs were clear. Saw pcp and labs were ok. Added patient to schedule tomorrow to be seen.  She voiced understanding

## 2020-07-14 ENCOUNTER — OFFICE VISIT (OUTPATIENT)
Dept: CARDIOLOGY | Age: 75
End: 2020-07-14
Payer: MEDICARE

## 2020-07-14 VITALS
BODY MASS INDEX: 16.99 KG/M2 | SYSTOLIC BLOOD PRESSURE: 152 MMHG | HEART RATE: 72 BPM | WEIGHT: 90 LBS | HEIGHT: 61 IN | DIASTOLIC BLOOD PRESSURE: 80 MMHG

## 2020-07-14 PROCEDURE — 1036F TOBACCO NON-USER: CPT | Performed by: NURSE PRACTITIONER

## 2020-07-14 PROCEDURE — 1090F PRES/ABSN URINE INCON ASSESS: CPT | Performed by: NURSE PRACTITIONER

## 2020-07-14 PROCEDURE — G8400 PT W/DXA NO RESULTS DOC: HCPCS | Performed by: NURSE PRACTITIONER

## 2020-07-14 PROCEDURE — 93000 ELECTROCARDIOGRAM COMPLETE: CPT | Performed by: NURSE PRACTITIONER

## 2020-07-14 PROCEDURE — G8427 DOCREV CUR MEDS BY ELIG CLIN: HCPCS | Performed by: NURSE PRACTITIONER

## 2020-07-14 PROCEDURE — G8419 CALC BMI OUT NRM PARAM NOF/U: HCPCS | Performed by: NURSE PRACTITIONER

## 2020-07-14 PROCEDURE — 4040F PNEUMOC VAC/ADMIN/RCVD: CPT | Performed by: NURSE PRACTITIONER

## 2020-07-14 PROCEDURE — 1123F ACP DISCUSS/DSCN MKR DOCD: CPT | Performed by: NURSE PRACTITIONER

## 2020-07-14 PROCEDURE — 3017F COLORECTAL CA SCREEN DOC REV: CPT | Performed by: NURSE PRACTITIONER

## 2020-07-14 PROCEDURE — 99214 OFFICE O/P EST MOD 30 MIN: CPT | Performed by: NURSE PRACTITIONER

## 2020-07-14 RX ORDER — LEVOTHYROXINE SODIUM 0.03 MG/1
25 TABLET ORAL DAILY
COMMUNITY
Start: 2019-11-07

## 2020-07-14 NOTE — PATIENT INSTRUCTIONS
New instructions for today:    Keep a blood pressure log at home and call the office to report in one week at [phone]. If your symptoms worsen, go to the emergency room. Patient Instructions:  Continue current medications as prescribed. Always keep a current medication list. Bring your medications to every office visit. Continue to follow up with primary care provider for non cardiac medical problems. Call the office with any problems, questions or concerns at [phone]. If you have been asked to keep a blood pressure log, do so for 2 weeks. Call the office to report readings to the triage nurse at [phone]. Follow up with cardiologist as scheduled. The following educational material has been included in this after visit summary for your review: Life simple 7. Cardiac catheterization. Life simple 7  1) Manage blood pressure - high blood pressure is a major risk factor for heart disease and stroke. Keeping blood pressure in health range reduces strain on your heart, arteries and kidneys. Blood pressure goal is less than 130/80. 2) Control cholesterol - contributes to plaque, which can clog arteries and lead to heart disease and stroke. When you control your cholesterol you are giving your arteries their best chance to remain clear. It is recommended that you get cholesterol lab work done once a year. 3) Reduce blood sugar - most of the food we eat is turning into glucose or blood sugar that our body uses for energy. Over time, high levels of blood sugar can damage your heart, kidneys, eyes and nerves. 4) Get active - living an active life is one of the most rewarding gifts you can give yourself and those you love. Simply put, daily physical activity increases your length and quality of life. Strive to exercise 15 minutes most days of the week. 5)  Eat better - A healthy diet is one of your best weapons for fighting cardiovascular disease.   When you eat a heart healthy diet, you improve your chances for feeling good and staying healthy for life. 6)  Lose weight - when you shed extra fat an unnecessary pounds, you reduce the burden on your hear, lungs, blood vessels and skeleton. You give yourself the gift of active living, you lower your blood pressure and help yourself feel better. 7) Stop smoking - cigarette smokers have a higher risk of developing cardiovascular disease. If  You smoke, quitting is the best thing you can do for your health. Check American Heart Association on line for more information on Life's Simple 7 and tips for healthy living. You will need to have lab and a chest x-ray completed today in preparation for the cardiac catheterization. Please go to first floor of this building to Outpatient registration for the testing. You will need COVID 19 testing prior to the heart catheterization. You will need to quarantine from time of COVID 19 testing until the cardiac catheterization. COVID 19 testing date and time:      Cardiac Catheterization Instructions   (before 10:00 am)    Date/Time: 7/30/20 at 10 am with an arrival of 8 am. You will need to be COVID tested on 7/27/20 no later than 11 am at our [facility]) Urgent care. [place] at the [facility] and [address] [facility] located on the first floor of [place] [address] through the hospital main entrance and turn immediately to your left. Test preparation:  · Do not eat or drink anything after midnight on the night before your procedure. You can take your morning medications with a sip of water unless otherwise directed not to. Bring a list of the names and dosages of all the medications you are taking. · Glucophage (Metformin) and/or Coumadin (Warfarin) should be stopped two days prior to the cardiac catheterization procedure. Pradaxa should be stopped one day prior to procedure.     · You should arrange to have someone take you home rather than drive yourself. · Further plan will depend upon the result of the cardiac catheterization. If for any reason you are unable to keep this appointment, please contact CV Registration, [phone], as soon as possible to reschedule.  -------------------------------------------------------------------------------------------------------------------  Cardiac Catheterization   (Coronary Angiography; Coronary Arteriography; Coronary Angiogram)   Definition:  Cardiac catheterization is a test that uses a catheter (tube) and x-ray machine to assess the heart and its blood supply. Reasons for Procedure   It is used to find the cause of symptoms, like chest pain, that could mean heart problems. Cardiac catheterization helps doctors to: Identify narrowed or clogged arteries of the heart   Measure blood pressure within the heart   Evaluate how well the heart valves and chambers function   Check heart defects   Evaluate an enlarged heart   Decide on an appropriate treatment   Possible Complications   If you are planning to have a cardiac catheterization, your doctor will review a list of possible complications, which may include:   Bleeding at the point of the catheter insertion   Damage to arteries   Heart attack or arrhythmia (abnormal heart beats)   Allergic reaction to x-ray dye   Blood clot formation   Infection   Some factors that may increase the risk of complications include: Allergies to medicines or x-ray dye   Obesity   Smoking   Bleeding disorder   Age: 61 or older   Recent pneumonia   Recent heart attack   Diabetes   Kidney disease   What to Expect Prior to Procedure   Your doctor may order:   Blood and urine tests   Electrocardiogram (ECG, EKG)a test that records the heart's activity by measuring electrical currents through the heart muscle   Chest x-ray   Stress test   Talk to your doctor about your medicines.  You may be asked to stop taking some medicines before the procedure, like:   Anti-inflammatory Once all the tests and images are complete, the catheter will be removed. Sometimes, the doctor will perform balloon angioplasty and stenting if he finds an area in your arteries that is narrow or clogged. These are procedures that help to open narrowed arteries. Finally, a bandage will be placed over the groin or arm area. How Long Will It Take? The procedure takes about 1-2 hours. Preparation before the test will take another 1-2 hours. How Much Will It Hurt? Although the procedure is generally not painful, it can cause some discomfort, including:   Burning sensation (when skin at catheter insertion site is anesthetized)   Pressure when catheter is inserted or replaced with other catheters   A flushing feeling or nausea when the dye is injected   Headache   Heart palpitations   Pain medicine will be given when needed. Average Hospital Stay:  0-1 days     Postoperative Care At the [facility]   EKG and blood studies may be done. You will likely need to lie still and flat on your back for a period of time. A pressure dressing may be placed over the area where the catheter was inserted to help prevent bleeding. It is important to follow the nurse's directions. At Home   When you return home, do the following to help ensure a smooth recovery:   Do not drive until your doctor says it is okay. Do not lift heavy objects or engage in strenuous exercise or sexual activity for at least 5-7 days. Change the dressing around the incision area as instructed. Your doctor will explain to you which medicines you can take and which ones to avoid. Take medicines as instructed. Ice may help decrease discomfort at the insertion site. You may apply the ice for 15-20 minutes each hour, for the first few days. To lower your risk for further complications of heart disease, you can make lifestyle changes. These include eating a healthier diet, exercising regularly, and managing stress.    Ask your doctor about when it is safe to shower, bathe, or soak in water. Be sure to follow your doctor's instructions . After arriving home, contact your doctor if any of the following occurs:   Signs of infection, including fever and chills   Extreme sweating, nausea, or vomiting   Change in sensation to affected leg, including numbness, feeling cold, or change in color   Redness, swelling, increasing pain, excessive bleeding, or discharge at point of catheter insertion   Cough, shortness of breath, or difficulty breathing   Extreme pain   Chest pain   Drooping facial muscles   Changes in vision or speech   Difficulty walking or using your limbs     In case of an emergency, Call 911.

## 2020-07-14 NOTE — PROGRESS NOTES
[facility], [place]. [address], [place] [address] [address] [facility]  [phone] office  [phone] fax      OFFICE VISIT:  2020    Cherelle Martinez - : 1945  Reason For Visit:  Elida Bingham is a 76 y.o. female who is here for Follow-up (Patient complains of SOA, no energy, low BP) and Coronary Artery Disease    History:   Diagnosis Orders   1. Coronary artery disease involving native coronary artery of native heart without angina pectoris     2. Diastolic dysfunction     3. PAF (paroxysmal atrial fibrillation) ([facility] [address]     4. Status post Maze operation for atrial fibrillation     5. S/P mitral valve replacement     6. S/P coronary artery bypass graft x 1     7. Essential hypertension     8. Mixed hyperlipidemia     9. History of coronary artery stent placement       The patient presents today for cardiology follow up. Elida Bingham reports just not feeling well. She is very fatigued and seems to be more short of breath since April. She reports some issues with low BP in April. Subsequently, her PCP made BP adjustments - stopped amlodipine and Lisinopril. BP at home recently 90/40 and 110/60. This am was 112/72. She has not experienced angina. The patient reports feeling somewhat like she did prior to cath with stents by Dr. Miguel Palma on 11/15/19. Prior to April, the patient reports \"I was feeling really good and was able to be outside in my garden and now I just feel really tired and short of breath. \"   The patient's PCP monitors cholesterol. Maximiliano Henson denies exertional chest pain, orthopnea, paroxysmal nocturnal dyspnea, syncope, presyncope, sensed arrhythmia and edema. The patient denies numbness or weakness to suggest cerebrovascular accident or transient ischemic attack.  +  Fatigue with decreased activity tolerance.  + increasing JONES.     Cherelle Martinez has the following history as recorded in [facility]:  Patient Active vascular disease) ([facility] [address] I73.9    Wound of sacral region S31.000A    Elevated TSH R79.89    Bilateral carotid artery stenosis I65.23    Obstruction of left carotid artery I65.22    Bradycardia R00.1    Sepsis with organ dysfunction ([facility]) A41.9, R65.20    NSTEMI (non-ST elevated myocardial infarction) ([facility]) I21.4    HCAP (healthcare-associated pneumonia) J18.9    Acute renal failure (ARF) ([facility]) N17.9    Syncope and collapse R55    Essential hypertension I10    Mild aortic stenosis I35.0    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    Carotid aneurysm, right ([facility]) I72.0    Postoperative anemia due to acute blood loss D62    Status post Maze operation for atrial fibrillation Z98.890, Z86.79    PAF (paroxysmal atrial fibrillation) ([facility]) I48.0    S/P coronary artery bypass graft x 1 Z95.1    S/P mitral valve replacement Z95.2    Chest pain R07.9    JONES (dyspnea on exertion) R06.09    Fatigue R53.83    Pain in both lower extremities M79.604, M79.605    History of coronary artery stent placement Z95.5    Myalgia M79.10     Past Medical History:   Diagnosis Date    Aortic stenosis     Arthritis     Atherosclerosis of native arteries of the extremities with intermittent claudication 7/25/2011    Carotid aneurysm, right ([facility])     Carotid artery occlusion     CHF (congestive heart failure) ([facility])     COPD (chronic obstructive pulmonary disease) ([facility])     History of blood transfusion 2016    Post op, was taking blood thinner    Hyperlipidemia     Hypertension     MI (myocardial infarction) ([facility] [address] 2009    x2    Mitral valve stenosis     Pneumonia     Post-menopausal     PUD (peptic ulcer disease) 2009    Renal failure 2009    after ulcer perforation sepsis.     Thyroid disease     Wound infection after surgery     right foot infection after cabg     Past Surgical History:   Procedure Laterality Date    ABDOMEN SURGERY  2009    perforated ulcer resection of 1/3 stomach    CARDIAC SURGERY      artificial valve and bypass    CAROTID ENDARTERECTOMY      Right  with Dacron patch angioplasty    CAROTID ENDARTERECTOMY Left     CAROTID ENDARTERECTOMY Right 2019    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 AND REPAIR WITH REVERSED LEFT GREATER SAPHENOUS VEIN INTERPOSITIONAL BYPASS GRAFT performed by Vicenta Aly MD at [address] [facility] Right early 's    long ago    CATARACT REMOVAL WITH IMPLANT Bilateral      SECTION  1972    COLONOSCOPY      CORONARY ARTERY BYPASS GRAFT  2016    DILATION AND CURETTAGE OF UTERUS      ILIO-FEMORAL BYPASS GRAFT N/A 2016    OPEN TRANSLUMINAL BALLOON ANGIOPLASTY AND STENTING OF RIGHT COMMON AND EXTERNAL  ILIAC ARTERIES; RIGHT FEMORAL ENDARTERECTOMY WITH VEIN PATCH ANGIOPLASTY performed by Vicenta Aly MD at [address] N/A 2016    MITRAL VALVE  REPLACEMENT LUONG-MAZE ABLATION WITH CRYO PROCEDURE, CORONARY ARTERY BYPASS GRAFT X 1 WITH ENDOSCOPIC VEIN HARVESTING WITH PERFUSION TRANSESOPHAGEAL ECHOCARDIOGRAM performed by Marc Griffin MD at [facility] MITRAL VALVE REPLACEMENT  2016    NC REOPER, CAROTID ENDARTEC>1 MON Left 2018    REMOVAL OF HEMATOMA, LEFT CAROTID ARTERY performed by Vicenta Aly MD at [address] Left 2018    LEFT CAROTID ENDARTERECTOMY WITH EEG MONITORING AND COMPLETION DUPLEX ULTRASOUND performed by Vicenta Aly MD at [facility] PTCA      SKIN GRAFT Right 2016    Skin graft split thickness foot,ankle,and leg. Right leg 26x8cm and 12x6cm total area 280cm squared. TJR    UPPER GASTROINTESTINAL ENDOSCOPY  3/15/16    Dr Catalina Crowe  3/15/2016    EGD BIOPSY performed by Yanet Harris DO at [address] [facility]    VASCULAR SURGERY  16 TJR    Aortagram and right leg runoff,right leg runoff,right common iliac artery selection for right leg run off views.     VASCULAR SURGERY .  Open transluminal angioplasty and stenting of the external iliac artery. TJR    VASCULAR SURGERY  2019    TJR. 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      Family History   Problem Relation Age of Onset    Diabetes Mother     Heart Disease Mother     Heart Failure Mother     Diabetes Sister     Heart Disease Sister     High Blood Pressure Sister      Social History     Tobacco Use    Smoking status: Former Smoker     Years: 2.00     Types: Cigarettes     Last attempt to quit: 1980     Years since quittin.5    Smokeless tobacco: Never Used   Substance Use Topics    Alcohol use: Yes     Comment: rarely maybe twice a year      Current Outpatient Medications   Medication Sig Dispense Refill    levothyroxine (SYNTHROID) 25 MCG tablet Take 25 mcg by mouth Daily       Fexofenadine HCl (MUCINEX ALLERGY PO) Take by mouth      OXYGEN Inhale into the lungs daily      levalbuterol (XOPENEX) 1.25 MG/3ML nebulizer solution Inhale 3 mLs into the lungs      clopidogrel (PLAVIX) 75 MG tablet Take 1 tablet by mouth daily 90 tablet 3    furosemide (LASIX) 40 MG tablet Take 1 tablet by mouth daily 90 tablet 3    aspirin EC 81 MG EC tablet Take 1 tablet by mouth daily 30 tablet 3    isosorbide mononitrate (IMDUR) 30 MG extended release tablet Take 1 tablet by mouth nightly 90 tablet 3    nitroGLYCERIN (NITROSTAT) 0.4 MG SL tablet Place 1 tablet under the tongue every 5 minutes as needed for Chest pain 25 tablet 3    atorvastatin (LIPITOR) 40 MG tablet Take 1 tablet by mouth daily 90 tablet 3    metoprolol (LOPRESSOR) 100 MG tablet Take 1 tablet by mouth 2 times daily 180 tablet 3    Dextromethorphan-guaiFENesin (MUCINEX DM MAXIMUM STRENGTH)  MG TB12 Take 1,200 mg by mouth 2 times daily 28 tablet 3  calcium-cholecalciferol (CALCIUM 500+D) 500-200 MG-UNIT per tablet Take 2 tablets by mouth daily       Biotin 5000 MCG TABS Take 1 tablet by mouth daily       Arformoterol Tartrate (BROVANA) 15 MCG/2ML NEBU Inhale 15 mcg into the lungs       No current facility-administered medications for this visit. Allergies: Levaquin [levofloxacin in d5w]; Xarelto [rivaroxaban]; and Morphine    Review of Systems  Constitutional - no appetite change, or unexpected weight change. No fever, chills or diaphoresis. + fatigue with decreased activity tolerance. HEENT - no significant rhinorrhea or epistaxis. No tinnitus or significant hearing loss. Eyes - no sudden vision change or amaurosis. No corneal arcus, xantholasma, subconjunctival hemorrhage or discharge. Respiratory - no significant wheezing, stridor, apnea or cough. + increasing JONES since April. Cardiovascular - no exertional chest pain to suggest myocardial ischemia. No orthopnea or PND. No sensation of sustained arrythmia. No occurrence of slow heart rate. No palpitations. No claudication. Gastrointestinal - no abdominal swelling or pain. No blood in stool. No severe constipation, diarrhea, nausea, or vomiting. Genitourinary - no dysuria, frequency, or urgency. No flank pain or hematuria. Musculoskeletal - no back pain or myalgia. No problems with gait. Extremities - no clubbing, cyanosis or extremity edema. Skin - no color change or rash. No pallor. No new surgical incision. Neurologic - no speech difficulty, facial asymmetry or lateralizing weakness. No seizures, presyncope or syncope. No significant dizziness. Hematologic - no easy bruising or excessive bleeding. Psychiatric - no severe anxiety or insomnia. No confusion. All other review of systems are negative.     Objective  Vital Signs - BP (!) 172/80   Pulse 72   Ht 5' 1\" (1.549 m)   Wt 90 lb (40.8 kg)   BMI 17.01 kg/m²   General - Ariella Lozoya is alert, cooperative, and pleasant. Well groomed. No acute distress. Body habitus - Body mass index is 17.01 kg/m². HEENT - Head is normocephalic. No circumoral cyanosis. Dentition is normal.  EYES -   Lids normal without ptosis. No discharge, edema or subconjunctival hemorrhage. Neck - Symmetrical without apparent mass or lymphadenopathy. Respiratory - Normal respiratory effort without use of accessory muscles. Ausculatation reveals vesicular breath sounds without crackles, wheezes, rub or rhonchi. Cardiovascular - No jugular venous distention. Auscultation reveals regular rate and rhythm. No audible clicks, gallop or rub. No murmur. No lower extremity varicosities. No carotid bruits. Abdominal -  No visible distention, mass or pulsations. Extremities - No clubbing or cyanosis. No statis dermatitis or ulcers. No edema. Musculoskeletal -   No Osler's nodes. No kyphosis or scoliosis. Gait is even and regular without limp or shuffle. Ambulates without assistance. Skin -  Warm and dry; no rash or pallor. No new surgical wound. Neurological - No focal neurological deficits. Thought processes coherent. No apparent tremor. Oriented to person, place and time. Psychiatric -  Appropriate affect and mood. Assessment:     Diagnosis Orders   1. Coronary artery disease involving native coronary artery of native heart without angina pectoris     2. Diastolic dysfunction     3. PAF (paroxysmal atrial fibrillation) ([facility] [address]     4. Status post Maze operation for atrial fibrillation     5. S/P mitral valve replacement     6. S/P coronary artery bypass graft x 1     7. Essential hypertension  EKG 12 lead   8. Mixed hyperlipidemia     9. History of coronary artery stent placement     10. Nonspecific ST-T wave electrocardiographic changes     11. SOB (shortness of breath)     12. Fatigue, unspecified type       Data reviewed:  11/15/19 cath  Conclusions    Double vessel disease involving RCA and circumflex.    Severely stenotic SVG to distal RCA. Hyperdynamic left ventricle with severe left ventricular hypertrophy   consistent with hypertensive heart disease. In view of severe disease in graft to RCA, decision to treat native RCA as   opposed to SVG graft. Successful PCI to the ostial to proximal RCA (3.0 x 15 mm resolute   integrity) and proximal to mid RCA rinses 3.0 x 18 mm resolute integrity)   utilizing drug-eluting stents. Successful PCI to proximal circumflex (2.5 x 14 mm resolute integrity)   utilizing drug-eluting stent. Recommendations    Medical management. Signatures    ----------------------------------------------------------------   Electronically signed by Phyllis Holly MD(Performing Physician) on   11/15/2019 17:43   ----------------------------------------------------------------    Lab Results   Component Value Date    WBC 9.6 11/27/2019    HGB 11.6 (L) 11/27/2019    HCT 40.0 11/27/2019    MCV 79.8 (L) 11/27/2019     (H) 11/27/2019     Lab Results   Component Value Date     11/27/2019    K 4.7 11/27/2019    CL 96 (L) 11/27/2019    CO2 30 (H) 11/27/2019    BUN 39 (H) 11/27/2019    CREATININE 1.6 (H) 11/27/2019    GLUCOSE 65 (L) 11/27/2019    CALCIUM 11.0 (H) 11/27/2019    PROT 6.9 11/15/2019    LABALBU 3.8 11/15/2019    BILITOT 0.6 11/15/2019    ALKPHOS 83 11/15/2019    AST 33 (H) 11/15/2019    ALT 13 11/15/2019    LABGLOM 31 (A) 11/27/2019    GLOB 4.6 08/29/2016       Lab Results   Component Value Date    CHOL 125 (L) 03/15/2016     Lab Results   Component Value Date    TRIG 123 (L) 03/15/2016     Lab Results   Component Value Date    HDL 11 (L) 03/15/2016     Lab Results   Component Value Date    LDLCALC 89 03/15/2016     EKG reviewed:  NSR 80 BPM; ST elevation V4-5; mild ST depression V5 and V6. No ectopy. Changes from previous EKG noted. Dr. Hugh Stoddard reviewed EKG today and patient symptoms. He reviewed previous cath films as well.     CAD with fatigue and increasing SOA - Dr. Hugh Stoddard reviewed EKG and previous calth films. L, C and G recommended. Patient agreeable to proceed. Patient will present for COVID 19 testing and quarantine until cath as instucted. Date/Time: 7/30/20 at 10 am with an arrival of 8 am. You will need to be COVID tested on 7/27/20 no later than 11 am at our [facility]) Urgent care. MVR - 11/15/19 echo:    Bioprosthetic mitral valve replacement with good leaflet mobility. Increased pressure gradients across valve with a max gradient of 29 mmHg  and a mean gradient of 11 mmHg. MVA by PHT is calculated to be 2.6 cm2. Mild mitral regurgitation is seen with color flow mapping. PAF - no recurrent AF. HTN - normotensive on current regimen. Hyperlipidemia - LDL 89 on Lipitor 10 mg daily. DD - no history of diastolic heart failure. Patient is compliant with medication regimen. BP Readings from Last 3 Encounters:   07/14/20 (!) 172/80   01/29/20 (!) 90/50   11/27/19 136/60    Pulse Readings from Last 3 Encounters:   07/14/20 72   01/29/20 76   11/27/19 88        Wt Readings from Last 3 Encounters:   07/14/20 90 lb (40.8 kg)   01/29/20 93 lb (42.2 kg)   11/27/19 96 lb (43.5 kg)     Plan  Previous cardiac history and records reviewed. Continue current medical management. Dr. Yaz Marcos consulted. Upon review of current EKG with changes and review of previous cath films, L, C and G recommended. Patient opted to proceed. Cardiac Catheterization Instructions     Date/Time: 7/30/20 at 10 am with an arrival of 8 am. You will need to be COVID tested on 7/27/20 no later than 11 am at our [facility]) Urgent care. Continue other current medications as directed. Continue to follow up with primary care provider for non cardiac medical problems. Call the office with any problems, questions or concerns at [phone]. Cardiology follow up: as scheduled after cath. Educational included in patient instructions. Cardiac catheterization. NTG sl.      Jacquie Sanderson, APRN

## 2020-07-15 PROBLEM — R06.02 SOB (SHORTNESS OF BREATH): Status: ACTIVE | Noted: 2019-09-25

## 2020-07-17 ENCOUNTER — TELEPHONE (OUTPATIENT)
Dept: CARDIOLOGY | Age: 75
End: 2020-07-17

## 2020-07-17 NOTE — TELEPHONE ENCOUNTER
I called and spoke with the pts daughter and let her know that she will need to go get her covid testing done on the 26th before 11am in order to have her procedure on the 30th, pt verbalized understanding. SM

## 2020-07-26 ENCOUNTER — OFFICE VISIT (OUTPATIENT)
Age: 75
End: 2020-07-26

## 2020-07-26 VITALS — OXYGEN SATURATION: 96 % | TEMPERATURE: 99.9 F | HEART RATE: 108 BPM

## 2020-07-27 ENCOUNTER — TELEPHONE (OUTPATIENT)
Dept: CARDIOLOGY | Age: 75
End: 2020-07-27

## 2020-07-27 RX ORDER — METOPROLOL TARTRATE 100 MG/1
50 TABLET ORAL 2 TIMES DAILY
COMMUNITY
End: 2020-09-22

## 2020-07-27 NOTE — TELEPHONE ENCOUNTER
Patient called to report BP readings:    7/14 /62 /54  7/16 /65 PM 87/32  7/17 /53 PM 98/48  7/18 /73 /50  7/19 /54 /50  7/20 /69 PM 95/40  7/21 /90 PM 89/32  7/22 /48 PM 94/32  7/24 /67 /44  7/25 /44 PM 98/40  7/26 /50 /48  7/27 /80    She states she doesn't have any energy and gets kind of dizzy with PM readings.  She takes metoprolol 100 mg BID

## 2020-07-28 LAB
REPORT: NORMAL
SARS-COV-2: NOT DETECTED
THIS TEST SENT TO: NORMAL

## 2020-07-30 ENCOUNTER — APPOINTMENT (OUTPATIENT)
Dept: ULTRASOUND IMAGING | Age: 75
End: 2020-07-30
Attending: INTERNAL MEDICINE
Payer: MEDICARE

## 2020-07-30 ENCOUNTER — HOSPITAL ENCOUNTER (OUTPATIENT)
Dept: CARDIAC CATH/INVASIVE PROCEDURES | Age: 75
Discharge: HOME | End: 2020-07-30
Attending: INTERNAL MEDICINE | Admitting: INTERNAL MEDICINE
Payer: MEDICARE

## 2020-07-30 VITALS
HEIGHT: 63 IN | OXYGEN SATURATION: 98 % | TEMPERATURE: 97.9 F | HEART RATE: 84 BPM | BODY MASS INDEX: 15.59 KG/M2 | WEIGHT: 88 LBS | RESPIRATION RATE: 28 BRPM | DIASTOLIC BLOOD PRESSURE: 66 MMHG | SYSTOLIC BLOOD PRESSURE: 172 MMHG

## 2020-07-30 LAB
ALBUMIN SERPL-MCNC: 4.4 G/DL (ref 3.5–5.2)
ALP BLD-CCNC: 102 U/L (ref 35–104)
ALT SERPL-CCNC: 11 U/L (ref 5–33)
ANION GAP SERPL CALCULATED.3IONS-SCNC: 12 MMOL/L (ref 7–19)
AST SERPL-CCNC: 23 U/L (ref 5–32)
BILIRUB SERPL-MCNC: 0.6 MG/DL (ref 0.2–1.2)
BUN BLDV-MCNC: 28 MG/DL (ref 8–23)
CALCIUM SERPL-MCNC: 10.6 MG/DL (ref 8.8–10.2)
CHLORIDE BLD-SCNC: 94 MMOL/L (ref 98–111)
CHOLESTEROL, TOTAL: 169 MG/DL (ref 160–199)
CO2: 35 MMOL/L (ref 22–29)
CREAT SERPL-MCNC: 1.2 MG/DL (ref 0.5–0.9)
GFR AFRICAN AMERICAN: 53
GFR NON-AFRICAN AMERICAN: 44
GLUCOSE BLD-MCNC: 111 MG/DL (ref 74–109)
HCT VFR BLD CALC: 35 % (ref 37–47)
HDLC SERPL-MCNC: 82 MG/DL (ref 65–121)
HEMOGLOBIN: 10.2 G/DL (ref 12–16)
LDL CHOLESTEROL CALCULATED: 76 MG/DL
LV EF: 70 %
LVEF MODALITY: NORMAL
MCH RBC QN AUTO: 24.2 PG (ref 27–31)
MCHC RBC AUTO-ENTMCNC: 29.1 G/DL (ref 33–37)
MCV RBC AUTO: 83.1 FL (ref 81–99)
PDW BLD-RTO: 18.3 % (ref 11.5–14.5)
PLATELET # BLD: 269 K/UL (ref 130–400)
PMV BLD AUTO: 9.5 FL (ref 9.4–12.3)
POTASSIUM SERPL-SCNC: 3.6 MMOL/L (ref 3.5–5)
RBC # BLD: 4.21 M/UL (ref 4.2–5.4)
SODIUM BLD-SCNC: 141 MMOL/L (ref 136–145)
TOTAL PROTEIN: 8 G/DL (ref 6.6–8.7)
TRIGL SERPL-MCNC: 55 MG/DL (ref 0–149)
WBC # BLD: 9.9 K/UL (ref 4.8–10.8)

## 2020-07-30 PROCEDURE — C1760 CLOSURE DEV, VASC: HCPCS

## 2020-07-30 PROCEDURE — 36415 COLL VENOUS BLD VENIPUNCTURE: CPT

## 2020-07-30 PROCEDURE — 36251 INS CATH REN ART 1ST UNILAT: CPT | Performed by: INTERNAL MEDICINE

## 2020-07-30 PROCEDURE — C1769 GUIDE WIRE: HCPCS

## 2020-07-30 PROCEDURE — C1887 CATHETER, GUIDING: HCPCS

## 2020-07-30 PROCEDURE — 80061 LIPID PANEL: CPT

## 2020-07-30 PROCEDURE — 6360000004 HC RX CONTRAST MEDICATION: Performed by: INTERNAL MEDICINE

## 2020-07-30 PROCEDURE — 93975 VASCULAR STUDY: CPT

## 2020-07-30 PROCEDURE — 93459 L HRT ART/GRFT ANGIO: CPT

## 2020-07-30 PROCEDURE — 92928 PRQ TCAT PLMT NTRAC ST 1 LES: CPT | Performed by: INTERNAL MEDICINE

## 2020-07-30 PROCEDURE — 37236 OPEN/PERQ PLACE STENT 1ST: CPT | Performed by: INTERNAL MEDICINE

## 2020-07-30 PROCEDURE — C1874 STENT, COATED/COV W/DEL SYS: HCPCS

## 2020-07-30 PROCEDURE — 36251 INS CATH REN ART 1ST UNILAT: CPT

## 2020-07-30 PROCEDURE — 2580000003 HC RX 258: Performed by: INTERNAL MEDICINE

## 2020-07-30 PROCEDURE — 99152 MOD SED SAME PHYS/QHP 5/>YRS: CPT | Performed by: INTERNAL MEDICINE

## 2020-07-30 PROCEDURE — 99153 MOD SED SAME PHYS/QHP EA: CPT

## 2020-07-30 PROCEDURE — C1894 INTRO/SHEATH, NON-LASER: HCPCS

## 2020-07-30 PROCEDURE — 37236 OPEN/PERQ PLACE STENT 1ST: CPT

## 2020-07-30 PROCEDURE — 85027 COMPLETE CBC AUTOMATED: CPT

## 2020-07-30 PROCEDURE — C1725 CATH, TRANSLUMIN NON-LASER: HCPCS

## 2020-07-30 PROCEDURE — 6370000000 HC RX 637 (ALT 250 FOR IP)

## 2020-07-30 PROCEDURE — 93459 L HRT ART/GRFT ANGIO: CPT | Performed by: INTERNAL MEDICINE

## 2020-07-30 PROCEDURE — 92928 PRQ TCAT PLMT NTRAC ST 1 LES: CPT

## 2020-07-30 PROCEDURE — 2709999900 HC NON-CHARGEABLE SUPPLY

## 2020-07-30 PROCEDURE — 99152 MOD SED SAME PHYS/QHP 5/>YRS: CPT

## 2020-07-30 PROCEDURE — 6360000002 HC RX W HCPCS

## 2020-07-30 PROCEDURE — 6370000000 HC RX 637 (ALT 250 FOR IP): Performed by: INTERNAL MEDICINE

## 2020-07-30 PROCEDURE — 2500000003 HC RX 250 WO HCPCS: Performed by: INTERNAL MEDICINE

## 2020-07-30 PROCEDURE — 2500000003 HC RX 250 WO HCPCS

## 2020-07-30 PROCEDURE — 80053 COMPREHEN METABOLIC PANEL: CPT

## 2020-07-30 RX ORDER — ASPIRIN 325 MG
325 TABLET ORAL ONCE
Status: COMPLETED | OUTPATIENT
Start: 2020-07-30 | End: 2020-07-30

## 2020-07-30 RX ORDER — NITROGLYCERIN 0.4 MG/1
0.4 TABLET SUBLINGUAL EVERY 5 MIN PRN
Status: DISCONTINUED | OUTPATIENT
Start: 2020-07-30 | End: 2020-07-30 | Stop reason: HOSPADM

## 2020-07-30 RX ORDER — SODIUM CHLORIDE 0.9 % (FLUSH) 0.9 %
10 SYRINGE (ML) INJECTION EVERY 12 HOURS SCHEDULED
Status: DISCONTINUED | OUTPATIENT
Start: 2020-07-30 | End: 2020-07-30 | Stop reason: HOSPADM

## 2020-07-30 RX ORDER — SODIUM CHLORIDE 9 MG/ML
INJECTION, SOLUTION INTRAVENOUS CONTINUOUS
Status: DISCONTINUED | OUTPATIENT
Start: 2020-07-30 | End: 2020-07-30 | Stop reason: HOSPADM

## 2020-07-30 RX ORDER — IODIXANOL 320 MG/ML
200 INJECTION, SOLUTION INTRAVASCULAR
Status: COMPLETED | OUTPATIENT
Start: 2020-07-30 | End: 2020-07-30

## 2020-07-30 RX ORDER — ATROPINE SULFATE 0.4 MG/ML
0.5 AMPUL (ML) INJECTION
Status: DISCONTINUED | OUTPATIENT
Start: 2020-07-30 | End: 2020-07-30 | Stop reason: HOSPADM

## 2020-07-30 RX ORDER — HYDROCODONE BITARTRATE AND ACETAMINOPHEN 5; 325 MG/1; MG/1
1 TABLET ORAL EVERY 4 HOURS PRN
Status: DISCONTINUED | OUTPATIENT
Start: 2020-07-30 | End: 2020-07-30 | Stop reason: HOSPADM

## 2020-07-30 RX ORDER — ACETAMINOPHEN 325 MG/1
650 TABLET ORAL EVERY 4 HOURS PRN
Status: DISCONTINUED | OUTPATIENT
Start: 2020-07-30 | End: 2020-07-30 | Stop reason: HOSPADM

## 2020-07-30 RX ORDER — HYDROCODONE BITARTRATE AND ACETAMINOPHEN 5; 325 MG/1; MG/1
2 TABLET ORAL EVERY 4 HOURS PRN
Status: DISCONTINUED | OUTPATIENT
Start: 2020-07-30 | End: 2020-07-30 | Stop reason: HOSPADM

## 2020-07-30 RX ORDER — ONDANSETRON 2 MG/ML
4 INJECTION INTRAMUSCULAR; INTRAVENOUS EVERY 6 HOURS PRN
Status: DISCONTINUED | OUTPATIENT
Start: 2020-07-30 | End: 2020-07-30 | Stop reason: HOSPADM

## 2020-07-30 RX ORDER — SODIUM CHLORIDE 0.9 % (FLUSH) 0.9 %
10 SYRINGE (ML) INJECTION PRN
Status: DISCONTINUED | OUTPATIENT
Start: 2020-07-30 | End: 2020-07-30 | Stop reason: HOSPADM

## 2020-07-30 RX ORDER — LIDOCAINE HYDROCHLORIDE AND EPINEPHRINE 10; 10 MG/ML; UG/ML
20 INJECTION, SOLUTION INFILTRATION; PERINEURAL ONCE
Status: DISCONTINUED | OUTPATIENT
Start: 2020-07-30 | End: 2020-07-30 | Stop reason: HOSPADM

## 2020-07-30 RX ORDER — FENTANYL CITRATE 50 UG/ML
25 INJECTION, SOLUTION INTRAMUSCULAR; INTRAVENOUS
Status: DISCONTINUED | OUTPATIENT
Start: 2020-07-30 | End: 2020-07-30 | Stop reason: HOSPADM

## 2020-07-30 RX ADMIN — IODIXANOL 170 ML: 320 INJECTION, SOLUTION INTRAVASCULAR at 11:00

## 2020-07-30 RX ADMIN — SODIUM BICARBONATE: 84 INJECTION, SOLUTION INTRAVENOUS at 11:30

## 2020-07-30 RX ADMIN — SODIUM CHLORIDE: 9 INJECTION, SOLUTION INTRAVENOUS at 08:41

## 2020-07-30 RX ADMIN — Medication 6 ML: at 17:00

## 2020-07-30 RX ADMIN — ASPIRIN 325 MG: 325 TABLET, FILM COATED ORAL at 08:44

## 2020-07-30 ASSESSMENT — ENCOUNTER SYMPTOMS
WHEEZING: 0
COUGH: 0
BACK PAIN: 0
BLOOD IN STOOL: 0
EYE DISCHARGE: 0
ABDOMINAL DISTENTION: 0
VOMITING: 0
SHORTNESS OF BREATH: 1
CONSTIPATION: 0
DIARRHEA: 0

## 2020-07-30 NOTE — PROGRESS NOTES
RT GROIN DRESSING CHANGED. SNOW APPLIED WITH MANUAL PRESSURE X 3 MIN. GUAZE DRESSING, OPSITE, & PRESSURE TAPE APPLIED.

## 2020-07-30 NOTE — PROGRESS NOTES
PT OOB WITH RN @ SIDE. PT AMBULATED APPROX. 50 FT WITHOUT 02. PT VERY DYSPNEIC WITH AMBULATION. 02 CHECKED IMMEDIATELY UPON RETURN TO ROOM. 02 SAT 79%. CHECKED WITH FINGER PROBE. GOOD WAVEFORM NOTED. 02 CONTINUED AT 2.5 LT VIA NC. SAT INCREASED TO 90 & AFTER 2-3 MINUTES. PT NOW SITTING UPRIGHT AT SIDE OF BED. SLIGHT INCREASE IN BLEEDING NOTED ON DRESSING.

## 2020-07-30 NOTE — PROGRESS NOTES
Patient noted to be severely hypertensive. Post coronary intervention decision to proceed with renal angiography with known CK D to evaluate for renal artery stenosis triggering hypertension. Renal angiography revealed severe left renal artery stenosis with absence of the right renal artery and no dye filling of right kidney. Findings consistent with solitary kidney with severe renal artery stenosis with probable trigger for severe hypertension and diastolic heart failure. Spoke with patient on table and discussed findings and possible intervention options. Risks, benefits, alternatives of renal intervention discussed with patient and she is fully agreeable to proceed with endovascular intervention. Will obtain renal artery duplex.

## 2020-07-30 NOTE — H&P
.  Patient:  Jordi Mckeon                  1945  MRN: [id number]    PROBLEM LIST:    Patient Active Problem List    Diagnosis Date Noted    Bradycardia      Priority: High    Acute superficial venous thrombosis of right lower extremity 04/25/2016     Priority: High     Overview Note:     With large RLE bulla lateral foot skin violaceous discoloration, acutely POA (venous backpressure)      Mitral valve stenosis 03/29/2016     Priority: High    Mitral valve insufficiency 03/29/2016     Priority: High    PUD (peptic ulcer disease) 03/29/2016     Priority: Medium    History of coronary artery stent placement 01/29/2020     Priority: Low     Overview Note:     11/15/ 2 stents to RCA and 1 to circumflex - Dr. Dameon Kim Myalgia 01/29/2020     Priority: Low    Pain in both lower extremities 10/30/2019     Priority: Low    Status post Maze operation for atrial fibrillation 09/25/2019     Priority: Low     Overview Note:     4/18/16      PAF (paroxysmal atrial fibrillation) (HCC) 09/25/2019     Priority: Low    S/P coronary artery bypass graft x 1 09/25/2019     Priority: Low     Overview Note:     4/8/16 SVT to RCA      S/P mitral valve replacement 09/25/2019     Priority: Low     Overview Note:     4/8/16 MVR 23 mm mosaic porcine by Dr. Cherylene Rocker Chest pain 09/25/2019     Priority: Low    SOB (shortness of breath) 09/25/2019     Priority: Low    Fatigue 09/25/2019     Priority: Low    Postoperative anemia due to acute blood loss 07/12/2019     Priority: Low    Carotid aneurysm, right ([facility] [address] 07/11/2019     Priority: Low    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[facility] [address] 05/21/2019     Priority: Low    Mild aortic stenosis 02/20/2018     Priority: Low    Essential hypertension      Priority: Low    NSTEMI (non-ST elevated myocardial infarction) ([address] 01/14/2018     Priority: Low    HCAP (healthcare-associated pneumonia) 01/14/2018     Priority: Low    Acute renal failure (ARF) ([address] 01/14/2018 Priority: Low    Syncope and collapse      Priority: Low    Sepsis with organ dysfunction ([facility] [address] 01/13/2018     Priority: Low    Obstruction of left carotid artery 01/11/2018     Priority: Low    Bilateral carotid artery stenosis 11/19/2017     Priority: Low    Wound of sacral region 06/16/2016     Priority: Low    Elevated TSH 06/16/2016     Priority: Low    CHF (congestive heart failure) ([address] 06/15/2016     Priority: Low    CKD (chronic kidney disease), stage III ([address] 06/15/2016     Priority: Low    Pulmonary emphysema ([address] 06/15/2016     Priority: Low    COPD without exacerbation (HCC)      Priority: Low    PVD (peripheral vascular disease) ([facility]      Priority: Low    Atherosclerosis of native artery of right lower extremity with ulceration of ankle ([facility] [address] 06/05/2016     Priority: Low    Atherosclerosis of native arteries of right leg with ulceration of other part of lower right leg 06/05/2016     Priority: Low     Overview Note:     Replacing Inactive Diagnoses      Atherosclerosis of native arteries of right leg with ulceration of other part of foot 06/05/2016     Priority: Low     Overview Note:     Replacing Inactive Diagnoses      Nonhealing ulcer of right lower leg with fat layer exposed ([facility] [address] 06/05/2016     Priority: Low    Non-pressure chronic ulcer of right ankle with fat layer exposed ([facility] [address] 06/05/2016     Priority: Low    Neuropathic ulcer of right foot with fat layer exposed ([facility] [address] 06/05/2016     Priority: Low    Hypervolemia      Priority: Low    Nonhealing nonsurgical wound with fat layer exposed 06/03/2016     Priority: Low    Atherosclerosis of native arteries of left leg with ulceration of calf (HCC)      Priority: Low    Severe malnutrition (HCC)      Priority: Low    Diastolic dysfunction      Priority: Low    Anemia in chronic kidney disease (CKD)      Priority: Low    Non-pressure chronic ulcer of right calf with fat layer exposed ([facility] [address] 05/27/2016     Priority: Low    Decubitus ulcer of right buttock, stage 3 ([facility]) 05/27/2016     Priority: Low    Nocturnal hypoxia 03/30/2016     Priority: Low     Overview Note:     With associated mitral stenosis / regurgitation and pulmonary hypertension      Pulmonary hypertension 03/29/2016     Priority: Low    PAD (peripheral artery disease) ([facility])      Priority: Low    Calculus of gallbladder without cholecystitis without obstruction      Priority: Low    Carotid artery stenosis 02/08/2012     Priority: Low    Atherosclerosis of native artery of extremity with intermittent claudication ([facility]) 07/25/2011     Priority: Low    Atherosclerosis of native artery of extremity with intermittent claudication ([facility] [address] 07/25/2011     Priority: Low     Overview Note:     Replacing Inactive Diagnoses      Mixed hyperlipidemia      Priority: Low    Arthritis      Priority: Low    Coronary artery disease involving native coronary artery of native heart      Priority: Low     Overview Note:     3/16/2016  Echo  Severe MS, moderate to severe MR, RVSP 73 mmHg, normal LVFX  3/31/2016  Cath  70% osteal RCA, severe MR, MVA 1.9, normal LVFX  4/7//2016   MVR (23 mm [employer] Mosaic) VG-PDARichard Silk)  5/7/2016  Echo  Normal LVFX, large pleural effusion, echolucency near preserved posterior Mitral leaflet, likely chordae, RVSP 72 mmHg         PRESENTATION: Lucius Maguire is a 76y.o. year old female who presents with worsening symptoms of shortness of breath over the last month referred for cardiac catheterization. Patient had previously been doing well post PCI to RCA and circumflex 11/15/2019. She had a severely diseased SVG to RCA after one vessel CABG performed in 2016 with bioprosthetic mitral valve replacement. She has a history of extensive peripheral arterial disease with bilateral CEA, his CK D stage III, COPD. REVIEW OF SYSTEMS:  Review of Systems   Constitutional: Negative for activity change, fatigue and fever.    HENT: Negative for ear pain, hearing loss and tinnitus. Eyes: Negative for discharge and visual disturbance. Respiratory: Positive for shortness of breath. Negative for cough and wheezing. Cardiovascular: Negative for chest pain, palpitations and leg swelling. Gastrointestinal: Negative for abdominal distention, blood in stool, constipation, diarrhea and vomiting. Endocrine: Negative for cold intolerance, heat intolerance, polydipsia and polyuria. Genitourinary: Negative for dysuria and hematuria. Musculoskeletal: Negative for arthralgias, back pain and myalgias. Skin: Negative for pallor and rash. Neurological: Negative for seizures, syncope, weakness and headaches. Psychiatric/Behavioral: Negative for behavioral problems and dysphoric mood. Past Medical History:      Diagnosis Date    Aortic stenosis     Arthritis     Atherosclerosis of native arteries of the extremities with intermittent claudication 7/25/2011    Carotid aneurysm, right (HCC)     Carotid artery occlusion     CH[facility]estive heart failure) [facility] [address]     COPD (chronic obstructive pulmonary disease) ([facility] [address]     History of blood transfusion 2016    Post op, was taking blood thinner    Hyperlipidemia     Hypertension     MI (myocardial infarction) ([facility] [address] 2009    x2    Mitral valve stenosis     Pneumonia     Post-menopausal     PUD (peptic ulcer disease) 2009    Renal failure 2009    after ulcer perforation sepsis.     Thyroid disease     Wound infection after surgery     right foot infection after cabg       Past Surgical History:      Procedure Laterality Date    ABDOMEN SURGERY  2009    perforated ulcer resection of 1/3 stomach    CARDIAC SURGERY      artificial valve and bypass    CAROTID ENDARTERECTOMY      Right  with Dacron patch angioplasty    CAROTID ENDARTERECTOMY Left     CAROTID ENDARTERECTOMY Right 7/11/2019    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 AND REPAIR WITH REVERSED LEFT GREATER SAPHENOUS VEIN INTERPOSITIONAL BYPASS GRAFT performed by Shashi Henderson MD at [address] [facility] Right early 's    long ago    CATARACT REMOVAL WITH IMPLANT Bilateral      SECTION  1972    COLONOSCOPY      CORONARY ARTERY BYPASS GRAFT      DILATION AND CURETTAGE OF UTERUS      ILIO-FEMORAL BYPASS GRAFT N/A 2016    OPEN TRANSLUMINAL BALLOON ANGIOPLASTY AND STENTING OF RIGHT COMMON AND EXTERNAL  ILIAC ARTERIES; RIGHT FEMORAL ENDARTERECTOMY WITH VEIN PATCH ANGIOPLASTY performed by Shashi Henderson MD at [address] N/A 2016    MITRAL VALVE  REPLACEMENT LUONG-MAZE ABLATION WITH CRYO PROCEDURE, CORONARY ARTERY BYPASS GRAFT X 1 WITH ENDOSCOPIC VEIN HARVESTING WITH PERFUSION TRANSESOPHAGEAL ECHOCARDIOGRAM performed by Cheryle Churn, MD at [address] [facility] MITRAL VALVE REPLACEMENT  2016    RI REOPER, CAROTID ENDARTEC>1 MON Left 2018    REMOVAL OF HEMATOMA, LEFT CAROTID ARTERY performed by Shashi Henderson MD at [address] Left 2018    LEFT CAROTID ENDARTERECTOMY WITH EEG MONITORING AND COMPLETION DUPLEX ULTRASOUND performed by Shashi Henderson MD at [address] [facility] PTCA      SKIN GRAFT Right 2016    Skin graft split thickness foot,ankle,and leg. Right leg 26x8cm and 12x6cm total area 280cm squared. TJR    UPPER GASTROINTESTINAL ENDOSCOPY  3/15/16    Dr Stephany Corrales  3/15/2016    EGD BIOPSY performed by Kathia Harris DO at [facility]    VASCULAR SURGERY  16 T[facility]    Aortagram and right leg runoff,right leg runoff,right common iliac artery selection for right leg run off views.  VASCULAR SURGERY      . Open transluminal angioplasty and stenting of the external iliac artery. TJR    VASCULAR SURGERY  2019    T[facility]. 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 Medications Prior to Admission:    Prior to Admission medications    Medication Sig Start Date End Date Taking? Authorizing Provider   metoprolol (LOPRESSOR) 100 MG tablet 100 mg Take 100 mg PO in the AM and 50 mg PO in the PM    Historical Provider, MD   levothyroxine (SYNTHROID) 25 MCG tablet Take 25 mcg by mouth Daily  11/7/19   Historical Provider, MD   Fexofenadine HCl (MUCINEX ALLERGY PO) Take by mouth    Historical Provider, MD   Arformoterol Tartrate (BROVANA) 15 MCG/2ML NEBU Inhale 15 mcg into the lungs 1/2/20   Historical Provider, MD   OXYGEN Inhale into the lungs daily    Historical Provider, MD   levalbuterol [facility]) 1.25 MG/3ML nebulizer solution Inhale 3 mLs into the lungs 4/4/19   Historical Provider, MD   clopidogrel (PLAVIX) 75 MG tablet Take 1 tablet by mouth daily 11/20/19   Shayy Joya MD   furosemide (LASIX) 40 MG tablet Take 1 tablet by mouth daily 11/21/19   Shayy Joya MD   aspirin EC 81 MG EC tablet Take 1 tablet by mouth daily 11/20/19   Shayy Joya MD   isosorbide mononitrate (IMDUR) 30 MG extended release tablet Take 1 tablet by mouth nightly 11/20/19   Shayy Joya MD   nitroGLYCERIN (NITROSTAT) 0.4 MG SL tablet Place 1 tablet under the tongue every 5 minutes as needed for Chest pain 11/20/19   Shayy Joya MD   atorvastatin (LIPITOR) 40 MG tablet Take 1 tablet by mouth daily 11/20/19   Shayy Joya MD   Dextromethorphan-guaiFENesin (MUCINEX DM MAXIMUM STRENGTH)  MG TB12 Take 1,200 mg by mouth 2 times daily 11/20/19   Shayy Joya MD   calcium-cholecalciferol (CALCIUM 500+D) 500-200 MG-UNIT per tablet Take 2 tablets by mouth daily     Historical Provider, MD   Biotin 5000 MCG TABS Take 1 tablet by mouth daily     Historical Provider, MD       Allergies:  Levaquin [levofloxacin in d5w];  Xarelto [rivaroxaban]; and Morphine    Past Social History:  Social History     Socioeconomic History    Marital status:      Spouse name: Not on file    Number of children: Not on file    Years of education: Not on file    Highest education level: Not on file   Occupational History    Not on file   Social Needs    Financial resource strain: Not on file    Food insecurity     Worry: Not on file     Inability: Not on file    Transportation needs     Medical: Not on file     Non-medical: Not on file   Tobacco Use    Smoking status: Former Smoker     Years: 2.00     Types: Cigarettes     Last attempt to quit: 1980     Years since quittin.6    Smokeless tobacco: Never Used   Substance and Sexual Activity    Alcohol use: Yes     Comment: rarely maybe twice a year    Drug use: No    Sexual activity: Not Currently   Lifestyle    Physical activity     Days per week: Not on file     Minutes per session: Not on file    Stress: Not on file   Relationships    Social connections     Talks on phone: Not on file     Gets together: Not on file     Attends Sikh service: Not on file     Active member of club or organization: Not on file     Attends meetings of clubs or organizations: Not on file     Relationship status: Not on file    Intimate partner violence     Fear of current or ex partner: Not on file     Emotionally abused: Not on file     Physically abused: Not on file     Forced sexual activity: Not on file   Other Topics Concern    Not on file   Social History Narrative    Not on file       Family History:       Problem Relation Age of Onset    Diabetes Mother     Heart Disease Mother     Heart Failure Mother     Diabetes Sister     Heart Disease Sister     High Blood Pressure Sister      Physical Exam:    Vitals: There were no vitals taken for this visit. 24HR INTAKE/OUTPUT:  No intake or output data in the 24 hours ending 20 0754    Physical Exam  Constitutional:       General: She is not in acute distress. Appearance: She is not diaphoretic.    HENT:      Mouth/Throat:      Pharynx: No oropharyngeal exudate. Eyes:      General: No scleral icterus. Right eye: No discharge. Left eye: No discharge. Neck:      Thyroid: No thyromegaly. Vascular: No JVD. Cardiovascular:      Rate and Rhythm: Normal rate and regular rhythm. No extrasystoles are present. Heart sounds: Normal heart sounds, S1 normal and S2 normal. No murmur. No systolic murmur. No diastolic murmur. No friction rub. No gallop. No S3 or S4 sounds. Pulmonary:      Effort: Pulmonary effort is normal. No respiratory distress. Breath sounds: Normal breath sounds. No wheezing or rales. Chest:      Chest wall: No tenderness. Abdominal:      General: Bowel sounds are normal. There is no distension. Palpations: Abdomen is soft. There is no mass. Tenderness: There is no abdominal tenderness. There is no guarding or rebound. Hernia: No hernia is present. Musculoskeletal: Normal range of motion. Skin:     General: Skin is warm. Coloration: Skin is not pale. Findings: No rash. Neurological:      Mental Status: She is alert and oriented to person, place, and time. Cranial Nerves: No cranial nerve deficit. Deep Tendon Reflexes: Reflexes normal.         LAB DATA:  CBC: No results for input(s): WBC, HGB, PLT in the last 72 hours. BMP:  No results for input(s): NA, K, CL, CO2, BUN, CREATININE, GLUCOSE in the last 72 hours. Hepatic: No results for input(s): AST, ALT, ALB, BILITOT, ALKPHOS in the last 72 hours. CK, CKMB, Troponin: @LABRCNT (CKTOTAL:3, CKMB:3, TROPONINI:3)@  Pro-BNP: No results for input(s): BNP in the last 72 hours. Lipids: No results for input(s): CHOL, HDL in the last 72 hours. Invalid input(s): LDL  ABGs: No results for input(s): PHART, DDW4VTA, PO2ART, KND1FFZ, BEART, HGBAE, H4JLQQPI, CARBOXHGBART, 02THERAPY in the last 72 hours. INR: No results for input(s): INR in the last 72 hours. A1c:Invalid input(s):  HEMOGLOBIN A1C  URINALYSIS:   Lab Results   Component Value Date    NITRU Negative 08/02/2016    WBCUA 1 08/02/2016    BACTERIA NEGATIVE 08/02/2016    RBCUA 1 08/02/2016    BLOODU Negative 08/02/2016    SPECGRAV 1.008 08/02/2016    GLUCOSEU Negative 08/02/2016     -----------------------------------------------------------------  IMAGING:  No orders to display         Assessment and Recommendations: This is a 76y.o. year old female with past medical history of coronary artery disease, single-vessel CABG with SVG to RCA, bioprosthetic mitral valve replacement for mitral stenosis/mitral regurgitation 2016, extensive peripheral arterial disease with bilateral CEA, CK D stage III, COPD with recent PCI 11/15/2019 to native RCA after finding severely diseased SVG to RCA as well as PCI to the proximal circumflex with symptomatic improvement now with recurrent symptoms over the last one month referred for cardiac catheterization. Risks, benefits, alternatives of cardiac catheterization/PCI discussed with the patient and full informed consent obtained.   Acceptable Mallampati score  Consent for moderate conscious sedation  ASA 3            Electronically signed by Des Chambers MD on 7/30/2020 at 7:54 AM

## 2020-07-31 ENCOUNTER — HOSPITAL ENCOUNTER (EMERGENCY)
Age: 75
Discharge: HOME | End: 2020-07-31
Attending: EMERGENCY MEDICINE
Payer: MEDICARE

## 2020-07-31 VITALS
DIASTOLIC BLOOD PRESSURE: 76 MMHG | TEMPERATURE: 97.6 F | HEIGHT: 63 IN | HEART RATE: 97 BPM | OXYGEN SATURATION: 99 % | RESPIRATION RATE: 17 BRPM | SYSTOLIC BLOOD PRESSURE: 152 MMHG | WEIGHT: 88 LBS | BODY MASS INDEX: 15.59 KG/M2

## 2020-07-31 PROCEDURE — 2500000003 HC RX 250 WO HCPCS: Performed by: EMERGENCY MEDICINE

## 2020-07-31 PROCEDURE — 99283 EMERGENCY DEPT VISIT LOW MDM: CPT

## 2020-07-31 PROCEDURE — 99999 PR OFFICE/OUTPT VISIT,PROCEDURE ONLY: CPT | Performed by: EMERGENCY MEDICINE

## 2020-07-31 RX ORDER — LIDOCAINE HYDROCHLORIDE AND EPINEPHRINE 10; 10 MG/ML; UG/ML
10 INJECTION, SOLUTION INFILTRATION; PERINEURAL ONCE
Status: COMPLETED | OUTPATIENT
Start: 2020-07-31 | End: 2020-07-31

## 2020-07-31 RX ADMIN — LIDOCAINE HYDROCHLORIDE,EPINEPHRINE BITARTRATE 10 ML: 10; .01 INJECTION, SOLUTION INFILTRATION; PERINEURAL at 02:24

## 2020-07-31 ASSESSMENT — ENCOUNTER SYMPTOMS
GASTROINTESTINAL NEGATIVE: 1
EYES NEGATIVE: 1
RESPIRATORY NEGATIVE: 1

## 2020-07-31 ASSESSMENT — PAIN SCALES - GENERAL: PAINLEVEL_OUTOF10: 3

## 2020-07-31 NOTE — ED NOTES
MD removed cath site dressing, cleaned with large chlorhexidine swab; attempted to close with dermabond.         Susu Combs RN  07/31/20 [id number]

## 2020-07-31 NOTE — ED NOTES
Pressure dressing applied by Anil Chaparro Rn. Pt tolerated without evidence of difficulty or distress at this time.       Jenifer Butt RN  07/31/20 [id number]

## 2020-07-31 NOTE — ED PROVIDER NOTES
[facility] EMERGENCY DEPT  EMERGENCY DEPARTMENT ENCOUNTER      Pt Name: Nicholas Shields  MRN: [id number]  [place] 1945  Date of evaluation: 7/31/2020  Provider: Jacklyn Carter MD    [address] [place] [address]       Chief Complaint   Patient presents with    Post-op Problem     pt bleeding at cath site to right groin         HISTORY OF PRESENT ILLNESS   (Location/Symptom, Timing/Onset,Context/Setting, Quality, Duration, Modifying Factors, Severity)  Note limiting factors. Nicholas Shields is a 76 y.o. female who presents to the emergency department with continued oozing from the right femoral arterial puncture site from a heart catheterization earlier today. Patient denies any associated pain. States it had not been bleeding throughout the day afterwards but she woke up tonight after having gone to bed feeling wet in her groin and noticed that there was blood coming from under her dressing. Patient takes aspirin and Plavix and was also given blood thinners during the catheterization today. Denies any other acute complaints. HPI    NursingNotes were reviewed. REVIEW OF SYSTEMS    (2-9 systems for level 4, 10 or more for level 5)     Review of Systems   Constitutional: Negative. HENT: Negative. Eyes: Negative. Respiratory: Negative. Cardiovascular: Negative. Gastrointestinal: Negative. Genitourinary: Negative. Musculoskeletal: Negative. Skin: Positive for wound. Neurological: Negative. Hematological: Negative. Psychiatric/Behavioral: Negative. A complete review of systems was performed and is negative except as noted above in the HPI.        PAST MEDICAL HISTORY     Past Medical History:   Diagnosis Date    Aortic stenosis     Arthritis     Atherosclerosis of native arteries of the extremities with intermittent claudication 7/25/2011    Carotid aneurysm, right (HCC)     Carotid artery occlusion     [facility]     [facility]     History of blood transfusion 2016    Post op, was taking blood thinner    Hyperlipidemia     Hypertension     MI (myocardial infarction) ([facility] 2009    x2    Mitral valve stenosis     Pneumonia     Post-menopausal     PUD (peptic ulcer disease) 2009    Renal failure 2009    after ulcer perforation sepsis.    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 AND REPAIR WITH REVERSED LEFT GREATER SAPHENOUS VEIN INTERPOSITIONAL BYPASS GRAFT performed by Anthony Singh MD at [place] [address] Right early 's    long ago    CATARACT REMOVAL WITH IMPLANT Bilateral      SECTION  1972    COLONOSCOPY      CORONARY ANGIOPLASTY WITH STENT PLACEMENT      CORONARY ARTERY BYPASS GRAFT  2016    DILATION AND CURETTAGE OF UTERUS      ILIO-FEMORAL BYPASS GRAFT N/A 2016    OPEN TRANSLUMINAL BALLOON ANGIOPLASTY AND STENTING OF RIGHT COMMON AND EXTERNAL  ILIAC ARTERIES; RIGHT FEMORAL ENDARTERECTOMY WITH VEIN PATCH ANGIOPLASTY performed by Anthony Singh MD at [facility] N/A 2016    MITRAL VALVE  REPLACEMENT LUONG-MAZE ABLATION WITH CRYO PROCEDURE, CORONARY ARTERY BYPASS GRAFT X 1 WITH ENDOSCOPIC VEIN HARVESTING WITH PERFUSION TRANSESOPHAGEAL ECHOCARDIOGRAM performed by Sangeetha Osorio MD at [facility] MITRAL VALVE REPLACEMENT  2016    GA REOPER, CAROTID ENDARTEC>1 MON Left 2018    REMOVAL OF HEMATOMA, LEFT CAROTID ARTERY performed by Anthony Singh MD at [address] [place] [address] NECK,NECK INCIS Left 1/11/2018    LEFT CAROTID ENDARTERECTOMY WITH EEG MONITORING AND COMPLETION DUPLEX ULTRASOUND performed by Deacon Chiu MD at [address] [place] [address] PTCA      SKIN GRAFT Right 7/22/2016    Skin graft split thickness foot,ankle,and leg. Right leg 26x8cm and 12x6cm total area 280cm squared. TJR    UPPER GASTROINTESTINAL ENDOSCOPY  3/15/16    Dr Franklinaj Canales  3/15/2016    EGD BIOPSY performed by Radha Harris DO at [facility]    VASCULAR SURGERY  5/27/16 TJR    Aortagram and right leg runoff,right leg runoff,right common iliac artery selection for right leg run off views.  VASCULAR SURGERY      . Open transluminal angioplasty and stenting of the external iliac artery. TJR    VASCULAR SURGERY  07/11/2019    TJR. 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          CURRENT MEDICATIONS       Previous Medications    ARFORMOTEROL TARTRATE (BROVANA) 15 MCG/2ML NEBU    Inhale 15 mcg into the lungs    ASPIRIN EC 81 MG EC TABLET    Take 1 tablet by mouth daily    ATORVASTATIN (LIPITOR) 40 MG TABLET    Take 1 tablet by mouth daily    BIOTIN 5000 MCG TABS    Take 1 tablet by mouth daily     CALCIUM-CHOLECALCIFEROL (CALCIUM 500+D) 500-200 MG-UNIT PER TABLET    Take 2 tablets by mouth daily     CLOPIDOGREL (PLAVIX) 75 MG TABLET    Take 1 tablet by mouth daily    FEXOFENADINE HCL (MUCINEX ALLERGY PO)    Take 1 tablet by mouth 2 times daily     FUROSEMIDE (LASIX) 40 MG TABLET    Take 1 tablet by mouth daily    ISOSORBIDE MONONITRATE (IMDUR) 30 MG EXTENDED RELEASE TABLET    Take 1 tablet by mouth nightly    LEVALBUTEROL (XOPENEX) 1.25 MG/3ML NEBULIZER SOLUTION    Inhale 3 mLs into the lungs    LEVOTHYROXINE (SYNTHROID) 25 MCG TABLET    Take 25 mcg by mouth Daily     METOPROLOL (LOPRESSOR) 100 MG TABLET    100 mg Take 100 mg PO in the AM and 50 mg PO in the PM    NITROGLYCERIN (NITROSTAT) 0.4 MG SL TABLET    Place 1 tablet under the tongue every 5 minutes as needed for Chest pain    OXYGEN    Inhale 2 L into the lungs daily        ALLERGIES     Levaquin [levofloxacin in d5w];  Xarelto [rivaroxaban]; and Morphine    FAMILY HISTORY       Family History   Problem Relation Age of Onset    Diabetes Mother     Heart Disease Mother     Heart Failure Mother     Diabetes Sister     Heart Disease Sister     High Blood Pressure Sister           SOCIAL HISTORY       Social History     Socioeconomic History    Marital status:      Spouse name: None    Number of children: None    Years of education: None    Highest education level: None   Occupational History    None   Social Needs    Financial resource strain: None    Food insecurity     Worry: None     Inability: None    Transportation needs     Medical: None     Non-medical: None   Tobacco Use    Smoking status: Former Smoker     Years: 2.00     Types: Cigarettes     Last attempt to quit: 1980     Years since quittin.6    Smokeless tobacco: Never Used   Substance and Sexual Activity    Alcohol use: Yes     Comment: rarely maybe twice a year    Drug use: No    Sexual activity: Not Currently     Partners: Male   Lifestyle    Physical activity     Days per week: None     Minutes per session: None    Stress: None   Relationships    Social connections     Talks on phone: None     Gets together: None     Attends Roman [employer] service: None     Active member of club or organization: None     Attends meetings of clubs or organizations: None     Relationship status: None    Intimate partner violence     Fear of current or ex partner: None     Emotionally abused: None     Physically abused: None     Forced sexual activity: None   Other Topics Concern    None   Social History Narrative    None       SCREENINGS             PHYSICAL EXAM (up to 7 for level 4, 8 or more for level 5)     ED Triage Vitals [07/31/20 0032]   BP Temp Temp Source Pulse Resp SpO2 Height Weight   (!) 179/79 97.6 °F (36.4 °C) Oral 97 17 95 % 5' 3\" (1.6 m) 88 lb (39.9 kg)       Physical Exam  Vitals signs and nursing note reviewed. Constitutional:       General: She is not in acute distress. Appearance: She is well-developed. She is not toxic-appearing or diaphoretic. HENT:      Head: Normocephalic and atraumatic. Eyes:      General: No scleral icterus. Right eye: No discharge. Left eye: No discharge. Pupils: Pupils are equal, round, and reactive to light. Neck:      Musculoskeletal: Normal range of motion. Cardiovascular:      Rate and Rhythm: Normal rate and regular rhythm. Pulses:           Femoral pulses are 2+ on the right side and 2+ on the left side. Pulmonary:      Effort: Pulmonary effort is normal. No respiratory distress. Breath sounds: No stridor. Abdominal:      General: There is no distension. Musculoskeletal: Normal range of motion. General: No deformity. Skin:     General: Skin is warm and dry. Comments: Single puncture site to the right groin with mild constant nonpulsatile bloody oozing. No underlying hematoma   Neurological:      Mental Status: She is alert and oriented to person, place, and time. GCS: GCS eye subscore is 4. GCS verbal subscore is 5. GCS motor subscore is 6. Cranial Nerves: No cranial nerve deficit. Motor: No abnormal muscle tone. Psychiatric:         Behavior: Behavior normal.         Thought Content:  Thought content normal.         Judgment: Judgment normal.         DIAGNOSTIC RESULTS     EKG: All EKG's are interpreted by the Emergency Department Physician who either signs or Co-signs this chart in the absence of a cardiologist.      RADIOLOGY:   Non-plain film images such as CT, Ultrasound and MRI are read by the radiologist. Plainradiographic images are doctor in the appropriate timeframe. Necessary prescriptions and information have been provided for treatment at home. Patient voices understanding and agreement with the plan. CONSULTS:  None    PROCEDURES:  Unless otherwise notedrebelow, none     Lac Repair    Date/Time: 7/31/2020 3:55 AM  Performed by: Guadalupe Rodgers MD  Authorized by: Guadalupe Rodgers MD     Consent:     Consent obtained:  Verbal    Consent given by:  Patient    Risks discussed:  Pain, need for additional repair and vascular damage    Alternatives discussed:  Delayed treatment  Anesthesia (see MAR for exact dosages): Anesthesia method:  Local infiltration    Local anesthetic:  Lidocaine 1% WITH epi  Laceration details:     Location:  Leg    Leg location:  R upper leg    Wound length (cm): punctate. Repair type:     Repair type:  Simple  Pre-procedure details:     Preparation:  Patient was prepped and draped in usual sterile fashion  Treatment:     Area cleansed with:  Shur-Clens    Amount of cleaning:  Standard  Skin repair:     Repair method:  Sutures    Suture size:  3-0    Suture material:  Prolene    Suture technique:  Figure eight  Approximation:     Approximation:  Close  Post-procedure details:     Dressing: pressure dressing. Patient tolerance of procedure: Tolerated well, no immediate complications          FINAL IMPRESSION     1. Postoperative hemorrhage involving circulatory system following cardiac catheterization    2. Chronic congestive heart failure, unspecified heart failure type ([address]    3.  Peripheral vascular disease [facility])          DISPOSITION/PLAN   DISPOSITION        PATIENT REFERRED TO:  [facility] EMERGENCY DEPT  [facility]  [phone]    If symptoms worsen    Randolph Myles MD  [address] [phone]      As needed    [facility] EMERGENCY DEPT  [facility]  [phone]  In 5 days  For suture removal      DISCHARGE MEDICATIONS:  New Prescriptions    No medications on file          (Please note that portions of this note were completed with a voice recognition program.  Efforts were made to edit the dictations butoccasionally words are mis-transcribed.)    Yrn Miller MD (electronically signed)  AttendingEmergency Physician         Yrn Ozuna MD  07/31/20 5844

## 2020-07-31 NOTE — PROGRESS NOTES
NO FURTHER BLEEDING NOTED AT RT GROIN. AVS EXPLAINED & GIVEN TO DAUGHTER & PT. UNDERSTANDINGS VOICED. ALSO STRESSED IMPORTANCE OF CARE OF GROIN AND CALLING 911 IF BLEEDING OCCURS. UNDERSTANDINGS VOICED.

## 2020-07-31 NOTE — ED NOTES
Stitches to right groin applied by Dr. Giselle Landon. Pt tolerated without difficulty or distress. Saline bad re-applied to side for pressure.      Hussain Slaughter RN  07/31/20 8414

## 2020-07-31 NOTE — PROGRESS NOTES
PT AMBULATED IN CATH HOLDING HALLS WITHOUT FURTHER BLEEDING NOTED. PT CONT'S WITH SOB WITH EXERTION.  02 SAT 76% AFTER AMBULATED ON RA. 02 RESUMED AT 2.5 L VIA NC.

## 2020-07-31 NOTE — ED NOTES
Bed: 04  Expected date:   Expected time:   Means of arrival: [facility]  Comments:  EMS     Jerica Coleman RN  07/31/20 0030

## 2020-07-31 NOTE — CONSULTS
Cardiac Rehab PTCA/Stent education packet was sent to the patient's address on record. Handouts included were titled; \"Home Instructions Following a Cardiac Event\", \"Cardiac Home Exercise Program - Phase I\", \"Risk Factors for Heart Disease and Stroke\" and \"Cardiac Diet/Low Cholesterol\". Patient was instructed to contact Sharyle Long for the opportunity to enroll in Phase II Outpatient Cardiac Rehab.

## 2020-07-31 NOTE — ED NOTES
Dressing removed by MD; abd pad soaked through with blood; saline bag reapplied to site for pressure.       Adriano Verdin RN  07/31/20 6234

## 2020-08-03 ENCOUNTER — TELEPHONE (OUTPATIENT)
Dept: VASCULAR SURGERY | Age: 75
End: 2020-08-03

## 2020-08-05 ENCOUNTER — HOSPITAL ENCOUNTER (EMERGENCY)
Age: 75
Discharge: HOME | End: 2020-08-05
Payer: MEDICARE

## 2020-08-05 VITALS
BODY MASS INDEX: 15.59 KG/M2 | SYSTOLIC BLOOD PRESSURE: 137 MMHG | DIASTOLIC BLOOD PRESSURE: 65 MMHG | OXYGEN SATURATION: 98 % | RESPIRATION RATE: 18 BRPM | WEIGHT: 88 LBS | HEIGHT: 63 IN | TEMPERATURE: 98 F | HEART RATE: 83 BPM

## 2020-08-05 PROCEDURE — 99282 EMERGENCY DEPT VISIT SF MDM: CPT

## 2020-08-05 PROCEDURE — 99283 EMERGENCY DEPT VISIT LOW MDM: CPT

## 2020-08-05 NOTE — ED PROVIDER NOTES
[facility] EMERGENCY DEPT  eMERGENCY dEPARTMENT eNCOUnter      Pt Name: Mili Alamo  MRN: [id number]  [place] 1945  Date of evaluation: 8/5/2020  Provider: Ariella Walker, [address]       Chief Complaint   Patient presents with    Suture / Staple Removal     Pt presents to ED for suture removal of heart cath site in the femoral on the right side          HISTORY OF PRESENT ILLNESS   (Location/Symptom, Timing/Onset,Context/Setting, Quality, Duration, Modifying Factors, Severity)  Note limiting factors. Ankit Gallegos a 76 y.o. female who presents to the emergency department for evaluation of suture removal. Pt tells me that he she had suture placed right femoral arteriotomy site having had oozing from wound after cardiac catheterization. She tells me that she was told to present today for suture removal. She has had no further problems with bleeding for suture site. HPI    Nursing Notes were reviewed. REVIEW OF SYSTEMS    (2-9 systems for level 4, 10 or more for level 5)     Review of Systems   Constitutional: Negative. Skin: Positive for wound (right groin). A complete review of systems was performed and is negative except as noted above in the HPI. PAST MEDICAL HISTORY     Past Medical History:   Diagnosis Date    Aortic stenosis     Arthritis     Atherosclerosis of native arteries of the extremities with intermittent claudication 7/25/2011    Carotid aneurysm, right ([address]     Carotid artery occlusion     CHF (congestive heart failure) ([address]     COPD (chronic obstructive pulmonary disease) ([address]     History of blood transfusion 2016    Post op, was taking blood thinner    Hyperlipidemia     Hypertension     MI (myocardial infarction) ([address] 2009    x2    Mitral valve stenosis     Pneumonia     Post-menopausal     PUD (peptic ulcer disease) 2009    Renal failure 2009    after ulcer perforation sepsis.     Thyroid disease     Wound infection after surgery 3/15/2016    EGD [facility]OPSY [facility] Carole Harris DO [facility]    VASCULAR SURGERY  5/27/16 TJR    Aortagram and right leg runoff,right leg runoff,right common iliac artery selection for right leg run off views.  VASCULAR SURGERY      . Open transluminal angioplasty and stenting of the external iliac artery. TJR    VASCULAR SURGERY  07/11/2019    TJR. 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          CURRENT MEDICATIONS       Discharge Medication List as of 8/5/2020  1:22 PM      CONTINUE these medications which have NOT CHANGED    Details   metoprolol (LOPRESSOR) 100 MG tablet 100 mg Take 100 mg PO in the AM and 50 mg PO in the PMHistorical Med      levothyroxine (SYNTHROID) 25 MCG tablet Take 25 mcg by mouth Daily Historical Med      Fexofenadine HCl (MUCINEX ALLERGY PO) Take 1 tablet by mouth 2 times daily Historical Med      Arformoterol Tartrate (BROVANA) 15 MCG/2ML NEBU Inhale 15 mcg into the lungsHistorical Med      OXYGEN Inhale 2 L into the lungs daily Historical Med      levalbuterol (XOPENEX) 1.25 MG/3ML nebulizer solution Inhale 3 mLs into the lungsHistorical Med      clopidogrel (PLAVIX) 75 MG tablet Take 1 tablet by mouth daily, Disp-90 tablet, R-3Normal      furosemide (LASIX) 40 MG tablet Take 1 tablet by mouth daily, Disp-90 tablet, R-3Normal      aspirin EC 81 MG EC tablet Take 1 tablet by mouth daily, Disp-30 tablet, R-3NO PRINT      isosorbide mononitrate (IMDUR) 30 MG extended release tablet Take 1 tablet by mouth nightly, Disp-90 tablet, R-3Normal      nitroGLYCERIN (NITROSTAT) 0.4 MG SL tablet Place 1 tablet under the tongue every 5 minutes as needed for Chest pain, Disp-25 tablet, R-3Normal      atorvastatin (LIPITOR) 40 MG tablet Take 1 tablet by mouth daily, Disp-90 tablet, R-3Patient must have labs before any further refillsNormal      calcium-cholecalciferol (CALCIUM 500+D) 500-200 MG-UNIT per tablet Take 2 tablets by mouth daily Historical Med      Biotin 5000 MCG TABS Take 1 tablet by mouth daily Historical Med             ALLERGIES     Levaquin [levofloxacin in d5w];  Xarelto [rivaroxaban]; and Morphine    FAMILY HISTORY       Family History   Problem Relation Age of Onset    Diabetes Mother     Heart Disease Mother     Heart Failure Mother     Diabetes Sister     Heart Disease Sister     High Blood Pressure Sister           SOCIAL HISTORY       Social History     Socioeconomic History    Marital status:      Spouse name: Not on file    Number of children: Not on file    Years of education: Not on file    Highest education level: Not on file   Occupational History    Not on file   Social Needs    Financial resource strain: Not on file    Food insecurity     Worry: Not on file     Inability: Not on file    Transportation needs     Medical: Not on file     Non-medical: Not on file   Tobacco Use    Smoking status: Former Smoker     Years: 2.00     Types: Cigarettes     Last attempt to quit: 1980     Years since quittin.6    Smokeless tobacco: Never Used   Substance and Sexual Activity    Alcohol use: Yes     Comment: rarely maybe twice a year    Drug use: No    Sexual activity: Not Currently     Partners: Male   Lifestyle    Physical activity     Days per week: Not on file     Minutes per session: Not on file    Stress: Not on file   Relationships    Social connections     Talks on phone: Not on file     Gets together: Not on file     Attends Christian service: Not on file     Active member of club or organization: Not on file     Attends meetings of clubs or organizations: Not on file     Relationship status: Not on file    Intimate partner violence     Fear of current or ex partner: Not on file     Emotionally abused: Not on file     Physically abused: Not on file     Forced sexual activity: Not on file   Other Topics Concern    Not on file   Social History Narrative    Not on file       SCREENINGS             PHYSICAL EXAM    (up to 7 for level 4, 8 or more for level 5)     ED Triage Vitals [08/05/20 1257]   BP Temp Temp src Pulse Resp SpO2 Height Weight   -- -- -- -- -- -- 5' 3\" (1.6 m) 88 lb (39.9 kg)     Vitals:    08/05/20 1257   BP: 137/65   Pulse: 83   Resp: 18   Temp: 98 °F (36.7 °C)   TempSrc: Temporal   SpO2: 98%   Weight: 88 lb (39.9 kg)   Height: 5' 3\" (1.6 m)       Physical Exam  Cardiovascular:      Rate and Rhythm: Normal rate. Pulses: Normal pulses. Abdominal:      General: Abdomen is flat. Musculoskeletal:      Comments: Right groin with suture intact without secondary sign of infection   Skin:     General: Skin is warm and dry. Capillary Refill: Capillary refill takes less than 2 seconds. Neurological:      Mental Status: She is alert and oriented to person, place, and time. DIAGNOSTIC RESULTS     EKG: All EKG's are interpreted by the Emergency Department Physician who either signs or Co-signs this chart in the absence of acardiologist.        RADIOLOGY:   Non-plain film images such as CT, Ultrasound andMRI are read by the radiologist. Plain radiographic images are visualized and preliminarily interpreted by the emergency physician with the below findings:        Interpretation per the Radiologist below, if available at the time of this note:    No orders to display         ED BEDSIDE ULTRASOUND:   Performed by ED Physician - none    LABS:  Labs Reviewed - No data to display    All other labs were within normal range or not returned as of this dictation.     RE-ASSESSMENT           EMERGENCY DEPARTMENT COURSE and DIFFERENTIALDIAGNOSIS/MDM:   Vitals:    Vitals:    08/05/20 1257   BP: 137/65   Pulse: 83   Resp: 18   Temp: 98 °F (36.7 °C)   TempSrc: Temporal   SpO2: 98%   Weight: 88 lb (39.9 kg)   Height: 5' 3\" (1.6 m)

## 2020-08-18 ENCOUNTER — OFFICE VISIT (OUTPATIENT)
Dept: CARDIOLOGY | Age: 75
End: 2020-08-18
Payer: MEDICARE

## 2020-08-18 VITALS
WEIGHT: 88 LBS | HEIGHT: 63 IN | DIASTOLIC BLOOD PRESSURE: 68 MMHG | HEART RATE: 82 BPM | OXYGEN SATURATION: 90 % | SYSTOLIC BLOOD PRESSURE: 118 MMHG | BODY MASS INDEX: 15.59 KG/M2

## 2020-08-18 PROCEDURE — 1036F TOBACCO NON-USER: CPT | Performed by: NURSE PRACTITIONER

## 2020-08-18 PROCEDURE — G8427 DOCREV CUR MEDS BY ELIG CLIN: HCPCS | Performed by: NURSE PRACTITIONER

## 2020-08-18 PROCEDURE — 3017F COLORECTAL CA SCREEN DOC REV: CPT | Performed by: NURSE PRACTITIONER

## 2020-08-18 PROCEDURE — 1090F PRES/ABSN URINE INCON ASSESS: CPT | Performed by: NURSE PRACTITIONER

## 2020-08-18 PROCEDURE — 4040F PNEUMOC VAC/ADMIN/RCVD: CPT | Performed by: NURSE PRACTITIONER

## 2020-08-18 PROCEDURE — 93000 ELECTROCARDIOGRAM COMPLETE: CPT | Performed by: NURSE PRACTITIONER

## 2020-08-18 PROCEDURE — 99214 OFFICE O/P EST MOD 30 MIN: CPT | Performed by: NURSE PRACTITIONER

## 2020-08-18 PROCEDURE — G8400 PT W/DXA NO RESULTS DOC: HCPCS | Performed by: NURSE PRACTITIONER

## 2020-08-18 PROCEDURE — G8419 CALC BMI OUT NRM PARAM NOF/U: HCPCS | Performed by: NURSE PRACTITIONER

## 2020-08-18 PROCEDURE — 1123F ACP DISCUSS/DSCN MKR DOCD: CPT | Performed by: NURSE PRACTITIONER

## 2020-08-18 RX ORDER — NITROGLYCERIN 0.4 MG/1
0.4 TABLET SUBLINGUAL EVERY 5 MIN PRN
Qty: 25 TABLET | Refills: 3 | Status: ON HOLD | OUTPATIENT
Start: 2020-08-18 | End: 2022-01-01 | Stop reason: SINTOL

## 2020-08-18 NOTE — PROGRESS NOTES
hypertension I27.20    PAD (peripheral artery disease) ([facility]) I73.9    Nocturnal hypoxia G47.34    Acute superficial venous thrombosis of right lower extremity I82.811    Non-pressure chronic ulcer of right calf with fat layer exposed ([facility]) L97.212    Decubitus ulcer of right buttock, stage 3 ([facility]) L89.313    Severe malnutrition ([facility]) P43    Diastolic dysfunction R58.15    Anemia in chronic kidney disease (CKD) N18.9, D63.1    Nonhealing nonsurgical wound with fat layer exposed T14. 8XXA    Atherosclerosis of native arteries of left leg with ulceration of calf ([facility]) I70.242    Atherosclerosis of native artery of right lower extremity with ulceration of ankle ([facility]) I70.233    Atherosclerosis of native arteries of right leg with ulceration of other part of lower right leg I70.238    Atherosclerosis of native arteries of right leg with ulceration of other part of foot I70.235    Nonhealing ulcer of right lower leg with fat layer exposed ([address] L97.912    Non-pressure chronic ulcer of right ankle with fat layer exposed ([address] L97.312    Neuropathic ulcer of right foot with fat layer exposed ([address] L97.512    Hypervolemia E87.70    Atherosclerosis of native artery of extremity with intermittent claudication ([facility]) I70.219    CHF (congestive heart failure) ([facility]) I50.9    CKD (chronic kidney disease), stage III ([facility]) N18.3    Pulmonary emphysema ([facility]) J43.9    COPD without exacerbation ([facility]) J44.9    PVD (peripheral vascular disease) ([facility]) I73.9    Wound of sacral region S31.000A    Elevated TSH R79.89    Bilateral carotid artery stenosis I65.23    Obstruction of left carotid artery I65.22    Bradycardia R00.1    Sepsis with organ dysfunction ([facility]) A41.9, R65.20    NSTEMI (non-ST elevated myocardial infarction) ([facility]) I21.4    HCAP (healthcare-associated pneumonia) J18.9    Acute renal failure (ARF) ([facility]) N17.9    Syncope and collapse R55    Essential hypertension I10    Mild aortic stenosis I35.0    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    Carotid aneurysm, right ([facility]) I72.0    Postoperative anemia due to acute blood loss D62    Status post Maze operation for atrial fibrillation Z98.890, Z86.79    PAF (paroxysmal atrial fibrillation) ([facility]) I48.0    S/P coronary artery bypass graft x 1 Z95.1    S/P mitral valve replacement Z95.2    Chest pain R07.9    SOB (shortness of breath) R06.02    Fatigue R53.83    Pain in both lower extremities M79.604, M79.605    History of coronary artery stent placement Z95.5    Myalgia M79.10    Unstable angina ([facility]) I20.0    Acute diastolic heart failure ([facility]) I50.31     Past Medical History:   Diagnosis Date    Aortic stenosis     Arthritis     Atherosclerosis of native arteries of the extremities with intermittent claudication 7/25/2011    Carotid aneurysm, right ([facility])     Carotid artery occlusion     CHF (congestive heart failure) ([facility])     [facility] obstructive [facility] disease) [facility]     History of blood transfusion 2016    Post op, was taking blood thinner    Hyperlipidemia     Hypertension     MI (myocardial infarction) ([facility] 2009    x2    Mitral valve stenosis     Pneumonia     Post-menopausal     PUD (peptic ulcer disease) 2009    Renal failure 2009    after ulcer perforation sepsis.     Thyroid disease     Wound infection after surgery     right foot infection after cabg     Past Surgical History:   Procedure Laterality Date    ABDOMEN SURGERY  2009    perforated ulcer resection of 1/3 stomach    CARDIAC SURGERY      artificial valve and bypass    CAROTID ENDARTERECTOMY      Right  with Dacron patch angioplasty    CAROTID ENDARTERECTOMY Left     CAROTID ENDARTERECTOMY Right 7/11/2019    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 AND REPAIR WITH REVERSED LEFT GREATER SAPHENOUS VEIN INTERPOSITIONAL BYPASS GRAFT performed by Hillary Miranda MD at [address] [facility] Right early 1990's    long ago    CATARACT REMOVAL WITH IMPLANT Bilateral      SECTION      COLONOSCOPY      CORONARY ANGIOPLASTY WITH STENT PLACEMENT      CORONARY ANGIOPLASTY WITH STENT PLACEMENT      CORONARY ARTERY BYPASS GRAFT      DIAGNOSTIC CARDIAC CATH LAB PROCEDURE      DILATION AND CURETTAGE OF UTERUS      ILIO-FEMORAL BYPASS GRAFT N/A 2016    OPEN TRANSLUMINAL BALLOON ANGIOPLASTY AND STENTING OF RIGHT COMMON AND EXTERNAL  ILIAC ARTERIES; RIGHT FEMORAL ENDARTERECTOMY WITH VEIN PATCH ANGIOPLASTY performed by Desire Feliciano MD at [address] N/A 2016    MITRAL VALVE  REPLACEMENT LUONG-MAZE ABLATION WITH CRYO PROCEDURE, CORONARY ARTERY BYPASS GRAFT X 1 WITH ENDOSCOPIC VEIN HARVESTING WITH PERFUSION TRANSESOPHAGEAL ECHOCARDIOGRAM performed by Etelvina Kirkland MD at [address] [facility] MITRAL VALVE REPLACEMENT  2016    SC REOPER, CAROTID ENDARTEC>1 MON Left 2018    REMOVAL OF HEMATOMA, LEFT CAROTID ARTERY performed by Desire Feliciano MD at [address] Left 2018    LEFT CAROTID ENDARTERECTOMY WITH EEG MONITORING AND COMPLETION DUPLEX ULTRASOUND performed by Desire Feliciano MD at [address] [place] [address] PTCA      SKIN GRAFT Right 2016    Skin graft split thickness foot,ankle,and leg. Right leg 26x8cm and 12x6cm total area 280cm squared. TJR    UPPER GASTROINTESTINAL ENDOSCOPY  3/15/16    Dr Flakita Arzola  3/15/2016    EGD BIOPSY performed by Lynette Harris DO at [address] [facility]    VASCULAR SURGERY  16 TJR    Aortagram and right leg runoff,right leg runoff,right common iliac artery selection for right leg run off views.  VASCULAR SURGERY      . Open transluminal angioplasty and stenting of the external iliac artery. TJR    VASCULAR SURGERY  2019    TJR. 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      Family History   Problem Relation Age of Onset    Diabetes Mother     Heart Disease Mother     Heart Failure Mother     Diabetes Sister     Heart Disease Sister     High Blood Pressure Sister      Social History     Tobacco Use    Smoking status: Former Smoker     Years: 2.00     Types: Cigarettes     Last attempt to quit: 1980     Years since quittin.6    Smokeless tobacco: Never Used   Substance Use Topics    Alcohol use: Yes     Comment: rarely maybe twice a year      Current Outpatient Medications   Medication Sig Dispense Refill    nitroGLYCERIN (NITROSTAT) 0.4 MG SL tablet Place 1 tablet under the tongue every 5 minutes as needed for Chest pain 25 tablet 3    metoprolol (LOPRESSOR) 100 MG tablet 100 mg Take 100 mg PO in the AM and 50 mg PO in the PM      levothyroxine (SYNTHROID) 25 MCG tablet Take 25 mcg by mouth Daily       Fexofenadine HCl (MUCINEX ALLERGY PO) Take 1 tablet by mouth 2 times daily       Arformoterol Tartrate (BROVANA) 15 MCG/2ML NEBU Inhale 15 mcg into the lungs      OXYGEN Inhale 2 L into the lungs daily       levalbuterol (XOPENEX) 1.25 MG/3ML nebulizer solution Inhale 3 mLs into the lungs      clopidogrel (PLAVIX) 75 MG tablet Take 1 tablet by mouth daily 90 tablet 3    furosemide (LASIX) 40 MG tablet Take 1 tablet by mouth daily 90 tablet 3    aspirin EC 81 MG EC tablet Take 1 tablet by mouth daily (Patient taking differently: Take 81 mg by mouth nightly PT TAKES AT NIGHT.) 30 tablet 3    isosorbide mononitrate (IMDUR) 30 MG extended release tablet Take 1 tablet by mouth nightly 90 tablet 3    atorvastatin (LIPITOR) 40 MG tablet Take 1 tablet by mouth daily (Patient taking differently: Take 40 mg by mouth nightly PT TAKES AT NIGHT.) 90 tablet 3    calcium-cholecalciferol (CALCIUM 500+D) 500-200 MG-UNIT per tablet Take 2 tablets by mouth daily       Biotin 5000 MCG TABS Take 1 tablet by mouth daily        No current facility-administered medications for this visit. Allergies: Levaquin [levofloxacin in d5w]; Xarelto [rivaroxaban]; and Morphine    Review of Systems  Constitutional - no appetite change, or unexpected weight change. No fever, chills or diaphoresis. + fatigue. HEENT - no significant rhinorrhea or epistaxis. No tinnitus or significant hearing loss. Eyes - no sudden vision change or amaurosis. No corneal arcus, xantholasma, subconjunctival hemorrhage or discharge. Respiratory - no significant wheezing, stridor, apnea or cough. + SOA. Cardiovascular - no exertional chest pain to suggest myocardial ischemia. No orthopnea or PND. No sensation of sustained arrythmia. No occurrence of slow heart rate. No palpitations. No claudication. Gastrointestinal - no abdominal swelling or pain. No blood in stool. No severe constipation, diarrhea, nausea, or vomiting. Genitourinary - no dysuria, frequency, or urgency. No flank pain or hematuria. Musculoskeletal - no back pain or myalgia. No problems with gait. Extremities - no clubbing, cyanosis or extremity edema. Skin - no color change or rash. No pallor. No new surgical incision. Neurologic - no speech difficulty, facial asymmetry or lateralizing weakness. No seizures, presyncope or syncope. No significant dizziness. Hematologic - no easy bruising or excessive bleeding. Psychiatric - no severe anxiety or insomnia. No confusion. All other review of systems are negative. Objective  Vital Signs - /68   Pulse 82   Ht 5' 3\" (1.6 m)   Wt 88 lb (39.9 kg)   BMI 15.59 kg/m²   General - Jose Antonio Covarrubias is alert, cooperative, and pleasant. Well groomed. No acute distress. Body habitus - Body mass index is 15.59 kg/m². HEENT - Head is normocephalic. No circumoral cyanosis. Dentition is normal.  EYES -   Lids normal without ptosis. No discharge, edema or subconjunctival hemorrhage.    Neck - Symmetrical utilizing drug-eluting stent. Successful endovascular intervention to proximal left renal artery (5.0 x  22 mm resolute jaylan stent) utilizing drug-eluting stent. Lab Results   Component Value Date    WBC 9.9 07/30/2020    HGB 10.2 (L) 07/30/2020    HCT 35.0 (L) 07/30/2020    MCV 83.1 07/30/2020     07/30/2020     Lab Results   Component Value Date     07/30/2020    K 3.6 07/30/2020    CL 94 (L) 07/30/2020    CO2 35 (H) 07/30/2020    BUN 28 (H) 07/30/2020    CREATININE 1.2 (H) 07/30/2020    GLUCOSE 111 (H) 07/30/2020    CALCIUM 10.6 (H) 07/30/2020    PROT 8.0 07/30/2020    LABALBU 4.4 07/30/2020    BILITOT 0.6 07/30/2020    ALKPHOS 102 07/30/2020    AST 23 07/30/2020    ALT 11 07/30/2020    LABGLOM 44 (A) 07/30/2020    GFRAA 53 (L) 07/30/2020    GLOB 4.6 08/29/2016       Lab Results   Component Value Date    CHOL 169 07/30/2020    CHOL 125 (L) 03/15/2016     Lab Results   Component Value Date    TRIG 55 07/30/2020    TRIG 123 (L) 03/15/2016     Lab Results   Component Value Date    HDL 82 07/30/2020    HDL 11 (L) 03/15/2016     Lab Results   Component Value Date    LDLCALC 76 07/30/2020    LDLCALC 89 03/15/2016      EKG reviewed:  NSR 84 bpm; LVH voltage; occasional PACs. No acute ischemic changes. CAD - stable on current medical management. DD - no fluid retention. S/p MVR - 11/20/19 echo:  Bioprosthetic mitral valve replacement with good leaflet mobility. Increased pressure gradients across valve with a max gradient of 29 mmHg  and a mean gradient of 11 mmHg. MVA by PHT is calculated to be 2.6 cm2. Mild mitral regurgitation is seen with color flow mapping. Follow periodically with echo. HTN - normotensive on current regimen. Hyperlipdemia - LDL 76 on Lipitor 40 mg daily. SOA - Hg 10.2 may be contributing. Advised to see PCP for anemia. Hx COPD follows with pulmonology. Advised to follow up. Patient uses oxygen mostly at hs.   Pulse ox in office today off oxygen was 73%.  Advised patient to wear oxygen. Pulse ox improved to 0%    Patient is compliant with medication regimen. BP Readings from Last 3 Encounters:   08/18/20 118/68   08/05/20 137/65   07/31/20 (!) 152/76    Pulse Readings from Last 3 Encounters:   08/18/20 82   08/05/20 83   07/31/20 97        Wt Readings from Last 3 Encounters:   08/18/20 88 lb (39.9 kg)   08/05/20 88 lb (39.9 kg)   07/31/20 88 lb (39.9 kg)     Plan  Previous cardiac history and records reviewed. Continue current medical management. Reduce Lopressor to 100 mg 1/2 tab daily. Advised to f/u PCP for anemia and pulmonology as well. Continue other current medications as directed. Continue to follow up with primary care provider for non cardiac medical problems. Call the office with any problems, questions or concerns at [phone]. Cardiology follow up: 9/18/20 as scheduled following RM placement. Educational included in patient instructions. Heart health. NTG SL.      BEBO Castillo

## 2020-08-18 NOTE — PATIENT INSTRUCTIONS
New instructions for today:  Reduce Lopressor to 100 mg 1/2 tab twice daily. Follow up with pulmonology. Follow up with your primary care doctor regarding anemia. Patient Instructions:  Continue current medications as prescribed. Always keep a current medication list. Bring your medications to every office visit. Continue to follow up with primary care provider for non cardiac medical problems. Call the office with any problems, questions or concerns at [phone]. If you have been asked to keep a blood pressure log, do so for 2 weeks. Call the office to report readings to the triage nurse at [phone]. Follow up with cardiologist as scheduled. The following educational material has been included in this after visit summary for your review: Life simple 7. Heart health. [facility] sl. Life simple 7  1) Manage blood pressure - high blood pressure is a major risk factor for heart disease and stroke. Keeping blood pressure in health range reduces strain on your heart, arteries and kidneys. Blood pressure goal is less than 130/80. 2) Control cholesterol - contributes to plaque, which can clog arteries and lead to heart disease and stroke. When you control your cholesterol you are giving your arteries their best chance to remain clear. It is recommended that you get cholesterol lab work done once a year. 3) Reduce blood sugar - most of the food we eat is turning into glucose or blood sugar that our body uses for energy. Over time, high levels of blood sugar can damage your heart, kidneys, eyes and nerves. 4) Get active - living an active life is one of the most rewarding gifts you can give yourself and those you love. Simply put, daily physical activity increases your length and quality of life. Strive to exercise 15 minutes most days of the week. 5)  Eat better - A healthy diet is one of your best weapons for fighting cardiovascular disease.   When you eat a heart healthy diet, you improve your chances for feeling good and staying healthy for life. 6)  Lose weight - when you shed extra fat an unnecessary pounds, you reduce the burden on your hear, lungs, blood vessels and skeleton. You give yourself the gift of active living, you lower your blood pressure and help yourself feel better. 7) Stop smoking - cigarette smokers have a higher risk of developing cardiovascular disease. If  You smoke, quitting is the best thing you can do for your health. Check American Heart Association on line for more information on Life's Simple 7 and tips for healthy living. A Healthy Heart: Care Instructions  Your Care Instructions     Coronary artery disease, also called heart disease, occurs when a substance called plaque builds up in the vessels that supply oxygen-rich blood to your heart muscle. This can narrow the blood vessels and reduce blood flow. A heart attack happens when blood flow is completely blocked. A high-fat diet, smoking, and other factors increase the risk of heart disease. Your doctor has found that you have a chance of having heart disease. You can do lots of things to keep your heart healthy. It may not be easy, but you can change your diet, exercise more, and quit smoking. These steps really work to lower your chance of heart disease. Follow-up care is a key part of your treatment and safety. Be sure to make and go to all appointments, and call your doctor if you are having problems. It's also a good idea to know your test results and keep a list of the medicines you take. How can you care for yourself at home? Diet  · Use less salt when you cook and eat. This helps lower your blood pressure. Taste food before salting. Add only a little salt when you think you need it. With time, your taste buds will adjust to less salt. · Eat fewer snack items, fast foods, canned soups, and other high-salt, high-fat, processed foods. · Read food labels and try to avoid saturated and trans fats.  They increase your risk of heart disease by raising cholesterol levels. · Limit the amount of solid fat-butter, margarine, and shortening-you eat. Use olive, peanut, or canola oil when you cook. Bake, broil, and steam foods instead of frying them. · Eat a variety of fruit and vegetables every day. Dark green, deep orange, red, or yellow fruits and vegetables are especially good for you. Examples include spinach, carrots, peaches, and berries. · Foods high in fiber can reduce your cholesterol and provide important vitamins and minerals. High-fiber foods include whole-grain cereals and breads, oatmeal, beans, brown rice, citrus fruits, and apples. · Eat lean proteins. Heart-healthy proteins include seafood, lean meats and poultry, eggs, beans, peas, nuts, seeds, and soy products. · Limit drinks and foods with added sugar. These include candy, desserts, and soda pop. Lifestyle changes  · If your doctor recommends it, get more exercise. Walking is a good choice. Bit by bit, increase the amount you walk every day. Try for at least 30 minutes on most days of the week. You also may want to swim, bike, or do other activities. · Do not smoke. If you need help quitting, talk to your doctor about stop-smoking programs and medicines. These can increase your chances of quitting for good. Quitting smoking may be the most important step you can take to protect your heart. It is never too late to quit. · Limit alcohol to 2 drinks a day for men and 1 drink a day for women. Too much alcohol can cause health problems. · Manage other health problems such as diabetes, high blood pressure, and high cholesterol. If you think you may have a problem with alcohol or drug use, talk to your doctor. Medicines  · Take your medicines exactly as prescribed. Call your doctor if you think you are having a problem with your medicine. · If your doctor recommends aspirin, take the amount directed each day.  Make sure you take aspirin and not another kind of pain reliever, such as acetaminophen (Tylenol). When should you call for help? [id number] if you have symptoms of a heart attack. These may include:  · Chest pain or pressure, or a strange feeling in the chest.  · Sweating. · Shortness of breath. · Pain, pressure, or a strange feeling in the back, neck, jaw, or upper belly or in one or both shoulders or arms. · Lightheadedness or sudden weakness. · A fast or irregular heartbeat. After you call 911, the  may tell you to chew 1 adult-strength or 2 to 4 low-dose aspirin. Wait for an ambulance. Do not try to drive yourself. Watch closely for changes in your health, and be sure to contact your doctor if you have any problems. Where can you learn more? Go to https://O[employer]alk[employer]Slice.[employer]ar[employer]. org and sign in to your [employer] account. Enter O808 in the [employer] box to learn more about \"A Healthy Heart: Care Instructions. \"     If you do not have an account, please click on the \"Sign Up Now\" link. Current as of: December 16, 2019               Content Version: 12.5  © 9036-8445 [employer]. Care instructions adapted under license by [facility] ([facility]). If you have questions about a medical condition or this instruction, always ask your healthcare professional. [place] [address] any warranty or liability for your use of this information. How to take:  NITROGLYCERIN (Nitrostat) 0.4 mg tablets, sublingual.  Nitroglycerin is in a group of drugs called nitrates. Nitroglycerin dilates (widens) blood vessels, making it easier for blood to flow through them and easier for the heart to pump. Dosing Guidelines for Nitroglycerin Tablets  · At the start of an angina (chest pain) attack, place one tablet under the tongue or between the cheek and gum. Do not swallow or chew the tablet; let it dissolve on its own.   If necessary, a second and third tablet may be used, with five minutes between using each tablet. If you use a third tablet and your chest pain continues, it is time to seek immediate medical attention. Call 911 immediately and have someone drive you to the emergency room. You may be having a heart attack or other serious heart problem. · To prevent angina from exercise or stress, use 1 tablet 5 to 10 minutes before the activity.

## 2020-09-02 NOTE — PROGRESS NOTES
" ASHER Cabello  [facility]   Respiratory Disease Clinic  [address] [place] [address]  Phone: [phone]  Fax: [phone]     Steffi Michelle is a 75 y.o. female.   CC:   Chief Complaint   Patient presents with   • COPD     patient's o2 level when brought back to the room was 77% after sitting there on her finger it went up to 88%, both of these are on RA.        HPI: She has severe COPD, pulmonary hypertension and chronic respiratory failure.  She is a former smoker who quit in 2017.  She also has a history of coronary artery disease, chronic kidney disease with chronic anemia and atrial fibrillation.  She reports that she is \"running out of breath\" with minimal activity.  She had a heart cath on 30 August and had a drug-eluting stent placed.  She is on a pulmonary regimen of Brovana, Xopenex, oxygen and Mucinex.  She wears her oxygen most hours of the day and always at night.  She is followed by Dr. Gupta for routine medical care and she stays up-to-date on flu and pneumonia vaccines.    The following portions of the patient's history were reviewed and updated as appropriate: allergies, current medications, past family history, past medical history, past social history, past surgical history and problem list.    Past Medical History:   Diagnosis Date   • Arthritis    • CAD (coronary artery disease)    • Colon polyp    • Elevated cholesterol    • History of heart artery stent    • Hypertension        Family History   Problem Relation Age of Onset   • Diabetes Mother    • Heart disease Mother    • Cancer Other    • Heart disease Other    • Hypertension Other    • Drug abuse Other        Social History     Socioeconomic History   • Marital status:      Spouse name: Not on file   • Number of children: Not on file   • Years of education: Not on file   • Highest education level: Not on file   Tobacco Use   • Smoking status: Former Smoker     Packs/day: 0.25     Years: " "10.00     Pack years: 2.50     Types: Cigarettes     Last attempt to quit:      Years since quittin.7   • Smokeless tobacco: Never Used   Substance and Sexual Activity   • Alcohol use: No   • Drug use: No   • Sexual activity: Defer       Review of Systems   Constitutional: Negative for appetite change, chills, fatigue and fever.        Very poor activity tolerance   HENT: Negative for swollen glands, trouble swallowing and voice change.    Eyes: Negative for visual disturbance.   Respiratory: Positive for shortness of breath. Negative for cough and wheezing.    Cardiovascular: Negative for chest pain and leg swelling.   Gastrointestinal: Negative for abdominal distention, abdominal pain, nausea and vomiting.   Genitourinary: Negative.    Musculoskeletal: Negative for gait problem and myalgias.   Skin: Negative.    Neurological: Negative for weakness.   Hematological: Negative.    Psychiatric/Behavioral: The patient is not nervous/anxious.        /60   Pulse 90   Temp 98.1 °F (36.7 °C)   Ht 162.6 cm (64\")   Wt 40.8 kg (90 lb)   SpO2 (!) 88% Comment: RA  Breastfeeding No   BMI 15.45 kg/m²     Physical Exam   Constitutional: She is oriented to person, place, and time. She appears well-developed and well-nourished. No distress.   thin   HENT:   Head: Normocephalic and atraumatic.   Wearing mask   Eyes: Pupils are equal, round, and reactive to light. No scleral icterus.   Neck: Normal range of motion. Neck supple.   Cardiovascular: Normal rate, regular rhythm, S1 normal and S2 normal.   Murmur heard.   Systolic murmur is present.  Pulmonary/Chest: Effort normal. No tachypnea. She has decreased breath sounds (At the bases). She has no wheezes. She has no rales.   Abdominal: Soft. Bowel sounds are normal. She exhibits no distension.   Musculoskeletal: Normal range of motion. She exhibits no edema.   Lymphadenopathy:     She has no cervical adenopathy.   Neurological: She is alert and oriented to " person, place, and time.   Generalized weakness, in a wheelchair   Skin: Skin is warm and dry. No rash noted. No cyanosis. Nails show no clubbing.   Scar tissue to right ankle from previous grafting    Psychiatric: She has a normal mood and affect. Her behavior is normal.   Vitals reviewed.        Steffi was seen today for copd.    Diagnoses and all orders for this visit:    Stage 3 severe COPD by GOLD classification (CMS/[facility])  -     albuterol (ACCUNEB) 1.25 MG/3ML nebulizer solution; Take 3 mL by nebulization Every 6 (Six) Hours As Needed for Wheezing or Shortness of Air for up to 30 days.  -     revefenacin (YUPELRI) 175 MCG/3ML nebulizer solution; Take 3 mL by nebulization Daily.    Pulmonary hypertension (CMS/[facility])    Lung nodules    Chronic respiratory failure with hypoxia (CMS/[facility])    Former smoker    Underweight      Patient's Body mass index is 15.45 kg/m². BMI is below normal parameters. Recommendations include: treating the underlying disease process.      Follow-up/Plan: Continue Brovana twice daily.  I wrote a new prescription for the patient's albuterol as half-strength/AccuNeb because pharmacy was only providing 2 doses a day of Xopenex and the patient feels that she may benefit from additional bronchodilator treatments as needed through the day.  She is going to trial Yupelri 1 neb a day.  She was provided enough samples for 21 days.  Her daughter Ruth will call after 1 week to to report if the Yupelri was helpful.  Otherwise, she will plan on returning to the clinic in 6 months to see Dr. Syed.    Kwabena Raymond, APRN  9/4/2020  10:24

## 2020-09-04 ENCOUNTER — OFFICE VISIT (OUTPATIENT)
Dept: PULMONOLOGY | Facility: CLINIC | Age: 75
End: 2020-09-04

## 2020-09-04 VITALS
SYSTOLIC BLOOD PRESSURE: 110 MMHG | HEIGHT: 64 IN | OXYGEN SATURATION: 88 % | TEMPERATURE: 98.1 F | BODY MASS INDEX: 15.36 KG/M2 | HEART RATE: 90 BPM | DIASTOLIC BLOOD PRESSURE: 60 MMHG | WEIGHT: 90 LBS

## 2020-09-04 DIAGNOSIS — Z87.891 FORMER SMOKER: ICD-10-CM

## 2020-09-04 DIAGNOSIS — J96.11 CHRONIC RESPIRATORY FAILURE WITH HYPOXIA (HCC): ICD-10-CM

## 2020-09-04 DIAGNOSIS — I27.20 PULMONARY HYPERTENSION (HCC): ICD-10-CM

## 2020-09-04 DIAGNOSIS — R91.8 LUNG NODULES: ICD-10-CM

## 2020-09-04 DIAGNOSIS — Z23 NEED FOR IMMUNIZATION AGAINST INFLUENZA: ICD-10-CM

## 2020-09-04 DIAGNOSIS — R63.6 UNDERWEIGHT: ICD-10-CM

## 2020-09-04 DIAGNOSIS — J44.9 STAGE 3 SEVERE COPD BY GOLD CLASSIFICATION (HCC): Primary | ICD-10-CM

## 2020-09-04 PROCEDURE — 99214 OFFICE O/P EST MOD 30 MIN: CPT | Performed by: NURSE PRACTITIONER

## 2020-09-04 PROCEDURE — G0008 ADMIN INFLUENZA VIRUS VAC: HCPCS | Performed by: NURSE PRACTITIONER

## 2020-09-04 PROCEDURE — 90653 IIV ADJUVANT VACCINE IM: CPT | Performed by: NURSE PRACTITIONER

## 2020-09-04 RX ORDER — ALBUTEROL SULFATE 1.25 MG/3ML
1 SOLUTION RESPIRATORY (INHALATION) EVERY 6 HOURS PRN
Qty: 360 ML | Refills: 12 | Status: SHIPPED | OUTPATIENT
Start: 2020-09-04 | End: 2020-10-04

## 2020-09-04 NOTE — PATIENT INSTRUCTIONS
Yupelri/Revefenacin inhalation solution  What is this medicine?  REVEFENACIN (Rev eh FEN uh sin) is a bronchodilator. It helps open up the airways in your lungs to make it easier to breathe. This medicine is used to treat chronic obstructive pulmonary disease (COPD). Do NOT use for an acute COPD attack.  This medicine may be used for other purposes; ask your health care provider or pharmacist if you have questions.  COMMON BRAND NAME(S): KRISCHRISASHIA  What should I tell my health care provider before I take this medicine?  They need to know if you have any of these conditions:  · bladder problems or difficulty passing urine  · glaucoma  · heart disease or irregular heartbeat  · liver disease  · prostate trouble  · an unusual or allergic reaction to revefenacin, other medicines, foods, dyes, or preservatives  · pregnant or trying to get pregnant  · breast-feeding  How should I use this medicine?  This medicine is used in a nebulizer. Nebulizers make a liquid into an aerosol that you breathe in through your mouth or your mouth and nose into your lungs. You will be taught how to use your nebulizer. Follow the directions on your prescription label. Take your medicine at regular intervals. Do not use more often than directed.  Talk to your pediatrician regarding the use of this medicine in children. Special care may be needed.  Overdosage: If you think you have taken too much of this medicine contact a poison control center or emergency room at once.  NOTE: This medicine is only for you. Do not share this medicine with others.  What if I miss a dose?  If you miss a dose, use it as soon as you can. If it is almost time for your next dose, use only that dose and continue with your regular schedule. Do not use double or extra doses.  What may interact with this medicine?  What may interact with this medicine?  · aclidinium  · atropine  · antihistamines for allergy, cough and cold  · certain medicines for bladder problems like  oxybutynin, tolterodine  · certain medicines for stomach problems like dicyclomine, hyoscyamine  · certain medicines for travel sickness like scopolamine  · certain medicines for Parkinson's disease like benztropine, trihexyphenidyl  · cyclosporine  · glycopyrrolate  · ipratropium  · rifampin  · tiotropium  · umeclidinium  This list may not describe all possible interactions. Give your health care provider a list of all the medicines, herbs, non-prescription drugs, or dietary supplements you use. Also tell them if you smoke, drink alcohol, or use illegal drugs. Some items may interact with your medicine.  What should I watch for while using this medicine?  Visit your doctor or health care professional for regular checks on your progress. Tell your doctor if your symptoms do not improve. Do not use more medicine than directed. If your symptoms get worse while you are using this medicine, call your doctor right away.  Do not get this medicine in your eyes. It can cause irritation, pain, or blurred vision.  Clean the nebulizer as directed in the patient information sheet that comes with this medicine.  What side effects may I notice from receiving this medicine?  Side effects that you should report to your doctor or health care professional as soon as possible:  · allergic reactions like skin rash or hives, swelling of the face, lips, or tongue  · breathing problems  · changes in vision  · chest pain  · eye pain  · fast heartbeat  · signs and symptoms of infection like fever; chills; cough; sore throat  · trouble passing urine or change in the amount of urine  Side effects that usually do not require medical attention (report these to your doctor or health care professional if they continue or are bothersome):  · back pain  · headache  · runny nose  This list may not describe all possible side effects. Call your doctor for medical advice about side effects. You may report side effects to FDA at 4-712-MMD-0336.  Where  should I keep my medicine?  Keep out of the reach of children.  Store at room temperature between 15 and 30 degrees C (59 and 86 degrees F). Store the vial in the foil pouch until ready to use. Only open the foil pouch right before use. Do not use this medicine if it is cloudy or discolored. Discard the used vial once used. Throw away any unused medicine after the expiration date.  NOTE: This sheet is a summary. It may not cover all possible information. If you have questions about this medicine, talk to your doctor, pharmacist, or health care provider.  © 2020 [employer] Standard (2019-05-23 11:41:39)

## 2020-09-10 ENCOUNTER — TELEPHONE (OUTPATIENT)
Dept: PULMONOLOGY | Facility: CLINIC | Age: 75
End: 2020-09-10

## 2020-09-10 DIAGNOSIS — R06.00 DYSPNEA, UNSPECIFIED TYPE: Primary | ICD-10-CM

## 2020-09-10 DIAGNOSIS — Z95.2 H/O PROSTHETIC HEART VALVE: ICD-10-CM

## 2020-09-10 DIAGNOSIS — R63.6 UNDERWEIGHT: ICD-10-CM

## 2020-09-10 NOTE — TELEPHONE ENCOUNTER
"Pt's daughter left voicemail stating \"that new med is not working Kwabena started Mom on.\"    Kwabena, how would you like me to proceed?   "

## 2020-09-11 NOTE — TELEPHONE ENCOUNTER
Spoke to Ruth.  Pt is going to do xop in am and pm.  With an alb in between (per [facility]).  She knows has to be 6 hours apart.  She is going to get bloodwork done this afternoon.  They will  order. Pt will stop the Yupelri.

## 2020-09-11 NOTE — TELEPHONE ENCOUNTER
Please call the patient's daughter back and tell her to just switch back to the Xopenex and Brovana treatments.  The only reason we changed from brand-name Xopenex to half-strength albuterol was because insurance will not give her enough medication on Xopenex to do 4 breathing treatments a day.  Ask Ruth if they have enough Xopenex on hand or does she need that prescription rewritten?  Thank you.

## 2020-09-11 NOTE — TELEPHONE ENCOUNTER
"Pt's daughter, Ruth, called back.  They will be glad to do bloodwork, but had questions about the actual meds.  The pt was taking the albuterol 1.25 qid, but felt that the xopenex even bid was working better.  The yupelri does not seem to be helping.  She said she is still having \"trouble breathing.\" She said the pt just did bloodwork for her heart 2 weeks ago. Dr Will said everything looked good.  Patient would like us to call Ruth at [phone] with the f/u answer.   "

## 2020-09-12 LAB — BNP SERPL-MCNC: 619.2 PG/ML (ref 0–100)

## 2020-09-22 ENCOUNTER — OFFICE VISIT (OUTPATIENT)
Dept: CARDIOLOGY | Age: 75
End: 2020-09-22
Payer: MEDICARE

## 2020-09-22 VITALS
WEIGHT: 90 LBS | HEART RATE: 80 BPM | OXYGEN SATURATION: 90 % | BODY MASS INDEX: 15.95 KG/M2 | HEIGHT: 63 IN | SYSTOLIC BLOOD PRESSURE: 118 MMHG | DIASTOLIC BLOOD PRESSURE: 70 MMHG

## 2020-09-22 DIAGNOSIS — I10 ESSENTIAL HYPERTENSION: ICD-10-CM

## 2020-09-22 DIAGNOSIS — I51.89 DIASTOLIC DYSFUNCTION: ICD-10-CM

## 2020-09-22 DIAGNOSIS — I25.10 CORONARY ARTERY DISEASE INVOLVING NATIVE CORONARY ARTERY OF NATIVE HEART WITHOUT ANGINA PECTORIS: ICD-10-CM

## 2020-09-22 DIAGNOSIS — R06.02 SOB (SHORTNESS OF BREATH): ICD-10-CM

## 2020-09-22 LAB
ANION GAP SERPL CALCULATED.3IONS-SCNC: 13 MMOL/L (ref 7–19)
BUN BLDV-MCNC: 32 MG/DL (ref 8–23)
CALCIUM SERPL-MCNC: 10.3 MG/DL (ref 8.8–10.2)
CHLORIDE BLD-SCNC: 93 MMOL/L (ref 98–111)
CO2: 35 MMOL/L (ref 22–29)
CREAT SERPL-MCNC: 1.1 MG/DL (ref 0.5–0.9)
GFR AFRICAN AMERICAN: 59
GFR NON-AFRICAN AMERICAN: 48
GLUCOSE BLD-MCNC: 80 MG/DL (ref 74–109)
POTASSIUM SERPL-SCNC: 3.3 MMOL/L (ref 3.5–5)
PRO-BNP: 2104 PG/ML (ref 0–1800)
SODIUM BLD-SCNC: 141 MMOL/L (ref 136–145)

## 2020-09-22 PROCEDURE — 1036F TOBACCO NON-USER: CPT | Performed by: NURSE PRACTITIONER

## 2020-09-22 PROCEDURE — 99214 OFFICE O/P EST MOD 30 MIN: CPT | Performed by: NURSE PRACTITIONER

## 2020-09-22 PROCEDURE — 3017F COLORECTAL CA SCREEN DOC REV: CPT | Performed by: NURSE PRACTITIONER

## 2020-09-22 PROCEDURE — G8427 DOCREV CUR MEDS BY ELIG CLIN: HCPCS | Performed by: NURSE PRACTITIONER

## 2020-09-22 PROCEDURE — G8400 PT W/DXA NO RESULTS DOC: HCPCS | Performed by: NURSE PRACTITIONER

## 2020-09-22 PROCEDURE — 93000 ELECTROCARDIOGRAM COMPLETE: CPT | Performed by: NURSE PRACTITIONER

## 2020-09-22 PROCEDURE — 1090F PRES/ABSN URINE INCON ASSESS: CPT | Performed by: NURSE PRACTITIONER

## 2020-09-22 PROCEDURE — 1123F ACP DISCUSS/DSCN MKR DOCD: CPT | Performed by: NURSE PRACTITIONER

## 2020-09-22 PROCEDURE — G8419 CALC BMI OUT NRM PARAM NOF/U: HCPCS | Performed by: NURSE PRACTITIONER

## 2020-09-22 PROCEDURE — 4040F PNEUMOC VAC/ADMIN/RCVD: CPT | Performed by: NURSE PRACTITIONER

## 2020-09-22 RX ORDER — METOPROLOL TARTRATE 100 MG/1
50 TABLET ORAL 2 TIMES DAILY
COMMUNITY
End: 2021-03-25

## 2020-09-22 NOTE — PROGRESS NOTES
attack. + fatigue.  + SOA. + angina x 1 since stent - relief with NTG SL. Alex Herman has the following history as recorded in [facility]:  Patient Active Problem List   Diagnosis Code    Mixed hyperlipidemia E78.2    Arthritis M19.90    Coronary artery disease involving native coronary artery of native heart I25.10    Carotid artery stenosis I65.29    Calculus of gallbladder without cholecystitis without obstruction K80.20    Mitral valve stenosis I05.0    Mitral valve insufficiency I34.0    PUD (peptic ulcer disease) K27.9    Atherosclerosis of native artery of extremity with intermittent claudication (HCC) I70.219    Pulmonary hypertension I27.20    PAD (peripheral artery disease) ([facility]) I73.9    Nocturnal hypoxia G47.34    Acute superficial venous thrombosis of right lower extremity I82.811    Non-pressure chronic ulcer of right calf with fat layer exposed ([address] L97.212    Decubitus ulcer of right buttock, stage 3 ([facility]) L89.313    Severe malnutrition ([address] N47    Diastolic dysfunction U27.08    Anemia in chronic kidney disease (CKD) N18.9, D63.1    Nonhealing nonsurgical wound with fat layer exposed T14. 8XXA    Atherosclerosis of native arteries of left leg with ulceration of calf (HCC) I70.242    Atherosclerosis of native artery of right lower extremity with ulceration of ankle (HCC) I70.233    Atherosclerosis of native arteries of right leg with ulceration of other part of lower right leg I70.238    Atherosclerosis of native arteries of right leg with ulceration of other part of foot I70.235    Nonhealing ulcer of right lower leg with fat layer exposed ([address] L97.912    Non-pressure chronic ulcer of right ankle with fat layer exposed ([address] L97.312    Neuropathic ulcer of right foot with fat layer exposed ([address] L97.512    Hypervolemia E87.70    Atherosclerosis of native artery of extremity with intermittent claudication (HCC) I70.219    CHF (congestive heart failure) ([address] I50.9    CKD (chronic kidney disease), stage III ([facility]) N18.3    Pulmonary emphysema ([facility]) J43.9    COPD without exacerbation ([facility]) J44.9    PVD (peripheral vascular disease) ([facility]) I73.9    Wound of sacral region S31.000A    Elevated TSH R79.89    Bilateral carotid artery stenosis I65.23    Obstruction of left carotid artery I65.22    Bradycardia R00.1    Sepsis with organ dysfunction ([facility]) A41.9, R65.20    NSTEMI (non-ST elevated myocardial infarction) ([facility]) I21.4    HCAP (healthcare-associated pneumonia) J18.9    Acute renal failure (ARF) ([facility]) N17.9    Syncope and collapse R55    Essential hypertension I10    Mild aortic stenosis I35.0    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    Carotid aneurysm, right ([facility]) I72.0    Postoperative anemia due to acute blood loss D62    Status post Maze operation for atrial fibrillation Z98.890, Z86.79    PAF (paroxysmal atrial fibrillation) ([facility]) I48.0    S/P coronary artery bypass graft x 1 Z95.1    S/P mitral valve replacement Z95.2    Chest pain R07.9    SOB (shortness of breath) R06.02    Fatigue R53.83    Pain in both lower extremities M79.604, M79.605    History of coronary artery stent placement Z95.5    Myalgia M79.10    Unstable angina ([facility]) I20.0    Acute diastolic heart failure ([facility]) I50.31     Past Medical History:   Diagnosis Date    Aortic stenosis     Arthritis     Atherosclerosis of native arteries of the extremities with intermittent claudication 7/25/2011    [facility], right [facility]     Carotid artery occlusion     CHF (congestive heart failure) ([facility])     COPD (chronic obstructive pulmonary disease) ([facility]     History of blood transfusion 2016    Post op, was taking blood thinner    Hyperlipidemia     Hypertension     MI (myocardial infarction) ([facility] 2009    x2    Mitral valve stenosis     Pneumonia     Post-menopausal     PUD (peptic ulcer disease) 2009    Renal failure 2009    after ulcer perforation 12x6cm total area 280cm squared. TJR    UPPER GASTROINTESTINAL ENDOSCOPY  3/15/16    Dr Ba Smith  3/15/2016    EGD BIOPSY performed by Anil Harris DO at [address] [facility]    VASCULAR SURGERY  16 T[facility]    Aortagram and right leg runoff,right leg runoff,right common iliac artery selection for right leg run off views.  VASCULAR SURGERY      . Open transluminal angioplasty and stenting of the external iliac artery. TJR    VASCULAR SURGERY  2019    TJR. 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      Family History   Problem Relation Age of Onset    Diabetes Mother     Heart Disease Mother     Heart Failure Mother     Diabetes Sister     Heart Disease Sister     High Blood Pressure Sister      Social History     Tobacco Use    Smoking status: Former Smoker     Years: 2.00     Types: Cigarettes     Last attempt to quit: 1980     Years since quittin.7    Smokeless tobacco: Never Used   Substance Use Topics    Alcohol use: Yes     Comment: rarely maybe twice a year      Current Outpatient Medications   Medication Sig Dispense Refill    metoprolol (LOPRESSOR) 100 MG tablet Take 50 mg by mouth 2 times daily      nitroGLYCERIN (NITROSTAT) 0.4 MG SL tablet Place 1 tablet under the tongue every 5 minutes as needed for Chest pain 25 tablet 3    levothyroxine (SYNTHROID) 25 MCG tablet Take 25 mcg by mouth Daily       Fexofenadine HCl (MUCINEX ALLERGY PO) Take 1 tablet by mouth 2 times daily       Arformoterol Tartrate (BROVANA) 15 MCG/2ML NEBU Inhale 15 mcg into the lungs      OXYGEN Inhale 2 L into the lungs daily       levalbuterol (XOPENEX) 1.25 MG/3ML nebulizer solution Inhale 3 mLs into the lungs      clopidogrel (PLAVIX) 75 MG tablet Take 1 tablet by mouth daily incision. Neurologic - no speech difficulty, facial asymmetry or lateralizing weakness. No seizures, presyncope or syncope. No significant dizziness. Hematologic - no easy bruising or excessive bleeding. Psychiatric - no severe anxiety or insomnia. No confusion. All other review of systems are negative. Objective  Vital Signs - /70   Pulse 80   Ht 5' 3\" (1.6 m)   Wt 90 lb (40.8 kg)   SpO2 90%   BMI 15.94 kg/m²   General - Argelia Munoz is alert, cooperative, and pleasant. Well groomed. No acute distress. Body habitus - Body mass index is 15.94 kg/m². HEENT - Head is normocephalic. No circumoral cyanosis. Dentition is normal.  EYES -   Lids normal without ptosis. No discharge, edema or subconjunctival hemorrhage. Neck - Symmetrical without apparent mass or lymphadenopathy. Respiratory - Normal respiratory effort without use of accessory muscles. Ausculatation reveals vesicular breath sounds without crackles, wheezes, rub or rhonchi. Cardiovascular - No jugular venous distention. Auscultation reveals regular rate and rhythm. No audible clicks, gallop or rub. No murmur. No lower extremity varicosities. No carotid bruits. Abdominal -  No visible distention, mass or pulsations. Extremities - No clubbing or cyanosis. No statis dermatitis or ulcers. No edema. Musculoskeletal -   No Osler's nodes. No kyphosis or scoliosis. Gait is even and regular without limp or shuffle. Ambulates without assistance. Skin -  Warm and dry; no rash or pallor. No new surgical wound. Neurological - No focal neurological deficits. Thought processes coherent. No apparent tremor. Oriented to person, place and time. Psychiatric -  Appropriate affect and mood. Assessment:     Diagnosis Orders   1. Coronary artery disease involving native coronary artery of native heart without angina pectoris  Basic Metabolic Panel    proBNP, N-TERMINAL    EKG 12 lead   2.  Status post Maze operation for atrial fibrillation     3. Diastolic dysfunction  Basic Metabolic Panel    proBNP, N-TERMINAL   4. S/P coronary artery bypass graft x 1     5. Essential hypertension  Basic Metabolic Panel    proBNP, N-TERMINAL    EKG 12 lead   6. Mixed hyperlipidemia     7. History of coronary artery stent placement     8. PAF (paroxysmal atrial fibrillation) ([address]     9. SOB (shortness of breath)  Basic Metabolic Panel    proBNP, N-TERMINAL   EKG reviewed:  NSR 84 BPM; occasional PACs. No change in comparison to previous EKG. Data reviewed:  7/30/20 cath   Peripheral Procedure Description    Double vessel disease with obstructive ostial RCA stenosis and  intermediate ostial circumflex stenosis. Severely diseased SVG to distal RCA. Patent stent in proximal RCA. Patent stent in mid RCA. Patent stent in proximal circumflex. Occluded right renal artery. Severely stenosed left renal artery feeding solitary kidney. Hyperdynamic LV systolic function, ejection fraction 70% with moderate  left ventricular hypertrophy. Successful PCI to the ostial RCA (3.5 x 9 mm resolute integrity stent)  utilizing drug-eluting stent. Successful endovascular intervention to proximal left renal artery (5.0 x  22 mm resolute jaylan stent) utilizing drug-eluting stent. Recommendations    Medical management. Hypertensive management.     Signatures    ----------------------------------------------------------------   Electronically signed by Cedric Holly MD(Performing Physician) on  07/30/2020 18:27   ----------------------------------------------------------------    Lab Results   Component Value Date     09/22/2020    K 3.3 (L) 09/22/2020    CL 93 (L) 09/22/2020    CO2 35 (H) 09/22/2020    BUN 32 (H) 09/22/2020    CREATININE 1.1 (H) 09/22/2020    GLUCOSE 80 09/22/2020    CALCIUM 10.3 (H) 09/22/2020    PROT 8.0 07/30/2020    LABALBU 4.4 07/30/2020    BILITOT 0.6 07/30/2020    ALKPHOS 102 07/30/2020    AST 23 07/30/2020    ALT 11 07/30/2020    LABGLOM 48 (A) 09/22/2020    GFRAA 59 (L) 09/22/2020    GLOB 4.6 08/29/2016       Lab Results   Component Value Date    WBC 9.9 07/30/2020    HGB 10.2 (L) 07/30/2020    HCT 35.0 (L) 07/30/2020    MCV 83.1 07/30/2020     07/30/2020     Lab Results   Component Value Date    CHOL 169 07/30/2020    CHOL 125 (L) 03/15/2016     Lab Results   Component Value Date    TRIG 55 07/30/2020    TRIG 123 (L) 03/15/2016     Lab Results   Component Value Date    HDL 82 07/30/2020    HDL 11 (L) 03/15/2016     Lab Results   Component Value Date    LDLCALC 76 07/30/2020    LDLCALC 89 03/15/2016     CAD - stable on current medical management. DD - weight is stable. Check BMP and BNP today. HTN - normotensive on current regimen. PAF - no recurrent AF. Hyperlipidemia - on Lipitor. LDL 76.    Patient is compliant with medication regimen. BP Readings from Last 3 Encounters:   09/22/20 118/70   08/18/20 118/68   08/05/20 137/65    Pulse Readings from Last 3 Encounters:   09/22/20 80   08/18/20 82   08/05/20 83        Wt Readings from Last 3 Encounters:   09/22/20 90 lb (40.8 kg)   08/18/20 88 lb (39.9 kg)   08/05/20 88 lb (39.9 kg)     Plan  Previous cardiac history and records reviewed. Continue current medical management. Check BMP and BNP today. Continue other current medications as directed. Continue to follow up with primary care provider for non cardiac medical problems. Call the office with any problems, questions or concerns at [phone]. Cardiology follow up: 2 weeks. Educational included in patient instructions. Heart health. NTG SL.      BEBO Kingsley

## 2020-09-22 NOTE — PATIENT INSTRUCTIONS
clog arteries and lead to heart disease and stroke. When you control your cholesterol you are giving your arteries their best chance to remain clear. It is recommended that you get cholesterol lab work done once a year. 3) Reduce blood sugar - most of the food we eat is turning into glucose or blood sugar that our body uses for energy. Over time, high levels of blood sugar can damage your heart, kidneys, eyes and nerves. 4) Get active - living an active life is one of the most rewarding gifts you can give yourself and those you love. Simply put, daily physical activity increases your length and quality of life. Strive to exercise 15 minutes most days of the week. 5)  Eat better - A healthy diet is one of your best weapons for fighting cardiovascular disease. When you eat a heart healthy diet, you improve your chances for feeling good and staying healthy for life. 6)  Lose weight - when you shed extra fat an unnecessary pounds, you reduce the burden on your hear, lungs, blood vessels and skeleton. You give yourself the gift of active living, you lower your blood pressure and help yourself feel better. 7) Stop smoking - cigarette smokers have a higher risk of developing cardiovascular disease. If  You smoke, quitting is the best thing you can do for your health. Check American Heart Association on line for more information on Life's Simple 7 and tips for healthy living. A Healthy Heart: Care Instructions  Your Care Instructions     Coronary artery disease, also called heart disease, occurs when a substance called plaque builds up in the vessels that supply oxygen-rich blood to your heart muscle. This can narrow the blood vessels and reduce blood flow. A heart attack happens when blood flow is completely blocked. A high-fat diet, smoking, and other factors increase the risk of heart disease. Your doctor has found that you have a chance of having heart disease.  You can do lots of things to keep your heart healthy. It may not be easy, but you can change your diet, exercise more, and quit smoking. These steps really work to lower your chance of heart disease. Follow-up care is a key part of your treatment and safety. Be sure to make and go to all appointments, and call your doctor if you are having problems. It's also a good idea to know your test results and keep a list of the medicines you take. How can you care for yourself at home? Diet  · Use less salt when you cook and eat. This helps lower your blood pressure. Taste food before salting. Add only a little salt when you think you need it. With time, your taste buds will adjust to less salt. · Eat fewer snack items, fast foods, canned soups, and other high-salt, high-fat, processed foods. · Read food labels and try to avoid saturated and trans fats. They increase your risk of heart disease by raising cholesterol levels. · Limit the amount of solid fat-butter, margarine, and shortening-you eat. Use olive, peanut, or canola oil when you cook. Bake, broil, and steam foods instead of frying them. · Eat a variety of fruit and vegetables every day. Dark green, deep orange, red, or yellow fruits and vegetables are especially good for you. Examples include spinach, carrots, peaches, and berries. · Foods high in fiber can reduce your cholesterol and provide important vitamins and minerals. High-fiber foods include whole-grain cereals and breads, oatmeal, beans, brown rice, citrus fruits, and apples. · Eat lean proteins. Heart-healthy proteins include seafood, lean meats and poultry, eggs, beans, peas, nuts, seeds, and soy products. · Limit drinks and foods with added sugar. These include candy, desserts, and soda pop. Lifestyle changes  · If your doctor recommends it, get more exercise. Walking is a good choice. Bit by bit, increase the amount you walk every day. Try for at least 30 minutes on most days of the week.  You also may want to swim, bike, or do other activities. · Do not smoke. If you need help quitting, talk to your doctor about stop-smoking programs and medicines. These can increase your chances of quitting for good. Quitting smoking may be the most important step you can take to protect your heart. It is never too late to quit. · Limit alcohol to 2 drinks a day for men and 1 drink a day for women. Too much alcohol can cause health problems. · Manage other health problems such as diabetes, high blood pressure, and high cholesterol. If you think you may have a problem with alcohol or drug use, talk to your doctor. Medicines  · Take your medicines exactly as prescribed. Call your doctor if you think you are having a problem with your medicine. · If your doctor recommends aspirin, take the amount directed each day. Make sure you take aspirin and not another kind of pain reliever, such as acetaminophen (Tylenol). When should you call for help? [id number] if you have symptoms of a heart attack. These may include:  · Chest pain or pressure, or a strange feeling in the chest.  · Sweating. · Shortness of breath. · Pain, pressure, or a strange feeling in the back, neck, jaw, or upper belly or in one or both shoulders or arms. · Lightheadedness or sudden weakness. · A fast or irregular heartbeat. After you call 911, the  may tell you to chew 1 adult-strength or 2 to 4 low-dose aspirin. Wait for an ambulance. Do not try to drive yourself. Watch closely for changes in your health, and be sure to contact your doctor if you have any problems. Where can you learn more? Go to https://[employer]. org and sign in to your [employer] account. Enter F364 in the [facility] box to learn more about \"A Healthy Heart: Care Instructions. \"     If you do not have an account, please click on the \"Sign Up Now\" link. Current as of: December 16, 2019               Content Version: 12.5  © 9240-4056 Healthwise, [facility].    Care instructions adapted under license by [facility] ([facility]). If you have questions about a medical condition or this instruction, always ask your healthcare professional. [address] any warranty or liability for your use of this information.

## 2020-09-24 RX ORDER — POTASSIUM CHLORIDE 20 MEQ/1
20 TABLET, EXTENDED RELEASE ORAL DAILY
Qty: 30 TABLET | Refills: 3 | Status: SHIPPED | OUTPATIENT
Start: 2020-09-24 | End: 2020-12-23

## 2020-10-01 DIAGNOSIS — R07.9 CHEST PAIN, UNSPECIFIED TYPE: ICD-10-CM

## 2020-10-01 DIAGNOSIS — I50.22 CHRONIC SYSTOLIC HEART FAILURE (HCC): ICD-10-CM

## 2020-10-01 DIAGNOSIS — I48.91 ATRIAL FIBRILLATION, UNSPECIFIED TYPE (HCC): ICD-10-CM

## 2020-10-01 DIAGNOSIS — I50.22 CHRONIC SYSTOLIC CONGESTIVE HEART FAILURE (HCC): ICD-10-CM

## 2020-10-01 DIAGNOSIS — I10 ESSENTIAL HYPERTENSION: ICD-10-CM

## 2020-10-01 DIAGNOSIS — I48.20 CHRONIC ATRIAL FIBRILLATION (HCC): ICD-10-CM

## 2020-10-01 DIAGNOSIS — R06.02 SHORTNESS OF BREATH: ICD-10-CM

## 2020-10-01 LAB
ANION GAP SERPL CALCULATED.3IONS-SCNC: 12 MMOL/L (ref 7–19)
BUN BLDV-MCNC: 28 MG/DL (ref 8–23)
CALCIUM SERPL-MCNC: 10.3 MG/DL (ref 8.8–10.2)
CHLORIDE BLD-SCNC: 92 MMOL/L (ref 98–111)
CO2: 34 MMOL/L (ref 22–29)
CREAT SERPL-MCNC: 1.2 MG/DL (ref 0.5–0.9)
GFR AFRICAN AMERICAN: 53
GFR NON-AFRICAN AMERICAN: 44
GLUCOSE BLD-MCNC: 88 MG/DL (ref 74–109)
POTASSIUM SERPL-SCNC: 3.9 MMOL/L (ref 3.5–5)
SODIUM BLD-SCNC: 138 MMOL/L (ref 136–145)

## 2020-10-06 ENCOUNTER — OFFICE VISIT (OUTPATIENT)
Dept: CARDIOLOGY | Age: 75
End: 2020-10-06
Payer: MEDICARE

## 2020-10-06 VITALS
BODY MASS INDEX: 15.41 KG/M2 | HEART RATE: 98 BPM | DIASTOLIC BLOOD PRESSURE: 70 MMHG | WEIGHT: 87 LBS | HEIGHT: 63 IN | SYSTOLIC BLOOD PRESSURE: 118 MMHG

## 2020-10-06 PROBLEM — I48.20 CHRONIC ATRIAL FIBRILLATION (HCC): Status: ACTIVE | Noted: 2020-10-06

## 2020-10-06 PROCEDURE — G8427 DOCREV CUR MEDS BY ELIG CLIN: HCPCS | Performed by: NURSE PRACTITIONER

## 2020-10-06 PROCEDURE — 1123F ACP DISCUSS/DSCN MKR DOCD: CPT | Performed by: NURSE PRACTITIONER

## 2020-10-06 PROCEDURE — 1090F PRES/ABSN URINE INCON ASSESS: CPT | Performed by: NURSE PRACTITIONER

## 2020-10-06 PROCEDURE — G8419 CALC BMI OUT NRM PARAM NOF/U: HCPCS | Performed by: NURSE PRACTITIONER

## 2020-10-06 PROCEDURE — G8926 SPIRO NO PERF OR DOC: HCPCS | Performed by: NURSE PRACTITIONER

## 2020-10-06 PROCEDURE — 4040F PNEUMOC VAC/ADMIN/RCVD: CPT | Performed by: NURSE PRACTITIONER

## 2020-10-06 PROCEDURE — G8400 PT W/DXA NO RESULTS DOC: HCPCS | Performed by: NURSE PRACTITIONER

## 2020-10-06 PROCEDURE — G8484 FLU IMMUNIZE NO ADMIN: HCPCS | Performed by: NURSE PRACTITIONER

## 2020-10-06 PROCEDURE — 3023F SPIROM DOC REV: CPT | Performed by: NURSE PRACTITIONER

## 2020-10-06 PROCEDURE — 3017F COLORECTAL CA SCREEN DOC REV: CPT | Performed by: NURSE PRACTITIONER

## 2020-10-06 PROCEDURE — 1036F TOBACCO NON-USER: CPT | Performed by: NURSE PRACTITIONER

## 2020-10-06 PROCEDURE — 99214 OFFICE O/P EST MOD 30 MIN: CPT | Performed by: NURSE PRACTITIONER

## 2020-10-06 NOTE — PATIENT INSTRUCTIONS
New instructions for today:  You can try resuming Lasix 40 mg daily. Use Ocean nasal spray to help keep your nose mucosa moist.     Try taking Imdur 30 mg (2) tabs at bedtime. Resume Plavix. Weigh yourself daily without clothing at the same time each day. Record a daily weight log. Call the office if you gain 3 pounds or more in 2 to 3 days or 5 pounds in one week. A sudden weight gain may mean that your heart failure is getting worse. Sodium causes your body to hold on to extra water. This may cause your heart failure symptoms to get worse. Limit sodium to 2,000 milligrams (mg) a day or less. That is less than 1 teaspoon of salt a day, including all the salt you eat in cooking or in packaged foods. Fluid restriction of 1500ml per day (about 6 cups of fluid per day). How to take:  NITROGLYCERIN (Nitrostat) 0.4 mg tablets, sublingual.  Nitroglycerin is in a group of drugs called nitrates. Nitroglycerin dilates (widens) blood vessels, making it easier for blood to flow through them and easier for the heart to pump. Dosing Guidelines for Nitroglycerin Tablets  · At the start of an angina (chest pain) attack, place one tablet under the tongue or between the cheek and gum. Do not swallow or chew the tablet; let it dissolve on its own. If necessary, a second and third tablet may be used, with five minutes between using each tablet. If you use a third tablet and your chest pain continues, it is time to seek immediate medical attention. Call 911 immediately and have someone drive you to the emergency room. You may be having a heart attack or other serious heart problem. · To prevent angina from exercise or stress, use 1 tablet 5 to 10 minutes before the activity. Patient Instructions:  Continue current medications as prescribed. Always keep a current medication list. Bring your medications to every office visit.    Continue to follow up with primary care provider for non cardiac medical problems. Call the office with any problems, questions or concerns at [phone]. If you have been asked to keep a blood pressure log, do so for 2 weeks. Call the office to report readings to the triage nurse at [phone]. Follow up with cardiologist as scheduled. The following educational material has been included in this after visit summary for your review: Life simple 7. Heart health. Life simple 7  1) Manage blood pressure - high blood pressure is a major risk factor for heart disease and stroke. Keeping blood pressure in health range reduces strain on your heart, arteries and kidneys. Blood pressure goal is less than 130/80. 2) Control cholesterol - contributes to plaque, which can clog arteries and lead to heart disease and stroke. When you control your cholesterol you are giving your arteries their best chance to remain clear. It is recommended that you get cholesterol lab work done once a year. 3) Reduce blood sugar - most of the food we eat is turning into glucose or blood sugar that our body uses for energy. Over time, high levels of blood sugar can damage your heart, kidneys, eyes and nerves. 4) Get active - living an active life is one of the most rewarding gifts you can give yourself and those you love. Simply put, daily physical activity increases your length and quality of life. Strive to exercise 15 minutes most days of the week. 5)  Eat better - A healthy diet is one of your best weapons for fighting cardiovascular disease. When you eat a heart healthy diet, you improve your chances for feeling good and staying healthy for life. 6)  Lose weight - when you shed extra fat an unnecessary pounds, you reduce the burden on your hear, lungs, blood vessels and skeleton. You give yourself the gift of active living, you lower your blood pressure and help yourself feel better. 7) Stop smoking - cigarette smokers have a higher risk of developing cardiovascular disease.   If You smoke, quitting is the best thing you can do for your health. Check American Heart Association on line for more information on Life's Simple 7 and tips for healthy living. A Healthy Heart: Care Instructions  Your Care Instructions     Coronary artery disease, also called heart disease, occurs when a substance called plaque builds up in the vessels that supply oxygen-rich blood to your heart muscle. This can narrow the blood vessels and reduce blood flow. A heart attack happens when blood flow is completely blocked. A high-fat diet, smoking, and other factors increase the risk of heart disease. Your doctor has found that you have a chance of having heart disease. You can do lots of things to keep your heart healthy. It may not be easy, but you can change your diet, exercise more, and quit smoking. These steps really work to lower your chance of heart disease. Follow-up care is a key part of your treatment and safety. Be sure to make and go to all appointments, and call your doctor if you are having problems. It's also a good idea to know your test results and keep a list of the medicines you take. How can you care for yourself at home? Diet  · Use less salt when you cook and eat. This helps lower your blood pressure. Taste food before salting. Add only a little salt when you think you need it. With time, your taste buds will adjust to less salt. · Eat fewer snack items, fast foods, canned soups, and other high-salt, high-fat, processed foods. · Read food labels and try to avoid saturated and trans fats. They increase your risk of heart disease by raising cholesterol levels. · Limit the amount of solid fat-butter, margarine, and shortening-you eat. Use olive, peanut, or canola oil when you cook. Bake, broil, and steam foods instead of frying them. · Eat a variety of fruit and vegetables every day. Dark green, deep orange, red, or yellow fruits and vegetables are especially good for you.  Examples sudden weakness. · A fast or irregular heartbeat. After you call 911, the  may tell you to chew 1 adult-strength or 2 to 4 low-dose aspirin. Wait for an ambulance. Do not try to drive yourself. Watch closely for changes in your health, and be sure to contact your doctor if you have any problems. Where can you learn more? Go to https://[employer]abags.ALENTY. org and sign in to your [employer] account. Enter S827 in the boo-box box to learn more about \"A Healthy Heart: Care Instructions. \"     If you do not have an account, please click on the \"Sign Up Now\" link. Current as of: December 16, 2019               Content Version: 12.5  © 6085-4678 Healthwise, [employer]. Care instructions adapted under license by [facility] ([facility]). If you have questions about a medical condition or this instruction, always ask your healthcare professional. [place] [address] any warranty or liability for your use of this information.

## 2020-10-06 NOTE — PROGRESS NOTES
[facility], [place]. [address], [place] [address] [facility]  [phone] office  [phone] fax      OFFICE VISIT:  10/6/2020    Kaitlin Macdonald - : 1945  Reason For Visit:  Yeni Rogers is a 76 y.o. female who is here for Coronary Artery Disease (no cardiac issues today )    History:   Diagnosis Orders   1. Coronary artery disease involving native coronary artery of native heart without angina pectoris     2. S/P coronary artery bypass graft x 1     3. Status post Maze operation for atrial fibrillation     4. Mixed hyperlipidemia     5. PAF (paroxysmal atrial fibrillation) ([facility]     6. History of coronary artery stent placement     7. Chronic obstructive pulmonary disease, unspecified COPD type [facility])       The patient presents today for cardiology follow up. The patient was referred by her lung doctor on 20 due to elevated BNP. A recheck at last visit was pro BNP was 2,104. It was over 6,000 in 2016. The patient reports continued SOA on higher dose of Lasix, has noticed no change. She reports no issues with edema or orthopnea. Weight is down 3 pounds from last visit. Prior to last office, visit, Dr. Noah Steel, while on call, advised patient to increase Lasix to 40 mg AM and 20 mg PM.  Labs were not received from pulmonology for review. The patient denies angina. She continues to have SOA [place] when I am inside and not everyday. \"  She uses oxygen therapy most all of the time. BP is well controlled on current regimen. The patient's PCP monitors cholesterol. Patient is an ex smoker. She cannot tolerate inhaler therapy. The patient also has anemia and reports cannot tolerate iron therapy. The patient also reports not taking Plavix last 2 days due to a nosebleed. No report of blood in urine or stool.      Yadira Bigg denies exertional chest pain, orthopnea, paroxysmal nocturnal dyspnea, syncope, presyncope, sensed arrhythmia and edema. The patient denies numbness or weakness to suggest cerebrovascular accident or transient ischemic attack. + stable JONES.  + fatigue. Maddie Locke has the following history as recorded in [facility]:  Patient Active Problem List   Diagnosis Code    Mixed hyperlipidemia E78.2    Arthritis M19.90    Coronary artery disease involving native coronary artery of native heart I25.10    Carotid artery stenosis I65.29    Calculus of gallbladder without cholecystitis without obstruction K80.20    Mitral valve stenosis I05.0    Mitral valve insufficiency I34.0    PUD (peptic ulcer disease) K27.9    Atherosclerosis of native artery of extremity with intermittent claudication (HCC) I70.219    Pulmonary hypertension I27.20    PAD (peripheral artery disease) ([facility]) I73.9    Nocturnal hypoxia G47.34    Acute superficial venous thrombosis of right lower extremity I82.811    Non-pressure chronic ulcer of right calf with fat layer exposed ([address] L97.212    Decubitus ulcer of right buttock, stage 3 ([facility]) L89.313    Severe malnutrition ([address] X92    Diastolic dysfunction R95.22    Anemia in chronic kidney disease (CKD) N18.9, D63.1    Nonhealing nonsurgical wound with fat layer exposed T14. 8XXA    Atherosclerosis of native arteries of left leg with ulceration of calf ([facility]) I70.242    Atherosclerosis of native artery of right lower extremity with ulceration of ankle ([facility]) I70.233    Atherosclerosis of native arteries of right leg with ulceration of other part of lower right leg I70.238    Atherosclerosis of native arteries of right leg with ulceration of other part of foot I70.235    Nonhealing ulcer of right lower leg with fat layer exposed ([address] L97.912    Non-pressure chronic ulcer of right ankle with fat layer exposed ([address] L97.312    Neuropathic ulcer of right foot with fat layer exposed ([address] L97.512    Hypervolemia E87.70    Atherosclerosis of native artery of extremity with intermittent claudication ([facility]) I70.219    CHF (congestive heart failure) ([facility]) I50.9    CKD (chronic kidney disease), stage III N18.30    Pulmonary emphysema ([facility]) J43.9    COPD without exacerbation ([facility]) J44.9    PVD (peripheral vascular disease) ([facility]) I73.9    Wound of sacral region S31.000A    Elevated TSH R79.89    Bilateral carotid artery stenosis I65.23    Obstruction of left carotid artery I65.22    Bradycardia R00.1    Sepsis with organ dysfunction ([facility]) A41.9, R65.20    NSTEMI (non-ST elevated myocardial infarction) ([facility]) I21.4    HCAP (healthcare-associated pneumonia) J18.9    Acute renal failure (ARF) ([facility]) N17.9    Syncope and collapse R55    Essential hypertension I10    Mild aortic stenosis I35.0    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    Carotid aneurysm, right ([facility]) I72.0    Postoperative anemia due to acute blood loss D62    Status post Maze operation for atrial fibrillation Z98.890, Z86.79    PAF (paroxysmal atrial fibrillation) ([facility]) I48.0    S/P coronary artery bypass graft x 1 Z95.1    S/P mitral valve replacement Z95.2    Chest pain R07.9    SOB (shortness of breath) R06.02    Fatigue R53.83    Pain in both lower extremities M79.604, M79.605    History of coronary artery stent placement Z95.5    Myalgia M79.10    Unstable angina ([facility]) I20.0    Acute diastolic heart failure ([facility]) I50.31    Chronic atrial fibrillation ([facility]) I48.20     Past Medical History:   Diagnosis Date    Aortic stenosis     Arthritis     Atherosclerosis of native arteries of the extremities with intermittent claudication 7/25/2011    Carotid aneurysm, right ([facility])     Carotid artery occlusion     CHF (congestive heart failure) ([facility])     COPD (chronic obstructive pulmonary disease) ([facility])     History of blood transfusion 2016    Post op, was taking blood thinner    Hyperlipidemia     Hypertension     MI [facility] infarction) [facility] 2009    x2    Mitral valve stenosis     Pneumonia     Post-menopausal     PUD (peptic ulcer disease) 2009    Renal failure 2009    after ulcer perforation sepsis.     Thyroid disease     Wound infection after surgery     right foot infection after cabg     Past Surgical History:   Procedure Laterality Date    ABDOMEN SURGERY  2009    perforated ulcer resection of 1/3 stomach    CARDIAC SURGERY      artificial valve and bypass    CAROTID ENDARTERECTOMY      Right  with Dacron patch angioplasty    CAROTID ENDARTERECTOMY Left     CAROTID ENDARTERECTOMY Right 2019    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 AND REPAIR WITH REVERSED LEFT GREATER SAPHENOUS VEIN INTERPOSITIONAL BYPASS GRAFT performed by Noman Corral MD at [place] [address] Right early 's    long ago    CATARACT REMOVAL WITH IMPLANT Bilateral      SECTION  1972    COLONOSCOPY      CORONARY ANGIOPLASTY WITH STENT PLACEMENT      CORONARY ANGIOPLASTY WITH STENT PLACEMENT      CORONARY ARTERY BYPASS GRAFT  2016    DIAGNOSTIC CARDIAC CATH LAB PROCEDURE      DILATION AND CURETTAGE OF UTERUS      ILIO-FEMORAL BYPASS GRAFT N/A 2016    OPEN TRANSLUMINAL BALLOON ANGIOPLASTY AND STENTING OF RIGHT COMMON AND EXTERNAL  ILIAC ARTERIES; RIGHT FEMORAL ENDARTERECTOMY WITH VEIN PATCH ANGIOPLASTY performed by Noman Corral MD at [facility] N/A 2016    MITRAL VALVE  REPLACEMENT LUONG-MAZE ABLATION WITH CRYO PROCEDURE, CORONARY ARTERY BYPASS GRAFT X 1 WITH ENDOSCOPIC VEIN HARVESTING WITH PERFUSION TRANSESOPHAGEAL ECHOCARDIOGRAM performed by Chay Arizmendi MD at [facility] MITRAL VALVE REPLACEMENT  2016    KS REOPER, CAROTID ENDARTEC>1 MON Left 2018    REMOVAL OF HEMATOMA, LEFT CAROTID ARTERY performed by Noman Corral MD at [address] Left 2018    LEFT CAROTID ENDARTERECTOMY WITH EEG MONITORING AND COMPLETION DUPLEX ULTRASOUND [facility]    PTCA      SKIN GRAFT Right 2016    Skin graft split thickness foot,ankle,and leg. Right leg 26x8cm and 12x6cm total area 280cm squared. TJR    UPPER GASTROINTESTINAL ENDOSCOPY  3/15/16    Dr Uriel Corrales  3/15/2016    EGD BIOPSY performed by Elisabeth Harris DO at [address] [facility]    VASCULAR SURGERY  16 TJR    Aortagram and right leg runoff,right leg runoff,right common iliac artery selection for right leg run off views.  VASCULAR SURGERY      . Open transluminal angioplasty and stenting of the external iliac artery. TJR    VASCULAR SURGERY  2019    TJR. 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      Family History   Problem Relation Age of Onset    Diabetes Mother     Heart Disease Mother     Heart Failure Mother     Diabetes Sister     Heart Disease Sister     High Blood Pressure Sister      Social History     Tobacco Use    Smoking status: Former Smoker     Years: 2.00     Types: Cigarettes     Last attempt to quit: 1980     Years since quittin.7    Smokeless tobacco: Never Used   Substance Use Topics    Alcohol use: Yes     Comment: rarely maybe twice a year      Current Outpatient Medications   Medication Sig Dispense Refill    potassium chloride (KLOR-CON M) 20 MEQ extended release tablet Take 1 tablet by mouth daily 30 tablet 3    metoprolol (LOPRESSOR) 100 MG tablet Take 50 mg by mouth 2 times daily      nitroGLYCERIN (NITROSTAT) 0.4 MG SL tablet Place 1 tablet under the tongue every 5 minutes as needed for Chest pain 25 tablet 3    levothyroxine (SYNTHROID) 25 MCG tablet Take 25 mcg by mouth Daily       Fexofenadine HCl (MUCINEX ALLERGY PO) Take 1 tablet by mouth 2 times daily       Arformoterol Tartrate (BROVANA) 15 MCG/2ML NEBU Inhale 15 mcg into the lungs      OXYGEN Inhale 2 L into the lungs daily       levalbuterol (XOPENEX) 1.25 MG/3ML nebulizer solution Inhale 3 mLs into the lungs      clopidogrel (PLAVIX) 75 MG tablet Take 1 tablet by mouth daily 90 tablet 3    furosemide (LASIX) 40 MG tablet Take 1 tablet by mouth daily (Patient taking differently: Take 40 mg by mouth daily Take 20 mg in the afternoon) 90 tablet 3    aspirin EC 81 MG EC tablet Take 1 tablet by mouth daily (Patient taking differently: Take 81 mg by mouth nightly PT TAKES AT NIGHT.) 30 tablet 3    isosorbide mononitrate (IMDUR) 30 MG extended release tablet Take 1 tablet by mouth nightly 90 tablet 3    atorvastatin (LIPITOR) 40 MG tablet Take 1 tablet by mouth daily (Patient taking differently: Take 40 mg by mouth nightly PT TAKES AT NIGHT.) 90 tablet 3    calcium-cholecalciferol (CALCIUM 500+D) 500-200 MG-UNIT per tablet Take 2 tablets by mouth daily       Biotin 5000 MCG TABS Take 1 tablet by mouth daily        No current facility-administered medications for this visit. Allergies: Levaquin [levofloxacin in d5w]; Xarelto [rivaroxaban]; and Morphine    Review of Systems  Constitutional - no appetite change, or unexpected weight change. No fever, chills or diaphoresis. + fatigue. HEENT - no significant rhinorrhea or epistaxis. No tinnitus or significant hearing loss. Eyes - no sudden vision change or amaurosis. No corneal arcus, xantholasma, subconjunctival hemorrhage or discharge. Respiratory - no significant wheezing, stridor, apnea or cough.  + JONES. Cardiovascular - no exertional chest pain to suggest myocardial ischemia. No orthopnea or PND. No sensation of sustained arrythmia. No occurrence of slow heart rate. No palpitations. No claudication. Gastrointestinal - no abdominal swelling or pain. No blood in stool. No severe constipation, diarrhea, nausea, or vomiting. Genitourinary - no dysuria, frequency, or urgency.  No flank pain or hematuria. Musculoskeletal - no back pain or myalgia. No problems with gait. Extremities - no clubbing, cyanosis or extremity edema. Skin - no color change or rash. No pallor. No new surgical incision. Neurologic - no speech difficulty, facial asymmetry or lateralizing weakness. No seizures, presyncope or syncope. No significant dizziness. Hematologic - no easy bruising or excessive bleeding. Psychiatric - no severe anxiety or insomnia. No confusion. All other review of systems are negative. Objective  Vital Signs - /70   Pulse 98   Ht 5' 3\" (1.6 m)   Wt 87 lb (39.5 kg)   BMI 15.41 kg/m²   General - Ning Khan is alert, cooperative, and pleasant. Well groomed. No acute distress. Body habitus - Body mass index is 15.41 kg/m². HEENT - Head is normocephalic. No circumoral cyanosis. Dentition is normal.  EYES -   Lids normal without ptosis. No discharge, edema or subconjunctival hemorrhage. Neck - Symmetrical without apparent mass or lymphadenopathy. Respiratory - Normal respiratory effort without use of accessory muscles. Ausculatation reveals vesicular breath sounds without crackles, wheezes, rub or rhonchi. Cardiovascular - No jugular venous distention. Auscultation reveals regular rate and rhythm. No audible clicks, gallop or rub. No murmur. No lower extremity varicosities. No carotid bruits. Abdominal -  No visible distention, mass or pulsations. Extremities - No clubbing or cyanosis. No statis dermatitis or ulcers. No edema. Musculoskeletal -   No Osler's nodes. No kyphosis or scoliosis. Gait is even and regular without limp or shuffle. Ambulates without assistance. Skin -  Warm and dry; no rash or pallor. No new surgical wound. Neurological - No focal neurological deficits. Thought processes coherent. No apparent tremor. Oriented to person, place and time. Psychiatric -  Appropriate affect and mood. Assessment:     Diagnosis Orders   1. Coronary artery disease involving native coronary artery of native heart without angina pectoris     2. S/P coronary artery bypass graft x 1     3. Status post Maze operation for atrial fibrillation     4. Mixed hyperlipidemia     5. PA[facility]     6. History of coronary artery stent placement     7. Chronic obstructive pulmonary disease, unspecified COPD type ([facility]       Data reviewed:  7/30/20 cath  Peripheral Procedure Description    Double vessel disease with obstructive ostial RCA stenosis and  intermediate ostial circumflex stenosis. Severely diseased SVG to distal RCA. Patent stent in proximal RCA. Patent stent in mid RCA. Patent stent in proximal circumflex. Occluded right renal artery. Severely stenosed left renal artery feeding solitary kidney. Hyperdynamic LV systolic function, ejection fraction 70% with moderate  left ventricular hypertrophy. Successful PCI to the ostial RCA (3.5 x 9 mm resolute integrity stent)  utilizing drug-eluting stent. Successful endovascular intervention to proximal left renal artery (5.0 x  22 mm resolute jaylan stent) utilizing drug-eluting stent. Recommendations    Medical management. Hypertensive management.     Signatures    ----------------------------------------------------------------   Electronically signed by Tr Holly MD(Performing Physician) on  07/30/2020 18:27   ----------------------------------------------------------------    Lab Results   Component Value Date     10/01/2020    K 3.9 10/01/2020    CL 92 (L) 10/01/2020    CO2 34 (H) 10/01/2020    BUN 28 (H) 10/01/2020    CREATININE 1.2 (H) 10/01/2020    GLUCOSE 88 10/01/2020    CALCIUM 10.3 (H) 10/01/2020    PROT 8.0 07/30/2020    LABALBU 4.4 07/30/2020    BILITOT 0.6 07/30/2020    ALKPHOS 102 07/30/2020    AST 23 07/30/2020    ALT 11 07/30/2020    LABGLOM 44 (A) 10/01/2020    GFRAA 53 (L) 10/01/2020    GLOB 4.6 08/29/2016       Lab Results   Component

## 2020-11-03 PROBLEM — I73.9 PAD (PERIPHERAL ARTERY DISEASE) (HCC): Status: RESOLVED | Noted: 2020-11-03 | Resolved: 2020-11-03

## 2020-11-05 ENCOUNTER — VIRTUAL VISIT (OUTPATIENT)
Dept: VASCULAR SURGERY | Age: 75
End: 2020-11-05
Payer: MEDICARE

## 2020-11-05 PROCEDURE — 99442 PR PHYS/QHP TELEPHONE EVALUATION 11-20 MIN: CPT | Performed by: NURSE PRACTITIONER

## 2020-11-05 NOTE — Clinical Note
Please look at her zan that is scheduled. She is suppose to have cvls at same time.   Please fix and call her

## 2020-11-05 NOTE — PROGRESS NOTES
Sagar Lanza is a 76 y.o. female evaluated via telephone on 11/5/2020. Consent:  She and/or health care decision maker is aware that that she may receive a bill for this telephone service, depending on her insurance coverage, and has provided verbal consent to proceed: Yes    Patient is located at home  Provider is located at [facility]   Also present during call is     She presents for follow up of carotid artery stenosis. She has a known history of carotid artery stenosis for 1 - 5 years. Her current treatment includes clopidogrel 75 mg po qd, ASA EC daily. She denies a history of CVA. She reports no TIA's, episodes of lateralizing weakness and episodes of amaurosis fugax. Nilsa Elizabeth has a history of peripheral vascular disease of the lower extremities. She has had this for 1 - 5 years. Current treatment includes clopidogrel 75 mg po qd, ASA EC daily. Nilsa Elizabeth has not had new wounds. Recently, she reports claudication at a distance of  - feet. Nilsa Elizabeth reports that the right leg is more signifcant than the left . She reports claudication is not changed and is mostly in the form of crampy type pain starting in the hips, thighs and calves. She has a short recovery time. This is reproducible in nature. She reports ischemic rest pain 0 times per night. She reports walking with cart does not help.   Legs ache all the time        Sagar Lanza is a 76 y.o. female with the following history reviewed and recorded in [facility]:  Patient Active Problem List    Diagnosis Date Noted    Unstable angina [facility])      Priority: High    Acute diastolic heart failure (HCC)      Priority: High    Bradycardia      Priority: High    Acute superficial venous thrombosis of right lower extremity 04/25/2016     Priority: High     With large RLE bulla lateral foot skin violaceous discoloration, acutely POA (venous backpressure)      Mitral valve stenosis 03/29/2016     Priority: High    Mitral valve 06/05/2016     Replacing Inactive Diagnoses      Atherosclerosis of native arteries of right leg with ulceration of other part of foot 06/05/2016     Replacing Inactive Diagnoses      Nonhealing ulcer of right lower leg with fat layer exposed ([facility] [address] 06/05/2016    Non-pressure chronic ulcer of right ankle with fat layer exposed ([facility] [address] 06/05/2016    Neuropathic ulcer of right foot with fat layer exposed ([facility] [address] 06/05/2016    Hypervolemia     Nonhealing nonsurgical wound with fat layer exposed 06/03/2016    Atherosclerosis of native arteries of left leg with ulceration of calf (HCC)     Severe malnutrition (HCC)     Diastolic dysfunction     Anemia in chronic kidney disease (CKD)     Non-pressure chronic ulcer of right calf with fat layer exposed ([facility] [address] 05/27/2016    Decubitus ulcer of right buttock, stage 3 ([facility] [address] 05/27/2016    Nocturnal hypoxia 03/30/2016     With associated mitral stenosis / regurgitation and pulmonary hypertension      Pulmonary hypertension 03/29/2016    Calculus of gallbladder without cholecystitis without obstruction     Carotid artery stenosis 02/08/2012    Atherosclerosis of native artery of extremity with intermittent claudication ([facility] [address] 07/25/2011     Replacing Inactive Diagnoses      Mixed hyperlipidemia     Arthritis     Coronary artery disease involving native coronary artery of native heart      3/16/2016  Echo  Severe MS, moderate to severe MR, RVSP 73 mmHg, normal LVFX  3/31/2016  Cath  70% osteal RCA, severe MR, MVA 1.9, normal LVFX  4/7//2016   MVR (23 mm [employer] Mosaic) VG-PDAD[facility]l [facility]sey)  5/7/2016  Echo  Normal LVFX, large pleural effusion, echolucency near preserved posterior Mitral leaflet, likely chordae, RVSP 72 mmHg       Current Outpatient Medications   Medication Sig Dispense Refill    potassium chloride (KLOR-CON M) 20 MEQ extended release tablet Take 1 tablet by mouth daily 30 tablet 3    metoprolol (LOPRESSOR) 100 MG tablet Take 50 mg by mouth 2 times daily      nitroGLYCERIN (NITROSTAT) 0.4 MG SL tablet Place 1 tablet under the tongue every 5 minutes as needed for Chest pain 25 tablet 3    levothyroxine (SYNTHROID) 25 MCG tablet Take 25 mcg by mouth Daily       Fexofenadine HCl (MUCINEX ALLERGY PO) Take 1 tablet by mouth 2 times daily       Arformoterol Tartrate (BROVANA) 15 MCG/2ML NEBU Inhale 15 mcg into the lungs      OXYGEN Inhale 2 L into the lungs daily       levalbuterol (XOPENEX) 1.25 MG/3ML nebulizer solution Inhale 3 mLs into the lungs      clopidogrel (PLAVIX) 75 MG tablet Take 1 tablet by mouth daily 90 tablet 3    furosemide (LASIX) 40 MG tablet Take 1 tablet by mouth daily (Patient taking differently: Take 40 mg by mouth daily Take 20 mg in the afternoon) 90 tablet 3    aspirin EC 81 MG EC tablet Take 1 tablet by mouth daily (Patient taking differently: Take 81 mg by mouth nightly PT TAKES AT NIGHT.) 30 tablet 3    isosorbide mononitrate (IMDUR) 30 MG extended release tablet Take 1 tablet by mouth nightly 90 tablet 3    atorvastatin (LIPITOR) 40 MG tablet Take 1 tablet by mouth daily (Patient taking differently: Take 40 mg by mouth nightly PT TAKES AT NIGHT.) 90 tablet 3    calcium-cholecalciferol (CALCIUM 500+D) 500-200 MG-UNIT per tablet Take 2 tablets by mouth daily       Biotin 5000 MCG TABS Take 1 tablet by mouth daily        No current facility-administered medications for this visit. Allergies: Levaquin [levofloxacin in d5w];  Xarelto [rivaroxaban]; and Morphine  Past Medical History:   Diagnosis Date    Aortic stenosis     Arthritis     Atherosclerosis of native arteries of the extremities with intermittent claudication 7/25/2011    Carotid aneurysm, right (HCC)     Carotid artery occlusion     [facility]est[facility] failure) [facility] [address]     COPD (chronic obstructive pulmonary disease) [facility] [address]     History of blood transfusion 2016    Post op, was taking blood thinner    Hyperlipidemia     Hypertension     MI ([facility]ocardial infarction) [facility]) 2009    x2    Mitral valve stenosis     Pneumonia     Post-menopausal     PUD (peptic ulcer disease) 2009    Renal failure 2009    after ulcer perforation sepsis.    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 AND REPAIR WITH REVERSED LEFT GREATER SAPHENOUS VEIN INTERPOSITIONAL BYPASS GRAFT performed by Brody Guerrero MD at [address] [facility] Right early 's    long ago    CATARACT REMOVAL WITH IMPLANT Bilateral      SECTION      COLONOSCOPY      CORONARY ANGIOPLASTY WITH STENT PLACEMENT      CORONARY ANGIOPLASTY WITH STENT PLACEMENT      CORONARY ARTERY BYPASS GRAFT      DIAGNOSTIC CARDIAC CATH LAB PROCEDURE      DILATION AND CURETTAGE OF UTERUS      ILIO-FEMORAL BYPASS GRAFT N/A 2016    OPEN TRANSLUMINAL BALLOON ANGIOPLASTY AND STENTING OF RIGHT COMMON AND EXTERNAL  ILIAC ARTERIES; RIGHT FEMORAL ENDARTERECTOMY WITH VEIN PATCH ANGIOPLASTY performed by Brody Guerrero MD at [address] [facility] N/A 2016    MITRAL VALVE  REPLACEMENT LUONG-MAZE ABLATION WITH CRYO PROCEDURE, CORONARY ARTERY BYPASS GRAFT X 1 WITH ENDOSCOPIC VEIN HARVESTING WITH PERFUSION TRANSESOPHAGEAL ECHOCARDIOGRAM performed by Michael Burleson MD at [address] [facility] MITRAL VALVE REPLACEMENT  2016    [place] REOPER, CAROTID ENDARTEC>1 MON Left 2018    REMOVAL OF HEMATOMA, LEFT CAROTID ARTERY performed by Brody Guerrero MD at [address] Left 2018    LEFT CAROTID ENDARTERECTOMY WITH EEG MONITORING AND COMPLETION DUPLEX ULTRASOUND performed by Ezio Benavides MD at [address] [facility]      SKIN GRAFT Right 2016    Skin graft split thickness foot,ankle,and leg. Right leg 26x8cm and 12x6cm total area 280cm squared. TJR    UPPER GASTROINTESTINAL ENDOSCOPY  3/15/16    Dr Jaclyn Jacobs  3/15/2016    EGD BIOPSY performed by Anselmo Harris DO at [address] [facility]    VASCULAR SURGERY  16 T[facility]    Aortagram and right leg runoff,right leg runoff,right common iliac artery selection for right leg run off views.  VASCULAR SURGERY      . Open transluminal angioplasty and stenting of the external iliac artery. TJR    VASCULAR SURGERY  2019    TJR. 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 Family History   Problem Relation Age of Onset    Diabetes Mother     Heart Disease Mother     Heart Failure Mother     Diabetes Sister     Heart Disease Sister     High Blood Pressure Sister      Social History     Tobacco Use    Smoking status: Former Smoker     Years: 2.00     Types: Cigarettes     Last attempt to quit: 1980     Years since quittin.8    Smokeless tobacco: Never Used   Substance Use Topics    Alcohol use: Yes     Comment: rarely maybe twice a year         Review of Systems    Constitutional - no significant activity change, appetite change, or unexpected weight change. No fever or chills. No diaphoresis or significant fatigue. HENT - no significant rhinorrhea or epistaxis. No tinnitus or significant hearing loss. Eyes - no sudden vision change or amaurosis. Respiratory - has shortness of breath, no wheezing, or stridor. Uses oxygen. No apnea, cough, or chest tightness associated with shortness of breath.   Cardiovascular - no chest pain, cvd, pavd. Details of this discussion including any medical advice provided: as above      I affirm this is a Patient Initiated Episode with an Established Patient who has not had a related appointment within my department in the past 7 days or scheduled within the next 24 hours.     Total Time: minutes: 11-20 minutes    Note: not billable if this call serves to triage the patient into an appointment for the relevant concern      [facility]

## 2020-11-10 RX ORDER — FUROSEMIDE 40 MG/1
40 TABLET ORAL DAILY
Qty: 90 TABLET | Refills: 0 | Status: SHIPPED | OUTPATIENT
Start: 2020-11-10 | End: 2021-02-05

## 2020-11-13 ENCOUNTER — OFFICE VISIT (OUTPATIENT)
Dept: CARDIOLOGY | Age: 75
End: 2020-11-13
Payer: MEDICARE

## 2020-11-13 VITALS
BODY MASS INDEX: 15.88 KG/M2 | DIASTOLIC BLOOD PRESSURE: 74 MMHG | WEIGHT: 89.6 LBS | HEART RATE: 67 BPM | SYSTOLIC BLOOD PRESSURE: 124 MMHG | HEIGHT: 63 IN

## 2020-11-13 PROCEDURE — 3017F COLORECTAL CA SCREEN DOC REV: CPT | Performed by: INTERNAL MEDICINE

## 2020-11-13 PROCEDURE — 1036F TOBACCO NON-USER: CPT | Performed by: INTERNAL MEDICINE

## 2020-11-13 PROCEDURE — G8427 DOCREV CUR MEDS BY ELIG CLIN: HCPCS | Performed by: INTERNAL MEDICINE

## 2020-11-13 PROCEDURE — 1123F ACP DISCUSS/DSCN MKR DOCD: CPT | Performed by: INTERNAL MEDICINE

## 2020-11-13 PROCEDURE — G8484 FLU IMMUNIZE NO ADMIN: HCPCS | Performed by: INTERNAL MEDICINE

## 2020-11-13 PROCEDURE — G8419 CALC BMI OUT NRM PARAM NOF/U: HCPCS | Performed by: INTERNAL MEDICINE

## 2020-11-13 PROCEDURE — 99214 OFFICE O/P EST MOD 30 MIN: CPT | Performed by: INTERNAL MEDICINE

## 2020-11-13 PROCEDURE — G8400 PT W/DXA NO RESULTS DOC: HCPCS | Performed by: INTERNAL MEDICINE

## 2020-11-13 PROCEDURE — 1090F PRES/ABSN URINE INCON ASSESS: CPT | Performed by: INTERNAL MEDICINE

## 2020-11-13 PROCEDURE — 4040F PNEUMOC VAC/ADMIN/RCVD: CPT | Performed by: INTERNAL MEDICINE

## 2020-11-13 RX ORDER — AMLODIPINE BESYLATE 5 MG/1
2.5 TABLET ORAL DAILY
Qty: 90 TABLET | Refills: 3 | Status: SHIPPED | OUTPATIENT
Start: 2020-11-13 | End: 2021-01-01 | Stop reason: SDUPTHER

## 2020-11-13 ASSESSMENT — ENCOUNTER SYMPTOMS
CONSTIPATION: 0
EYE DISCHARGE: 0
COUGH: 0
SHORTNESS OF BREATH: 1
VOMITING: 0
WHEEZING: 0
BACK PAIN: 0
DIARRHEA: 0
BLOOD IN STOOL: 0
ABDOMINAL DISTENTION: 0

## 2020-11-13 NOTE — PROGRESS NOTES
[facility]  Cardiology Office Note  [address] [id number]  Phone: [phone]  Fax: [phone]                            Date:  11/13/2020  Patient: Samantha Russell  Age:  76 y.o., 1945    Referral: No ref.  provider found      PROBLEM LIST:    Patient Active Problem List    Diagnosis Date Noted    Unstable angina ([facility]      Priority: High    Acute diastolic heart failure ([facility]      Priority: High    Bradycardia      Priority: High    Acute superficial venous thrombosis of right lower extremity 04/25/2016     Priority: High     Overview Note:     With large RLE bulla lateral foot skin violaceous discoloration, acutely POA (venous backpressure)      Mitral valve stenosis 03/29/2016     Priority: High    Mitral valve insufficiency 03/29/2016     Priority: High    PUD (peptic ulcer disease) 03/29/2016     Priority: Medium    Chronic atrial fibrillation ([facility] 10/06/2020     Priority: Low    History of coronary artery stent placement 01/29/2020     Priority: Low     Overview Note:     11/15/ 2 stents to RCA and 1 to circumflex - Dr. Claudeen Juniper Myalgia 01/29/2020     Priority: Low    Pain in both lower extremities 10/30/2019     Priority: Low    Status post Maze operation for atrial fibrillation 09/25/2019     Priority: Low     Overview Note:     4/18/16      [facility] 09/25/2019     Priority: Low    S/P coronary artery bypass graft x 1 09/25/2019     Priority: Low     Overview Note:     4/8/16 SVT to RCA      S/P mitral valve replacement 09/25/2019     Priority: Low     Overview Note:     4/8/16 MVR 23 mm mosaic porcine by Dr. Abena Sheets Chest pain 09/25/2019     Priority: Low    SOB (shortness of breath) 09/25/2019     Priority: Low    Fatigue 09/25/2019     Priority: Low    Postoperative anemia due to acute blood loss 07/12/2019     Priority: Low    Carotid aneurysm, right ([facility] 07/11/2019 exposed 06/03/2016     Priority: Low    Atherosclerosis of native arteries of left leg with ulceration of calf (HCC)      Priority: Low    Severe malnutrition (HCC)      Priority: Low    Diastolic dysfunction      Priority: Low    Anemia in chronic kidney disease (CKD)      Priority: Low    Non-pressure chronic ulcer of right calf with fat layer exposed ([facility] 05/27/2016     Priority: Low    Decubitus ulcer of right buttock, stage 3 ([facility] 05/27/2016     Priority: Low    Nocturnal hypoxia 03/30/2016     Priority: Low     Overview Note:     With associated mitral stenosis / regurgitation and pulmonary hypertension      Pulmonary hypertension 03/29/2016     Priority: Low    Calculus of gallbladder without cholecystitis without obstruction      Priority: Low    Carotid artery stenosis 02/08/2012     Priority: Low    Atherosclerosis of native artery of extremity with intermittent claudication ([facility] 07/25/2011     Priority: Low    Atherosclerosis of native artery of extremity with intermittent claudication ([facility] 07/25/2011     Priority: Low     Overview Note:     Replacing Inactive Diagnoses      Mixed hyperlipidemia      Priority: Low    Arthritis      Priority: Low    Coronary artery disease involving native coronary artery of native heart      Priority: Low     Overview Note:     3/16/2016  Echo  Severe MS, moderate to severe MR, RVSP 73 mmHg, normal LVFX  3/31/2016  Cath  70% osteal RCA, severe MR, MVA 1.9, normal LVFX  4/7//2016   MVR (23 mm Medtronic Mosaic) [facility])  5/7/2016  Echo  Normal LVFX, large pleural effusion, echolucency near preserved posterior Mitral leaflet, likely chordae, RVSP 72 mmHg       1.   Coronary artery disease status post CABG with one-vessel bypass, SVG to RCA 2016 with bioprosthetic mitral valve replacement, severely stenotic SVG to RCA by catheterization 11/15/2019 with PCI to proximal to mid RCA and circumflex, repeat PCI 7/30/2022 ostial RCA, normal LV ejection fraction. 2.  Chronic kidney disease with occluded right renal artery, severely stenotic left renal artery status post endovascular intervention 7/30/2020 (5.0 x 22 mm resolute [place] stent). 3.  Severe hypertension. 4.  COPD.  5.  Peripheral arterial disease with prior iliac stents, bilateral carotid endarterectomy. PRESENTATION: Farzaneh Abbott is a 76y.o. year old female presents for follow-up evaluation. Since her last intervention she has been doing fairly well. She breathes better outside the house by her account. Her  however is a heavy tobacco user. She has not smoked since 1980. Her blood pressure appears better controlled. Renal functions from October show stable renal status. No leg swelling. REVIEW OF SYSTEMS:  Review of Systems   Constitutional: Negative for activity change, fatigue and fever. HENT: Negative for ear pain, hearing loss and tinnitus. Eyes: Negative for discharge and visual disturbance. Respiratory: Positive for shortness of breath. Negative for cough and wheezing. Cardiovascular: Negative for chest pain, palpitations and leg swelling. Gastrointestinal: Negative for abdominal distention, blood in stool, constipation, diarrhea and vomiting. Endocrine: Negative for cold intolerance, heat intolerance, polydipsia and polyuria. Genitourinary: Negative for dysuria and hematuria. Musculoskeletal: Negative for arthralgias, back pain and myalgias. Skin: Negative for pallor and rash. Neurological: Negative for seizures, syncope, weakness and headaches. Psychiatric/Behavioral: Negative for behavioral problems and dysphoric mood.        Past Medical History:      Diagnosis Date    Aortic stenosis     Arthritis     Atherosclerosis of native arteries of the extremities with intermittent claudication 7/25/2011    Carotid aneurysm, right (HCC)     Carotid artery occlusion     [facility]congestive [facility] failure[facility]     COPD (ch[facility] obstructive pulmonary disease) [facility]     History of blood transfusion 2016    Post op, was taking blood thinner    Hyperlipidemia     Hypertension     MI (myocardial infarction) ([facility] 2009    x2    Mitral valve stenosis     Pneumonia     Post-menopausal     PUD (peptic ulcer disease) 2009    Renal failure 2009    after ulcer perforation sepsis.     Severe malnutrition ([facility]     Thyroid disease     Wound infection after surgery     right foot infection after cabg       Past Surgical History:      Procedure Laterality Date    ABDOMEN SURGERY  2009    perforated ulcer resection of 1/3 stomach    CARDIAC SURGERY      artificial valve and bypass    CAROTID ENDARTERECTOMY      Right  with Dacron patch angioplasty    CAROTID ENDARTERECTOMY Left     CAROTID ENDARTERECTOMY Right 2019    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 AND REPAIR WITH REVERSED LEFT GREATER SAPHENOUS VEIN INTERPOSITIONAL BYPASS GRAFT performed by Bandar Grijalva MD at [facility] Right early 's    long ago    CATARACT REMOVAL WITH IMPLANT Bilateral      SECTION  1972    COLONOSCOPY      CORONARY ANGIOPLASTY WITH STENT PLACEMENT      CORONARY ANGIOPLASTY WITH STENT PLACEMENT      CORONARY ARTERY BYPASS GRAFT  2016    DIAGNOSTIC CARDIAC CATH LAB PROCEDURE      DILATION AND CURETTAGE OF UTERUS      ILIO-FEMORAL BYPASS GRAFT N/A 2016    OPEN TRANSLUMINAL BALLOON ANGIOPLASTY AND STENTING OF RIGHT COMMON AND EXTERNAL  ILIAC ARTERIES; RIGHT FEMORAL ENDARTERECTOMY WITH VEIN PATCH ANGIOPLASTY performed by Bandar Grijalva MD at [facility] N/A 2016    MITRAL VALVE  REPLACEMENT LUONG-MAZE ABLATION WITH CRYO PROCEDURE, CORONARY ARTERY BYPASS GRAFT X 1 WITH ENDOSCOPIC VEIN HARVESTING WITH PERFUSION TRANSESOPHAGEAL ECHOCARDIOGRAM performed by Hector Baxter MD at [facility] MITRAL VALVE REPLACEMENT  2016    NH REOPER, CAROTID ENDARTEC>1 MON Left 2018    REMOVAL OF HEMATOMA, levalbuterol (XOPENEX) 1.25 MG/3ML nebulizer solution Inhale 3 mLs into the lungs      clopidogrel (PLAVIX) 75 MG tablet Take 1 tablet by mouth daily 90 tablet 3    aspirin EC 81 MG EC tablet Take 1 tablet by mouth daily (Patient taking differently: Take 81 mg by mouth nightly PT TAKES AT NIGHT.) 30 tablet 3    isosorbide mononitrate (IMDUR) 30 MG extended release tablet Take 1 tablet by mouth nightly (Patient taking differently: Take 60 mg by mouth nightly ) 90 tablet 3    atorvastatin (LIPITOR) 40 MG tablet Take 1 tablet by mouth daily (Patient taking differently: Take 40 mg by mouth nightly PT TAKES AT NIGHT.) 90 tablet 3    calcium-cholecalciferol (CALCIUM 500+D) 500-200 MG-UNIT per tablet Take 2 tablets by mouth daily       Biotin 5000 MCG TABS Take 1 tablet by mouth daily        No current facility-administered medications for this visit. Allergies:  Levaquin [levofloxacin in d5w];  Xarelto [rivaroxaban]; and Morphine    Past Social History:  Social History     Socioeconomic History    Marital status:      Spouse name: Not on file    Number of children: Not on file    Years of education: Not on file    Highest education level: Not on file   Occupational History    Not on file   Social Needs    Financial resource strain: Not on file    Food insecurity     Worry: Not on file     Inability: Not on file   [employer] needs     Medical: Not on file     Non-medical: Not on file   Tobacco Use    Smoking status: Former Smoker     Years: 2.00     Types: Cigarettes     Last attempt to quit: 1980     Years since quittin.8    Smokeless tobacco: Never Used   Substance and Sexual Activity    Alcohol use: Yes     Comment: rarely maybe twice a year    Drug use: No    Sexual activity: Not Currently     Partners: Male   Lifestyle    Physical activity     Days per week: Not on file     Minutes per session: Not on file    Stress: Not on file   Relationships    Social connections Talks on phone: Not on file     Gets together: Not on file     Attends Judaism service: Not on file     Active member of club or organization: Not on file     Attends meetings of clubs or organizations: Not on file     Relationship status: Not on file    Intimate partner violence     Fear of current or ex partner: Not on file     Emotionally abused: Not on file     Physically abused: Not on file     Forced sexual activity: Not on file   Other Topics Concern    Not on file   Social History Narrative    Not on file       Family History:       Problem Relation Age of Onset    Diabetes Mother     Heart Disease Mother     Heart Failure Mother     Diabetes Sister     Heart Disease Sister     High Blood Pressure Sister          Physical Examination:  /74   Pulse 67   Ht 5' 3\" (1.6 m)   Wt 89 lb 9.6 oz (40.6 kg)   BMI 15.87 kg/m²   Physical Exam  Constitutional:       General: She is not in acute distress. Appearance: She is not diaphoretic. Comments: Thinly built  Blood pressure right arm sitting 130/85 mmHg, pulse 68 bpm regular   HENT:      Mouth/Throat:      Pharynx: No oropharyngeal exudate. Eyes:      General: No scleral icterus. Right eye: No discharge. Left eye: No discharge. Neck:      Thyroid: No thyromegaly. Vascular: No JVD. Cardiovascular:      Rate and Rhythm: Normal rate and regular rhythm. No extrasystoles are present. Heart sounds: Normal heart sounds, S1 normal and S2 normal. No murmur. No systolic murmur. No diastolic murmur. No friction rub. No gallop. No S3 or S4 sounds. Comments: Bilateral carotid bruits  Midline abdominal bruits  Right lower quadrant bruit noted  No JVD  No edema  Short systolic murmur appreciated  No gallop noted    Pulmonary:      Effort: Pulmonary effort is normal. No respiratory distress. Breath sounds: Normal breath sounds. No wheezing or rales.       Comments: Mildly diminished air entry posteriorly but no crepitations or wheezes noted  Chest:      Chest wall: No tenderness. Abdominal:      General: Bowel sounds are normal. There is no distension. Palpations: Abdomen is soft. There is no mass. Tenderness: There is no abdominal tenderness. There is no guarding or rebound. Hernia: No hernia is present. Comments: No palpable organomegaly   Musculoskeletal: Normal range of motion. Skin:     General: Skin is warm. Coloration: Skin is not pale. Findings: No rash. Neurological:      Mental Status: She is alert and oriented to person, place, and time. Cranial Nerves: No cranial nerve deficit. Deep Tendon Reflexes: Reflexes normal.           Labs:   CBC: No results for input(s): WBC, HGB, HCT, PLT in the last 72 hours. BMP:No results for input(s): NA, K, CO2, BUN, CREATININE, LABGLOM, GLUCOSE in the last 72 hours. BNP: No results for input(s): BNP in the last 72 hours. PT/INR: No results for input(s): PROTIME, INR in the last 72 hours. APTT:No results for input(s): APTT in the last 72 hours. CARDIAC ENZYMES:No results for input(s): CKTOTAL, CKMB, CKMBINDEX, TROPONINI in the last 72 hours. FASTING LIPID PANEL:  Lab Results   Component Value Date    HDL 82 07/30/2020    LDLCALC 76 07/30/2020    TRIG 55 07/30/2020     LIVER PROFILE:No results for input(s): AST, ALT, LABALBU in the last 72 hours. Imaging:    CARDIAC CATHETERIZATION      Conclusions      Peripheral Procedure Description      Double vessel disease with obstructive ostial RCA stenosis and   intermediate ostial circumflex stenosis. Severely diseased SVG to distal RCA. Patent stent in proximal RCA. Patent stent in mid RCA. Patent stent in proximal circumflex. Occluded right renal artery. Severely stenosed left renal artery feeding solitary kidney. Hyperdynamic LV systolic function, ejection fraction 70% with moderate   left ventricular hypertrophy.       Successful PCI to the ostial RCA (3.5 x 9 mm resolute integrity stent)   utilizing drug-eluting stent. Successful endovascular intervention to proximal left renal artery (5.0 x   22 mm resolute jaylan stent) utilizing drug-eluting stent. Recommendations      Medical management. Hypertensive management. Signatures      ----------------------------------------------------------------   Electronically signed by Tr Holly MD(Performing Physician) on   07/30/2020 18:27   ----------------------------------------------------------------      Angiographic Findings      Cardiac Arteries and Lesion Findings     LMCA: Left main distal 35% diffuse tapering stenosis.     LAD: LAD ostial 30% stenosis. LAD mid diffuse 30% stenosis. Lesion on Prox LAD: Ostial.30% stenosis 7 mm length. Lesion on Mid LAD: 30% stenosis 19 mm length. LCx: Circumflex ostial 55% stenosis just before proximal stent. Circumflex proximal stent widely patent. Lesion on Prox CX: Ostial.50% stenosis 2 mm length. RCA: RCA ostial 60-70% stenosis. RCA proximal stent widely patent. RCA mid stent widely patent. RCA mid 50% stenosis just after mid stent. SVG to distal RCA is severely diseased with proximal 95% diffuse stenosis. Lesion on Prox RCA: 70% stenosis reduced to 0%. Treatment results:Interventional treatment was successful. Comments:Ostial RCA before proximal RCA stent with 60-70% stenosis. Ostial RCA stented with 3.5 x 9 mm resolute integrity stent. Devices used       - Balanced Middleweight Universal. Number of passes: 1.       - Medtronic Euphora 2.0mm x12mm RX Balloon Catheter. 1 inflation(s) to    a max pressure of: 12 saud. - Resolute Integrity 3.5 x 09 RM. Diameter: 3.5 mm. Length: 9 mm. 1    inflation(s) to a max pressure of: 14 saud. Lesion on Mid RCA: 50% stenosis 5 mm length. Peripheral Arteries and Lesion Findings  Renal, Right: Chronic occlusion. Right renal artery is 100% ostial occluded.   No filling of right kidney noted     Renal, Left: Focal stenosis. Left renal artery with proximal diffuse heavily  calcified 85% stenosis. Intrarenal artery in lower pole with diffuse 70% stenosis. Lesion on Proximal:       Devices used       - Balanced Middleweight Universal. Number of passes: 1.       - Abbott [id number] Trek RX Balloon Catheter. Diameter: 2.5 mm. Length:    12 mm. 1 inflation(s) to a max pressure of: 18 saud. - [place] 5.0 x 22 RM. Diameter: 5 mm. Length: 22 mm. 3 inflation(s) to a    max pressure of: 14 saud. Graft Lesions       Lesion on Aorta Right to Dist RCA: 95% stenosis 29 mm length. Cardiac Grafts      -  There is a graft that originates at the Aorta Right and attaches to the      Dist RCA. VA     Ventriculography Findings  Moderate concentric LVH.     LV function assessed as:Normal.  Ejection Fraction    - Method: LV gram. EF%: 70.    Procedure     Procedure Type      Diagnostic procedure:DCA, Coronary Angiogram, Coronary Bypass Graft   Angiogram, Left Heart Cath, Left Ventriculogram, Left Ventricular Pressure   Measurement      Conclusions      Double vessel disease involving RCA and circumflex. Severely stenotic SVG to distal RCA. Hyperdynamic left ventricle with severe left ventricular hypertrophy   consistent with hypertensive heart disease. In view of severe disease in graft to RCA, decision to treat native RCA as   opposed to SVG graft. Successful PCI to the ostial to proximal RCA (3.0 x 15 mm resolute   integrity) and proximal to mid RCA rinses 3.0 x 18 mm resolute integrity)   utilizing drug-eluting stents. Successful PCI to proximal circumflex (2.5 x 14 mm resolute integrity)   utilizing drug-eluting stent. Recommendations      Medical management.       Signatures      ----------------------------------------------------------------   Electronically signed by Annel Holly MD(Performing Physician) on   11/15/2019 17:43 ----------------------------------------------------------------      Angiographic Findings      Cardiac Arteries and Lesion Findings     LMCA: Left main with Mild calcific luminal irregularities.     LAD: LAD proximal 35% stenosis. Lesion on Prox LAD: 30% stenosis 13 mm length. LCx: Circumflex proximal 90% stenosis. Circumflex mid diffuse 50% stenosis. Lesion on Prox CX: 90% stenosis . The guidewire cross was successful. The    lesion was diagnosed as Low Risk (A). Comments:Circumflex proximal 90% stenosis. Circumflex proximal stented with 2.5 x 14 mm resolute integrity stent. Lesion on Mid CX: 50% stenosis 16 mm length. RCA: RCA ostial 95% stenosis. RCA proximal to mid in-stent 95% restenosis. SVG to distal RCA with ostial to proximal diffuse 90% stenosis. Lesion on Prox RCA: Ostial.95% stenosis . The guidewire cross was    successful. The lesion was diagnosed as Moderate Risk (B). Comments:Ostial RCA 95% stenosis. Ostial RCA stented with 3.0 x 15 mm resolute integrity stent. Lesion on Prox RCA: Distal subsection. 95% stenosis . The guidewire cross    was successful. The lesion was diagnosed as Moderate Risk (B). Comments:RCA proximal to mid in-stent focal 95% stenosis. RCA proximal to mid stented with 3.0 x 18 mm resolute integrity stent. Graft Lesions       Lesion on Aorta Right to Dist RCA: 95% stenosis 23 mm length. Cardiac Grafts      -  There is a graft that originates at the Aorta Right and attaches to the      Dist RCA (SVG to distal RCA proximal 95% stenosis). VA     Ventriculography Findings  Severe concentric LVH.   Hyperdynamic left ventricle.     LV function assessed as:Normal.  Ejection Fraction    - Method: LV gram. EF%: 70.         ASSESSMENT and PLAN:    27-year-old female patient with past medical history of extensive vasculopathy, coronary artery disease with prior one-vessel CABG with SVG to RCA (severely diseased) 2016 with bioprosthetic mitral valve replacement, PCI to RCA and circumflex 11/15/2019, repeat PCI to ostial RCA 7/30/2020, chronic kidney disease stage III with solitary left kidney with severe left renal artery stenosis status post LESLEY 7/30/2020, normal LV ejection fraction, COPD, peripheral arterial disease with prior bilateral carotid endarterectomy, iliac stents here for follow-up evaluation. 1.  Her blood pressure appears fairly well controlled. I have added Norvasc 2.5 mg once daily to her medication regimen for improved blood pressure control. She will continue her other medications unchanged. 2.  She only has a left kidney and if there is any sudden increase in blood pressure or volume retention or worsening in renal function, this will need to be evaluated immediately for restenosis. 3.  She will follow-up with me in 8 months. Orders:  No orders of the defined types were placed in this encounter. Orders Placed This Encounter   Medications    amLODIPine (NORVASC) 5 MG tablet     Sig: Take 0.5 tablets by mouth daily     Dispense:  90 tablet     Refill:  3             Return in about 8 months (around 7/13/2021). Electronically signed by Elke Solis MD on 11/13/2020 at 10:39 AM    [facility]      Thisdictation was generated by voice recognition computer software. Although all attempts are made to edit the dictation for accuracy, there may be errors in the transcription that are not intended.

## 2020-11-19 RX ORDER — CLOPIDOGREL BISULFATE 75 MG/1
75 TABLET ORAL DAILY
Qty: 90 TABLET | Refills: 3 | Status: ON HOLD | OUTPATIENT
Start: 2020-11-19 | End: 2021-01-01 | Stop reason: SDUPTHER

## 2020-12-03 RX ORDER — ISOSORBIDE MONONITRATE 60 MG/1
60 TABLET, EXTENDED RELEASE ORAL NIGHTLY
Qty: 180 TABLET | Refills: 1 | Status: SHIPPED | OUTPATIENT
Start: 2020-12-03 | End: 2021-01-01

## 2020-12-23 RX ORDER — POTASSIUM CHLORIDE 20 MEQ/1
20 TABLET, EXTENDED RELEASE ORAL DAILY
Qty: 30 TABLET | Refills: 0 | Status: SHIPPED | OUTPATIENT
Start: 2020-12-23 | End: 2021-04-02

## 2020-12-29 ENCOUNTER — TELEPHONE (OUTPATIENT)
Dept: PULMONOLOGY | Facility: CLINIC | Age: 75
End: 2020-12-29

## 2020-12-29 RX ORDER — FORMOTEROL FUMARATE 20 UG/2ML
20 SOLUTION RESPIRATORY (INHALATION)
Qty: 180 EACH | Refills: 3 | Status: SHIPPED | OUTPATIENT
Start: 2020-12-29 | End: 2021-03-05 | Stop reason: ALTCHOICE

## 2020-12-29 NOTE — TELEPHONE ENCOUNTER
A prescription for Perforomist was sent to [facility].   As far as the Covid vaccine, I would recommend that she get it when it is available.  However we do not have any inside track into getting the Covid vaccine for any of our patients any sooner than it is otherwise available.  We also do not have any information as to when the vaccine will be open to the general public.  It will just all depend on how quickly they can get the frontline workers followed by the long-term residents vaccinated.

## 2020-12-29 NOTE — TELEPHONE ENCOUNTER
Brown is on a different tier and the insurance is requesting performist as an alternative.   Do you want to switch her to this?  She was asking to see if we could suggest her to get the COVID vaccine before it is open to the public and if there is any information you have on it that we can tell her.

## 2021-01-01 ENCOUNTER — TELEPHONE (OUTPATIENT)
Dept: CARDIOLOGY CLINIC | Age: 76
End: 2021-01-01

## 2021-01-01 ENCOUNTER — OFFICE VISIT (OUTPATIENT)
Dept: CARDIOLOGY CLINIC | Age: 76
End: 2021-01-01
Payer: MEDICARE

## 2021-01-01 ENCOUNTER — OFFICE VISIT (OUTPATIENT)
Dept: PULMONOLOGY | Facility: CLINIC | Age: 76
End: 2021-01-01

## 2021-01-01 ENCOUNTER — HOSPITAL ENCOUNTER (OUTPATIENT)
Dept: CARDIAC CATH/INVASIVE PROCEDURES | Age: 76
Discharge: HOME | End: 2021-08-10
Attending: INTERNAL MEDICINE | Admitting: INTERNAL MEDICINE
Payer: MEDICARE

## 2021-01-01 VITALS
SYSTOLIC BLOOD PRESSURE: 177 MMHG | RESPIRATION RATE: 18 BRPM | TEMPERATURE: 97.3 F | BODY MASS INDEX: 14.91 KG/M2 | OXYGEN SATURATION: 100 % | HEART RATE: 77 BPM | HEIGHT: 62 IN | DIASTOLIC BLOOD PRESSURE: 87 MMHG | WEIGHT: 81 LBS

## 2021-01-01 VITALS
HEIGHT: 64 IN | SYSTOLIC BLOOD PRESSURE: 148 MMHG | WEIGHT: 83 LBS | OXYGEN SATURATION: 99 % | HEART RATE: 80 BPM | DIASTOLIC BLOOD PRESSURE: 80 MMHG | BODY MASS INDEX: 14.17 KG/M2

## 2021-01-01 VITALS
BODY MASS INDEX: 15.46 KG/M2 | OXYGEN SATURATION: 97 % | HEART RATE: 67 BPM | WEIGHT: 84 LBS | SYSTOLIC BLOOD PRESSURE: 138 MMHG | HEIGHT: 62 IN | DIASTOLIC BLOOD PRESSURE: 88 MMHG

## 2021-01-01 VITALS
SYSTOLIC BLOOD PRESSURE: 144 MMHG | HEIGHT: 63 IN | BODY MASS INDEX: 14.53 KG/M2 | WEIGHT: 82 LBS | HEART RATE: 76 BPM | DIASTOLIC BLOOD PRESSURE: 62 MMHG

## 2021-01-01 DIAGNOSIS — I25.10 CORONARY ARTERY DISEASE INVOLVING NATIVE CORONARY ARTERY OF NATIVE HEART WITHOUT ANGINA PECTORIS: ICD-10-CM

## 2021-01-01 DIAGNOSIS — R63.6 UNDERWEIGHT: ICD-10-CM

## 2021-01-01 DIAGNOSIS — I70.1 RENAL ARTERY STENOSIS (HCC): ICD-10-CM

## 2021-01-01 DIAGNOSIS — I10 ESSENTIAL HYPERTENSION: ICD-10-CM

## 2021-01-01 DIAGNOSIS — I34.2 NONRHEUMATIC MITRAL VALVE STENOSIS: ICD-10-CM

## 2021-01-01 DIAGNOSIS — I25.10 CORONARY ARTERY DISEASE INVOLVING NATIVE CORONARY ARTERY OF NATIVE HEART WITHOUT ANGINA PECTORIS: Primary | ICD-10-CM

## 2021-01-01 DIAGNOSIS — J44.9 STAGE 3 SEVERE COPD BY GOLD CLASSIFICATION (HCC): ICD-10-CM

## 2021-01-01 DIAGNOSIS — J96.11 CHRONIC RESPIRATORY FAILURE WITH HYPOXIA (HCC): Primary | ICD-10-CM

## 2021-01-01 DIAGNOSIS — I48.0 PAF (PAROXYSMAL ATRIAL FIBRILLATION) (HCC): ICD-10-CM

## 2021-01-01 DIAGNOSIS — R91.1 LUNG NODULE: ICD-10-CM

## 2021-01-01 DIAGNOSIS — I35.0 NONRHEUMATIC AORTIC VALVE STENOSIS: ICD-10-CM

## 2021-01-01 LAB
ALBUMIN SERPL-MCNC: 3.9 G/DL (ref 3.5–5.2)
ALP BLD-CCNC: 86 U/L (ref 35–104)
ALT SERPL-CCNC: 13 U/L (ref 5–33)
ANION GAP SERPL CALCULATED.3IONS-SCNC: 6 MMOL/L (ref 7–19)
ANION GAP SERPL CALCULATED.3IONS-SCNC: 8 MMOL/L (ref 7–19)
AST SERPL-CCNC: 27 U/L (ref 5–32)
BILIRUB SERPL-MCNC: <0.2 MG/DL (ref 0.2–1.2)
BUN BLDV-MCNC: 22 MG/DL (ref 8–23)
BUN BLDV-MCNC: 36 MG/DL (ref 8–23)
CALCIUM SERPL-MCNC: 11.4 MG/DL (ref 8.8–10.2)
CALCIUM SERPL-MCNC: 11.8 MG/DL (ref 8.8–10.2)
CHLORIDE BLD-SCNC: 93 MMOL/L (ref 98–111)
CHLORIDE BLD-SCNC: 95 MMOL/L (ref 98–111)
CO2: 40 MMOL/L (ref 22–29)
CO2: 42 MMOL/L (ref 22–29)
CREAT SERPL-MCNC: 1.2 MG/DL (ref 0.5–0.9)
CREAT SERPL-MCNC: 1.2 MG/DL (ref 0.5–0.9)
EKG P AXIS: 77 DEGREES
EKG P AXIS: 95 DEGREES
EKG P-R INTERVAL: 136 MS
EKG P-R INTERVAL: 144 MS
EKG Q-T INTERVAL: 432 MS
EKG Q-T INTERVAL: 444 MS
EKG QRS DURATION: 102 MS
EKG QRS DURATION: 106 MS
EKG QTC CALCULATION (BAZETT): 453 MS
EKG QTC CALCULATION (BAZETT): 461 MS
EKG T AXIS: 71 DEGREES
EKG T AXIS: 71 DEGREES
GFR AFRICAN AMERICAN: 53
GFR AFRICAN AMERICAN: 53
GFR NON-AFRICAN AMERICAN: 44
GFR NON-AFRICAN AMERICAN: 44
GLUCOSE BLD-MCNC: 77 MG/DL (ref 74–109)
GLUCOSE BLD-MCNC: 94 MG/DL (ref 74–109)
HCT VFR BLD CALC: 37.9 % (ref 37–47)
HEMOGLOBIN: 11.1 G/DL (ref 12–16)
MCH RBC QN AUTO: 25.3 PG (ref 27–31)
MCHC RBC AUTO-ENTMCNC: 29.3 G/DL (ref 33–37)
MCV RBC AUTO: 86.5 FL (ref 81–99)
PDW BLD-RTO: 16 % (ref 11.5–14.5)
PLATELET # BLD: 303 K/UL (ref 130–400)
PMV BLD AUTO: 9.6 FL (ref 9.4–12.3)
POTASSIUM SERPL-SCNC: 3.3 MMOL/L (ref 3.5–5)
POTASSIUM SERPL-SCNC: 4.2 MMOL/L (ref 3.5–5)
RBC # BLD: 4.38 M/UL (ref 4.2–5.4)
SODIUM BLD-SCNC: 141 MMOL/L (ref 136–145)
SODIUM BLD-SCNC: 143 MMOL/L (ref 136–145)
TOTAL PROTEIN: 7.6 G/DL (ref 6.6–8.7)
WBC # BLD: 9.6 K/UL (ref 4.8–10.8)

## 2021-01-01 PROCEDURE — 2580000003 HC RX 258: Performed by: INTERNAL MEDICINE

## 2021-01-01 PROCEDURE — 6370000000 HC RX 637 (ALT 250 FOR IP)

## 2021-01-01 PROCEDURE — 2500000003 HC RX 250 WO HCPCS

## 2021-01-01 PROCEDURE — 37236 OPEN/PERQ PLACE STENT 1ST: CPT

## 2021-01-01 PROCEDURE — 1036F TOBACCO NON-USER: CPT | Performed by: CLINICAL NURSE SPECIALIST

## 2021-01-01 PROCEDURE — 93005 ELECTROCARDIOGRAM TRACING: CPT | Performed by: INTERNAL MEDICINE

## 2021-01-01 PROCEDURE — G8427 DOCREV CUR MEDS BY ELIG CLIN: HCPCS | Performed by: CLINICAL NURSE SPECIALIST

## 2021-01-01 PROCEDURE — 93459 L HRT ART/GRFT ANGIO: CPT | Performed by: INTERNAL MEDICINE

## 2021-01-01 PROCEDURE — 2709999900 HC NON-CHARGEABLE SUPPLY

## 2021-01-01 PROCEDURE — G8400 PT W/DXA NO RESULTS DOC: HCPCS | Performed by: INTERNAL MEDICINE

## 2021-01-01 PROCEDURE — C1769 GUIDE WIRE: HCPCS

## 2021-01-01 PROCEDURE — 93010 ELECTROCARDIOGRAM REPORT: CPT | Performed by: INTERNAL MEDICINE

## 2021-01-01 PROCEDURE — 36251 INS CATH REN ART 1ST UNILAT: CPT

## 2021-01-01 PROCEDURE — 37236 OPEN/PERQ PLACE STENT 1ST: CPT | Performed by: INTERNAL MEDICINE

## 2021-01-01 PROCEDURE — C1713 ANCHOR/SCREW BN/BN,TIS/BN: HCPCS

## 2021-01-01 PROCEDURE — 36251 INS CATH REN ART 1ST UNILAT: CPT | Performed by: INTERNAL MEDICINE

## 2021-01-01 PROCEDURE — 6360000004 HC RX CONTRAST MEDICATION: Performed by: INTERNAL MEDICINE

## 2021-01-01 PROCEDURE — 1090F PRES/ABSN URINE INCON ASSESS: CPT | Performed by: INTERNAL MEDICINE

## 2021-01-01 PROCEDURE — 4040F PNEUMOC VAC/ADMIN/RCVD: CPT | Performed by: INTERNAL MEDICINE

## 2021-01-01 PROCEDURE — C1874 STENT, COATED/COV W/DEL SYS: HCPCS

## 2021-01-01 PROCEDURE — 99214 OFFICE O/P EST MOD 30 MIN: CPT | Performed by: CLINICAL NURSE SPECIALIST

## 2021-01-01 PROCEDURE — 36415 COLL VENOUS BLD VENIPUNCTURE: CPT

## 2021-01-01 PROCEDURE — 4040F PNEUMOC VAC/ADMIN/RCVD: CPT | Performed by: CLINICAL NURSE SPECIALIST

## 2021-01-01 PROCEDURE — 92928 PRQ TCAT PLMT NTRAC ST 1 LES: CPT | Performed by: INTERNAL MEDICINE

## 2021-01-01 PROCEDURE — 99153 MOD SED SAME PHYS/QHP EA: CPT

## 2021-01-01 PROCEDURE — 1123F ACP DISCUSS/DSCN MKR DOCD: CPT | Performed by: INTERNAL MEDICINE

## 2021-01-01 PROCEDURE — 99214 OFFICE O/P EST MOD 30 MIN: CPT | Performed by: INTERNAL MEDICINE

## 2021-01-01 PROCEDURE — G8400 PT W/DXA NO RESULTS DOC: HCPCS | Performed by: CLINICAL NURSE SPECIALIST

## 2021-01-01 PROCEDURE — 80053 COMPREHEN METABOLIC PANEL: CPT

## 2021-01-01 PROCEDURE — C1894 INTRO/SHEATH, NON-LASER: HCPCS

## 2021-01-01 PROCEDURE — 3017F COLORECTAL CA SCREEN DOC REV: CPT | Performed by: INTERNAL MEDICINE

## 2021-01-01 PROCEDURE — 2500000003 HC RX 250 WO HCPCS: Performed by: INTERNAL MEDICINE

## 2021-01-01 PROCEDURE — 99152 MOD SED SAME PHYS/QHP 5/>YRS: CPT | Performed by: INTERNAL MEDICINE

## 2021-01-01 PROCEDURE — C1725 CATH, TRANSLUMIN NON-LASER: HCPCS

## 2021-01-01 PROCEDURE — C1760 CLOSURE DEV, VASC: HCPCS

## 2021-01-01 PROCEDURE — 1036F TOBACCO NON-USER: CPT | Performed by: INTERNAL MEDICINE

## 2021-01-01 PROCEDURE — C1887 CATHETER, GUIDING: HCPCS

## 2021-01-01 PROCEDURE — 6370000000 HC RX 637 (ALT 250 FOR IP): Performed by: INTERNAL MEDICINE

## 2021-01-01 PROCEDURE — G8419 CALC BMI OUT NRM PARAM NOF/U: HCPCS | Performed by: CLINICAL NURSE SPECIALIST

## 2021-01-01 PROCEDURE — 99152 MOD SED SAME PHYS/QHP 5/>YRS: CPT

## 2021-01-01 PROCEDURE — 1090F PRES/ABSN URINE INCON ASSESS: CPT | Performed by: CLINICAL NURSE SPECIALIST

## 2021-01-01 PROCEDURE — 6360000002 HC RX W HCPCS

## 2021-01-01 PROCEDURE — G8419 CALC BMI OUT NRM PARAM NOF/U: HCPCS | Performed by: INTERNAL MEDICINE

## 2021-01-01 PROCEDURE — 92928 PRQ TCAT PLMT NTRAC ST 1 LES: CPT

## 2021-01-01 PROCEDURE — 1123F ACP DISCUSS/DSCN MKR DOCD: CPT | Performed by: CLINICAL NURSE SPECIALIST

## 2021-01-01 PROCEDURE — 93459 L HRT ART/GRFT ANGIO: CPT

## 2021-01-01 PROCEDURE — G8427 DOCREV CUR MEDS BY ELIG CLIN: HCPCS | Performed by: INTERNAL MEDICINE

## 2021-01-01 RX ORDER — AMLODIPINE BESYLATE 5 MG/1
5 TABLET ORAL 2 TIMES DAILY
Qty: 180 TABLET | Refills: 3 | Status: SHIPPED | OUTPATIENT
Start: 2021-01-01

## 2021-01-01 RX ORDER — SODIUM CHLORIDE 9 MG/ML
INJECTION, SOLUTION INTRAVENOUS CONTINUOUS
Status: DISCONTINUED | OUTPATIENT
Start: 2021-01-01 | End: 2021-01-01 | Stop reason: HOSPADM

## 2021-01-01 RX ORDER — CLOPIDOGREL BISULFATE 75 MG/1
75 TABLET ORAL DAILY
Qty: 90 TABLET | Refills: 5 | Status: ON HOLD
Start: 2021-01-01 | End: 2022-01-01 | Stop reason: HOSPADM

## 2021-01-01 RX ORDER — ISOSORBIDE MONONITRATE 30 MG/1
60 TABLET, EXTENDED RELEASE ORAL 2 TIMES DAILY
COMMUNITY

## 2021-01-01 RX ORDER — FUROSEMIDE 40 MG/1
40 TABLET ORAL DAILY
Qty: 90 TABLET | Refills: 1 | Status: SHIPPED | OUTPATIENT
Start: 2021-01-01 | End: 2021-01-01

## 2021-01-01 RX ORDER — FUROSEMIDE 40 MG/1
40 TABLET ORAL DAILY
Qty: 90 TABLET | Refills: 1 | Status: SHIPPED | OUTPATIENT
Start: 2021-01-01 | End: 2022-01-01 | Stop reason: SDUPTHER

## 2021-01-01 RX ORDER — ACETAMINOPHEN 325 MG/1
650 TABLET ORAL EVERY 4 HOURS PRN
Status: DISCONTINUED | OUTPATIENT
Start: 2021-01-01 | End: 2021-01-01 | Stop reason: HOSPADM

## 2021-01-01 RX ORDER — ISOSORBIDE MONONITRATE 60 MG/1
TABLET, EXTENDED RELEASE ORAL
Qty: 90 TABLET | Refills: 3 | Status: SHIPPED | OUTPATIENT
Start: 2021-01-01 | End: 2021-01-01

## 2021-01-01 RX ORDER — VALSARTAN 80 MG/1
80 TABLET ORAL 2 TIMES DAILY
Qty: 30 TABLET | Refills: 5 | Status: SHIPPED | OUTPATIENT
Start: 2021-01-01 | End: 2021-01-01

## 2021-01-01 RX ORDER — IODIXANOL 320 MG/ML
165 INJECTION, SOLUTION INTRAVASCULAR
Status: COMPLETED | OUTPATIENT
Start: 2021-01-01 | End: 2021-01-01

## 2021-01-01 RX ORDER — ASPIRIN 325 MG
325 TABLET ORAL ONCE
Status: COMPLETED | OUTPATIENT
Start: 2021-01-01 | End: 2021-01-01

## 2021-01-01 RX ORDER — ONDANSETRON 2 MG/ML
4 INJECTION INTRAMUSCULAR; INTRAVENOUS EVERY 6 HOURS PRN
Status: DISCONTINUED | OUTPATIENT
Start: 2021-01-01 | End: 2021-01-01 | Stop reason: HOSPADM

## 2021-01-01 RX ORDER — NITROGLYCERIN 0.4 MG/1
0.4 TABLET SUBLINGUAL EVERY 5 MIN PRN
Status: DISCONTINUED | OUTPATIENT
Start: 2021-01-01 | End: 2021-01-01 | Stop reason: HOSPADM

## 2021-01-01 RX ORDER — CLOPIDOGREL BISULFATE 75 MG/1
75 TABLET ORAL DAILY
Status: DISCONTINUED | OUTPATIENT
Start: 2021-01-01 | End: 2021-01-01 | Stop reason: HOSPADM

## 2021-01-01 RX ORDER — METOPROLOL TARTRATE 100 MG/1
50 TABLET ORAL 2 TIMES DAILY
Qty: 180 TABLET | Refills: 3 | Status: SHIPPED | OUTPATIENT
Start: 2021-01-01

## 2021-01-01 RX ADMIN — ASPIRIN 325 MG: 325 TABLET ORAL at 09:07

## 2021-01-01 RX ADMIN — SODIUM CHLORIDE: 9 INJECTION, SOLUTION INTRAVENOUS at 09:05

## 2021-01-01 RX ADMIN — IODIXANOL 165 ML: 320 INJECTION, SOLUTION INTRAVASCULAR at 12:24

## 2021-01-01 RX ADMIN — SODIUM BICARBONATE: 84 INJECTION, SOLUTION INTRAVENOUS at 12:47

## 2021-01-01 ASSESSMENT — ENCOUNTER SYMPTOMS
EYE DISCHARGE: 0
SHORTNESS OF BREATH: 1
BACK PAIN: 0
VOMITING: 0
ABDOMINAL DISTENTION: 0
BLOOD IN STOOL: 0
DIARRHEA: 0
WHEEZING: 0
COUGH: 0
CONSTIPATION: 0
CHEST TIGHTNESS: 0

## 2021-02-02 DIAGNOSIS — J44.9 STAGE 3 SEVERE COPD BY GOLD CLASSIFICATION (HCC): Primary | ICD-10-CM

## 2021-02-02 RX ORDER — IPRATROPIUM BROMIDE AND ALBUTEROL SULFATE 2.5; .5 MG/3ML; MG/3ML
3 SOLUTION RESPIRATORY (INHALATION) EVERY 4 HOURS
Qty: 360 ML | Refills: 11 | Status: SHIPPED | OUTPATIENT
Start: 2021-02-02 | End: 2022-01-01 | Stop reason: ALTCHOICE

## 2021-02-02 RX ORDER — BUDESONIDE 0.5 MG/2ML
0.5 INHALANT ORAL
Qty: 60 ML | Refills: 11 | Status: SHIPPED | OUTPATIENT
Start: 2021-02-02 | End: 2022-01-01 | Stop reason: SDUPTHER

## 2021-02-02 NOTE — TELEPHONE ENCOUNTER
Patient is agreeable to do the other nebulized medicines. Please approve these medicines and they will go to [facility].    I will need to fax a med list with them on it and this telephone message to [phone]

## 2021-02-02 NOTE — TELEPHONE ENCOUNTER
"Left message for patient to call back regarding the nebulizer medicine.     I talked with [facility] and the Brovana and performist copay is around $200 but if she is aggreable to do Duonebs $0-10$ a month and the steroid budesonide will be $0-$30 a month and they will ship to patient.    She doesn't have part \"B\" plan to pay for nebulized medicine.  "

## 2021-02-05 RX ORDER — FUROSEMIDE 40 MG/1
40 TABLET ORAL DAILY
Qty: 90 TABLET | Refills: 1 | Status: SHIPPED | OUTPATIENT
Start: 2021-02-05 | End: 2021-01-01

## 2021-02-22 ENCOUNTER — TELEPHONE (OUTPATIENT)
Dept: PULMONOLOGY | Facility: CLINIC | Age: 76
End: 2021-02-22

## 2021-02-22 NOTE — TELEPHONE ENCOUNTER
Patient left a voicemail:  Patient states that the new neb medicines are not helping as well as the Brown did. She is asking if you want her to try something else?    I called the patient back. She had only been using the Budesonide. She has DuoNebs that was sent in on 2/2/21 and was confused and has not been taking it.  She is going to continue using the Budesonide and will start on the DuoNebs every 4 hours as needed. She will call back if it doesn't help her.

## 2021-03-05 ENCOUNTER — OFFICE VISIT (OUTPATIENT)
Dept: PULMONOLOGY | Facility: CLINIC | Age: 76
End: 2021-03-05

## 2021-03-05 VITALS
SYSTOLIC BLOOD PRESSURE: 122 MMHG | TEMPERATURE: 97.1 F | HEIGHT: 64 IN | DIASTOLIC BLOOD PRESSURE: 84 MMHG | OXYGEN SATURATION: 75 % | BODY MASS INDEX: 15.19 KG/M2 | WEIGHT: 89 LBS | HEART RATE: 90 BPM

## 2021-03-05 DIAGNOSIS — I27.20 PULMONARY HYPERTENSION (HCC): ICD-10-CM

## 2021-03-05 DIAGNOSIS — R63.6 UNDERWEIGHT: ICD-10-CM

## 2021-03-05 DIAGNOSIS — I48.0 PAF (PAROXYSMAL ATRIAL FIBRILLATION) (HCC): ICD-10-CM

## 2021-03-05 DIAGNOSIS — J44.9 STAGE 3 SEVERE COPD BY GOLD CLASSIFICATION (HCC): Primary | ICD-10-CM

## 2021-03-05 DIAGNOSIS — J44.9 STAGE 3 SEVERE COPD BY GOLD CLASSIFICATION (HCC): ICD-10-CM

## 2021-03-05 DIAGNOSIS — R09.02 EXERCISE HYPOXEMIA: ICD-10-CM

## 2021-03-05 DIAGNOSIS — Z95.2 H/O PROSTHETIC HEART VALVE: ICD-10-CM

## 2021-03-05 DIAGNOSIS — J96.11 CHRONIC RESPIRATORY FAILURE WITH HYPOXIA (HCC): Primary | ICD-10-CM

## 2021-03-05 PROCEDURE — 99214 OFFICE O/P EST MOD 30 MIN: CPT | Performed by: INTERNAL MEDICINE

## 2021-03-05 RX ORDER — FORMOTEROL FUMARATE 20 UG/2ML
20 SOLUTION RESPIRATORY (INHALATION)
Qty: 180 EACH | Refills: 3 | Status: SHIPPED | OUTPATIENT
Start: 2021-03-05 | End: 2022-01-01 | Stop reason: ALTCHOICE

## 2021-03-05 RX ORDER — ARFORMOTEROL TARTRATE 15 UG/2ML
15 SOLUTION RESPIRATORY (INHALATION)
Qty: 360 ML | Refills: 3 | Status: SHIPPED | OUTPATIENT
Start: 2021-03-05 | End: 2022-01-01 | Stop reason: SDUPTHER

## 2021-03-05 RX ORDER — AMLODIPINE BESYLATE 5 MG/1
TABLET ORAL
COMMUNITY
Start: 2021-01-29

## 2021-03-05 RX ORDER — ARFORMOTEROL TARTRATE 15 UG/2ML
15 SOLUTION RESPIRATORY (INHALATION)
Qty: 360 ML | Refills: 3 | Status: SHIPPED | OUTPATIENT
Start: 2021-03-05 | End: 2021-03-05 | Stop reason: SDUPTHER

## 2021-03-05 NOTE — PROGRESS NOTES
Chief Complaint  COPD and Shortness of Breath and pulmonary hypertentsion (no hospitalizations; no tested; no exposure; patients oxygen was 75 % coming back to the room but didnt want to put on oxygen; it went up as she sit there more; it got to 93%)  Subjective    History of Present Illness {CC  Problem List  Visit Diagnosis   Encounters  Notes  Medications  Labs  Result Review Imaging  Media     Steffi Michelle presents to [facility] for shortness of breath associate with pulmonary hypertension and COPD.    History of Present Illness   The patient currently is on Pulmicort nebulization as well as duo nebs.  She previously had been utilizing Brovana twice daily which provided her much better relief than her current regimen.  Brovana is no longer covered on her insurance.  I am hopeful that Perforomist at least generic Perforomist may be covered and I did send a prescription for this.  If that is still not covered we will try to get either Perforomist or Brovana authorizes a clearly have benefited her more than her current regimen.  She does have significant dyspnea with exertion and did desaturate after walking into the room.  She did not want to take her mask off and utilize oxygen and her sats did come up to 93% on room air after resting.  She also states her portable oxygen is very difficult to carry and I told her to contact her [employer] company which is [employer] to see if she might qualify for a portable concentrator.  If insurance will not cover this we can still write her prescription and she will check back with me after checking with [employer].  Prior to Admission medications    Medication Sig Start Date End Date Taking? Authorizing Provider   amLODIPine (NORVASC) 5 MG tablet  1/29/21  Yes ProviderCarlos A MD   aspirin 81 MG EC tablet Take 81 mg by mouth.   Yes ProviderCarlos A MD   atorvastatin (LIPITOR) 40 MG tablet Take 40 mg by  mouth.   Yes Carlos A Garcia MD   Biotin 5000 MCG tablet Take  by mouth.   Yes Carlos A Garcia MD   budesonide (PULMICORT) 0.5 MG/2ML nebulizer solution Take 2 mL by nebulization Daily. 2/2/21  Yes Kwabena Raymond APRN   Calcium Carbonate-Vitamin D (CALCIUM 500/D PO) Take  by mouth.   Yes Carlos A Garcia MD   clopidogrel (PLAVIX) 75 MG tablet  11/20/19  Yes Carlos A Garcia MD   Fexofenadine HCl (MUCINEX ALLERGY PO) Take  by mouth.   Yes Carlos A Garcia MD   furosemide (LASIX) 40 MG tablet  11/20/19  Yes Carlos A Garcia MD   ipratropium-albuterol (DUO-NEB) 0.5-2.5 mg/3 ml nebulizer Take 3 mL by nebulization Every 4 (Four) Hours. 2/2/21  Yes Kwabena Raymond APRN   isosorbide mononitrate (IMDUR) 30 MG 24 hr tablet  12/30/19  Yes Carlos A Garcia MD   levothyroxine (SYNTHROID, LEVOTHROID) 25 MCG tablet  11/7/19  Yes ProviderCarlos A MD   metoprolol tartrate (LOPRESSOR) 100 MG tablet Take 50 mg by mouth 2 (Two) Times a Day. 6/20/18  Yes Carlos A Garcia MD   nitroglycerin (NITROSTAT) 0.4 MG SL tablet  11/20/19  Yes Carlos A Garcia MD   O2 (OXYGEN) Inhale 1.5 L/min Every Night.   Yes Carlos A Garcia MD   formoterol (PERFOROMIST) 20 MCG/2ML nebulizer solution Take 2 mL by nebulization 2 (Two) Times a Day. 3/5/21   Keyon Syed MD   formoterol (PERFOROMIST) 20 MCG/2ML nebulizer solution Take 2 mL by nebulization 2 (Two) Times a Day. 12/29/20 3/5/21  Kwabena Raymond APRN   levalbuterol (XOPENEX) 1.25 MG/3ML nebulizer solution USE 1 VIAL IN NEBULIZER 4 TIMES A DAY. 5/26/20 3/5/21  Kwabena Raymond APRN   revefenacin (YUPELRI) 175 MCG/3ML nebulizer solution Take 3 mL by nebulization Daily. 9/4/20 3/5/21  Kwabena Raymond APRN       Social History     Socioeconomic History   • Marital status:      Spouse name: Not on file   • Number of children: Not on file   • Years of education: Not on file   •  "Highest education level: Not on file   Tobacco Use   • Smoking status: Former Smoker     Packs/day: 0.25     Years: 10.00     Pack years: 2.50     Types: Cigarettes     Quit date:      Years since quittin.2   • Smokeless tobacco: Never Used   Substance and Sexual Activity   • Alcohol use: No   • Drug use: No   • Sexual activity: Defer       Objective   Vital Signs:   /84   Pulse 90   Temp 97.1 °F (36.2 °C)   Ht 162.6 cm (64\")   Wt 40.4 kg (89 lb)   SpO2 (!) 75% Comment: as coming back to the room and didnt want to put on oxygen;  BMI 15.28 kg/m²     Physical Exam  Vitals signs and nursing note reviewed.   Constitutional:       Comments: She is a chronically ill-appearing thin white female.   HENT:      Head:      Comments: She is wearing a mask.  Eyes:      Extraocular Movements: Extraocular movements intact.      Pupils: Pupils are equal, round, and reactive to light.   Neck:      Musculoskeletal: Normal range of motion.   Cardiovascular:      Rate and Rhythm: Normal rate and regular rhythm.      Comments: A systolic murmur is heard along the left sternal border.  Pulmonary:      Comments: Breath sounds are diminished.  Abdominal:      Palpations: Abdomen is soft.   Musculoskeletal: Normal range of motion.   Skin:     General: Skin is warm and dry.   Neurological:      General: No focal deficit present.      Mental Status: She is alert and oriented to person, place, and time.   Psychiatric:         Mood and Affect: Mood normal.        Result Review :{ Labs  Result Review  Imaging  Med Tab  Media :    PFT Values        Some values may be hidden. Unless noted otherwise, only the newest values recorded on each date are displayed.         Old Values PFT Results 5/1/19 3/3/20   FVC 68%    FEV1 37%    FEV1/FVC 42.56%    DLCO 35%    TLC 83%       Pre Drug PFT Results 5/1/19 3/3/20   FVC  60.62   FEV1  43.26   FEF 25-75%  27.11   FEV1/FVC  54      Post Drug PFT Results 5/1/19 3/3/20   No data to " display.      Other Tests PFT Results 5/1/19 3/3/20   No data to display.               Results for orders placed in visit on 03/03/20   Pulmonary Function Test    Narrative Cuco Valdez, MA     3/3/2020 10:07 AM  Pulmonary Function Test  Performed by: Keyon Syed MD  Authorized by: Keyon Syed MD      Pre Drug    FVC: 60.62%   FEV1: 43.26%   FEF 25-75%: 27.11%   FEV1/FVC: 54%   Results for orders placed in visit on 05/01/19   Full Pulmonary Function Test Without Bronchodilator                      Assessment and Plan {CC Problem List  Visit Diagnosis  ROS  Review (Popup)  Health Maintenance  Quality  BestPractice  Medications  SmartSets  SnapShot Encounters  Media      Problem List Items Addressed This Visit        Cardiac and Vasculature    H/O prosthetic heart valve    Current Assessment & Plan     She is status post mitral valve replacement.         PAF (paroxysmal atrial fibrillation) (CMS/[facility])    Current Assessment & Plan     Her rhythm is regular today.         Relevant Medications    amLODIPine (NORVASC) 5 MG tablet       Endocrine and Metabolic    Underweight    Current Assessment & Plan     She will follow-up with her primary care physician regarding her low BMI.            Pulmonary and Pneumonias    Stage 3 severe COPD by GOLD classification (CMS/[facility])    Current Assessment & Plan     I am going to try to get either Perforomist or Brovana covered as she is done quite well with this in the past and is done much worse symptomatically from pulmonary standpoint since her Brovana was discontinued.  Again, Brovana is not well covered at present and I will send in a prescription for generic performance.  If this is not covered either we will see if we can get 1 of these agents preauthorized.           Relevant Medications    Fexofenadine HCl (MUCINEX ALLERGY PO)    formoterol (PERFOROMIST) 20 MCG/2ML nebulizer solution    Pulmonary hypertension (CMS/[facility])    Current Assessment &  Plan     This is almost certainly due to a combination of group 2 and group 3 pulmonary hypertension.         Exercise hypoxemia    Current Assessment & Plan     She does desaturate with exertion.         Chronic respiratory failure with hypoxia (CMS/HCC) - Primary    Current Assessment & Plan     She still has O2 desaturation minimal exertion and certainly can continue her oxygen routinely at night and with exertion and as needed at rest.               Patient's Body mass index is 15.28 kg/m². BMI is below normal parameters. Recommendations include: referral to primary care.          Keyon Syed MD  3/5/2021  11:37 CST    Follow Up {Instructions Charge Capture  Follow-up Communications   Return in about 6 months (around 9/5/2021).    Patient was given instructions and counseling regarding her condition or for health maintenance advice. Please see specific information pulled into the AVS if appropriate.

## 2021-03-05 NOTE — PATIENT INSTRUCTIONS
The patient is no longer on Brovana as its not covered on her insurance.  She is on duo nebs and budesonide and states this is not doing nearly as well for her.  I am going to send in a prescription for generic Perforomist to take twice daily and her nebulizer to see if this helps her.  She also might benefit from a portable O2 concentrator and she will check with [employer] to see if this is covered.  I told her I had be glad to provide her prescription whether it is covered or not but she is going to check with [facility] first.  Otherwise I will see her back in 6 months.

## 2021-03-05 NOTE — ASSESSMENT & PLAN NOTE
I am going to try to get either Perforomist or Brovana covered as she is done quite well with this in the past and is done much worse symptomatically from pulmonary standpoint since her Brovana was discontinued.  Again, Brovana is not well covered at present and I will send in a prescription for generic performance.  If this is not covered either we will see if we can get 1 of these agents preauthorized.

## 2021-03-05 NOTE — TELEPHONE ENCOUNTER
Erin S recommended that we try to go through pharmacy incorper[facility] to see if they can get it to go through. I talked with patient and she is fine with that. If you want to use the Brovana then we can send that over. They will call her and let her know price and will ship it to her. Please approve if that is what you want to do

## 2021-03-05 NOTE — TELEPHONE ENCOUNTER
The only other option will be Brovana and it is not covered either per the patient.  We need to see if we get either Perforomist or Brovana preauthorized or if I can send a note to her insurance to see if we can get it covered as she has done much worse since this agent was discontinued.  The only other option will be some sort of inhaler that contains both a steroid and a long-acting beta agonist and this would include either Symbicort or Dulera.  If she did want to try 1 of these agents and if they were covered we could utilize that and she would just discontinue her budesonide.  Again the best option would be to see if we can get either Brovana or Perforomist preauthorized per her insurance as she clearly has done better when she was taking the Brovana in particular.

## 2021-03-05 NOTE — ASSESSMENT & PLAN NOTE
She still has O2 desaturation minimal exertion and certainly can continue her oxygen routinely at night and with exertion and as needed at rest.

## 2021-03-05 NOTE — TELEPHONE ENCOUNTER
Pharmacy stated that the perforomist wasn't covered with her medicare. Was going to see if you would like to change it to something else?

## 2021-03-11 DIAGNOSIS — J44.9 STAGE 3 SEVERE COPD BY GOLD CLASSIFICATION (HCC): Primary | ICD-10-CM

## 2021-03-11 NOTE — PROGRESS NOTES
Pt's daughter, Remedios, left a voicemail.  Pt would like to proceed with the POC.  Order placed.

## 2021-03-26 ENCOUNTER — HOSPITAL ENCOUNTER (INPATIENT)
Age: 76
LOS: 1 days | Discharge: HOME | DRG: 280 | End: 2021-03-27
Attending: EMERGENCY MEDICINE | Admitting: INTERNAL MEDICINE
Payer: MEDICARE

## 2021-03-26 ENCOUNTER — APPOINTMENT (OUTPATIENT)
Dept: GENERAL RADIOLOGY | Age: 76
DRG: 280 | End: 2021-03-26
Payer: MEDICARE

## 2021-03-26 ENCOUNTER — TELEPHONE (OUTPATIENT)
Dept: CARDIOLOGY CLINIC | Age: 76
End: 2021-03-26

## 2021-03-26 DIAGNOSIS — I25.9 CHEST PAIN DUE TO MYOCARDIAL ISCHEMIA, UNSPECIFIED ISCHEMIC CHEST PAIN TYPE: Primary | ICD-10-CM

## 2021-03-26 DIAGNOSIS — I10 SEVERE HYPERTENSION: ICD-10-CM

## 2021-03-26 DIAGNOSIS — D64.9 ACUTE ON CHRONIC ANEMIA: ICD-10-CM

## 2021-03-26 DIAGNOSIS — R77.8 ELEVATED TROPONIN: ICD-10-CM

## 2021-03-26 DIAGNOSIS — K92.2 GASTROINTESTINAL HEMORRHAGE, UNSPECIFIED GASTROINTESTINAL HEMORRHAGE TYPE: ICD-10-CM

## 2021-03-26 LAB
ABO/RH: NORMAL
ALBUMIN SERPL-MCNC: 4.1 G/DL (ref 3.5–5.2)
ALP BLD-CCNC: 72 U/L (ref 35–104)
ALT SERPL-CCNC: 10 U/L (ref 5–33)
ANION GAP SERPL CALCULATED.3IONS-SCNC: 10 MMOL/L (ref 7–19)
ANTIBODY SCREEN: NORMAL
APTT: 28 SEC (ref 26–36.2)
AST SERPL-CCNC: 20 U/L (ref 5–32)
BASOPHILS ABSOLUTE: 0.1 K/UL (ref 0–0.2)
BASOPHILS RELATIVE PERCENT: 0.5 % (ref 0–1)
BILIRUB SERPL-MCNC: <0.2 MG/DL (ref 0.2–1.2)
BLOOD BANK DISPENSE STATUS: NORMAL
BLOOD BANK PRODUCT CODE: NORMAL
BPU ID: NORMAL
BUN BLDV-MCNC: 47 MG/DL (ref 8–23)
CALCIUM SERPL-MCNC: 10.5 MG/DL (ref 8.8–10.2)
CHLORIDE BLD-SCNC: 95 MMOL/L (ref 98–111)
CO2: 34 MMOL/L (ref 22–29)
CREAT SERPL-MCNC: 1.1 MG/DL (ref 0.5–0.9)
DESCRIPTION BLOOD BANK: NORMAL
EOSINOPHILS ABSOLUTE: 0.3 K/UL (ref 0–0.6)
EOSINOPHILS RELATIVE PERCENT: 3.3 % (ref 0–5)
GFR AFRICAN AMERICAN: 58
GFR NON-AFRICAN AMERICAN: 48
GLUCOSE BLD-MCNC: 100 MG/DL (ref 70–99)
GLUCOSE BLD-MCNC: 155 MG/DL (ref 74–109)
GLUCOSE BLD-MCNC: 269 MG/DL (ref 70–99)
HBA1C MFR BLD: 5 % (ref 4–6)
HCT VFR BLD CALC: 25.2 % (ref 37–47)
HCT VFR BLD CALC: 28.3 % (ref 37–47)
HEMOGLOBIN: 7.3 G/DL (ref 12–16)
HEMOGLOBIN: 8.4 G/DL (ref 12–16)
IMMATURE GRANULOCYTES #: 0.1 K/UL
INR BLD: 1.14 (ref 0.88–1.18)
LACTIC ACID: 1.2 MMOL/L (ref 0.5–1.9)
LV EF: 58 %
LVEF MODALITY: NORMAL
LYMPHOCYTES ABSOLUTE: 1.2 K/UL (ref 1.1–4.5)
LYMPHOCYTES RELATIVE PERCENT: 11.9 % (ref 20–40)
MCH RBC QN AUTO: 26.7 PG (ref 27–31)
MCHC RBC AUTO-ENTMCNC: 29 G/DL (ref 33–37)
MCV RBC AUTO: 92.3 FL (ref 81–99)
MONOCYTES ABSOLUTE: 1.2 K/UL (ref 0–0.9)
MONOCYTES RELATIVE PERCENT: 11.6 % (ref 0–10)
NEUTROPHILS ABSOLUTE: 7.5 K/UL (ref 1.5–7.5)
NEUTROPHILS RELATIVE PERCENT: 72.2 % (ref 50–65)
PDW BLD-RTO: 19.1 % (ref 11.5–14.5)
PERFORMED ON: ABNORMAL
PERFORMED ON: ABNORMAL
PLATELET # BLD: 370 K/UL (ref 130–400)
PMV BLD AUTO: 9.8 FL (ref 9.4–12.3)
POTASSIUM REFLEX MAGNESIUM: 3.9 MMOL/L (ref 3.5–5)
PRO-BNP: 1648 PG/ML (ref 0–1800)
PROTHROMBIN TIME: 14.6 SEC (ref 12–14.6)
RBC # BLD: 2.73 M/UL (ref 4.2–5.4)
SARS-COV-2, NAAT: NOT DETECTED
SODIUM BLD-SCNC: 139 MMOL/L (ref 136–145)
TOTAL PROTEIN: 7.6 G/DL (ref 6.6–8.7)
TROPONIN: 0.03 NG/ML (ref 0–0.03)
TROPONIN: 0.03 NG/ML (ref 0–0.03)
TROPONIN: 0.04 NG/ML (ref 0–0.03)
WBC # BLD: 10.4 K/UL (ref 4.8–10.8)

## 2021-03-26 PROCEDURE — 2580000003 HC RX 258: Performed by: EMERGENCY MEDICINE

## 2021-03-26 PROCEDURE — C9113 INJ PANTOPRAZOLE SODIUM, VIA: HCPCS | Performed by: EMERGENCY MEDICINE

## 2021-03-26 PROCEDURE — 86850 RBC ANTIBODY SCREEN: CPT

## 2021-03-26 PROCEDURE — 87635 SARS-COV-2 COVID-19 AMP PRB: CPT

## 2021-03-26 PROCEDURE — 83880 ASSAY OF NATRIURETIC PEPTIDE: CPT

## 2021-03-26 PROCEDURE — 82947 ASSAY GLUCOSE BLOOD QUANT: CPT

## 2021-03-26 PROCEDURE — 84484 ASSAY OF TROPONIN QUANT: CPT

## 2021-03-26 PROCEDURE — 85730 THROMBOPLASTIN TIME PARTIAL: CPT

## 2021-03-26 PROCEDURE — 86923 COMPATIBILITY TEST ELECTRIC: CPT

## 2021-03-26 PROCEDURE — 83605 ASSAY OF LACTIC ACID: CPT

## 2021-03-26 PROCEDURE — 6360000002 HC RX W HCPCS: Performed by: INTERNAL MEDICINE

## 2021-03-26 PROCEDURE — 2140000000 HC CCU INTERMEDIATE R&B

## 2021-03-26 PROCEDURE — 94640 AIRWAY INHALATION TREATMENT: CPT

## 2021-03-26 PROCEDURE — P9016 RBC LEUKOCYTES REDUCED: HCPCS

## 2021-03-26 PROCEDURE — 93005 ELECTROCARDIOGRAM TRACING: CPT | Performed by: EMERGENCY MEDICINE

## 2021-03-26 PROCEDURE — 2580000003 HC RX 258: Performed by: INTERNAL MEDICINE

## 2021-03-26 PROCEDURE — 6360000002 HC RX W HCPCS: Performed by: EMERGENCY MEDICINE

## 2021-03-26 PROCEDURE — C9113 INJ PANTOPRAZOLE SODIUM, VIA: HCPCS | Performed by: INTERNAL MEDICINE

## 2021-03-26 PROCEDURE — 2700000000 HC OXYGEN THERAPY PER DAY

## 2021-03-26 PROCEDURE — 99284 EMERGENCY DEPT VISIT MOD MDM: CPT

## 2021-03-26 PROCEDURE — 36415 COLL VENOUS BLD VENIPUNCTURE: CPT

## 2021-03-26 PROCEDURE — 85018 HEMOGLOBIN: CPT

## 2021-03-26 PROCEDURE — 80053 COMPREHEN METABOLIC PANEL: CPT

## 2021-03-26 PROCEDURE — 6370000000 HC RX 637 (ALT 250 FOR IP): Performed by: INTERNAL MEDICINE

## 2021-03-26 PROCEDURE — 99221 1ST HOSP IP/OBS SF/LOW 40: CPT | Performed by: INTERNAL MEDICINE

## 2021-03-26 PROCEDURE — 36430 TRANSFUSION BLD/BLD COMPNT: CPT

## 2021-03-26 PROCEDURE — 83036 HEMOGLOBIN GLYCOSYLATED A1C: CPT

## 2021-03-26 PROCEDURE — 86901 BLOOD TYPING SEROLOGIC RH(D): CPT

## 2021-03-26 PROCEDURE — 71045 X-RAY EXAM CHEST 1 VIEW: CPT

## 2021-03-26 PROCEDURE — 96374 THER/PROPH/DIAG INJ IV PUSH: CPT

## 2021-03-26 PROCEDURE — 85610 PROTHROMBIN TIME: CPT

## 2021-03-26 PROCEDURE — 85014 HEMATOCRIT: CPT

## 2021-03-26 PROCEDURE — 86900 BLOOD TYPING SEROLOGIC ABO: CPT

## 2021-03-26 PROCEDURE — 85025 COMPLETE CBC W/AUTO DIFF WBC: CPT

## 2021-03-26 RX ORDER — LEVOTHYROXINE SODIUM 0.03 MG/1
25 TABLET ORAL DAILY
Status: DISCONTINUED | OUTPATIENT
Start: 2021-03-27 | End: 2021-03-27 | Stop reason: HOSPADM

## 2021-03-26 RX ORDER — SODIUM CHLORIDE 9 MG/ML
25 INJECTION, SOLUTION INTRAVENOUS PRN
Status: DISCONTINUED | OUTPATIENT
Start: 2021-03-26 | End: 2021-03-27 | Stop reason: HOSPADM

## 2021-03-26 RX ORDER — METOPROLOL TARTRATE 50 MG/1
50 TABLET, FILM COATED ORAL 2 TIMES DAILY
Status: DISCONTINUED | OUTPATIENT
Start: 2021-03-26 | End: 2021-03-27 | Stop reason: HOSPADM

## 2021-03-26 RX ORDER — ISOSORBIDE MONONITRATE 60 MG/1
60 TABLET, EXTENDED RELEASE ORAL NIGHTLY
Status: DISCONTINUED | OUTPATIENT
Start: 2021-03-26 | End: 2021-03-27 | Stop reason: HOSPADM

## 2021-03-26 RX ORDER — ACETAMINOPHEN 325 MG/1
650 TABLET ORAL EVERY 6 HOURS PRN
Status: DISCONTINUED | OUTPATIENT
Start: 2021-03-26 | End: 2021-03-27 | Stop reason: HOSPADM

## 2021-03-26 RX ORDER — SODIUM CHLORIDE 0.9 % (FLUSH) 0.9 %
10 SYRINGE (ML) INJECTION EVERY 12 HOURS SCHEDULED
Status: DISCONTINUED | OUTPATIENT
Start: 2021-03-26 | End: 2021-03-27 | Stop reason: HOSPADM

## 2021-03-26 RX ORDER — ARFORMOTEROL TARTRATE 15 UG/2ML
15 SOLUTION RESPIRATORY (INHALATION) 2 TIMES DAILY
Status: DISCONTINUED | OUTPATIENT
Start: 2021-03-26 | End: 2021-03-27 | Stop reason: HOSPADM

## 2021-03-26 RX ORDER — ACETAMINOPHEN 650 MG/1
650 SUPPOSITORY RECTAL EVERY 6 HOURS PRN
Status: DISCONTINUED | OUTPATIENT
Start: 2021-03-26 | End: 2021-03-27 | Stop reason: HOSPADM

## 2021-03-26 RX ORDER — SODIUM CHLORIDE 9 MG/ML
INJECTION, SOLUTION INTRAVENOUS PRN
Status: DISCONTINUED | OUTPATIENT
Start: 2021-03-26 | End: 2021-03-27 | Stop reason: HOSPADM

## 2021-03-26 RX ORDER — FUROSEMIDE 40 MG/1
40 TABLET ORAL DAILY
Status: DISCONTINUED | OUTPATIENT
Start: 2021-03-27 | End: 2021-03-27 | Stop reason: HOSPADM

## 2021-03-26 RX ORDER — ATORVASTATIN CALCIUM 40 MG/1
40 TABLET, FILM COATED ORAL NIGHTLY
Status: DISCONTINUED | OUTPATIENT
Start: 2021-03-26 | End: 2021-03-27 | Stop reason: HOSPADM

## 2021-03-26 RX ORDER — NICOTINE POLACRILEX 4 MG
15 LOZENGE BUCCAL PRN
Status: DISCONTINUED | OUTPATIENT
Start: 2021-03-26 | End: 2021-03-27 | Stop reason: HOSPADM

## 2021-03-26 RX ORDER — PANTOPRAZOLE SODIUM 40 MG/10ML
40 INJECTION, POWDER, LYOPHILIZED, FOR SOLUTION INTRAVENOUS 2 TIMES DAILY
Status: DISCONTINUED | OUTPATIENT
Start: 2021-03-26 | End: 2021-03-27 | Stop reason: HOSPADM

## 2021-03-26 RX ORDER — LEVALBUTEROL INHALATION SOLUTION 1.25 MG/3ML
1.25 SOLUTION RESPIRATORY (INHALATION) EVERY 4 HOURS PRN
Status: DISCONTINUED | OUTPATIENT
Start: 2021-03-26 | End: 2021-03-27 | Stop reason: HOSPADM

## 2021-03-26 RX ORDER — POLYETHYLENE GLYCOL 3350 17 G/17G
17 POWDER, FOR SOLUTION ORAL DAILY PRN
Status: DISCONTINUED | OUTPATIENT
Start: 2021-03-26 | End: 2021-03-27 | Stop reason: HOSPADM

## 2021-03-26 RX ORDER — PANTOPRAZOLE SODIUM 40 MG/10ML
40 INJECTION, POWDER, LYOPHILIZED, FOR SOLUTION INTRAVENOUS DAILY
Status: DISCONTINUED | OUTPATIENT
Start: 2021-03-26 | End: 2021-03-26

## 2021-03-26 RX ORDER — SODIUM CHLORIDE 9 MG/ML
10 INJECTION INTRAVENOUS 2 TIMES DAILY
Status: DISCONTINUED | OUTPATIENT
Start: 2021-03-26 | End: 2021-03-27 | Stop reason: HOSPADM

## 2021-03-26 RX ORDER — SODIUM CHLORIDE 9 MG/ML
10 INJECTION INTRAVENOUS DAILY
Status: DISCONTINUED | OUTPATIENT
Start: 2021-03-26 | End: 2021-03-26 | Stop reason: ALTCHOICE

## 2021-03-26 RX ORDER — ONDANSETRON 2 MG/ML
4 INJECTION INTRAMUSCULAR; INTRAVENOUS EVERY 6 HOURS PRN
Status: DISCONTINUED | OUTPATIENT
Start: 2021-03-26 | End: 2021-03-27 | Stop reason: HOSPADM

## 2021-03-26 RX ORDER — HYDRALAZINE HYDROCHLORIDE 20 MG/ML
10 INJECTION INTRAMUSCULAR; INTRAVENOUS EVERY 6 HOURS PRN
Status: DISCONTINUED | OUTPATIENT
Start: 2021-03-26 | End: 2021-03-27 | Stop reason: HOSPADM

## 2021-03-26 RX ORDER — SODIUM CHLORIDE 0.9 % (FLUSH) 0.9 %
10 SYRINGE (ML) INJECTION PRN
Status: DISCONTINUED | OUTPATIENT
Start: 2021-03-26 | End: 2021-03-27 | Stop reason: HOSPADM

## 2021-03-26 RX ORDER — AMLODIPINE BESYLATE 5 MG/1
5 TABLET ORAL DAILY
Status: DISCONTINUED | OUTPATIENT
Start: 2021-03-27 | End: 2021-03-27 | Stop reason: HOSPADM

## 2021-03-26 RX ORDER — DEXTROSE MONOHYDRATE 25 G/50ML
12.5 INJECTION, SOLUTION INTRAVENOUS PRN
Status: DISCONTINUED | OUTPATIENT
Start: 2021-03-26 | End: 2021-03-27 | Stop reason: HOSPADM

## 2021-03-26 RX ORDER — DEXTROSE MONOHYDRATE 50 MG/ML
100 INJECTION, SOLUTION INTRAVENOUS PRN
Status: DISCONTINUED | OUTPATIENT
Start: 2021-03-26 | End: 2021-03-27 | Stop reason: HOSPADM

## 2021-03-26 RX ORDER — ASPIRIN 81 MG/1
81 TABLET, CHEWABLE ORAL DAILY
Status: DISCONTINUED | OUTPATIENT
Start: 2021-03-27 | End: 2021-03-27 | Stop reason: HOSPADM

## 2021-03-26 RX ORDER — NITROGLYCERIN 0.4 MG/1
0.4 TABLET SUBLINGUAL EVERY 5 MIN PRN
Status: DISCONTINUED | OUTPATIENT
Start: 2021-03-26 | End: 2021-03-27 | Stop reason: HOSPADM

## 2021-03-26 RX ORDER — METOPROLOL TARTRATE 5 MG/5ML
5 INJECTION INTRAVENOUS ONCE
Status: DISCONTINUED | OUTPATIENT
Start: 2021-03-26 | End: 2021-03-27 | Stop reason: HOSPADM

## 2021-03-26 RX ORDER — PROMETHAZINE HYDROCHLORIDE 25 MG/1
12.5 TABLET ORAL EVERY 6 HOURS PRN
Status: DISCONTINUED | OUTPATIENT
Start: 2021-03-26 | End: 2021-03-27 | Stop reason: HOSPADM

## 2021-03-26 RX ADMIN — ATORVASTATIN CALCIUM 40 MG: 40 TABLET, FILM COATED ORAL at 22:43

## 2021-03-26 RX ADMIN — SODIUM CHLORIDE, PRESERVATIVE FREE 10 ML: 5 INJECTION INTRAVENOUS at 21:00

## 2021-03-26 RX ADMIN — ISOSORBIDE MONONITRATE 60 MG: 60 TABLET, EXTENDED RELEASE ORAL at 22:43

## 2021-03-26 RX ADMIN — METOPROLOL TARTRATE 50 MG: 50 TABLET, FILM COATED ORAL at 21:00

## 2021-03-26 RX ADMIN — LEVALBUTEROL HYDROCHLORIDE 1.25 MG: 1.25 SOLUTION RESPIRATORY (INHALATION) at 18:32

## 2021-03-26 RX ADMIN — PANTOPRAZOLE SODIUM 40 MG: 40 INJECTION, POWDER, FOR SOLUTION INTRAVENOUS at 21:00

## 2021-03-26 RX ADMIN — ARFORMOTEROL TARTRATE 15 MCG: 15 SOLUTION RESPIRATORY (INHALATION) at 18:32

## 2021-03-26 RX ADMIN — PANTOPRAZOLE SODIUM 40 MG: 40 INJECTION, POWDER, FOR SOLUTION INTRAVENOUS at 13:42

## 2021-03-26 RX ADMIN — SODIUM CHLORIDE, PRESERVATIVE FREE 10 ML: 5 INJECTION INTRAVENOUS at 13:43

## 2021-03-26 ASSESSMENT — ENCOUNTER SYMPTOMS
BLOOD IN STOOL: 0
SINUS PRESSURE: 0
EYE DISCHARGE: 0
DIARRHEA: 0
NAUSEA: 0
ANAL BLEEDING: 0
SORE THROAT: 0
VOICE CHANGE: 0
SHORTNESS OF BREATH: 1
APNEA: 0
CONSTIPATION: 0
COUGH: 0
CHOKING: 0
VOMITING: 0
FACIAL SWELLING: 0

## 2021-03-26 ASSESSMENT — PAIN SCALES - GENERAL: PAINLEVEL_OUTOF10: 0

## 2021-03-26 NOTE — ED PROVIDER NOTES
[facility] EMERGENCY DEPT  eMERGENCY dEPARTMENT eNCOUnter      Pt Name: Faraz Canchola  MRN: [id number]  [place] 1945  Date of evaluation: 3/26/2021  Provider: Kanchan Herman MD    [address] [place] [address]       Chief Complaint   Patient presents with    Fatigue     \"think I'm in afib, my heart rate keeps going up and down\"         HISTORY OF PRESENT ILLNESS   (Location/Symptom, Timing/Onset,Context/Setting, Quality, Duration, Modifying Factors, Severity)  Note limiting factors. Faraz Canchola is a 76 y.o. female who presents to the emergency department duration of fatigue and rapid heart rate. 41-year-old female presents with complaints of 3 days of increasing shortness of breath having to wear her home oxygen 3 L continuously. She is here with her daughter. Fatigue and dyspnea with any activity. Chest pain. It was worse 3 days ago in between her shoulder blades she has twinges now. She has a history of coronary artery bypass 2016. She denies fever chills or known Covid symptoms. She denies abdominal pain nausea vomiting or diarrhea. She denies any changes in her bowel habits. I reviewed her medical records briefly and she has a problem list which includes 60 listed items. The history is provided by the patient, a relative and medical records. NursingNotes were reviewed. REVIEW OF SYSTEMS    (2-9 systems for level 4, 10 or more for level 5)     Review of Systems   Constitutional: Positive for fatigue. Negative for chills and fever. HENT: Negative for congestion, drooling, facial swelling, nosebleeds, sinus pressure, sore throat and voice change. Eyes: Negative for discharge. Respiratory: Positive for shortness of breath. Negative for apnea, cough and choking. Cardiovascular: Positive for chest pain and palpitations. Negative for leg swelling. Gastrointestinal: Negative for anal bleeding, blood in stool, constipation, diarrhea, nausea and vomiting.    Genitourinary: Negative for dysuria and enuresis. Musculoskeletal: Negative for joint swelling. Skin: Negative for rash and wound. Neurological: Positive for weakness (Generalized). Negative for seizures and syncope. Psychiatric/Behavioral: Negative for behavioral problems, hallucinations and suicidal ideas. All other systems reviewed and are negative. A complete review of systems was performed and is negative except as noted above in the HPI. PAST MEDICAL HISTORY     Past Medical History:   Diagnosis Date    Aortic stenosis     Arthritis     Atherosclerosis of native arteries of the extremities with intermittent claudication 2011    Carotid aneurysm, right ([address]     Carotid artery occlusion     CHF (congestive heart failure) ([address]     COPD (chronic obstructive pulmonary disease) ([address]     History of blood transfusion 2016    Post op, was taking blood thinner    Hyperlipidemia     Hypertension     MI (myocardial infarction) ([address] 2009    x2    Mitral valve stenosis     Pneumonia     Post-menopausal     PUD (peptic ulcer disease) 2009    Renal failure 2009    after ulcer perforation sepsis.    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 AND REPAIR WITH REVERSED LEFT GREATER SAPHENOUS VEIN INTERPOSITIONAL BYPASS GRAFT performed by Nicole Magana MD at [facility] Right early 's    long ago    CATARACT REMOVAL WITH IMPLANT Bilateral      SECTION      COLONOSCOPY      CORONARY ANGIOPLASTY WITH STENT PLACEMENT      CORONARY ANGIOPLASTY WITH STENT PLACEMENT      CORONARY ARTERY BYPASS GRAFT  2016    DIAGNOSTIC CARDIAC CATH LAB PROCEDURE      DILATION AND CURETTAGE OF UTERUS      ILIO-FEMORAL BYPASS GRAFT N/A 6/2/2016    OPEN TRANSLUMINAL BALLOON ANGIOPLASTY AND STENTING OF RIGHT COMMON AND EXTERNAL  ILIAC ARTERIES; RIGHT FEMORAL ENDARTERECTOMY WITH VEIN PATCH ANGIOPLASTY performed by Leora Enrique MD at [address] N/A 4/7/2016    MITRAL VALVE  REPLACEMENT LUONG-MAZE ABLATION WITH CRYO PROCEDURE, CORONARY ARTERY BYPASS GRAFT X 1 WITH ENDOSCOPIC VEIN HARVESTING WITH PERFUSION TRANSESOPHAGEAL ECHOCARDIOGRAM performed by Jerrod Hernandez MD at [facility] MITRAL VALVE REPLACEMENT  2016    DC REOPER, CAROTID ENDARTEC>1 MON Left 1/11/2018    REMOVAL OF HEMATOMA, LEFT CAROTID ARTERY performed by Leora Enrique MD at [address] Left 1/11/2018    LEFT CAROTID ENDARTERECTOMY WITH EEG MONITORING AND COMPLETION DUPLEX ULTRASOUND performed by Leora Enrique MD at [address] [place] [address] [facility]      SKIN GRAFT Right 7/22/2016    Skin graft split thickness foot,ankle,and leg. Right leg 26x8cm and 12x6cm total area 280cm squared. T[facility]    UPPER GASTROINTESTINAL ENDOSCOPY  3/15/16    Dr Guadalupe [facility]  3/15/2016    EGD BIOPSY performed by Lucia Harris DO at [address] [facility]    VASCULAR SURGERY  5/27/16 T[facility]    Aortagram and right leg runoff,right leg runoff,right common iliac artery selection for right leg run off views.  VASCULAR SURGERY      . Open transluminal angioplasty and stenting of the external iliac artery. TJR    VASCULAR SURGERY  07/11/2019    T[facility]. 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          CURRENT MEDICATIONS Previous Medications    AMLODIPINE (NORVASC) 5 MG TABLET    Take 0.5 tablets by mouth daily    ARFORMOTEROL TARTRATE (BROVANA) 15 MCG/2ML NEBU    Inhale 15 mcg into the lungs    ASPIRIN EC 81 MG EC TABLET    Take 1 tablet by mouth daily    ATORVASTATIN (LIPITOR) 40 MG TABLET    Take 1 tablet by mouth daily    BIOTIN 5000 MCG TABS    Take 1 tablet by mouth daily     CALCIUM-CHOLECALCIFEROL (CALCIUM 500+D) 500-200 MG-UNIT PER TABLET    Take 2 tablets by mouth daily     CLOPIDOGREL (PLAVIX) 75 MG TABLET    Take 1 tablet by mouth daily    FEXOFENADINE HCL (MUCINEX ALLERGY PO)    Take 1 tablet by mouth 2 times daily     FUROSEMIDE (LASIX) 40 MG TABLET    Take 1 tablet by mouth daily    ISOSORBIDE MONONITRATE (IMDUR) 60 MG EXTENDED RELEASE TABLET    Take 1 tablet by mouth nightly    LEVALBUTEROL (XOPENEX) 1.25 MG/3ML NEBULIZER SOLUTION    Inhale 3 mLs into the lungs    LEVOTHYROXINE (SYNTHROID) 25 MCG TABLET    Take 25 mcg by mouth Daily     METOPROLOL (LOPRESSOR) 100 MG TABLET    Take 50 mg by mouth 2 times daily    NITROGLYCERIN (NITROSTAT) 0.4 MG SL TABLET    Place 1 tablet under the tongue every 5 minutes as needed for Chest pain    OXYGEN    Inhale 2 L into the lungs daily     POTASSIUM CHLORIDE (KLOR-CON M) 20 MEQ EXTENDED RELEASE TABLET    Take 1 tablet by mouth daily       ALLERGIES     Levaquin [levofloxacin in d5w], Xarelto [rivaroxaban], and Morphine    FAMILY HISTORY       Family History   Problem Relation Age of Onset    Diabetes Mother     Heart Disease Mother     Heart Failure Mother     Diabetes Sister     Heart Disease Sister     High Blood Pressure Sister           SOCIAL HISTORY       Social History     Socioeconomic History    Marital status:      Spouse name: None    Number of children: None    Years of education: None    Highest education level: None   Occupational History    None   Social Needs    Financial resource strain: None    Food insecurity     Worry: None     Inability: None    Transportation needs     Medical: None     Non-medical: None   Tobacco Use    Smoking status: Former Smoker     Years: 2.00     Types: Cigarettes     Quit date: 1980     Years since quittin.2    Smokeless tobacco: Never Used   Substance and Sexual Activity    Alcohol use: Yes     Comment: rarely maybe twice a year    Drug use: No    Sexual activity: Not Currently     Partners: Male   Lifestyle    Physical activity     Days per week: None     Minutes per session: None    Stress: None   Relationships    Social connections     Talks on phone: None     Gets together: None     Attends [employer] service: None     Active member of club or organization: None     Attends meetings of clubs or organizations: None     Relationship status: None    Intimate partner violence     Fear of current or ex partner: None     Emotionally abused: None     Physically abused: None     Forced sexual activity: None   Other Topics Concern    None   Social History Narrative    None       SCREENINGS             PHYSICAL EXAM    (up to 7 for level 4, 8 or more for level 5)     ED Triage Vitals [21 1214]   BP Temp Temp Source Pulse Resp SpO2 Height Weight   (!) 215/92 98.7 °F (37.1 °C) Oral 103 30 100 % 5' 3\" (1.6 m) 89 lb 9.6 oz (40.6 kg)       Physical Exam  Vitals signs and nursing note reviewed. Constitutional:       Appearance: She is well-developed. Comments: She is petite and frail   HENT:      Head: Normocephalic and atraumatic. Right Ear: External ear normal.      Left Ear: External ear normal.      Nose: Nose normal.      Mouth/Throat:      Mouth: Mucous membranes are moist.      Pharynx: Oropharynx is clear. Eyes:      General: No scleral icterus. Conjunctiva/sclera: Conjunctivae normal.      Pupils: Pupils are equal, round, and reactive to light. Comments: Pale conjunctiva   Neck:      Musculoskeletal: Normal range of motion and neck supple.    Cardiovascular:      Rate and Rhythm: Normal rate and regular rhythm. Pulses: Normal pulses. Heart sounds: Normal heart sounds. No murmur. Pulmonary:      Effort: Pulmonary effort is normal. No respiratory distress. Breath sounds: Normal breath sounds. No wheezing or rales. Abdominal:      General: Bowel sounds are normal.      Palpations: Abdomen is soft. Tenderness: There is no abdominal tenderness. Genitourinary:     Rectum: Guaiac result positive (Stool was dark but not grossly bloody and there are strong reaction on the Hemoccult card). Musculoskeletal: Normal range of motion. General: No swelling or signs of injury. Skin:     General: Skin is warm and dry. Coloration: Skin is pale. Neurological:      Mental Status: She is alert and oriented to person, place, and time. Mental status is at baseline. Psychiatric:         Mood and Affect: Mood normal.         Behavior: Behavior normal.         DIAGNOSTIC RESULTS     EKG: All EKG's are interpreted by the Emergency Department Physician who either signs or Co-signs this chart in the absence of a cardiologist.    Sinus tachycardia with PACs. RADIOLOGY:   Non-plain film images such as CT, Ultrasound and MRI are read by the radiologist. Plainradiographic images are visualized and preliminarily interpreted by the emergency physician with the below findings:    Viewed the results. Interpretation per the Radiologist below, if available at the time of this note:    XR CHEST PORTABLE   Final Result   1. Chronic changes noted in the pulmonary parenchyma with no acute   cardiopulmonary process   2. Mild cardiomegaly no evidence of pulmonary vascular congestion.    Signed by Dr Marcia Olivarez on 3/26/2021 1:08 PM            ED BEDSIDE ULTRASOUND:   Performed by ED Physician - none    LABS:  Labs Reviewed   CBC WITH AUTO DIFFERENTIAL - Abnormal; Notable for the following components:       Result Value    RBC 2.73 (*)     Hemoglobin 7.3 (*)     Hematocrit GI.        CONSULTS:  IP CONSULT TO HOSPITALIST  IP CONSULT TO CARDIOLOGY  IP CONSULT TO GI    PROCEDURES:  Unless otherwise notedbelow, none     Procedures    FINAL IMPRESSION     1. Chest pain due to myocardial ischemia, unspecified ischemic chest pain type    2. Acute on chronic anemia    3. Elevated troponin    4. Gastrointestinal hemorrhage, unspecified gastrointestinal hemorrhage type    5.  Severe hypertension          DISPOSITION/PLAN   DISPOSITION Decision To Admit 03/26/2021 01:17:18 PM      PATIENT REFERRED TO:  @FUP@    DISCHARGE MEDICATIONS:  New Prescriptions    No medications on file          (Please note that portions of this note were completed with a voice recognition program.  Efforts were made to edit the dictations butoccasionally words are mis-transcribed.)    Lennie Castellano MD (electronically signed)  AttendingEmergency Physician          Trav Canada MD  03/26/21 3609

## 2021-03-26 NOTE — PROGRESS NOTES
Sagar Schneider arrived to room # [phone]. Presented with: Shortness of breath   Mental Status: Patient is oriented, alert, coherent, logical, thought processes intact and able to concentrate and follow conversation. Vitals:     03/26/21 1417   BP: 150/84   Pulse: 82   Resp: 22   Temp: 98   SpO2:  100% - 2L O2 per NC      Patient safety contract and falls prevention contract reviewed with patient Yes. Oriented Patient to room. Call light within reach. Yes.   Needs, issues or concerns expressed at this time: no

## 2021-03-26 NOTE — TELEPHONE ENCOUNTER
Pt called stating she is having no energy, tachycardia, chest pain off and on x 3 days. Patient advised to report to ER. Pt verbally understood and states she will.

## 2021-03-26 NOTE — CONSULTS
EMMANCGRISEL BARAJAS [place] OF [facility] [address], [address] [facility]                                  CONSULTATION    PATIENT NAME: Whit Yañez                 :        1945  MED REC NO:   [id number]                              ROOM:       [facility]  ACCOUNT NO:   [de-identified]                           ADMIT DATE: 2021  PROVIDER:     Yessy Ortiz MD    CONSULT DATE:  2021    HISTORY OF PRESENT ILLNESS:  This is a very pleasant 27-year-old white  female, who came to the emergency room due to rapid heartbeat. She is  accompanied by her daughter. She was having increased dyspnea and  difficulty ambulating and chest pain with even slight activity. The  patient underwent coronary artery bypass surgery in 2016, and has since  been on Plavix. She took a Plavix dose today. In the emergency room,  she was noted to be anemic with a hemoglobin of 7.3 and hematocrit of  25.2. She does have a chronic history of anemia and has been on oral  iron; however, she discontinued this quite a while back (1 year) because  it upset her stomach. She has not been on iron since. GI was asked to  see for anemia, possible GI bleeding. Of additional note is that she  was heme-positive in the emergency room. In discussing with the daughter, she feels like she had an endoscopic  and possibly colonoscopic evaluation in 2019; however, I have reviewed  the record including the notes, media, and Care Everywhere and I cannot  find record of her last GI workup. .  The patient and daughter state that  there has been no visible bleeding. She feels like her stools have been  brown. There has been no melena, hematochezia, or hematemesis.     PAST MEDICAL HISTORY:  The patient's past medical history is otherwise  significant for aortic stenosis, arthritis, carotid aneurysm, congestive  heart failure, hyperlipidemia, hypertension, mitral valve stenosis,  pneumonia, renal failure, severe malnutrition, thyroid disease. SURGICAL HISTORY:  Remarkable for abdominal surgery. This was due to a  perforated ulcer in , with resection of one-third of her stomach. She had cardiac surgery consisting of artificial valve and bypass. Carotid endarterectomy, resection of right common artery, pseudoaneurysm  repair with reversed left greater saphenous vein bypass graft. Cataract  removal,  section, colonoscopy (date unknown), coronary  angioplasty, iliofemoral bypass grafting, mitral valve replacement. Last documented endoscopic evaluation was upper endoscopic evaluation by  Dr. Remy Blancas in 2016, I cannot find record of colonoscopy. MEDICATIONS AT HOME:  Include Norvasc, Brovana, Lipitor, Plavix (took  this a.m.), Lasix, Imdur, levothyroxine, Synthroid, Lopressor,  Nitrostat, potassium chloride. ALLERGIES:  Include LEVAQUIN. FAMILY HISTORY AND SOCIAL HISTORY:  Well outlined in Epic and reviewed. REVIEW OF SYSTEMS:  CONSTITUTIONAL:  Weak and tired, fatigue. There has been no fever. There has been no syncope. EYES:  Denies eye pain, visual changes, double vision, blurred vision. EAR, NOSE, AND THROAT:  Denies ringing in ears, nose bleeds, sore  throat, pain with swallowing. CARDIOVASCULAR:  Significant palpitations. There has been no chest  pain. RESPIRATORY:  Denies cough. GASTROINTESTINAL:  Denies melena or hematochezia. Denies nausea and  vomiting. URINARY:  Denies incontinence, frequency, urgency, nocturia, pain,  discomfort. MUSCULOSKELETAL:  Denies joint pain, stiffness, joint redness or  swelling. NEUROLOGICAL:  Denies seizure, focal paralysis, numbness or tingling. HEMATOLOGIC/LYMPHATIC:  Denies known history of anemia, easy bleeding or  bruising, petechia. SKIN:  Denies itching, rashes, nodules, eczema. LABORATORY STUDIES:  Include a white count of 10,400, hemoglobin and  hematocrit is 7.3 and 25.2 respectively, the MCV is 92.3, platelet count  is 023,884. Her sodium is 139, potassium is 3.9, BUN is 47, creatinine  is 1.1. Liver function tests are within normal limits. PHYSICAL EXAMINATION:  GENERAL:  On exam, she is a thin-appearing 77-year-old white female. She does not appear in acute distress. VITAL SIGNS:  She is afebrile with temperature of 98, pulse 82,  respirations 22, /84. HEENT:  Head is normocephalic, atraumatic. Pupils are equal, reactive  to light and accommodation. EOMs are intact. Conjunctivae are pale. Sclerae are nonicteric. NECK:  Supple without thyromegaly. Trachea is in the midline. No  carotid bruits. LUNGS:  With decreased breath sounds bilaterally. There are basilar  rhonchi. Respiratory excursion is equal bilaterally. HEART:  Cardiovascular reveals a regular rate and rhythm. I cannot  detect a murmur. ABDOMEN:  Soft with active bowel sounds. No masses are appreciated. No  hepatosplenomegaly is noted. EXTREMITIES:  Without edema. IMPRESSION:  1. Anemia without clinical evidence of GI bleeding, but with  heme-positive stools. 2.  Significant comorbidities including Plavix usage, cardiac valvular  disease, and significant COPD. 3.  Other significant medical issues as listed in the record. PLAN:  1. Plavix has been held. We will defer to admitting service regarding  need for bridge therapy given her valvular and coronary artery disease  (Lovenox bridge therapies). 2.  Protonix 40 mg IV q.12 hours. 3.  Venofer 100 mg IV daily x3.  4.  Consider alternative oral iron preparations to decrease GI side  effects so she may stay on this chronically. 5.  Endoscopic and possibly colonoscopic evaluation will have to be  considered 5 days after being off Plavix provided her other underlying  comorbidities are stable including, but not limited to her  cardiopulmonary disease. Thank you for asking me to see the patient.         Liliana Mendez MD    D: 03/26/2021 17:00:49      T: 03/26/2021 17:10:50 [email or web address]  Job#: [id number]     Doc#: [id number]    CC:

## 2021-03-26 NOTE — H&P
[facility]      History & Physical    [id number]  PCP: Kye Anthony MD    Date of Admission:3/26/2021    Patient:  Avinash Hirsch  MRN: [id number]    Date of Service: Pt seen/examined on 3/26/2021 and Admitted to Inpatient with expected LOS greater than two midnights due to medical therapy. CHIEF COMPLAINT:     Chief Complaint   Patient presents with    Fatigue     \"think I'm in afib, my heart rate keeps going up and down\"       History Obtained From:  patient, electronic medical record  Primary Care Physician: Kye Anthony MD    HISTORY OF PRESENT ILLNESS:    Ms. Belinda Schneider, a 76 y.o. female with a history of CAD (s/p CABG 2016, also on ASA and clopidogrel), CHF, A. fib, COPD (on 3 L home O2), HTN, presented to [facility] ED (03/26/2021), on account of progressively worsening 3-day history of progressively worsening shortness of breath,, with an associated generalized weakness/fatigue, chest pain, and palpitation. Patient's family at bedside. Reports symptoms started about 3 days ago with chest pain, radiating to the back and neck. No specific alleviating aggravating factors reported. Patient reportedly wears 3.5 L of nasal cannula oxygen at night. However patient has reportedly required oxygen throughout the day in the past 3 days. Initial blood pressure of 215/92, with HR of 103    Initial work-up significant for;  Drop in hemoglobin of 7.3  Troponin of 0.04  ProBNP within lab reference range  Dark guaiac positive stool noted in the ED. Patient admitted for further work-up and management.        PAST MEDICAL & SURGICAL HISTORY    Past Medical History:      Diagnosis Date    Aortic stenosis     Arthritis     Atherosclerosis of native arteries of the extremities with intermittent claudication 7/25/2011    Carotid aneurysm, right (HCC)     Carotid artery occlusion     [facility]     COPD (chronic obstructive pulmonary disease) [facility]     History of blood Lonnie Zheng MD at [address] Left 1/11/2018    LEFT CAROTID ENDARTERECTOMY WITH EEG MONITORING AND COMPLETION DUPLEX ULTRASOUND performed by Lonnie Zheng MD at [address] [facility]      SKIN GRAFT Right 7/22/2016    Skin graft split thickness foot,ankle,and leg. Right leg 26x8cm and 12x6cm total area 280cm squared. T[facility]    UPPER GASTROINTESTINAL ENDOSCOPY  3/15/16    Dr Luis Bernabe  3/15/2016    EGD BIOPSY performed by Shelli Harris DO at [address] [facility]    VASCULAR SURGERY  5/27/16 T[facility]    Aortagram and right leg runoff,right leg runoff,right common iliac artery selection for right leg run off views.  VASCULAR SURGERY      . Open transluminal angioplasty and stenting of the external iliac artery. TJR    VASCULAR SURGERY  07/11/2019    T[facility]. 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 SOCIAL & FAMILY HISTORY:    Social History:   TOBACCO:   reports that she quit smoking about 41 years ago. Her smoking use included cigarettes. She quit after 2.00 years of use. She has never used smokeless tobacco.  ETOH:   reports current alcohol use. Family History:       Problem Relation Age of Onset    Diabetes Mother     Heart Disease Mother     Heart Failure Mother     Diabetes Sister     Heart Disease Sister     High Blood Pressure Sister         MEDICATIONS:    Medications Prior to Admission:    Prior to Admission medications    Medication Sig Start Date End Date Taking?  Authorizing Provider   furosemide (LASIX) 40 MG tablet Take 1 tablet by mouth daily 2/5/21  Yes BEBO Kaiser - CNP   potassium chloride (KLOR-CON M) 20 MEQ extended release tablet Take 1 tablet by mouth daily 12/23/20  Yes BEBO Curtis   clopidogrel °C) (Oral)   Resp 22   Ht 5' 3\" (1.6 m)   Wt 89 lb 9.6 oz (40.6 kg)   SpO2 100%   BMI 15.87 kg/m²     Physical Exam  Vitals signs and nursing note reviewed. Constitutional:       General: She is not in acute distress. Appearance: Normal appearance. She is not ill-appearing, toxic-appearing or diaphoretic. HENT:      Head: Normocephalic and atraumatic. Right Ear: External ear normal.      Left Ear: External ear normal.      Nose: Nose normal. No congestion or rhinorrhea. Mouth/Throat:      Mouth: Mucous membranes are moist.      Pharynx: Oropharynx is clear. No oropharyngeal exudate or posterior oropharyngeal erythema. Eyes:      General: No scleral icterus. Right eye: No discharge. Left eye: No discharge. Extraocular Movements: Extraocular movements intact. Conjunctiva/sclera: Conjunctivae normal.      Pupils: Pupils are equal, round, and reactive to light. Neck:      Musculoskeletal: Normal range of motion and neck supple. No neck rigidity or muscular tenderness. Vascular: No carotid bruit. Cardiovascular:      Rate and Rhythm: Normal rate and regular rhythm. Pulses: Normal pulses. Heart sounds: Normal heart sounds. No friction rub. No gallop. Pulmonary:      Effort: Pulmonary effort is normal. No respiratory distress. Breath sounds: Normal breath sounds. No stridor. No wheezing, rhonchi or rales. Chest:      Chest wall: No tenderness. Abdominal:      General: Bowel sounds are normal. There is no distension. Palpations: Abdomen is soft. Tenderness: There is no abdominal tenderness. There is no guarding or rebound. Musculoskeletal: Normal range of motion. General: No swelling, tenderness, deformity or signs of injury. Right lower leg: No edema. Left lower leg: No edema. Skin:     General: Skin is warm and dry. Capillary Refill: Capillary refill takes less than 2 seconds.       Coloration: Skin is pale. Skin is not jaundiced. Findings: No bruising, erythema, lesion or rash. Neurological:      General: No focal deficit present. Mental Status: She is alert and oriented to person, place, and time. Cranial Nerves: No cranial nerve deficit. Sensory: No sensory deficit. Motor: No weakness. Coordination: Coordination normal.   Psychiatric:         Mood and Affect: Mood normal.         Behavior: Behavior normal.         Thought Content: Thought content normal.         Judgment: Judgment normal.               DIAGNOSTIC STUDIES:    I have reviewedLaboratory and Imaging data report today. Recent Labs     03/26/21  1216   WBC 10.4   HGB 7.3*        Recent Labs     03/26/21  1216      K 3.9   CL 95*   CO2 34*   BUN 47*   CREATININE 1.1*   GLUCOSE 155*   AST 20   ALT 10   BILITOT <0.2   ALKPHOS 72     Troponin T:   Recent Labs     03/26/21  1216   TROPONINI 0.04*     Pro-BNP: No results found for: BNP  Lactate:   Lab Results   Component Value Date    LACTA 1.2 03/26/2021         ABGs:   Lab Results   Component Value Date    PHART 7.490 11/16/2019    PO2ART 64.0 11/16/2019    KJF2DOJ 55.0 11/16/2019     INR:   Recent Labs     03/26/21  1216   INR 1.14     TSH:   Lab Results   Component Value Date    TSH 1.220 01/13/2018     URINALYSIS:No results for input(s): NITRITE, COLORU, PHUR, LABCAST, WBCUA, RBCUA, MUCUS, TRICHOMONAS, YEAST, BACTERIA, CLARITYU, SPECGRAV, LEUKOCYTESUR, UROBILINOGEN, BILIRUBINUR, BLOODU, GLUCOSEU, AMORPHOUS in the last 72 hours. Invalid input(s): Laina Sis / IMAGING REPORTS:     Xr Chest Portable    Result Date: 3/26/2021  EXAMINATION: XR CHEST PORTABLE 3/26/2021 1:01 PM HISTORY: XR CHEST PORTABLE 3/26/2021 11:36 AM HISTORY: Irregular heart rate COMPARISON: November 16, 2019. FINDINGS: Hyperinflation chronic changes present in the pulmonary parenchyma. . Cardiac silhouettes mildly enlarged.  Wires are present from previous median sternotomy. Surgical clips are noted in the base of the neck. The osseous structures and surrounding soft tissues demonstrate no acute abnormality. 1. Chronic changes noted in the pulmonary parenchyma with no acute cardiopulmonary process 2. Mild cardiomegaly no evidence of pulmonary vascular congestion.  Signed by Dr Julio Rojo on 3/26/2021 1:08 PM          ECHOCARDIOGRAM     pending        PATIENT SUMMARY      ASSESSMENT / IMPRESSION & PLAN:          Hospital Problems           Last Modified POA    * (Principal) NSTEMI (non-ST elevated myocardial infarction) ([address] 3/26/2021 Yes    Mitral valve stenosis (Chronic) 3/26/2021 Yes    GI bleed 3/26/2021 Yes    Chest pain 3/26/2021 Yes    Mixed hyperlipidemia (Chronic) 3/26/2021 Yes    Coronary artery disease involving native coronary artery of native heart 3/26/2021 Yes    Overview Signed 1/13/2018  6:23 PM by Susette Apley, MD     3/16/2016  Echo  Severe MS, moderate to severe MR, RVSP 73 mmHg, normal LVFX  3/31/2016  Cath  70% osteal RCA, severe MR, MVA 1.9, normal LVFX  4/7//2016   MVR (23 mm [employer] Mosaic) VG-PDAWeyman Tirso)  5/7/2016  Echo  Normal LVFX, large pleural effusion, echolucency near preserved posterior Mitral leaflet, likely chordae, RVSP 72 mmHg         Nocturnal hypoxia 3/26/2021 Yes    Overview Signed 3/30/2016 10:01 AM by Hardeep Ayers MD     With associated mitral stenosis / regurgitation and pulmonary hypertension         Diastolic dysfunction 9/21/0098 Yes    Pulmonary emphysema ([address] (Chronic) 3/26/2021 Yes    Chronic obstructive pulmonary disease ([address] 3/26/2021 Yes    Essential hypertension (Chronic) 3/26/2021 Yes    PAF (paroxysmal atrial fibrillation) ([address] 3/26/2021 Yes    S/P coronary artery bypass graft x 1 3/26/2021 Yes    Overview Signed 9/25/2019  3:47 PM by BEBO Stovall     4/8/16 SVT to RCA         S/P mitral valve replacement 3/26/2021 Yes    Overview Signed 9/25/2019  3:47 PM by BEBO Stovall     4/8/16 MVR 23 mm mosaic porcine by Dr. Dayton Tran         Chronic atrial fibrillation ([facility] 3/26/2021 Yes          Principal Problem:    NSTEMI (non-ST elevated myocardial infarction) [facility])  Active Problems:    Mitral valve stenosis    GI bleed    Chest pain    Mixed hyperlipidemia    Coronary artery disease involving native coronary artery of native heart    Nocturnal hypoxia    Diastolic dysfunction    Pulmonary emphysema (HCC)    Chronic obstructive pulmonary disease ([facility]    Essential hypertension    PAF (paroxysmal atrial fibrillation) ([facility])    S/P coronary artery bypass graft x 1    S/P mitral valve replacement    Chronic atrial fibrillation ([facility]  Resolved Problems:    * No resolved hospital problems. *              Chest frank  NSTEMI  Hx of CAD with prior CABG   - Initial troponin 0.04   - Will trend troponin    - Serial EKGs for chest pain   - Optimized medical management   --> Nitro glycerin sublingual when necessary for chest pain   - 2D Echocardiogram:    - Continue to monitor on telemetry   - Fasting Lipid panel in a.m    - Cardiology consult    GI Bleed   Drop in hemoglobin   7.3 (03/26/2021), from 10.2 (07/30/2020)   Occult blood test positive in the ER.   H&H Q8H   Transfusion Goal: Hgb < 7   Pantoprazole 40 mg IV BID   NPO   Hold aspirin and clopidogrel for now   GI Consult     History of Essential Hypertension   Uncontrolled   Initial documented BP on presentation of 215/92, with HR of 103 (03/26/2021)   Continue home regimen;   Amlodipine 5 mg p.o. daily   Isosorbide mononitrate 60 mg p.o. daily   Metoprolol 50 mg p.o. twice daily       Hydralazine 10 mg IV Q6H/PRN  For SBP> 180 and/or DP> 110 or    Labetalol 10 mg IV Q6H/PRN  For SBP> 180 and/or DP> 110   Continue to monitor    History of COPD  · -On 3.5 baseline home O2 @ Night  · Nebulized Bronchodilators scheduled and on as-needed basis.   · Oxygen supplementation to keep SPO2 above 90%      Hx of HFpEF,   · - Chest x-ray: (03/26/2021): Impression: Chronic changes noted in the pulmonary parenchyma with no acute cardiopulmonary process 2. Mild cardiomegaly no evidence of pulmonary vascular congestion  · - ProBNP within lab reference range: 1,648  · - Troponin level 0.04  · - Continue diureses;   · --> Continue patient's patient's home diuretic dose to include; Furosemide 40 mg by mouth daily  · - Strict I's and O  · - Fluid restriction  · - Continue optimized medical management. History of hypothyroidism  · Levothyroxine 25 mcg p.o. daily      . Continue management of other chronic medical conditions - Please see orders above         CONSULTS:    IP CONSULT TO HOSPITALIST  IP CONSULT TO CARDIOLOGY  IP CONSULT TO GI        INPATIENT CHECKLIST:      Nutrition: DIET LOW SODIUM 2 GM; No Caffeine    Prophylaxis Orders:   VTE - SCDs     CODE STATUS: FULL  ISOLATION:       DISCHARGE PLAN: tbd     Total face-to-face time spent with this patient, time spent reviewing medical records, and in coordination of care with the emergency department physician, nursing staff, in the examination, evaluation/assessment, counseling, review of medications and plan, was more than 45 minutes . Electronically signed by   Shaun Hines MD, MPH  Internal Medicine Hospitalist   3/26/2021 5:22 PM      EMR Dragon/Transcription disclaimer:   Much of this encounter note is an electronic transcription/translation of spoken language to printed text.  The electronic translation of spoken language may permit erroneous, or at times, nonsensical words or phrases to be inadvertently transcribed; although attempts have made to review the note for such errors, some may still exist.

## 2021-03-27 VITALS
HEIGHT: 63 IN | WEIGHT: 86 LBS | DIASTOLIC BLOOD PRESSURE: 73 MMHG | SYSTOLIC BLOOD PRESSURE: 162 MMHG | HEART RATE: 85 BPM | BODY MASS INDEX: 15.24 KG/M2 | RESPIRATION RATE: 18 BRPM | OXYGEN SATURATION: 97 % | TEMPERATURE: 98.2 F

## 2021-03-27 LAB
ANION GAP SERPL CALCULATED.3IONS-SCNC: 10 MMOL/L (ref 7–19)
BASOPHILS ABSOLUTE: 0.1 K/UL (ref 0–0.2)
BASOPHILS RELATIVE PERCENT: 0.6 % (ref 0–1)
BUN BLDV-MCNC: 42 MG/DL (ref 8–23)
CALCIUM SERPL-MCNC: 9.1 MG/DL (ref 8.8–10.2)
CHLORIDE BLD-SCNC: 99 MMOL/L (ref 98–111)
CHOLESTEROL, TOTAL: 142 MG/DL (ref 160–199)
CO2: 33 MMOL/L (ref 22–29)
CREAT SERPL-MCNC: 1.4 MG/DL (ref 0.5–0.9)
EOSINOPHILS ABSOLUTE: 0.4 K/UL (ref 0–0.6)
EOSINOPHILS RELATIVE PERCENT: 3.9 % (ref 0–5)
GFR AFRICAN AMERICAN: 44
GFR NON-AFRICAN AMERICAN: 37
GLUCOSE BLD-MCNC: 112 MG/DL (ref 70–99)
GLUCOSE BLD-MCNC: 120 MG/DL (ref 74–109)
GLUCOSE BLD-MCNC: 141 MG/DL (ref 70–99)
GLUCOSE BLD-MCNC: 97 MG/DL (ref 70–99)
HCT VFR BLD CALC: 26 % (ref 37–47)
HCT VFR BLD CALC: 29.1 % (ref 37–47)
HDLC SERPL-MCNC: 61 MG/DL (ref 65–121)
HEMOGLOBIN: 7.9 G/DL (ref 12–16)
HEMOGLOBIN: 8.6 G/DL (ref 12–16)
IMMATURE GRANULOCYTES #: 0.1 K/UL
LDL CHOLESTEROL CALCULATED: 66 MG/DL
LYMPHOCYTES ABSOLUTE: 1.1 K/UL (ref 1.1–4.5)
LYMPHOCYTES RELATIVE PERCENT: 10.7 % (ref 20–40)
MCH RBC QN AUTO: 27.8 PG (ref 27–31)
MCHC RBC AUTO-ENTMCNC: 30.4 G/DL (ref 33–37)
MCV RBC AUTO: 91.5 FL (ref 81–99)
MONOCYTES ABSOLUTE: 1.4 K/UL (ref 0–0.9)
MONOCYTES RELATIVE PERCENT: 13.8 % (ref 0–10)
NEUTROPHILS ABSOLUTE: 7.3 K/UL (ref 1.5–7.5)
NEUTROPHILS RELATIVE PERCENT: 70.5 % (ref 50–65)
PDW BLD-RTO: 18.2 % (ref 11.5–14.5)
PERFORMED ON: ABNORMAL
PERFORMED ON: ABNORMAL
PERFORMED ON: NORMAL
PLATELET # BLD: 281 K/UL (ref 130–400)
PMV BLD AUTO: 9.8 FL (ref 9.4–12.3)
POTASSIUM REFLEX MAGNESIUM: 3.9 MMOL/L (ref 3.5–5)
RBC # BLD: 2.84 M/UL (ref 4.2–5.4)
SODIUM BLD-SCNC: 142 MMOL/L (ref 136–145)
TRIGL SERPL-MCNC: 73 MG/DL (ref 0–149)
WBC # BLD: 10.4 K/UL (ref 4.8–10.8)

## 2021-03-27 PROCEDURE — 93005 ELECTROCARDIOGRAM TRACING: CPT | Performed by: INTERNAL MEDICINE

## 2021-03-27 PROCEDURE — 99232 SBSQ HOSP IP/OBS MODERATE 35: CPT | Performed by: INTERNAL MEDICINE

## 2021-03-27 PROCEDURE — 6360000002 HC RX W HCPCS: Performed by: INTERNAL MEDICINE

## 2021-03-27 PROCEDURE — 85025 COMPLETE CBC W/AUTO DIFF WBC: CPT

## 2021-03-27 PROCEDURE — 2700000000 HC OXYGEN THERAPY PER DAY

## 2021-03-27 PROCEDURE — C9113 INJ PANTOPRAZOLE SODIUM, VIA: HCPCS | Performed by: INTERNAL MEDICINE

## 2021-03-27 PROCEDURE — 85018 HEMOGLOBIN: CPT

## 2021-03-27 PROCEDURE — 36415 COLL VENOUS BLD VENIPUNCTURE: CPT

## 2021-03-27 PROCEDURE — 80048 BASIC METABOLIC PNL TOTAL CA: CPT

## 2021-03-27 PROCEDURE — 85014 HEMATOCRIT: CPT

## 2021-03-27 PROCEDURE — 82947 ASSAY GLUCOSE BLOOD QUANT: CPT

## 2021-03-27 PROCEDURE — 94640 AIRWAY INHALATION TREATMENT: CPT

## 2021-03-27 PROCEDURE — 6370000000 HC RX 637 (ALT 250 FOR IP): Performed by: INTERNAL MEDICINE

## 2021-03-27 PROCEDURE — 2580000003 HC RX 258: Performed by: INTERNAL MEDICINE

## 2021-03-27 PROCEDURE — 80061 LIPID PANEL: CPT

## 2021-03-27 RX ORDER — PANTOPRAZOLE SODIUM 40 MG/1
40 TABLET, DELAYED RELEASE ORAL
Qty: 60 TABLET | Refills: 0 | Status: ON HOLD | OUTPATIENT
Start: 2021-03-27 | End: 2021-01-01

## 2021-03-27 RX ADMIN — METOPROLOL TARTRATE 50 MG: 50 TABLET, FILM COATED ORAL at 11:32

## 2021-03-27 RX ADMIN — ASPIRIN 81 MG: 81 TABLET, CHEWABLE ORAL at 11:32

## 2021-03-27 RX ADMIN — AMLODIPINE BESYLATE 5 MG: 5 TABLET ORAL at 11:32

## 2021-03-27 RX ADMIN — LEVOTHYROXINE SODIUM 25 MCG: 25 TABLET ORAL at 06:13

## 2021-03-27 RX ADMIN — SODIUM CHLORIDE, PRESERVATIVE FREE 10 ML: 5 INJECTION INTRAVENOUS at 11:31

## 2021-03-27 RX ADMIN — LEVALBUTEROL HYDROCHLORIDE 1.25 MG: 1.25 SOLUTION RESPIRATORY (INHALATION) at 07:18

## 2021-03-27 RX ADMIN — PANTOPRAZOLE SODIUM 40 MG: 40 INJECTION, POWDER, FOR SOLUTION INTRAVENOUS at 11:30

## 2021-03-27 RX ADMIN — IRON SUCROSE 100 MG: 20 INJECTION, SOLUTION INTRAVENOUS at 11:30

## 2021-03-27 RX ADMIN — LEVALBUTEROL HYDROCHLORIDE 1.25 MG: 1.25 SOLUTION RESPIRATORY (INHALATION) at 10:27

## 2021-03-27 RX ADMIN — ARFORMOTEROL TARTRATE 15 MCG: 15 SOLUTION RESPIRATORY (INHALATION) at 07:14

## 2021-03-27 RX ADMIN — LEVALBUTEROL HYDROCHLORIDE 1.25 MG: 1.25 SOLUTION RESPIRATORY (INHALATION) at 14:16

## 2021-03-27 NOTE — DISCHARGE SUMMARY
BILITOT <0.2  --    ALKPHOS 72  --    ALT 10  --    AST 20  --          CRP:  No results for input(s): CRP in the last 72 hours. Sed Rate:  No results for input(s): SEDRATE in the last 72 hours. HgBA1c:  No components found for: HGBA1C  FLP:    Lab Results   Component Value Date    TRIG 73 03/27/2021    HDL 61 03/27/2021    LDLCALC 66 03/27/2021     TSH:    Lab Results   Component Value Date    TSH 1.220 01/13/2018     Troponin T:   Recent Labs     03/26/21  1216 03/26/21  1857 03/26/21 2223   TROPONINI 0.04* 0.03 0.03     Pro-BNP: No results for input(s): BNP in the last 72 hours. INR:   Recent Labs     03/26/21 1216   INR 1.14     ABGs:   Lab Results   Component Value Date    PHART 7.490 11/16/2019    PO2ART 64.0 11/16/2019    CVD9DUJ 55.0 11/16/2019     UA:No results for input(s): NITRITE, COLORU, PHUR, LABCAST, WBCUA, RBCUA, MUCUS, TRICHOMONAS, YEAST, BACTERIA, CLARITYU, SPECGRAV, LEUKOCYTESUR, UROBILINOGEN, BILIRUBINUR, BLOODU, GLUCOSEU, AMORPHOUS in the last 72 hours. Invalid input(s): Manuel Jackson      Culture Results:    No results for input(s): CXSURG in the last 720 hours. Blood Culture Recent: No results for input(s): BC in the last 720 hours. Cultures:   No results for input(s): CULTURE in the last 72 hours. No results for input(s): BC, Ribeiro Haste in the last 72 hours. No results for input(s): CXSURG in the last 72 hours. No results for input(s): MG, PHOS in the last 72 hours. Recent Labs     03/26/21 1216   AST 20   ALT 10   BILITOT <0.2   ALKPHOS 72           Significant Diagnostic Studies:   Xr Chest Portable    Result Date: 3/26/2021  EXAMINATION: XR CHEST PORTABLE 3/26/2021 1:01 PM HISTORY: XR CHEST PORTABLE 3/26/2021 11:36 AM HISTORY: Irregular heart rate COMPARISON: November 16, 2019. FINDINGS: Hyperinflation chronic changes present in the pulmonary parenchyma. . Cardiac silhouettes mildly enlarged. Wires are present from previous median sternotomy.  Surgical clips are noted in the base of the neck. The osseous structures and surrounding soft tissues demonstrate no acute abnormality. 1. Chronic changes noted in the pulmonary parenchyma with no acute cardiopulmonary process 2. Mild cardiomegaly no evidence of pulmonary vascular congestion. Signed by Dr Annelise Reyes on 3/26/2021 1:08 PM        Hospital Course:   Ms. Marlene Amos 76 y. o. female with a history of CAD (s/p CABG 2016, also on ASA and clopidogrel), CHF, A. fib, COPD (on 3 L home O2), HTN, presented to [facility] ED (03/26/2021), on account of progressively worsening 3-day history of progressively worsening shortness of breath,, with an associated generalized weakness/fatigue, chest pain, and palpitation.  Patient's family at bedside.  Reports symptoms started about 3 days ago with chest pain, radiating to the back and neck.  No specific alleviating aggravating factors reported.     Patient reportedly wears 3.5 L of nasal cannula oxygen at night.  However patient has reportedly required oxygen throughout the day in the past 3 days.     Initial blood pressure of 215/92, with HR of 103     Initial work-up significant for;  Drop in hemoglobin of 7.3  Troponin of 0.04  ProBNP within lab reference range  Dark guaiac positive stool noted in the ED.     Patient admitted for further work-up and management.        Chest pain  NSTEMI  Hx of CAD with prior CABG  · - Initial troponin 0.04 --> 0.03 --> 0.03  · - Serial EKGs for chest pain  · - Optimized medical management  · --> Nitro glycerin sublingual when necessary for chest pain  · - Continue to monitor on telemetry  · - Fasting Lipid panel -03/27/2021  · Cholesterol, Total: 142 (L)  · HDL Cholesterol: 61 (L)  · LDL Calculated: 66  · Triglycerides: 73  · -Seen by Cardiology   · Okay for discharge from cardiology standpoint  · Follow-up with cardiology outpatient     GI Bleed  · Drop in hemoglobin  · 7.3 (03/26/2021), from 10.2 (07/30/2020)  · Occult blood test positive in the ER.   · Status estimated energy requirements for 1 month or longer (03/27/21 1003)  Chronic Illness - Weight Loss : No significant weight loss (03/27/21 1003)  Chronic Illness - Body Fat Loss: 7 - Severe body fat loss (03/27/21 1003)  Chronic Illness - Body Fat Loss Locations: Orbital;Buccal region (03/27/21 1003)  Chronic Illness - Muscle Mass Loss: 7 - Severe muscle mass loss (03/27/21 1003)  Chronic Illness - Muscle Mass Loss Location: Temples (temporalis); Clavicles (pectoralis & deltoids) (03/27/21 1003)  Chronic Illness - Fluid Accumulation : No significant fluid accumulation (03/27/21 1003)  Chronic Illness -  Strength: Not Performed (03/27/21 1003)  Chronic Illness - Malnutrition Score: 21 (03/27/21 1003)  Malnutrition Status: Severe malnutrition (03/27/21 1003)\"  · Management as per dietitian recommendation        Continue management of other chronic medical conditions       Physical Exam:  Vital Signs: BP (!) 162/73   Pulse 85   Temp 98.2 °F (36.8 °C) (Temporal)   Resp 18   Ht 5' 3\" (1.6 m)   Wt 86 lb (39 kg)   SpO2 97%   BMI 15.23 kg/m²   Physical Exam  Vitals signs and nursing note reviewed. Constitutional:       General: She is not in acute distress. Appearance: Normal appearance. She is not ill-appearing, toxic-appearing or diaphoretic. HENT:      Head: Normocephalic and atraumatic. Right Ear: External ear normal.      Left Ear: External ear normal.      Nose: Nose normal. No congestion or rhinorrhea. Mouth/Throat:      Mouth: Mucous membranes are moist.      Pharynx: Oropharynx is clear. No oropharyngeal exudate or posterior oropharyngeal erythema. Eyes:      General: No scleral icterus. Right eye: No discharge. Left eye: No discharge. Extraocular Movements: Extraocular movements intact. Conjunctiva/sclera: Conjunctivae normal.      Pupils: Pupils are equal, round, and reactive to light. Neck:      Musculoskeletal: Normal range of motion and neck supple.  No tablet by mouth daily             Biotin 5000 MCG TABS  Take 1 tablet by mouth daily              calcium-cholecalciferol (CALCIUM 500+D) 500-200 MG-UNIT per tablet  Take 2 tablets by mouth daily              clopidogrel (PLAVIX) 75 MG tablet  Take 1 tablet by mouth daily             Fexofenadine HCl (MUCINEX ALLERGY PO)  Take 1 tablet by mouth 2 times daily              furosemide (LASIX) 40 MG tablet  Take 1 tablet by mouth daily             isosorbide mononitrate (IMDUR) 60 MG extended release tablet  Take 1 tablet by mouth nightly             levalbuterol (XOPENEX) 1.25 MG/3ML nebulizer solution  Inhale 3 mLs into the lungs             levothyroxine (SYNTHROID) 25 MCG tablet  Take 25 mcg by mouth Daily              metoprolol (LOPRESSOR) 100 MG tablet  Take 50 mg by mouth 2 times daily             nitroGLYCERIN (NITROSTAT) 0.4 MG SL tablet  Place 1 tablet under the tongue every 5 minutes as needed for Chest pain             OXYGEN  Inhale 2 L into the lungs daily              pantoprazole (PROTONIX) 40 MG tablet  Take 1 tablet by mouth 2 times daily (before meals)             potassium chloride (KLOR-CON M) 20 MEQ extended release tablet  Take 1 tablet by mouth daily                 Discharge Instructions: Follow up with Alicia Estevse MD in 7 days. Take medications as directed. Resume activity as tolerated. Diet: Dietary Nutrition Supplements: Standard High Calorie Oral Supplement  DIET LOW SODIUM 2 GM; No Caffeine        DISCHARGE STATUS:    Condition: Good  Disposition: Patient is medically stable and will be discharged home    Extended Emergency Contact Information  Primary Emergency Contact: Tawanna Grey  Address: [address] [place] [address] Phone: [phone]  Relation: Child   needed? No  Secondary Emergency Contact:  Alejandro Grey  Address: [address] [place] [address] Home Phone: [phone]  Mobile Phone: [phone]  Relation: Spouse   needed? No       Time Spent on discharge is more than 30 minutes in the examination, evaluation, counseling and review of medications and discharge plan. Electronically signed by   Myra Senior MD, MPH,   Internal Medicine Hospitalist   3/27/2021 2:37 PM      Thank you Danitza Leonard MD for the opportunity to be involved in this patient's care. If you have any questions or concerns please feel free to contact me at [phone]        EMR Dragon/Transcription disclaimer:   Much of this encounter note is an electronic transcription/translation of spoken language to printed text.  The electronic translation of spoken language may permit erroneous, or at times, nonsensical words or phrases to be inadvertently transcribed; although attempts have made to review the note for such errors, some may still exist.

## 2021-03-27 NOTE — PLAN OF CARE
Problem: Nutrition  Goal: Optimal nutrition therapy  Outcome: Ongoing   Nutrition Problem #1: Severe malnutrition, In context of chronic illness  Intervention: Food and/or Nutrient Delivery: Continue Current Diet, Start Oral Nutrition Supplement  Nutritional Goals: PO intake >50% with supplement; wt stable or gain 1-3 lbs/wk

## 2021-03-27 NOTE — PROGRESS NOTES
[facility] Hospitalists Progress Note    Patient:  Karyle Better  YOB: 1945  Date of Service: 3/27/2021  MRN: [id number]   Acct: [de-identified]   Primary Care Physician: Alicia Esteves MD  Advance Directive: Full Code  Admit Date: 3/26/2021       Hospital Day: 1      CHIEF COMPLAINT:     Chief Complaint   Patient presents with    Fatigue     \"think I'm in afib, my heart rate keeps going up and down\"       3/27/2021 9:17 AM  Subjective / Interval History:   03/27/2021  ***        Review of Systems:   Review of Systems  ROS: 14 point review of systems is negative except as specifically addressed above.     DIET LOW SODIUM 2 GM; No Caffeine    Intake/Output Summary (Last 24 hours) at 3/27/2021 5027  Last data filed at 3/26/2021 2214  Gross per 24 hour   Intake 8.33 ml   Output 350 ml   Net -341.67 ml       Medications:   sodium chloride      dextrose      sodium chloride       Current Facility-Administered Medications   Medication Dose Route Frequency Provider Last Rate Last Admin    iron sucrose (VENOFER) injection 100 mg  100 mg Intravenous Q24H Evan Houston MD        0.9 % sodium chloride infusion   Intravenous PRN Rupali Echavarria MD        metoprolol (LOPRESSOR) injection 5 mg  5 mg Intravenous Once Rupali Echavarria MD        glucose (GLUTOSE) 40 % oral gel 15 g  15 g Oral PRN Rupali Echavarria MD        dextrose 50 % IV solution  12.5 g Intravenous PRN Rupali Echavarria MD        glucagon (rDNA) injection 1 mg  1 mg Intramuscular PRN Rupali Echavarria MD        dextrose 5 % solution  100 mL/hr Intravenous PRN Rupali Echavarria MD        sodium chloride flush 0.9 % injection 10 mL  10 mL Intravenous 2 times per day Rupali Echavarria MD   10 mL at 03/26/21 2100    sodium chloride flush 0.9 % injection 10 mL  10 mL Intravenous PRN Rupali Echavarria MD        0.9 % sodium chloride infusion  25 mL Intravenous PRN Rupali Echavarria MD        promethazine (PHENERGAN) tablet 12.5 mg  12.5 mg Oral Q6H PRN Guerrero Seth MD        Or    ondansetron [facility]) injection 4 mg  4 mg Intravenous Q6H PRN Guerrero Seth MD        acetaminophen (TYLENOL) tablet 650 mg  650 mg Oral Q6H PRN Guerrero Seth MD        Or    acetaminophen (TYLENOL) suppository 650 mg  650 mg Rectal Q6H PRN Guerrero Seth MD        polyethylene glycol (GLYCOLAX) packet 17 g  17 g Oral Daily PRN Guerrero Seth MD        aspirin chewable tablet 81 mg  81 mg Oral Daily Guerrero Seth MD        atorvastatin (LIPITOR) tablet 40 mg  40 mg Oral Nightly Guerrero Seth MD   40 mg at 03/26/21 2243    nitroGLYCERIN (NITROSTAT) SL tablet 0.4 mg  0.4 mg Sublingual Q5 Min PRN Guerrero Seth MD        pantoprazole (PROTONIX) injection 40 mg  40 mg Intravenous BID Guerrero Seth MD   40 mg at 03/26/21 2100    And    sodium chloride (PF) 0.9 % injection 10 mL  10 mL Intravenous BID Guerrero Seth MD   10 mL at 03/26/21 2100    insulin lispro (HUMALOG) injection vial 0-6 Units  0-6 Units Subcutaneous TID WC Guerrero Seth MD        insulin lispro (HUMALOG) injection vial 0-3 Units  0-3 Units Subcutaneous Nightly Guerrero Seth MD        Arformoterol Tartrate (BROVANA) nebulizer solution 15 mcg  15 mcg Nebulization BID Guerrero Seth MD   15 mcg at 03/27/21 9101    isosorbide mononitrate (IMDUR) extended release tablet 60 mg  60 mg Oral Nightly Guerrero Seth MD   60 mg at 03/26/21 2243    levalbuterol (Marleen Yvonne) nebulizer solution 1.25 mg  1.25 mg Nebulization Q4H PRN Guerrero Seth MD   1.25 mg at 03/27/21 9034    levothyroxine (SYNTHROID) tablet 25 mcg  25 mcg Oral Daily Guerrero Seth MD   25 mcg at 03/27/21 1497    metoprolol tartrate (LOPRESSOR) tablet 50 mg  50 mg Oral BID Guerrero Seth MD   50 mg at 03/26/21 2100    furosemide (LASIX) tablet 40 mg  40 mg Oral Daily Guerrero Seth MD        amLODIPine (NORVASC) tablet 5 mg  5 mg Oral Daily Justice Noyola MD        hydrALAZINE (APRESOLINE) injection 10 mg  10 mg Intravenous Q6H PRN Justice Noyola MD             sodium chloride      dextrose      sodium chloride        iron sucrose  100 mg Intravenous Q24H    metoprolol  5 mg Intravenous Once    sodium chloride flush  10 mL Intravenous 2 times per day    aspirin  81 mg Oral Daily    atorvastatin  40 mg Oral Nightly    pantoprazole  40 mg Intravenous BID    And    sodium chloride (PF)  10 mL Intravenous BID    insulin lispro  0-6 Units Subcutaneous TID WC    insulin lispro  0-3 Units Subcutaneous Nightly    Arformoterol Tartrate  15 mcg Nebulization BID    isosorbide mononitrate  60 mg Oral Nightly    levothyroxine  25 mcg Oral Daily    metoprolol  50 mg Oral BID    furosemide  40 mg Oral Daily    amLODIPine  5 mg Oral Daily     sodium chloride, glucose, dextrose, glucagon (rDNA), dextrose, sodium chloride flush, sodium chloride, promethazine **OR** ondansetron, acetaminophen **OR** acetaminophen, polyethylene glycol, nitroGLYCERIN, levalbuterol, hydrALAZINE  DIET LOW SODIUM 2 GM; No Caffeine       Labs:   CBC with DIFF:  Recent Labs     03/26/21  1216 03/26/21  1857 03/27/21  0154   WBC 10.4  --  10.4   RBC 2.73*  --  2.84*   HGB 7.3* 8.4* 7.9*   HCT 25.2* 28.3* 26.0*   MCV 92.3  --  91.5   MCH 26.7*  --  27.8   MCHC 29.0*  --  30.4*   RDW 19.1*  --  18.2*     --  281   MPV 9.8  --  9.8   NEUTOPHILPCT 72.2*  --  70.5*   LYMPHOPCT 11.9*  --  10.7*   MONOPCT 11.6*  --  13.8*   EOSRELPCT 3.3  --  3.9   BASOPCT 0.5  --  0.6   NEUTROABS 7.5  --  7.3   LYMPHSABS 1.2  --  1.1   MONOSABS 1.20*  --  1.40*   EOSABS 0.30  --  0.40   BASOSABS 0.10  --  0.10       CMP/BMP:  Recent Labs     03/26/21  1216 03/27/21  0154    142   K 3.9 3.9   CL 95* 99   CO2 34* 33*   ANIONGAP 10 10   GLUCOSE 155* 120*   BUN 47* 42*   CREATININE 1.1* 1.4*   LABGLOM 48* 37*   CALCIUM 10.5* 9.1   PROT 7.6  --    LABALBU 4.1 neck. The osseous structures and surrounding soft tissues demonstrate no acute abnormality. 1. Chronic changes noted in the pulmonary parenchyma with no acute cardiopulmonary process 2. Mild cardiomegaly no evidence of pulmonary vascular congestion. Signed by Dr Rey Daniels on 3/26/2021 1:08 PM      Echo:   ***      Objective:   Vitals:   BP (!) 153/81   Pulse 81   Temp 97.7 °F (36.5 °C) (Temporal)   Resp 17   Ht 5' 3\" (1.6 m)   Wt 86 lb (39 kg)   SpO2 98%   BMI 15.23 kg/m²     ***  Patient Vitals for the past 24 hrs:   BP Temp Temp src Pulse Resp SpO2 Height Weight   03/27/21 0654 (!) 153/81 97.7 °F (36.5 °C) Temporal 81 17 98 %     03/27/21 0417       5' 3\" (1.6 m) 86 lb (39 kg)   03/27/21 0229 (!) 141/48 96 °F (35.6 °C) Temporal 78 18 97 %     03/27/21 0051 (!) 150/54 97.2 °F (36.2 °C) Temporal 77 18 93 %     03/26/21 2214 (!) 203/82 96.7 °F (35.9 °C) Temporal 99  95 %     03/26/21 1832      93 %     03/26/21 1831 (!) 143/50 97.7 °F (36.5 °C) Temporal 88 28 93 %     03/26/21 1445    86       03/26/21 1417 (!) 150/84 98 °F (36.7 °C) Oral 82 22 100 %     03/26/21 1415 (!) 146/116          03/26/21 1214 (!) 215/92 98.7 °F (37.1 °C) Oral 103 30 100 % 5' 3\" (1.6 m) 89 lb 9.6 oz (40.6 kg)       24HR INTAKE/OUTPUT:      Intake/Output Summary (Last 24 hours) at 3/27/2021 1380  Last data filed at 3/26/2021 2214  Gross per 24 hour   Intake 8.33 ml   Output 350 ml   Net -341.67 ml       Physical Exam  Nursing notes and vital signs reviewed***  General appearance: No apparent distress, appears stated age and cooperative. Well developed and well groomed. HEENT: atraumatic, eyes with clear conjunctiva and normal lids, pupils and irises normal, external ears and nose are normal, lips normal. Hearing ***Intact. Lips with ***No blisters. Neck: Supple, with full range of motion. No jugular venous distention. Trachea midline. Thyroid no masses noted. No lympadenopathy or bruit. Lungs: Patient in no acute respiratory distress, no increased work of breathing, {lung exam:96690::\"\"clear to auscultation bilaterally\" without rales, rhonchi or wheezes\"}  Heart: {heart exam:5510::\"regular rate and rhythm, S1, S2 normal, no murmur, click, rub or gallop\"}  Abdomen: soft, non-tender; {DISTENTION:096362345} {EXAM; ABDOMEN BOWEL SOUNDS:19341} no masses, no organomegaly  Musculoskeletal: ***ROM Intact in B/L Upper and LE. No joint tenderness or Deformity throughout. Extremities: {extremity exam:5109::\"extremities normal, atraumatic, no cyanosis or edema\"}  Neurologic: No focal neurologic deficits, normal sensation, ***CN: II-XII intact, grossly non-focal.  Skin: Skin color, texture, turgor normal. No rashes or lesions  Psychiatric: Alert and oriented, affect and mood appropriate, thought content appropriate, normal insight. ***Intact memory.     Assessment/plan:         Hospital Problems           Last Modified POA    * (Principal) NSTEMI (non-ST elevated myocardial infarction) ([facility] 3/26/2021 Yes    Mitral valve stenosis (Chronic) 3/26/2021 Yes    GI bleed 3/26/2021 Yes    Chest pain 3/26/2021 Yes    Mixed hyperlipidemia (Chronic) 3/26/2021 Yes    Coronary artery disease involving native coronary artery of native heart 3/26/2021 Yes    Overview Signed 1/13/2018  6:23 PM by Thiago Berry MD     3/16/2016  Echo  Severe MS, moderate to severe MR, RVSP 73 mmHg, normal LVFX  3/31/2016  Cath  70% osteal RCA, severe MR, MVA 1.9, normal LVFX  4/7//2016   MVR (23 mm [employer] Mosaic) VG-PDARenaldo Loots)  5/7/2016  Echo  Normal LVFX, large pleural effusion, echolucency near preserved posterior Mitral leaflet, likely chordae, RVSP 72 mmHg         Nocturnal hypoxia 3/26/2021 Yes    Overview Signed 3/30/2016 10:01 AM by Kapil Baron MD     With associated mitral stenosis / regurgitation and pulmonary hypertension         Diastolic dysfunction 5/76/9025 Yes    Pulmonary emphysema ([facility] [address] (Chronic) 3/26/2021 Yes Chronic obstructive pulmonary disease ([address] 3/26/2021 Yes    Essential hypertension (Chronic) 3/26/2021 Yes    PAF (paroxysmal atrial fibrillation) ([address] 3/26/2021 Yes    S/P coronary artery bypass graft x 1 3/26/2021 Yes    Overview Signed 9/25/2019  3:47 PM by Liz Vee, APRN     4/8/16 SVT to RCA         S/P mitral valve replacement 3/26/2021 Yes    Overview Signed 9/25/2019  3:47 PM by Liz Vee, APRN     4/8/16 MVR 23 mm mosaic porcine by Dr. Medardo Bunch         Chronic atrial fibrillation ([address] 3/26/2021 Yes          Principal Problem:    NSTEMI (non-ST elevated myocardial infarction) [facility])  Active Problems:    Mitral valve stenosis    GI bleed    Chest pain    Mixed hyperlipidemia    Coronary artery disease involving native coronary artery of native heart    Nocturnal hypoxia    Diastolic dysfunction    Pulmonary emphysema ([address]    Chronic obstructive pulmonary disease ([address]    Essential hypertension    PAF (paroxysmal atrial fibrillation) (HCC)    S/P coronary artery bypass graft x 1    S/P mitral valve replacement    Chronic atrial fibrillation ([address]  Resolved Problems:    * No resolved hospital problems. *        Brief Summary  Ms. Celestino Jennings, a 76 y.o. female with a history of CAD (s/p CABG 2016, also on ASA and clopidogrel), CHF, A. fib, COPD (on 3 L home O2), HTN, presented to [address] [place] [address] ED (03/26/2021), on account of progressively worsening 3-day history of progressively worsening shortness of breath,, with an associated generalized weakness/fatigue, chest pain, and palpitation. Patient's family at bedside. Reports symptoms started about 3 days ago with chest pain, radiating to the back and neck. No specific alleviating aggravating factors reported.     Patient reportedly wears 3.5 L of nasal cannula oxygen at night.   However patient has reportedly required oxygen throughout the day in the past 3 days.     Initial blood pressure of 215/92, with HR of 103     Initial work-up significant for;  Drop in As per dietitian malnutrition assessment, quoted below  \"Malnutrition Assessment  Context of Malnutrition: Chronic Illness (03/27/21 1003)  Chronic Illness - Energy Intake : 7 - 75% or less estimated energy requirements for 1 month or longer (03/27/21 1003)  Chronic Illness - Weight Loss : No significant weight loss (03/27/21 1003)  Chronic Illness - Body Fat Loss: 7 - Severe body fat loss (03/27/21 1003)  Chronic Illness - Body Fat Loss Locations: Orbital;Buccal region (03/27/21 1003)  Chronic Illness - Muscle Mass Loss: 7 - Severe muscle mass loss (03/27/21 1003)  Chronic Illness - Muscle Mass Loss Location: Temples (temporalis); Clavicles (pectoralis & deltoids) (03/27/21 1003)  Chronic Illness - Fluid Accumulation : No significant fluid accumulation (03/27/21 1003)  Chronic Illness -  Strength: Not Performed (03/27/21 1003)  Chronic Illness - Malnutrition Score: 21 (03/27/21 1003)  Malnutrition Status: Severe malnutrition (03/27/21 1003)\"  · Management as per dietitian recommendation      Continue management of other chronic medical conditions - see above and orders. Advance Directive: Full Code    DIET LOW SODIUM 2 GM; No Caffeine         Consults Made:   IP CONSULT TO HOSPITALIST  IP CONSULT TO CARDIOLOGY  IP CONSULT TO GI    DVT prophylaxis: ***      Discharge planning: tbd***    Time Spent is {Time; 15 min - 8 hours:91512} in the examination, evaluation, counseling and review of medications, assessment and plan.      Electronically signed by   Bradford Cannon MD, MPH,   Internal Medicine Hospitalist   3/27/2021 9:17 AM

## 2021-03-27 NOTE — PROGRESS NOTES
Platelet function test timed out per lab without result. Lab requested this be reordered for AM.  Order entered.

## 2021-03-27 NOTE — PROGRESS NOTES
Comprehensive Nutrition Assessment    Type and Reason for Visit:  Initial, Positive Nutrition Screen    Nutrition Recommendations/Plan: Dx Severe Malnutrition; start Enlive BID, remove caffeine restriction when appropriate    Nutrition Assessment:  Positive nutrition screen for supplement recommendations. Pt presents severely malnourished AEB severe muscle/fat loss and decreased PO intake. Pt feels as if she eats fair but does not have much of an appetite most of the time. She states she drinks protein drinks at home often. Agreeable to [facility]. Obtained preferences and encouraged intake. Will continue to monitor nutrition progression. Malnutrition Assessment:  Malnutrition Status:  Severe malnutrition    Context:  Chronic Illness     Findings of the 6 clinical characteristics of malnutrition:  Energy Intake:  7 - 75% or less estimated energy requirements for 1 month or longer  Weight Loss:  No significant weight loss     Body Fat Loss:  7 - Severe body fat loss Orbital, Buccal region   Muscle Mass Loss:  7 - Severe muscle mass loss Temples (temporalis), Clavicles (pectoralis & deltoids)  Fluid Accumulation:  No significant fluid accumulation     Strength:  Not Performed    Estimated Daily Nutrient Needs:  Energy (kcal):  6247-4712; Weight Used for Energy Requirements:  Current(30-35)     Protein (g):  59;  Weight Used for Protein Requirements:  Current(1.5)        Fluid (ml/day):  4776-7296; Method Used for Fluid Requirements:  1 ml/kcal      Nutrition Related Findings:  muscle/fat loss      Wounds:  None       Current Nutrition Therapies:    DIET LOW SODIUM 2 GM; No Caffeine  Dietary Nutrition Supplements: Standard High Calorie Oral Supplement    Anthropometric Measures:  · Height: 5' 3\" (160 cm)  · Current Body Weight: 86 lb (39 kg)   · Ideal Body Weight: 115 lbs; % Ideal Body Weight 74.8 %   · BMI: 15.2  · BMI Categories: Underweight (BMI less than 22) age over 72       Nutrition Diagnosis:   · Severe malnutrition, In context of chronic illness related to inadequate protein-energy intake, early satiety as evidenced by poor intake prior to admission, severe loss of subcutaneous fat, severe muscle loss      Nutrition Interventions:   Food and/or Nutrient Delivery:  Continue Current Diet, Start Oral Nutrition Supplement  Nutrition Education/Counseling:  No recommendation at this time   Coordination of Nutrition Care:  Continue to monitor while inpatient    Goals:  PO intake >50% with supplement; wt stable or gain 1-3 lbs/wk       Nutrition Monitoring and Evaluation:   Behavioral-Environmental Outcomes:  None Identified   Food/Nutrient Intake Outcomes:  Food and Nutrient Intake, Supplement Intake  Physical Signs/Symptoms Outcomes:  Biochemical Data, Nutrition Focused Physical Findings, Weight     Discharge Planning:    Continue Oral Nutrition Supplement     Electronically signed by Taj Vieira RD, LD on 3/27/21 at 10:08 AM CDT    Contact: [phone]

## 2021-03-27 NOTE — PROGRESS NOTES
Dr Jacek Hernandez ok to discharge home and follow-up outpatient .  Electronically signed by Cherri Pina RN on 3/27/21 at 1:59 PM CDT

## 2021-03-27 NOTE — PROGRESS NOTES
GI  - PROGRESS NOTE    Admit Date: 3/26/2021             Chief Complaint: feels better. Denies melana or hematochezia. H/H stable    Patient being seen for:  anemia     Dietary Nutrition Supplements: Standard High Calorie Oral Supplement  DIET LOW SODIUM 2 GM; No Caffeine                         Bowel movement: no black or bloody stools    Pain:None  Nausea:None    Intake/Output Summary (Last 24 hours) at 3/27/2021 1213  Last data filed at 3/27/2021 1048  Gross per 24 hour   Intake 578.33 ml   Output 750 ml   Net -171.67 ml               Lab /Radiology:   Scheduled Meds: Reviewed          -----------------------------------------------------------------          Recent Labs     03/26/21 1216 03/26/21 1857 03/27/21  0154   WBC 10.4  --  10.4   RBC 2.73*  --  2.84*   HGB 7.3* 8.4* 7.9*   HCT 25.2* 28.3* 26.0*     --  281     Recent Labs     03/26/21 1216 03/27/21  0154    142   K 3.9 3.9   ANIONGAP 10 10   CL 95* 99   CO2 34* 33*   BUN 47* 42*   CREATININE 1.1* 1.4*   GLUCOSE 155* 120*   CALCIUM 10.5* 9.1     Recent Labs     03/26/21 1216   AST 20   ALT 10   BILITOT <0.2   ALKPHOS 72     No results for input(s): AMYLASE, LIPASE in the last 72 hours. Troponin T:   Recent Labs     03/26/21 1216 03/26/21 1857 03/26/21  2223   TROPONINI 0.04* 0.03 0.03     Pro-BNP: No results for input(s): BNP in the last 72 hours. INR:   Recent Labs     03/26/21 1216   INR 1.14             Physical Exam:   Vitals: BP (!) 162/73   Pulse 85   Temp 98.2 °F (36.8 °C) (Temporal)   Resp 18   Ht 5' 3\" (1.6 m)   Wt 86 lb (39 kg)   SpO2 97%   BMI 15.23 kg/m²     HEENT: Pupils equal, round, reactive to light       Neck supple. Trachea midline. No crepitus  Lungs: breath sounds bilaterally. Normal excursion  Heart[de-identified]    RRR without heave or thrill  Abdomen: soft, non-tender; bowel sounds normal; no masses,  no organomegaly. No encarcerated hernia detected.   No organomegaly detected  Extremities: no cyanosis or clubbing  Lymphatic: No significant lymph node enlargement papable in the neck , groin or umbilicus  Neurologic: alert. oriented        Impression: 1. Anemia,heme + stools chronic in nature. 2.CAD      3. COPD  Plan: 1. If pt has inpt scope she will have to be off her plavix for 5 days( wend. or thurs) she is not clinically bleeding( brown stools). If OK with cardiology Pt may be discharged and  resume plavix . She is to call office Monday for op visit to arrange op scopes. Discussed with pt and daughter they express understanding. They will return if any clinical bleeing. May go home after venofer infusion today        Edwar Goodrich M.D.   Gastroenterology

## 2021-03-27 NOTE — PROGRESS NOTES
Spoke with Dr Diane Dunaway on the telephone. Ok per GI to dc home and follow up outpatient. Dr Diane Dunaway spoke with patient's daughter and she will call GI office Monday to schedule outpatient appointment. Until then, patient ok to resume Plavix. Patient is also to take over the counter oral iron supplement. Patient to speak with pharmacist for recommendation on oral iron supplement that will not cause GI upset.   Electronically signed by Aneudy Pinedo RN on 3/27/2021 at 12:35 PM

## 2021-03-29 ENCOUNTER — CARE COORDINATION (OUTPATIENT)
Dept: CASE MANAGEMENT | Age: 76
End: 2021-03-29

## 2021-03-29 LAB
EKG P AXIS: 81 DEGREES
EKG P AXIS: 81 DEGREES
EKG P-R INTERVAL: 112 MS
EKG P-R INTERVAL: 150 MS
EKG Q-T INTERVAL: 356 MS
EKG Q-T INTERVAL: 416 MS
EKG QRS DURATION: 100 MS
EKG QRS DURATION: 102 MS
EKG QTC CALCULATION (BAZETT): 431 MS
EKG QTC CALCULATION (BAZETT): 461 MS
EKG T AXIS: 68 DEGREES
EKG T AXIS: 85 DEGREES

## 2021-03-29 PROCEDURE — 93010 ELECTROCARDIOGRAM REPORT: CPT | Performed by: INTERNAL MEDICINE

## 2021-03-29 RX ORDER — METOPROLOL TARTRATE 100 MG/1
50 TABLET ORAL 2 TIMES DAILY
Qty: 90 TABLET | Refills: 0 | Status: SHIPPED | OUTPATIENT
Start: 2021-03-29 | End: 2021-01-01

## 2021-03-29 NOTE — CARE COORDINATION
[facility] Transitions Initial Follow Up Call    Call within 2 business days of discharge: Yes    Patient: Sahra Ludwig Patient : 1945   MRN: [id number]  Reason for Admission Chest Pain  Discharge Date: 3/27/21 RARS: Readmission Risk Score: 21      Last Discharge [address] [address]       Complaint Diagnosis Description Type Department Provider    3/26/21 Fatigue Chest pain due to myocardial ischemia, unspecified ischemic chest pain type . .. ED to Hosp-Admission (Discharged) (ADMITTED) [facility] JEREMY Senior MD; Eugenio Bynum ... Spoke with: [facility]: [facility]    Non-face-to-face services provided:  Reviewed encounter information for continuity of care prior to follow up Care Transitions phone call - chart notes, consults, progress notes, test results, med list, appointments, AVS, other information. Care Transitions 24 Hour Call    Schedule Follow Up Appointment with PCP: Declined  Do you have any ongoing symptoms?: No  Do you have a copy of your discharge instructions?: Yes  Do you have all of your prescriptions and are they filled?: No  Have you been contacted by a [facility] [place] Pharmacist?: No  Have you scheduled your follow up appointment?: No  Were you discharged with any Home Care or Post Acute Services: No  Do you feel like you have everything you need to keep you well at home?: Yes  Care Transitions Interventions         Follow Up  Future Appointments   Date Time Provider Andrade Draper   2021 10:50 AM BEBO Baptiste [facility] Marissa Penn [facility]   2021  9:00 AM MD CHING Hwang [facility] Cardio [facility]   2021 10:15 AM MD CHING Donald [facility] Cardio [facility]     Placed a call to the number listed for patient and spoke with her for an initial follow up call post discharge. She reported that she is doing very well. She denied any new or ongoing issues.   She said she was not able to get her discharge medication because the pharmacy was closed and she is going today to get it. She said that she is eating and drinking well, sleeping well. She is tolerating activity without any symptoms. She is up and about and reported that she has actually been doing some things around the house today. She said she has called her PCP for a hospital follow up appointment and they were out to lunch. She is going to call back. She was appreciative of call, but reported that she does not feel that she needs another. CTN will sign off at this time per patient request.        Was this an external facility discharge? No    Challenges to be reviewed by the provider   Additional needs identified to be addressed with provider No  none       Method of communication with provider : none    Discussed COVID-19 related testing which was available at this time. Test results were negative. Patient informed of results, if available? Already aware. Advance Care Planning:   Does patient have an Advance Directive:  reviewed and current. Was this a readmission? No  Patients top risk factors for readmission: medical condition    Care Transition Nurse (CTN) contacted the patient by telephone to perform post hospital discharge assessment. Verified name and  with patient as identifiers. Provided introduction to self, and explanation of the CTN role. CTN reviewed discharge instructions, medical action plan and red flags with patient who verbalized understanding. Patient given an opportunity to ask questions and does not have any further questions or concerns at this time. Were discharge instructions available to patient? Yes. Reviewed appropriate site of care based on symptoms and resources available to patient including: PCP, Specialist, Urgent care clinics, Home health and When to call 911. The patient agrees to contact the PCP office for questions related to their healthcare.      Covid Risk Education    Patient has following risk factors of: heart failure, COPD and Severe Malnutrition, GI Bleed, S/P MVR, PAF, CAD, HTN. Education provided regarding infection prevention, and signs and symptoms of COVID-19 and when to seek medical attention with patient who verbalized understanding. Discussed exposure protocols and quarantine From CDC: Are you at higher risk for severe illness?   and given an opportunity for questions and concerns. The patient agrees to contact the COVID-19 hotline [phone] or PCP office for questions related to COVID-19. For more information on steps you can take to protect yourself, see CDC's How to Protect Yourself     Discussed follow-up appointments. CTN provided contact information for future needs.         Sneha Guerin RN

## 2021-03-30 ENCOUNTER — TELEPHONE (OUTPATIENT)
Dept: CARDIOLOGY CLINIC | Age: 76
End: 2021-03-30

## 2021-03-30 NOTE — TELEPHONE ENCOUNTER
Attempted to call patient to give echocardiogram results per Dr. Esperanza Miranda request. No answer or way to leave message.

## 2021-04-02 ENCOUNTER — OFFICE VISIT (OUTPATIENT)
Dept: GASTROENTEROLOGY | Age: 76
End: 2021-04-02
Payer: MEDICARE

## 2021-04-02 VITALS
HEIGHT: 63 IN | SYSTOLIC BLOOD PRESSURE: 150 MMHG | BODY MASS INDEX: 15.77 KG/M2 | DIASTOLIC BLOOD PRESSURE: 80 MMHG | WEIGHT: 89 LBS | OXYGEN SATURATION: 98 % | HEART RATE: 87 BPM

## 2021-04-02 DIAGNOSIS — R19.5 OB + STOOL: ICD-10-CM

## 2021-04-02 DIAGNOSIS — D64.9 ACUTE ON CHRONIC ANEMIA: Primary | ICD-10-CM

## 2021-04-02 PROCEDURE — 1036F TOBACCO NON-USER: CPT | Performed by: NURSE PRACTITIONER

## 2021-04-02 PROCEDURE — 1111F DSCHRG MED/CURRENT MED MERGE: CPT | Performed by: NURSE PRACTITIONER

## 2021-04-02 PROCEDURE — G8400 PT W/DXA NO RESULTS DOC: HCPCS | Performed by: NURSE PRACTITIONER

## 2021-04-02 PROCEDURE — 99203 OFFICE O/P NEW LOW 30 MIN: CPT | Performed by: NURSE PRACTITIONER

## 2021-04-02 PROCEDURE — 1123F ACP DISCUSS/DSCN MKR DOCD: CPT | Performed by: NURSE PRACTITIONER

## 2021-04-02 PROCEDURE — G8419 CALC BMI OUT NRM PARAM NOF/U: HCPCS | Performed by: NURSE PRACTITIONER

## 2021-04-02 PROCEDURE — 3017F COLORECTAL CA SCREEN DOC REV: CPT | Performed by: NURSE PRACTITIONER

## 2021-04-02 PROCEDURE — G8427 DOCREV CUR MEDS BY ELIG CLIN: HCPCS | Performed by: NURSE PRACTITIONER

## 2021-04-02 PROCEDURE — 1090F PRES/ABSN URINE INCON ASSESS: CPT | Performed by: NURSE PRACTITIONER

## 2021-04-02 PROCEDURE — 4040F PNEUMOC VAC/ADMIN/RCVD: CPT | Performed by: NURSE PRACTITIONER

## 2021-04-02 RX ORDER — ALBUTEROL SULFATE 1.25 MG/3ML
1 SOLUTION RESPIRATORY (INHALATION) EVERY 6 HOURS PRN
COMMUNITY
Start: 2021-01-22

## 2021-04-02 RX ORDER — BUDESONIDE 0.5 MG/2ML
0.5 INHALANT ORAL NIGHTLY
COMMUNITY
Start: 2021-02-02

## 2021-04-02 RX ORDER — IPRATROPIUM BROMIDE AND ALBUTEROL SULFATE 2.5; .5 MG/3ML; MG/3ML
3 SOLUTION RESPIRATORY (INHALATION) EVERY 4 HOURS
COMMUNITY
Start: 2021-02-02

## 2021-04-02 RX ORDER — FORMOTEROL FUMARATE 20 UG/2ML
20 SOLUTION RESPIRATORY (INHALATION)
Status: ON HOLD | COMMUNITY
Start: 2021-03-05 | End: 2021-01-01

## 2021-04-02 ASSESSMENT — ENCOUNTER SYMPTOMS
VOMITING: 0
COUGH: 0
ABDOMINAL PAIN: 0
TROUBLE SWALLOWING: 0
RECTAL PAIN: 0
VOICE CHANGE: 0
BACK PAIN: 0
BLOOD IN STOOL: 0
ABDOMINAL DISTENTION: 0
ANAL BLEEDING: 0
NAUSEA: 0
SORE THROAT: 0
CONSTIPATION: 0
DIARRHEA: 0
SHORTNESS OF BREATH: 1

## 2021-04-02 NOTE — PATIENT INSTRUCTIONS

## 2021-04-02 NOTE — PROGRESS NOTES
Subjective:      Melissa Patel is a74 y.o. female  Chief Complaint   Patient presents with    New Patient     seen hospital        HPI  PCP: Fili Velasco MD  Referring Provider: Kiera Mei MD  Pt is here as a hospital follow up appt. Admit date 3/26/21  Discharge date 3/27/21  Came in with c/o chest pain. Was found to have a drop in baseline hemoglobin. Reports to me she has been anemic on and off for years. Dr Shade Mcclellan was consulted. He recommends outpt EGD/colonoscopy after holding the plavix. She denies any visible source of GI blood loss (melena, brbpr, hematemesis). She has no GI complaints at all. Hgb on admission was 7.3  Hgb at discharge was 8.6  Hgb yesterday with labs at PCP office was 8.8 (per pt). Stool OB x1 + on admission as well. Family HX:    Pt denies family hx of colon polyps, colon CA, inflammatory bowel dx, gastric CA and esophageal CA. Past Medical History:   Diagnosis Date    Aortic stenosis     Arthritis     Atherosclerosis of native arteries of the extremities with intermittent claudication 7/25/2011    Carotid aneurysm, right (HCC)     Carotid artery occlusion     CHF (congestive heart failure) ([address]     COPD (chronic obstructive pulmonary disease) ([address]     History of blood transfusion 2016    Post op, was taking blood thinner    Hyperlipidemia     Hypertension     MI (myocardial infarction) ([address] 2009    x2    Mitral valve stenosis     Pneumonia     Post-menopausal     PUD (peptic ulcer disease) 2009    Renal failure 2009    after ulcer perforation sepsis.    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 Right early 's    long ago    CATARACT REMOVAL WITH IMPLANT Bilateral      SECTION  1972    COLONOSCOPY  2012    Dr Evan Osorio:  HP    CORONARY ANGIOPLASTY WITH STENT PLACEMENT      CORONARY ANGIOPLASTY WITH STENT PLACEMENT      CORONARY ARTERY BYPASS GRAFT      DIAGNOSTIC CARDIAC CATH LAB PROCEDURE      DILATION AND CURETTAGE OF UTERUS      ILIO-FEMORAL BYPASS GRAFT N/A 2016    OPEN TRANSLUMINAL BALLOON ANGIOPLASTY AND STENTING OF RIGHT COMMON AND EXTERNAL  ILIAC ARTERIES; RIGHT FEMORAL ENDARTERECTOMY WITH VEIN PATCH ANGIOPLASTY performed by Ervin Piña MD at [address] [facility] N/A 2016    MITRAL VALVE  REPLACEMENT LUONG-MAZE ABLATION WITH CRYO PROCEDURE, CORONARY ARTERY BYPASS GRAFT X 1 WITH ENDOSCOPIC VEIN HARVESTING WITH PERFUSION TRANSESOPHAGEAL ECHOCARDIOGRAM performed by Yadira Meyer MD at [facility] MITRAL VALVE REPLACEMENT  2016    MA REOPER, CAROTID ENDARTEC>1 MON Left 2018    REMOVAL OF HEMATOMA, LEFT CAROTID ARTERY performed by Ervin Piña MD at [address] Left 2018    LEFT CAROTID ENDARTERECTOMY WITH EEG MONITORING AND COMPLETION DUPLEX ULTRASOUND performed by Ervin Piña MD at [facility] PTCA      SKIN GRAFT Right 2016    Skin graft split thickness foot,ankle,and leg. Right leg 26x8cm and 12x6cm total area 280cm squared. TJR    UPPER GASTROINTESTINAL ENDOSCOPY  2015    Dr Modesta Wise: normal    UPPER GASTROINTESTINAL ENDOSCOPY  03/15/2016    Dr Emre Ho: normal    UPPER GASTROINTESTINAL ENDOSCOPY  2015    Dr Modesta Wise:  paul neg, multiple gastric antial and duodenal ulcers    VASCULAR SURGERY  16 [facility] Aortagram and right leg runoff,right leg runoff,right common iliac artery selection for right leg run off views.  VASCULAR SURGERY      . Open transluminal angioplasty and stenting of the external iliac artery. TJR    VASCULAR SURGERY  2019    [facility]. 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        Social History     Socioeconomic History    Marital status:      Spouse name: None    Number of children: None    Years of education: None    Highest education level: None   Occupational History    None   Social Needs    Financial resource strain: None    Food insecurity     Worry: None     Inability: None    Transportation needs     Medical: None     Non-medical: None   Tobacco Use    Smoking status: Former Smoker     Years: 2.00     Types: Cigarettes     Quit date: 1980     Years since quittin.2    Smokeless tobacco: Never Used   Substance and Sexual Activity    Alcohol use: Yes     Comment: rarely maybe twice a year    Drug use: No    Sexual activity: Not Currently     Partners: Male   Lifestyle    Physical activity     Days per week: None     Minutes per session: None    Stress: None   Relationships    Social connections     Talks on phone: None     Gets together: None     Attends [employer] service: None     Active member of club or organization: None     Attends meetings of clubs or organizations: None     Relationship status: None    Intimate partner violence     Fear of current or ex partner: None     Emotionally abused: None     Physically abused: None     Forced sexual activity: None   Other Topics Concern    None   Social History Narrative    None       Allergies   Allergen Reactions    Levaquin [Levofloxacin In D5w] Nausea And Vomiting    Xarelto [Rivaroxaban] Other (See Comments)     Made anemic. CANNOT HAVE DUE TO HEART VALVE REPLACEMENT.     Morphine Itching and Rash       Current Outpatient Medications   Medication Sig Dispense Refill    formoterol (PERFOROMIST) 20 MCG/2ML nebulizer solution Inhale 20 mcg into the lungs Twice daily      albuterol (ACCUNEB) 1.25 MG/3ML nebulizer solution       budesonide (PULMICORT) 0.5 MG/2ML nebulizer suspension Inhale 0.5 mg into the lungs Daily      ipratropium-albuterol (DUONEB) 0.5-2.5 (3) MG/3ML SOLN nebulizer solution Inhale 3 mLs into the lungs every 4 hours      Ferrous Sulfate (IRON PO) Take 1 tablet by mouth daily      Probiotic Product (PROBIOTIC DAILY PO) Take 1 tablet by mouth daily      metoprolol (LOPRESSOR) 100 MG tablet Take 0.5 tablets by mouth 2 times daily (Patient taking differently: Take 50 mg by mouth 2 times daily 50 mg in the AM & 50 mg in the PM) 90 tablet 0    pantoprazole (PROTONIX) 40 MG tablet Take 1 tablet by mouth 2 times daily (before meals) 60 tablet 0    furosemide (LASIX) 40 MG tablet Take 1 tablet by mouth daily 90 tablet 1    isosorbide mononitrate (IMDUR) 60 MG extended release tablet Take 1 tablet by mouth nightly (Patient taking differently: Take 30 mg by mouth nightly ) 180 tablet 1    clopidogrel (PLAVIX) 75 MG tablet Take 1 tablet by mouth daily 90 tablet 3    amLODIPine (NORVASC) 5 MG tablet Take 0.5 tablets by mouth daily 90 tablet 3    nitroGLYCERIN (NITROSTAT) 0.4 MG SL tablet Place 1 tablet under the tongue every 5 minutes as needed for Chest pain 25 tablet 3    levothyroxine (SYNTHROID) 25 MCG tablet Take 25 mcg by mouth Daily       Fexofenadine HCl (MUCINEX ALLERGY PO) Take 1 tablet by mouth 2 times daily       Arformoterol Tartrate (BROVANA) 15 MCG/2ML NEBU Inhale 15 mcg into the lungs      OXYGEN Inhale 2 L into the lungs daily       levalbuterol (XOPENEX) 1.25 MG/3ML nebulizer solution Inhale 3 mLs into the lungs      aspirin EC 81 MG EC tablet Take 1 tablet by mouth daily (Patient taking differently: Take 81 mg by mouth nightly PT TAKES AT NIGHT.) 30 tablet 3    atorvastatin (LIPITOR) 40 MG tablet Take 1 tablet by mouth daily (Patient taking differently: Take 40 mg by mouth nightly PT TAKES AT NIGHT.) 90 tablet 3    calcium-cholecalciferol (CALCIUM 500+D) 500-200 MG-UNIT per tablet Take 2 tablets by mouth daily       Biotin 5000 MCG TABS Take 1 tablet by mouth daily        No current facility-administered medications for this visit. Review of Systems   Constitutional: Negative for appetite change, fatigue, fever and unexpected weight change. HENT: Negative for sore throat, trouble swallowing and voice change. Respiratory: Positive for shortness of breath. Negative for cough. Cardiovascular: Negative for chest pain, palpitations and leg swelling. Gastrointestinal: Negative for abdominal distention, abdominal pain, anal bleeding, blood in stool, constipation, diarrhea, nausea, rectal pain and vomiting. Genitourinary: Negative for hematuria. Musculoskeletal: Positive for arthralgias. Negative for back pain and neck pain. Neurological: Negative for dizziness, weakness, light-headedness and headaches. Psychiatric/Behavioral: Negative for dysphoric mood and sleep disturbance. The patient is not nervous/anxious. All other systems reviewed and are negative. Objective:     Physical Exam  Vitals signs and nursing note reviewed. Constitutional:       Appearance: She is well-developed. Comments: BP (!) 150/80   Pulse 87   Ht 5' 3\" (1.6 m)   Wt 89 lb (40.4 kg)   SpO2 98%   BMI 15.77 kg/m²   Oxygen intact at 3l/nc   Eyes:      General: No scleral icterus. Conjunctiva/sclera: Conjunctivae normal.      Pupils: Pupils are equal, round, and reactive to light. Neck:      Musculoskeletal: Normal range of motion and neck supple. Thyroid: No thyromegaly. Cardiovascular:      Rate and Rhythm: Normal rate and regular rhythm.       Heart sounds: Normal heart sounds. No murmur. No friction rub. No gallop. Pulmonary:      Effort: Pulmonary effort is normal. No respiratory distress. Breath sounds: Normal breath sounds. Abdominal:      General: Bowel sounds are normal. There is no distension. Palpations: Abdomen is soft. Tenderness: There is no abdominal tenderness. There is no rebound. Musculoskeletal: Normal range of motion. General: No deformity. Neurological:      Mental Status: She is alert and oriented to person, place, and time. Cranial Nerves: No cranial nerve deficit. Psychiatric:         Judgment: Judgment normal.           Assessment:       Diagnosis Orders   1. Acute on chronic anemia  COLONOSCOPY W/ OR W/O BIOPSY    ESOPHAGOSCOPY / EGD   2. OB + stool  COLONOSCOPY W/ OR W/O BIOPSY    ESOPHAGOSCOPY / EGD         Plan:      1. Schedule outpatient endoscopy. Patient advised no Aspirin, Fish Oil, Vit E or NSAIDs 5 (five) days before procedure. Follow-up Visit: per Dr. Carver Flight clearance from Dr. Saravanan Jimenez  to stop plavix and approve anesthesia with her cardiac hx  Clearance for discontinuing anticoagulants is needed before scheduling procedure. Increased r isks may be associated with discontinuation of this medication including stroke, TIA, and cardiac event. I have discussed this with patient. Patient voices understanding and agrees to proceed with scheduling. Pt Education:   Risks, benefits, and alternatives to endoscopy were discussed. Patient voices understanding of risks of, but not limited to, perforation, bleeding, and infection. The risk of perforation is increased with esophageal dilatation. All questions answered to patient's satisfaction. Patient is agreable to proceed. 2. Schedule outpatient colonoscopy. Patient advised no Aspirin, Fish Oil, Vit E or NSAIDs 5 (five) days before procedure.   Follow-up Visit: per Dr Carver Flight clearance from Dr. Saravanan Jimenez  to stop  plavix and approve anesthesia with her cardiac history  Clearance for discontinuing anticoagulants is needed before scheduling procedure. Increased r isks may be associated with discontinuation of this medication including stroke, TIA, and cardiac event. I have discussed this with patient. Patient voices understanding and agrees to proceed with scheduling. Pt education:  Risks, benefits, and alternatives to colonoscopy were discussed. Risks of colonoscopy include, but are not limited to, perforation, bleeding, and infection. We discussed that the risk for perforation is 1-3 in 5,000  at the time of colonoscopy;   and 1-2% risk of bleeding post-polypectomy. All questions answered to the satisfaction of the patient. Pt is agreeable to proceed.

## 2021-04-05 ENCOUNTER — TELEPHONE (OUTPATIENT)
Dept: GASTROENTEROLOGY | Age: 76
End: 2021-04-05

## 2021-04-05 NOTE — TELEPHONE ENCOUNTER
We sent clearance to Dr. Queta Matthews, patient cannot come off of Plavix until after 7/30/21. I let the patient know that we have cancelled her EGD/CLN and have put her in recall so she will receive a letter reminding her to call our office to schedule.   The EGD/CLN was scheduled as a hospital follow-up for GI bleed      I have routed this to Aurelia Kim who saw her in the Office and Dr. David Majano who is her PCP

## 2021-04-06 NOTE — TELEPHONE ENCOUNTER
Daja, please put her in recall for CBC monthly til the scopes are scheduled. So I know her counts remain stable.  thanks

## 2021-04-09 ENCOUNTER — OFFICE VISIT (OUTPATIENT)
Dept: CARDIOLOGY CLINIC | Age: 76
End: 2021-04-09
Payer: MEDICARE

## 2021-04-09 VITALS
HEIGHT: 63 IN | WEIGHT: 85 LBS | BODY MASS INDEX: 15.06 KG/M2 | SYSTOLIC BLOOD PRESSURE: 167 MMHG | DIASTOLIC BLOOD PRESSURE: 78 MMHG | HEART RATE: 92 BPM

## 2021-04-09 DIAGNOSIS — I35.0 SEVERE AORTIC STENOSIS: Primary | ICD-10-CM

## 2021-04-09 PROCEDURE — 4040F PNEUMOC VAC/ADMIN/RCVD: CPT | Performed by: INTERNAL MEDICINE

## 2021-04-09 PROCEDURE — 1111F DSCHRG MED/CURRENT MED MERGE: CPT | Performed by: INTERNAL MEDICINE

## 2021-04-09 PROCEDURE — 3017F COLORECTAL CA SCREEN DOC REV: CPT | Performed by: INTERNAL MEDICINE

## 2021-04-09 PROCEDURE — 1036F TOBACCO NON-USER: CPT | Performed by: INTERNAL MEDICINE

## 2021-04-09 PROCEDURE — G8419 CALC BMI OUT NRM PARAM NOF/U: HCPCS | Performed by: INTERNAL MEDICINE

## 2021-04-09 PROCEDURE — 1123F ACP DISCUSS/DSCN MKR DOCD: CPT | Performed by: INTERNAL MEDICINE

## 2021-04-09 PROCEDURE — 99204 OFFICE O/P NEW MOD 45 MIN: CPT | Performed by: INTERNAL MEDICINE

## 2021-04-09 PROCEDURE — G8427 DOCREV CUR MEDS BY ELIG CLIN: HCPCS | Performed by: INTERNAL MEDICINE

## 2021-04-09 PROCEDURE — G8400 PT W/DXA NO RESULTS DOC: HCPCS | Performed by: INTERNAL MEDICINE

## 2021-04-09 PROCEDURE — 1090F PRES/ABSN URINE INCON ASSESS: CPT | Performed by: INTERNAL MEDICINE

## 2021-04-09 ASSESSMENT — ENCOUNTER SYMPTOMS
SHORTNESS OF BREATH: 1
GASTROINTESTINAL NEGATIVE: 1

## 2021-04-09 NOTE — PROGRESS NOTES
Cardiology Office Visit Note  [facility]  [address]  Phone: [phone]  Fax: [phone]                            Date:  4/9/2021  Patient: Linden Goldmann  Age:  76 y.o., 1945    Referral: No ref.  provider found      PROBLEM LIST:    Patient Active Problem List    Diagnosis Date Noted    Unstable angina ([facility]      Priority: High    Acute diastolic heart failure (HCC)      Priority: High    Bradycardia      Priority: High    Acute superficial venous thrombosis of right lower extremity 04/25/2016     Priority: High     Overview Note:     With large RLE bulla lateral foot skin violaceous discoloration, acutely POA (venous backpressure)      Mitral valve stenosis 03/29/2016     Priority: High    Mitral valve insufficiency 03/29/2016     Priority: High    PUD (peptic ulcer disease) 03/29/2016     Priority: Medium    Atherosclerosis of native artery of extremity with intermittent claudication ([facility] 07/25/2011     Priority: Low    Acute on chronic anemia 04/02/2021    OB + stool 04/02/2021    Chronic atrial fibrillation ([facility] 10/06/2020    History of coronary artery stent placement 01/29/2020     Overview Note:     11/15/ 2 stents to RCA and 1 to circumflex - Dr. Leo Meléndez Myalgia 01/29/2020    Pain in both lower extremities 10/30/2019    Status post Maze operation for atrial fibrillation 09/25/2019     Overview Note:     4/18/16      PAF (paroxysmal atrial fibrillation) ([facility] 09/25/2019    S/P coronary artery bypass graft x 1 09/25/2019     Overview Note:     4/8/16 SVT to RCA      S/P mitral valve replacement 09/25/2019     Overview Note:     4/8/16 MVR 23 mm mosaic porcine by Dr. Angelica Bush Chest pain 09/25/2019    SOB (shortness of breath) 09/25/2019    Fatigue 09/25/2019    Postoperative anemia due to acute blood loss 07/12/2019    Carotid aneurysm, right ([facility] 07/11/2019    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    Mild aortic stenosis 02/20/2018    Essential hypertension     NSTEMI (non-ST elevated myocardial infarction) ([address] 01/14/2018    HCAP (healthcare-associated pneumonia) 01/14/2018    Acute renal failure (ARF) ([address] 01/14/2018    Syncope and collapse     Sepsis with organ dysfunction ([address] 01/13/2018    Obstruction of left carotid artery 01/11/2018    Bilateral carotid artery stenosis 11/19/2017    Wound of sacral region 06/16/2016    Elevated TSH 06/16/2016    GI bleed 06/15/2016    CHF (congestive heart failure) ([address] 06/15/2016    CKD (chronic kidney disease), stage III 06/15/2016    Pulmonary emphysema (HCC) 06/15/2016    Chronic obstructive pulmonary disease (HCC)     PVD (peripheral vascular disease) ([facility]     Atherosclerosis of native artery of right lower extremity with ulceration of ankle ([address] 06/05/2016    Atherosclerosis of native arteries of right leg with ulceration of other part of lower right leg 06/05/2016     Overview Note:     Replacing Inactive Diagnoses      Atherosclerosis of native arteries of right leg with ulceration of other part of foot 06/05/2016     Overview Note:     Replacing Inactive Diagnoses      Nonhealing ulcer of right lower leg with fat layer exposed ([address] 06/05/2016    Non-pressure chronic ulcer of right ankle with fat layer exposed ([address] 06/05/2016    Neuropathic ulcer of right foot with fat layer exposed ([address] 06/05/2016    Hypervolemia     Nonhealing nonsurgical wound with fat layer exposed 06/03/2016    Atherosclerosis of native arteries of left leg with ulceration of calf ([facility])     Severe malnutrition ([facility])     Diastolic dysfunction     Anemia in chronic kidney disease (CKD)     Non-pressure chronic ulcer of right calf with fat layer exposed ([address] 05/27/2016    Decubitus ulcer of right buttock, stage 3 ([address] 05/27/2016    Nocturnal hypoxia 03/30/2016     Overview Note:     With associated mitral stenosis / regurgitation and pulmonary hypertension      Pulmonary hypertension 03/29/2016    Calculus of gallbladder without cholecystitis without obstruction     Carotid artery stenosis 02/08/2012    Atherosclerosis of native artery of extremity with intermittent claudication ([address] 07/25/2011     Overview Note:     Replacing Inactive Diagnoses      Mixed hyperlipidemia     Arthritis     Coronary artery disease involving native coronary artery of native heart      Overview Note:     3/16/2016  Echo  Severe MS, moderate to severe MR, RVSP 73 mmHg, normal LVFX  3/31/2016  Cath  70% osteal RCA, severe MR, MVA 1.9, normal LVFX  4/7//2016   MVR (23 mm [employer] Mosaic) VG-PDABarba Bernadine)  5/7/2016  Echo  Normal LVFX, large pleural effusion, echolucency near preserved posterior Mitral leaflet, likely chordae, RVSP 72 mmHg           PRESENTATION: Genaro Zeng is a 76y.o. year old female was known to have history of coronary artery disease status post CABG in 2016, status post multiple interventions with PCI to the circumflex and RCA in 2019, she is status post mitral valve replacement with bioprosthetic valve in 2016, history of extensive peripheral vascular disease involving renal artery stenosis with stenting chronic bilateral endarterectomy and iliac stents, stage III chronic kidney disease    History of advanced COPD on continuous oxygen supply of 3 to 4 L/min home oxygen, hypertension hyperlipidemia atrial fibrillation congestive heart failure and valvular heart disease including mitral valve and aortic valve disease    Also patient is known to have chronic anemia due to GI loss and she is being closely managed with GI, Saint IV iron therapy to stabilize her hemoglobin    She was admitted to the hospital recently on account of progressively worsening shortness of breath due to COPD exacerbation and chest pain that she was ruled out for acute coronary syndrome  Patient was treated medically and discharged from the hospital and came to follow-up with me regarding  CORONARY ANGIOPLASTY WITH STENT PLACEMENT      CORONARY ARTERY BYPASS GRAFT  2016    DIAGNOSTIC CARDIAC CATH LAB PROCEDURE      DILATION AND CURETTAGE OF UTERUS      ILIO-FEMORAL BYPASS GRAFT N/A 06/02/2016    OPEN TRANSLUMINAL BALLOON ANGIOPLASTY AND STENTING OF RIGHT COMMON AND EXTERNAL  ILIAC ARTERIES; RIGHT FEMORAL ENDARTERECTOMY WITH VEIN PATCH ANGIOPLASTY performed by Kaity Vaughn MD at [address] N/A 04/07/2016    MITRAL VALVE  REPLACEMENT LUONG-MAZE ABLATION WITH CRYO PROCEDURE, CORONARY ARTERY BYPASS GRAFT X 1 WITH ENDOSCOPIC VEIN HARVESTING WITH PERFUSION TRANSESOPHAGEAL ECHOCARDIOGRAM performed by Memo Kiran MD at [facility] MITRAL VALVE REPLACEMENT  2016    WA REOPER, CAROTID ENDARTEC>1 MON Left 01/11/2018    REMOVAL OF HEMATOMA, LEFT CAROTID ARTERY performed by Kaity Vaughn MD at [address] Left 01/11/2018    LEFT CAROTID ENDARTERECTOMY WITH EEG MONITORING AND COMPLETION DUPLEX ULTRASOUND performed by Kaity Vaughn MD at [address] [place] [address] PTCA      SKIN GRAFT Right 07/22/2016    Skin graft split thickness foot,ankle,and leg. Right leg 26x8cm and 12x6cm total area 280cm squared. TJR    UPPER GASTROINTESTINAL ENDOSCOPY  07/14/2015    Dr Nava Breeding: normal    UPPER GASTROINTESTINAL ENDOSCOPY  03/15/2016    Dr Paige Mark: normal    UPPER GASTROINTESTINAL ENDOSCOPY  05/27/2015    Dr Nava Breeding:  paul neg, multiple gastric antial and duodenal ulcers    VASCULAR SURGERY  5/27/16 TJR    Aortagram and right leg runoff,right leg runoff,right common iliac artery selection for right leg run off views.  VASCULAR SURGERY      . Open transluminal angioplasty and stenting of the external iliac artery. TJR    VASCULAR SURGERY  07/11/2019    TJR. 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        Medications:  Current Outpatient Medications   Medication Sig Dispense Refill    formoterol (PERFOROMIST) 20 MCG/2ML nebulizer solution Inhale 20 mcg into the lungs Twice daily      albuterol (ACCUNEB) 1.25 MG/3ML nebulizer solution       budesonide (PULMICORT) 0.5 MG/2ML nebulizer suspension Inhale 0.5 mg into the lungs Daily      ipratropium-albuterol (DUONEB) 0.5-2.5 (3) MG/3ML SOLN nebulizer solution Inhale 3 mLs into the lungs every 4 hours      Ferrous Sulfate (IRON PO) Take 1 tablet by mouth daily      Probiotic Product (PROBIOTIC DAILY PO) Take 1 tablet by mouth daily      metoprolol (LOPRESSOR) 100 MG tablet Take 0.5 tablets by mouth 2 times daily (Patient taking differently: Take 50 mg by mouth 2 times daily 50 mg in the AM & 50 mg in the PM) 90 tablet 0    pantoprazole (PROTONIX) 40 MG tablet Take 1 tablet by mouth 2 times daily (before meals) 60 tablet 0    furosemide (LASIX) 40 MG tablet Take 1 tablet by mouth daily 90 tablet 1    isosorbide mononitrate (IMDUR) 60 MG extended release tablet Take 1 tablet by mouth nightly (Patient taking differently: Take 30 mg by mouth nightly ) 180 tablet 1    clopidogrel (PLAVIX) 75 MG tablet Take 1 tablet by mouth daily 90 tablet 3    amLODIPine (NORVASC) 5 MG tablet Take 0.5 tablets by mouth daily 90 tablet 3    nitroGLYCERIN (NITROSTAT) 0.4 MG SL tablet Place 1 tablet under the tongue every 5 minutes as needed for Chest pain 25 tablet 3    levothyroxine (SYNTHROID) 25 MCG tablet Take 25 mcg by mouth Daily       Fexofenadine HCl (MUCINEX ALLERGY PO) Take 1 tablet by mouth 2 times daily       Arformoterol Tartrate (BROVANA) 15 MCG/2ML NEBU Inhale 15 mcg into the lungs      OXYGEN Inhale 2 L into the lungs daily       levalbuterol (XOPENEX) 1.25 MG/3ML nebulizer solution Inhale 3 mLs into the lungs      aspirin EC 81 MG EC tablet Take 1 tablet by mouth daily (Patient taking differently: Take 81 mg by mouth Palpations: Abdomen is soft. Tenderness: There is no abdominal tenderness. Musculoskeletal: Normal range of motion. Right lower leg: No edema. Left lower leg: No edema. Skin:     General: Skin is warm. Neurological:      General: No focal deficit present. Mental Status: She is alert and oriented to person, place, and time. Psychiatric:         Mood and Affect: Mood normal.         Behavior: Behavior normal.           Labs:   CBC: No results found for: CBC   BMP: No results found for: BMP    BNP: No results found for: BNPINT   PT/INR:   Protime   Date Value Ref Range Status   03/26/2021 14.6 12.0 - 14.6 sec Final     INR   Date Value Ref Range Status   03/26/2021 1.14 0.88 - 1.18 Final     Comment:     INR  < or = 1.3  Normal  INR = 2.0 - 3.0  Therapeutic  INR = 2.5 - 3.5  Therapeutic for patients with mechanical  prosthetic heart valve & MI prophylaxis  INR  > or = 3.5  Abnormal/Elevated  INR  > or = 5.0  Critical (requires immediate physician  notification)         APTT:    aPTT   Date Value Ref Range Status   03/26/2021 28.0 26.0 - 36.2 sec Final      CARDIAC ENZYMES:   Total CK   Date Value Ref Range Status   04/26/2016 128 26 - 192 U/L Final     Troponin   Date Value Ref Range Status   03/26/2021 0.03 0.00 - 0.03 ng/mL Final     Comment:     <0.030 ng/ml       No measurable cardiac damage. 0.030-0.099 ng/ml  Values of troponin in this range suggest  possible myocardial damage. Repeat assay  4 to 6 hours after the current specimen.    >= 0.100 ng/ml     Indicative of myocardial damage. Recommend continued monitoring of  patient status and cardiac markers.         FASTING LIPID PANEL: No results found for: LIPIDPAN  LIVER PROFILE:   AST   Date Value Ref Range Status   03/26/2021 20 5 - 32 U/L Final     ALT   Date Value Ref Range Status   03/26/2021 10 5 - 33 U/L Final     Albumin   Date Value Ref Range Status   03/26/2021 4.1 3.5 - 5.2 g/dL Final            Imaging:    - Echo : Conclusions      Summary   Bioprosthetic mitral valve replacement with normal function. No   significant mitral regurgitation. mild MS with Mean gradient 6 mmHg. The aortic valve is trileaflet, moderately thickened & calcified with   severely reduced leaflet separation. Severe aortic stenosis; no evidence of aortic regurgitation. The aortic   valve area is . 9 cm2 with a maximum gradient of 22 mmHg and a mean   gradient of 12 mmHg. low flow low gradient severe AS with preserved EF and low Stroke volume   Index (less than 35 ml/m2)   Tricuspid valve is structurally normal.   Moderate (2+) tricuspid regurgitation with estimated RVSP of 53 mm Hg. The pulmonic valve was not well visualized . Mildly dilated left atrium. Normal left ventricular size with preserved LV function and an estimated   ejection fraction of approximately 55-60%. No evidence of left ventricular   mass or thrombus noted. Mild concentric left ventricular hypertrophy. Diastolic dysfunction is present. Mildly enlarged right atrium size. Mildly dilated right ventricle. Aortic root is within normal limits. No evidence of significant pericardial effusion is noted. No evidence of pleural effusion.       Signature      ----------------------------------------------------------------   Electronically signed by Kristina May MD(Interpreting   physician) on 03/27/2021 05:04 PM   ----------------------------------------------------------------          ASSESSMENT and PLAN:    Coronary artery disease -without any chest pain, continue same medical management follow-up with Dr. Cynthia Aguilera as planned     Valvular heart disease  Last echo showed normally functioning mitral bioprosthetic valve with mild mitral stenosis no significant regurgitation, normal ejection fraction, low flow low gradient normal EF severe aortic stenosis (paradoxical aortic stenosis),   Patient was under the follow-up of Dr. Jeferson Angel for aortic stenosis over the past 3 years, it seems now she is still compensating without much change of symptoms as her shortness of breath is most likely due to COPD  I believe she is stable now and currently not a candidate for TAVR at this point but if this changes and she become more symptomatic or she develop more florid heart failure symptoms, I would like to visit this again with the patient and her family    She will follow with Dr. Cristiano Angulo for her regular cardiovascular care    Be happy to see her again if she became more symptomatic      Electronically signed by Saniya Valadez MD on 4/9/2021 at 9:34 AM    Saniya Valadez MD, [facility]  Interventional Cardiologist, Endovascular Specialist   Medical Director, Structural Heart Program   [facility]     This dictation was generated by voice recognition computer software. Although all attempts are made to edit the dictation for accuracy, there may be errors in the transcription that are not intended.

## 2021-04-26 ENCOUNTER — TELEPHONE (OUTPATIENT)
Dept: PULMONOLOGY | Facility: CLINIC | Age: 76
End: 2021-04-26

## 2021-04-26 DIAGNOSIS — J44.9 STAGE 3 SEVERE COPD BY GOLD CLASSIFICATION (HCC): Primary | ICD-10-CM

## 2021-04-26 RX ORDER — ALBUTEROL SULFATE 1.25 MG/3ML
1.25 SOLUTION RESPIRATORY (INHALATION) EVERY 6 HOURS PRN
Status: DISCONTINUED | OUTPATIENT
Start: 2021-04-26 | End: 2022-01-01

## 2021-04-26 NOTE — TELEPHONE ENCOUNTER
Patient called stating that she is having a terrible time breathing and she thinks it is from the change in the neb solution.  She is still ok with the Brovana and has plenty of refills on it. She has tried the Performist and Budesonide. She doesn't feel that it has helped her at all. She wants to go back to using the albuterol that she has been on before.     LOV 03/05/2021 with Dr Syed  NOV 09/09/2021 with Dr Syed      Sending to [place] due to Dr Syed being out of office

## 2021-05-03 DIAGNOSIS — J44.9 STAGE 3 SEVERE COPD BY GOLD CLASSIFICATION (HCC): Primary | ICD-10-CM

## 2021-05-03 RX ORDER — ALBUTEROL SULFATE 2.5 MG/3ML
2.5 SOLUTION RESPIRATORY (INHALATION) EVERY 4 HOURS PRN
Qty: 360 ML | Refills: 11 | Status: SHIPPED | OUTPATIENT
Start: 2021-05-03

## 2021-05-03 NOTE — TELEPHONE ENCOUNTER
Patient calling saying she is out of her medicine as of today and hasn't receive the med that was ordered last week. I called pollo with [facility] and they never received a script for the albuterol.   Please look at the script and make sure it is correct.     The one that was sent last week was 1/2 strength. Does she need that one or the full strength?

## 2021-05-10 ENCOUNTER — TELEPHONE (OUTPATIENT)
Dept: GASTROENTEROLOGY | Age: 76
End: 2021-05-10

## 2021-05-10 DIAGNOSIS — D64.9 ACUTE ON CHRONIC ANEMIA: Primary | ICD-10-CM

## 2021-05-14 ENCOUNTER — TELEPHONE (OUTPATIENT)
Dept: GASTROENTEROLOGY | Age: 76
End: 2021-05-14

## 2021-05-14 DIAGNOSIS — D64.9 ACUTE ON CHRONIC ANEMIA: ICD-10-CM

## 2021-05-14 LAB
HCT VFR BLD CALC: 35.2 % (ref 37–47)
HEMOGLOBIN: 10.3 G/DL (ref 12–16)
MCH RBC QN AUTO: 28.1 PG (ref 27–31)
MCHC RBC AUTO-ENTMCNC: 29.3 G/DL (ref 33–37)
MCV RBC AUTO: 95.9 FL (ref 81–99)
PDW BLD-RTO: 15.2 % (ref 11.5–14.5)
PLATELET # BLD: 270 K/UL (ref 130–400)
PMV BLD AUTO: 9.9 FL (ref 9.4–12.3)
RBC # BLD: 3.67 M/UL (ref 4.2–5.4)
WBC # BLD: 8.4 K/UL (ref 4.8–10.8)

## 2021-05-14 NOTE — TELEPHONE ENCOUNTER
Please let Slick Lulú know her hemoglobin is still low but trending up nicely. It is now 10.3 (normal is >12).   thanks

## 2021-06-15 ENCOUNTER — TELEPHONE (OUTPATIENT)
Dept: GASTROENTEROLOGY | Age: 76
End: 2021-06-15

## 2021-06-15 DIAGNOSIS — D64.9 ACUTE ON CHRONIC ANEMIA: Primary | ICD-10-CM

## 2021-06-18 ENCOUNTER — TELEPHONE (OUTPATIENT)
Dept: GASTROENTEROLOGY | Age: 76
End: 2021-06-18

## 2021-06-18 DIAGNOSIS — D64.9 ACUTE ON CHRONIC ANEMIA: ICD-10-CM

## 2021-06-18 LAB
HCT VFR BLD CALC: 36.8 % (ref 37–47)
HEMOGLOBIN: 11.6 G/DL (ref 12–16)
MCH RBC QN AUTO: 27.5 PG (ref 27–31)
MCHC RBC AUTO-ENTMCNC: 31.5 G/DL (ref 33–37)
MCV RBC AUTO: 87.2 FL (ref 81–99)
PDW BLD-RTO: 14 % (ref 11.5–14.5)
PLATELET # BLD: 273 K/UL (ref 130–400)
PMV BLD AUTO: 10.1 FL (ref 9.4–12.3)
RBC # BLD: 4.22 M/UL (ref 4.2–5.4)
WBC # BLD: 9.4 K/UL (ref 4.8–10.8)

## 2021-06-18 NOTE — TELEPHONE ENCOUNTER
Please let Morgan Chaudhary know I have reviewed the results of her bloodwork. Her hemoglobin is now 11.6 , normal is >12. This has increased when compared to labs 4 weeks ago when her hemoglobin was 10.3  This is trending in he right direction, which is good news! Looking at notes in her chart, it looks like she can come off of her plavix next month. So no further CBCs need to be done, just proceed with scopes as planned, make sure these are scheduled.   thanks

## 2021-06-21 NOTE — TELEPHONE ENCOUNTER
06-21-21 Called and notified patient of results ans recommendations as per [facility] APRN    Patient did not want to scheduled her scopes at this time as that she does not see Dr Jazmyne Portillo until the end of July and he will not discontinue her Plavix until then. Routed to [facility] for Dr Jim Junior.

## 2021-07-30 NOTE — PROGRESS NOTES
[facility]  Cardiology Office Note  [address]  [address]  Phone: [phone]  Fax: [phone]                            Date:  7/30/2021  Patient: Emmett Moore  Age:  76 y.o., 1945    Referral: No ref.  provider found      PROBLEM LIST:    Patient Active Problem List    Diagnosis Date Noted    Unstable angina ([facility]      Priority: High    Acute diastolic heart failure ([facility]      Priority: High    Bradycardia      Priority: High    Acute superficial venous thrombosis of right lower extremity 04/25/2016     Priority: High     Overview Note:     With large RLE bulla lateral foot skin violaceous discoloration, acutely POA (venous backpressure)      Mitral valve stenosis 03/29/2016     Priority: High    Mitral valve insufficiency 03/29/2016     Priority: High    PUD (peptic ulcer disease) 03/29/2016     Priority: Medium    Acute on chronic anemia 04/02/2021     Priority: Low    OB + stool 04/02/2021     Priority: Low    Chronic atrial fibrillation ([facility] 10/06/2020     Priority: Low    History of coronary artery stent placement 01/29/2020     Priority: Low     Overview Note:     11/15/ 2 stents to RCA and 1 to circumflex - Dr. Rosado Senior Myalgia 01/29/2020     Priority: Low    Pain in both lower extremities 10/30/2019     Priority: Low    Status post Maze operation for atrial fibrillation 09/25/2019     Priority: Low     Overview Note:     4/18/16      PAF (paroxysmal atrial fibrillation) ([facility] 09/25/2019     Priority: Low    S/P coronary artery bypass graft x 1 09/25/2019     Priority: Low     Overview Note:     4/8/16 SVT to RCA      S/P mitral valve replacement 09/25/2019     Priority: Low     Overview Note:     4/8/16 MVR 23 mm mosaic porcine by Dr. Violet uYn Chest pain 09/25/2019     Priority: Low    SOB (shortness of breath) 09/25/2019     Priority: Low    Fatigue 09/25/2019     Priority: Low    Postoperative anemia due to acute blood loss 07/12/2019     Priority: Low    Carotid aneurysm, right ([facility] 07/11/2019     Priority: Low    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[facility] 05/21/2019     Priority: Low    Mild aortic stenosis 02/20/2018     Priority: Low    Essential hypertension      Priority: Low    NSTEMI (non-ST elevated myocardial infarction) ([facility] 01/14/2018     Priority: Low    HCAP (healthcare-associated pneumonia) 01/14/2018     Priority: Low    Acute renal failure (ARF) ([facility] 01/14/2018     Priority: Low    Syncope and collapse      Priority: Low    Sepsis with organ dysfunction ([facility] 01/13/2018     Priority: Low    Obstruction of left carotid artery 01/11/2018     Priority: Low    Bilateral carotid artery stenosis 11/19/2017     Priority: Low    Wound of sacral region 06/16/2016     Priority: Low    Elevated TSH 06/16/2016     Priority: Low    GI bleed 06/15/2016     Priority: Low    CHF (congestive heart failure) ([facility] 06/15/2016     Priority: Low    CKD (chronic kidney disease), stage III ([facility] 06/15/2016     Priority: Low    Pulmonary emphysema ([facility] 06/15/2016     Priority: Low    Chronic obstructive pulmonary disease (HCC)      Priority: Low    PVD (peripheral vascular disease) ([facility]      Priority: Low    Atherosclerosis of native artery of right lower extremity with ulceration of ankle ([facility] 06/05/2016     Priority: Low    Atherosclerosis of native arteries of right leg with ulceration of other part of lower right leg 06/05/2016     Priority: Low     Overview Note:     Replacing Inactive Diagnoses      Atherosclerosis of native arteries of right leg with ulceration of other part of foot 06/05/2016     Priority: Low     Overview Note:     Replacing Inactive Diagnoses      Nonhealing ulcer of right lower leg with fat layer exposed ([facility] 06/05/2016     Priority: Low    Non-pressure chronic ulcer of right ankle with fat layer exposed ([facility] 06/05/2016     Priority: Low    Neuropathic ulcer of right foot with fat layer exposed ([facility] 06/05/2016     Priority: Low    Hypervolemia      Priority: Low    Nonhealing nonsurgical wound with fat layer exposed 06/03/2016     Priority: Low    Atherosclerosis of native arteries of left leg with ulceration of calf (HCC)      Priority: Low    Severe malnutrition (HCC)      Priority: Low    Diastolic dysfunction      Priority: Low    Anemia in chronic kidney disease (CKD)      Priority: Low    Non-pressure chronic ulcer of right calf with fat layer exposed ([facility] 05/27/2016     Priority: Low    Decubitus ulcer of right buttock, stage 3 ([facility] 05/27/2016     Priority: Low    Nocturnal hypoxia 03/30/2016     Priority: Low     Overview Note:     With associated mitral stenosis / regurgitation and pulmonary hypertension      Pulmonary hypertension 03/29/2016     Priority: Low    Calculus of gallbladder without cholecystitis without obstruction      Priority: Low    Carotid artery stenosis 02/08/2012     Priority: Low    Atherosclerosis of native artery of extremity with intermittent claudication ([facility] 07/25/2011     Priority: Low    Atherosclerosis of native artery of extremity with intermittent claudication ([facility] 07/25/2011     Priority: Low     Overview Note:     Replacing Inactive Diagnoses      Mixed hyperlipidemia      Priority: Low    Arthritis      Priority: Low    Coronary artery disease involving native coronary artery of native heart      Priority: Low     Overview Note:     3/16/2016  Echo  Severe MS, moderate to severe MR, RVSP 73 mmHg, normal LVFX  3/31/2016  Cath  70% osteal RCA, severe MR, MVA 1.9, normal LVFX  4/7//2016   MVR (23 mm [employer] Mosaic) VG-PDATiburcio Kiera)  5/7/2016  Echo  Normal LVFX, large pleural effusion, echolucency near preserved posterior Mitral leaflet, likely chordae, RVSP 72 mmHg       1.   Coronary artery disease status post CABG with one-vessel bypass, SVG to RCA 2016 with bioprosthetic mitral valve replacement, severely stenotic SVG to RCA by catheterization 11/15/2019 with PCI to proximal to mid RCA and circumflex, repeat PCI 7/30/2020 ostial RCA (3.5 x 9 mm resolute), residual ostial circumflex 60% stenosis, normal LV ejection fraction. 2.  Chronic kidney disease with occluded right renal artery, solitary left kidney with severely stenotic left renal artery status post endovascular intervention 7/30/2020 (5.0 x 22 mm resolute Roverto stent). 3.  Severe hypertension. 4.  COPD.  5.  Peripheral arterial disease with prior iliac stents, bilateral carotid endarterectomy, possible bilateral subclavian artery stenosis with differential blood pressures and bruits. PRESENTATION: Cecilia Carroll is a 76y.o. year old female presents for follow-up evaluation. Of late she has noticed tingling and discomfort in her left arm which is a new symptom. No clear relation ship to exertion though she does not exert much[place]. She walked in from the lobby and required oxygen. This did tire her out. No chest pain. REVIEW OF SYSTEMS:  Review of Systems   Constitutional: Negative for activity change, fatigue and fever. HENT: Negative for ear pain, hearing loss and tinnitus. Eyes: Negative for discharge and visual disturbance. Respiratory: Positive for shortness of breath. Negative for cough, chest tightness and wheezing. Cardiovascular: Negative for chest pain, palpitations and leg swelling. Gastrointestinal: Negative for abdominal distention, blood in stool, constipation, diarrhea and vomiting. Endocrine: Negative for cold intolerance, heat intolerance, polydipsia and polyuria. Genitourinary: Negative for dysuria and hematuria. Musculoskeletal: Negative for arthralgias, back pain and myalgias. Skin: Negative for pallor and rash. Neurological: Negative for seizures, syncope, weakness and headaches.    Psychiatric/Behavioral: Negative for behavioral problems and dysphoric mood.       Past Medical History:      Diagnosis Date    Aortic stenosis     Arthritis     Atherosclerosis of native arteries of the extremities with intermittent claudication 2011    Carotid aneurysm, right (HCC)     Carotid artery occlusion     CH[facility]congestive [facility] failure) [facility]     COPD (chronic obstructive pulmonary disease) ([facility]     History of blood transfusion 2016    Post op, was taking blood thinner    Hyperlipidemia     Hypertension     MI (myocardial infarction) ([facility] 2009    x2    Mitral valve stenosis     Pneumonia     Post-menopausal     PUD (peptic ulcer disease) 2009    Renal failure 2009    after ulcer perforation sepsis.     Severe malnutrition ([facility]     Thyroid disease     Wound infection after surgery     right foot infection after cabg       Past Surgical History:      Procedure Laterality Date    ABDOMEN SURGERY  2009    perforated ulcer resection of 1/3 stomach    CARDIAC SURGERY      artificial valve and bypass    CAROTID ENDARTERECTOMY      Right  with Dacron patch angioplasty    CAROTID ENDARTERECTOMY Left     CAROTID ENDARTERECTOMY Right 2019    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 AND REPAIR WITH REVERSED LEFT GREATER SAPHENOUS VEIN INTERPOSITIONAL BYPASS GRAFT performed by Tawanda Arteaga MD at [address] [facility] Right early 's    long ago    CATARACT REMOVAL WITH IMPLANT Bilateral      SECTION  1972    COLONOSCOPY  2012    Dr Fanny Nieto:  HP    CORONARY ANGIOPLASTY WITH STENT PLACEMENT      CORONARY ANGIOPLASTY WITH STENT PLACEMENT      CORONARY ARTERY BYPASS GRAFT  2016    DIAGNOSTIC CARDIAC CATH LAB PROCEDURE      DILATION AND CURETTAGE OF UTERUS      ILIO-FEMORAL BYPASS GRAFT N/A 2016    OPEN TRANSLUMINAL BALLOON ANGIOPLASTY AND STENTING OF RIGHT COMMON AND EXTERNAL  ILIAC ARTERIES; RIGHT FEMORAL ENDARTERECTOMY WITH VEIN PATCH ANGIOPLASTY performed by Tawanda Arteaga MD at [address] [place] [address] MAZE PROCEDURE N/A 04/07/2016    MITRAL VALVE  REPLACEMENT LUONG-MAZE ABLATION WITH CRYO PROCEDURE, CORONARY ARTERY BYPASS GRAFT X 1 WITH ENDOSCOPIC VEIN HARVESTING WITH PERFUSION TRANSESOPHAGEAL ECHOCARDIOGRAM performed by Mary Ohara MD at [address] [place] [address] MITRAL VALVE REPLACEMENT  2016    WA REOPER, CAROTID ENDARTEC>1 MON Left 01/11/2018    REMOVAL OF HEMATOMA, LEFT CAROTID ARTERY performed by Jane Arthur MD at [address] Left 01/11/2018    LEFT CAROTID ENDARTERECTOMY WITH EEG MONITORING AND COMPLETION DUPLEX ULTRASOUND performed by Jane Arthur MD at [address] [place] [address] PTCA      SKIN GRAFT Right 07/22/2016    Skin graft split thickness foot,ankle,and leg. Right leg 26x8cm and 12x6cm total area 280cm squared. TJR    UPPER GASTROINTESTINAL ENDOSCOPY  07/14/2015    Dr Arlinda Apley: normal    UPPER GASTROINTESTINAL ENDOSCOPY  03/15/2016    Dr Marco Bonds: normal    UPPER GASTROINTESTINAL ENDOSCOPY  05/27/2015    Dr Arlinda Apley:  paul neg, multiple gastric antial and duodenal ulcers    VASCULAR SURGERY  5/27/16 TJR    Aortagram and right leg runoff,right leg runoff,right common iliac artery selection for right leg run off views.  VASCULAR SURGERY      . Open transluminal angioplasty and stenting of the external iliac artery. TJR    VASCULAR SURGERY  07/11/2019    TJR. 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        Medications:  Current Outpatient Medications   Medication Sig Dispense Refill    isosorbide mononitrate (IMDUR) 30 MG extended release tablet Take 30 mg by mouth daily      amLODIPine (NORVASC) 5 MG tablet Take 1 tablet by mouth 2 times daily 180 tablet 3    metoprolol (LOPRESSOR) 100 MG tablet Take 0.5 tablets by mouth 2 times daily 50 mg in the AM & 50 mg in the  tablet 3  formoterol (PERFOROMIST) 20 MCG/2ML nebulizer solution Inhale 20 mcg into the lungs Twice daily      albuterol (ACCUNEB) 1.25 MG/3ML nebulizer solution       budesonide (PULMICORT) 0.5 MG/2ML nebulizer suspension Inhale 0.5 mg into the lungs Daily      ipratropium-albuterol (DUONEB) 0.5-2.5 (3) MG/3ML SOLN nebulizer solution Inhale 3 mLs into the lungs every 4 hours      Probiotic Product (PROBIOTIC DAILY PO) Take 1 tablet by mouth daily      furosemide (LASIX) 40 MG tablet Take 1 tablet by mouth daily 90 tablet 1    clopidogrel (PLAVIX) 75 MG tablet Take 1 tablet by mouth daily 90 tablet 3    nitroGLYCERIN (NITROSTAT) 0.4 MG SL tablet Place 1 tablet under the tongue every 5 minutes as needed for Chest pain 25 tablet 3    levothyroxine (SYNTHROID) 25 MCG tablet Take 25 mcg by mouth Daily       Fexofenadine HCl (MUCINEX ALLERGY PO) Take 1 tablet by mouth 2 times daily       Arformoterol Tartrate (BROVANA) 15 MCG/2ML NEBU Inhale 15 mcg into the lungs      OXYGEN Inhale 2 L into the lungs daily       levalbuterol (XOPENEX) 1.25 MG/3ML nebulizer solution Inhale 3 mLs into the lungs      aspirin EC 81 MG EC tablet Take 1 tablet by mouth daily (Patient taking differently: Take 81 mg by mouth nightly PT TAKES AT NIGHT.) 30 tablet 3    atorvastatin (LIPITOR) 40 MG tablet Take 1 tablet by mouth daily (Patient taking differently: Take 40 mg by mouth nightly PT TAKES AT NIGHT.) 90 tablet 3    calcium-cholecalciferol (CALCIUM 500+D) 500-200 MG-UNIT per tablet Take 2 tablets by mouth daily       Biotin 5000 MCG TABS Take 1 tablet by mouth daily       Ferrous Sulfate (IRON PO) Take 1 tablet by mouth daily (Patient not taking: Reported on 7/30/2021)      pantoprazole (PROTONIX) 40 MG tablet Take 1 tablet by mouth 2 times daily (before meals) (Patient not taking: Reported on 7/30/2021) 60 tablet 0     No current facility-administered medications for this visit.        Allergies:  Levaquin [levofloxacin in d5w], Xarelto [rivaroxaban], and Morphine    Past Social History:  Social History     Socioeconomic History    Marital status:      Spouse name: Not on file    Number of children: Not on file    Years of education: Not on file    Highest education level: Not on file   Occupational History    Not on file   Tobacco Use    Smoking status: Former Smoker     Years: 2.00     Types: Cigarettes     Quit date: 1980     Years since quittin.6    Smokeless tobacco: Never Used   Vaping Use    Vaping Use: Never used   Substance and Sexual Activity    Alcohol use: Yes     Comment: rarely maybe twice a year    Drug use: No    Sexual activity: Not Currently     Partners: Male   Other Topics Concern    Not on file   Social History Narrative    Not on file     Social Determinants of Health     Financial Resource Strain:     Difficulty of Paying Living Expenses:    Food Insecurity:     Worried About 3085 to be in the Last Year:     [address]  [employer] in the Last Year:    Transportation Needs:     Lack of Transportation (Medical):      Lack of Transportation (Non-Medical):    Physical Activity:     Days of Exercise per Week:     Minutes of Exercise per Session:    Stress:     Feeling of Stress :    Social Connections:     Frequency of Communication with Friends and Family:     Frequency of Social Gatherings with Friends and Family:     Attends Faith Services:     Active Member of Clubs or Organizations:     Attends Club or Organization Meetings:     Marital Status:    Intimate Partner Violence:     Fear of Current or Ex-Partner:     Emotionally Abused:     Physically Abused:     Sexually Abused:        Family History:       Problem Relation Age of Onset    Diabetes Mother     Heart Disease Mother     Heart Failure Mother     Diabetes Sister     Heart Disease Sister     High Blood Pressure Sister     Colon Cancer Sister     Liver Disease Sister     Cirrhosis Sister     Colon Cancer Maternal Aunt     Colon Polyps Neg Hx     Esophageal Cancer Neg Hx          Physical Examination:  /88   Pulse 67   Ht 5' 2\" (1.575 m)   Wt 84 lb (38.1 kg)   SpO2 97%   BMI 15.36 kg/m²   Physical Exam  Constitutional:       General: She is not in acute distress. Appearance: She is not diaphoretic. Comments: Thin build  Blood pressure left arm 110/70 mmHg, right arm 180/100 mmHg with severe differential noted  Bilateral supraclavicular bruits noted   HENT:      Mouth/Throat:      Pharynx: No oropharyngeal exudate. Eyes:      General: No scleral icterus. Right eye: No discharge. Left eye: No discharge. Neck:      Thyroid: No thyromegaly. Vascular: No carotid bruit or JVD. Cardiovascular:      Rate and Rhythm: Normal rate and regular rhythm. No extrasystoles are present. Heart sounds: Normal heart sounds, S1 normal and S2 normal. No murmur heard. No systolic murmur is present. No diastolic murmur is present. No friction rub. No gallop. No S3 or S4 sounds. Comments: Bilateral supraclavicular bruits  No JVD  No edema    Pulmonary:      Effort: Pulmonary effort is normal. No respiratory distress. Breath sounds: Normal breath sounds. No wheezing or rales. Chest:      Chest wall: No tenderness. Abdominal:      General: Bowel sounds are normal. There is no distension. Palpations: Abdomen is soft. There is no mass. Tenderness: There is no abdominal tenderness. There is no guarding or rebound. Hernia: No hernia is present. Comments: No palpable organomegaly  Midline abdominal bruit   Musculoskeletal:         General: Normal range of motion. Skin:     General: Skin is warm. Coloration: Skin is not pale. Findings: No rash. Neurological:      Mental Status: She is alert and oriented to person, place, and time. Cranial Nerves: No cranial nerve deficit.       Deep Tendon Reflexes: Reflexes normal.           Labs: CBC: No results for input(s): WBC, HGB, HCT, PLT in the last 72 hours. BMP:No results for input(s): NA, K, CO2, BUN, CREATININE, LABGLOM, GLUCOSE in the last 72 hours. BNP: No results for input(s): BNP in the last 72 hours. PT/INR: No results for input(s): PROTIME, INR in the last 72 hours. APTT:No results for input(s): APTT in the last 72 hours. CARDIAC ENZYMES:No results for input(s): CKTOTAL, CKMB, CKMBINDEX, TROPONINI in the last 72 hours. FASTING LIPID PANEL:  Lab Results   Component Value Date    HDL 61 03/27/2021    LDLCALC 66 03/27/2021    TRIG 73 03/27/2021     LIVER PROFILE:No results for input(s): AST, ALT, LABALBU in the last 72 hours. Imaging:          ASSESSMENT and PLAN:    22-year-old female patient with past medical history of extensive vasculopathy, coronary artery disease with prior one-vessel CABG with SVG to RCA (severely diseased) 2016 with bioprosthetic mitral valve replacement, PCI to RCA and circumflex 11/15/2019, repeat PCI to ostial RCA 7/30/2020, chronic kidney disease stage III with solitary left kidney with severe left renal artery stenosis status post LESLEY 7/30/2020, normal LV ejection fraction, COPD, peripheral arterial disease with prior bilateral carotid endarterectomy, iliac stents here for follow-up evaluation. 1.  Differential blood pressures noted in both arms with significant elevation in blood pressure on the right arm and bilateral subclavian bruits. Renal bruit also audible. She has a solitary left kidney with significant stenosis which was treated last year. High likelihood of restenosis. She is also having left arm discomfort which is concerning. She did have residual disease in the ostial circumflex. At this time I would proceed with cardiac catheterization and renal angiography given that she has a solitary kidney. I have increase her Norvasc to 5 mg twice daily. I have discussed the options with her in detail.   They are agreeable to proceed with care as suggested. 2.  Continue medical management. Have asked her to monitor her blood pressures on her right arm going forward. 3.  Follow-up appointment for 6 months though I will be seeing her in hospital during the procedure. Orders:  No orders of the defined types were placed in this encounter. Orders Placed This Encounter   Medications    amLODIPine (NORVASC) 5 MG tablet     Sig: Take 1 tablet by mouth 2 times daily     Dispense:  180 tablet     Refill:  3             Return in about 6 months (around 1/30/2022). Electronically signed by Morgan Rodriguez MD on 7/30/2021 at 11:06 AM    [facility]      Thisdictation was generated by voice recognition computer software. Although all attempts are made to edit the dictation for accuracy, there may be errors in the transcription that are not intended.

## 2021-07-30 NOTE — PATIENT INSTRUCTIONS
[place] at the [facility] and [address] [facility] located on the first floor of [place] [address] through hospital main entrance and turn immediately to your left. Date/Time: 8/10/21 arrive at [address] [place] [address] for 10AM procedure. Pre-operative work-up:  CBC & BMP    Allergies:  Levaquin [levofloxacin in d5w], Xarelto [rivaroxaban], and Morphine   Contact number:  [phone] (home)     Cardiac Catheterization Instructions   · Do not eat or drink anything after midnight on the night before your procedure. You can take your morning medications with a sip of water unless otherwise directed not to. · Bring a list of the names and dosages of all the medications you are taking. · Coumadin (warfarin) should be stopped two days prior to this procedure. · Pradaxa (dabigatran) should be stopped one day prior to procedure. · You should arrange to have someone take you home rather than drive yourself. · Further plan will depend upon the result of the cardiac catheterization.       If for any reason you are unable to keep this appointment, please contact [facility], [phone], as soon as possible to reschedule.  -------------------------------------------------------------------------------------------------------------------

## 2021-08-10 NOTE — LETTER
[facility]  Cardiac Rehab Department  [address] [facility], [place] [address]  [phone]  Toll Free [phone]          August 11, 2021    Dear Kaitlin Macdonald,    Please find this informational packet that has been sent to you on heart disease and the guidelines you are to follow concerning your present cardiac condition and immediate recovery. Due to your recent cardiac diagnosis and coronary stent placement you now qualify for participation in a Phase II Outpatient Cardiac Rehab Program.  This elective service has been shown to significantly reduce cardiac mortality by 26-31% and increase longevity by as much as 5 years among patients such as yourself! A brochure and info sheet have been included in this mailing for the purpose of providing you with a brief overview of program components. The [facility] is currently caring for patients in accordance with all mandated regulatory COVID-19 guidelines and practices. Since you live locally you should contact us at [phone] to set up a free, no obligation \"orientation & assessment\" appointment to learn more and take full advantage of this life changing opportunity. Furthermore, feel free to reach out to us with any questions or concerns and our staff will be more than pleased to assist you. Thank you. To the betterment of your health,        [facility] Cardiac Rehab Staff    JAYSHREE Koenig, MA            David Sesay, RN  Registered Nurse  Exercise Physiologist  Registered Nurse

## 2021-08-10 NOTE — PROGRESS NOTES
Returned post cath. Awake and alert. Puncture site to right groin site clear with gauze and tegaderm dressing in place. C/o having a slight headache. Daughter at bedside.

## 2021-08-10 NOTE — H&P
Priority: Low    Essential hypertension      Priority: Low    NSTEMI (non-ST elevated myocardial infarction) ([facility] 01/14/2018     Priority: Low    HCAP (healthcare-associated pneumonia) 01/14/2018     Priority: Low    Acute renal failure (ARF) ([facility] 01/14/2018     Priority: Low    Syncope and collapse      Priority: Low    Sepsis with organ dysfunction ([facility] 01/13/2018     Priority: Low    Obstruction of left carotid artery 01/11/2018     Priority: Low    Bilateral carotid artery stenosis 11/19/2017     Priority: Low    Wound of sacral region 06/16/2016     Priority: Low    Elevated TSH 06/16/2016     Priority: Low    GI bleed 06/15/2016     Priority: Low    CHF (congestive heart failure) ([facility] 06/15/2016     Priority: Low    CKD (chronic kidney disease), stage III ([facility] 06/15/2016     Priority: Low    Pulmonary emphysema ([facility] 06/15/2016     Priority: Low    Chronic obstructive pulmonary disease (HCC)      Priority: Low    PVD (peripheral vascular disease) (HCC)      Priority: Low    Atherosclerosis of native artery of right lower extremity with ulceration of ankle ([facility] 06/05/2016     Priority: Low    Atherosclerosis of native arteries of right leg with ulceration of other part of lower right leg 06/05/2016     Priority: Low     Overview Note:     Replacing Inactive Diagnoses      Atherosclerosis of native arteries of right leg with ulceration of other part of foot 06/05/2016     Priority: Low     Overview Note:     Replacing Inactive Diagnoses      Nonhealing ulcer of right lower leg with fat layer exposed ([facility] 06/05/2016     Priority: Low    Non-pressure chronic ulcer of right ankle with fat layer exposed ([facility] 06/05/2016     Priority: Low    Neuropathic ulcer of right foot with fat layer exposed ([facility] 06/05/2016     Priority: Low    Hypervolemia      Priority: Low    Nonhealing nonsurgical wound with fat layer exposed 06/03/2016     Priority: Low    Atherosclerosis of native arteries of left leg with ulceration of calf (HCC)      Priority: Low    Severe malnutrition (HCC)      Priority: Low    Diastolic dysfunction      Priority: Low    Anemia in chronic kidney disease (CKD)      Priority: Low    Non-pressure chronic ulcer of right calf with fat layer exposed ([facility] 05/27/2016     Priority: Low    Decubitus ulcer of right buttock, stage 3 ([facility] 05/27/2016     Priority: Low    Nocturnal hypoxia 03/30/2016     Priority: Low     Overview Note:     With associated mitral stenosis / regurgitation and pulmonary hypertension      Pulmonary hypertension 03/29/2016     Priority: Low    Calculus of gallbladder without cholecystitis without obstruction      Priority: Low    Carotid artery stenosis 02/08/2012     Priority: Low    Atherosclerosis of native artery of extremity with intermittent claudication ([facility] 07/25/2011     Priority: Low    Atherosclerosis of native artery of extremity with intermittent claudication ([facility] 07/25/2011     Priority: Low     Overview Note:     Replacing Inactive Diagnoses      Mixed hyperlipidemia      Priority: Low    Arthritis      Priority: Low    Coronary artery disease involving native coronary artery of native heart      Priority: Low     Overview Note:     3/16/2016  Echo  Severe MS, moderate to severe MR, RVSP 73 mmHg, normal LVFX  3/31/2016  Cath  70% osteal RCA, severe MR, MVA 1.9, normal LVFX  4/7//2016   MVR (23 mm [employer] Mosaic) VG-PDACyndee Toan)  5/7/2016  Echo  Normal LVFX, large pleural effusion, echolucency near preserved posterior Mitral leaflet, likely chordae, RVSP 72 mmHg       1.  Coronary artery disease status post CABG with one-vessel bypass, SVG to RCA 2016 with bioprosthetic mitral valve replacement, severely stenotic SVG to RCA by catheterization 11/15/2019 with PCI to proximal to mid RCA and circumflex, repeat PCI 7/30/2020 ostial RCA (3.5 x 9 mm resolute), residual ostial circumflex 60% stenosis, normal LV ejection fraction.   2.  Chronic kidney disease with occluded right renal artery, solitary left kidney with severely stenotic left renal artery status post endovascular intervention 7/30/2020 (5.0 x 22 mm resolute Alcalde stent). 3.  Severe hypertension. 4.  COPD. 5.  Peripheral arterial disease with prior iliac stents, bilateral carotid endarterectomy, possible bilateral subclavian artery stenosis with differential blood pressures and bruits.     PRESENTATION: Mayi Carolina is a 76y.o. year old female presents for follow-up evaluation. Of late she has noticed tingling and discomfort in her left arm which is a new symptom. No clear relation ship to exertion though she does not exert much[place]. She walked in from the lobby and required oxygen. This did tire her out. No chest pain.     REVIEW OF SYSTEMS:  Review of Systems   Constitutional: Negative for activity change, fatigue and fever. HENT: Negative for ear pain, hearing loss and tinnitus. Eyes: Negative for discharge and visual disturbance. Respiratory: Positive for shortness of breath. Negative for cough, chest tightness and wheezing. Cardiovascular: Negative for chest pain, palpitations and leg swelling. Gastrointestinal: Negative for abdominal distention, blood in stool, constipation, diarrhea and vomiting. Endocrine: Negative for cold intolerance, heat intolerance, polydipsia and polyuria. Genitourinary: Negative for dysuria and hematuria. Musculoskeletal: Negative for arthralgias, back pain and myalgias. Skin: Negative for pallor and rash. Neurological: Negative for seizures, syncope, weakness and headaches.    Psychiatric/Behavioral: Negative for behavioral problems and dysphoric mood.        Past Medical History:      Diagnosis Date    Aortic stenosis     Arthritis     Atherosclerosis of native arteries of the extremities with intermittent claudication 7/25/2011    Carotid aneurysm, right ([address]     Carotid artery occlusion     CHF (congestive heart failure) (HCC)     COPD (chronic obstructive pulmonary disease) ([address]     History of blood transfusion 2016    Post op, was taking blood thinner    Hyperlipidemia     Hypertension     MI (myocardial infarction) ([address] 2009    x2    Mitral valve stenosis     Pneumonia     Post-menopausal     PUD (peptic ulcer disease) 2009    Renal failure 2009    after ulcer perforation sepsis.    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 Right early 's    long ago    CATARACT REMOVAL WITH IMPLANT Bilateral      SECTION  1972    COLONOSCOPY  2012    Dr Rudy Galvez:  HP    CORONARY ANGIOPLASTY WITH STENT PLACEMENT      CORONARY ANGIOPLASTY WITH STENT PLACEMENT      CORONARY ARTERY BYPASS GRAFT  2016    DIAGNOSTIC CARDIAC CATH LAB PROCEDURE      DILATION AND CURETTAGE OF UTERUS      ILIO-FEMORAL BYPASS GRAFT N/A 2016    OPEN TRANSLUMINAL BALLOON ANGIOPLASTY AND STENTING OF RIGHT COMMON AND EXTERNAL  ILIAC ARTERIES; RIGHT FEMORAL ENDARTERECTOMY WITH VEIN PATCH ANGIOPLASTY performed by Gisela Gallego MD at [address] [facility] N/A 2016    MITRAL VALVE  REPLACEMENT LUONG-MAZE ABLATION WITH CRYO PROCEDURE, CORONARY ARTERY BYPASS GRAFT X 1 WITH ENDOSCOPIC VEIN HARVESTING WITH PERFUSION TRANSESOPHAGEAL ECHOCARDIOGRAM performed by Joey Burleson Kandy Naqvi [facility] MITRAL VALVE REPLACEMENT  2016    [place] REOPER, CAROTID ENDARTEC>1 MON Left 01/11/2018    REMOVAL OF HEMATOMA, LEFT CAROTID ARTERY performed by Tawanda Arteaga MD at [address] Left 01/11/2018    LEFT CAROTID ENDARTERECTOMY WITH EEG MONITORING AND COMPLETION DUPLEX ULTRASOUND performed by Tawanda Arteaga MD at [address] [place] [address] PTCA      SKIN GRAFT Right 07/22/2016    Skin graft split thickness foot,ankle,and leg. Right leg 26x8cm and 12x6cm total area 280cm squared. TJR    UPPER GASTROINTESTINAL ENDOSCOPY  07/14/2015    Dr Courtney Vang: normal    UPPER GASTROINTESTINAL ENDOSCOPY  03/15/2016    Dr Clau Uribe: normal    UPPER GASTROINTESTINAL ENDOSCOPY  05/27/2015    Dr Courtney Vang:  paul neg, multiple gastric antial and duodenal ulcers    VASCULAR SURGERY  5/27/16 TJR    Aortagram and right leg runoff,right leg runoff,right common iliac artery selection for right leg run off views.  VASCULAR SURGERY      . Open transluminal angioplasty and stenting of the external iliac artery. TJR    VASCULAR SURGERY  07/11/2019    TJR. 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 Medications Prior to Admission:    Prior to Admission medications    Medication Sig Start Date End Date Taking?  Authorizing Provider   furosemide (LASIX) 40 MG tablet TAKE 1 TABLET BY MOUTH DAILY  8/6/21   BEBO Barraza   isosorbide mononitrate (IMDUR) 30 MG extended release tablet Take 30 mg by mouth daily    Historical Provider, MD   amLODIPine (NORVASC) 5 MG tablet Take 1 tablet by mouth 2 times daily 7/30/21   Jose Antonio Eng MD   metoprolol (LOPRESSOR) 100 MG tablet Take 0.5 tablets by mouth 2 times daily 50 mg in the AM & 50 mg in the PM 6/30/21   BEBO Baxter   formoterol (PERFOROMIST) 20 MCG/2ML nebulizer solution Inhale 20 mcg into the lungs Twice daily 3/5/21   Historical Provider, MD   albuterol (ACCUNEB) 1.25 MG/3ML nebulizer solution  1/22/21   Historical Provider, MD   budesonide (PULMICORT) 0.5 MG/2ML nebulizer suspension Inhale 0.5 mg into the lungs Daily 2/2/21   Historical Provider, MD   ipratropium-albuterol (DUONEB) 0.5-2.5 (3) MG/3ML SOLN nebulizer solution Inhale 3 mLs into the lungs every 4 hours 2/2/21   Historical Provider, MD   Ferrous Sulfate (IRON PO) Take 1 tablet by mouth daily  Patient not taking: Reported on 7/30/2021    Historical Provider, MD   Probiotic Product (PROBIOTIC DAILY PO) Take 1 tablet by mouth daily    Historical Provider, MD   pantoprazole (PROTONIX) 40 MG tablet Take 1 tablet by mouth 2 times daily (before meals)  Patient not taking: Reported on 7/30/2021 3/27/21 7/30/21  Darlene Felton MD   clopidogrel (PLAVIX) 75 MG tablet Take 1 tablet by mouth daily 11/19/20   BEBO Keane   nitroGLYCERIN (NITROSTAT) 0.4 MG SL tablet Place 1 tablet under the tongue every 5 minutes as needed for Chest pain 8/18/20   BEBO Perez   levothyroxine (SYNTHROID) 25 MCG tablet Take 25 mcg by mouth Daily  11/7/19   Historical Provider, MD   Fexofenadine HCl (MUCINEX ALLERGY PO) Take 1 tablet by mouth 2 times daily     Historical Provider, MD   Arformoterol Tartrate (BROVANA) 15 MCG/2ML NEBU Inhale 15 mcg into the lungs 1/2/20   Historical Provider, MD   OXYGEN Inhale 2 L into the lungs daily     Historical Provider, MD   levalbuterol Alray Paola) 1.25 MG/3ML nebulizer solution Inhale 3 mLs into the lungs 4/4/19   Historical Provider, MD   aspirin EC 81 MG EC tablet Take 1 tablet by mouth daily  Patient taking differently: Take 81 mg by mouth nightly PT TAKES AT NIGHT. 11/20/19   Kimani Hannon MD   atorvastatin (LIPITOR) 40 MG tablet Take 1 tablet by mouth daily  Patient taking differently: Take 40 mg by mouth nightly PT TAKES AT NIGHT.  11/20/19   Kimani Hannon MD calcium-cholecalciferol (CALCIUM 500+D) 500-200 MG-UNIT per tablet Take 2 tablets by mouth daily     Historical Provider, MD   Biotin 5000 MCG TABS Take 1 tablet by mouth daily     Historical Provider, MD       Allergies:  Levaquin [levofloxacin in d5w], Xarelto [rivaroxaban], and Morphine    Past Social History:  Social History     Socioeconomic History    Marital status:      Spouse name: Not on file    Number of children: Not on file    Years of education: Not on file    Highest education level: Not on file   Occupational History    Not on file   Tobacco Use    Smoking status: Former Smoker     Years: 2.00     Types: Cigarettes     Quit date: 1980     Years since quittin.6    Smokeless tobacco: Never Used   Vaping Use    Vaping Use: Never used   Substance and Sexual Activity    Alcohol use: Yes     Comment: rarely maybe twice a year    Drug use: No    Sexual activity: Not Currently     Partners: Male   Other Topics Concern    Not on file   Social History Narrative    Not on file     Social Determinants of Health     Financial Resource Strain:     Difficulty of Paying Living Expenses:    Food Insecurity:     Worried About [address] in the Last Year:     [address] [employer] in the Last Year:    Transportation Needs:     Lack of Transportation (Medical):      Lack of Transportation (Non-Medical):    Physical Activity:     Days of Exercise per Week:     Minutes of Exercise per Session:    Stress:     Feeling of Stress :    Social Connections:     Frequency of Communication with Friends and Family:     Frequency of Social Gatherings with Friends and Family:     Attends Gnosticist Services:     Active Member of Clubs or Organizations:     Attends Club or Organization Meetings:     Marital Status:    Intimate Partner Violence:     Fear of Current or Ex-Partner:     Emotionally Abused:     Physically Abused:     Sexually Abused:        Family History:       Problem Relation Age of Onset    Diabetes Mother     Heart Disease Mother     Heart Failure Mother     Diabetes Sister     Heart Disease Sister     High Blood Pressure Sister     Colon Cancer Sister     Liver Disease Sister     Cirrhosis Sister     Colon Cancer Maternal Aunt     Colon Polyps Neg Hx     Esophageal Cancer Neg Hx      Physical Exam:    Vitals: There were no vitals taken for this visit. 24HR INTAKE/OUTPUT:  No intake or output data in the 24 hours ending 08/10/21 4132    Physical Exam  Physical Exam  Constitutional:       General: She is not in acute distress. Appearance: She is not diaphoretic. Comments: Thin build  Differential blood pressures greater on right arm than left arm  Bilateral supraclavicular bruits noted   HENT:      Mouth/Throat:      Pharynx: No oropharyngeal exudate. Eyes:      General: No scleral icterus. Right eye: No discharge. Left eye: No discharge. Neck:      Thyroid: No thyromegaly. Vascular: No carotid bruit or JVD. Cardiovascular:      Rate and Rhythm: Normal rate and regular rhythm. No extrasystoles are present. Heart sounds: Normal heart sounds, S1 normal and S2 normal. No murmur heard. No systolic murmur is present. No diastolic murmur is present. No friction rub. No gallop. No S3 or S4 sounds. Comments: Bilateral supraclavicular bruits  No JVD  No edema    Pulmonary:      Effort: Pulmonary effort is normal. No respiratory distress. Breath sounds: Normal breath sounds. No wheezing or rales. Chest:      Chest wall: No tenderness. Abdominal:      General: Bowel sounds are normal. There is no distension. Palpations: Abdomen is soft. There is no mass. Tenderness: There is no abdominal tenderness. There is no guarding or rebound. Hernia: No hernia is present. Comments: No palpable organomegaly  Midline abdominal bruit   Musculoskeletal:         General: Normal range of motion.    Skin: General: Skin is warm. Coloration: Skin is not pale. Findings: No rash. Neurological:      Mental Status: She is alert and oriented to person, place, and time. Cranial Nerves: No cranial nerve deficit. Deep Tendon Reflexes: Reflexes normal.       LAB DATA:  CBC: No results for input(s): WBC, HGB, PLT in the last 72 hours. BMP:  No results for input(s): NA, K, CL, CO2, BUN, CREATININE, GLUCOSE in the last 72 hours. Hepatic: No results for input(s): AST, ALT, ALB, BILITOT, ALKPHOS in the last 72 hours. CK, CKMB, Troponin: @LABRCNT (CKTOTAL:3, CKMB:3, TROPONINI:3)@  Pro-BNP: No results for input(s): BNP in the last 72 hours. Lipids: No results for input(s): CHOL, HDL in the last 72 hours. Invalid input(s): LDL  ABGs: No results for input(s): PHART, OGL1WQT, PO2ART, ZMQ5NCM, BEART, HGBAE, A8VRCUBJ, CARBOXHGBART, 02THERAPY in the last 72 hours. INR: No results for input(s): INR in the last 72 hours. A1c:Invalid input(s):  HEMOGLOBIN A1C  URINALYSIS:   Lab Results   Component Value Date    NITRU Negative 08/02/2016    WBCUA 1 08/02/2016    BACTERIA NEGATIVE 08/02/2016    RBCUA 1 08/02/2016    BLOODU Negative 08/02/2016    SPECGRAV 1.008 08/02/2016    GLUCOSEU Negative 08/02/2016     -----------------------------------------------------------------  IMAGING:  No orders to display         Assessment and Recommendations:    49-year-old female patient with past medical history of extensive vasculopathy, coronary artery disease with prior one-vessel CABG with SVG to RCA (severely diseased) 2016 with bioprosthetic mitral valve replacement, PCI to RCA and circumflex 11/15/2019, repeat PCI to ostial RCA 7/30/2020, chronic kidney disease stage III with solitary left kidney with severe left renal artery stenosis status post LESLEY 7/30/2020, normal LV ejection fraction, COPD, peripheral arterial disease with prior bilateral carotid endarterectomy, iliac stents with increased blood pressure noted in the right arm with differential blood pressures with recent onset of left arm discomfort concerning for anginal symptoms being referred for cardiac catheterization and renal angiography. Risks, benefits, alternatives of cardiac catheterization/PCI/renal angiography/endovascular intervention discussed with the patient and full informed consent obtained.   Acceptable Mallampati score  Consent for moderate conscious sedation  ASA 3        Electronically signed by Kimani Hannon MD on 8/10/2021 at 8:12 AM

## 2021-09-08 NOTE — PATIENT INSTRUCTIONS
Monitor BP - if getting < 100 or staying > 140 then let us know  Follow up in Sean   Call with any questions or concerns  Follow up with Charmaine Recinos MD for non cardiac problems  Report any new problems  Cardiovascular Fitness-Exercise as tolerated. Cardiac / Healthy Diet  Continue current medications as directed  Continue plan of treatment  It is always recommended that you bring your medications bottles with you to each visit - this is for your safety!

## 2021-09-08 NOTE — PROGRESS NOTES
[facility]  [facility] [address]  [address]  Phone: [phone]  Fax: [phone]    OFFICE VISIT:  2021    Rosa Joseph - : 1945    Reason For Visit:  Lizzie Bennett is a 68 y.o. female who is here for Follow-Up from Hospital (here for f/u after hospital stay with PCI & renal stent placement. Patient is not feeling anybody still c/o shortness of breath and weakness.  )  1.  Coronary artery disease status post CABG with one-vessel bypass, SVG to RCA  with bioprosthetic mitral valve replacement, severely stenotic SVG to RCA by catheterization 11/15/2019 with PCI to proximal to mid RCA and circumflex, repeat PCI 2020 ostial RCA (3.5 x 9 mm resolute), residual ostial circumflex 60% stenosis, normal LV ejection fraction. 2.  Chronic kidney disease with occluded right renal artery, solitary left kidney with severely stenotic left renal artery status post endovascular intervention 2020 (5.0 x 22 mm resolute Roverto stent). 3.  Severe hypertension. 4.  COPD. 5.  Peripheral arterial disease with prior iliac stents, bilateral carotid endarterectomy, possible bilateral subclavian artery stenosis with differential blood pressures and bruits. Patient was seen 2020 with noted hypertension with significantly elevation in the right arm and bilateral subclavian bruits in renal bruit noted. Concern for worsening renal artery stenosis and atherosclerosis of coronary arteries    8/10/2021 patient PCI to RCA with drug-eluting stent placed. And renal artery angioplasty    She is here in follow up accompanied with her daughter. No recurrent CP or arm discomfort. BP got low after procedure and her diovans was stopped due to severe dizziness with this. Right arm 120/60 this AM  Staying about that range  Still has shortness of breath with activity but is improved  Sees Dr Henny Hilton tomorrow. She is wearing O2 all the time now. She states overall she is doing fairly well. Daughter comments that she has been much more active up doing some housework and cooking      Subjective  Kaitlin denies exertional chest pain, orthopnea, paroxysmal nocturnal dyspnea, syncope, presyncope, arrhythmia, edema and fatigue. The patient denies numbness or weakness to suggest cerebrovascular accident or transient ischemic attack. Roya Kendrick MD is PCP and follows labs.   Farzaneh Abbott has the following history as recorded in [facility]:    Patient Active Problem List    Diagnosis Date Noted    Unstable angina ([address]     Acute diastolic heart failure (HCC)     Bradycardia     Acute superficial venous thrombosis of right lower extremity 04/25/2016    Mitral valve stenosis 03/29/2016    Mitral valve insufficiency 03/29/2016    PUD (peptic ulcer disease) 03/29/2016    Atherosclerosis of native artery of extremity with intermittent claudication ([address] 07/25/2011    Acute on chronic anemia 04/02/2021    OB + stool 04/02/2021    Chronic atrial fibrillation ([address] 10/06/2020    History of coronary artery stent placement 01/29/2020    Myalgia 01/29/2020    Pain in both lower extremities 10/30/2019    Status post Maze operation for atrial fibrillation 09/25/2019    PAF (paroxysmal atrial fibrillation) ([address] 09/25/2019    S/P coronary artery bypass graft x 1 09/25/2019    S/P mitral valve replacement 09/25/2019    Chest pain 09/25/2019    SOB (shortness of breath) 09/25/2019    Fatigue 09/25/2019    Postoperative anemia due to acute blood loss 07/12/2019    Carotid aneurysm, right ([address] 07/11/2019    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[address] 05/21/2019    Mild aortic stenosis 02/20/2018    Essential hypertension     NSTEMI (non-ST elevated myocardial infarction) ([address] 01/14/2018    HCAP (healthcare-associated pneumonia) 01/14/2018    Acute renal failure (ARF) ([address] 01/14/2018    Syncope and collapse     Sepsis with organ dysfunction ([address] 01/13/2018    Obstruction of left carotid artery 01/11/2018    Bilateral carotid artery stenosis 11/19/2017    Wound of sacral region 06/16/2016    Elevated TSH 06/16/2016    GI bleed 06/15/2016    CHF (congestive heart failure) ([address] 06/15/2016    CKD (chronic kidney disease), stage III ([address] 06/15/2016    Pulmonary emphysema (HCC) 06/15/2016    Chronic obstructive pulmonary disease (HCC)     PVD (peripheral vascular disease) ([facility]     Atherosclerosis of native artery of right lower extremity with ulceration of ankle ([address] 06/05/2016    Atherosclerosis of native arteries of right leg with ulceration of other part of lower right leg 06/05/2016    Atherosclerosis of native arteries of right leg with ulceration of other part of foot 06/05/2016    Nonhealing ulcer of right lower leg with fat layer exposed ([address] 06/05/2016    Non-pressure chronic ulcer of right ankle with fat layer exposed ([address] 06/05/2016    Neuropathic ulcer of right foot with fat layer exposed ([address] 06/05/2016    Hypervolemia     Nonhealing nonsurgical wound with fat layer exposed 06/03/2016    Atherosclerosis of native arteries of left leg with ulceration of calf ([facility])     Severe malnutrition ([facility])     Diastolic dysfunction     Anemia in chronic kidney disease (CKD)     Non-pressure chronic ulcer of right calf with fat layer exposed ([address] 05/27/2016    Decubitus ulcer of right buttock, stage 3 ([address] 05/27/2016    Nocturnal hypoxia 03/30/2016    Pulmonary hypertension 03/29/2016    Calculus of gallbladder without cholecystitis without obstruction     Carotid artery stenosis 02/08/2012    Atherosclerosis of native artery of extremity with intermittent claudication ([address] 07/25/2011    Mixed hyperlipidemia     Arthritis     Coronary artery disease involving native coronary artery of native heart      Past Medical History:   Diagnosis Date    Aortic stenosis     Arthritis     Atherosclerosis of native arteries of the extremities with intermittent claudication 2011    CAD (coronary artery disease)     Carotid aneurysm, right (HCC)     Carotid artery occlusion     CHF (congestive heart failure) (HCC)     COPD (chronic obstructive pulmonary disease) (HCC)     History of blood transfusion 2016    Post op, was taking blood thinner    Hyperlipidemia     Hypertension     MI (myocardial infarction) ([address] 2009    x2    Mitral valve stenosis     Pneumonia     Post-menopausal     PUD (peptic ulcer disease) 2009    Renal artery stenosis ([address] 08/10/2021    s/p stenting to L    Renal failure 2009    after ulcer perforation sepsis.    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 AND REPAIR WITH REVERSED LEFT GREATER SAPHENOUS VEIN INTERPOSITIONAL BYPASS GRAFT performed by [facility]la [facility]yes [facility] Right early 's    long ago    CATARACT REMOVAL WITH IMPLANT Bilateral      SECTION  1972    COLONOSCOPY  2012    Dr Shah Mediate:  HP    CORONARY ANGIOPLASTY WITH STENT PLACEMENT  08/10/2021    RM- RCA    CORONARY ANGIOPLASTY WITH STENT PLACEMENT      CORONARY ARTERY BYPASS GRAFT  2016    DIAGNOSTIC CARDIAC CATH LAB PROCEDURE      DILATION AND CURETTAGE OF UTERUS      ILIO-FEMORAL BYPASS GRAFT N/A 2016    OPEN TRANSLUMINAL BALLOON ANGIOPLASTY AND STENTING OF RIGHT COMMON AND EXTERNAL  ILIAC ARTERIES; RIGHT FEMORAL ENDARTERECTOMY WITH VEIN PATCH ANGIOPLASTY performed by Karla Reyes MD at [facility] N/A 2016    MITRAL VALVE  REPLACEMENT Social History     Tobacco Use    Smoking status: Former Smoker     Years: 2.00     Types: Cigarettes     Quit date: 1980     Years since quittin.7    Smokeless tobacco: Never Used   Substance Use Topics    Alcohol use: Yes     Comment: rarely maybe twice a year      Current Outpatient Medications   Medication Sig Dispense Refill    clopidogrel (PLAVIX) 75 MG tablet Take 1 tablet by mouth daily 90 tablet 5    furosemide (LASIX) 40 MG tablet TAKE 1 TABLET BY MOUTH DAILY  90 tablet 1    isosorbide mononitrate (IMDUR) 30 MG extended release tablet Take 30 mg by mouth daily      amLODIPine (NORVASC) 5 MG tablet Take 1 tablet by mouth 2 times daily 180 tablet 3    metoprolol (LOPRESSOR) 100 MG tablet Take 0.5 tablets by mouth 2 times daily 50 mg in the AM & 50 mg in the  tablet 3    albuterol (ACCUNEB) 1.25 MG/3ML nebulizer solution Inhale 1 ampule into the lungs every 6 hours as needed       budesonide (PULMICORT) 0.5 MG/2ML nebulizer suspension Inhale 0.5 mg into the lungs Daily      ipratropium-albuterol (DUONEB) 0.5-2.5 (3) MG/3ML SOLN nebulizer solution Inhale 3 mLs into the lungs every 4 hours      Probiotic Product (PROBIOTIC DAILY PO) Take 1 tablet by mouth daily      nitroGLYCERIN (NITROSTAT) 0.4 MG SL tablet Place 1 tablet under the tongue every 5 minutes as needed for Chest pain 25 tablet 3    levothyroxine (SYNTHROID) 25 MCG tablet Take 25 mcg by mouth Daily       Fexofenadine HCl (MUCINEX ALLERGY PO) Take 1 tablet by mouth 2 times daily       Arformoterol Tartrate (BROVANA) 15 MCG/2ML NEBU Inhale 15 mcg into the lungs 2 times daily       OXYGEN Inhale 2 L into the lungs daily       aspirin EC 81 MG EC tablet Take 1 tablet by mouth daily (Patient taking differently: Take 81 mg by mouth nightly PT TAKES AT NIGHT.) 30 tablet 3    atorvastatin (LIPITOR) 40 MG tablet Take 1 tablet by mouth daily (Patient taking differently: Take 40 mg by mouth nightly PT TAKES AT NIGHT.) 90 tablet 3    calcium-cholecalciferol (CALCIUM 500+D) 500-200 MG-UNIT per tablet Take 1 tablet by mouth 2 times daily       Biotin 5000 MCG TABS Take 1 tablet by mouth daily        No current facility-administered medications for this visit. Allergies: Levaquin [levofloxacin in d5w], Xarelto [rivaroxaban], and Morphine    Review of Systems  Constitutional  no significant activity change, appetite change, or unexpected weight change. No fever, chills or diaphoresis. No fatigue. HEENT  no significant rhinorrhea or epistaxis. No tinnitus or significant hearing loss. Eyes  no sudden vision change or amaurosis. Respiratory  no significant wheezing, stridor, apnea or cough. + dyspnea on exertion + shortness of breath. + Wearing oxygen  Cardiovascular  no exertional chest pain, orthopnea or PND. No sensation of arrhythmia or slow heart rate. No claudication or leg edema. Gastrointestinal  no abdominal swelling or pain. No blood in stool. No severe constipation, diarrhea, nausea, or vomiting. Genitourinary  no difficulty urinating, dysuria, frequency, or urgency. No flank pain or hematuria. Musculoskeletal  no back pain, gait disturbance, or myalgia. Skin  no color change or rash. No pallor. No new surgical incision. Neurologic  no speech difficulty, facial asymmetry or lateralizing weakness. No seizures, presyncope, syncope, or significant dizziness. Hematologic  no easy bruising or excessive bleeding. Psychiatric  no severe anxiety or insomnia. No confusion. All other review of systems are negative. Objective  Vital Signs - BP (!) 144/62   Pulse 76   Ht 5' 3\" (1.6 m)   Wt 82 lb (37.2 kg)   BMI 14.53 kg/m²   General - Nilsa Elizabeth is alert, cooperative, and pleasant. Well groomed. No acute distress. Body habitus is frail. HEENT  The head is normocephalic. No circumoral cyanosis.   Dentition is normal.   EYES -  No Xanthelasma, no arcus senilis, no conjunctival utilizing FLASH (4.5/8 mm) ostial   balloon system. Successful endovascular intervention to left renal artery (5.0 x 12 mm   resolute [place] taken to 24 saud) utilizing drug-eluting stent. Recommendations    Medical management. Plavix uninterrupted for minimum 6 months. Close monitoring for changes in blood pressure and symptoms going forward. Medical management close DAPT with aspirin Plavix. Will need to continue without cessation for the minimum of 6 months  On CCB, long-acting nitrate    Patient has been followed for valvular heart disease as well. Last echo was 3/26/2021  Bioprosthetic mitral valve replacement with normal function. No   significant mitral regurgitation. mild MS with Mean gradient 6 mmHg. The aortic valve is trileaflet, moderately thickened & calcified with   severely reduced leaflet separation. Severe aortic stenosis; no evidence of aortic regurgitation. The aortic   valve area is . 9 cm2 with a maximum gradient of 22 mmHg and a mean   gradient of 12 mmHg. low flow low gradient severe AS with preserved EF and low Stroke volume   Index (less than 35 ml/m2)   Tricuspid valve is structurally normal.   Moderate (2+) tricuspid regurgitation with estimated RVSP of 53 mm Hg. The pulmonic valve was not well visualized . Mildly dilated left atrium. Normal left ventricular size with preserved LV function and an estimated   ejection fraction of approximately 55-60%. No evidence of left ventricular   mass or thrombus noted. Mild concentric left ventricular hypertrophy. Diastolic dysfunction is present. Mildly enlarged right atrium size. Mildly dilated right ventricle. Aortic root is within normal limits. No evidence of significant pericardial effusion is noted. No evidence of pleural effusion. Overall patient is improved activity tolerance and blood pressures been well controlled at home. We will keep her off her Diovan for now.   We will continue the beta-blocker and calcium channel blocker. If blood pressure drops below 100 with symptoms or consistently staying above 140 will reassess status    We will keep follow-up plan for January. Plan on repeating echo this spring    Has follow-up with pulmonologist tomorrow. Still wearing oxygen will further discuss need with Dr. Romero Hence   States taking medications as prescribed  Stable cardiovascular status. No evidence of overt heart failure, angina or dysrhythmia. 30 minutes were spent preparing, reviewing and seeing patient. All questions answered    Plan  Monitor BP - if getting < 100 or staying > 140 then let us know  Follow up in Jan   Repeat echo after appointment  Call with any questions or concerns  Follow up with Sunshine Newman MD for non cardiac problems  Follow-up with pulmonology as planned  Report any new problems  Cardiovascular Fitness-Exercise as tolerated. Cardiac / Healthy Diet  Continue current medications as directed  Continue plan of treatment  It is always recommended that you bring your medications bottles with you to each visit - this is for your safety! BEBO Whatley    EMR dragon/transcription disclaimer: Much of this encounter note is electronic transcription/translation of spoken language to printed tach. Electronic translation of spoken language may be erroneous, or at times, nonsensical words or phrases may be inadvertently transcribed.  Although, I have reviewed the note for such errors, some may still exist.

## 2021-09-09 PROBLEM — R91.1 LUNG NODULE: Status: ACTIVE | Noted: 2020-03-03

## 2021-09-09 NOTE — PATIENT INSTRUCTIONS
I did advise the patient and her daughter that she could discontinue her budesonide in her neb treatments.  Also if her resting O2 sats are in the low 90s she need not wear her oxygen on a continuous basis.  She will continue her current regimen otherwise.  I will see her back in 6 months.

## 2021-09-09 NOTE — ASSESSMENT & PLAN NOTE
Again I did tell her if at rest her O2 sats are running 89 to 90% on room air she need not wear her oxygen continuously.  She will continue with exertion and while asleep.

## 2021-09-09 NOTE — ASSESSMENT & PLAN NOTE
The nodule seen on previous scan at [facility] appeared stable compared to previous scans.  Based on her remote and very minimal smoking history, she would not be a candidate for any routine follow-up scans.

## 2021-09-09 NOTE — PROGRESS NOTES
Chief Complaint  stage 3 severe copd and Lung Nodule    Subjective    History of Present Illness {CC  Problem List  Visit Diagnosis   Encounters  Notes  Medications  Labs  Result Review Imaging  Media     Steffi Michelle presents to [facility] for COPD with chronic respiratory failure and associated shortness of breath.    History of Present Illness   The patient had a heart cath recently in particular on August 10 at [facility] and had a stent placed and also had renal angioplasty.  She is doing better in terms of her respiratory issues since having a hard cath procedure performed.  She does have COPD.  She does wear oxygen routinely at night and inquired if she need to wear it continuously during the day.  I told her if at rest her sats are running 89 to 90% or above she do not wear oxygen on a continuous basis.  When she is out she generally has her oxygen turned up to 5 L/min and I told that was fine to do so when she was exerting herself but again if at rest her sats on room air are 89 to 90% or above then she need not wear oxygen continuously.  She has history of a small lung nodule but it appeared stable compared to previous scans.  We will just plan on a follow-up visit in several months.  We might want to consider PFTs in the future but I am going to hold off on these for now.  She also is on Brovana and albuterol neb treatments and has been placed on Pulmicort more recently as well wonders about stopping it.  I told her in the absence of exacerbations of her COPD or significant problems with wheezing she could discontinue that but could always resume it again if need be.  She is accompanied today by her daughter.  She has had the COVID-19 vaccine in the form of a Moderna vaccine.  Prior to Admission medications    Medication Sig Start Date End Date Taking? Authorizing Provider   amLODIPine (NORVASC) 5 MG tablet  1/29/21  Yes Provider,  MD Carlos A   arformoterol (Brovana) 15 MCG/2ML nebulizer solution Take 2 mL by nebulization 2 (Two) Times a Day. 3/5/21  Yes Keyon Syed MD   aspirin 81 MG EC tablet Take 81 mg by mouth.   Yes Carlos A Garcia MD   atorvastatin (LIPITOR) 40 MG tablet Take 40 mg by mouth.   Yes Carlos A Garcia MD   Biotin 5000 MCG tablet Take  by mouth.   Yes ProviderCarlos A MD   budesonide (PULMICORT) 0.5 MG/2ML nebulizer solution Take 2 mL by nebulization Daily. 2/2/21  Yes Kwabena Raymond APRN   Calcium Carbonate-Vitamin D (CALCIUM 500/D PO) Take  by mouth.   Yes Carlos A Garcia MD   clopidogrel (PLAVIX) 75 MG tablet  11/20/19  Yes ProviderCarlos A MD   Fexofenadine HCl (MUCINEX ALLERGY PO) Take  by mouth.   Yes Carlos A Garcia MD   formoterol (PERFOROMIST) 20 MCG/2ML nebulizer solution Take 2 mL by nebulization 2 (Two) Times a Day. 3/5/21  Yes Keyon Syed MD   furosemide (LASIX) 40 MG tablet  11/20/19  Yes Carlos A Garcia MD   ipratropium-albuterol (DUO-NEB) 0.5-2.5 mg/3 ml nebulizer Take 3 mL by nebulization Every 4 (Four) Hours. 2/2/21  Yes Kwabena Raymond APRN   isosorbide mononitrate (IMDUR) 30 MG 24 hr tablet  12/30/19  Yes Carlos A Garcia MD   levothyroxine (SYNTHROID, LEVOTHROID) 25 MCG tablet  11/7/19  Yes Carlos A Garcia MD   metoprolol tartrate (LOPRESSOR) 100 MG tablet Take 50 mg by mouth 2 (Two) Times a Day. 6/20/18  Yes Carlos A Garcia MD   nitroglycerin (NITROSTAT) 0.4 MG SL tablet  11/20/19  Yes Carlos A Garcia MD   O2 (OXYGEN) Inhale 1.5 L/min Every Night.   Yes Carlos A Garcia MD   albuterol (PROVENTIL) (2.5 MG/3ML) 0.083% nebulizer solution Take 2.5 mg by nebulization Every 4 (Four) Hours As Needed for Wheezing. 5/3/21   Kwabena Raymond APRN   Calcium Carbonate-Vitamin D (calcium-vitamin D) 500-200 MG-UNIT tablet per tablet Take 1 tablet by mouth.    Provider, MD Carlos A       Social  "History     Socioeconomic History   • Marital status:      Spouse name: Not on file   • Number of children: Not on file   • Years of education: Not on file   • Highest education level: Not on file   Tobacco Use   • Smoking status: Former Smoker     Packs/day: 0.25     Years: 10.00     Pack years: 2.50     Types: Cigarettes     Quit date:      Years since quittin.7   • Smokeless tobacco: Never Used   Substance and Sexual Activity   • Alcohol use: No   • Drug use: No   • Sexual activity: Defer       Objective   Vital Signs:   /80   Pulse 80   Ht 162.6 cm (64\")   Wt 37.6 kg (83 lb)   SpO2 99% Comment: 5lt pulse  BMI 14.25 kg/m²     Physical Exam  Vitals and nursing note reviewed.   Constitutional:       Comments: She is a very thin white female.   HENT:      Head: Normocephalic.      Comments: She is wearing a mask.  Nasal oxygen is in place.  Eyes:      Extraocular Movements: Extraocular movements intact.      Pupils: Pupils are equal, round, and reactive to light.   Cardiovascular:      Rate and Rhythm: Normal rate and regular rhythm.      Heart sounds: Murmur heard.        Comments: She does have a systolic murmur on the left sternal border.  Pulmonary:      Effort: Pulmonary effort is normal.      Comments: Breath sounds are diminished.  Musculoskeletal:         General: Normal range of motion.   Skin:     General: Skin is warm and dry.   Neurological:      General: No focal deficit present.      Mental Status: She is alert and oriented to person, place, and time.   Psychiatric:         Mood and Affect: Mood normal.         Behavior: Behavior normal.        Result Review :{ Labs  Result Review  Imaging  Med Tab  Media :    PFT Values        Some values may be hidden. Unless noted otherwise, only the newest values recorded on each date are displayed.         Old Values PFT Results 3/3/20   No data to display.      Pre Drug PFT Results 3/3/20   FVC 60.62   FEV1 43.26   FEF 25-75% 27.11 "   FEV1/FVC 54      Post Drug PFT Results 3/3/20   No data to display.      Other Tests PFT Results 3/3/20   No data to display.               Results for orders placed in visit on 03/03/20    Pulmonary Function Test    Narrative  Pulmonary Function Test  Performed by: Keyon Syed MD  Authorized by: Keyon Syed MD    Pre Drug  FVC: 60.62%  FEV1: 43.26%  FEF 25-75%: 27.11%  FEV1/FVC: 54%      Results for orders placed in visit on 05/01/19    Full Pulmonary Function Test Without Bronchodilator                 My interpretation of imaging:    I did review her previous CT report from [facility] from 2019.  My interpretation of labs:   COMPREHENSIVE METABOLIC PANEL (08/10/2021 09:37 EDT)      Assessment and Plan {CC Problem List  Visit Diagnosis  ROS  Review (Popup)  [facility] Maintenance  Quality  BestPractice  Medications  SmartSets  SnapShot Encounters  Media      Diagnoses and all orders for this visit:    1. Chronic respiratory failure with hypoxia (CMS/HCC) (Primary)  Assessment & Plan:  Again I did tell her if at rest her O2 sats are running 89 to 90% on room air she need not wear her oxygen continuously.  She will continue with exertion and while asleep.      2. Stage 3 severe COPD by GOLD classification (CMS/HCC)  Assessment & Plan:  She may continue her Brovana and albuterol neb treatments but may discontinue her budesonide.          3. Lung nodule  Assessment & Plan:  The nodule seen on previous scan at [facility] appeared stable compared to previous scans.  Based on her remote and very minimal smoking history, she would not be a candidate for any routine follow-up scans.      4. Underweight  Assessment & Plan:  She will follow up with her primary care physician regarding her low BMI.        Patient's Body mass index is 14.25 kg/m². indicating that she is underweight (BMI < 18.5). Recommendations include: referral to primary care.        Keyon Syed  MD  9/9/2021  15:56 CDT    Follow Up {Instructions Charge Capture  Follow-up Communications   Return in about 6 months (around 3/9/2022).    Patient was given instructions and counseling regarding her condition or for health maintenance advice. Please see specific information pulled into the AVS if appropriate.

## 2022-01-01 ENCOUNTER — CARE COORDINATION (OUTPATIENT)
Dept: CASE MANAGEMENT | Age: 77
End: 2022-01-01

## 2022-01-01 ENCOUNTER — TELEPHONE (OUTPATIENT)
Dept: CARDIOLOGY CLINIC | Age: 77
End: 2022-01-01

## 2022-01-01 ENCOUNTER — OFFICE VISIT (OUTPATIENT)
Dept: PULMONOLOGY | Facility: CLINIC | Age: 77
End: 2022-01-01

## 2022-01-01 ENCOUNTER — TELEPHONE (OUTPATIENT)
Dept: PULMONOLOGY | Facility: CLINIC | Age: 77
End: 2022-01-01

## 2022-01-01 ENCOUNTER — HOSPITAL ENCOUNTER (INPATIENT)
Age: 77
LOS: 8 days | Discharge: HOME HEALTH | DRG: 190 | End: 2022-04-22
Attending: EMERGENCY MEDICINE
Payer: MEDICARE

## 2022-01-01 ENCOUNTER — APPOINTMENT (OUTPATIENT)
Dept: GENERAL RADIOLOGY | Age: 77
DRG: 811 | End: 2022-01-01
Payer: MEDICARE

## 2022-01-01 ENCOUNTER — TELEPHONE (OUTPATIENT)
Dept: HEMATOLOGY | Age: 77
End: 2022-01-01

## 2022-01-01 ENCOUNTER — HOSPITAL ENCOUNTER (OUTPATIENT)
Dept: NON INVASIVE DIAGNOSTICS | Age: 77
Discharge: HOME | End: 2022-02-14
Payer: MEDICARE

## 2022-01-01 ENCOUNTER — APPOINTMENT (OUTPATIENT)
Dept: GENERAL RADIOLOGY | Age: 77
DRG: 190 | End: 2022-01-01
Payer: MEDICARE

## 2022-01-01 ENCOUNTER — APPOINTMENT (OUTPATIENT)
Dept: NUCLEAR MEDICINE | Age: 77
DRG: 190 | End: 2022-01-01
Payer: MEDICARE

## 2022-01-01 ENCOUNTER — APPOINTMENT (OUTPATIENT)
Dept: GENERAL RADIOLOGY | Age: 77
DRG: 291 | End: 2022-01-01
Payer: MEDICARE

## 2022-01-01 ENCOUNTER — OFFICE VISIT (OUTPATIENT)
Dept: CARDIOLOGY CLINIC | Age: 77
End: 2022-01-01
Payer: MEDICARE

## 2022-01-01 ENCOUNTER — TELEPHONE (OUTPATIENT)
Dept: PALLATIVE CARE | Age: 77
End: 2022-01-01

## 2022-01-01 ENCOUNTER — HOSPITAL ENCOUNTER (INPATIENT)
Age: 77
LOS: 10 days | Discharge: HOME HEALTH | DRG: 811 | End: 2022-06-14
Attending: EMERGENCY MEDICINE | Admitting: STUDENT IN AN ORGANIZED HEALTH CARE EDUCATION/TRAINING PROGRAM
Payer: MEDICARE

## 2022-01-01 ENCOUNTER — APPOINTMENT (OUTPATIENT)
Dept: CT IMAGING | Age: 77
DRG: 811 | End: 2022-01-01
Payer: MEDICARE

## 2022-01-01 ENCOUNTER — HOSPITAL ENCOUNTER (INPATIENT)
Age: 77
LOS: 5 days | Discharge: HOSPICE | DRG: 291 | End: 2022-06-25
Attending: EMERGENCY MEDICINE | Admitting: HOSPITALIST
Payer: MEDICARE

## 2022-01-01 ENCOUNTER — LAB (OUTPATIENT)
Dept: LAB | Facility: HOSPITAL | Age: 77
End: 2022-01-01

## 2022-01-01 ENCOUNTER — TELEPHONE (OUTPATIENT)
Dept: CARDIOTHORACIC SURGERY | Age: 77
End: 2022-01-01

## 2022-01-01 VITALS
OXYGEN SATURATION: 94 % | BODY MASS INDEX: 15.03 KG/M2 | SYSTOLIC BLOOD PRESSURE: 122 MMHG | WEIGHT: 88 LBS | DIASTOLIC BLOOD PRESSURE: 78 MMHG | HEIGHT: 64 IN | HEART RATE: 58 BPM

## 2022-01-01 VITALS
DIASTOLIC BLOOD PRESSURE: 58 MMHG | HEIGHT: 63 IN | OXYGEN SATURATION: 92 % | RESPIRATION RATE: 18 BRPM | HEART RATE: 98 BPM | WEIGHT: 78 LBS | BODY MASS INDEX: 13.82 KG/M2 | SYSTOLIC BLOOD PRESSURE: 143 MMHG | TEMPERATURE: 98.4 F

## 2022-01-01 VITALS
OXYGEN SATURATION: 97 % | WEIGHT: 80 LBS | HEART RATE: 74 BPM | BODY MASS INDEX: 13.66 KG/M2 | SYSTOLIC BLOOD PRESSURE: 118 MMHG | HEIGHT: 64 IN | DIASTOLIC BLOOD PRESSURE: 72 MMHG

## 2022-01-01 VITALS
BODY MASS INDEX: 19.19 KG/M2 | HEART RATE: 84 BPM | WEIGHT: 108.3 LBS | TEMPERATURE: 98.1 F | HEIGHT: 63 IN | DIASTOLIC BLOOD PRESSURE: 52 MMHG | RESPIRATION RATE: 18 BRPM | SYSTOLIC BLOOD PRESSURE: 117 MMHG | OXYGEN SATURATION: 94 %

## 2022-01-01 VITALS
HEIGHT: 64 IN | DIASTOLIC BLOOD PRESSURE: 60 MMHG | WEIGHT: 79 LBS | BODY MASS INDEX: 13.49 KG/M2 | OXYGEN SATURATION: 95 % | HEART RATE: 88 BPM | SYSTOLIC BLOOD PRESSURE: 134 MMHG

## 2022-01-01 VITALS
DIASTOLIC BLOOD PRESSURE: 63 MMHG | RESPIRATION RATE: 16 BRPM | HEIGHT: 63 IN | TEMPERATURE: 97.9 F | HEART RATE: 78 BPM | BODY MASS INDEX: 17.63 KG/M2 | OXYGEN SATURATION: 97 % | WEIGHT: 99.5 LBS | SYSTOLIC BLOOD PRESSURE: 118 MMHG

## 2022-01-01 VITALS
SYSTOLIC BLOOD PRESSURE: 116 MMHG | OXYGEN SATURATION: 93 % | DIASTOLIC BLOOD PRESSURE: 76 MMHG | WEIGHT: 89 LBS | HEIGHT: 64 IN | HEART RATE: 88 BPM | BODY MASS INDEX: 15.19 KG/M2

## 2022-01-01 VITALS
HEART RATE: 91 BPM | DIASTOLIC BLOOD PRESSURE: 78 MMHG | SYSTOLIC BLOOD PRESSURE: 124 MMHG | OXYGEN SATURATION: 89 % | HEIGHT: 64 IN | WEIGHT: 82 LBS | BODY MASS INDEX: 14 KG/M2

## 2022-01-01 DIAGNOSIS — I50.33 ACUTE ON CHRONIC DIASTOLIC HEART FAILURE (HCC): ICD-10-CM

## 2022-01-01 DIAGNOSIS — R63.6 UNDERWEIGHT: ICD-10-CM

## 2022-01-01 DIAGNOSIS — J44.1 COPD EXACERBATION (HCC): ICD-10-CM

## 2022-01-01 DIAGNOSIS — J96.12 CHRONIC RESPIRATORY FAILURE WITH HYPOXIA AND HYPERCAPNIA: ICD-10-CM

## 2022-01-01 DIAGNOSIS — J96.11 CHRONIC RESPIRATORY FAILURE WITH HYPOXIA AND HYPERCAPNIA: Primary | ICD-10-CM

## 2022-01-01 DIAGNOSIS — J96.12 CHRONIC RESPIRATORY FAILURE WITH HYPOXIA AND HYPERCAPNIA: Primary | ICD-10-CM

## 2022-01-01 DIAGNOSIS — I50.33 ACUTE ON CHRONIC DIASTOLIC CHF (CONGESTIVE HEART FAILURE) (HCC): ICD-10-CM

## 2022-01-01 DIAGNOSIS — J44.9 STAGE 3 SEVERE COPD BY GOLD CLASSIFICATION: ICD-10-CM

## 2022-01-01 DIAGNOSIS — R06.89 DYSPNEA AND RESPIRATORY ABNORMALITIES: ICD-10-CM

## 2022-01-01 DIAGNOSIS — I48.0 PAF (PAROXYSMAL ATRIAL FIBRILLATION) (HCC): ICD-10-CM

## 2022-01-01 DIAGNOSIS — J96.21 ACUTE ON CHRONIC RESPIRATORY FAILURE WITH HYPOXIA (HCC): ICD-10-CM

## 2022-01-01 DIAGNOSIS — I5A MYOCARDIAL INJURY: ICD-10-CM

## 2022-01-01 DIAGNOSIS — Z87.891 FORMER SMOKER: ICD-10-CM

## 2022-01-01 DIAGNOSIS — Z86.79 STATUS POST MAZE OPERATION FOR ATRIAL FIBRILLATION: ICD-10-CM

## 2022-01-01 DIAGNOSIS — D72.829 LEUKOCYTOSIS, UNSPECIFIED TYPE: ICD-10-CM

## 2022-01-01 DIAGNOSIS — Z95.2 H/O PROSTHETIC HEART VALVE: ICD-10-CM

## 2022-01-01 DIAGNOSIS — R06.00 DYSPNEA AND RESPIRATORY ABNORMALITIES: ICD-10-CM

## 2022-01-01 DIAGNOSIS — Z95.1 S/P CORONARY ARTERY BYPASS GRAFT X 1: ICD-10-CM

## 2022-01-01 DIAGNOSIS — J96.21 ACUTE AND CHRONIC RESPIRATORY FAILURE WITH HYPOXIA (HCC): Primary | ICD-10-CM

## 2022-01-01 DIAGNOSIS — J96.11 CHRONIC RESPIRATORY FAILURE WITH HYPOXIA: Primary | ICD-10-CM

## 2022-01-01 DIAGNOSIS — I27.20 PULMONARY HYPERTENSION: ICD-10-CM

## 2022-01-01 DIAGNOSIS — J96.11 CHRONIC RESPIRATORY FAILURE WITH HYPOXIA AND HYPERCAPNIA: ICD-10-CM

## 2022-01-01 DIAGNOSIS — J40 BRONCHITIS: Primary | ICD-10-CM

## 2022-01-01 DIAGNOSIS — J44.9 STAGE 3 SEVERE COPD BY GOLD CLASSIFICATION: Primary | ICD-10-CM

## 2022-01-01 DIAGNOSIS — I27.20 PULMONARY HYPERTENSION (HCC): ICD-10-CM

## 2022-01-01 DIAGNOSIS — J18.9 PNEUMONIA OF RIGHT LUNG DUE TO INFECTIOUS ORGANISM, UNSPECIFIED PART OF LUNG: Primary | ICD-10-CM

## 2022-01-01 DIAGNOSIS — D64.9 ACUTE ON CHRONIC ANEMIA: Primary | ICD-10-CM

## 2022-01-01 DIAGNOSIS — Z95.5 HISTORY OF CORONARY ARTERY STENT PLACEMENT: ICD-10-CM

## 2022-01-01 DIAGNOSIS — Z98.890 STATUS POST MAZE OPERATION FOR ATRIAL FIBRILLATION: ICD-10-CM

## 2022-01-01 DIAGNOSIS — Z20.822 ENCOUNTER FOR PREOPERATIVE SCREENING LABORATORY TESTING FOR COVID-19 VIRUS: ICD-10-CM

## 2022-01-01 DIAGNOSIS — G89.4 CHRONIC PAIN SYNDROME: ICD-10-CM

## 2022-01-01 DIAGNOSIS — I35.0 AORTIC STENOSIS, SEVERE: ICD-10-CM

## 2022-01-01 DIAGNOSIS — I51.89 DIASTOLIC DYSFUNCTION: ICD-10-CM

## 2022-01-01 DIAGNOSIS — Z95.2 MITRAL VALVE REPLACED: ICD-10-CM

## 2022-01-01 DIAGNOSIS — Z23 NEED FOR PNEUMOCOCCAL VACCINE: ICD-10-CM

## 2022-01-01 DIAGNOSIS — E78.2 MIXED HYPERLIPIDEMIA: ICD-10-CM

## 2022-01-01 DIAGNOSIS — I35.9 AORTIC VALVE DISEASE: ICD-10-CM

## 2022-01-01 DIAGNOSIS — I70.1 RENAL ARTERY STENOSIS (HCC): ICD-10-CM

## 2022-01-01 DIAGNOSIS — J44.9 CHRONIC OBSTRUCTIVE PULMONARY DISEASE, UNSPECIFIED COPD TYPE (HCC): ICD-10-CM

## 2022-01-01 DIAGNOSIS — I48.0 PAF (PAROXYSMAL ATRIAL FIBRILLATION): ICD-10-CM

## 2022-01-01 DIAGNOSIS — I25.10 CORONARY ARTERY DISEASE INVOLVING NATIVE CORONARY ARTERY OF NATIVE HEART WITHOUT ANGINA PECTORIS: Primary | ICD-10-CM

## 2022-01-01 DIAGNOSIS — Z01.812 ENCOUNTER FOR PREOPERATIVE SCREENING LABORATORY TESTING FOR COVID-19 VIRUS: ICD-10-CM

## 2022-01-01 DIAGNOSIS — D63.8 ANEMIA OF CHRONIC DISEASE: ICD-10-CM

## 2022-01-01 DIAGNOSIS — I10 HYPERTENSION, UNSPECIFIED TYPE: ICD-10-CM

## 2022-01-01 DIAGNOSIS — R91.1 LUNG NODULE: ICD-10-CM

## 2022-01-01 DIAGNOSIS — R09.02 HYPOXIA: ICD-10-CM

## 2022-01-01 LAB
ABO/RH: NORMAL
ABO/RH: NORMAL
ALBUMIN SERPL-MCNC: 2.3 G/DL (ref 3.5–5.2)
ALBUMIN SERPL-MCNC: 2.3 G/DL (ref 3.5–5.2)
ALBUMIN SERPL-MCNC: 2.7 G/DL (ref 3.5–5.2)
ALBUMIN SERPL-MCNC: 3.2 G/DL (ref 3.5–5.2)
ALBUMIN SERPL-MCNC: 3.4 G/DL (ref 3.5–5.2)
ALLENS TEST: ABNORMAL
ALLENS TEST: ABNORMAL
ALP BLD-CCNC: 171 U/L (ref 35–104)
ALP BLD-CCNC: 49 U/L (ref 35–104)
ALP BLD-CCNC: 56 U/L (ref 35–104)
ALP BLD-CCNC: 73 U/L (ref 35–104)
ALT SERPL-CCNC: 16 U/L (ref 5–33)
ALT SERPL-CCNC: 17 U/L (ref 5–33)
ALT SERPL-CCNC: 19 U/L (ref 5–33)
ALT SERPL-CCNC: 31 U/L (ref 5–33)
ANION GAP SERPL CALCULATED.3IONS-SCNC: 10 MMOL/L (ref 7–19)
ANION GAP SERPL CALCULATED.3IONS-SCNC: 11 MMOL/L (ref 7–19)
ANION GAP SERPL CALCULATED.3IONS-SCNC: 12 MMOL/L (ref 7–19)
ANION GAP SERPL CALCULATED.3IONS-SCNC: 14 MMOL/L (ref 7–19)
ANION GAP SERPL CALCULATED.3IONS-SCNC: 14 MMOL/L (ref 7–19)
ANION GAP SERPL CALCULATED.3IONS-SCNC: 15 MMOL/L (ref 7–19)
ANION GAP SERPL CALCULATED.3IONS-SCNC: 6 MMOL/L (ref 7–19)
ANION GAP SERPL CALCULATED.3IONS-SCNC: 6 MMOL/L (ref 7–19)
ANION GAP SERPL CALCULATED.3IONS-SCNC: 7 MMOL/L (ref 7–19)
ANION GAP SERPL CALCULATED.3IONS-SCNC: 8 MMOL/L (ref 7–19)
ANION GAP SERPL CALCULATED.3IONS-SCNC: 9 MMOL/L (ref 7–19)
ANISOCYTOSIS: ABNORMAL
ANTIBODY SCREEN: NORMAL
ANTIBODY SCREEN: NORMAL
APTT: 34.3 SEC (ref 26–36.2)
AST SERPL-CCNC: 20 U/L (ref 5–32)
AST SERPL-CCNC: 25 U/L (ref 5–32)
AST SERPL-CCNC: 26 U/L (ref 5–32)
AST SERPL-CCNC: 33 U/L (ref 5–32)
BACTERIA: NEGATIVE /HPF
BASE EXCESS ARTERIAL: 21.2 MMOL/L (ref -2–2)
BASE EXCESS ARTERIAL: 7.7 MMOL/L (ref -2–2)
BASE EXCESS VENOUS: 9 MMOL/L
BASOPHILIC STIPPLING: ABNORMAL
BASOPHILS ABSOLUTE: 0 K/UL (ref 0–0.2)
BASOPHILS ABSOLUTE: 0.1 K/UL (ref 0–0.2)
BASOPHILS RELATIVE PERCENT: 0 % (ref 0–1)
BASOPHILS RELATIVE PERCENT: 0.1 % (ref 0–1)
BASOPHILS RELATIVE PERCENT: 0.2 % (ref 0–1)
BASOPHILS RELATIVE PERCENT: 0.3 % (ref 0–1)
BILIRUB SERPL-MCNC: 0.3 MG/DL (ref 0.2–1.2)
BILIRUB SERPL-MCNC: <0.2 MG/DL (ref 0.2–1.2)
BILIRUBIN URINE: NEGATIVE
BLOOD BANK DISPENSE STATUS: NORMAL
BLOOD BANK PRODUCT CODE: NORMAL
BLOOD CULTURE, ROUTINE: NORMAL
BLOOD CULTURE, ROUTINE: NORMAL
BLOOD, URINE: NEGATIVE
BPU ID: NORMAL
BUN BLDV-MCNC: 29 MG/DL (ref 8–23)
BUN BLDV-MCNC: 29 MG/DL (ref 8–23)
BUN BLDV-MCNC: 30 MG/DL (ref 8–23)
BUN BLDV-MCNC: 30 MG/DL (ref 8–23)
BUN BLDV-MCNC: 31 MG/DL (ref 8–23)
BUN BLDV-MCNC: 32 MG/DL (ref 8–23)
BUN BLDV-MCNC: 33 MG/DL (ref 8–23)
BUN BLDV-MCNC: 34 MG/DL (ref 8–23)
BUN BLDV-MCNC: 37 MG/DL (ref 8–23)
BUN BLDV-MCNC: 38 MG/DL (ref 8–23)
BUN BLDV-MCNC: 39 MG/DL (ref 8–23)
BUN BLDV-MCNC: 40 MG/DL (ref 8–23)
BUN BLDV-MCNC: 40 MG/DL (ref 8–23)
BUN BLDV-MCNC: 41 MG/DL (ref 8–23)
BUN BLDV-MCNC: 42 MG/DL (ref 8–23)
BUN BLDV-MCNC: 42 MG/DL (ref 8–23)
BUN BLDV-MCNC: 46 MG/DL (ref 8–23)
BUN BLDV-MCNC: 47 MG/DL (ref 8–23)
BUN BLDV-MCNC: 47 MG/DL (ref 8–23)
BUN BLDV-MCNC: 49 MG/DL (ref 8–23)
BUN BLDV-MCNC: 49 MG/DL (ref 8–23)
BUN BLDV-MCNC: 50 MG/DL (ref 8–23)
BUN BLDV-MCNC: 54 MG/DL (ref 8–23)
BUN BLDV-MCNC: 56 MG/DL (ref 8–23)
BUN BLDV-MCNC: 60 MG/DL (ref 8–23)
BUN BLDV-MCNC: 60 MG/DL (ref 8–23)
C-REACTIVE PROTEIN: 2.86 MG/DL (ref 0–0.5)
CALCIUM SERPL-MCNC: 10.2 MG/DL (ref 8.8–10.2)
CALCIUM SERPL-MCNC: 12.1 MG/DL (ref 8.8–10.2)
CALCIUM SERPL-MCNC: 7.5 MG/DL (ref 8.8–10.2)
CALCIUM SERPL-MCNC: 7.8 MG/DL (ref 8.8–10.2)
CALCIUM SERPL-MCNC: 7.9 MG/DL (ref 8.8–10.2)
CALCIUM SERPL-MCNC: 8 MG/DL (ref 8.8–10.2)
CALCIUM SERPL-MCNC: 8.1 MG/DL (ref 8.8–10.2)
CALCIUM SERPL-MCNC: 8.2 MG/DL (ref 8.8–10.2)
CALCIUM SERPL-MCNC: 8.2 MG/DL (ref 8.8–10.2)
CALCIUM SERPL-MCNC: 8.3 MG/DL (ref 8.8–10.2)
CALCIUM SERPL-MCNC: 8.3 MG/DL (ref 8.8–10.2)
CALCIUM SERPL-MCNC: 8.4 MG/DL (ref 8.8–10.2)
CALCIUM SERPL-MCNC: 8.7 MG/DL (ref 8.8–10.2)
CALCIUM SERPL-MCNC: 8.7 MG/DL (ref 8.8–10.2)
CALCIUM SERPL-MCNC: 9 MG/DL (ref 8.8–10.2)
CALCIUM SERPL-MCNC: 9.1 MG/DL (ref 8.8–10.2)
CALCIUM SERPL-MCNC: 9.4 MG/DL (ref 8.8–10.2)
CARBOXYHEMOGLOBIN ARTERIAL: 1.7 % (ref 0–5)
CARBOXYHEMOGLOBIN ARTERIAL: 4.1 % (ref 0–5)
CARBOXYHEMOGLOBIN: 2.8 %
CHLORIDE BLD-SCNC: 100 MMOL/L (ref 98–111)
CHLORIDE BLD-SCNC: 101 MMOL/L (ref 98–111)
CHLORIDE BLD-SCNC: 103 MMOL/L (ref 98–111)
CHLORIDE BLD-SCNC: 92 MMOL/L (ref 98–111)
CHLORIDE BLD-SCNC: 92 MMOL/L (ref 98–111)
CHLORIDE BLD-SCNC: 93 MMOL/L (ref 98–111)
CHLORIDE BLD-SCNC: 95 MMOL/L (ref 98–111)
CHLORIDE BLD-SCNC: 96 MMOL/L (ref 98–111)
CHLORIDE BLD-SCNC: 97 MMOL/L (ref 98–111)
CHLORIDE BLD-SCNC: 98 MMOL/L (ref 98–111)
CHLORIDE BLD-SCNC: 99 MMOL/L (ref 98–111)
CHLORIDE BLD-SCNC: 99 MMOL/L (ref 98–111)
CHOLESTEROL, TOTAL: 198 MG/DL (ref 160–199)
CLARITY: CLEAR
CO2: 25 MMOL/L (ref 22–29)
CO2: 25 MMOL/L (ref 22–29)
CO2: 26 MMOL/L (ref 22–29)
CO2: 27 MMOL/L (ref 22–29)
CO2: 28 MMOL/L (ref 22–29)
CO2: 29 MMOL/L (ref 22–29)
CO2: 29 MMOL/L (ref 22–29)
CO2: 30 MMOL/L (ref 22–29)
CO2: 30 MMOL/L (ref 22–29)
CO2: 31 MMOL/L (ref 22–29)
CO2: 32 MMOL/L (ref 22–29)
CO2: 32 MMOL/L (ref 22–29)
CO2: 33 MMOL/L (ref 22–29)
CO2: 34 MMOL/L (ref 22–29)
CO2: 34 MMOL/L (ref 22–29)
CO2: 35 MMOL/L (ref 22–29)
CO2: 42 MMOL/L (ref 22–29)
COLOR: YELLOW
CREAT SERPL-MCNC: 0.8 MG/DL (ref 0.5–0.9)
CREAT SERPL-MCNC: 0.9 MG/DL (ref 0.5–0.9)
CREAT SERPL-MCNC: 1 MG/DL (ref 0.5–0.9)
CREAT SERPL-MCNC: 1.1 MG/DL (ref 0.5–0.9)
CREAT SERPL-MCNC: 1.2 MG/DL (ref 0.5–0.9)
CREAT SERPL-MCNC: 1.2 MG/DL (ref 0.5–0.9)
CREAT SERPL-MCNC: 1.3 MG/DL (ref 0.5–0.9)
CREAT SERPL-MCNC: 1.4 MG/DL (ref 0.5–0.9)
CREAT SERPL-MCNC: 1.4 MG/DL (ref 0.5–0.9)
CRYSTALS, UA: ABNORMAL /HPF
CULTURE, BLOOD 2: NORMAL
CULTURE, BLOOD 2: NORMAL
D DIMER: 0.58 UG/ML FEU (ref 0–0.48)
DESCRIPTION BLOOD BANK: NORMAL
EKG P AXIS: -21 DEGREES
EKG P AXIS: 72 DEGREES
EKG P AXIS: 72 DEGREES
EKG P AXIS: 73 DEGREES
EKG P AXIS: 77 DEGREES
EKG P AXIS: 80 DEGREES
EKG P AXIS: 80 DEGREES
EKG P AXIS: 81 DEGREES
EKG P AXIS: 85 DEGREES
EKG P AXIS: 85 DEGREES
EKG P AXIS: 86 DEGREES
EKG P AXIS: 90 DEGREES
EKG P-R INTERVAL: 100 MS
EKG P-R INTERVAL: 100 MS
EKG P-R INTERVAL: 102 MS
EKG P-R INTERVAL: 120 MS
EKG P-R INTERVAL: 122 MS
EKG P-R INTERVAL: 122 MS
EKG P-R INTERVAL: 124 MS
EKG P-R INTERVAL: 126 MS
EKG P-R INTERVAL: 126 MS
EKG P-R INTERVAL: 154 MS
EKG Q-T INTERVAL: 348 MS
EKG Q-T INTERVAL: 352 MS
EKG Q-T INTERVAL: 366 MS
EKG Q-T INTERVAL: 370 MS
EKG Q-T INTERVAL: 372 MS
EKG Q-T INTERVAL: 372 MS
EKG Q-T INTERVAL: 378 MS
EKG Q-T INTERVAL: 384 MS
EKG Q-T INTERVAL: 386 MS
EKG Q-T INTERVAL: 400 MS
EKG QRS DURATION: 100 MS
EKG QRS DURATION: 100 MS
EKG QRS DURATION: 102 MS
EKG QRS DURATION: 118 MS
EKG QRS DURATION: 124 MS
EKG QRS DURATION: 94 MS
EKG QRS DURATION: 94 MS
EKG QRS DURATION: 96 MS
EKG QRS DURATION: 98 MS
EKG QTC CALCULATION (BAZETT): 412 MS
EKG QTC CALCULATION (BAZETT): 415 MS
EKG QTC CALCULATION (BAZETT): 418 MS
EKG QTC CALCULATION (BAZETT): 420 MS
EKG QTC CALCULATION (BAZETT): 420 MS
EKG QTC CALCULATION (BAZETT): 421 MS
EKG QTC CALCULATION (BAZETT): 422 MS
EKG QTC CALCULATION (BAZETT): 424 MS
EKG QTC CALCULATION (BAZETT): 427 MS
EKG QTC CALCULATION (BAZETT): 429 MS
EKG QTC CALCULATION (BAZETT): 433 MS
EKG QTC CALCULATION (BAZETT): 436 MS
EKG T AXIS: -21 DEGREES
EKG T AXIS: 63 DEGREES
EKG T AXIS: 67 DEGREES
EKG T AXIS: 68 DEGREES
EKG T AXIS: 68 DEGREES
EKG T AXIS: 70 DEGREES
EKG T AXIS: 70 DEGREES
EKG T AXIS: 71 DEGREES
EKG T AXIS: 73 DEGREES
EKG T AXIS: 75 DEGREES
EKG T AXIS: 82 DEGREES
EKG T AXIS: 85 DEGREES
EOSINOPHILS ABSOLUTE: 0 K/UL (ref 0–0.6)
EOSINOPHILS ABSOLUTE: 0.1 K/UL (ref 0–0.6)
EOSINOPHILS ABSOLUTE: 0.2 K/UL (ref 0–0.6)
EOSINOPHILS RELATIVE PERCENT: 0 % (ref 0–5)
EOSINOPHILS RELATIVE PERCENT: 0.1 % (ref 0–5)
EOSINOPHILS RELATIVE PERCENT: 0.5 % (ref 0–5)
EOSINOPHILS RELATIVE PERCENT: 0.7 % (ref 0–5)
EOSINOPHILS RELATIVE PERCENT: 1 % (ref 0–5)
EOSINOPHILS RELATIVE PERCENT: 1.2 % (ref 0–5)
EOSINOPHILS RELATIVE PERCENT: 1.3 % (ref 0–5)
EPITHELIAL CELLS, UA: 2 /HPF (ref 0–5)
FERRITIN: 108.9 NG/ML (ref 13–150)
FERRITIN: 110 NG/ML (ref 13–150)
FOLATE: >20 NG/ML (ref 4.8–37.3)
FOLATE: >20 NG/ML (ref 4.8–37.3)
GFR AFRICAN AMERICAN: 44
GFR AFRICAN AMERICAN: 44
GFR AFRICAN AMERICAN: 48
GFR AFRICAN AMERICAN: 53
GFR AFRICAN AMERICAN: 53
GFR AFRICAN AMERICAN: 58
GFR AFRICAN AMERICAN: >59
GFR NON-AFRICAN AMERICAN: 36
GFR NON-AFRICAN AMERICAN: 36
GFR NON-AFRICAN AMERICAN: 40
GFR NON-AFRICAN AMERICAN: 44
GFR NON-AFRICAN AMERICAN: 44
GFR NON-AFRICAN AMERICAN: 48
GFR NON-AFRICAN AMERICAN: 54
GFR NON-AFRICAN AMERICAN: >60
GLUCOSE BLD-MCNC: 101 MG/DL (ref 74–109)
GLUCOSE BLD-MCNC: 102 MG/DL (ref 74–109)
GLUCOSE BLD-MCNC: 102 MG/DL (ref 74–109)
GLUCOSE BLD-MCNC: 105 MG/DL (ref 70–99)
GLUCOSE BLD-MCNC: 105 MG/DL (ref 70–99)
GLUCOSE BLD-MCNC: 105 MG/DL (ref 74–109)
GLUCOSE BLD-MCNC: 106 MG/DL (ref 74–109)
GLUCOSE BLD-MCNC: 108 MG/DL (ref 70–99)
GLUCOSE BLD-MCNC: 108 MG/DL (ref 74–109)
GLUCOSE BLD-MCNC: 109 MG/DL (ref 74–109)
GLUCOSE BLD-MCNC: 113 MG/DL (ref 70–99)
GLUCOSE BLD-MCNC: 119 MG/DL (ref 74–109)
GLUCOSE BLD-MCNC: 121 MG/DL (ref 74–109)
GLUCOSE BLD-MCNC: 122 MG/DL (ref 70–99)
GLUCOSE BLD-MCNC: 125 MG/DL (ref 70–99)
GLUCOSE BLD-MCNC: 128 MG/DL (ref 74–109)
GLUCOSE BLD-MCNC: 129 MG/DL (ref 74–109)
GLUCOSE BLD-MCNC: 137 MG/DL (ref 74–109)
GLUCOSE BLD-MCNC: 138 MG/DL (ref 74–109)
GLUCOSE BLD-MCNC: 140 MG/DL (ref 70–99)
GLUCOSE BLD-MCNC: 142 MG/DL (ref 70–99)
GLUCOSE BLD-MCNC: 144 MG/DL (ref 74–109)
GLUCOSE BLD-MCNC: 145 MG/DL (ref 74–109)
GLUCOSE BLD-MCNC: 147 MG/DL (ref 70–99)
GLUCOSE BLD-MCNC: 147 MG/DL (ref 70–99)
GLUCOSE BLD-MCNC: 150 MG/DL (ref 74–109)
GLUCOSE BLD-MCNC: 154 MG/DL (ref 74–109)
GLUCOSE BLD-MCNC: 159 MG/DL (ref 74–109)
GLUCOSE BLD-MCNC: 165 MG/DL (ref 70–99)
GLUCOSE BLD-MCNC: 165 MG/DL (ref 74–109)
GLUCOSE BLD-MCNC: 178 MG/DL (ref 74–109)
GLUCOSE BLD-MCNC: 182 MG/DL (ref 70–99)
GLUCOSE BLD-MCNC: 184 MG/DL (ref 70–99)
GLUCOSE BLD-MCNC: 186 MG/DL (ref 70–99)
GLUCOSE BLD-MCNC: 197 MG/DL (ref 70–99)
GLUCOSE BLD-MCNC: 200 MG/DL (ref 70–99)
GLUCOSE BLD-MCNC: 212 MG/DL (ref 74–109)
GLUCOSE BLD-MCNC: 214 MG/DL (ref 70–99)
GLUCOSE BLD-MCNC: 214 MG/DL (ref 70–99)
GLUCOSE BLD-MCNC: 218 MG/DL (ref 70–99)
GLUCOSE BLD-MCNC: 220 MG/DL (ref 70–99)
GLUCOSE BLD-MCNC: 228 MG/DL (ref 70–99)
GLUCOSE BLD-MCNC: 238 MG/DL (ref 70–99)
GLUCOSE BLD-MCNC: 242 MG/DL (ref 70–99)
GLUCOSE BLD-MCNC: 245 MG/DL (ref 70–99)
GLUCOSE BLD-MCNC: 245 MG/DL (ref 74–109)
GLUCOSE BLD-MCNC: 248 MG/DL (ref 74–109)
GLUCOSE BLD-MCNC: 256 MG/DL (ref 70–99)
GLUCOSE BLD-MCNC: 264 MG/DL (ref 70–99)
GLUCOSE BLD-MCNC: 277 MG/DL (ref 70–99)
GLUCOSE BLD-MCNC: 285 MG/DL (ref 70–99)
GLUCOSE BLD-MCNC: 285 MG/DL (ref 70–99)
GLUCOSE BLD-MCNC: 303 MG/DL (ref 70–99)
GLUCOSE BLD-MCNC: 303 MG/DL (ref 70–99)
GLUCOSE BLD-MCNC: 326 MG/DL (ref 74–109)
GLUCOSE BLD-MCNC: 339 MG/DL (ref 70–99)
GLUCOSE BLD-MCNC: 375 MG/DL (ref 70–99)
GLUCOSE BLD-MCNC: 386 MG/DL (ref 70–99)
GLUCOSE BLD-MCNC: 72 MG/DL (ref 74–109)
GLUCOSE BLD-MCNC: 75 MG/DL (ref 70–99)
GLUCOSE BLD-MCNC: 79 MG/DL (ref 70–99)
GLUCOSE BLD-MCNC: 82 MG/DL (ref 70–99)
GLUCOSE BLD-MCNC: 93 MG/DL (ref 70–99)
GLUCOSE BLD-MCNC: 93 MG/DL (ref 74–109)
GLUCOSE BLD-MCNC: 99 MG/DL (ref 70–99)
GLUCOSE URINE: 100 MG/DL
HAPTOGLOBIN: 86 MG/DL (ref 30–200)
HBA1C MFR BLD: 5.6 % (ref 4–6)
HCO3 ARTERIAL: 32 MMOL/L (ref 22–26)
HCO3 ARTERIAL: 49.1 MMOL/L (ref 22–26)
HCO3 VENOUS: 33 MMOL/L (ref 23–29)
HCT VFR BLD CALC: 17.3 % (ref 37–47)
HCT VFR BLD CALC: 22.7 % (ref 37–47)
HCT VFR BLD CALC: 23 % (ref 37–47)
HCT VFR BLD CALC: 23.6 % (ref 37–47)
HCT VFR BLD CALC: 24.7 % (ref 37–47)
HCT VFR BLD CALC: 25 % (ref 37–47)
HCT VFR BLD CALC: 25.2 % (ref 37–47)
HCT VFR BLD CALC: 25.9 % (ref 37–47)
HCT VFR BLD CALC: 26 % (ref 37–47)
HCT VFR BLD CALC: 26.3 % (ref 37–47)
HCT VFR BLD CALC: 26.4 % (ref 37–47)
HCT VFR BLD CALC: 27 % (ref 37–47)
HCT VFR BLD CALC: 27.3 % (ref 37–47)
HCT VFR BLD CALC: 27.6 % (ref 37–47)
HCT VFR BLD CALC: 27.8 % (ref 37–47)
HCT VFR BLD CALC: 27.9 % (ref 37–47)
HCT VFR BLD CALC: 28.8 % (ref 37–47)
HCT VFR BLD CALC: 29.6 % (ref 37–47)
HCT VFR BLD CALC: 29.9 % (ref 37–47)
HCT VFR BLD CALC: 30 % (ref 37–47)
HCT VFR BLD CALC: 30.1 % (ref 37–47)
HCT VFR BLD CALC: 30.2 % (ref 37–47)
HCT VFR BLD CALC: 30.3 % (ref 37–47)
HCT VFR BLD CALC: 31.4 % (ref 37–47)
HCT VFR BLD CALC: 32.4 % (ref 37–47)
HCT VFR BLD CALC: 32.7 % (ref 37–47)
HCT VFR BLD CALC: 33.1 % (ref 37–47)
HCT VFR BLD CALC: 34.4 % (ref 37–47)
HDLC SERPL-MCNC: 81 MG/DL (ref 65–121)
HEMOGLOBIN, ART, EXTENDED: 10.7 G/DL (ref 12–16)
HEMOGLOBIN, ART, EXTENDED: 5.2 G/DL (ref 12–16)
HEMOGLOBIN: 10.1 G/DL (ref 12–16)
HEMOGLOBIN: 10.2 G/DL (ref 12–16)
HEMOGLOBIN: 5 G/DL (ref 12–16)
HEMOGLOBIN: 6.8 G/DL (ref 12–16)
HEMOGLOBIN: 6.9 G/DL (ref 12–16)
HEMOGLOBIN: 7.1 G/DL (ref 12–16)
HEMOGLOBIN: 7.3 G/DL (ref 12–16)
HEMOGLOBIN: 7.4 G/DL (ref 12–16)
HEMOGLOBIN: 7.5 G/DL (ref 12–16)
HEMOGLOBIN: 7.5 G/DL (ref 12–16)
HEMOGLOBIN: 7.7 G/DL (ref 12–16)
HEMOGLOBIN: 7.8 G/DL (ref 12–16)
HEMOGLOBIN: 7.9 G/DL (ref 12–16)
HEMOGLOBIN: 7.9 G/DL (ref 12–16)
HEMOGLOBIN: 8 G/DL (ref 12–16)
HEMOGLOBIN: 8.1 G/DL (ref 12–16)
HEMOGLOBIN: 8.2 G/DL (ref 12–16)
HEMOGLOBIN: 8.2 G/DL (ref 12–16)
HEMOGLOBIN: 8.5 G/DL (ref 12–16)
HEMOGLOBIN: 8.8 G/DL (ref 12–16)
HEMOGLOBIN: 8.8 G/DL (ref 12–16)
HEMOGLOBIN: 9 G/DL (ref 12–16)
HEMOGLOBIN: 9.4 G/DL (ref 12–16)
HEMOGLOBIN: 9.4 G/DL (ref 12–16)
HEMOGLOBIN: 9.6 G/DL (ref 12–16)
HEMOGLOBIN: 9.9 G/DL (ref 12–16)
HYALINE CASTS: 1 /HPF (ref 0–8)
HYPOCHROMIA: ABNORMAL
HYPOCHROMIA: ABNORMAL
IMMATURE GRANULOCYTES #: 0.2 K/UL
IMMATURE GRANULOCYTES #: 0.9 K/UL
IMMATURE GRANULOCYTES #: 1 K/UL
IMMATURE GRANULOCYTES #: 1.1 K/UL
IMMATURE GRANULOCYTES #: 1.1 K/UL
IMMATURE GRANULOCYTES #: 1.2 K/UL
IMMATURE GRANULOCYTES #: 1.4 K/UL
IMMATURE GRANULOCYTES #: 1.5 K/UL
IMMATURE GRANULOCYTES #: 1.5 K/UL
INR BLD: 1.17 (ref 0.88–1.18)
INR BLD: 1.25 (ref 0.88–1.18)
IRON SATURATION: 11 % (ref 14–50)
IRON SATURATION: 13 % (ref 14–50)
IRON SATURATION: 40 % (ref 14–50)
IRON: 20 UG/DL (ref 37–145)
IRON: 31 UG/DL (ref 37–145)
IRON: 89 UG/DL (ref 37–145)
KETONES, URINE: NEGATIVE MG/DL
LACTATE DEHYDROGENASE: 303 U/L (ref 91–215)
LACTIC ACID: 1.9 MMOL/L (ref 0.5–1.9)
LDL CHOLESTEROL CALCULATED: 99 MG/DL
LEUKOCYTE ESTERASE, URINE: NEGATIVE
LV EF: 55 %
LV EF: 55 %
LVEF MODALITY: NORMAL
LVEF MODALITY: NORMAL
LYMPHOCYTES ABSOLUTE: 0.2 K/UL (ref 1.1–4.5)
LYMPHOCYTES ABSOLUTE: 0.2 K/UL (ref 1.1–4.5)
LYMPHOCYTES ABSOLUTE: 0.3 K/UL (ref 1.1–4.5)
LYMPHOCYTES ABSOLUTE: 0.4 K/UL (ref 1.1–4.5)
LYMPHOCYTES ABSOLUTE: 0.4 K/UL (ref 1.1–4.5)
LYMPHOCYTES ABSOLUTE: 0.5 K/UL (ref 1.1–4.5)
LYMPHOCYTES ABSOLUTE: 0.5 K/UL (ref 1.1–4.5)
LYMPHOCYTES ABSOLUTE: 0.6 K/UL (ref 1.1–4.5)
LYMPHOCYTES ABSOLUTE: 0.7 K/UL (ref 1.1–4.5)
LYMPHOCYTES ABSOLUTE: 1.8 K/UL (ref 1.1–4.5)
LYMPHOCYTES RELATIVE PERCENT: 1 % (ref 20–40)
LYMPHOCYTES RELATIVE PERCENT: 1 % (ref 20–40)
LYMPHOCYTES RELATIVE PERCENT: 1.4 % (ref 20–40)
LYMPHOCYTES RELATIVE PERCENT: 1.6 % (ref 20–40)
LYMPHOCYTES RELATIVE PERCENT: 2.4 % (ref 20–40)
LYMPHOCYTES RELATIVE PERCENT: 2.5 % (ref 20–40)
LYMPHOCYTES RELATIVE PERCENT: 2.5 % (ref 20–40)
LYMPHOCYTES RELATIVE PERCENT: 2.6 % (ref 20–40)
LYMPHOCYTES RELATIVE PERCENT: 2.9 % (ref 20–40)
LYMPHOCYTES RELATIVE PERCENT: 3 % (ref 20–40)
LYMPHOCYTES RELATIVE PERCENT: 3.1 % (ref 20–40)
LYMPHOCYTES RELATIVE PERCENT: 3.4 % (ref 20–40)
LYMPHOCYTES RELATIVE PERCENT: 3.6 % (ref 20–40)
LYMPHOCYTES RELATIVE PERCENT: 3.8 % (ref 20–40)
LYMPHOCYTES RELATIVE PERCENT: 6.9 % (ref 20–40)
MAGNESIUM: 1.6 MG/DL (ref 1.6–2.4)
MAGNESIUM: 1.6 MG/DL (ref 1.6–2.4)
MAGNESIUM: 1.8 MG/DL (ref 1.6–2.4)
MAGNESIUM: 1.9 MG/DL (ref 1.6–2.4)
MAGNESIUM: 2.2 MG/DL (ref 1.6–2.4)
MCH RBC QN AUTO: 26.5 PG (ref 27–31)
MCH RBC QN AUTO: 26.6 PG (ref 27–31)
MCH RBC QN AUTO: 26.6 PG (ref 27–31)
MCH RBC QN AUTO: 26.7 PG (ref 27–31)
MCH RBC QN AUTO: 26.8 PG (ref 27–31)
MCH RBC QN AUTO: 27 PG (ref 27–31)
MCH RBC QN AUTO: 27.1 PG (ref 27–31)
MCH RBC QN AUTO: 27.2 PG (ref 27–31)
MCH RBC QN AUTO: 27.3 PG (ref 27–31)
MCH RBC QN AUTO: 27.3 PG (ref 27–31)
MCH RBC QN AUTO: 27.8 PG (ref 27–31)
MCH RBC QN AUTO: 28 PG (ref 27–31)
MCH RBC QN AUTO: 28.3 PG (ref 27–31)
MCH RBC QN AUTO: 28.5 PG (ref 27–31)
MCH RBC QN AUTO: 28.5 PG (ref 27–31)
MCH RBC QN AUTO: 28.9 PG (ref 27–31)
MCH RBC QN AUTO: 29 PG (ref 27–31)
MCH RBC QN AUTO: 29 PG (ref 27–31)
MCH RBC QN AUTO: 29.1 PG (ref 27–31)
MCH RBC QN AUTO: 29.1 PG (ref 27–31)
MCH RBC QN AUTO: 29.2 PG (ref 27–31)
MCH RBC QN AUTO: 29.3 PG (ref 27–31)
MCH RBC QN AUTO: 29.7 PG (ref 27–31)
MCH RBC QN AUTO: 30 PG (ref 27–31)
MCHC RBC AUTO-ENTMCNC: 28.2 G/DL (ref 33–37)
MCHC RBC AUTO-ENTMCNC: 28.5 G/DL (ref 33–37)
MCHC RBC AUTO-ENTMCNC: 28.6 G/DL (ref 33–37)
MCHC RBC AUTO-ENTMCNC: 28.9 G/DL (ref 33–37)
MCHC RBC AUTO-ENTMCNC: 28.9 G/DL (ref 33–37)
MCHC RBC AUTO-ENTMCNC: 29 G/DL (ref 33–37)
MCHC RBC AUTO-ENTMCNC: 29 G/DL (ref 33–37)
MCHC RBC AUTO-ENTMCNC: 29.4 G/DL (ref 33–37)
MCHC RBC AUTO-ENTMCNC: 29.4 G/DL (ref 33–37)
MCHC RBC AUTO-ENTMCNC: 29.5 G/DL (ref 33–37)
MCHC RBC AUTO-ENTMCNC: 29.6 G/DL (ref 33–37)
MCHC RBC AUTO-ENTMCNC: 29.7 G/DL (ref 33–37)
MCHC RBC AUTO-ENTMCNC: 29.7 G/DL (ref 33–37)
MCHC RBC AUTO-ENTMCNC: 29.8 G/DL (ref 33–37)
MCHC RBC AUTO-ENTMCNC: 29.9 G/DL (ref 33–37)
MCHC RBC AUTO-ENTMCNC: 29.9 G/DL (ref 33–37)
MCHC RBC AUTO-ENTMCNC: 30 G/DL (ref 33–37)
MCHC RBC AUTO-ENTMCNC: 30.1 G/DL (ref 33–37)
MCHC RBC AUTO-ENTMCNC: 30.4 G/DL (ref 33–37)
MCHC RBC AUTO-ENTMCNC: 30.4 G/DL (ref 33–37)
MCHC RBC AUTO-ENTMCNC: 30.5 G/DL (ref 33–37)
MCHC RBC AUTO-ENTMCNC: 30.7 G/DL (ref 33–37)
MCHC RBC AUTO-ENTMCNC: 31 G/DL (ref 33–37)
MCV RBC AUTO: 90.8 FL (ref 81–99)
MCV RBC AUTO: 90.9 FL (ref 81–99)
MCV RBC AUTO: 91 FL (ref 81–99)
MCV RBC AUTO: 92.2 FL (ref 81–99)
MCV RBC AUTO: 93.2 FL (ref 81–99)
MCV RBC AUTO: 93.5 FL (ref 81–99)
MCV RBC AUTO: 93.8 FL (ref 81–99)
MCV RBC AUTO: 94 FL (ref 81–99)
MCV RBC AUTO: 94.3 FL (ref 81–99)
MCV RBC AUTO: 94.5 FL (ref 81–99)
MCV RBC AUTO: 94.6 FL (ref 81–99)
MCV RBC AUTO: 94.8 FL (ref 81–99)
MCV RBC AUTO: 94.9 FL (ref 81–99)
MCV RBC AUTO: 95.3 FL (ref 81–99)
MCV RBC AUTO: 95.6 FL (ref 81–99)
MCV RBC AUTO: 96.9 FL (ref 81–99)
MCV RBC AUTO: 97 FL (ref 81–99)
MCV RBC AUTO: 97.1 FL (ref 81–99)
MCV RBC AUTO: 97.7 FL (ref 81–99)
MCV RBC AUTO: 97.8 FL (ref 81–99)
MCV RBC AUTO: 98.3 FL (ref 81–99)
MCV RBC AUTO: 98.6 FL (ref 81–99)
MCV RBC AUTO: 98.7 FL (ref 81–99)
METHEMOGLOBIN ARTERIAL: 0.8 %
METHEMOGLOBIN ARTERIAL: 2 %
MONOCYTES ABSOLUTE: 0.7 K/UL (ref 0–0.9)
MONOCYTES ABSOLUTE: 0.7 K/UL (ref 0–0.9)
MONOCYTES ABSOLUTE: 0.9 K/UL (ref 0–0.9)
MONOCYTES ABSOLUTE: 0.9 K/UL (ref 0–0.9)
MONOCYTES ABSOLUTE: 1 K/UL (ref 0–0.9)
MONOCYTES ABSOLUTE: 1.1 K/UL (ref 0–0.9)
MONOCYTES ABSOLUTE: 1.1 K/UL (ref 0–0.9)
MONOCYTES ABSOLUTE: 1.2 K/UL (ref 0–0.9)
MONOCYTES ABSOLUTE: 1.2 K/UL (ref 0–0.9)
MONOCYTES ABSOLUTE: 1.3 K/UL (ref 0–0.9)
MONOCYTES ABSOLUTE: 1.3 K/UL (ref 0–0.9)
MONOCYTES ABSOLUTE: 1.4 K/UL (ref 0–0.9)
MONOCYTES ABSOLUTE: 1.7 K/UL (ref 0–0.9)
MONOCYTES ABSOLUTE: 1.9 K/UL (ref 0–0.9)
MONOCYTES ABSOLUTE: 1.9 K/UL (ref 0–0.9)
MONOCYTES RELATIVE PERCENT: 10 % (ref 0–10)
MONOCYTES RELATIVE PERCENT: 3.7 % (ref 0–10)
MONOCYTES RELATIVE PERCENT: 4.4 % (ref 0–10)
MONOCYTES RELATIVE PERCENT: 4.6 % (ref 0–10)
MONOCYTES RELATIVE PERCENT: 4.7 % (ref 0–10)
MONOCYTES RELATIVE PERCENT: 5 % (ref 0–10)
MONOCYTES RELATIVE PERCENT: 5.4 % (ref 0–10)
MONOCYTES RELATIVE PERCENT: 5.6 % (ref 0–10)
MONOCYTES RELATIVE PERCENT: 5.7 % (ref 0–10)
MONOCYTES RELATIVE PERCENT: 6.2 % (ref 0–10)
MONOCYTES RELATIVE PERCENT: 6.2 % (ref 0–10)
MONOCYTES RELATIVE PERCENT: 6.4 % (ref 0–10)
MONOCYTES RELATIVE PERCENT: 6.9 % (ref 0–10)
MONOCYTES RELATIVE PERCENT: 6.9 % (ref 0–10)
MONOCYTES RELATIVE PERCENT: 7.5 % (ref 0–10)
NEUTROPHILS ABSOLUTE: 11.6 K/UL (ref 1.5–7.5)
NEUTROPHILS ABSOLUTE: 14.2 K/UL (ref 1.5–7.5)
NEUTROPHILS ABSOLUTE: 14.4 K/UL (ref 1.5–7.5)
NEUTROPHILS ABSOLUTE: 14.8 K/UL (ref 1.5–7.5)
NEUTROPHILS ABSOLUTE: 15.1 K/UL (ref 1.5–7.5)
NEUTROPHILS ABSOLUTE: 16.5 K/UL (ref 1.5–7.5)
NEUTROPHILS ABSOLUTE: 17 K/UL (ref 1.5–7.5)
NEUTROPHILS ABSOLUTE: 18.5 K/UL (ref 1.5–7.5)
NEUTROPHILS ABSOLUTE: 18.6 K/UL (ref 1.5–7.5)
NEUTROPHILS ABSOLUTE: 19.8 K/UL (ref 1.5–7.5)
NEUTROPHILS ABSOLUTE: 20.9 K/UL (ref 1.5–7.5)
NEUTROPHILS ABSOLUTE: 21.4 K/UL (ref 1.5–7.5)
NEUTROPHILS ABSOLUTE: 21.5 K/UL (ref 1.5–7.5)
NEUTROPHILS ABSOLUTE: 21.8 K/UL (ref 1.5–7.5)
NEUTROPHILS ABSOLUTE: 22.3 K/UL (ref 1.5–7.5)
NEUTROPHILS RELATIVE PERCENT: 81 % (ref 50–65)
NEUTROPHILS RELATIVE PERCENT: 83 % (ref 50–65)
NEUTROPHILS RELATIVE PERCENT: 85.4 % (ref 50–65)
NEUTROPHILS RELATIVE PERCENT: 85.4 % (ref 50–65)
NEUTROPHILS RELATIVE PERCENT: 85.6 % (ref 50–65)
NEUTROPHILS RELATIVE PERCENT: 85.9 % (ref 50–65)
NEUTROPHILS RELATIVE PERCENT: 85.9 % (ref 50–65)
NEUTROPHILS RELATIVE PERCENT: 86.5 % (ref 50–65)
NEUTROPHILS RELATIVE PERCENT: 86.6 % (ref 50–65)
NEUTROPHILS RELATIVE PERCENT: 86.9 % (ref 50–65)
NEUTROPHILS RELATIVE PERCENT: 89 % (ref 50–65)
NEUTROPHILS RELATIVE PERCENT: 89.3 % (ref 50–65)
NEUTROPHILS RELATIVE PERCENT: 89.7 % (ref 50–65)
NEUTROPHILS RELATIVE PERCENT: 91.4 % (ref 50–65)
NEUTROPHILS RELATIVE PERCENT: 94.2 % (ref 50–65)
NITRITE, URINE: NEGATIVE
O2 CONTENT ARTERIAL: 14 ML/DL
O2 CONTENT ARTERIAL: 6.6 ML/DL
O2 CONTENT, VEN: 6 ML/DL
O2 SAT, ARTERIAL: 89.1 %
O2 SAT, ARTERIAL: 92.7 %
O2 SAT, VEN: 56 %
O2 THERAPY: ABNORMAL
O2 THERAPY: ABNORMAL
OCCULT BLOOD QC: ABNORMAL
OCCULT BLOOD SCREENING: ABNORMAL
OXYGEN FLOW: 2
OXYGEN FLOW: 5
PCO2 ARTERIAL: 44 MMHG (ref 35–45)
PCO2 ARTERIAL: 74 MMHG (ref 35–45)
PCO2, VEN: 43 MMHG (ref 40–50)
PDW BLD-RTO: 15.2 % (ref 11.5–14.5)
PDW BLD-RTO: 15.4 % (ref 11.5–14.5)
PDW BLD-RTO: 15.4 % (ref 11.5–14.5)
PDW BLD-RTO: 15.5 % (ref 11.5–14.5)
PDW BLD-RTO: 15.6 % (ref 11.5–14.5)
PDW BLD-RTO: 15.7 % (ref 11.5–14.5)
PDW BLD-RTO: 15.8 % (ref 11.5–14.5)
PDW BLD-RTO: 15.9 % (ref 11.5–14.5)
PDW BLD-RTO: 15.9 % (ref 11.5–14.5)
PDW BLD-RTO: 16.1 % (ref 11.5–14.5)
PDW BLD-RTO: 16.1 % (ref 11.5–14.5)
PDW BLD-RTO: 16.3 % (ref 11.5–14.5)
PDW BLD-RTO: 16.4 % (ref 11.5–14.5)
PDW BLD-RTO: 16.6 % (ref 11.5–14.5)
PDW BLD-RTO: 16.9 % (ref 11.5–14.5)
PDW BLD-RTO: 17.2 % (ref 11.5–14.5)
PDW BLD-RTO: 18.1 % (ref 11.5–14.5)
PERFORMED ON: ABNORMAL
PERFORMED ON: NORMAL
PH ARTERIAL: 7.43 (ref 7.35–7.45)
PH ARTERIAL: 7.47 (ref 7.35–7.45)
PH UA: 7 (ref 5–8)
PH VENOUS: 7.49 (ref 7.35–7.45)
PLATELET # BLD: 256 K/UL (ref 130–400)
PLATELET # BLD: 266 K/UL (ref 130–400)
PLATELET # BLD: 274 K/UL (ref 130–400)
PLATELET # BLD: 275 K/UL (ref 130–400)
PLATELET # BLD: 276 K/UL (ref 130–400)
PLATELET # BLD: 276 K/UL (ref 130–400)
PLATELET # BLD: 289 K/UL (ref 130–400)
PLATELET # BLD: 298 K/UL (ref 130–400)
PLATELET # BLD: 298 K/UL (ref 130–400)
PLATELET # BLD: 300 K/UL (ref 130–400)
PLATELET # BLD: 302 K/UL (ref 130–400)
PLATELET # BLD: 303 K/UL (ref 130–400)
PLATELET # BLD: 315 K/UL (ref 130–400)
PLATELET # BLD: 318 K/UL (ref 130–400)
PLATELET # BLD: 322 K/UL (ref 130–400)
PLATELET # BLD: 332 K/UL (ref 130–400)
PLATELET # BLD: 336 K/UL (ref 130–400)
PLATELET # BLD: 344 K/UL (ref 130–400)
PLATELET # BLD: 345 K/UL (ref 130–400)
PLATELET # BLD: 347 K/UL (ref 130–400)
PLATELET # BLD: 378 K/UL (ref 130–400)
PLATELET # BLD: 393 K/UL (ref 130–400)
PLATELET # BLD: 400 K/UL (ref 130–400)
PLATELET # BLD: 416 K/UL (ref 130–400)
PLATELET # BLD: 434 K/UL (ref 130–400)
PLATELET # BLD: 456 K/UL (ref 130–400)
PLATELET SLIDE REVIEW: ABNORMAL
PLATELET SLIDE REVIEW: ADEQUATE
PMV BLD AUTO: 10 FL (ref 9.4–12.3)
PMV BLD AUTO: 10.1 FL (ref 9.4–12.3)
PMV BLD AUTO: 10.2 FL (ref 9.4–12.3)
PMV BLD AUTO: 10.3 FL (ref 9.4–12.3)
PMV BLD AUTO: 10.4 FL (ref 9.4–12.3)
PMV BLD AUTO: 10.4 FL (ref 9.4–12.3)
PMV BLD AUTO: 10.6 FL (ref 9.4–12.3)
PMV BLD AUTO: 9.6 FL (ref 9.4–12.3)
PMV BLD AUTO: 9.6 FL (ref 9.4–12.3)
PMV BLD AUTO: 9.7 FL (ref 9.4–12.3)
PMV BLD AUTO: 9.8 FL (ref 9.4–12.3)
PMV BLD AUTO: 9.9 FL (ref 9.4–12.3)
PMV BLD AUTO: 9.9 FL (ref 9.4–12.3)
PO2 ARTERIAL: 54 MMHG (ref 80–100)
PO2 ARTERIAL: 69 MMHG (ref 80–100)
PO2, VEN: 30 MMHG
POLYCHROMASIA: ABNORMAL
POTASSIUM REFLEX MAGNESIUM: 3.1 MMOL/L (ref 3.5–5)
POTASSIUM REFLEX MAGNESIUM: 3.4 MMOL/L (ref 3.5–5)
POTASSIUM REFLEX MAGNESIUM: 3.5 MMOL/L (ref 3.5–5)
POTASSIUM REFLEX MAGNESIUM: 3.5 MMOL/L (ref 3.5–5)
POTASSIUM REFLEX MAGNESIUM: 3.7 MMOL/L (ref 3.5–5)
POTASSIUM REFLEX MAGNESIUM: 3.8 MMOL/L (ref 3.5–5)
POTASSIUM REFLEX MAGNESIUM: 4 MMOL/L (ref 3.5–5)
POTASSIUM REFLEX MAGNESIUM: 4.2 MMOL/L (ref 3.5–5)
POTASSIUM REFLEX MAGNESIUM: 4.2 MMOL/L (ref 3.5–5)
POTASSIUM REFLEX MAGNESIUM: 4.3 MMOL/L (ref 3.5–5)
POTASSIUM SERPL-SCNC: 3.3 MMOL/L (ref 3.5–5)
POTASSIUM SERPL-SCNC: 3.4 MMOL/L (ref 3.5–5)
POTASSIUM SERPL-SCNC: 3.6 MMOL/L (ref 3.5–5)
POTASSIUM SERPL-SCNC: 3.6 MMOL/L (ref 3.5–5)
POTASSIUM SERPL-SCNC: 3.7 MMOL/L (ref 3.5–5)
POTASSIUM SERPL-SCNC: 3.7 MMOL/L (ref 3.5–5)
POTASSIUM SERPL-SCNC: 4 MMOL/L (ref 3.5–5)
POTASSIUM SERPL-SCNC: 4 MMOL/L (ref 3.5–5)
POTASSIUM SERPL-SCNC: 4.1 MMOL/L (ref 3.5–5)
POTASSIUM SERPL-SCNC: 4.3 MMOL/L (ref 3.5–5)
POTASSIUM SERPL-SCNC: 4.5 MMOL/L (ref 3.5–5)
POTASSIUM SERPL-SCNC: 4.6 MMOL/L (ref 3.5–5)
POTASSIUM, WHOLE BLOOD: 3.4
POTASSIUM, WHOLE BLOOD: 3.7
PRO-BNP: 3581 PG/ML (ref 0–1800)
PRO-BNP: ABNORMAL PG/ML (ref 0–1800)
PROCALCITONIN: 0.11 NG/ML (ref 0–0.09)
PROCALCITONIN: 0.35 NG/ML (ref 0–0.09)
PROTEIN UA: 300 MG/DL
PROTHROMBIN TIME: 14.9 SEC (ref 12–14.6)
PROTHROMBIN TIME: 15.7 SEC (ref 12–14.6)
RBC # BLD: 1.83 M/UL (ref 4.2–5.4)
RBC # BLD: 2.34 M/UL (ref 4.2–5.4)
RBC # BLD: 2.44 M/UL (ref 4.2–5.4)
RBC # BLD: 2.51 M/UL (ref 4.2–5.4)
RBC # BLD: 2.6 M/UL (ref 4.2–5.4)
RBC # BLD: 2.65 M/UL (ref 4.2–5.4)
RBC # BLD: 2.68 M/UL (ref 4.2–5.4)
RBC # BLD: 2.75 M/UL (ref 4.2–5.4)
RBC # BLD: 2.77 M/UL (ref 4.2–5.4)
RBC # BLD: 2.79 M/UL (ref 4.2–5.4)
RBC # BLD: 2.82 M/UL (ref 4.2–5.4)
RBC # BLD: 2.83 M/UL (ref 4.2–5.4)
RBC # BLD: 2.91 M/UL (ref 4.2–5.4)
RBC # BLD: 2.91 M/UL (ref 4.2–5.4)
RBC # BLD: 2.93 M/UL (ref 4.2–5.4)
RBC # BLD: 3.01 M/UL (ref 4.2–5.4)
RBC # BLD: 3.03 M/UL (ref 4.2–5.4)
RBC # BLD: 3.06 M/UL (ref 4.2–5.4)
RBC # BLD: 3.08 M/UL (ref 4.2–5.4)
RBC # BLD: 3.09 M/UL (ref 4.2–5.4)
RBC # BLD: 3.16 M/UL (ref 4.2–5.4)
RBC # BLD: 3.17 M/UL (ref 4.2–5.4)
RBC # BLD: 3.21 M/UL (ref 4.2–5.4)
RBC # BLD: 3.24 M/UL (ref 4.2–5.4)
RBC # BLD: 3.59 M/UL (ref 4.2–5.4)
RBC # BLD: 3.78 M/UL (ref 4.2–5.4)
RBC UA: 3 /HPF (ref 0–4)
SAMPLE SOURCE: ABNORMAL
SAMPLE SOURCE: ABNORMAL
SARS-COV-2 ORF1AB RESP QL NAA+PROBE: NOT DETECTED
SARS-COV-2, NAAT: NOT DETECTED
SODIUM BLD-SCNC: 130 MMOL/L (ref 136–145)
SODIUM BLD-SCNC: 132 MMOL/L (ref 136–145)
SODIUM BLD-SCNC: 134 MMOL/L (ref 136–145)
SODIUM BLD-SCNC: 135 MMOL/L (ref 136–145)
SODIUM BLD-SCNC: 136 MMOL/L (ref 136–145)
SODIUM BLD-SCNC: 136 MMOL/L (ref 136–145)
SODIUM BLD-SCNC: 137 MMOL/L (ref 136–145)
SODIUM BLD-SCNC: 138 MMOL/L (ref 136–145)
SODIUM BLD-SCNC: 139 MMOL/L (ref 136–145)
SODIUM BLD-SCNC: 139 MMOL/L (ref 136–145)
SODIUM BLD-SCNC: 140 MMOL/L (ref 136–145)
SODIUM BLD-SCNC: 140 MMOL/L (ref 136–145)
SODIUM BLD-SCNC: 141 MMOL/L (ref 136–145)
SODIUM BLD-SCNC: 142 MMOL/L (ref 136–145)
SODIUM BLD-SCNC: 146 MMOL/L (ref 136–145)
SPECIFIC GRAVITY UA: 1.01 (ref 1–1.03)
SPONT RATE(BPM): 2
SPONT RATE(BPM): 26
TOTAL IRON BINDING CAPACITY: 150 UG/DL (ref 250–400)
TOTAL IRON BINDING CAPACITY: 220 UG/DL (ref 250–400)
TOTAL IRON BINDING CAPACITY: 294 UG/DL (ref 250–400)
TOTAL PROTEIN: 4 G/DL (ref 6.6–8.7)
TOTAL PROTEIN: 4.6 G/DL (ref 6.6–8.7)
TOTAL PROTEIN: 5.2 G/DL (ref 6.6–8.7)
TOTAL PROTEIN: 6.7 G/DL (ref 6.6–8.7)
TRIGL SERPL-MCNC: 88 MG/DL (ref 0–149)
TROPONIN: 0.03 NG/ML (ref 0–0.03)
TROPONIN: 0.04 NG/ML (ref 0–0.03)
TROPONIN: 0.08 NG/ML (ref 0–0.03)
TROPONIN: 0.09 NG/ML (ref 0–0.03)
TROPONIN: 0.12 NG/ML (ref 0–0.03)
TROPONIN: 0.12 NG/ML (ref 0–0.03)
TSH SERPL DL<=0.05 MIU/L-ACNC: 1.4 UIU/ML (ref 0.27–4.2)
TSH SERPL DL<=0.05 MIU/L-ACNC: 2.43 UIU/ML (ref 0.27–4.2)
UROBILINOGEN, URINE: 0.2 E.U./DL
VITAMIN B-12: 1425 PG/ML (ref 211–946)
VITAMIN B-12: 579 PG/ML (ref 211–946)
VITAMIN D 25-HYDROXY: 54.6 NG/ML
VITAMIN D 25-HYDROXY: 70 NG/ML
VITAMIN K1: 0.92 NMOL/L (ref 0.22–4.88)
WBC # BLD: 12.2 K/UL (ref 4.8–10.8)
WBC # BLD: 12.5 K/UL (ref 4.8–10.8)
WBC # BLD: 13.4 K/UL (ref 4.8–10.8)
WBC # BLD: 13.8 K/UL (ref 4.8–10.8)
WBC # BLD: 14.4 K/UL (ref 4.8–10.8)
WBC # BLD: 14.8 K/UL (ref 4.8–10.8)
WBC # BLD: 15 K/UL (ref 4.8–10.8)
WBC # BLD: 15.8 K/UL (ref 4.8–10.8)
WBC # BLD: 15.9 K/UL (ref 4.8–10.8)
WBC # BLD: 17.3 K/UL (ref 4.8–10.8)
WBC # BLD: 17.5 K/UL (ref 4.8–10.8)
WBC # BLD: 18.2 K/UL (ref 4.8–10.8)
WBC # BLD: 19.1 K/UL (ref 4.8–10.8)
WBC # BLD: 19.3 K/UL (ref 4.8–10.8)
WBC # BLD: 19.7 K/UL (ref 4.8–10.8)
WBC # BLD: 20.7 K/UL (ref 4.8–10.8)
WBC # BLD: 21.7 K/UL (ref 4.8–10.8)
WBC # BLD: 22.8 K/UL (ref 4.8–10.8)
WBC # BLD: 22.9 K/UL (ref 4.8–10.8)
WBC # BLD: 24.8 K/UL (ref 4.8–10.8)
WBC # BLD: 24.9 K/UL (ref 4.8–10.8)
WBC # BLD: 25.1 K/UL (ref 4.8–10.8)
WBC # BLD: 25.3 K/UL (ref 4.8–10.8)
WBC # BLD: 25.8 K/UL (ref 4.8–10.8)
WBC # BLD: 26.5 K/UL (ref 4.8–10.8)
WBC # BLD: 27.8 K/UL (ref 4.8–10.8)
WBC UA: 2 /HPF (ref 0–5)

## 2022-01-01 PROCEDURE — 36415 COLL VENOUS BLD VENIPUNCTURE: CPT

## 2022-01-01 PROCEDURE — 85027 COMPLETE CBC AUTOMATED: CPT

## 2022-01-01 PROCEDURE — 94640 AIRWAY INHALATION TREATMENT: CPT

## 2022-01-01 PROCEDURE — 82803 BLOOD GASES ANY COMBINATION: CPT

## 2022-01-01 PROCEDURE — 6370000000 HC RX 637 (ALT 250 FOR IP)

## 2022-01-01 PROCEDURE — 78580 LUNG PERFUSION IMAGING: CPT

## 2022-01-01 PROCEDURE — 87040 BLOOD CULTURE FOR BACTERIA: CPT

## 2022-01-01 PROCEDURE — 99231 SBSQ HOSP IP/OBS SF/LOW 25: CPT | Performed by: INTERNAL MEDICINE

## 2022-01-01 PROCEDURE — 6360000002 HC RX W HCPCS: Performed by: STUDENT IN AN ORGANIZED HEALTH CARE EDUCATION/TRAINING PROGRAM

## 2022-01-01 PROCEDURE — 2700000000 HC OXYGEN THERAPY PER DAY

## 2022-01-01 PROCEDURE — 82270 OCCULT BLOOD FECES: CPT

## 2022-01-01 PROCEDURE — 2580000003 HC RX 258

## 2022-01-01 PROCEDURE — 2140000000 HC CCU INTERMEDIATE R&B

## 2022-01-01 PROCEDURE — 80048 BASIC METABOLIC PNL TOTAL CA: CPT

## 2022-01-01 PROCEDURE — 92610 EVALUATE SWALLOWING FUNCTION: CPT

## 2022-01-01 PROCEDURE — 93005 ELECTROCARDIOGRAM TRACING: CPT

## 2022-01-01 PROCEDURE — 82947 ASSAY GLUCOSE BLOOD QUANT: CPT

## 2022-01-01 PROCEDURE — 99232 SBSQ HOSP IP/OBS MODERATE 35: CPT

## 2022-01-01 PROCEDURE — 6360000002 HC RX W HCPCS: Performed by: NURSE PRACTITIONER

## 2022-01-01 PROCEDURE — 36430 TRANSFUSION BLD/BLD COMPNT: CPT

## 2022-01-01 PROCEDURE — 6360000002 HC RX W HCPCS: Performed by: INTERNAL MEDICINE

## 2022-01-01 PROCEDURE — 6370000000 HC RX 637 (ALT 250 FOR IP): Performed by: STUDENT IN AN ORGANIZED HEALTH CARE EDUCATION/TRAINING PROGRAM

## 2022-01-01 PROCEDURE — U0004 COV-19 TEST NON-CDC HGH THRU: HCPCS

## 2022-01-01 PROCEDURE — 71046 X-RAY EXAM CHEST 2 VIEWS: CPT | Performed by: RADIOLOGY

## 2022-01-01 PROCEDURE — 1210000000 HC MED SURG R&B

## 2022-01-01 PROCEDURE — 94660 CPAP INITIATION&MGMT: CPT

## 2022-01-01 PROCEDURE — 84484 ASSAY OF TROPONIN QUANT: CPT

## 2022-01-01 PROCEDURE — 97110 THERAPEUTIC EXERCISES: CPT

## 2022-01-01 PROCEDURE — 94761 N-INVAS EAR/PLS OXIMETRY MLT: CPT

## 2022-01-01 PROCEDURE — 83735 ASSAY OF MAGNESIUM: CPT

## 2022-01-01 PROCEDURE — 2580000003 HC RX 258: Performed by: HOSPITALIST

## 2022-01-01 PROCEDURE — 97116 GAIT TRAINING THERAPY: CPT

## 2022-01-01 PROCEDURE — 6370000000 HC RX 637 (ALT 250 FOR IP): Performed by: NURSE PRACTITIONER

## 2022-01-01 PROCEDURE — 71045 X-RAY EXAM CHEST 1 VIEW: CPT

## 2022-01-01 PROCEDURE — 92526 ORAL FUNCTION THERAPY: CPT

## 2022-01-01 PROCEDURE — 99222 1ST HOSP IP/OBS MODERATE 55: CPT | Performed by: INTERNAL MEDICINE

## 2022-01-01 PROCEDURE — 85018 HEMOGLOBIN: CPT

## 2022-01-01 PROCEDURE — 6370000000 HC RX 637 (ALT 250 FOR IP): Performed by: HOSPITALIST

## 2022-01-01 PROCEDURE — 99214 OFFICE O/P EST MOD 30 MIN: CPT | Performed by: INTERNAL MEDICINE

## 2022-01-01 PROCEDURE — 99497 ADVNCD CARE PLAN 30 MIN: CPT

## 2022-01-01 PROCEDURE — 6360000002 HC RX W HCPCS: Performed by: EMERGENCY MEDICINE

## 2022-01-01 PROCEDURE — 97530 THERAPEUTIC ACTIVITIES: CPT

## 2022-01-01 PROCEDURE — 99233 SBSQ HOSP IP/OBS HIGH 50: CPT

## 2022-01-01 PROCEDURE — P9016 RBC LEUKOCYTES REDUCED: HCPCS

## 2022-01-01 PROCEDURE — 83880 ASSAY OF NATRIURETIC PEPTIDE: CPT

## 2022-01-01 PROCEDURE — 2580000003 HC RX 258: Performed by: NURSE PRACTITIONER

## 2022-01-01 PROCEDURE — 85025 COMPLETE CBC W/AUTO DIFF WBC: CPT

## 2022-01-01 PROCEDURE — 6360000002 HC RX W HCPCS: Performed by: HOSPITALIST

## 2022-01-01 PROCEDURE — 6360000002 HC RX W HCPCS

## 2022-01-01 PROCEDURE — 36600 WITHDRAWAL OF ARTERIAL BLOOD: CPT

## 2022-01-01 PROCEDURE — 80061 LIPID PANEL: CPT

## 2022-01-01 PROCEDURE — 83550 IRON BINDING TEST: CPT

## 2022-01-01 PROCEDURE — 97162 PT EVAL MOD COMPLEX 30 MIN: CPT

## 2022-01-01 PROCEDURE — 96374 THER/PROPH/DIAG INJ IV PUSH: CPT

## 2022-01-01 PROCEDURE — 86900 BLOOD TYPING SEROLOGIC ABO: CPT

## 2022-01-01 PROCEDURE — 99223 1ST HOSP IP/OBS HIGH 75: CPT | Performed by: INTERNAL MEDICINE

## 2022-01-01 PROCEDURE — 96375 TX/PRO/DX INJ NEW DRUG ADDON: CPT

## 2022-01-01 PROCEDURE — 71046 X-RAY EXAM CHEST 2 VIEWS: CPT

## 2022-01-01 PROCEDURE — 93306 TTE W/DOPPLER COMPLETE: CPT

## 2022-01-01 PROCEDURE — 2500000003 HC RX 250 WO HCPCS: Performed by: STUDENT IN AN ORGANIZED HEALTH CARE EDUCATION/TRAINING PROGRAM

## 2022-01-01 PROCEDURE — 92507 TX SP LANG VOICE COMM INDIV: CPT

## 2022-01-01 PROCEDURE — 93010 ELECTROCARDIOGRAM REPORT: CPT | Performed by: INTERNAL MEDICINE

## 2022-01-01 PROCEDURE — 6370000000 HC RX 637 (ALT 250 FOR IP): Performed by: INTERNAL MEDICINE

## 2022-01-01 PROCEDURE — 74176 CT ABD & PELVIS W/O CONTRAST: CPT

## 2022-01-01 PROCEDURE — G8419 CALC BMI OUT NRM PARAM NOF/U: HCPCS | Performed by: NURSE PRACTITIONER

## 2022-01-01 PROCEDURE — 82746 ASSAY OF FOLIC ACID SERUM: CPT

## 2022-01-01 PROCEDURE — 84443 ASSAY THYROID STIM HORMONE: CPT

## 2022-01-01 PROCEDURE — 85610 PROTHROMBIN TIME: CPT

## 2022-01-01 PROCEDURE — 86850 RBC ANTIBODY SCREEN: CPT

## 2022-01-01 PROCEDURE — 87635 SARS-COV-2 COVID-19 AMP PRB: CPT

## 2022-01-01 PROCEDURE — 97161 PT EVAL LOW COMPLEX 20 MIN: CPT

## 2022-01-01 PROCEDURE — 97165 OT EVAL LOW COMPLEX 30 MIN: CPT

## 2022-01-01 PROCEDURE — 86901 BLOOD TYPING SEROLOGIC RH(D): CPT

## 2022-01-01 PROCEDURE — 2580000003 HC RX 258: Performed by: STUDENT IN AN ORGANIZED HEALTH CARE EDUCATION/TRAINING PROGRAM

## 2022-01-01 PROCEDURE — 99285 EMERGENCY DEPT VISIT HI MDM: CPT

## 2022-01-01 PROCEDURE — 85379 FIBRIN DEGRADATION QUANT: CPT

## 2022-01-01 PROCEDURE — 6370000000 HC RX 637 (ALT 250 FOR IP): Performed by: PHYSICIAN ASSISTANT

## 2022-01-01 PROCEDURE — 83540 ASSAY OF IRON: CPT

## 2022-01-01 PROCEDURE — 6370000000 HC RX 637 (ALT 250 FOR IP): Performed by: EMERGENCY MEDICINE

## 2022-01-01 PROCEDURE — 82728 ASSAY OF FERRITIN: CPT

## 2022-01-01 PROCEDURE — 82306 VITAMIN D 25 HYDROXY: CPT

## 2022-01-01 PROCEDURE — 99223 1ST HOSP IP/OBS HIGH 75: CPT

## 2022-01-01 PROCEDURE — 85014 HEMATOCRIT: CPT

## 2022-01-01 PROCEDURE — 86140 C-REACTIVE PROTEIN: CPT

## 2022-01-01 PROCEDURE — 93005 ELECTROCARDIOGRAM TRACING: CPT | Performed by: EMERGENCY MEDICINE

## 2022-01-01 PROCEDURE — 82040 ASSAY OF SERUM ALBUMIN: CPT

## 2022-01-01 PROCEDURE — 2580000003 HC RX 258: Performed by: INTERNAL MEDICINE

## 2022-01-01 PROCEDURE — 84145 PROCALCITONIN (PCT): CPT

## 2022-01-01 PROCEDURE — G0009 ADMIN PNEUMOCOCCAL VACCINE: HCPCS | Performed by: INTERNAL MEDICINE

## 2022-01-01 PROCEDURE — 99232 SBSQ HOSP IP/OBS MODERATE 35: CPT | Performed by: INTERNAL MEDICINE

## 2022-01-01 PROCEDURE — 84597 ASSAY OF VITAMIN K: CPT

## 2022-01-01 PROCEDURE — 99283 EMERGENCY DEPT VISIT LOW MDM: CPT

## 2022-01-01 PROCEDURE — 85730 THROMBOPLASTIN TIME PARTIAL: CPT

## 2022-01-01 PROCEDURE — G8400 PT W/DXA NO RESULTS DOC: HCPCS | Performed by: NURSE PRACTITIONER

## 2022-01-01 PROCEDURE — C9803 HOPD COVID-19 SPEC COLLECT: HCPCS

## 2022-01-01 PROCEDURE — 92522 EVALUATE SPEECH PRODUCTION: CPT

## 2022-01-01 PROCEDURE — 1090F PRES/ABSN URINE INCON ASSESS: CPT | Performed by: NURSE PRACTITIONER

## 2022-01-01 PROCEDURE — 99223 1ST HOSP IP/OBS HIGH 75: CPT | Performed by: SPECIALIST

## 2022-01-01 PROCEDURE — 80053 COMPREHEN METABOLIC PANEL: CPT

## 2022-01-01 PROCEDURE — 83010 ASSAY OF HAPTOGLOBIN QUANT: CPT

## 2022-01-01 PROCEDURE — 71045 X-RAY EXAM CHEST 1 VIEW: CPT | Performed by: RADIOLOGY

## 2022-01-01 PROCEDURE — 97535 SELF CARE MNGMENT TRAINING: CPT

## 2022-01-01 PROCEDURE — 90677 PCV20 VACCINE IM: CPT | Performed by: INTERNAL MEDICINE

## 2022-01-01 PROCEDURE — 83605 ASSAY OF LACTIC ACID: CPT

## 2022-01-01 PROCEDURE — G8484 FLU IMMUNIZE NO ADMIN: HCPCS | Performed by: NURSE PRACTITIONER

## 2022-01-01 PROCEDURE — 2580000003 HC RX 258: Performed by: EMERGENCY MEDICINE

## 2022-01-01 PROCEDURE — 83615 LACTATE (LD) (LDH) ENZYME: CPT

## 2022-01-01 PROCEDURE — 1036F TOBACCO NON-USER: CPT | Performed by: NURSE PRACTITIONER

## 2022-01-01 PROCEDURE — 2500000003 HC RX 250 WO HCPCS: Performed by: NURSE PRACTITIONER

## 2022-01-01 PROCEDURE — 74176 CT ABD & PELVIS W/O CONTRAST: CPT | Performed by: RADIOLOGY

## 2022-01-01 PROCEDURE — 4040F PNEUMOC VAC/ADMIN/RCVD: CPT | Performed by: NURSE PRACTITIONER

## 2022-01-01 PROCEDURE — G8427 DOCREV CUR MEDS BY ELIG CLIN: HCPCS | Performed by: NURSE PRACTITIONER

## 2022-01-01 PROCEDURE — 82607 VITAMIN B-12: CPT

## 2022-01-01 PROCEDURE — A9540 TC99M MAA: HCPCS | Performed by: NURSE PRACTITIONER

## 2022-01-01 PROCEDURE — 3430000000 HC RX DIAGNOSTIC RADIOPHARMACEUTICAL: Performed by: NURSE PRACTITIONER

## 2022-01-01 PROCEDURE — 99214 OFFICE O/P EST MOD 30 MIN: CPT | Performed by: NURSE PRACTITIONER

## 2022-01-01 PROCEDURE — 81001 URINALYSIS AUTO W/SCOPE: CPT

## 2022-01-01 PROCEDURE — 83036 HEMOGLOBIN GLYCOSYLATED A1C: CPT

## 2022-01-01 PROCEDURE — 86923 COMPATIBILITY TEST ELECTRIC: CPT

## 2022-01-01 PROCEDURE — 99232 SBSQ HOSP IP/OBS MODERATE 35: CPT | Performed by: SPECIALIST

## 2022-01-01 PROCEDURE — 1123F ACP DISCUSS/DSCN MKR DOCD: CPT | Performed by: NURSE PRACTITIONER

## 2022-01-01 RX ORDER — MECOBALAMIN 5000 MCG
5 TABLET,DISINTEGRATING ORAL NIGHTLY
Status: DISCONTINUED | OUTPATIENT
Start: 2022-01-01 | End: 2022-01-01 | Stop reason: HOSPADM

## 2022-01-01 RX ORDER — ISOSORBIDE MONONITRATE 30 MG/1
30 TABLET, EXTENDED RELEASE ORAL DAILY
Status: DISCONTINUED | OUTPATIENT
Start: 2022-01-01 | End: 2022-01-01 | Stop reason: HOSPADM

## 2022-01-01 RX ORDER — FUROSEMIDE 40 MG/1
40 TABLET ORAL DAILY
Qty: 90 TABLET | Refills: 3 | Status: ON HOLD
Start: 2022-01-01 | End: 2022-01-01 | Stop reason: HOSPADM

## 2022-01-01 RX ORDER — SILDENAFIL CITRATE 20 MG/1
20 TABLET ORAL 3 TIMES DAILY
Status: DISCONTINUED | OUTPATIENT
Start: 2022-01-01 | End: 2022-01-01 | Stop reason: HOSPADM

## 2022-01-01 RX ORDER — ASPIRIN 81 MG/1
81 TABLET ORAL NIGHTLY
Status: DISCONTINUED | OUTPATIENT
Start: 2022-01-01 | End: 2022-01-01

## 2022-01-01 RX ORDER — SODIUM CHLORIDE 9 MG/ML
INJECTION, SOLUTION INTRAVENOUS PRN
Status: DISCONTINUED | OUTPATIENT
Start: 2022-01-01 | End: 2022-01-01

## 2022-01-01 RX ORDER — BUDESONIDE 0.5 MG/2ML
0.5 INHALANT ORAL 2 TIMES DAILY
Status: CANCELLED | OUTPATIENT
Start: 2022-01-01

## 2022-01-01 RX ORDER — DOXYCYCLINE HYCLATE 100 MG/1
100 CAPSULE ORAL EVERY 12 HOURS SCHEDULED
Status: COMPLETED | OUTPATIENT
Start: 2022-01-01 | End: 2022-01-01

## 2022-01-01 RX ORDER — POTASSIUM CHLORIDE 20 MEQ/1
40 TABLET, EXTENDED RELEASE ORAL ONCE
Status: COMPLETED | OUTPATIENT
Start: 2022-01-01 | End: 2022-01-01

## 2022-01-01 RX ORDER — FUROSEMIDE 10 MG/ML
20 INJECTION INTRAMUSCULAR; INTRAVENOUS SEE ADMIN INSTRUCTIONS
Status: COMPLETED | OUTPATIENT
Start: 2022-01-01 | End: 2022-01-01

## 2022-01-01 RX ORDER — POTASSIUM CHLORIDE 7.45 MG/ML
10 INJECTION INTRAVENOUS PRN
Status: DISCONTINUED | OUTPATIENT
Start: 2022-01-01 | End: 2022-01-01 | Stop reason: HOSPADM

## 2022-01-01 RX ORDER — METOPROLOL TARTRATE 5 MG/5ML
2.5 INJECTION INTRAVENOUS EVERY 6 HOURS PRN
Status: DISCONTINUED | OUTPATIENT
Start: 2022-01-01 | End: 2022-01-01

## 2022-01-01 RX ORDER — ONDANSETRON 2 MG/ML
4 INJECTION INTRAMUSCULAR; INTRAVENOUS EVERY 6 HOURS PRN
Status: DISCONTINUED | OUTPATIENT
Start: 2022-01-01 | End: 2022-01-01 | Stop reason: HOSPADM

## 2022-01-01 RX ORDER — SODIUM CHLORIDE 0.9 % (FLUSH) 0.9 %
5-40 SYRINGE (ML) INJECTION PRN
Status: CANCELLED | OUTPATIENT
Start: 2022-01-01

## 2022-01-01 RX ORDER — PREDNISONE 10 MG/1
10 TABLET ORAL DAILY
Status: DISCONTINUED | OUTPATIENT
Start: 2022-01-01 | End: 2022-01-01 | Stop reason: HOSPADM

## 2022-01-01 RX ORDER — POTASSIUM CHLORIDE 20 MEQ/1
40 TABLET, EXTENDED RELEASE ORAL 2 TIMES DAILY
Status: COMPLETED | OUTPATIENT
Start: 2022-01-01 | End: 2022-01-01

## 2022-01-01 RX ORDER — METHYLPREDNISOLONE SODIUM SUCCINATE 40 MG/ML
40 INJECTION, POWDER, LYOPHILIZED, FOR SOLUTION INTRAMUSCULAR; INTRAVENOUS EVERY 6 HOURS
Status: DISCONTINUED | OUTPATIENT
Start: 2022-01-01 | End: 2022-01-01

## 2022-01-01 RX ORDER — PREDNISONE 10 MG/1
TABLET ORAL
Qty: 31 TABLET | Refills: 0 | Status: SHIPPED | OUTPATIENT
Start: 2022-01-01 | End: 2022-01-01

## 2022-01-01 RX ORDER — ACETAMINOPHEN 325 MG/1
650 TABLET ORAL EVERY 6 HOURS PRN
Status: DISCONTINUED | OUTPATIENT
Start: 2022-01-01 | End: 2022-01-01 | Stop reason: HOSPADM

## 2022-01-01 RX ORDER — DIPHENHYDRAMINE HYDROCHLORIDE 50 MG/ML
12.5 INJECTION INTRAMUSCULAR; INTRAVENOUS EVERY 4 HOURS PRN
Status: CANCELLED | OUTPATIENT
Start: 2022-01-01

## 2022-01-01 RX ORDER — LORAZEPAM 0.5 MG/1
0.5 TABLET ORAL EVERY 8 HOURS PRN
Status: DISCONTINUED | OUTPATIENT
Start: 2022-01-01 | End: 2022-01-01

## 2022-01-01 RX ORDER — AMLODIPINE BESYLATE 5 MG/1
5 TABLET ORAL 2 TIMES DAILY
Status: DISCONTINUED | OUTPATIENT
Start: 2022-01-01 | End: 2022-01-01 | Stop reason: HOSPADM

## 2022-01-01 RX ORDER — LEVOTHYROXINE SODIUM 0.03 MG/1
25 TABLET ORAL DAILY
Status: DISCONTINUED | OUTPATIENT
Start: 2022-01-01 | End: 2022-01-01 | Stop reason: HOSPADM

## 2022-01-01 RX ORDER — POTASSIUM CHLORIDE 20 MEQ/1
40 TABLET, EXTENDED RELEASE ORAL PRN
Status: DISCONTINUED | OUTPATIENT
Start: 2022-01-01 | End: 2022-01-01 | Stop reason: HOSPADM

## 2022-01-01 RX ORDER — ACETAMINOPHEN 650 MG/1
650 SUPPOSITORY RECTAL EVERY 6 HOURS PRN
Status: CANCELLED | OUTPATIENT
Start: 2022-01-01

## 2022-01-01 RX ORDER — DEXTROSE MONOHYDRATE 50 MG/ML
100 INJECTION, SOLUTION INTRAVENOUS PRN
Status: DISCONTINUED | OUTPATIENT
Start: 2022-01-01 | End: 2022-01-01 | Stop reason: HOSPADM

## 2022-01-01 RX ORDER — PREDNISONE 10 MG/1
10 TABLET ORAL DAILY
Status: DISCONTINUED | OUTPATIENT
Start: 2022-01-01 | End: 2022-01-01

## 2022-01-01 RX ORDER — ACETAMINOPHEN 650 MG/1
650 SUPPOSITORY RECTAL EVERY 6 HOURS PRN
Status: DISCONTINUED | OUTPATIENT
Start: 2022-01-01 | End: 2022-01-01 | Stop reason: HOSPADM

## 2022-01-01 RX ORDER — DIPHENHYDRAMINE HYDROCHLORIDE 50 MG/ML
12.5 INJECTION INTRAMUSCULAR; INTRAVENOUS EVERY 6 HOURS PRN
Status: DISCONTINUED | OUTPATIENT
Start: 2022-01-01 | End: 2022-01-01

## 2022-01-01 RX ORDER — CLOPIDOGREL BISULFATE 75 MG/1
75 TABLET ORAL DAILY
Status: CANCELLED | OUTPATIENT
Start: 2022-01-01

## 2022-01-01 RX ORDER — GUAIFENESIN 600 MG/1
1200 TABLET, EXTENDED RELEASE ORAL 2 TIMES DAILY
Status: DISCONTINUED | OUTPATIENT
Start: 2022-01-01 | End: 2022-01-01 | Stop reason: HOSPADM

## 2022-01-01 RX ORDER — METOPROLOL TARTRATE 50 MG/1
50 TABLET, FILM COATED ORAL 2 TIMES DAILY
Status: DISCONTINUED | OUTPATIENT
Start: 2022-01-01 | End: 2022-01-01

## 2022-01-01 RX ORDER — LEVALBUTEROL INHALATION SOLUTION 0.63 MG/3ML
0.63 SOLUTION RESPIRATORY (INHALATION) EVERY 8 HOURS PRN
Status: DISCONTINUED | OUTPATIENT
Start: 2022-01-01 | End: 2022-01-01

## 2022-01-01 RX ORDER — ARFORMOTEROL TARTRATE 15 UG/2ML
15 SOLUTION RESPIRATORY (INHALATION)
Qty: 360 ML | Refills: 3 | Status: SHIPPED | OUTPATIENT
Start: 2022-01-01

## 2022-01-01 RX ORDER — HYDROXYZINE HYDROCHLORIDE 10 MG/1
10 TABLET, FILM COATED ORAL 3 TIMES DAILY PRN
Status: CANCELLED | OUTPATIENT
Start: 2022-01-01

## 2022-01-01 RX ORDER — SODIUM CHLORIDE 0.9 % (FLUSH) 0.9 %
5-40 SYRINGE (ML) INJECTION EVERY 12 HOURS SCHEDULED
Status: DISCONTINUED | OUTPATIENT
Start: 2022-01-01 | End: 2022-01-01 | Stop reason: HOSPADM

## 2022-01-01 RX ORDER — IPRATROPIUM BROMIDE AND ALBUTEROL SULFATE 2.5; .5 MG/3ML; MG/3ML
1 SOLUTION RESPIRATORY (INHALATION) EVERY 4 HOURS
Status: DISCONTINUED | OUTPATIENT
Start: 2022-01-01 | End: 2022-01-01

## 2022-01-01 RX ORDER — ANTIARTHRITIC COMBINATION NO.2 900 MG
5000 TABLET ORAL DAILY
Status: DISCONTINUED | OUTPATIENT
Start: 2022-01-01 | End: 2022-01-01

## 2022-01-01 RX ORDER — PREDNISONE 20 MG/1
20 TABLET ORAL DAILY
Status: COMPLETED | OUTPATIENT
Start: 2022-01-01 | End: 2022-01-01

## 2022-01-01 RX ORDER — POTASSIUM CHLORIDE 7.45 MG/ML
10 INJECTION INTRAVENOUS PRN
Status: CANCELLED | OUTPATIENT
Start: 2022-01-01

## 2022-01-01 RX ORDER — HYDROXYZINE HYDROCHLORIDE 10 MG/1
10 TABLET, FILM COATED ORAL 3 TIMES DAILY PRN
Status: DISCONTINUED | OUTPATIENT
Start: 2022-01-01 | End: 2022-01-01 | Stop reason: HOSPADM

## 2022-01-01 RX ORDER — METOPROLOL TARTRATE 5 MG/5ML
5 INJECTION INTRAVENOUS EVERY 6 HOURS PRN
Status: DISCONTINUED | OUTPATIENT
Start: 2022-01-01 | End: 2022-01-01 | Stop reason: HOSPADM

## 2022-01-01 RX ORDER — SILDENAFIL CITRATE 20 MG/1
20 TABLET ORAL 3 TIMES DAILY
Qty: 90 TABLET | Refills: 0 | Status: SHIPPED | OUTPATIENT
Start: 2022-01-01 | End: 2022-07-14

## 2022-01-01 RX ORDER — PREDNISONE 10 MG/1
10 TABLET ORAL DAILY
Qty: 90 TABLET | Refills: 3 | Status: SHIPPED | OUTPATIENT
Start: 2022-01-01

## 2022-01-01 RX ORDER — BUMETANIDE 0.25 MG/ML
2 INJECTION, SOLUTION INTRAMUSCULAR; INTRAVENOUS 2 TIMES DAILY
Status: COMPLETED | OUTPATIENT
Start: 2022-01-01 | End: 2022-01-01

## 2022-01-01 RX ORDER — SODIUM CHLORIDE 9 MG/ML
INJECTION, SOLUTION INTRAVENOUS PRN
Status: DISCONTINUED | OUTPATIENT
Start: 2022-01-01 | End: 2022-01-01 | Stop reason: HOSPADM

## 2022-01-01 RX ORDER — M-VIT,TX,IRON,MINS/CALC/FOLIC 27MG-0.4MG
1 TABLET ORAL DAILY
COMMUNITY

## 2022-01-01 RX ORDER — POTASSIUM CHLORIDE 20 MEQ/1
20 TABLET, EXTENDED RELEASE ORAL DAILY
Status: ON HOLD | COMMUNITY
Start: 2022-01-01 | End: 2022-01-01 | Stop reason: HOSPADM

## 2022-01-01 RX ORDER — MAGNESIUM SULFATE IN WATER 40 MG/ML
2000 INJECTION, SOLUTION INTRAVENOUS PRN
Status: CANCELLED | OUTPATIENT
Start: 2022-01-01

## 2022-01-01 RX ORDER — PREDNISONE 10 MG/1
5 TABLET ORAL DAILY
Status: DISCONTINUED | OUTPATIENT
Start: 2022-01-01 | End: 2022-01-01

## 2022-01-01 RX ORDER — POTASSIUM CHLORIDE 20 MEQ/1
20 TABLET, EXTENDED RELEASE ORAL 2 TIMES DAILY
Qty: 60 TABLET | Refills: 0 | Status: SHIPPED | OUTPATIENT
Start: 2022-01-01 | End: 2022-07-14

## 2022-01-01 RX ORDER — ARFORMOTEROL TARTRATE 15 UG/2ML
15 SOLUTION RESPIRATORY (INHALATION) 2 TIMES DAILY
Status: DISCONTINUED | OUTPATIENT
Start: 2022-01-01 | End: 2022-01-01 | Stop reason: HOSPADM

## 2022-01-01 RX ORDER — HYDROXYZINE HYDROCHLORIDE 25 MG/1
25 TABLET, FILM COATED ORAL EVERY 6 HOURS PRN
Status: DISCONTINUED | OUTPATIENT
Start: 2022-01-01 | End: 2022-01-01 | Stop reason: HOSPADM

## 2022-01-01 RX ORDER — POTASSIUM CHLORIDE 20 MEQ/1
20 TABLET, EXTENDED RELEASE ORAL DAILY
Status: DISCONTINUED | OUTPATIENT
Start: 2022-01-01 | End: 2022-01-01

## 2022-01-01 RX ORDER — DIPHENHYDRAMINE HYDROCHLORIDE 50 MG/ML
12.5 INJECTION INTRAMUSCULAR; INTRAVENOUS EVERY 4 HOURS PRN
Status: DISCONTINUED | OUTPATIENT
Start: 2022-01-01 | End: 2022-01-01 | Stop reason: HOSPADM

## 2022-01-01 RX ORDER — POLYETHYLENE GLYCOL 3350 17 G/17G
17 POWDER, FOR SOLUTION ORAL DAILY PRN
Status: DISCONTINUED | OUTPATIENT
Start: 2022-01-01 | End: 2022-01-01 | Stop reason: HOSPADM

## 2022-01-01 RX ORDER — FUROSEMIDE 10 MG/ML
60 INJECTION INTRAMUSCULAR; INTRAVENOUS ONCE
Status: COMPLETED | OUTPATIENT
Start: 2022-01-01 | End: 2022-01-01

## 2022-01-01 RX ORDER — HEPARIN SODIUM 5000 [USP'U]/ML
5000 INJECTION, SOLUTION INTRAVENOUS; SUBCUTANEOUS 2 TIMES DAILY
Status: DISCONTINUED | OUTPATIENT
Start: 2022-01-01 | End: 2022-01-01 | Stop reason: HOSPADM

## 2022-01-01 RX ORDER — PREDNISONE 20 MG/1
20 TABLET ORAL DAILY
Status: DISCONTINUED | OUTPATIENT
Start: 2022-01-01 | End: 2022-01-01

## 2022-01-01 RX ORDER — ATORVASTATIN CALCIUM 40 MG/1
40 TABLET, FILM COATED ORAL NIGHTLY
Status: DISCONTINUED | OUTPATIENT
Start: 2022-01-01 | End: 2022-01-01 | Stop reason: HOSPADM

## 2022-01-01 RX ORDER — NITROGLYCERIN 0.4 MG/1
0.4 TABLET SUBLINGUAL EVERY 5 MIN PRN
Status: DISCONTINUED | OUTPATIENT
Start: 2022-01-01 | End: 2022-01-01 | Stop reason: HOSPADM

## 2022-01-01 RX ORDER — ANTIARTHRITIC COMBINATION NO.2 900 MG
1 TABLET ORAL DAILY
Status: DISCONTINUED | OUTPATIENT
Start: 2022-01-01 | End: 2022-01-01

## 2022-01-01 RX ORDER — FUROSEMIDE 40 MG/1
40 TABLET ORAL DAILY
Status: DISCONTINUED | OUTPATIENT
Start: 2022-01-01 | End: 2022-01-01 | Stop reason: HOSPADM

## 2022-01-01 RX ORDER — CEFDINIR 300 MG/1
300 CAPSULE ORAL 2 TIMES DAILY
Qty: 20 CAPSULE | Refills: 0 | Status: SHIPPED | OUTPATIENT
Start: 2022-01-01 | End: 2022-01-01

## 2022-01-01 RX ORDER — PREDNISONE 10 MG/1
TABLET ORAL
COMMUNITY
Start: 2022-01-01 | End: 2022-01-01

## 2022-01-01 RX ORDER — SODIUM CHLORIDE 0.9 % (FLUSH) 0.9 %
5-40 SYRINGE (ML) INJECTION PRN
Status: DISCONTINUED | OUTPATIENT
Start: 2022-01-01 | End: 2022-01-01 | Stop reason: HOSPADM

## 2022-01-01 RX ORDER — GUAIFENESIN 600 MG/1
1200 TABLET, EXTENDED RELEASE ORAL 2 TIMES DAILY
Qty: 60 TABLET | Refills: 0 | Status: SHIPPED | OUTPATIENT
Start: 2022-01-01

## 2022-01-01 RX ORDER — GUAIFENESIN 600 MG/1
1200 TABLET, EXTENDED RELEASE ORAL 2 TIMES DAILY
Status: CANCELLED | OUTPATIENT
Start: 2022-01-01

## 2022-01-01 RX ORDER — ARFORMOTEROL TARTRATE 15 UG/2ML
15 SOLUTION RESPIRATORY (INHALATION)
Qty: 360 ML | Refills: 3 | Status: SHIPPED | OUTPATIENT
Start: 2022-01-01 | End: 2022-01-01 | Stop reason: SDUPTHER

## 2022-01-01 RX ORDER — ONDANSETRON 4 MG/1
4 TABLET, ORALLY DISINTEGRATING ORAL EVERY 8 HOURS PRN
Status: DISCONTINUED | OUTPATIENT
Start: 2022-01-01 | End: 2022-01-01 | Stop reason: HOSPADM

## 2022-01-01 RX ORDER — PREDNISONE 10 MG/1
15 TABLET ORAL DAILY
Status: DISCONTINUED | OUTPATIENT
Start: 2022-01-01 | End: 2022-01-01

## 2022-01-01 RX ORDER — BACITRACIN, NEOMYCIN, POLYMYXIN B 400; 3.5; 5 [USP'U]/G; MG/G; [USP'U]/G
OINTMENT TOPICAL 2 TIMES DAILY PRN
Status: CANCELLED | OUTPATIENT
Start: 2022-01-01

## 2022-01-01 RX ORDER — ONDANSETRON 4 MG/1
4 TABLET, ORALLY DISINTEGRATING ORAL EVERY 8 HOURS PRN
Status: CANCELLED | OUTPATIENT
Start: 2022-01-01

## 2022-01-01 RX ORDER — HYDROCODONE BITARTRATE AND ACETAMINOPHEN 7.5; 325 MG/1; MG/1
1 TABLET ORAL EVERY 6 HOURS PRN
Status: DISCONTINUED | OUTPATIENT
Start: 2022-01-01 | End: 2022-01-01

## 2022-01-01 RX ORDER — PREDNISONE 20 MG/1
60 TABLET ORAL DAILY
Status: DISCONTINUED | OUTPATIENT
Start: 2022-01-01 | End: 2022-01-01 | Stop reason: HOSPADM

## 2022-01-01 RX ORDER — ISOSORBIDE MONONITRATE 30 MG/1
30 TABLET, EXTENDED RELEASE ORAL DAILY
Status: CANCELLED | OUTPATIENT
Start: 2022-01-01

## 2022-01-01 RX ORDER — HYDROCODONE BITARTRATE AND ACETAMINOPHEN 7.5; 325 MG/1; MG/1
1 TABLET ORAL EVERY 6 HOURS PRN
Status: DISCONTINUED | OUTPATIENT
Start: 2022-01-01 | End: 2022-01-01 | Stop reason: HOSPADM

## 2022-01-01 RX ORDER — POLYETHYLENE GLYCOL 3350 17 G/17G
17 POWDER, FOR SOLUTION ORAL DAILY PRN
Status: CANCELLED | OUTPATIENT
Start: 2022-01-01

## 2022-01-01 RX ORDER — SILDENAFIL CITRATE 20 MG/1
20 TABLET ORAL 3 TIMES DAILY
Status: CANCELLED | OUTPATIENT
Start: 2022-01-01

## 2022-01-01 RX ORDER — METOPROLOL TARTRATE 50 MG/1
50 TABLET, FILM COATED ORAL 2 TIMES DAILY
Status: DISCONTINUED | OUTPATIENT
Start: 2022-01-01 | End: 2022-01-01 | Stop reason: HOSPADM

## 2022-01-01 RX ORDER — ASPIRIN 81 MG/1
81 TABLET ORAL NIGHTLY
Status: DISCONTINUED | OUTPATIENT
Start: 2022-01-01 | End: 2022-01-01 | Stop reason: HOSPADM

## 2022-01-01 RX ORDER — ALBUTEROL SULFATE 2.5 MG/3ML
1.25 SOLUTION RESPIRATORY (INHALATION) 4 TIMES DAILY PRN
Status: DISCONTINUED | OUTPATIENT
Start: 2022-01-01 | End: 2022-01-01 | Stop reason: HOSPADM

## 2022-01-01 RX ORDER — BACITRACIN, NEOMYCIN, POLYMYXIN B 400; 3.5; 5 [USP'U]/G; MG/G; [USP'U]/G
OINTMENT TOPICAL 2 TIMES DAILY PRN
Status: DISCONTINUED | OUTPATIENT
Start: 2022-01-01 | End: 2022-01-01 | Stop reason: HOSPADM

## 2022-01-01 RX ORDER — CLOPIDOGREL BISULFATE 75 MG/1
75 TABLET ORAL DAILY
Status: DISCONTINUED | OUTPATIENT
Start: 2022-01-01 | End: 2022-01-01 | Stop reason: HOSPADM

## 2022-01-01 RX ORDER — SALIVA STIMULANT COMB. NO.3
SPRAY, NON-AEROSOL (ML) MUCOUS MEMBRANE PRN
Status: DISCONTINUED | OUTPATIENT
Start: 2022-01-01 | End: 2022-01-01 | Stop reason: HOSPADM

## 2022-01-01 RX ORDER — INSULIN LISPRO 100 [IU]/ML
0-6 INJECTION, SOLUTION INTRAVENOUS; SUBCUTANEOUS
Status: DISCONTINUED | OUTPATIENT
Start: 2022-01-01 | End: 2022-01-01 | Stop reason: HOSPADM

## 2022-01-01 RX ORDER — METOPROLOL SUCCINATE 50 MG/1
50 TABLET, EXTENDED RELEASE ORAL 2 TIMES DAILY
Status: DISCONTINUED | OUTPATIENT
Start: 2022-01-01 | End: 2022-01-01 | Stop reason: HOSPADM

## 2022-01-01 RX ORDER — METHYLPREDNISOLONE SODIUM SUCCINATE 40 MG/ML
40 INJECTION, POWDER, LYOPHILIZED, FOR SOLUTION INTRAMUSCULAR; INTRAVENOUS EVERY 12 HOURS
Status: DISCONTINUED | OUTPATIENT
Start: 2022-01-01 | End: 2022-01-01

## 2022-01-01 RX ORDER — ONDANSETRON 2 MG/ML
4 INJECTION INTRAMUSCULAR; INTRAVENOUS EVERY 6 HOURS PRN
Status: CANCELLED | OUTPATIENT
Start: 2022-01-01

## 2022-01-01 RX ORDER — AMOXICILLIN AND CLAVULANATE POTASSIUM 875; 125 MG/1; MG/1
1 TABLET, FILM COATED ORAL EVERY 12 HOURS SCHEDULED
Status: CANCELLED | OUTPATIENT
Start: 2022-01-01 | End: 2022-07-02

## 2022-01-01 RX ORDER — BUMETANIDE 0.25 MG/ML
2 INJECTION, SOLUTION INTRAMUSCULAR; INTRAVENOUS 2 TIMES DAILY
Status: DISCONTINUED | OUTPATIENT
Start: 2022-01-01 | End: 2022-01-01

## 2022-01-01 RX ORDER — BUDESONIDE 0.5 MG/2ML
0.5 INHALANT ORAL 2 TIMES DAILY
Status: DISCONTINUED | OUTPATIENT
Start: 2022-01-01 | End: 2022-01-01 | Stop reason: HOSPADM

## 2022-01-01 RX ORDER — LEVALBUTEROL INHALATION SOLUTION 0.63 MG/3ML
0.63 SOLUTION RESPIRATORY (INHALATION) EVERY 4 HOURS
Status: DISCONTINUED | OUTPATIENT
Start: 2022-01-01 | End: 2022-01-01 | Stop reason: HOSPADM

## 2022-01-01 RX ORDER — ARFORMOTEROL TARTRATE 15 UG/2ML
15 SOLUTION RESPIRATORY (INHALATION) 2 TIMES DAILY
Status: CANCELLED | OUTPATIENT
Start: 2022-01-01

## 2022-01-01 RX ORDER — PANTOPRAZOLE SODIUM 40 MG/1
40 TABLET, DELAYED RELEASE ORAL
Qty: 60 TABLET | Refills: 0 | Status: SHIPPED | OUTPATIENT
Start: 2022-01-01 | End: 2022-07-14

## 2022-01-01 RX ORDER — SODIUM CHLORIDE 9 MG/ML
INJECTION, SOLUTION INTRAVENOUS CONTINUOUS
Status: DISCONTINUED | OUTPATIENT
Start: 2022-01-01 | End: 2022-01-01

## 2022-01-01 RX ORDER — POTASSIUM CHLORIDE 20 MEQ/1
20 TABLET, EXTENDED RELEASE ORAL 2 TIMES DAILY
Status: DISCONTINUED | OUTPATIENT
Start: 2022-01-01 | End: 2022-01-01

## 2022-01-01 RX ORDER — IPRATROPIUM BROMIDE AND ALBUTEROL SULFATE 2.5; .5 MG/3ML; MG/3ML
3 SOLUTION RESPIRATORY (INHALATION) 4 TIMES DAILY
Status: DISCONTINUED | OUTPATIENT
Start: 2022-01-01 | End: 2022-01-01 | Stop reason: HOSPADM

## 2022-01-01 RX ORDER — DIPHENHYDRAMINE HYDROCHLORIDE 50 MG/ML
12.5 INJECTION INTRAMUSCULAR; INTRAVENOUS ONCE
Status: COMPLETED | OUTPATIENT
Start: 2022-01-01 | End: 2022-01-01

## 2022-01-01 RX ORDER — ACETAMINOPHEN 325 MG/1
650 TABLET ORAL EVERY 6 HOURS PRN
Status: CANCELLED | OUTPATIENT
Start: 2022-01-01

## 2022-01-01 RX ORDER — METOPROLOL TARTRATE 50 MG/1
100 TABLET, FILM COATED ORAL 2 TIMES DAILY
Status: CANCELLED | OUTPATIENT
Start: 2022-01-01

## 2022-01-01 RX ORDER — POTASSIUM CHLORIDE 20 MEQ/1
20 TABLET, EXTENDED RELEASE ORAL DAILY
COMMUNITY
Start: 2022-01-01

## 2022-01-01 RX ORDER — METOPROLOL TARTRATE 50 MG/1
100 TABLET, FILM COATED ORAL 2 TIMES DAILY
Status: DISCONTINUED | OUTPATIENT
Start: 2022-01-01 | End: 2022-01-01 | Stop reason: HOSPADM

## 2022-01-01 RX ORDER — POLYETHYLENE GLYCOL 3350 17 G/17G
17 POWDER, FOR SOLUTION ORAL DAILY PRN
Status: DISCONTINUED | OUTPATIENT
Start: 2022-01-01 | End: 2022-01-01

## 2022-01-01 RX ORDER — GUAIFENESIN 600 MG/1
2 TABLET, EXTENDED RELEASE ORAL 2 TIMES DAILY
COMMUNITY
Start: 2022-01-01

## 2022-01-01 RX ORDER — ALBUTEROL SULFATE 2.5 MG/3ML
1.25 SOLUTION RESPIRATORY (INHALATION) 4 TIMES DAILY PRN
Status: CANCELLED | OUTPATIENT
Start: 2022-01-01

## 2022-01-01 RX ORDER — MORPHINE SULFATE 2 MG/ML
1 INJECTION, SOLUTION INTRAMUSCULAR; INTRAVENOUS
Status: DISCONTINUED | OUTPATIENT
Start: 2022-01-01 | End: 2022-01-01 | Stop reason: HOSPADM

## 2022-01-01 RX ORDER — HYDROCODONE BITARTRATE AND ACETAMINOPHEN 10; 325 MG/1; MG/1
1 TABLET ORAL EVERY 12 HOURS PRN
Status: DISCONTINUED | OUTPATIENT
Start: 2022-01-01 | End: 2022-01-01

## 2022-01-01 RX ORDER — ALBUTEROL SULFATE 2.5 MG/3ML
2.5 SOLUTION RESPIRATORY (INHALATION) EVERY 4 HOURS PRN
Status: DISCONTINUED | OUTPATIENT
Start: 2022-01-01 | End: 2022-01-01 | Stop reason: HOSPADM

## 2022-01-01 RX ORDER — ISOSORBIDE MONONITRATE 60 MG/1
30 TABLET, EXTENDED RELEASE ORAL DAILY
Status: DISCONTINUED | OUTPATIENT
Start: 2022-01-01 | End: 2022-01-01 | Stop reason: HOSPADM

## 2022-01-01 RX ORDER — CEFDINIR 300 MG/1
300 CAPSULE ORAL 2 TIMES DAILY
Status: ON HOLD | COMMUNITY
Start: 2022-01-01 | End: 2022-01-01 | Stop reason: HOSPADM

## 2022-01-01 RX ORDER — CEFEPIME HYDROCHLORIDE 1 G/50ML
1000 INJECTION, SOLUTION INTRAVENOUS EVERY 12 HOURS
Status: DISCONTINUED | OUTPATIENT
Start: 2022-01-01 | End: 2022-01-01

## 2022-01-01 RX ORDER — METHYLPREDNISOLONE SODIUM SUCCINATE 125 MG/2ML
125 INJECTION, POWDER, LYOPHILIZED, FOR SOLUTION INTRAMUSCULAR; INTRAVENOUS ONCE
Status: COMPLETED | OUTPATIENT
Start: 2022-01-01 | End: 2022-01-01

## 2022-01-01 RX ORDER — LEVOTHYROXINE SODIUM 0.03 MG/1
25 TABLET ORAL DAILY
Status: CANCELLED | OUTPATIENT
Start: 2022-01-01

## 2022-01-01 RX ORDER — SODIUM CHLORIDE 9 MG/ML
INJECTION, SOLUTION INTRAVENOUS PRN
Status: CANCELLED | OUTPATIENT
Start: 2022-01-01

## 2022-01-01 RX ORDER — METOPROLOL SUCCINATE 50 MG/1
50 TABLET, EXTENDED RELEASE ORAL DAILY
Status: DISCONTINUED | OUTPATIENT
Start: 2022-01-01 | End: 2022-01-01

## 2022-01-01 RX ORDER — BUMETANIDE 1 MG/1
2 TABLET ORAL 2 TIMES DAILY
Status: DISCONTINUED | OUTPATIENT
Start: 2022-01-01 | End: 2022-01-01 | Stop reason: HOSPADM

## 2022-01-01 RX ORDER — POTASSIUM CHLORIDE 20 MEQ/1
40 TABLET, EXTENDED RELEASE ORAL PRN
Status: CANCELLED | OUTPATIENT
Start: 2022-01-01

## 2022-01-01 RX ORDER — ATORVASTATIN CALCIUM 40 MG/1
40 TABLET, FILM COATED ORAL NIGHTLY
Status: CANCELLED | OUTPATIENT
Start: 2022-01-01

## 2022-01-01 RX ORDER — CEFEPIME HYDROCHLORIDE 1 G/50ML
1000 INJECTION, SOLUTION INTRAVENOUS EVERY 12 HOURS
Status: DISCONTINUED | OUTPATIENT
Start: 2022-01-01 | End: 2022-01-01 | Stop reason: SDUPTHER

## 2022-01-01 RX ORDER — POTASSIUM CHLORIDE 20 MEQ/1
40 TABLET, EXTENDED RELEASE ORAL DAILY
Status: DISCONTINUED | OUTPATIENT
Start: 2022-01-01 | End: 2022-01-01 | Stop reason: HOSPADM

## 2022-01-01 RX ORDER — MORPHINE SULFATE 2 MG/ML
1 INJECTION, SOLUTION INTRAMUSCULAR; INTRAVENOUS ONCE
Status: COMPLETED | OUTPATIENT
Start: 2022-01-01 | End: 2022-01-01

## 2022-01-01 RX ORDER — IPRATROPIUM BROMIDE AND ALBUTEROL SULFATE 2.5; .5 MG/3ML; MG/3ML
3 SOLUTION RESPIRATORY (INHALATION) 4 TIMES DAILY
Status: CANCELLED | OUTPATIENT
Start: 2022-01-01

## 2022-01-01 RX ORDER — CLOPIDOGREL BISULFATE 75 MG/1
75 TABLET ORAL DAILY
COMMUNITY

## 2022-01-01 RX ORDER — FAMOTIDINE 20 MG/1
20 TABLET, FILM COATED ORAL ONCE
Status: COMPLETED | OUTPATIENT
Start: 2022-01-01 | End: 2022-01-01

## 2022-01-01 RX ORDER — PREDNISONE 1 MG/1
5 TABLET ORAL DAILY
Qty: 10 TABLET | Refills: 0 | Status: SHIPPED | OUTPATIENT
Start: 2022-01-01 | End: 2022-01-01

## 2022-01-01 RX ORDER — IPRATROPIUM BROMIDE AND ALBUTEROL SULFATE 2.5; .5 MG/3ML; MG/3ML
1 SOLUTION RESPIRATORY (INHALATION) ONCE
Status: COMPLETED | OUTPATIENT
Start: 2022-01-01 | End: 2022-01-01

## 2022-01-01 RX ORDER — INSULIN LISPRO 100 [IU]/ML
0-3 INJECTION, SOLUTION INTRAVENOUS; SUBCUTANEOUS NIGHTLY
Status: DISCONTINUED | OUTPATIENT
Start: 2022-01-01 | End: 2022-01-01 | Stop reason: HOSPADM

## 2022-01-01 RX ORDER — FUROSEMIDE 10 MG/ML
40 INJECTION INTRAMUSCULAR; INTRAVENOUS 2 TIMES DAILY
Status: DISCONTINUED | OUTPATIENT
Start: 2022-01-01 | End: 2022-01-01

## 2022-01-01 RX ORDER — HEPARIN SODIUM 5000 [USP'U]/ML
5000 INJECTION, SOLUTION INTRAVENOUS; SUBCUTANEOUS 2 TIMES DAILY
Status: CANCELLED | OUTPATIENT
Start: 2022-01-01

## 2022-01-01 RX ORDER — MAGNESIUM SULFATE IN WATER 40 MG/ML
2000 INJECTION, SOLUTION INTRAVENOUS ONCE
Status: COMPLETED | OUTPATIENT
Start: 2022-01-01 | End: 2022-01-01

## 2022-01-01 RX ORDER — HYDRALAZINE HYDROCHLORIDE 20 MG/ML
10 INJECTION INTRAMUSCULAR; INTRAVENOUS ONCE
Status: COMPLETED | OUTPATIENT
Start: 2022-01-01 | End: 2022-01-01

## 2022-01-01 RX ORDER — AMLODIPINE BESYLATE 5 MG/1
5 TABLET ORAL 2 TIMES DAILY
Status: CANCELLED | OUTPATIENT
Start: 2022-01-01

## 2022-01-01 RX ORDER — POLYETHYLENE GLYCOL 3350 17 G/17G
17 POWDER, FOR SOLUTION ORAL DAILY
Status: DISCONTINUED | OUTPATIENT
Start: 2022-01-01 | End: 2022-01-01 | Stop reason: HOSPADM

## 2022-01-01 RX ORDER — BUDESONIDE 0.25 MG/2ML
0.25 INHALANT ORAL 2 TIMES DAILY
Status: DISCONTINUED | OUTPATIENT
Start: 2022-01-01 | End: 2022-01-01 | Stop reason: SDUPTHER

## 2022-01-01 RX ORDER — AMOXICILLIN AND CLAVULANATE POTASSIUM 875; 125 MG/1; MG/1
1 TABLET, FILM COATED ORAL EVERY 12 HOURS SCHEDULED
Status: DISCONTINUED | OUTPATIENT
Start: 2022-01-01 | End: 2022-01-01 | Stop reason: HOSPADM

## 2022-01-01 RX ORDER — PANTOPRAZOLE SODIUM 40 MG/1
40 TABLET, DELAYED RELEASE ORAL
Status: DISCONTINUED | OUTPATIENT
Start: 2022-01-01 | End: 2022-01-01 | Stop reason: HOSPADM

## 2022-01-01 RX ORDER — PREDNISONE 10 MG/1
15 TABLET ORAL DAILY
Status: COMPLETED | OUTPATIENT
Start: 2022-01-01 | End: 2022-01-01

## 2022-01-01 RX ORDER — PREDNISONE 10 MG/1
TABLET ORAL
Qty: 53 TABLET | Refills: 0 | Status: SHIPPED | OUTPATIENT
Start: 2022-01-01 | End: 2022-01-01

## 2022-01-01 RX ORDER — CEFDINIR 300 MG/1
300 CAPSULE ORAL EVERY 12 HOURS SCHEDULED
Status: COMPLETED | OUTPATIENT
Start: 2022-01-01 | End: 2022-01-01

## 2022-01-01 RX ORDER — IPRATROPIUM BROMIDE AND ALBUTEROL SULFATE 2.5; .5 MG/3ML; MG/3ML
3 SOLUTION RESPIRATORY (INHALATION) 4 TIMES DAILY PRN
Qty: 120 ML | Refills: 11 | Status: SHIPPED | OUTPATIENT
Start: 2022-01-01

## 2022-01-01 RX ORDER — BUMETANIDE 2 MG/1
2 TABLET ORAL 2 TIMES DAILY
Qty: 60 TABLET | Refills: 0 | Status: SHIPPED | OUTPATIENT
Start: 2022-01-01 | End: 2022-07-14

## 2022-01-01 RX ORDER — IPRATROPIUM BROMIDE AND ALBUTEROL SULFATE 2.5; .5 MG/3ML; MG/3ML
1 SOLUTION RESPIRATORY (INHALATION)
Status: DISCONTINUED | OUTPATIENT
Start: 2022-01-01 | End: 2022-01-01 | Stop reason: HOSPADM

## 2022-01-01 RX ORDER — ALBUTEROL SULFATE 2.5 MG/3ML
SOLUTION RESPIRATORY (INHALATION)
Status: DISPENSED
Start: 2022-01-01 | End: 2022-01-01

## 2022-01-01 RX ORDER — PREDNISONE 20 MG/1
40 TABLET ORAL DAILY
Status: DISCONTINUED | OUTPATIENT
Start: 2022-01-01 | End: 2022-01-01

## 2022-01-01 RX ORDER — DOXYCYCLINE HYCLATE 100 MG/1
100 CAPSULE ORAL EVERY 12 HOURS SCHEDULED
Status: DISCONTINUED | OUTPATIENT
Start: 2022-01-01 | End: 2022-01-01

## 2022-01-01 RX ORDER — HYDROCODONE BITARTRATE AND ACETAMINOPHEN 5; 325 MG/1; MG/1
1 TABLET ORAL EVERY 6 HOURS PRN
COMMUNITY

## 2022-01-01 RX ORDER — ENOXAPARIN SODIUM 100 MG/ML
30 INJECTION SUBCUTANEOUS DAILY
Status: DISCONTINUED | OUTPATIENT
Start: 2022-01-01 | End: 2022-01-01

## 2022-01-01 RX ORDER — BUDESONIDE 0.5 MG/2ML
0.5 INHALANT ORAL
Qty: 60 ML | Refills: 11 | Status: SHIPPED | OUTPATIENT
Start: 2022-01-01

## 2022-01-01 RX ORDER — BUMETANIDE 1 MG/1
2 TABLET ORAL 2 TIMES DAILY
Status: CANCELLED | OUTPATIENT
Start: 2022-01-01

## 2022-01-01 RX ORDER — HYDROCODONE BITARTRATE AND ACETAMINOPHEN 5; 325 MG/1; MG/1
1 TABLET ORAL EVERY 6 HOURS PRN
Qty: 12 TABLET | Refills: 0 | Status: SHIPPED | OUTPATIENT
Start: 2022-01-01 | End: 2022-01-01

## 2022-01-01 RX ORDER — METHYLPREDNISOLONE SODIUM SUCCINATE 40 MG/ML
40 INJECTION, POWDER, LYOPHILIZED, FOR SOLUTION INTRAMUSCULAR; INTRAVENOUS EVERY 6 HOURS
Status: DISPENSED | OUTPATIENT
Start: 2022-01-01 | End: 2022-01-01

## 2022-01-01 RX ORDER — SALIVA STIMULANT COMB. NO.3
SPRAY, NON-AEROSOL (ML) MUCOUS MEMBRANE PRN
Status: CANCELLED | OUTPATIENT
Start: 2022-01-01

## 2022-01-01 RX ORDER — HYDROMORPHONE HYDROCHLORIDE 1 MG/ML
0.5 INJECTION, SOLUTION INTRAMUSCULAR; INTRAVENOUS; SUBCUTANEOUS ONCE
Status: COMPLETED | OUTPATIENT
Start: 2022-01-01 | End: 2022-01-01

## 2022-01-01 RX ORDER — MAGNESIUM SULFATE IN WATER 40 MG/ML
2000 INJECTION, SOLUTION INTRAVENOUS PRN
Status: DISCONTINUED | OUTPATIENT
Start: 2022-01-01 | End: 2022-01-01 | Stop reason: HOSPADM

## 2022-01-01 RX ORDER — SODIUM CHLORIDE 0.9 % (FLUSH) 0.9 %
5-40 SYRINGE (ML) INJECTION EVERY 12 HOURS SCHEDULED
Status: CANCELLED | OUTPATIENT
Start: 2022-01-01

## 2022-01-01 RX ORDER — PANTOPRAZOLE SODIUM 40 MG/1
40 TABLET, DELAYED RELEASE ORAL
Status: CANCELLED | OUTPATIENT
Start: 2022-01-01

## 2022-01-01 RX ORDER — ENOXAPARIN SODIUM 100 MG/ML
30 INJECTION SUBCUTANEOUS EVERY 24 HOURS
Status: DISCONTINUED | OUTPATIENT
Start: 2022-01-01 | End: 2022-01-01

## 2022-01-01 RX ADMIN — INSULIN LISPRO 2 UNITS: 100 INJECTION, SOLUTION INTRAVENOUS; SUBCUTANEOUS at 17:26

## 2022-01-01 RX ADMIN — Medication 5 MG: at 21:59

## 2022-01-01 RX ADMIN — ATORVASTATIN CALCIUM 40 MG: 40 TABLET, FILM COATED ORAL at 20:29

## 2022-01-01 RX ADMIN — ONDANSETRON HYDROCHLORIDE 4 MG: 2 SOLUTION INTRAMUSCULAR; INTRAVENOUS at 00:54

## 2022-01-01 RX ADMIN — IPRATROPIUM BROMIDE AND ALBUTEROL SULFATE 3 ML: 2.5; .5 SOLUTION RESPIRATORY (INHALATION) at 06:10

## 2022-01-01 RX ADMIN — CEFEPIME 2000 MG: 2 INJECTION, POWDER, FOR SOLUTION INTRAMUSCULAR; INTRAVENOUS at 20:52

## 2022-01-01 RX ADMIN — ONDANSETRON 4 MG: 4 TABLET, ORALLY DISINTEGRATING ORAL at 13:43

## 2022-01-01 RX ADMIN — METOPROLOL TARTRATE 50 MG: 50 TABLET, FILM COATED ORAL at 09:16

## 2022-01-01 RX ADMIN — ONDANSETRON HYDROCHLORIDE 4 MG: 2 SOLUTION INTRAMUSCULAR; INTRAVENOUS at 09:27

## 2022-01-01 RX ADMIN — INSULIN LISPRO 1 UNITS: 100 INJECTION, SOLUTION INTRAVENOUS; SUBCUTANEOUS at 21:13

## 2022-01-01 RX ADMIN — MORPHINE SULFATE 1 MG: 2 INJECTION, SOLUTION INTRAMUSCULAR; INTRAVENOUS at 14:09

## 2022-01-01 RX ADMIN — METHYLPREDNISOLONE SODIUM SUCCINATE 125 MG: 125 INJECTION, POWDER, FOR SOLUTION INTRAMUSCULAR; INTRAVENOUS at 11:10

## 2022-01-01 RX ADMIN — ISOSORBIDE MONONITRATE 30 MG: 30 TABLET, EXTENDED RELEASE ORAL at 08:11

## 2022-01-01 RX ADMIN — LEVALBUTEROL HYDROCHLORIDE 0.63 MG: 0.63 SOLUTION RESPIRATORY (INHALATION) at 02:06

## 2022-01-01 RX ADMIN — FUROSEMIDE 40 MG: 40 TABLET ORAL at 08:49

## 2022-01-01 RX ADMIN — METHYLPREDNISOLONE SODIUM SUCCINATE 40 MG: 40 INJECTION, POWDER, FOR SOLUTION INTRAMUSCULAR; INTRAVENOUS at 11:50

## 2022-01-01 RX ADMIN — SODIUM CHLORIDE, PRESERVATIVE FREE 10 ML: 5 INJECTION INTRAVENOUS at 08:14

## 2022-01-01 RX ADMIN — POTASSIUM CHLORIDE 20 MEQ: 1500 TABLET, EXTENDED RELEASE ORAL at 05:59

## 2022-01-01 RX ADMIN — SILDENAFIL CITRATE 20 MG: 20 TABLET ORAL at 13:58

## 2022-01-01 RX ADMIN — BUDESONIDE 500 MCG: 0.5 SUSPENSION RESPIRATORY (INHALATION) at 07:10

## 2022-01-01 RX ADMIN — ATORVASTATIN CALCIUM 40 MG: 40 TABLET, FILM COATED ORAL at 19:48

## 2022-01-01 RX ADMIN — GUAIFENESIN 1200 MG: 600 TABLET ORAL at 20:55

## 2022-01-01 RX ADMIN — CLOPIDOGREL BISULFATE 75 MG: 75 TABLET ORAL at 08:45

## 2022-01-01 RX ADMIN — METOPROLOL SUCCINATE 50 MG: 50 TABLET, EXTENDED RELEASE ORAL at 08:48

## 2022-01-01 RX ADMIN — POLYETHYLENE GLYCOL 3350 17 G: 17 POWDER, FOR SOLUTION ORAL at 09:25

## 2022-01-01 RX ADMIN — LEVALBUTEROL HYDROCHLORIDE 0.63 MG: 0.63 SOLUTION RESPIRATORY (INHALATION) at 18:55

## 2022-01-01 RX ADMIN — LEVALBUTEROL HYDROCHLORIDE 0.63 MG: 0.63 SOLUTION RESPIRATORY (INHALATION) at 22:16

## 2022-01-01 RX ADMIN — GUAIFENESIN 1200 MG: 600 TABLET ORAL at 19:47

## 2022-01-01 RX ADMIN — SODIUM CHLORIDE, PRESERVATIVE FREE 10 ML: 5 INJECTION INTRAVENOUS at 20:54

## 2022-01-01 RX ADMIN — IPRATROPIUM BROMIDE AND ALBUTEROL SULFATE 3 ML: 2.5; .5 SOLUTION RESPIRATORY (INHALATION) at 06:27

## 2022-01-01 RX ADMIN — ARFORMOTEROL TARTRATE 15 MCG: 15 SOLUTION RESPIRATORY (INHALATION) at 19:46

## 2022-01-01 RX ADMIN — IPRATROPIUM BROMIDE AND ALBUTEROL SULFATE 1 AMPULE: 2.5; .5 SOLUTION RESPIRATORY (INHALATION) at 14:24

## 2022-01-01 RX ADMIN — METOPROLOL TARTRATE 50 MG: 50 TABLET, FILM COATED ORAL at 21:25

## 2022-01-01 RX ADMIN — SODIUM CHLORIDE, PRESERVATIVE FREE 10 ML: 5 INJECTION INTRAVENOUS at 20:46

## 2022-01-01 RX ADMIN — ARFORMOTEROL TARTRATE 15 MCG: 15 SOLUTION RESPIRATORY (INHALATION) at 20:14

## 2022-01-01 RX ADMIN — HYDROXYZINE HYDROCHLORIDE 10 MG: 10 TABLET ORAL at 06:51

## 2022-01-01 RX ADMIN — IPRATROPIUM BROMIDE AND ALBUTEROL SULFATE 1 AMPULE: 2.5; .5 SOLUTION RESPIRATORY (INHALATION) at 18:47

## 2022-01-01 RX ADMIN — BUDESONIDE 500 MCG: 0.5 SUSPENSION RESPIRATORY (INHALATION) at 06:47

## 2022-01-01 RX ADMIN — ISOSORBIDE MONONITRATE 30 MG: 60 TABLET, EXTENDED RELEASE ORAL at 08:49

## 2022-01-01 RX ADMIN — METOPROLOL TARTRATE 50 MG: 50 TABLET, FILM COATED ORAL at 08:30

## 2022-01-01 RX ADMIN — LEVOTHYROXINE SODIUM 25 MCG: 25 TABLET ORAL at 08:49

## 2022-01-01 RX ADMIN — POTASSIUM CHLORIDE 40 MEQ: 20 TABLET, EXTENDED RELEASE ORAL at 08:49

## 2022-01-01 RX ADMIN — FUROSEMIDE 10 MG/HR: 10 INJECTION, SOLUTION INTRAVENOUS at 10:48

## 2022-01-01 RX ADMIN — PREDNISONE 15 MG: 10 TABLET ORAL at 08:05

## 2022-01-01 RX ADMIN — SILDENAFIL CITRATE 20 MG: 20 TABLET ORAL at 09:18

## 2022-01-01 RX ADMIN — ISOSORBIDE MONONITRATE 30 MG: 60 TABLET, EXTENDED RELEASE ORAL at 08:48

## 2022-01-01 RX ADMIN — POTASSIUM CHLORIDE 40 MEQ: 1500 TABLET, EXTENDED RELEASE ORAL at 20:47

## 2022-01-01 RX ADMIN — HYDROCODONE BITARTRATE AND ACETAMINOPHEN 1 TABLET: 7.5; 325 TABLET ORAL at 11:19

## 2022-01-01 RX ADMIN — ARFORMOTEROL TARTRATE 15 MCG: 15 SOLUTION RESPIRATORY (INHALATION) at 06:47

## 2022-01-01 RX ADMIN — GUAIFENESIN 1200 MG: 600 TABLET ORAL at 08:46

## 2022-01-01 RX ADMIN — IRON SUCROSE 500 MG: 20 INJECTION, SOLUTION INTRAVENOUS at 14:55

## 2022-01-01 RX ADMIN — AMLODIPINE BESYLATE 5 MG: 5 TABLET ORAL at 09:31

## 2022-01-01 RX ADMIN — HYDRALAZINE HYDROCHLORIDE 10 MG: 20 INJECTION INTRAMUSCULAR; INTRAVENOUS at 12:12

## 2022-01-01 RX ADMIN — GUAIFENESIN 1200 MG: 600 TABLET ORAL at 08:48

## 2022-01-01 RX ADMIN — METOPROLOL TARTRATE 50 MG: 50 TABLET, FILM COATED ORAL at 20:55

## 2022-01-01 RX ADMIN — LEVOTHYROXINE SODIUM 25 MCG: 25 TABLET ORAL at 06:05

## 2022-01-01 RX ADMIN — BUDESONIDE 500 MCG: 0.5 SUSPENSION RESPIRATORY (INHALATION) at 18:47

## 2022-01-01 RX ADMIN — SODIUM CHLORIDE, PRESERVATIVE FREE 10 ML: 5 INJECTION INTRAVENOUS at 11:04

## 2022-01-01 RX ADMIN — BUDESONIDE 500 MCG: 0.5 SUSPENSION RESPIRATORY (INHALATION) at 19:38

## 2022-01-01 RX ADMIN — INSULIN LISPRO 4 UNITS: 100 INJECTION, SOLUTION INTRAVENOUS; SUBCUTANEOUS at 17:44

## 2022-01-01 RX ADMIN — LEVALBUTEROL HYDROCHLORIDE 0.63 MG: 0.63 SOLUTION RESPIRATORY (INHALATION) at 10:17

## 2022-01-01 RX ADMIN — METOPROLOL SUCCINATE 50 MG: 50 TABLET, EXTENDED RELEASE ORAL at 19:47

## 2022-01-01 RX ADMIN — ARFORMOTEROL TARTRATE 15 MCG: 15 SOLUTION RESPIRATORY (INHALATION) at 07:21

## 2022-01-01 RX ADMIN — GUAIFENESIN 1200 MG: 600 TABLET ORAL at 19:53

## 2022-01-01 RX ADMIN — AMLODIPINE BESYLATE 5 MG: 5 TABLET ORAL at 10:29

## 2022-01-01 RX ADMIN — IPRATROPIUM BROMIDE AND ALBUTEROL SULFATE 3 ML: 2.5; .5 SOLUTION RESPIRATORY (INHALATION) at 19:00

## 2022-01-01 RX ADMIN — PANTOPRAZOLE SODIUM 40 MG: 40 TABLET, DELAYED RELEASE ORAL at 05:28

## 2022-01-01 RX ADMIN — CLOPIDOGREL BISULFATE 75 MG: 75 TABLET, FILM COATED ORAL at 10:29

## 2022-01-01 RX ADMIN — SODIUM CHLORIDE, PRESERVATIVE FREE 10 ML: 5 INJECTION INTRAVENOUS at 08:46

## 2022-01-01 RX ADMIN — BUDESONIDE 500 MCG: 0.5 SUSPENSION RESPIRATORY (INHALATION) at 07:22

## 2022-01-01 RX ADMIN — MORPHINE SULFATE 1 MG: 2 INJECTION, SOLUTION INTRAMUSCULAR; INTRAVENOUS at 19:49

## 2022-01-01 RX ADMIN — AMLODIPINE BESYLATE 5 MG: 5 TABLET ORAL at 19:53

## 2022-01-01 RX ADMIN — SILDENAFIL 20 MG: 20 TABLET, FILM COATED ORAL at 21:02

## 2022-01-01 RX ADMIN — INSULIN LISPRO 1 UNITS: 100 INJECTION, SOLUTION INTRAVENOUS; SUBCUTANEOUS at 21:02

## 2022-01-01 RX ADMIN — IPRATROPIUM BROMIDE AND ALBUTEROL SULFATE 1 AMPULE: 2.5; .5 SOLUTION RESPIRATORY (INHALATION) at 06:40

## 2022-01-01 RX ADMIN — SILDENAFIL 20 MG: 20 TABLET, FILM COATED ORAL at 09:16

## 2022-01-01 RX ADMIN — AMLODIPINE BESYLATE 5 MG: 5 TABLET ORAL at 08:47

## 2022-01-01 RX ADMIN — IPRATROPIUM BROMIDE AND ALBUTEROL SULFATE 1 AMPULE: 2.5; .5 SOLUTION RESPIRATORY (INHALATION) at 15:32

## 2022-01-01 RX ADMIN — LEVALBUTEROL HYDROCHLORIDE 0.63 MG: 0.63 SOLUTION RESPIRATORY (INHALATION) at 10:52

## 2022-01-01 RX ADMIN — GUAIFENESIN 1200 MG: 600 TABLET ORAL at 09:21

## 2022-01-01 RX ADMIN — SODIUM CHLORIDE, PRESERVATIVE FREE 10 ML: 5 INJECTION INTRAVENOUS at 20:15

## 2022-01-01 RX ADMIN — AMLODIPINE BESYLATE 5 MG: 5 TABLET ORAL at 08:37

## 2022-01-01 RX ADMIN — ARFORMOTEROL TARTRATE 15 MCG: 15 SOLUTION RESPIRATORY (INHALATION) at 06:39

## 2022-01-01 RX ADMIN — METOPROLOL TARTRATE 100 MG: 50 TABLET, FILM COATED ORAL at 09:15

## 2022-01-01 RX ADMIN — BUDESONIDE 500 MCG: 0.5 SUSPENSION RESPIRATORY (INHALATION) at 18:23

## 2022-01-01 RX ADMIN — Medication 5 MG: at 21:12

## 2022-01-01 RX ADMIN — BUMETANIDE 2 MG: 0.25 INJECTION INTRAMUSCULAR; INTRAVENOUS at 09:01

## 2022-01-01 RX ADMIN — BUDESONIDE 500 MCG: 0.5 SUSPENSION RESPIRATORY (INHALATION) at 06:27

## 2022-01-01 RX ADMIN — ASPIRIN 81 MG: 81 TABLET, COATED ORAL at 20:45

## 2022-01-01 RX ADMIN — AMLODIPINE BESYLATE 5 MG: 5 TABLET ORAL at 08:11

## 2022-01-01 RX ADMIN — IRON SUCROSE 200 MG: 20 INJECTION, SOLUTION INTRAVENOUS at 16:30

## 2022-01-01 RX ADMIN — ISOSORBIDE MONONITRATE 30 MG: 60 TABLET, EXTENDED RELEASE ORAL at 08:11

## 2022-01-01 RX ADMIN — LEVOTHYROXINE SODIUM 25 MCG: 25 TABLET ORAL at 05:40

## 2022-01-01 RX ADMIN — SODIUM CHLORIDE, PRESERVATIVE FREE 10 ML: 5 INJECTION INTRAVENOUS at 09:16

## 2022-01-01 RX ADMIN — INSULIN LISPRO 1 UNITS: 100 INJECTION, SOLUTION INTRAVENOUS; SUBCUTANEOUS at 08:19

## 2022-01-01 RX ADMIN — Medication 5 MG: at 20:55

## 2022-01-01 RX ADMIN — POTASSIUM CHLORIDE 20 MEQ: 20 TABLET, EXTENDED RELEASE ORAL at 07:40

## 2022-01-01 RX ADMIN — IPRATROPIUM BROMIDE AND ALBUTEROL SULFATE 3 ML: 2.5; .5 SOLUTION RESPIRATORY (INHALATION) at 05:58

## 2022-01-01 RX ADMIN — METOPROLOL TARTRATE 50 MG: 50 TABLET, FILM COATED ORAL at 09:32

## 2022-01-01 RX ADMIN — LEVOTHYROXINE SODIUM 25 MCG: 25 TABLET ORAL at 05:55

## 2022-01-01 RX ADMIN — BUMETANIDE 2 MG: 1 TABLET ORAL at 08:03

## 2022-01-01 RX ADMIN — CEFEPIME HYDROCHLORIDE 1000 MG: 1 INJECTION, SOLUTION INTRAVENOUS at 08:52

## 2022-01-01 RX ADMIN — ASPIRIN 81 MG: 81 TABLET, COATED ORAL at 21:02

## 2022-01-01 RX ADMIN — PANTOPRAZOLE SODIUM 40 MG: 40 TABLET, DELAYED RELEASE ORAL at 06:29

## 2022-01-01 RX ADMIN — CLOPIDOGREL BISULFATE 75 MG: 75 TABLET, FILM COATED ORAL at 07:40

## 2022-01-01 RX ADMIN — AZITHROMYCIN DIHYDRATE 500 MG: 500 INJECTION, POWDER, LYOPHILIZED, FOR SOLUTION INTRAVENOUS at 12:01

## 2022-01-01 RX ADMIN — ARFORMOTEROL TARTRATE 15 MCG: 15 SOLUTION RESPIRATORY (INHALATION) at 06:48

## 2022-01-01 RX ADMIN — SILDENAFIL CITRATE 20 MG: 20 TABLET ORAL at 08:11

## 2022-01-01 RX ADMIN — ACETAMINOPHEN 650 MG: 325 TABLET ORAL at 20:56

## 2022-01-01 RX ADMIN — LORAZEPAM 0.5 MG: 0.5 TABLET ORAL at 16:54

## 2022-01-01 RX ADMIN — CEFDINIR 300 MG: 300 CAPSULE ORAL at 20:55

## 2022-01-01 RX ADMIN — AMLODIPINE BESYLATE 5 MG: 5 TABLET ORAL at 20:54

## 2022-01-01 RX ADMIN — MUPIROCIN: 20 OINTMENT TOPICAL at 09:01

## 2022-01-01 RX ADMIN — CLOPIDOGREL BISULFATE 75 MG: 75 TABLET, FILM COATED ORAL at 07:36

## 2022-01-01 RX ADMIN — LEVOTHYROXINE SODIUM 25 MCG: 25 TABLET ORAL at 06:29

## 2022-01-01 RX ADMIN — BUMETANIDE 2 MG: 1 TABLET ORAL at 21:25

## 2022-01-01 RX ADMIN — SODIUM CHLORIDE, PRESERVATIVE FREE 10 ML: 5 INJECTION INTRAVENOUS at 09:40

## 2022-01-01 RX ADMIN — PANTOPRAZOLE SODIUM 40 MG: 40 TABLET, DELAYED RELEASE ORAL at 17:41

## 2022-01-01 RX ADMIN — SILDENAFIL 20 MG: 20 TABLET, FILM COATED ORAL at 20:17

## 2022-01-01 RX ADMIN — PANTOPRAZOLE SODIUM 40 MG: 40 TABLET, DELAYED RELEASE ORAL at 06:24

## 2022-01-01 RX ADMIN — MORPHINE SULFATE 1 MG: 2 INJECTION, SOLUTION INTRAMUSCULAR; INTRAVENOUS at 12:37

## 2022-01-01 RX ADMIN — INSULIN HUMAN 5 UNITS: 100 INJECTION, SUSPENSION SUBCUTANEOUS at 08:59

## 2022-01-01 RX ADMIN — BUDESONIDE 500 MCG: 0.5 SUSPENSION RESPIRATORY (INHALATION) at 19:44

## 2022-01-01 RX ADMIN — SODIUM CHLORIDE, PRESERVATIVE FREE 10 ML: 5 INJECTION INTRAVENOUS at 09:31

## 2022-01-01 RX ADMIN — ARFORMOTEROL TARTRATE 15 MCG: 15 SOLUTION RESPIRATORY (INHALATION) at 18:47

## 2022-01-01 RX ADMIN — ASPIRIN 81 MG: 81 TABLET, COATED ORAL at 21:10

## 2022-01-01 RX ADMIN — LEVALBUTEROL HYDROCHLORIDE 0.63 MG: 0.63 SOLUTION RESPIRATORY (INHALATION) at 14:59

## 2022-01-01 RX ADMIN — IPRATROPIUM BROMIDE AND ALBUTEROL SULFATE 1 AMPULE: 2.5; .5 SOLUTION RESPIRATORY (INHALATION) at 14:02

## 2022-01-01 RX ADMIN — METOPROLOL TARTRATE 100 MG: 50 TABLET, FILM COATED ORAL at 20:08

## 2022-01-01 RX ADMIN — HYDRALAZINE HYDROCHLORIDE 10 MG: 20 INJECTION, SOLUTION INTRAMUSCULAR; INTRAVENOUS at 00:02

## 2022-01-01 RX ADMIN — METOPROLOL SUCCINATE 50 MG: 50 TABLET, EXTENDED RELEASE ORAL at 20:51

## 2022-01-01 RX ADMIN — ATORVASTATIN CALCIUM 40 MG: 40 TABLET, FILM COATED ORAL at 20:34

## 2022-01-01 RX ADMIN — IPRATROPIUM BROMIDE AND ALBUTEROL SULFATE 3 ML: 2.5; .5 SOLUTION RESPIRATORY (INHALATION) at 19:54

## 2022-01-01 RX ADMIN — METOPROLOL TARTRATE 50 MG: 50 TABLET, FILM COATED ORAL at 20:47

## 2022-01-01 RX ADMIN — BUMETANIDE 2 MG: 0.25 INJECTION INTRAMUSCULAR; INTRAVENOUS at 18:06

## 2022-01-01 RX ADMIN — AMLODIPINE BESYLATE 5 MG: 5 TABLET ORAL at 09:15

## 2022-01-01 RX ADMIN — AMLODIPINE BESYLATE 5 MG: 5 TABLET ORAL at 21:43

## 2022-01-01 RX ADMIN — METOPROLOL TARTRATE 100 MG: 50 TABLET, FILM COATED ORAL at 09:18

## 2022-01-01 RX ADMIN — PANTOPRAZOLE SODIUM 40 MG: 40 TABLET, DELAYED RELEASE ORAL at 05:52

## 2022-01-01 RX ADMIN — ONDANSETRON HYDROCHLORIDE 4 MG: 2 SOLUTION INTRAMUSCULAR; INTRAVENOUS at 12:28

## 2022-01-01 RX ADMIN — BUMETANIDE 2 MG: 0.25 INJECTION INTRAMUSCULAR; INTRAVENOUS at 08:30

## 2022-01-01 RX ADMIN — BUDESONIDE 500 MCG: 0.5 SUSPENSION RESPIRATORY (INHALATION) at 06:20

## 2022-01-01 RX ADMIN — ISOSORBIDE MONONITRATE 30 MG: 60 TABLET, EXTENDED RELEASE ORAL at 07:38

## 2022-01-01 RX ADMIN — ATORVASTATIN CALCIUM 40 MG: 40 TABLET, FILM COATED ORAL at 20:48

## 2022-01-01 RX ADMIN — IRON SUCROSE 200 MG: 20 INJECTION, SOLUTION INTRAVENOUS at 16:08

## 2022-01-01 RX ADMIN — SILDENAFIL CITRATE 20 MG: 20 TABLET ORAL at 09:32

## 2022-01-01 RX ADMIN — HYDROXYZINE HYDROCHLORIDE 25 MG: 25 TABLET ORAL at 08:10

## 2022-01-01 RX ADMIN — GUAIFENESIN 1200 MG: 600 TABLET ORAL at 21:59

## 2022-01-01 RX ADMIN — GUAIFENESIN 1200 MG: 600 TABLET ORAL at 20:49

## 2022-01-01 RX ADMIN — GUAIFENESIN 1200 MG: 600 TABLET ORAL at 09:14

## 2022-01-01 RX ADMIN — SILDENAFIL 20 MG: 20 TABLET, FILM COATED ORAL at 14:48

## 2022-01-01 RX ADMIN — ARFORMOTEROL TARTRATE 15 MCG: 15 SOLUTION RESPIRATORY (INHALATION) at 19:24

## 2022-01-01 RX ADMIN — GUAIFENESIN 1200 MG: 600 TABLET ORAL at 08:11

## 2022-01-01 RX ADMIN — ARFORMOTEROL TARTRATE 15 MCG: 15 SOLUTION RESPIRATORY (INHALATION) at 06:20

## 2022-01-01 RX ADMIN — ATORVASTATIN CALCIUM 40 MG: 40 TABLET, FILM COATED ORAL at 20:16

## 2022-01-01 RX ADMIN — ATORVASTATIN CALCIUM 40 MG: 40 TABLET, FILM COATED ORAL at 20:04

## 2022-01-01 RX ADMIN — BUMETANIDE 2 MG: 0.25 INJECTION INTRAMUSCULAR; INTRAVENOUS at 08:18

## 2022-01-01 RX ADMIN — ISOSORBIDE MONONITRATE 30 MG: 30 TABLET, EXTENDED RELEASE ORAL at 09:00

## 2022-01-01 RX ADMIN — HYDROXYZINE HYDROCHLORIDE 10 MG: 10 TABLET ORAL at 09:18

## 2022-01-01 RX ADMIN — DOXYCYCLINE HYCLATE 100 MG: 100 CAPSULE ORAL at 08:49

## 2022-01-01 RX ADMIN — BUMETANIDE 2 MG: 1 TABLET ORAL at 22:00

## 2022-01-01 RX ADMIN — SODIUM CHLORIDE, PRESERVATIVE FREE 10 ML: 5 INJECTION INTRAVENOUS at 20:57

## 2022-01-01 RX ADMIN — ISOSORBIDE MONONITRATE 30 MG: 60 TABLET, EXTENDED RELEASE ORAL at 08:36

## 2022-01-01 RX ADMIN — GUAIFENESIN 1200 MG: 600 TABLET ORAL at 07:40

## 2022-01-01 RX ADMIN — POTASSIUM CHLORIDE 40 MEQ: 1500 TABLET, EXTENDED RELEASE ORAL at 09:16

## 2022-01-01 RX ADMIN — PANTOPRAZOLE SODIUM 40 MG: 40 TABLET, DELAYED RELEASE ORAL at 16:19

## 2022-01-01 RX ADMIN — INSULIN LISPRO 3 UNITS: 100 INJECTION, SOLUTION INTRAVENOUS; SUBCUTANEOUS at 17:39

## 2022-01-01 RX ADMIN — METOPROLOL TARTRATE 50 MG: 50 TABLET, FILM COATED ORAL at 09:41

## 2022-01-01 RX ADMIN — SILDENAFIL 20 MG: 20 TABLET, FILM COATED ORAL at 20:47

## 2022-01-01 RX ADMIN — PANTOPRAZOLE SODIUM 40 MG: 40 TABLET, DELAYED RELEASE ORAL at 16:28

## 2022-01-01 RX ADMIN — ISOSORBIDE MONONITRATE 30 MG: 30 TABLET, EXTENDED RELEASE ORAL at 09:15

## 2022-01-01 RX ADMIN — ASPIRIN 81 MG: 81 TABLET, COATED ORAL at 20:49

## 2022-01-01 RX ADMIN — IPRATROPIUM BROMIDE AND ALBUTEROL SULFATE 1 AMPULE: 2.5; .5 SOLUTION RESPIRATORY (INHALATION) at 06:42

## 2022-01-01 RX ADMIN — MORPHINE SULFATE 1 MG: 2 INJECTION, SOLUTION INTRAMUSCULAR; INTRAVENOUS at 09:34

## 2022-01-01 RX ADMIN — LEVOTHYROXINE SODIUM 25 MCG: 25 TABLET ORAL at 05:52

## 2022-01-01 RX ADMIN — METOPROLOL SUCCINATE 50 MG: 50 TABLET, EXTENDED RELEASE ORAL at 19:52

## 2022-01-01 RX ADMIN — HYDROCODONE BITARTRATE AND ACETAMINOPHEN 1 TABLET: 7.5; 325 TABLET ORAL at 15:08

## 2022-01-01 RX ADMIN — DOXYCYCLINE HYCLATE 100 MG: 100 CAPSULE ORAL at 09:39

## 2022-01-01 RX ADMIN — WATER 1000 MG: 1 INJECTION INTRAMUSCULAR; INTRAVENOUS; SUBCUTANEOUS at 11:56

## 2022-01-01 RX ADMIN — GUAIFENESIN 1200 MG: 600 TABLET ORAL at 20:46

## 2022-01-01 RX ADMIN — IPRATROPIUM BROMIDE AND ALBUTEROL SULFATE 1 AMPULE: 2.5; .5 SOLUTION RESPIRATORY (INHALATION) at 10:22

## 2022-01-01 RX ADMIN — CLOPIDOGREL BISULFATE 75 MG: 75 TABLET ORAL at 09:14

## 2022-01-01 RX ADMIN — METOPROLOL TARTRATE 50 MG: 50 TABLET, FILM COATED ORAL at 21:12

## 2022-01-01 RX ADMIN — METOPROLOL SUCCINATE 50 MG: 50 TABLET, EXTENDED RELEASE ORAL at 08:11

## 2022-01-01 RX ADMIN — ATORVASTATIN CALCIUM 40 MG: 40 TABLET, FILM COATED ORAL at 21:10

## 2022-01-01 RX ADMIN — CLOPIDOGREL BISULFATE 75 MG: 75 TABLET ORAL at 08:49

## 2022-01-01 RX ADMIN — ACETAMINOPHEN 650 MG: 325 TABLET ORAL at 16:19

## 2022-01-01 RX ADMIN — IPRATROPIUM BROMIDE AND ALBUTEROL SULFATE 1 AMPULE: 2.5; .5 SOLUTION RESPIRATORY (INHALATION) at 06:30

## 2022-01-01 RX ADMIN — SODIUM CHLORIDE, PRESERVATIVE FREE 10 ML: 5 INJECTION INTRAVENOUS at 10:20

## 2022-01-01 RX ADMIN — SODIUM CHLORIDE, PRESERVATIVE FREE 10 ML: 5 INJECTION INTRAVENOUS at 21:25

## 2022-01-01 RX ADMIN — SILDENAFIL 20 MG: 20 TABLET, FILM COATED ORAL at 08:30

## 2022-01-01 RX ADMIN — ASPIRIN 81 MG: 81 TABLET, COATED ORAL at 21:25

## 2022-01-01 RX ADMIN — IPRATROPIUM BROMIDE AND ALBUTEROL SULFATE 1 AMPULE: 2.5; .5 SOLUTION RESPIRATORY (INHALATION) at 06:25

## 2022-01-01 RX ADMIN — PANTOPRAZOLE SODIUM 40 MG: 40 TABLET, DELAYED RELEASE ORAL at 16:00

## 2022-01-01 RX ADMIN — BUDESONIDE 500 MCG: 0.5 SUSPENSION RESPIRATORY (INHALATION) at 19:02

## 2022-01-01 RX ADMIN — SODIUM CHLORIDE, PRESERVATIVE FREE 10 ML: 5 INJECTION INTRAVENOUS at 09:01

## 2022-01-01 RX ADMIN — INSULIN HUMAN 5 UNITS: 100 INJECTION, SUSPENSION SUBCUTANEOUS at 08:00

## 2022-01-01 RX ADMIN — BUDESONIDE 500 MCG: 0.5 SUSPENSION RESPIRATORY (INHALATION) at 06:36

## 2022-01-01 RX ADMIN — ISOSORBIDE MONONITRATE 30 MG: 30 TABLET, EXTENDED RELEASE ORAL at 08:49

## 2022-01-01 RX ADMIN — LEVALBUTEROL HYDROCHLORIDE 0.63 MG: 0.63 SOLUTION RESPIRATORY (INHALATION) at 19:43

## 2022-01-01 RX ADMIN — HYDROCODONE BITARTRATE AND ACETAMINOPHEN 1 TABLET: 7.5; 325 TABLET ORAL at 01:29

## 2022-01-01 RX ADMIN — PANTOPRAZOLE SODIUM 40 MG: 40 TABLET, DELAYED RELEASE ORAL at 17:42

## 2022-01-01 RX ADMIN — LEVOTHYROXINE SODIUM 25 MCG: 25 TABLET ORAL at 06:30

## 2022-01-01 RX ADMIN — SODIUM CHLORIDE, PRESERVATIVE FREE 10 ML: 5 INJECTION INTRAVENOUS at 21:42

## 2022-01-01 RX ADMIN — ISOSORBIDE MONONITRATE 30 MG: 30 TABLET, EXTENDED RELEASE ORAL at 08:03

## 2022-01-01 RX ADMIN — ATORVASTATIN CALCIUM 40 MG: 40 TABLET, FILM COATED ORAL at 21:59

## 2022-01-01 RX ADMIN — IPRATROPIUM BROMIDE AND ALBUTEROL SULFATE 1 AMPULE: 2.5; .5 SOLUTION RESPIRATORY (INHALATION) at 17:56

## 2022-01-01 RX ADMIN — ARFORMOTEROL TARTRATE 15 MCG: 15 SOLUTION RESPIRATORY (INHALATION) at 19:10

## 2022-01-01 RX ADMIN — PREDNISONE 15 MG: 10 TABLET ORAL at 09:21

## 2022-01-01 RX ADMIN — SILDENAFIL CITRATE 20 MG: 20 TABLET ORAL at 20:16

## 2022-01-01 RX ADMIN — PANTOPRAZOLE SODIUM 40 MG: 40 TABLET, DELAYED RELEASE ORAL at 06:30

## 2022-01-01 RX ADMIN — LEVOTHYROXINE SODIUM 25 MCG: 25 TABLET ORAL at 06:33

## 2022-01-01 RX ADMIN — AMLODIPINE BESYLATE 5 MG: 5 TABLET ORAL at 20:29

## 2022-01-01 RX ADMIN — GUAIFENESIN 1200 MG: 600 TABLET ORAL at 21:39

## 2022-01-01 RX ADMIN — BUDESONIDE 500 MCG: 0.5 SUSPENSION RESPIRATORY (INHALATION) at 06:39

## 2022-01-01 RX ADMIN — SILDENAFIL 20 MG: 20 TABLET, FILM COATED ORAL at 21:07

## 2022-01-01 RX ADMIN — LORAZEPAM 0.5 MG: 0.5 TABLET ORAL at 03:06

## 2022-01-01 RX ADMIN — METOPROLOL TARTRATE 50 MG: 50 TABLET, FILM COATED ORAL at 21:02

## 2022-01-01 RX ADMIN — LEVOTHYROXINE SODIUM 25 MCG: 25 TABLET ORAL at 05:12

## 2022-01-01 RX ADMIN — IPRATROPIUM BROMIDE AND ALBUTEROL SULFATE 3 ML: 2.5; .5 SOLUTION RESPIRATORY (INHALATION) at 19:15

## 2022-01-01 RX ADMIN — BUMETANIDE 2 MG: 1 TABLET ORAL at 09:21

## 2022-01-01 RX ADMIN — SODIUM CHLORIDE, PRESERVATIVE FREE 10 ML: 5 INJECTION INTRAVENOUS at 20:45

## 2022-01-01 RX ADMIN — AMLODIPINE BESYLATE 5 MG: 5 TABLET ORAL at 09:18

## 2022-01-01 RX ADMIN — ARFORMOTEROL TARTRATE 15 MCG: 15 SOLUTION RESPIRATORY (INHALATION) at 06:11

## 2022-01-01 RX ADMIN — METHYLPREDNISOLONE SODIUM SUCCINATE 40 MG: 40 INJECTION, POWDER, FOR SOLUTION INTRAMUSCULAR; INTRAVENOUS at 09:49

## 2022-01-01 RX ADMIN — INSULIN HUMAN 5 UNITS: 100 INJECTION, SUSPENSION SUBCUTANEOUS at 17:26

## 2022-01-01 RX ADMIN — ASPIRIN 81 MG: 81 TABLET, COATED ORAL at 21:43

## 2022-01-01 RX ADMIN — BUDESONIDE 500 MCG: 0.5 SUSPENSION RESPIRATORY (INHALATION) at 19:10

## 2022-01-01 RX ADMIN — INSULIN LISPRO 2 UNITS: 100 INJECTION, SOLUTION INTRAVENOUS; SUBCUTANEOUS at 16:44

## 2022-01-01 RX ADMIN — ASPIRIN 81 MG: 81 TABLET, COATED ORAL at 20:04

## 2022-01-01 RX ADMIN — POTASSIUM CHLORIDE 20 MEQ: 20 TABLET, EXTENDED RELEASE ORAL at 07:24

## 2022-01-01 RX ADMIN — ASPIRIN 81 MG: 81 TABLET, COATED ORAL at 21:12

## 2022-01-01 RX ADMIN — METOPROLOL TARTRATE 50 MG: 50 TABLET, FILM COATED ORAL at 20:16

## 2022-01-01 RX ADMIN — LEVOTHYROXINE SODIUM 25 MCG: 25 TABLET ORAL at 05:28

## 2022-01-01 RX ADMIN — LEVALBUTEROL HYDROCHLORIDE 0.63 MG: 0.63 SOLUTION RESPIRATORY (INHALATION) at 07:42

## 2022-01-01 RX ADMIN — PREDNISONE 60 MG: 20 TABLET ORAL at 08:48

## 2022-01-01 RX ADMIN — BUMETANIDE 2 MG: 0.25 INJECTION INTRAMUSCULAR; INTRAVENOUS at 20:15

## 2022-01-01 RX ADMIN — IPRATROPIUM BROMIDE AND ALBUTEROL SULFATE 1 AMPULE: 2.5; .5 SOLUTION RESPIRATORY (INHALATION) at 11:11

## 2022-01-01 RX ADMIN — PREDNISONE 40 MG: 20 TABLET ORAL at 08:54

## 2022-01-01 RX ADMIN — SILDENAFIL 20 MG: 20 TABLET, FILM COATED ORAL at 13:16

## 2022-01-01 RX ADMIN — GUAIFENESIN 1200 MG: 600 TABLET ORAL at 08:36

## 2022-01-01 RX ADMIN — BUMETANIDE 2 MG: 1 TABLET ORAL at 09:14

## 2022-01-01 RX ADMIN — SILDENAFIL 20 MG: 20 TABLET, FILM COATED ORAL at 10:18

## 2022-01-01 RX ADMIN — ARFORMOTEROL TARTRATE 15 MCG: 15 SOLUTION RESPIRATORY (INHALATION) at 19:15

## 2022-01-01 RX ADMIN — POTASSIUM CHLORIDE 40 MEQ: 1500 TABLET, EXTENDED RELEASE ORAL at 10:17

## 2022-01-01 RX ADMIN — ARFORMOTEROL TARTRATE 15 MCG: 15 SOLUTION RESPIRATORY (INHALATION) at 18:03

## 2022-01-01 RX ADMIN — HYDROXYZINE HYDROCHLORIDE 10 MG: 10 TABLET ORAL at 05:52

## 2022-01-01 RX ADMIN — SODIUM CHLORIDE, PRESERVATIVE FREE 10 ML: 5 INJECTION INTRAVENOUS at 20:50

## 2022-01-01 RX ADMIN — SILDENAFIL 20 MG: 20 TABLET, FILM COATED ORAL at 12:17

## 2022-01-01 RX ADMIN — BUDESONIDE 500 MCG: 0.5 SUSPENSION RESPIRATORY (INHALATION) at 07:08

## 2022-01-01 RX ADMIN — LEVALBUTEROL HYDROCHLORIDE 0.63 MG: 0.63 SOLUTION RESPIRATORY (INHALATION) at 17:57

## 2022-01-01 RX ADMIN — IPRATROPIUM BROMIDE AND ALBUTEROL SULFATE 1 AMPULE: 2.5; .5 SOLUTION RESPIRATORY (INHALATION) at 11:53

## 2022-01-01 RX ADMIN — LEVALBUTEROL HYDROCHLORIDE 0.63 MG: 0.63 SOLUTION RESPIRATORY (INHALATION) at 23:04

## 2022-01-01 RX ADMIN — MUPIROCIN: 20 OINTMENT TOPICAL at 09:20

## 2022-01-01 RX ADMIN — SODIUM CHLORIDE, PRESERVATIVE FREE 10 ML: 5 INJECTION INTRAVENOUS at 08:15

## 2022-01-01 RX ADMIN — FUROSEMIDE 40 MG: 40 TABLET ORAL at 10:33

## 2022-01-01 RX ADMIN — FUROSEMIDE 40 MG: 40 TABLET ORAL at 07:40

## 2022-01-01 RX ADMIN — GUAIFENESIN 1200 MG: 600 TABLET ORAL at 21:43

## 2022-01-01 RX ADMIN — LEVOTHYROXINE SODIUM 25 MCG: 25 TABLET ORAL at 06:28

## 2022-01-01 RX ADMIN — LEVALBUTEROL HYDROCHLORIDE 0.63 MG: 0.63 SOLUTION RESPIRATORY (INHALATION) at 06:58

## 2022-01-01 RX ADMIN — METOPROLOL TARTRATE 50 MG: 50 TABLET, FILM COATED ORAL at 20:48

## 2022-01-01 RX ADMIN — SILDENAFIL 20 MG: 20 TABLET, FILM COATED ORAL at 13:43

## 2022-01-01 RX ADMIN — ACETAMINOPHEN 650 MG: 325 TABLET ORAL at 17:59

## 2022-01-01 RX ADMIN — IPRATROPIUM BROMIDE AND ALBUTEROL SULFATE 3 ML: 2.5; .5 SOLUTION RESPIRATORY (INHALATION) at 10:23

## 2022-01-01 RX ADMIN — ONDANSETRON 4 MG: 4 TABLET, ORALLY DISINTEGRATING ORAL at 06:55

## 2022-01-01 RX ADMIN — SODIUM CHLORIDE, PRESERVATIVE FREE 10 ML: 5 INJECTION INTRAVENOUS at 09:23

## 2022-01-01 RX ADMIN — AMLODIPINE BESYLATE 5 MG: 5 TABLET ORAL at 09:39

## 2022-01-01 RX ADMIN — GUAIFENESIN 1200 MG: 600 TABLET ORAL at 20:15

## 2022-01-01 RX ADMIN — AMLODIPINE BESYLATE 5 MG: 5 TABLET ORAL at 20:08

## 2022-01-01 RX ADMIN — ARFORMOTEROL TARTRATE 15 MCG: 15 SOLUTION RESPIRATORY (INHALATION) at 19:02

## 2022-01-01 RX ADMIN — GUAIFENESIN 1200 MG: 600 TABLET ORAL at 07:24

## 2022-01-01 RX ADMIN — IPRATROPIUM BROMIDE AND ALBUTEROL SULFATE 3 ML: 2.5; .5 SOLUTION RESPIRATORY (INHALATION) at 10:30

## 2022-01-01 RX ADMIN — GUAIFENESIN 1200 MG: 600 TABLET ORAL at 10:18

## 2022-01-01 RX ADMIN — ALBUTEROL SULFATE 1.25 MG: 2.5 SOLUTION RESPIRATORY (INHALATION) at 12:43

## 2022-01-01 RX ADMIN — METHYLPREDNISOLONE SODIUM SUCCINATE 40 MG: 40 INJECTION, POWDER, FOR SOLUTION INTRAMUSCULAR; INTRAVENOUS at 16:30

## 2022-01-01 RX ADMIN — HYDROCODONE BITARTRATE AND ACETAMINOPHEN 1 TABLET: 7.5; 325 TABLET ORAL at 15:12

## 2022-01-01 RX ADMIN — IPRATROPIUM BROMIDE AND ALBUTEROL SULFATE 1 AMPULE: 2.5; .5 SOLUTION RESPIRATORY (INHALATION) at 10:25

## 2022-01-01 RX ADMIN — ATORVASTATIN CALCIUM 40 MG: 40 TABLET, FILM COATED ORAL at 20:54

## 2022-01-01 RX ADMIN — BUMETANIDE 2 MG: 0.25 INJECTION INTRAMUSCULAR; INTRAVENOUS at 10:17

## 2022-01-01 RX ADMIN — Medication 5 EACH: at 18:24

## 2022-01-01 RX ADMIN — IPRATROPIUM BROMIDE AND ALBUTEROL SULFATE 1 AMPULE: 2.5; .5 SOLUTION RESPIRATORY (INHALATION) at 10:21

## 2022-01-01 RX ADMIN — GUAIFENESIN 1200 MG: 600 TABLET ORAL at 21:11

## 2022-01-01 RX ADMIN — GUAIFENESIN 1200 MG: 600 TABLET ORAL at 09:15

## 2022-01-01 RX ADMIN — MUPIROCIN: 20 OINTMENT TOPICAL at 08:08

## 2022-01-01 RX ADMIN — CLOPIDOGREL BISULFATE 75 MG: 75 TABLET, FILM COATED ORAL at 08:49

## 2022-01-01 RX ADMIN — CEFDINIR 300 MG: 300 CAPSULE ORAL at 20:15

## 2022-01-01 RX ADMIN — SILVER NITRATE APPLICATORS 1 EACH: 25; 75 STICK TOPICAL at 18:24

## 2022-01-01 RX ADMIN — SILDENAFIL 20 MG: 20 TABLET, FILM COATED ORAL at 21:59

## 2022-01-01 RX ADMIN — POTASSIUM CHLORIDE 40 MEQ: 1500 TABLET, EXTENDED RELEASE ORAL at 12:28

## 2022-01-01 RX ADMIN — BUDESONIDE 500 MCG: 0.5 SUSPENSION RESPIRATORY (INHALATION) at 07:42

## 2022-01-01 RX ADMIN — ACETAMINOPHEN 650 MG: 325 TABLET ORAL at 22:48

## 2022-01-01 RX ADMIN — SILDENAFIL 20 MG: 20 TABLET, FILM COATED ORAL at 21:12

## 2022-01-01 RX ADMIN — SODIUM CHLORIDE, PRESERVATIVE FREE 10 ML: 5 INJECTION INTRAVENOUS at 20:04

## 2022-01-01 RX ADMIN — CLOPIDOGREL BISULFATE 75 MG: 75 TABLET ORAL at 10:18

## 2022-01-01 RX ADMIN — METOPROLOL TARTRATE 50 MG: 50 TABLET, FILM COATED ORAL at 20:50

## 2022-01-01 RX ADMIN — BUMETANIDE 2 MG: 1 TABLET ORAL at 21:11

## 2022-01-01 RX ADMIN — AMLODIPINE BESYLATE 5 MG: 5 TABLET ORAL at 20:04

## 2022-01-01 RX ADMIN — DOXYCYCLINE HYCLATE 100 MG: 100 CAPSULE ORAL at 08:47

## 2022-01-01 RX ADMIN — PANTOPRAZOLE SODIUM 40 MG: 40 TABLET, DELAYED RELEASE ORAL at 16:47

## 2022-01-01 RX ADMIN — METOPROLOL SUCCINATE 50 MG: 50 TABLET, EXTENDED RELEASE ORAL at 21:11

## 2022-01-01 RX ADMIN — IPRATROPIUM BROMIDE AND ALBUTEROL SULFATE 1 AMPULE: 2.5; .5 SOLUTION RESPIRATORY (INHALATION) at 09:54

## 2022-01-01 RX ADMIN — ARFORMOTEROL TARTRATE 15 MCG: 15 SOLUTION RESPIRATORY (INHALATION) at 06:27

## 2022-01-01 RX ADMIN — FUROSEMIDE 40 MG: 40 TABLET ORAL at 07:37

## 2022-01-01 RX ADMIN — INSULIN HUMAN 5 UNITS: 100 INJECTION, SUSPENSION SUBCUTANEOUS at 16:53

## 2022-01-01 RX ADMIN — METOPROLOL TARTRATE 50 MG: 50 TABLET, FILM COATED ORAL at 08:03

## 2022-01-01 RX ADMIN — FUROSEMIDE 40 MG: 40 TABLET ORAL at 09:39

## 2022-01-01 RX ADMIN — FUROSEMIDE 40 MG: 10 INJECTION, SOLUTION INTRAMUSCULAR; INTRAVENOUS at 08:45

## 2022-01-01 RX ADMIN — ARFORMOTEROL TARTRATE 15 MCG: 15 SOLUTION RESPIRATORY (INHALATION) at 18:24

## 2022-01-01 RX ADMIN — SODIUM CHLORIDE, PRESERVATIVE FREE 10 ML: 5 INJECTION INTRAVENOUS at 08:41

## 2022-01-01 RX ADMIN — MUPIROCIN: 20 OINTMENT TOPICAL at 20:56

## 2022-01-01 RX ADMIN — METOPROLOL TARTRATE 50 MG: 50 TABLET, FILM COATED ORAL at 08:45

## 2022-01-01 RX ADMIN — ISOSORBIDE MONONITRATE 30 MG: 60 TABLET, EXTENDED RELEASE ORAL at 07:40

## 2022-01-01 RX ADMIN — ASPIRIN 81 MG: 81 TABLET, COATED ORAL at 21:59

## 2022-01-01 RX ADMIN — IPRATROPIUM BROMIDE AND ALBUTEROL SULFATE 1 AMPULE: 2.5; .5 SOLUTION RESPIRATORY (INHALATION) at 19:34

## 2022-01-01 RX ADMIN — ASPIRIN 81 MG: 81 TABLET, COATED ORAL at 20:54

## 2022-01-01 RX ADMIN — ATORVASTATIN CALCIUM 40 MG: 40 TABLET, FILM COATED ORAL at 20:47

## 2022-01-01 RX ADMIN — AMLODIPINE BESYLATE 5 MG: 5 TABLET ORAL at 07:40

## 2022-01-01 RX ADMIN — DIPHENHYDRAMINE HYDROCHLORIDE 12.5 MG: 50 INJECTION, SOLUTION INTRAMUSCULAR; INTRAVENOUS at 08:56

## 2022-01-01 RX ADMIN — BUDESONIDE 500 MCG: 0.5 SUSPENSION RESPIRATORY (INHALATION) at 19:06

## 2022-01-01 RX ADMIN — SODIUM CHLORIDE, PRESERVATIVE FREE 10 ML: 5 INJECTION INTRAVENOUS at 19:04

## 2022-01-01 RX ADMIN — SODIUM CHLORIDE, PRESERVATIVE FREE 10 ML: 5 INJECTION INTRAVENOUS at 09:17

## 2022-01-01 RX ADMIN — AMLODIPINE BESYLATE 5 MG: 5 TABLET ORAL at 08:49

## 2022-01-01 RX ADMIN — BUDESONIDE 500 MCG: 0.5 SUSPENSION RESPIRATORY (INHALATION) at 06:18

## 2022-01-01 RX ADMIN — GUAIFENESIN 1200 MG: 600 TABLET ORAL at 09:00

## 2022-01-01 RX ADMIN — DOXYCYCLINE HYCLATE 100 MG: 100 CAPSULE ORAL at 07:37

## 2022-01-01 RX ADMIN — IPRATROPIUM BROMIDE AND ALBUTEROL SULFATE 3 ML: 2.5; .5 SOLUTION RESPIRATORY (INHALATION) at 14:18

## 2022-01-01 RX ADMIN — METOPROLOL TARTRATE 100 MG: 50 TABLET, FILM COATED ORAL at 08:11

## 2022-01-01 RX ADMIN — HYDROXYZINE HYDROCHLORIDE 10 MG: 10 TABLET ORAL at 20:15

## 2022-01-01 RX ADMIN — HYDROXYZINE HYDROCHLORIDE 10 MG: 10 TABLET ORAL at 20:56

## 2022-01-01 RX ADMIN — IPRATROPIUM BROMIDE AND ALBUTEROL SULFATE 1 AMPULE: 2.5; .5 SOLUTION RESPIRATORY (INHALATION) at 10:29

## 2022-01-01 RX ADMIN — LEVOTHYROXINE SODIUM 25 MCG: 25 TABLET ORAL at 05:35

## 2022-01-01 RX ADMIN — ATORVASTATIN CALCIUM 40 MG: 40 TABLET, FILM COATED ORAL at 20:51

## 2022-01-01 RX ADMIN — ALBUTEROL SULFATE 1.25 MG: 2.5 SOLUTION RESPIRATORY (INHALATION) at 00:39

## 2022-01-01 RX ADMIN — METHYLPREDNISOLONE SODIUM SUCCINATE 40 MG: 40 INJECTION, POWDER, FOR SOLUTION INTRAMUSCULAR; INTRAVENOUS at 05:40

## 2022-01-01 RX ADMIN — PANTOPRAZOLE SODIUM 40 MG: 40 TABLET, DELAYED RELEASE ORAL at 18:21

## 2022-01-01 RX ADMIN — ENOXAPARIN SODIUM 30 MG: 100 INJECTION SUBCUTANEOUS at 09:15

## 2022-01-01 RX ADMIN — BUDESONIDE 500 MCG: 0.5 SUSPENSION RESPIRATORY (INHALATION) at 06:16

## 2022-01-01 RX ADMIN — LEVOTHYROXINE SODIUM 25 MCG: 25 TABLET ORAL at 05:17

## 2022-01-01 RX ADMIN — METOPROLOL TARTRATE 50 MG: 50 TABLET, FILM COATED ORAL at 20:29

## 2022-01-01 RX ADMIN — BISACODYL 10 MG: 5 TABLET, COATED ORAL at 05:29

## 2022-01-01 RX ADMIN — ISOSORBIDE MONONITRATE 30 MG: 30 TABLET, EXTENDED RELEASE ORAL at 17:00

## 2022-01-01 RX ADMIN — BUDESONIDE 500 MCG: 0.5 SUSPENSION RESPIRATORY (INHALATION) at 06:53

## 2022-01-01 RX ADMIN — SODIUM CHLORIDE, PRESERVATIVE FREE 10 ML: 5 INJECTION INTRAVENOUS at 10:31

## 2022-01-01 RX ADMIN — ONDANSETRON HYDROCHLORIDE 4 MG: 2 SOLUTION INTRAMUSCULAR; INTRAVENOUS at 19:03

## 2022-01-01 RX ADMIN — FUROSEMIDE 40 MG: 10 INJECTION, SOLUTION INTRAMUSCULAR; INTRAVENOUS at 20:52

## 2022-01-01 RX ADMIN — METOPROLOL SUCCINATE 50 MG: 50 TABLET, EXTENDED RELEASE ORAL at 08:49

## 2022-01-01 RX ADMIN — ACETAMINOPHEN 650 MG: 325 TABLET ORAL at 12:14

## 2022-01-01 RX ADMIN — BUDESONIDE 500 MCG: 0.5 SUSPENSION RESPIRATORY (INHALATION) at 06:45

## 2022-01-01 RX ADMIN — LEVALBUTEROL HYDROCHLORIDE 0.63 MG: 0.63 SOLUTION RESPIRATORY (INHALATION) at 22:47

## 2022-01-01 RX ADMIN — LEVALBUTEROL HYDROCHLORIDE 0.63 MG: 0.63 SOLUTION RESPIRATORY (INHALATION) at 06:47

## 2022-01-01 RX ADMIN — LEVALBUTEROL HYDROCHLORIDE 0.63 MG: 0.63 SOLUTION RESPIRATORY (INHALATION) at 22:51

## 2022-01-01 RX ADMIN — WATER 1000 MG: 1 INJECTION INTRAMUSCULAR; INTRAVENOUS; SUBCUTANEOUS at 11:05

## 2022-01-01 RX ADMIN — HYDROCODONE BITARTRATE AND ACETAMINOPHEN 1 TABLET: 7.5; 325 TABLET ORAL at 09:25

## 2022-01-01 RX ADMIN — SODIUM CHLORIDE, PRESERVATIVE FREE 10 ML: 5 INJECTION INTRAVENOUS at 08:47

## 2022-01-01 RX ADMIN — METHYLPREDNISOLONE SODIUM SUCCINATE 40 MG: 40 INJECTION, POWDER, FOR SOLUTION INTRAMUSCULAR; INTRAVENOUS at 05:11

## 2022-01-01 RX ADMIN — BUDESONIDE 500 MCG: 0.5 SUSPENSION RESPIRATORY (INHALATION) at 18:03

## 2022-01-01 RX ADMIN — BUDESONIDE 500 MCG: 0.5 SUSPENSION RESPIRATORY (INHALATION) at 18:54

## 2022-01-01 RX ADMIN — INSULIN LISPRO 1 UNITS: 100 INJECTION, SOLUTION INTRAVENOUS; SUBCUTANEOUS at 11:55

## 2022-01-01 RX ADMIN — IPRATROPIUM BROMIDE AND ALBUTEROL SULFATE 1 AMPULE: 2.5; .5 SOLUTION RESPIRATORY (INHALATION) at 06:16

## 2022-01-01 RX ADMIN — FUROSEMIDE 60 MG: 10 INJECTION, SOLUTION INTRAMUSCULAR; INTRAVENOUS at 11:47

## 2022-01-01 RX ADMIN — METOPROLOL SUCCINATE 50 MG: 50 TABLET, EXTENDED RELEASE ORAL at 10:29

## 2022-01-01 RX ADMIN — PANTOPRAZOLE SODIUM 40 MG: 40 TABLET, DELAYED RELEASE ORAL at 06:33

## 2022-01-01 RX ADMIN — ARFORMOTEROL TARTRATE 15 MCG: 15 SOLUTION RESPIRATORY (INHALATION) at 06:38

## 2022-01-01 RX ADMIN — ARFORMOTEROL TARTRATE 15 MCG: 15 SOLUTION RESPIRATORY (INHALATION) at 19:05

## 2022-01-01 RX ADMIN — ISOSORBIDE MONONITRATE 30 MG: 30 TABLET, EXTENDED RELEASE ORAL at 10:17

## 2022-01-01 RX ADMIN — CLOPIDOGREL BISULFATE 75 MG: 75 TABLET, FILM COATED ORAL at 08:48

## 2022-01-01 RX ADMIN — ENOXAPARIN SODIUM 30 MG: 100 INJECTION SUBCUTANEOUS at 09:18

## 2022-01-01 RX ADMIN — IPRATROPIUM BROMIDE AND ALBUTEROL SULFATE 1 AMPULE: 2.5; .5 SOLUTION RESPIRATORY (INHALATION) at 18:24

## 2022-01-01 RX ADMIN — GUAIFENESIN 1200 MG: 600 TABLET ORAL at 20:45

## 2022-01-01 RX ADMIN — HYDROCODONE BITARTRATE AND ACETAMINOPHEN 1 TABLET: 10; 325 TABLET ORAL at 17:22

## 2022-01-01 RX ADMIN — LEVALBUTEROL HYDROCHLORIDE 0.63 MG: 0.63 SOLUTION RESPIRATORY (INHALATION) at 22:13

## 2022-01-01 RX ADMIN — ATORVASTATIN CALCIUM 40 MG: 40 TABLET, FILM COATED ORAL at 19:52

## 2022-01-01 RX ADMIN — ASPIRIN 81 MG: 81 TABLET, COATED ORAL at 20:48

## 2022-01-01 RX ADMIN — SODIUM CHLORIDE, PRESERVATIVE FREE 10 ML: 5 INJECTION INTRAVENOUS at 16:54

## 2022-01-01 RX ADMIN — BUMETANIDE 2 MG: 0.25 INJECTION INTRAMUSCULAR; INTRAVENOUS at 21:02

## 2022-01-01 RX ADMIN — ARFORMOTEROL TARTRATE 15 MCG: 15 SOLUTION RESPIRATORY (INHALATION) at 19:44

## 2022-01-01 RX ADMIN — MUPIROCIN: 20 OINTMENT TOPICAL at 21:18

## 2022-01-01 RX ADMIN — SODIUM CHLORIDE, PRESERVATIVE FREE 10 ML: 5 INJECTION INTRAVENOUS at 21:12

## 2022-01-01 RX ADMIN — SILDENAFIL 20 MG: 20 TABLET, FILM COATED ORAL at 14:59

## 2022-01-01 RX ADMIN — IPRATROPIUM BROMIDE AND ALBUTEROL SULFATE 1 AMPULE: 2.5; .5 SOLUTION RESPIRATORY (INHALATION) at 07:19

## 2022-01-01 RX ADMIN — SODIUM CHLORIDE: 9 INJECTION, SOLUTION INTRAVENOUS at 14:41

## 2022-01-01 RX ADMIN — FUROSEMIDE 40 MG: 40 TABLET ORAL at 08:38

## 2022-01-01 RX ADMIN — SODIUM CHLORIDE, PRESERVATIVE FREE 10 ML: 5 INJECTION INTRAVENOUS at 20:35

## 2022-01-01 RX ADMIN — PREDNISONE 20 MG: 20 TABLET ORAL at 09:00

## 2022-01-01 RX ADMIN — CEFDINIR 300 MG: 300 CAPSULE ORAL at 21:02

## 2022-01-01 RX ADMIN — CEFEPIME HYDROCHLORIDE 1000 MG: 1 INJECTION, SOLUTION INTRAVENOUS at 22:09

## 2022-01-01 RX ADMIN — AMLODIPINE BESYLATE 5 MG: 5 TABLET ORAL at 07:36

## 2022-01-01 RX ADMIN — MAGNESIUM SULFATE HEPTAHYDRATE 2000 MG: 40 INJECTION, SOLUTION INTRAVENOUS at 12:36

## 2022-01-01 RX ADMIN — INSULIN HUMAN 5 UNITS: 100 INJECTION, SUSPENSION SUBCUTANEOUS at 16:57

## 2022-01-01 RX ADMIN — IPRATROPIUM BROMIDE AND ALBUTEROL SULFATE 1 AMPULE: 2.5; .5 SOLUTION RESPIRATORY (INHALATION) at 06:48

## 2022-01-01 RX ADMIN — ARFORMOTEROL TARTRATE 15 MCG: 15 SOLUTION RESPIRATORY (INHALATION) at 19:38

## 2022-01-01 RX ADMIN — METOPROLOL SUCCINATE 50 MG: 50 TABLET, EXTENDED RELEASE ORAL at 08:36

## 2022-01-01 RX ADMIN — CEFEPIME 2000 MG: 2 INJECTION, POWDER, FOR SOLUTION INTRAMUSCULAR; INTRAVENOUS at 10:36

## 2022-01-01 RX ADMIN — SODIUM CHLORIDE, PRESERVATIVE FREE 10 ML: 5 INJECTION INTRAVENOUS at 22:00

## 2022-01-01 RX ADMIN — LEVOTHYROXINE SODIUM 25 MCG: 25 TABLET ORAL at 05:41

## 2022-01-01 RX ADMIN — SODIUM CHLORIDE, PRESERVATIVE FREE 10 ML: 5 INJECTION INTRAVENOUS at 08:30

## 2022-01-01 RX ADMIN — SODIUM CHLORIDE, PRESERVATIVE FREE 10 ML: 5 INJECTION INTRAVENOUS at 08:53

## 2022-01-01 RX ADMIN — BUDESONIDE 500 MCG: 0.5 SUSPENSION RESPIRATORY (INHALATION) at 18:35

## 2022-01-01 RX ADMIN — DOXYCYCLINE HYCLATE 100 MG: 100 CAPSULE ORAL at 21:43

## 2022-01-01 RX ADMIN — DOXYCYCLINE HYCLATE 100 MG: 100 CAPSULE ORAL at 20:45

## 2022-01-01 RX ADMIN — ISOSORBIDE MONONITRATE 30 MG: 60 TABLET, EXTENDED RELEASE ORAL at 10:29

## 2022-01-01 RX ADMIN — LEVALBUTEROL HYDROCHLORIDE 0.63 MG: 0.63 SOLUTION RESPIRATORY (INHALATION) at 02:08

## 2022-01-01 RX ADMIN — CLOPIDOGREL BISULFATE 75 MG: 75 TABLET ORAL at 09:15

## 2022-01-01 RX ADMIN — IPRATROPIUM BROMIDE AND ALBUTEROL SULFATE 1 AMPULE: 2.5; .5 SOLUTION RESPIRATORY (INHALATION) at 06:38

## 2022-01-01 RX ADMIN — ASPIRIN 81 MG: 81 TABLET, COATED ORAL at 19:48

## 2022-01-01 RX ADMIN — SILDENAFIL 20 MG: 20 TABLET, FILM COATED ORAL at 08:05

## 2022-01-01 RX ADMIN — LEVOTHYROXINE SODIUM 25 MCG: 25 TABLET ORAL at 05:31

## 2022-01-01 RX ADMIN — ARFORMOTEROL TARTRATE 15 MCG: 15 SOLUTION RESPIRATORY (INHALATION) at 18:35

## 2022-01-01 RX ADMIN — BUDESONIDE 500 MCG: 0.5 SUSPENSION RESPIRATORY (INHALATION) at 06:50

## 2022-01-01 RX ADMIN — BUDESONIDE 500 MCG: 0.5 SUSPENSION RESPIRATORY (INHALATION) at 06:38

## 2022-01-01 RX ADMIN — IPRATROPIUM BROMIDE AND ALBUTEROL SULFATE 3 ML: 2.5; .5 SOLUTION RESPIRATORY (INHALATION) at 16:04

## 2022-01-01 RX ADMIN — LEVOTHYROXINE SODIUM 25 MCG: 25 TABLET ORAL at 05:32

## 2022-01-01 RX ADMIN — ACETAMINOPHEN 650 MG: 325 TABLET ORAL at 13:58

## 2022-01-01 RX ADMIN — SODIUM CHLORIDE, PRESERVATIVE FREE 10 ML: 5 INJECTION INTRAVENOUS at 09:22

## 2022-01-01 RX ADMIN — SILDENAFIL 20 MG: 20 TABLET, FILM COATED ORAL at 14:03

## 2022-01-01 RX ADMIN — AMLODIPINE BESYLATE 5 MG: 5 TABLET ORAL at 21:11

## 2022-01-01 RX ADMIN — CEFDINIR 300 MG: 300 CAPSULE ORAL at 08:30

## 2022-01-01 RX ADMIN — ALBUTEROL SULFATE 1.25 MG: 2.5 SOLUTION RESPIRATORY (INHALATION) at 12:29

## 2022-01-01 RX ADMIN — IPRATROPIUM BROMIDE AND ALBUTEROL SULFATE 1 AMPULE: 2.5; .5 SOLUTION RESPIRATORY (INHALATION) at 06:28

## 2022-01-01 RX ADMIN — WATER 1000 MG: 1 INJECTION INTRAMUSCULAR; INTRAVENOUS; SUBCUTANEOUS at 12:04

## 2022-01-01 RX ADMIN — METOPROLOL TARTRATE 50 MG: 50 TABLET, FILM COATED ORAL at 08:49

## 2022-01-01 RX ADMIN — PANTOPRAZOLE SODIUM 40 MG: 40 TABLET, DELAYED RELEASE ORAL at 05:35

## 2022-01-01 RX ADMIN — HYDROXYZINE HYDROCHLORIDE 10 MG: 10 TABLET ORAL at 08:15

## 2022-01-01 RX ADMIN — INSULIN LISPRO 3 UNITS: 100 INJECTION, SOLUTION INTRAVENOUS; SUBCUTANEOUS at 18:22

## 2022-01-01 RX ADMIN — ARFORMOTEROL TARTRATE 15 MCG: 15 SOLUTION RESPIRATORY (INHALATION) at 06:36

## 2022-01-01 RX ADMIN — CLOPIDOGREL BISULFATE 75 MG: 75 TABLET, FILM COATED ORAL at 09:39

## 2022-01-01 RX ADMIN — GUAIFENESIN 1200 MG: 600 TABLET ORAL at 20:51

## 2022-01-01 RX ADMIN — ARFORMOTEROL TARTRATE 15 MCG: 15 SOLUTION RESPIRATORY (INHALATION) at 07:30

## 2022-01-01 RX ADMIN — SILDENAFIL CITRATE 20 MG: 20 TABLET ORAL at 09:15

## 2022-01-01 RX ADMIN — ARFORMOTEROL TARTRATE 15 MCG: 15 SOLUTION RESPIRATORY (INHALATION) at 07:10

## 2022-01-01 RX ADMIN — METOPROLOL TARTRATE 50 MG: 50 TABLET, FILM COATED ORAL at 10:18

## 2022-01-01 RX ADMIN — BUMETANIDE 2 MG: 0.25 INJECTION INTRAMUSCULAR; INTRAVENOUS at 20:54

## 2022-01-01 RX ADMIN — BUMETANIDE 2 MG: 0.25 INJECTION INTRAMUSCULAR; INTRAVENOUS at 20:30

## 2022-01-01 RX ADMIN — MORPHINE SULFATE 1 MG: 2 INJECTION, SOLUTION INTRAMUSCULAR; INTRAVENOUS at 20:13

## 2022-01-01 RX ADMIN — BUDESONIDE 500 MCG: 0.5 SUSPENSION RESPIRATORY (INHALATION) at 08:04

## 2022-01-01 RX ADMIN — LEVALBUTEROL HYDROCHLORIDE 0.63 MG: 0.63 SOLUTION RESPIRATORY (INHALATION) at 19:14

## 2022-01-01 RX ADMIN — BUDESONIDE 500 MCG: 0.5 SUSPENSION RESPIRATORY (INHALATION) at 06:08

## 2022-01-01 RX ADMIN — ARFORMOTEROL TARTRATE 15 MCG: 15 SOLUTION RESPIRATORY (INHALATION) at 07:08

## 2022-01-01 RX ADMIN — SILDENAFIL 20 MG: 20 TABLET, FILM COATED ORAL at 20:49

## 2022-01-01 RX ADMIN — PANTOPRAZOLE SODIUM 40 MG: 40 TABLET, DELAYED RELEASE ORAL at 14:59

## 2022-01-01 RX ADMIN — METOPROLOL TARTRATE 50 MG: 50 TABLET, FILM COATED ORAL at 08:18

## 2022-01-01 RX ADMIN — METHYLPREDNISOLONE SODIUM SUCCINATE 40 MG: 40 INJECTION, POWDER, FOR SOLUTION INTRAMUSCULAR; INTRAVENOUS at 16:54

## 2022-01-01 RX ADMIN — ISOSORBIDE MONONITRATE 30 MG: 30 TABLET, EXTENDED RELEASE ORAL at 09:14

## 2022-01-01 RX ADMIN — ARFORMOTEROL TARTRATE 15 MCG: 15 SOLUTION RESPIRATORY (INHALATION) at 06:50

## 2022-01-01 RX ADMIN — PIPERACILLIN AND TAZOBACTAM 3375 MG: 3; .375 INJECTION, POWDER, LYOPHILIZED, FOR SOLUTION INTRAVENOUS at 03:13

## 2022-01-01 RX ADMIN — INSULIN HUMAN 5 UNITS: 100 INJECTION, SUSPENSION SUBCUTANEOUS at 18:26

## 2022-01-01 RX ADMIN — BUMETANIDE 2 MG: 0.25 INJECTION INTRAMUSCULAR; INTRAVENOUS at 09:32

## 2022-01-01 RX ADMIN — IPRATROPIUM BROMIDE AND ALBUTEROL SULFATE 1 AMPULE: 2.5; .5 SOLUTION RESPIRATORY (INHALATION) at 18:53

## 2022-01-01 RX ADMIN — LEVALBUTEROL HYDROCHLORIDE 0.63 MG: 0.63 SOLUTION RESPIRATORY (INHALATION) at 18:44

## 2022-01-01 RX ADMIN — LEVALBUTEROL HYDROCHLORIDE 0.63 MG: 0.63 SOLUTION RESPIRATORY (INHALATION) at 11:17

## 2022-01-01 RX ADMIN — GUAIFENESIN 1200 MG: 600 TABLET ORAL at 20:16

## 2022-01-01 RX ADMIN — SODIUM CHLORIDE, PRESERVATIVE FREE 10 ML: 5 INJECTION INTRAVENOUS at 08:18

## 2022-01-01 RX ADMIN — IPRATROPIUM BROMIDE AND ALBUTEROL SULFATE 1 AMPULE: 2.5; .5 SOLUTION RESPIRATORY (INHALATION) at 19:26

## 2022-01-01 RX ADMIN — ARFORMOTEROL TARTRATE 15 MCG: 15 SOLUTION RESPIRATORY (INHALATION) at 08:04

## 2022-01-01 RX ADMIN — ATORVASTATIN CALCIUM 40 MG: 40 TABLET, FILM COATED ORAL at 20:08

## 2022-01-01 RX ADMIN — SILDENAFIL CITRATE 20 MG: 20 TABLET ORAL at 15:09

## 2022-01-01 RX ADMIN — SILDENAFIL CITRATE 20 MG: 20 TABLET ORAL at 14:12

## 2022-01-01 RX ADMIN — IPRATROPIUM BROMIDE AND ALBUTEROL SULFATE 1 AMPULE: 2.5; .5 SOLUTION RESPIRATORY (INHALATION) at 18:23

## 2022-01-01 RX ADMIN — METHYLPREDNISOLONE SODIUM SUCCINATE 40 MG: 40 INJECTION, POWDER, FOR SOLUTION INTRAMUSCULAR; INTRAVENOUS at 23:48

## 2022-01-01 RX ADMIN — LEVALBUTEROL HYDROCHLORIDE 0.63 MG: 0.63 SOLUTION RESPIRATORY (INHALATION) at 15:03

## 2022-01-01 RX ADMIN — GUAIFENESIN 1200 MG: 600 TABLET ORAL at 21:25

## 2022-01-01 RX ADMIN — PANTOPRAZOLE SODIUM 40 MG: 40 TABLET, DELAYED RELEASE ORAL at 05:31

## 2022-01-01 RX ADMIN — FUROSEMIDE 10 MG/HR: 10 INJECTION, SOLUTION INTRAVENOUS at 00:15

## 2022-01-01 RX ADMIN — GUAIFENESIN 1200 MG: 600 TABLET ORAL at 09:40

## 2022-01-01 RX ADMIN — MORPHINE SULFATE 1 MG: 2 INJECTION, SOLUTION INTRAMUSCULAR; INTRAVENOUS at 06:02

## 2022-01-01 RX ADMIN — DIPHENHYDRAMINE HYDROCHLORIDE 12.5 MG: 50 INJECTION, SOLUTION INTRAMUSCULAR; INTRAVENOUS at 14:09

## 2022-01-01 RX ADMIN — SILDENAFIL 20 MG: 20 TABLET, FILM COATED ORAL at 08:18

## 2022-01-01 RX ADMIN — AMLODIPINE BESYLATE 5 MG: 5 TABLET ORAL at 20:44

## 2022-01-01 RX ADMIN — GUAIFENESIN 1200 MG: 600 TABLET ORAL at 20:48

## 2022-01-01 RX ADMIN — BUMETANIDE 2 MG: 1 TABLET ORAL at 09:40

## 2022-01-01 RX ADMIN — GUAIFENESIN 1200 MG: 600 TABLET ORAL at 08:18

## 2022-01-01 RX ADMIN — SILDENAFIL 20 MG: 20 TABLET, FILM COATED ORAL at 13:58

## 2022-01-01 RX ADMIN — IPRATROPIUM BROMIDE AND ALBUTEROL SULFATE 1 AMPULE: 2.5; .5 SOLUTION RESPIRATORY (INHALATION) at 06:35

## 2022-01-01 RX ADMIN — IPRATROPIUM BROMIDE AND ALBUTEROL SULFATE 1 AMPULE: 2.5; .5 SOLUTION RESPIRATORY (INHALATION) at 10:10

## 2022-01-01 RX ADMIN — GUAIFENESIN 1200 MG: 600 TABLET ORAL at 09:39

## 2022-01-01 RX ADMIN — ASPIRIN 81 MG: 81 TABLET, COATED ORAL at 20:34

## 2022-01-01 RX ADMIN — PREDNISONE 10 MG: 10 TABLET ORAL at 09:16

## 2022-01-01 RX ADMIN — ISOSORBIDE MONONITRATE 30 MG: 60 TABLET, EXTENDED RELEASE ORAL at 09:39

## 2022-01-01 RX ADMIN — HYDROXYZINE HYDROCHLORIDE 25 MG: 25 TABLET ORAL at 19:47

## 2022-01-01 RX ADMIN — HYDROCODONE BITARTRATE AND ACETAMINOPHEN 1 TABLET: 7.5; 325 TABLET ORAL at 11:18

## 2022-01-01 RX ADMIN — FAMOTIDINE 20 MG: 20 TABLET ORAL at 12:28

## 2022-01-01 RX ADMIN — LEVOTHYROXINE SODIUM 25 MCG: 25 TABLET ORAL at 06:00

## 2022-01-01 RX ADMIN — CLOPIDOGREL BISULFATE 75 MG: 75 TABLET, FILM COATED ORAL at 08:11

## 2022-01-01 RX ADMIN — METOPROLOL TARTRATE 50 MG: 50 TABLET, FILM COATED ORAL at 21:59

## 2022-01-01 RX ADMIN — IPRATROPIUM BROMIDE AND ALBUTEROL SULFATE 3 ML: 2.5; .5 SOLUTION RESPIRATORY (INHALATION) at 06:35

## 2022-01-01 RX ADMIN — ATORVASTATIN CALCIUM 40 MG: 40 TABLET, FILM COATED ORAL at 20:15

## 2022-01-01 RX ADMIN — INSULIN LISPRO 2 UNITS: 100 INJECTION, SOLUTION INTRAVENOUS; SUBCUTANEOUS at 20:59

## 2022-01-01 RX ADMIN — INSULIN LISPRO 2 UNITS: 100 INJECTION, SOLUTION INTRAVENOUS; SUBCUTANEOUS at 21:05

## 2022-01-01 RX ADMIN — PANTOPRAZOLE SODIUM 40 MG: 40 TABLET, DELAYED RELEASE ORAL at 16:48

## 2022-01-01 RX ADMIN — GUAIFENESIN 1200 MG: 600 TABLET ORAL at 20:04

## 2022-01-01 RX ADMIN — HYDROXYZINE HYDROCHLORIDE 10 MG: 10 TABLET ORAL at 12:16

## 2022-01-01 RX ADMIN — BUMETANIDE 2 MG: 0.25 INJECTION INTRAMUSCULAR; INTRAVENOUS at 20:47

## 2022-01-01 RX ADMIN — ONDANSETRON HYDROCHLORIDE 4 MG: 2 SOLUTION INTRAMUSCULAR; INTRAVENOUS at 13:58

## 2022-01-01 RX ADMIN — IPRATROPIUM BROMIDE AND ALBUTEROL SULFATE 3 ML: 2.5; .5 SOLUTION RESPIRATORY (INHALATION) at 10:00

## 2022-01-01 RX ADMIN — ASPIRIN 81 MG: 81 TABLET, COATED ORAL at 21:11

## 2022-01-01 RX ADMIN — BUDESONIDE 500 MCG: 0.5 SUSPENSION RESPIRATORY (INHALATION) at 19:24

## 2022-01-01 RX ADMIN — LEVALBUTEROL HYDROCHLORIDE 0.63 MG: 0.63 SOLUTION RESPIRATORY (INHALATION) at 15:41

## 2022-01-01 RX ADMIN — PANTOPRAZOLE SODIUM 40 MG: 40 TABLET, DELAYED RELEASE ORAL at 16:52

## 2022-01-01 RX ADMIN — PREDNISONE 15 MG: 10 TABLET ORAL at 09:40

## 2022-01-01 RX ADMIN — ASPIRIN 81 MG: 81 TABLET, COATED ORAL at 19:52

## 2022-01-01 RX ADMIN — ARFORMOTEROL TARTRATE 15 MCG: 15 SOLUTION RESPIRATORY (INHALATION) at 18:54

## 2022-01-01 RX ADMIN — ATORVASTATIN CALCIUM 40 MG: 40 TABLET, FILM COATED ORAL at 21:42

## 2022-01-01 RX ADMIN — PREDNISONE 40 MG: 20 TABLET ORAL at 10:20

## 2022-01-01 RX ADMIN — ISOSORBIDE MONONITRATE 30 MG: 30 TABLET, EXTENDED RELEASE ORAL at 09:18

## 2022-01-01 RX ADMIN — CLOPIDOGREL BISULFATE 75 MG: 75 TABLET ORAL at 09:32

## 2022-01-01 RX ADMIN — IPRATROPIUM BROMIDE AND ALBUTEROL SULFATE 3 ML: 2.5; .5 SOLUTION RESPIRATORY (INHALATION) at 19:36

## 2022-01-01 RX ADMIN — IPRATROPIUM BROMIDE AND ALBUTEROL SULFATE 1 AMPULE: 2.5; .5 SOLUTION RESPIRATORY (INHALATION) at 10:08

## 2022-01-01 RX ADMIN — DIPHENHYDRAMINE HYDROCHLORIDE 12.5 MG: 50 INJECTION, SOLUTION INTRAMUSCULAR; INTRAVENOUS at 09:34

## 2022-01-01 RX ADMIN — BUDESONIDE 500 MCG: 0.5 SUSPENSION RESPIRATORY (INHALATION) at 19:46

## 2022-01-01 RX ADMIN — METHYLPREDNISOLONE SODIUM SUCCINATE 40 MG: 40 INJECTION, POWDER, FOR SOLUTION INTRAMUSCULAR; INTRAVENOUS at 16:58

## 2022-01-01 RX ADMIN — LEVALBUTEROL HYDROCHLORIDE 0.63 MG: 0.63 SOLUTION RESPIRATORY (INHALATION) at 02:05

## 2022-01-01 RX ADMIN — IPRATROPIUM BROMIDE AND ALBUTEROL SULFATE 1 AMPULE: 2.5; .5 SOLUTION RESPIRATORY (INHALATION) at 18:58

## 2022-01-01 RX ADMIN — ATORVASTATIN CALCIUM 40 MG: 40 TABLET, FILM COATED ORAL at 21:39

## 2022-01-01 RX ADMIN — METOPROLOL TARTRATE 50 MG: 50 TABLET, FILM COATED ORAL at 20:34

## 2022-01-01 RX ADMIN — ARFORMOTEROL TARTRATE 15 MCG: 15 SOLUTION RESPIRATORY (INHALATION) at 19:45

## 2022-01-01 RX ADMIN — GUAIFENESIN 1200 MG: 600 TABLET ORAL at 20:29

## 2022-01-01 RX ADMIN — PREDNISONE 60 MG: 20 TABLET ORAL at 08:36

## 2022-01-01 RX ADMIN — METHYLPREDNISOLONE SODIUM SUCCINATE 40 MG: 40 INJECTION, POWDER, FOR SOLUTION INTRAMUSCULAR; INTRAVENOUS at 23:37

## 2022-01-01 RX ADMIN — SILDENAFIL CITRATE 20 MG: 20 TABLET ORAL at 16:47

## 2022-01-01 RX ADMIN — PREDNISONE 60 MG: 20 TABLET ORAL at 10:29

## 2022-01-01 RX ADMIN — LEVALBUTEROL HYDROCHLORIDE 0.63 MG: 0.63 SOLUTION RESPIRATORY (INHALATION) at 15:17

## 2022-01-01 RX ADMIN — IPRATROPIUM BROMIDE AND ALBUTEROL SULFATE 3 ML: 2.5; .5 SOLUTION RESPIRATORY (INHALATION) at 14:40

## 2022-01-01 RX ADMIN — DIPHENHYDRAMINE HYDROCHLORIDE 12.5 MG: 50 INJECTION, SOLUTION INTRAMUSCULAR; INTRAVENOUS at 23:31

## 2022-01-01 RX ADMIN — PREDNISONE 20 MG: 20 TABLET ORAL at 08:30

## 2022-01-01 RX ADMIN — IPRATROPIUM BROMIDE AND ALBUTEROL SULFATE 1 AMPULE: 2.5; .5 SOLUTION RESPIRATORY (INHALATION) at 06:27

## 2022-01-01 RX ADMIN — CLOPIDOGREL BISULFATE 75 MG: 75 TABLET ORAL at 08:18

## 2022-01-01 RX ADMIN — FUROSEMIDE 10 MG/HR: 10 INJECTION, SOLUTION INTRAVENOUS at 11:09

## 2022-01-01 RX ADMIN — ARFORMOTEROL TARTRATE 15 MCG: 15 SOLUTION RESPIRATORY (INHALATION) at 18:53

## 2022-01-01 RX ADMIN — ALBUTEROL SULFATE 2.5 MG: 2.5 SOLUTION RESPIRATORY (INHALATION) at 08:42

## 2022-01-01 RX ADMIN — ISOSORBIDE MONONITRATE 30 MG: 30 TABLET, EXTENDED RELEASE ORAL at 09:31

## 2022-01-01 RX ADMIN — METHYLPREDNISOLONE SODIUM SUCCINATE 40 MG: 40 INJECTION, POWDER, FOR SOLUTION INTRAMUSCULAR; INTRAVENOUS at 05:54

## 2022-01-01 RX ADMIN — BUMETANIDE 2 MG: 1 TABLET ORAL at 09:16

## 2022-01-01 RX ADMIN — LEVOTHYROXINE SODIUM 25 MCG: 25 TABLET ORAL at 05:23

## 2022-01-01 RX ADMIN — PANTOPRAZOLE SODIUM 40 MG: 40 TABLET, DELAYED RELEASE ORAL at 16:44

## 2022-01-01 RX ADMIN — BUMETANIDE 2 MG: 0.25 INJECTION INTRAMUSCULAR; INTRAVENOUS at 08:54

## 2022-01-01 RX ADMIN — SILDENAFIL CITRATE 20 MG: 20 TABLET ORAL at 14:44

## 2022-01-01 RX ADMIN — ARFORMOTEROL TARTRATE 15 MCG: 15 SOLUTION RESPIRATORY (INHALATION) at 06:08

## 2022-01-01 RX ADMIN — IPRATROPIUM BROMIDE AND ALBUTEROL SULFATE 1 AMPULE: 2.5; .5 SOLUTION RESPIRATORY (INHALATION) at 14:25

## 2022-01-01 RX ADMIN — PANTOPRAZOLE SODIUM 40 MG: 40 TABLET, DELAYED RELEASE ORAL at 06:19

## 2022-01-01 RX ADMIN — SODIUM CHLORIDE, PRESERVATIVE FREE 10 ML: 5 INJECTION INTRAVENOUS at 08:11

## 2022-01-01 RX ADMIN — GUAIFENESIN 1200 MG: 600 TABLET ORAL at 08:03

## 2022-01-01 RX ADMIN — MUPIROCIN: 20 OINTMENT TOPICAL at 09:42

## 2022-01-01 RX ADMIN — GUAIFENESIN 1200 MG: 600 TABLET ORAL at 21:02

## 2022-01-01 RX ADMIN — DOXYCYCLINE HYCLATE 100 MG: 100 CAPSULE ORAL at 20:04

## 2022-01-01 RX ADMIN — IPRATROPIUM BROMIDE AND ALBUTEROL SULFATE 1 AMPULE: 2.5; .5 SOLUTION RESPIRATORY (INHALATION) at 14:04

## 2022-01-01 RX ADMIN — FUROSEMIDE 20 MG: 10 INJECTION, SOLUTION INTRAMUSCULAR; INTRAVENOUS at 15:47

## 2022-01-01 RX ADMIN — PIPERACILLIN SODIUM AND TAZOBACTAM SODIUM 4500 MG: 4; .5 INJECTION, POWDER, LYOPHILIZED, FOR SOLUTION INTRAVENOUS at 13:50

## 2022-01-01 RX ADMIN — CLOPIDOGREL BISULFATE 75 MG: 75 TABLET, FILM COATED ORAL at 08:37

## 2022-01-01 RX ADMIN — CLOPIDOGREL BISULFATE 75 MG: 75 TABLET ORAL at 09:40

## 2022-01-01 RX ADMIN — METOPROLOL TARTRATE 50 MG: 50 TABLET, FILM COATED ORAL at 09:22

## 2022-01-01 RX ADMIN — WATER 1000 MG: 1 INJECTION INTRAMUSCULAR; INTRAVENOUS; SUBCUTANEOUS at 10:13

## 2022-01-01 RX ADMIN — INSULIN LISPRO 2 UNITS: 100 INJECTION, SOLUTION INTRAVENOUS; SUBCUTANEOUS at 16:52

## 2022-01-01 RX ADMIN — LORAZEPAM 0.5 MG: 0.5 TABLET ORAL at 06:23

## 2022-01-01 RX ADMIN — ENOXAPARIN SODIUM 30 MG: 100 INJECTION SUBCUTANEOUS at 16:00

## 2022-01-01 RX ADMIN — CEFEPIME HYDROCHLORIDE 2000 MG: 2 INJECTION, POWDER, FOR SOLUTION INTRAVENOUS at 11:51

## 2022-01-01 RX ADMIN — WATER 1000 MG: 1 INJECTION INTRAMUSCULAR; INTRAVENOUS; SUBCUTANEOUS at 12:14

## 2022-01-01 RX ADMIN — IPRATROPIUM BROMIDE AND ALBUTEROL SULFATE 1 AMPULE: 2.5; .5 SOLUTION RESPIRATORY (INHALATION) at 10:34

## 2022-01-01 RX ADMIN — CEFDINIR 300 MG: 300 CAPSULE ORAL at 09:00

## 2022-01-01 RX ADMIN — AMLODIPINE BESYLATE 5 MG: 5 TABLET ORAL at 20:51

## 2022-01-01 RX ADMIN — METOPROLOL SUCCINATE 50 MG: 50 TABLET, EXTENDED RELEASE ORAL at 09:18

## 2022-01-01 RX ADMIN — HYDROXYZINE HYDROCHLORIDE 25 MG: 25 TABLET ORAL at 21:11

## 2022-01-01 RX ADMIN — INSULIN LISPRO 2 UNITS: 100 INJECTION, SOLUTION INTRAVENOUS; SUBCUTANEOUS at 14:03

## 2022-01-01 RX ADMIN — DOXYCYCLINE HYCLATE 100 MG: 100 CAPSULE ORAL at 20:34

## 2022-01-01 RX ADMIN — ENOXAPARIN SODIUM 30 MG: 100 INJECTION SUBCUTANEOUS at 09:32

## 2022-01-01 RX ADMIN — IPRATROPIUM BROMIDE AND ALBUTEROL SULFATE 1 AMPULE: 2.5; .5 SOLUTION RESPIRATORY (INHALATION) at 14:09

## 2022-01-01 RX ADMIN — Medication 5 MILLICURIE: at 14:13

## 2022-01-01 RX ADMIN — LORAZEPAM 0.5 MG: 0.5 TABLET ORAL at 10:29

## 2022-01-01 RX ADMIN — SILDENAFIL 20 MG: 20 TABLET, FILM COATED ORAL at 09:40

## 2022-01-01 RX ADMIN — ENOXAPARIN SODIUM 30 MG: 100 INJECTION SUBCUTANEOUS at 07:37

## 2022-01-01 RX ADMIN — ARFORMOTEROL TARTRATE 15 MCG: 15 SOLUTION RESPIRATORY (INHALATION) at 06:16

## 2022-01-01 RX ADMIN — SILDENAFIL CITRATE 20 MG: 20 TABLET ORAL at 20:08

## 2022-01-01 RX ADMIN — BUDESONIDE 500 MCG: 0.5 SUSPENSION RESPIRATORY (INHALATION) at 19:05

## 2022-01-01 RX ADMIN — SODIUM CHLORIDE, PRESERVATIVE FREE 10 ML: 5 INJECTION INTRAVENOUS at 07:38

## 2022-01-01 RX ADMIN — METOPROLOL TARTRATE 50 MG: 50 TABLET, FILM COATED ORAL at 20:14

## 2022-01-01 RX ADMIN — BUDESONIDE 500 MCG: 0.5 SUSPENSION RESPIRATORY (INHALATION) at 18:53

## 2022-01-01 RX ADMIN — PREDNISONE 60 MG: 20 TABLET ORAL at 08:11

## 2022-01-01 RX ADMIN — SILDENAFIL 20 MG: 20 TABLET, FILM COATED ORAL at 14:30

## 2022-01-01 RX ADMIN — SILDENAFIL 20 MG: 20 TABLET, FILM COATED ORAL at 09:21

## 2022-01-01 RX ADMIN — LEVOTHYROXINE SODIUM 25 MCG: 25 TABLET ORAL at 06:19

## 2022-01-01 RX ADMIN — DOXYCYCLINE HYCLATE 100 MG: 100 CAPSULE ORAL at 10:17

## 2022-01-01 RX ADMIN — INSULIN LISPRO 4 UNITS: 100 INJECTION, SOLUTION INTRAVENOUS; SUBCUTANEOUS at 16:51

## 2022-01-01 RX ADMIN — IRON SUCROSE 500 MG: 20 INJECTION, SOLUTION INTRAVENOUS at 13:21

## 2022-01-01 RX ADMIN — FUROSEMIDE 10 MG/HR: 10 INJECTION, SOLUTION INTRAVENOUS at 14:15

## 2022-01-01 RX ADMIN — BUMETANIDE 2 MG: 0.25 INJECTION INTRAMUSCULAR; INTRAVENOUS at 20:49

## 2022-01-01 RX ADMIN — LORAZEPAM 0.5 MG: 0.5 TABLET ORAL at 09:17

## 2022-01-01 RX ADMIN — IPRATROPIUM BROMIDE AND ALBUTEROL SULFATE 1 AMPULE: 2.5; .5 SOLUTION RESPIRATORY (INHALATION) at 14:27

## 2022-01-01 RX ADMIN — LORAZEPAM 0.5 MG: 0.5 TABLET ORAL at 07:44

## 2022-01-01 RX ADMIN — ONDANSETRON HYDROCHLORIDE 4 MG: 2 SOLUTION INTRAMUSCULAR; INTRAVENOUS at 17:22

## 2022-01-01 RX ADMIN — SODIUM CHLORIDE, PRESERVATIVE FREE 10 ML: 5 INJECTION INTRAVENOUS at 20:55

## 2022-01-01 RX ADMIN — INSULIN LISPRO 1 UNITS: 100 INJECTION, SOLUTION INTRAVENOUS; SUBCUTANEOUS at 20:33

## 2022-01-01 RX ADMIN — CEFDINIR 300 MG: 300 CAPSULE ORAL at 08:24

## 2022-01-01 RX ADMIN — LORAZEPAM 0.5 MG: 0.5 TABLET ORAL at 20:46

## 2022-01-01 RX ADMIN — IPRATROPIUM BROMIDE AND ALBUTEROL SULFATE 1 AMPULE: 2.5; .5 SOLUTION RESPIRATORY (INHALATION) at 14:18

## 2022-01-01 RX ADMIN — METOPROLOL SUCCINATE 50 MG: 50 TABLET, EXTENDED RELEASE ORAL at 20:54

## 2022-01-01 RX ADMIN — IPRATROPIUM BROMIDE AND ALBUTEROL SULFATE 1 AMPULE: 2.5; .5 SOLUTION RESPIRATORY (INHALATION) at 22:02

## 2022-01-01 RX ADMIN — METHYLPREDNISOLONE SODIUM SUCCINATE 40 MG: 40 INJECTION, POWDER, FOR SOLUTION INTRAMUSCULAR; INTRAVENOUS at 20:55

## 2022-01-01 RX ADMIN — IPRATROPIUM BROMIDE AND ALBUTEROL SULFATE 1 AMPULE: 2.5; .5 SOLUTION RESPIRATORY (INHALATION) at 19:28

## 2022-01-01 RX ADMIN — SODIUM CHLORIDE, PRESERVATIVE FREE 10 ML: 5 INJECTION INTRAVENOUS at 19:48

## 2022-01-01 RX ADMIN — IPRATROPIUM BROMIDE AND ALBUTEROL SULFATE 3 ML: 2.5; .5 SOLUTION RESPIRATORY (INHALATION) at 14:11

## 2022-01-01 RX ADMIN — SILDENAFIL 20 MG: 20 TABLET, FILM COATED ORAL at 09:00

## 2022-01-01 RX ADMIN — METOPROLOL SUCCINATE 50 MG: 50 TABLET, EXTENDED RELEASE ORAL at 07:40

## 2022-01-01 RX ADMIN — GUAIFENESIN 1200 MG: 600 TABLET ORAL at 09:16

## 2022-01-01 RX ADMIN — GUAIFENESIN 1200 MG: 600 TABLET ORAL at 08:49

## 2022-01-01 RX ADMIN — IPRATROPIUM BROMIDE AND ALBUTEROL SULFATE 3 ML: 2.5; .5 SOLUTION RESPIRATORY (INHALATION) at 14:35

## 2022-01-01 RX ADMIN — IPRATROPIUM BROMIDE AND ALBUTEROL SULFATE 1 AMPULE: 2.5; .5 SOLUTION RESPIRATORY (INHALATION) at 02:13

## 2022-01-01 RX ADMIN — INSULIN LISPRO 1 UNITS: 100 INJECTION, SOLUTION INTRAVENOUS; SUBCUTANEOUS at 12:15

## 2022-01-01 RX ADMIN — ATORVASTATIN CALCIUM 40 MG: 40 TABLET, FILM COATED ORAL at 20:44

## 2022-01-01 RX ADMIN — PREDNISONE 60 MG: 20 TABLET ORAL at 09:39

## 2022-01-01 RX ADMIN — ARFORMOTEROL TARTRATE 15 MCG: 15 SOLUTION RESPIRATORY (INHALATION) at 06:18

## 2022-01-01 RX ADMIN — ATORVASTATIN CALCIUM 40 MG: 40 TABLET, FILM COATED ORAL at 20:49

## 2022-01-01 RX ADMIN — HYDROMORPHONE HYDROCHLORIDE 0.5 MG: 1 INJECTION, SOLUTION INTRAMUSCULAR; INTRAVENOUS; SUBCUTANEOUS at 19:21

## 2022-01-01 RX ADMIN — GUAIFENESIN 1200 MG: 600 TABLET ORAL at 08:30

## 2022-01-01 RX ADMIN — INSULIN LISPRO 1 UNITS: 100 INJECTION, SOLUTION INTRAVENOUS; SUBCUTANEOUS at 09:35

## 2022-01-01 RX ADMIN — IPRATROPIUM BROMIDE AND ALBUTEROL SULFATE 3 ML: 2.5; .5 SOLUTION RESPIRATORY (INHALATION) at 18:50

## 2022-01-01 RX ADMIN — GUAIFENESIN 1200 MG: 600 TABLET ORAL at 20:08

## 2022-01-01 RX ADMIN — METOPROLOL SUCCINATE 50 MG: 50 TABLET, EXTENDED RELEASE ORAL at 09:39

## 2022-01-01 RX ADMIN — LEVALBUTEROL HYDROCHLORIDE 0.63 MG: 0.63 SOLUTION RESPIRATORY (INHALATION) at 07:27

## 2022-01-01 RX ADMIN — BUDESONIDE 500 MCG: 0.5 SUSPENSION RESPIRATORY (INHALATION) at 19:28

## 2022-01-01 RX ADMIN — ASPIRIN 81 MG: 81 TABLET, COATED ORAL at 20:51

## 2022-01-01 RX ADMIN — LEVALBUTEROL HYDROCHLORIDE 0.63 MG: 0.63 SOLUTION RESPIRATORY (INHALATION) at 06:50

## 2022-01-01 RX ADMIN — PANTOPRAZOLE SODIUM 40 MG: 40 TABLET, DELAYED RELEASE ORAL at 06:20

## 2022-01-01 RX ADMIN — DOXYCYCLINE HYCLATE 100 MG: 100 CAPSULE ORAL at 07:40

## 2022-01-01 RX ADMIN — LEVALBUTEROL HYDROCHLORIDE 0.63 MG: 0.63 SOLUTION RESPIRATORY (INHALATION) at 06:20

## 2022-01-01 RX ADMIN — FUROSEMIDE 40 MG: 40 TABLET ORAL at 08:11

## 2022-01-01 RX ADMIN — ATORVASTATIN CALCIUM 40 MG: 40 TABLET, FILM COATED ORAL at 20:56

## 2022-01-01 RX ADMIN — HYDROXYZINE HYDROCHLORIDE 25 MG: 25 TABLET ORAL at 19:52

## 2022-01-01 RX ADMIN — IPRATROPIUM BROMIDE AND ALBUTEROL SULFATE 3 ML: 2.5; .5 SOLUTION RESPIRATORY (INHALATION) at 14:02

## 2022-01-01 RX ADMIN — LEVOTHYROXINE SODIUM 25 MCG: 25 TABLET ORAL at 06:24

## 2022-01-01 RX ADMIN — ARFORMOTEROL TARTRATE 15 MCG: 15 SOLUTION RESPIRATORY (INHALATION) at 18:23

## 2022-01-01 RX ADMIN — LEVALBUTEROL HYDROCHLORIDE 0.63 MG: 0.63 SOLUTION RESPIRATORY (INHALATION) at 10:55

## 2022-01-01 RX ADMIN — LEVALBUTEROL HYDROCHLORIDE 0.63 MG: 0.63 SOLUTION RESPIRATORY (INHALATION) at 01:42

## 2022-01-01 RX ADMIN — PREDNISONE 60 MG: 20 TABLET ORAL at 08:49

## 2022-01-01 RX ADMIN — GUAIFENESIN 1200 MG: 600 TABLET ORAL at 20:54

## 2022-01-01 RX ADMIN — SODIUM CHLORIDE, PRESERVATIVE FREE 10 ML: 5 INJECTION INTRAVENOUS at 09:43

## 2022-01-01 RX ADMIN — SILDENAFIL CITRATE 20 MG: 20 TABLET ORAL at 20:29

## 2022-01-01 RX ADMIN — BUDESONIDE 500 MCG: 0.5 SUSPENSION RESPIRATORY (INHALATION) at 20:14

## 2022-01-01 RX ADMIN — AMLODIPINE BESYLATE 5 MG: 5 TABLET ORAL at 20:16

## 2022-01-01 RX ADMIN — BUDESONIDE 500 MCG: 0.5 SUSPENSION RESPIRATORY (INHALATION) at 06:48

## 2022-01-01 RX ADMIN — ARFORMOTEROL TARTRATE 15 MCG: 15 SOLUTION RESPIRATORY (INHALATION) at 19:06

## 2022-01-01 RX ADMIN — CLOPIDOGREL BISULFATE 75 MG: 75 TABLET ORAL at 09:22

## 2022-01-01 RX ADMIN — BUDESONIDE 500 MCG: 0.5 SUSPENSION RESPIRATORY (INHALATION) at 19:15

## 2022-01-01 RX ADMIN — LEVOTHYROXINE SODIUM 25 MCG: 25 TABLET ORAL at 05:49

## 2022-01-01 RX ADMIN — AMLODIPINE BESYLATE 5 MG: 5 TABLET ORAL at 19:47

## 2022-01-01 RX ADMIN — HYDROXYZINE HYDROCHLORIDE 25 MG: 25 TABLET ORAL at 08:47

## 2022-01-01 RX ADMIN — LEVALBUTEROL HYDROCHLORIDE 0.63 MG: 0.63 SOLUTION RESPIRATORY (INHALATION) at 11:10

## 2022-01-01 RX ADMIN — PANTOPRAZOLE SODIUM 40 MG: 40 TABLET, DELAYED RELEASE ORAL at 06:05

## 2022-01-01 RX ADMIN — ATORVASTATIN CALCIUM 40 MG: 40 TABLET, FILM COATED ORAL at 21:25

## 2022-01-01 RX ADMIN — SODIUM CHLORIDE, PRESERVATIVE FREE 10 ML: 5 INJECTION INTRAVENOUS at 19:52

## 2022-01-01 RX ADMIN — MORPHINE SULFATE 1 MG: 2 INJECTION, SOLUTION INTRAMUSCULAR; INTRAVENOUS at 05:52

## 2022-01-01 RX ADMIN — GUAIFENESIN 1200 MG: 600 TABLET ORAL at 09:32

## 2022-01-01 RX ADMIN — METHYLPREDNISOLONE SODIUM SUCCINATE 40 MG: 40 INJECTION, POWDER, FOR SOLUTION INTRAMUSCULAR; INTRAVENOUS at 17:08

## 2022-01-01 RX ADMIN — CLOPIDOGREL BISULFATE 75 MG: 75 TABLET ORAL at 09:18

## 2022-01-01 RX ADMIN — LEVALBUTEROL HYDROCHLORIDE 0.63 MG: 0.63 SOLUTION RESPIRATORY (INHALATION) at 02:24

## 2022-01-01 RX ADMIN — ATORVASTATIN CALCIUM 40 MG: 40 TABLET, FILM COATED ORAL at 21:02

## 2022-01-01 RX ADMIN — INSULIN LISPRO 1 UNITS: 100 INJECTION, SOLUTION INTRAVENOUS; SUBCUTANEOUS at 21:08

## 2022-01-01 RX ADMIN — FUROSEMIDE 10 MG/HR: 10 INJECTION, SOLUTION INTRAVENOUS at 23:00

## 2022-01-01 RX ADMIN — GUAIFENESIN 1200 MG: 600 TABLET ORAL at 10:31

## 2022-01-01 RX ADMIN — INSULIN LISPRO 2 UNITS: 100 INJECTION, SOLUTION INTRAVENOUS; SUBCUTANEOUS at 18:27

## 2022-01-01 RX ADMIN — IPRATROPIUM BROMIDE AND ALBUTEROL SULFATE 3 ML: 2.5; .5 SOLUTION RESPIRATORY (INHALATION) at 10:35

## 2022-01-01 RX ADMIN — LEVOTHYROXINE SODIUM 25 MCG: 25 TABLET ORAL at 06:20

## 2022-01-01 RX ADMIN — BUDESONIDE 500 MCG: 0.5 SUSPENSION RESPIRATORY (INHALATION) at 06:11

## 2022-01-01 RX ADMIN — GUAIFENESIN 1200 MG: 600 TABLET ORAL at 09:18

## 2022-01-01 RX ADMIN — POTASSIUM CHLORIDE 40 MEQ: 1500 TABLET, EXTENDED RELEASE ORAL at 19:25

## 2022-01-01 RX ADMIN — GUAIFENESIN 1200 MG: 600 TABLET ORAL at 20:35

## 2022-01-01 RX ADMIN — SILDENAFIL CITRATE 20 MG: 20 TABLET ORAL at 16:00

## 2022-01-01 RX ADMIN — ISOSORBIDE MONONITRATE 30 MG: 30 TABLET, EXTENDED RELEASE ORAL at 09:21

## 2022-01-01 RX ADMIN — ARFORMOTEROL TARTRATE 15 MCG: 15 SOLUTION RESPIRATORY (INHALATION) at 19:36

## 2022-01-01 RX ADMIN — Medication 5 MG: at 21:25

## 2022-01-01 RX ADMIN — SILDENAFIL 20 MG: 20 TABLET, FILM COATED ORAL at 17:42

## 2022-01-01 RX ADMIN — ARFORMOTEROL TARTRATE 15 MCG: 15 SOLUTION RESPIRATORY (INHALATION) at 06:45

## 2022-01-01 RX ADMIN — ASPIRIN 81 MG: 81 TABLET, COATED ORAL at 20:14

## 2022-01-01 RX ADMIN — ARFORMOTEROL TARTRATE 15 MCG: 15 SOLUTION RESPIRATORY (INHALATION) at 06:53

## 2022-01-01 RX ADMIN — DOXYCYCLINE HYCLATE 100 MG: 100 CAPSULE ORAL at 20:49

## 2022-01-01 RX ADMIN — ATORVASTATIN CALCIUM 40 MG: 40 TABLET, FILM COATED ORAL at 21:11

## 2022-01-01 RX ADMIN — CLOPIDOGREL BISULFATE 75 MG: 75 TABLET ORAL at 08:11

## 2022-01-01 RX ADMIN — LEVALBUTEROL HYDROCHLORIDE 0.63 MG: 0.63 SOLUTION RESPIRATORY (INHALATION) at 14:30

## 2022-01-01 RX ADMIN — CEFEPIME 2000 MG: 2 INJECTION, POWDER, FOR SOLUTION INTRAMUSCULAR; INTRAVENOUS at 21:09

## 2022-01-01 RX ADMIN — ARFORMOTEROL TARTRATE 15 MCG: 15 SOLUTION RESPIRATORY (INHALATION) at 07:42

## 2022-01-01 RX ADMIN — DOXYCYCLINE HYCLATE 100 MG: 100 CAPSULE ORAL at 20:46

## 2022-01-01 RX ADMIN — IPRATROPIUM BROMIDE AND ALBUTEROL SULFATE 1 AMPULE: 2.5; .5 SOLUTION RESPIRATORY (INHALATION) at 14:26

## 2022-01-01 RX ADMIN — ISOSORBIDE MONONITRATE 30 MG: 30 TABLET, EXTENDED RELEASE ORAL at 08:30

## 2022-01-01 RX ADMIN — METOPROLOL TARTRATE 50 MG: 50 TABLET, FILM COATED ORAL at 09:00

## 2022-01-01 RX ADMIN — POTASSIUM CHLORIDE 40 MEQ: 1500 TABLET, EXTENDED RELEASE ORAL at 09:15

## 2022-01-01 RX ADMIN — CLOPIDOGREL BISULFATE 75 MG: 75 TABLET ORAL at 08:03

## 2022-01-01 RX ADMIN — IPRATROPIUM BROMIDE AND ALBUTEROL SULFATE 1 AMPULE: 2.5; .5 SOLUTION RESPIRATORY (INHALATION) at 18:29

## 2022-01-01 RX ADMIN — CEFEPIME 2000 MG: 2 INJECTION, POWDER, FOR SOLUTION INTRAMUSCULAR; INTRAVENOUS at 20:55

## 2022-01-01 RX ADMIN — MORPHINE SULFATE 1 MG: 2 INJECTION, SOLUTION INTRAMUSCULAR; INTRAVENOUS at 08:53

## 2022-01-01 RX ADMIN — PREDNISONE 20 MG: 20 TABLET ORAL at 08:18

## 2022-01-01 RX ADMIN — ISOSORBIDE MONONITRATE 30 MG: 30 TABLET, EXTENDED RELEASE ORAL at 08:18

## 2022-01-01 RX ADMIN — SODIUM CHLORIDE, PRESERVATIVE FREE 10 ML: 5 INJECTION INTRAVENOUS at 07:40

## 2022-01-01 RX ADMIN — METOPROLOL SUCCINATE 50 MG: 50 TABLET, EXTENDED RELEASE ORAL at 21:43

## 2022-01-01 RX ADMIN — BUDESONIDE 500 MCG: 0.5 SUSPENSION RESPIRATORY (INHALATION) at 19:36

## 2022-01-01 RX ADMIN — ISOSORBIDE MONONITRATE 30 MG: 30 TABLET, EXTENDED RELEASE ORAL at 09:40

## 2022-01-01 RX ADMIN — SODIUM CHLORIDE, PRESERVATIVE FREE 10 ML: 5 INJECTION INTRAVENOUS at 20:30

## 2022-01-01 RX ADMIN — BUDESONIDE 500 MCG: 0.5 SUSPENSION RESPIRATORY (INHALATION) at 07:30

## 2022-01-01 RX ADMIN — FUROSEMIDE 40 MG: 40 TABLET ORAL at 08:48

## 2022-01-01 RX ADMIN — PIPERACILLIN AND TAZOBACTAM 3375 MG: 3; .375 INJECTION, POWDER, LYOPHILIZED, FOR SOLUTION INTRAVENOUS at 17:44

## 2022-01-01 RX ADMIN — METHYLPREDNISOLONE SODIUM SUCCINATE 40 MG: 40 INJECTION, POWDER, FOR SOLUTION INTRAMUSCULAR; INTRAVENOUS at 05:17

## 2022-01-01 RX ADMIN — ARFORMOTEROL TARTRATE 15 MCG: 15 SOLUTION RESPIRATORY (INHALATION) at 19:28

## 2022-01-01 RX ADMIN — LEVALBUTEROL HYDROCHLORIDE 0.63 MG: 0.63 SOLUTION RESPIRATORY (INHALATION) at 14:00

## 2022-01-01 RX ADMIN — IPRATROPIUM BROMIDE AND ALBUTEROL SULFATE 1 AMPULE: 2.5; .5 SOLUTION RESPIRATORY (INHALATION) at 14:23

## 2022-01-01 RX ADMIN — PANTOPRAZOLE SODIUM 40 MG: 40 TABLET, DELAYED RELEASE ORAL at 06:00

## 2022-01-01 RX ADMIN — PANTOPRAZOLE SODIUM 40 MG: 40 TABLET, DELAYED RELEASE ORAL at 18:26

## 2022-01-01 RX ADMIN — HEPARIN SODIUM 5000 UNITS: 5000 INJECTION INTRAVENOUS; SUBCUTANEOUS at 08:10

## 2022-01-01 RX ADMIN — BUDESONIDE 500 MCG: 0.5 SUSPENSION RESPIRATORY (INHALATION) at 19:45

## 2022-01-01 ASSESSMENT — ENCOUNTER SYMPTOMS
COUGH: 1
CONSTIPATION: 0
WHEEZING: 1
DIARRHEA: 0
VOICE CHANGE: 0
COLOR CHANGE: 0
COLOR CHANGE: 0
SORE THROAT: 0
ABDOMINAL PAIN: 0
DIARRHEA: 0
COLOR CHANGE: 0
VOMITING: 0
BACK PAIN: 0
ABDOMINAL PAIN: 0
GASTROINTESTINAL NEGATIVE: 1
COLOR CHANGE: 0
SHORTNESS OF BREATH: 1
DIARRHEA: 0
TROUBLE SWALLOWING: 0
CHEST TIGHTNESS: 0
WHEEZING: 0
ALLERGIC/IMMUNOLOGIC NEGATIVE: 1
EYES NEGATIVE: 1
EYES NEGATIVE: 1
DIARRHEA: 0
NAUSEA: 0
WHEEZING: 1
ALLERGIC/IMMUNOLOGIC NEGATIVE: 1
CONSTIPATION: 0
EYES NEGATIVE: 1
COUGH: 1
CONSTIPATION: 0
COUGH: 1
COUGH: 1
NAUSEA: 0
SHORTNESS OF BREATH: 0
COUGH: 0
TROUBLE SWALLOWING: 0
NAUSEA: 0
COLOR CHANGE: 0
DIARRHEA: 0
SHORTNESS OF BREATH: 1
COUGH: 1
DIARRHEA: 0
VOICE CHANGE: 0
TROUBLE SWALLOWING: 0
TROUBLE SWALLOWING: 0
WHEEZING: 1
TROUBLE SWALLOWING: 0
CHEST TIGHTNESS: 1
EYE DISCHARGE: 0
CONSTIPATION: 0
SINUS PRESSURE: 0
DIARRHEA: 0
COUGH: 1
ABDOMINAL PAIN: 0
GASTROINTESTINAL NEGATIVE: 1
NAUSEA: 0
BACK PAIN: 0
BLOOD IN STOOL: 0
CHOKING: 0
SHORTNESS OF BREATH: 1
CONSTIPATION: 0
NAUSEA: 0
DIARRHEA: 0
VOMITING: 0
VOMITING: 0
CHEST TIGHTNESS: 1
APNEA: 0
FACIAL SWELLING: 0
ABDOMINAL PAIN: 0
SHORTNESS OF BREATH: 1
SHORTNESS OF BREATH: 1
RHINORRHEA: 0
VOMITING: 0
SORE THROAT: 0
NAUSEA: 0
GASTROINTESTINAL NEGATIVE: 1
EYES NEGATIVE: 1
WHEEZING: 0
SHORTNESS OF BREATH: 1
SORE THROAT: 0
NAUSEA: 0
VOMITING: 0
CONSTIPATION: 0
SORE THROAT: 0
DIARRHEA: 0
ABDOMINAL PAIN: 0
SHORTNESS OF BREATH: 1
SORE THROAT: 0
ABDOMINAL PAIN: 0
SHORTNESS OF BREATH: 1
TROUBLE SWALLOWING: 0
ALLERGIC/IMMUNOLOGIC NEGATIVE: 1
VOMITING: 0
DIARRHEA: 0
BLOOD IN STOOL: 0
VOICE CHANGE: 0
WHEEZING: 0
WHEEZING: 0
GASTROINTESTINAL NEGATIVE: 1
NAUSEA: 0
SHORTNESS OF BREATH: 1
WHEEZING: 1
CHEST TIGHTNESS: 1
ABDOMINAL PAIN: 0
SHORTNESS OF BREATH: 1
COUGH: 1
EYE DISCHARGE: 0
EYE PAIN: 0
VOICE CHANGE: 0
VOMITING: 0
NAUSEA: 0
COLOR CHANGE: 0
WHEEZING: 1
ABDOMINAL PAIN: 0
COUGH: 1
WHEEZING: 0
VOICE CHANGE: 0
NAUSEA: 0
ABDOMINAL PAIN: 0
VOMITING: 0
SHORTNESS OF BREATH: 1
SORE THROAT: 0
WHEEZING: 1
CHEST TIGHTNESS: 1
GASTROINTESTINAL NEGATIVE: 1
SHORTNESS OF BREATH: 1
ALLERGIC/IMMUNOLOGIC NEGATIVE: 1
VOICE CHANGE: 0
EYE REDNESS: 0
ABDOMINAL DISTENTION: 0
CHEST TIGHTNESS: 0
SHORTNESS OF BREATH: 0
CHEST TIGHTNESS: 0
ANAL BLEEDING: 0
SHORTNESS OF BREATH: 1
TROUBLE SWALLOWING: 0
CONSTIPATION: 0
DIARRHEA: 0
COUGH: 1
RHINORRHEA: 0
VOICE CHANGE: 0
VOMITING: 0
VOICE CHANGE: 0
CHEST TIGHTNESS: 1
CONSTIPATION: 0
CHEST TIGHTNESS: 1
COLOR CHANGE: 0
COLOR CHANGE: 0
SHORTNESS OF BREATH: 1
TROUBLE SWALLOWING: 0
VOMITING: 0
VOMITING: 0
COLOR CHANGE: 1
RESPIRATORY NEGATIVE: 1
SORE THROAT: 0
CONSTIPATION: 0
NAUSEA: 0
SORE THROAT: 0
COUGH: 1
EYES NEGATIVE: 1
VOMITING: 0
VOICE CHANGE: 0
CONSTIPATION: 0
VOICE CHANGE: 0
SORE THROAT: 0
GASTROINTESTINAL NEGATIVE: 1
SORE THROAT: 0
DIARRHEA: 0
SHORTNESS OF BREATH: 1

## 2022-01-01 ASSESSMENT — PAIN DESCRIPTION - LOCATION
LOCATION: OTHER (COMMENT)
LOCATION: ABDOMEN
LOCATION: HEAD
LOCATION: ABDOMEN
LOCATION: ABDOMEN
LOCATION: OTHER (COMMENT)
LOCATION: OTHER (COMMENT)
LOCATION: HEAD
LOCATION: CHEST
LOCATION: ABDOMEN
LOCATION: HEAD
LOCATION: CHEST
LOCATION: ABDOMEN
LOCATION: ABDOMEN
LOCATION: GENERALIZED
LOCATION: ABDOMEN

## 2022-01-01 ASSESSMENT — PAIN SCALES - GENERAL
PAINLEVEL_OUTOF10: 8
PAINLEVEL_OUTOF10: 10
PAINLEVEL_OUTOF10: 0
PAINLEVEL_OUTOF10: 4
PAINLEVEL_OUTOF10: 0
PAINLEVEL_OUTOF10: 2
PAINLEVEL_OUTOF10: 3
PAINLEVEL_OUTOF10: 0
PAINLEVEL_OUTOF10: 6
PAINLEVEL_OUTOF10: 4
PAINLEVEL_OUTOF10: 0
PAINLEVEL_OUTOF10: 1
PAINLEVEL_OUTOF10: 3
PAINLEVEL_OUTOF10: 1
PAINLEVEL_OUTOF10: 0
PAINLEVEL_OUTOF10: 0
PAINLEVEL_OUTOF10: 8
PAINLEVEL_OUTOF10: 0
PAINLEVEL_OUTOF10: 6
PAINLEVEL_OUTOF10: 5
PAINLEVEL_OUTOF10: 2
PAINLEVEL_OUTOF10: 0
PAINLEVEL_OUTOF10: 3
PAINLEVEL_OUTOF10: 0
PAINLEVEL_OUTOF10: 3
PAINLEVEL_OUTOF10: 5
PAINLEVEL_OUTOF10: 0
PAINLEVEL_OUTOF10: 7
PAINLEVEL_OUTOF10: 10
PAINLEVEL_OUTOF10: 0
PAINLEVEL_OUTOF10: 0
PAINLEVEL_OUTOF10: 3
PAINLEVEL_OUTOF10: 3
PAINLEVEL_OUTOF10: 0

## 2022-01-01 ASSESSMENT — PAIN DESCRIPTION - ORIENTATION
ORIENTATION: UPPER
ORIENTATION: UPPER
ORIENTATION: MID
ORIENTATION: ANTERIOR
ORIENTATION: OTHER (COMMENT)
ORIENTATION: UPPER
ORIENTATION: UPPER
ORIENTATION: MID

## 2022-01-01 ASSESSMENT — PAIN DESCRIPTION - DESCRIPTORS
DESCRIPTORS: ACHING
DESCRIPTORS: SHOOTING
DESCRIPTORS: DISCOMFORT
DESCRIPTORS: OTHER (COMMENT)
DESCRIPTORS: SHARP
DESCRIPTORS: ACHING;CRAMPING
DESCRIPTORS: ACHING
DESCRIPTORS: ACHING;CRAMPING
DESCRIPTORS: SHARP
DESCRIPTORS: DISCOMFORT;CRAMPING
DESCRIPTORS: HEADACHE
DESCRIPTORS: ACHING;CRAMPING
DESCRIPTORS: THROBBING

## 2022-01-01 ASSESSMENT — PAIN DESCRIPTION - PAIN TYPE
TYPE: OTHER (COMMENT)
TYPE: ACUTE PAIN

## 2022-01-01 ASSESSMENT — PAIN DESCRIPTION - FREQUENCY: FREQUENCY: INTERMITTENT

## 2022-01-01 ASSESSMENT — PAIN - FUNCTIONAL ASSESSMENT
PAIN_FUNCTIONAL_ASSESSMENT: ACTIVITIES ARE NOT PREVENTED
PAIN_FUNCTIONAL_ASSESSMENT: ACTIVITIES ARE NOT PREVENTED
PAIN_FUNCTIONAL_ASSESSMENT: PREVENTS OR INTERFERES SOME ACTIVE ACTIVITIES AND ADLS
PAIN_FUNCTIONAL_ASSESSMENT: ACTIVITIES ARE NOT PREVENTED
PAIN_FUNCTIONAL_ASSESSMENT: PREVENTS OR INTERFERES WITH MANY ACTIVE NOT PASSIVE ACTIVITIES
PAIN_FUNCTIONAL_ASSESSMENT: ACTIVITIES ARE NOT PREVENTED
PAIN_FUNCTIONAL_ASSESSMENT: PREVENTS OR INTERFERES SOME ACTIVE ACTIVITIES AND ADLS

## 2022-01-01 ASSESSMENT — PAIN DESCRIPTION - ONSET: ONSET: GRADUAL

## 2022-01-01 ASSESSMENT — PAIN SCALES - WONG BAKER
WONGBAKER_NUMERICALRESPONSE: 0

## 2022-01-31 NOTE — PROGRESS NOTES
[facility], [place]. [address], [facility] [address] [facility]  [phone] office  [phone] fax      OFFICE VISIT:  2022    Para Gilford - : 1945  Reason For Visit:  Sharleen Boxer is a 68 y.o. female who is here for Follow-up (6 month after stent placement, repeat echo? ) and Coronary Artery Disease    History:   Diagnosis Orders   1. Coronary artery disease involving native coronary artery of native heart without angina pectoris     2. Pulmonary hypertension     3. History of coronary artery stent placement     4. S/P coronary artery bypass graft x 1     5. PAF (paroxysmal atrial fibrillation) ([address]     6. Status post Maze operation for atrial fibrillation     7. Mixed hyperlipidemia     8. Aortic stenosis, severe  ECHO Complete 2D W Doppler W Color   9. Renal artery stenosis ([address]      8/10/21 Endovascular intervention to left renal artery (5.0 x 12 mm  resolute [place] taken to 24 saud) utilizing drug-eluting stent - Dr. Nieves Son   10. Hypertension, unspecified type     11. Mitral valve replaced       The patient presents today for cardiology follow up. Overall, Sharleen Boxer reports doing much better. The patient denies symptoms to suggest myocardial ischemia, heart failure or arrhythmia. BP is well controlled on current regimen. The patient's PCP monitors cholesterol. Yongbo Gomezell denies exertional chest pain, orthopnea, paroxysmal nocturnal dyspnea, syncope, presyncope, sensed arrhythmia, edema and fatigue. The patient denies numbness or weakness to suggest cerebrovascular accident or transient ischemic attack. + stable JONES.      Para Gilford has the following history as [facility] in [facility]:  Patient Active Problem List   Diagnosis Code    Mixed hyperlipidemia E78.2    Arthritis M19.90    Coronary artery disease involving native coronary artery of native heart I25.10    Carotid artery stenosis I65.29    Calculus of gallbladder without cholecystitis without obstruction K80.20    Mitral valve stenosis I05.0    Mitral valve insufficiency I34.0    PUD (peptic ulcer disease) K27.9    Atherosclerosis of native artery of extremity with intermittent claudication ([facility]) I70.219    Pulmonary hypertension I27.20    Nocturnal hypoxia G47.34    Acute superficial venous thrombosis of right lower extremity I82.811    Non-pressure chronic ulcer of right calf with fat layer exposed ([facility]) L97.212    Decubitus ulcer of right buttock, stage 3 ([facility]) L89.313    Severe malnutrition ([facility]) T71    Diastolic dysfunction L28.28    Anemia in chronic kidney disease (CKD) N18.9, D63.1    Nonhealing nonsurgical wound with fat layer exposed T14. 8XXA    Atherosclerosis of native arteries of left leg with ulceration of calf ([facility]) I70.242    Atherosclerosis of native artery of right lower extremity with ulceration of ankle ([facility]) I70.233    Atherosclerosis of native arteries of right leg with ulceration of other part of lower right leg I70.238    Atherosclerosis of native arteries of right leg with ulceration of other part of foot I70.235    Nonhealing ulcer of right lower leg with fat layer exposed ([address] L97.912    Non-pressure chronic ulcer of right ankle with fat layer exposed ([address] L97.312    Neuropathic ulcer of right foot with fat layer exposed ([address] L97.512    Hypervolemia E87.70    GI bleed K92.2    Atherosclerosis of native artery of extremity with intermittent claudication ([facility]) I70.219    CHF (congestive heart failure) ([facility]) I50.9    CKD (chronic kidney disease), stage III ([facility]) N18.30    Pulmonary emphysema ([facility]) J43.9    Chronic obstructive pulmonary disease ([facility]) J44.9    PVD (peripheral vascular disease) ([facility]) I73.9    Wound of sacral region S31.000A    Elevated TSH R79.89    Bilateral carotid artery stenosis I65.23    Obstruction of left carotid artery I65.22    Bradycardia R00.1    Sepsis with organ dysfunction ([facility] A41.9, R65.20    NSTEMI (non-ST elevated myocardial infarction) ([facility] I21.4    HCAP (healthcare-associated pneumonia) J18.9    Acute renal failure (ARF) (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) I72.0    Postoperative anemia due to acute blood loss D62    Status post Maze operation for atrial fibrillation Z98.890, Z86.79    PAF (paroxysmal atrial fibrillation) ([facility]) I48.0    S/P coronary artery bypass graft x 1 Z95.1    S/P mitral valve replacement Z95.2    Chest pain R07.9    SOB (shortness of breath) R06.02    Fatigue R53.83    Pain in both lower extremities M79.604, M79.605    History of coronary artery stent placement Z95.5    Myalgia M79.10    Unstable angina ([facility]) I20.0    Acute diastolic heart failure ([facility]) I50.31    Chronic atrial fibrillation ([facility]) I48.20    Acute on chronic anemia D64.9    OB + stool R19.5     Past Medical History:   Diagnosis Date    Aortic stenosis     Arthritis     Atherosclerosis of native arteries of the extremities with intermittent claudication 07/25/2011    CAD (coronary artery disease)     Carotid aneurysm, right ([facility])     Carotid artery occlusion     CHF (congestive heart failure) ([facility])     COPD (chronic obstructive pulmonary disease) ([facility])     History of blood transfusion 2016    Post op, was taking blood thinner    Hyperlipidemia     Hypertension     MI (myocardial infarction) ([facility] 2009    x2    Mitral valve stenosis     Pneumonia     Post-menopausal     PUD (peptic ulcer disease) 2009    Renal artery stenosis ([address] 08/10/2021    s/p stenting to L    Renal failure 2009    after ulcer perforation sepsis.    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 AND REPAIR WITH REVERSED LEFT GREATER SAPHENOUS VEIN INTERPOSITIONAL BYPASS GRAFT performed by Suki Cisneros MD at [address] Right early 's    long ago    CATARACT REMOVAL WITH IMPLANT Bilateral      SECTION  1972    COLONOSCOPY  2012    Dr Stuart Smith:  HP    CORONARY ANGIOPLASTY WITH STENT PLACEMENT  08/10/2021    RM- RCA    CORONARY ANGIOPLASTY WITH STENT PLACEMENT      CORONARY ARTERY BYPASS GRAFT  2016    DIAGNOSTIC CARDIAC CATH LAB PROCEDURE      DILATION AND CURETTAGE OF UTERUS      ILIO-FEMORAL BYPASS GRAFT N/A 2016    OPEN TRANSLUMINAL BALLOON ANGIOPLASTY AND STENTING OF RIGHT COMMON AND EXTERNAL  ILIAC ARTERIES; RIGHT FEMORAL ENDARTERECTOMY WITH VEIN PATCH ANGIOPLASTY performed by Suki Cisneros MD at [facility] N/A 2016    MITRAL VALVE  REPLACEMENT LUONG-MAZE ABLATION WITH CRYO PROCEDURE, CORONARY ARTERY BYPASS GRAFT X 1 WITH ENDOSCOPIC VEIN HARVESTING WITH PERFUSION TRANSESOPHAGEAL ECHOCARDIOGRAM performed by Corrinne Makua, MD at [facility]  2016    PERIPHERAL PERCUTANEOUS ARTERIAL INTERVENTION Left 08/10/2021    RM to L renal artery    MI REOPER, CAROTID ENDARTEC>1 MON Left 2018    REMOVAL OF HEMATOMA, LEFT CAROTID ARTERY performed by Suki Cisneros MD at [address] Left 2018    LEFT CAROTID ENDARTERECTOMY WITH EEG MONITORING AND COMPLETION DUPLEX ULTRASOUND performed by Suki Cisneros MD at [address] [facility]      SKIN GRAFT Right 2016    Skin graft split thickness foot,ankle,and leg. Right leg 26x8cm and 12x6cm total area 280cm squared. TJR    UPPER GASTROINTESTINAL ENDOSCOPY  2015    Dr Aundrea Griffin: normal    UPPER GASTROINTESTINAL ENDOSCOPY  03/15/2016    Dr Ezio Frazier: normal    UPPER GASTROINTESTINAL ENDOSCOPY  2015    Dr Aundrea Griffin:  paul neg, multiple gastric antial and duodenal ulcers    VASCULAR SURGERY  16 TJR    Aortagram and right leg runoff,right leg runoff,right common iliac artery selection for right leg run off views.  VASCULAR SURGERY      . Open transluminal angioplasty and stenting of the external iliac artery. TJR    VASCULAR SURGERY  2019    TJR. 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      Family History   Problem Relation Age of Onset    Diabetes Mother     Heart Disease Mother     Heart Failure Mother     Diabetes Sister     Heart Disease Sister     High Blood Pressure Sister     Colon Cancer Sister     Liver Disease Sister     Cirrhosis Sister     Colon Cancer Maternal Aunt     Colon Polyps Neg Hx     Esophageal Cancer Neg Hx      Social History     Tobacco Use    Smoking status: Former Smoker     Years: 2.00     Types: Cigarettes     Quit date: 1980     Years since quittin.1    Smokeless tobacco: Never Used   Substance Use Topics    Alcohol use: Yes     Comment: rarely maybe twice a year      Current Outpatient Medications   Medication Sig Dispense Refill    furosemide (LASIX) 40 MG tablet TAKE 1 TABLET BY MOUTH DAILY  90 tablet 1    clopidogrel (PLAVIX) 75 MG tablet Take 1 tablet by mouth daily 90 tablet 5    isosorbide mononitrate (IMDUR) 30 MG extended release tablet Take 30 mg by mouth daily      amLODIPine (NORVASC) 5 MG tablet Take 1 tablet by mouth 2 times daily 180 tablet 3    metoprolol (LOPRESSOR) 100 MG tablet Take 0.5 tablets by mouth 2 times daily 50 mg in the AM & 50 mg in the  tablet 3    albuterol (ACCUNEB) 1.25 MG/3ML nebulizer solution Inhale 1 ampule into the lungs every 6 hours as needed       budesonide (PULMICORT) 0.5 MG/2ML nebulizer suspension Inhale 0.5 mg into the lungs Daily      ipratropium-albuterol (DUONEB) 0.5-2.5 (3) MG/3ML SOLN nebulizer solution Inhale 3 mLs into the lungs every 4 hours      Probiotic Product (PROBIOTIC DAILY PO) Take 1 tablet by mouth daily      nitroGLYCERIN (NITROSTAT) 0.4 MG SL tablet Place 1 tablet under the tongue every 5 minutes as needed for Chest pain 25 tablet 3    levothyroxine (SYNTHROID) 25 MCG tablet Take 25 mcg by mouth Daily       Fexofenadine HCl (MUCINEX ALLERGY PO) Take 1 tablet by mouth 2 times daily       Arformoterol Tartrate (BROVANA) 15 MCG/2ML NEBU Inhale 15 mcg into the lungs 2 times daily       OXYGEN Inhale 2 L into the lungs daily       aspirin EC 81 MG EC tablet Take 1 tablet by mouth daily (Patient taking differently: Take 81 mg by mouth nightly PT TAKES AT NIGHT.) 30 tablet 3    atorvastatin (LIPITOR) 40 MG tablet Take 1 tablet by mouth daily (Patient taking differently: Take 40 mg by mouth nightly PT TAKES AT NIGHT.) 90 tablet 3    calcium-cholecalciferol (CALCIUM 500+D) 500-200 MG-UNIT per tablet Take 1 tablet by mouth 2 times daily       Biotin 5000 MCG TABS Take 1 tablet by mouth daily        No current facility-administered medications for this visit. Allergies: Levaquin [levofloxacin in d5w], Xarelto [rivaroxaban], and Morphine    Review of Systems  Constitutional  no appetite change, or unexpected weight change. No fever, chills or diaphoresis. No significant change in activity level or new onset of fatigue. HEENT  no significant rhinorrhea or epistaxis. No tinnitus or significant hearing loss. Eyes  no sudden vision change or amaurosis. No corneal arcus, xantholasma, subconjunctival hemorrhage or discharge.   Respiratory  no significant wheezing, stridor, apnea or cough. + stable JONES. Cardiovascular  no exertional chest pain to suggest myocardial ischemia. No orthopnea or PND. No sensation of sustained arrythmia. No occurrence of slow heart rate. No palpitations. No claudication. Gastrointestinal  no abdominal swelling or pain. No blood in stool. No severe constipation, diarrhea, nausea, or vomiting. Genitourinary  no dysuria, frequency, or urgency. No flank pain or hematuria. Musculoskeletal  no back pain or myalgia. No problems with gait. Extremities - no clubbing, cyanosis or extremity edema. Skin  no color change or rash. No pallor. No new surgical incision. Neurologic  no speech difficulty, facial asymmetry or lateralizing weakness. No seizures, presyncope or syncope. No significant dizziness. Hematologic  no easy bruising or excessive bleeding. Psychiatric  no severe anxiety or insomnia. No confusion. All other review of systems are negative. Objective  Vital Signs - /60   Pulse 88   Ht 5' 4\" (1.626 m)   Wt 79 lb (35.8 kg)   SpO2 95%   BMI 13.56 kg/m²   General - Leila Jose is alert, cooperative, and pleasant. Well groomed. No acute distress. Body habitus - Body mass index is 13.56 kg/m². HEENT  Head is normocephalic. No circumoral cyanosis. Dentition is normal.  EYES -   Lids normal without ptosis. No discharge, edema or subconjunctival hemorrhage. Neck - Symmetrical without apparent mass or lymphadenopathy. Respiratory - Normal respiratory effort without use of accessory muscles. Ausculatation reveals vesicular breath sounds without crackles, wheezes, rub or rhonchi. Cardiovascular  No jugular venous distention. Auscultation reveals regular rate and rhythm. No audible clicks, gallop or rub.  6-2/5 systolic murmur radiating to carotids. No lower extremity varicosities. Abdominal -  No visible distention, mass or pulsations. Extremities - No clubbing or cyanosis.   No statis dermatitis or ulcers. No edema. Musculoskeletal -   No Osler's nodes. No kyphosis or scoliosis. Gait is even and regular without limp or shuffle. Ambulates without assistance. Skin -  Warm and dry; no rash or pallor. No new surgical wound. Neurological - No focal neurological deficits. Thought processes coherent. No apparent tremor. Oriented to person, place and time. Psychiatric -  Appropriate affect and mood. Data reviewed:  8/10/21 cath - Dr. Joel Ballard  Double vessel disease with severe critical in-stent restenosis of ostial   RCA with stable intermediate stenosis in mid RCA, ostial circumflex and   mid circumflex. Severely stenosed SVG to distal RCA. Normal LV systolic function. Severe in-stent restenosis in proximal left renal artery. Known occluded right renal artery. Successful PCI to ostial RCA (4.0 x 12 mm resolute integrity) utilizing  drug-eluting stent with postdilatation utilizing FLASH (4.5/8 mm) ostial   balloon system. Successful endovascular intervention to left renal artery (5.0 x 12 mm  resolute [place] taken to 24 saud) utilizing drug-eluting stent. Recommendations    Medical management. Plavix uninterrupted for minimum 6 months. Close monitoring for changes in blood pressure and symptoms going forward. Signatures   Electronically signed by Eugene Holly MD(Performing Physician) on   08/10/2021 18:14    3/16/21 echo   Bioprosthetic mitral valve replacement with normal function. No  significant mitral regurgitation. mild MS with Mean gradient 6 mmHg. The aortic valve is trileaflet, moderately thickened & calcified with  severely reduced leaflet separation. Severe aortic stenosis; no evidence of aortic regurgitation. The aortic  valve area is . 9 cm2 with a maximum gradient of 22 mmHg and a mean   gradient of 12 mmHg.    low flow low gradient severe AS with preserved EF and low Stroke volume  Index (less than 35 ml/m2)   Tricuspid valve is structurally normal.   Moderate (2+) tricuspid regurgitation with estimated RVSP of 53 mm Hg. The pulmonic valve was not well visualized . Mildly dilated left atrium. Normal left ventricular size with preserved LV function and an estimated  ejection fraction of approximately 55-60%. No evidence of left ventricular  mass or thrombus noted. Mild concentric left ventricular hypertrophy. Diastolic dysfunction is present. Mildly enlarged right atrium size. Mildly dilated right ventricle. Aortic root is within normal limits. No evidence of significant pericardial effusion is noted. No evidence of pleural effusion.     Signature    Electronically signed by Marco Antonio Dacosta MD(Interpreting  physician) on 03/27/2021 05:04 PM    10/29/19 carotid ultrasound   Well healed bilateral carotid endarterectomy site with no recurrent    stenosis or residual plaque.    There is normal antegrade flow in the bilateral vertebral artery(ies).    Signature    ----------------------------------------------------------------    Electronically signed by Rashida rFanco MD(Interpreting    physician) on 10/29/2019 12:38 PM     Lab Results   Component Value Date    WBC 9.6 07/30/2021    HGB 11.1 (L) 07/30/2021    HCT 37.9 07/30/2021    MCV 86.5 07/30/2021     07/30/2021     Lab Results   Component Value Date     08/10/2021    K 3.3 (L) 08/10/2021    CL 95 (L) 08/10/2021    CO2 40 (H) 08/10/2021    BUN 22 08/10/2021    CREATININE 1.2 (H) 08/10/2021    GLUCOSE 94 08/10/2021    CALCIUM 11.4 (H) 08/10/2021    PROT 7.6 08/10/2021    LABALBU 3.9 08/10/2021    BILITOT <0.2 08/10/2021    ALKPHOS 86 08/10/2021    AST 27 08/10/2021    ALT 13 08/10/2021    LABGLOM 44 (A) 08/10/2021    GFRAA 53 (L) 08/10/2021    GLOB 4.6 08/29/2016       Lab Results   Component Value Date    CHOL 142 (L) 03/27/2021    CHOL 169 07/30/2020    CHOL 125 (L) 03/15/2016     Lab Results   Component Value Date    TRIG 73 03/27/2021    TRIG 55 07/30/2020    TRIG 123 (L) 03/15/2016 Lab Results   Component Value Date    HDL 61 (L) 03/27/2021    HDL 82 07/30/2020    HDL 11 (L) 03/15/2016     Lab Results   Component Value Date    LDLCALC 66 03/27/2021    LDLCALC 76 07/30/2020    LDLCALC 89 03/15/2016       BP Readings from Last 3 Encounters:   01/31/22 134/60   09/08/21 (!) 144/62   08/10/21 (!) 177/87    Pulse Readings from Last 3 Encounters:   01/31/22 88   09/08/21 76   08/10/21 77        Wt Readings from Last 3 Encounters:   01/31/22 79 lb (35.8 kg)   09/08/21 82 lb (37.2 kg)   08/10/21 81 lb (36.7 kg)     Assessment/Plan:   Diagnosis Orders   1. Coronary artery disease involving native coronary artery of native heart without angina pectoris     2. Pulmonary hypertension     3. History of coronary artery stent placement     4. S/P coronary artery bypass graft x 1     5. PAF (paroxysmal atrial fibrillation) ([address]     6. Status post Maze operation for atrial fibrillation     7. Mixed hyperlipidemia     8. Aortic stenosis, severe  ECHO Complete 2D W Doppler W Color   9. Renal artery stenosis ([address]      8/10/21 Endovascular intervention to left renal artery (5.0 x 12 mm  resolute [place] taken to 24 saud) utilizing drug-eluting stent - Dr. Cristiano Angulo   10. Hypertension, unspecified type     11. Mitral valve replaced       [id number] Score: 6  Disclaimer: Risk Score calculation is dependent on accuracy of patient problem list and past encounter diagnosis. Stable CV status without overt heart failure, sensed arrhythmia or angina. CAD s/p CABG and stenting - currently stable with no symptomology to indicate myocardial ischemia. Continue same. PAF s/p MAZE ablation - no recurrent AF. HTN - normotensive on current regimen. BP goal less than 130/80. Continue same. Moderate AS - schedule 2D echo prior to next visit with Dr. Cristiano Angulo. Hyperlipidemia - LDL 66. Continue Lipitor 40 mg daily. Pulmonary HTN - continues on oxygen therapy and diuretic and amlodipine. 3/26/21 RVSP 53. Patient is compliant with medication regimen. Previous cardiac history and records reviewed. Continue current medical management for cardiac related condition. Continue other current medications as directed. Continue to follow up with primary care provider for non cardiac medical problems. If your primary care provider is outside of the [facility], please request that your labs be faxed to this office at [phone]. BP goal 130/80 or less. Call the office with any problems, questions or concerns at [phone]. Cardiology follow up as scheduled in [address] appointments. Educational included in patient instructions. Heart health.       Dorita Eisenmenger, APRN

## 2022-01-31 NOTE — PATIENT INSTRUCTIONS
New instructions for today:    [facility] the [facility] and [address] located on the first floor of [place] [address] through hospital main entrance and turn immediately to your left. Date/Time:     Patient's contact number:  [phone] (home)     Echocardiogram -  No prep. Takes approximately 30 min. An echocardiogram uses sound waves to produce images of your heart. This commonly used test allows your doctor to see how your heart is beating and pumping blood. Your doctor can use the images from an echocardiogram to identify various abnormalities in the heart muscle and valves. This test has 2 parts:   Ø You will be asked to disrobe from the waist up and given a gown to wear. The technologist will then hook up an EKG monitor to you for the entire exam.   Ø You will then have an ultrasound of your heart (echocardiogram) to assess the heart muscle, heart valves and heart function. You may eat and take any medicines before the exam.     If you need to change your appointment, please call outpatient scheduling at [phone]. Patient Instructions:  Continue current medications as prescribed. Always keep a current medication list. Bring your medications to every office visit. Continue to follow up with primary care provider for non cardiac medical problems. Call the office with any problems, questions or concerns at [phone]. If you have been asked to keep a blood pressure log, do so for 2 weeks. Call the office to report readings to the triage nurse at [phone]. Follow up with cardiologist as scheduled. The following educational material has been included in this after visit summary for your review: Life simple 7. Heart health. Life simple 7  1) Manage blood pressure - high blood pressure is a major risk factor for heart disease and stroke. Keeping blood pressure in health range reduces strain on your heart, arteries and kidneys.   Blood pressure goal is less than 130/80. 2) Control cholesterol - contributes to plaque, which can clog arteries and lead to heart disease and stroke. When you control your cholesterol you are giving your arteries their best chance to remain clear. It is recommended that you get cholesterol lab work done once a year. 3) Reduce blood sugar - most of the food we eat is turning into glucose or blood sugar that our body uses for energy. Over time, high levels of blood sugar can damage your heart, kidneys, eyes and nerves. 4) Get active - living an active life is one of the most rewarding gifts you can give yourself and those you love. Simply put, daily physical activity increases your length and quality of life. Strive to exercise 15 minutes most days of the week. 5)  Eat better - A healthy diet is one of your best weapons for fighting cardiovascular disease. When you eat a heart healthy diet, you improve your chances for feeling good and staying healthy for life. 6)  Lose weight - when you shed extra fat an unnecessary pounds, you reduce the burden on your hear, lungs, blood vessels and skeleton. You give yourself the gift of active living, you lower your blood pressure and help yourself feel better. 7) Stop smoking - cigarette smokers have a higher risk of developing cardiovascular disease. If  You smoke, quitting is the best thing you can do for your health. Check American Heart Association on line for more information on Life's Simple 7 and tips for healthy living. A Healthy Heart: Care Instructions  Your Care Instructions     Coronary artery disease, also called heart disease, occurs when a substance called plaque builds up in the vessels that supply oxygen-rich blood to your heart muscle. This can narrow the blood vessels and reduce blood flow. A heart attack happens when blood flow is completely blocked. A high-fat diet, smoking, and other factors increase the risk of heart disease.   Your doctor has found that you have a chance of having heart disease. You can do lots of things to keep your heart healthy. It may not be easy, but you can change your diet, exercise more, and quit smoking. These steps really work to lower your chance of heart disease. Follow-up care is a key part of your treatment and safety. Be sure to make and go to all appointments, and call your doctor if you are having problems. It's also a good idea to know your test results and keep a list of the medicines you take. How can you care for yourself at home? Diet  · Use less salt when you cook and eat. This helps lower your blood pressure. Taste food before salting. Add only a little salt when you think you need it. With time, your taste buds will adjust to less salt. · Eat fewer snack items, fast foods, canned soups, and other high-salt, high-fat, processed foods. · Read food labels and try to avoid saturated and trans fats. They increase your risk of heart disease by raising cholesterol levels. · Limit the amount of solid fat-butter, margarine, and shortening-you eat. Use olive, peanut, or canola oil when you cook. Bake, broil, and steam foods instead of frying them. · Eat a variety of fruit and vegetables every day. Dark green, deep orange, red, or yellow fruits and vegetables are especially good for you. Examples include spinach, carrots, peaches, and berries. · Foods high in fiber can reduce your cholesterol and provide important vitamins and minerals. High-fiber foods include whole-grain cereals and breads, oatmeal, beans, brown rice, citrus fruits, and apples. · Eat lean proteins. Heart-healthy proteins include seafood, lean meats and poultry, eggs, beans, peas, nuts, seeds, and soy products. · Limit drinks and foods with added sugar. These include candy, desserts, and soda pop. Lifestyle changes  · If your doctor recommends it, get more exercise. Walking is a good choice. Bit by bit, increase the amount you walk every day.  Try for at least 30 minutes on most days of the week. You also may want to swim, bike, or do other activities. · Do not smoke. If you need help quitting, talk to your doctor about stop-smoking programs and medicines. These can increase your chances of quitting for good. Quitting smoking may be the most important step you can take to protect your heart. It is never too late to quit. · Limit alcohol to 2 drinks a day for men and 1 drink a day for women. Too much alcohol can cause health problems. · Manage other health problems such as diabetes, high blood pressure, and high cholesterol. If you think you may have a problem with alcohol or drug use, talk to your doctor. Medicines  · Take your medicines exactly as prescribed. Call your doctor if you think you are having a problem with your medicine. · If your doctor recommends aspirin, take the amount directed each day. Make sure you take aspirin and not another kind of pain reliever, such as acetaminophen (Tylenol). When should you call for help? [id number] if you have symptoms of a heart attack. These may include:  · Chest pain or pressure, or a strange feeling in the chest.  · Sweating. · Shortness of breath. · Pain, pressure, or a strange feeling in the back, neck, jaw, or upper belly or in one or both shoulders or arms. · Lightheadedness or sudden weakness. · A fast or irregular heartbeat. After you call 911, the  may tell you to chew 1 adult-strength or 2 to 4 low-dose aspirin. Wait for an ambulance. Do not try to drive yourself. Watch closely for changes in your health, and be sure to contact your doctor if you have any problems. Where can you learn more? Go to https://[employer].Emulate. org and sign in to your Tra[employer] account. Enter I443 in the [employer]ocker[employer] box to learn more about \"A Healthy Heart: Care Instructions. \"     If you do not have an account, please click on the \"Sign Up Now\" link.   Current as of: December 16, 2019 Content Version: 12.5  © 7416-4268 Healthwise, Incorporated. Care instructions adapted under license by [facility] ([facility]). If you have questions about a medical condition or this instruction, always ask your healthcare professional. [address] any warranty or liability for your use of this information. How to take:  NITROGLYCERIN (Nitrostat) 0.4 mg tablets, sublingual.  Nitroglycerin is in a group of drugs called nitrates. Nitroglycerin dilates (widens) blood vessels, making it easier for blood to flow through them and easier for the heart to pump. Dosing Guidelines for Nitroglycerin Tablets  · At the start of an angina (chest pain) attack, place one tablet under the tongue or between the cheek and gum. Do not swallow or chew the tablet; let it dissolve on its own. If necessary, a second and third tablet may be used, with five minutes between using each tablet. If you use a third tablet and your chest pain continues, it is time to seek immediate medical attention. Call 911 immediately and have someone drive you to the emergency room. You may be having a heart attack or other serious heart problem. · To prevent angina from exercise or stress, use 1 tablet 5 to 10 minutes before the activity.

## 2022-02-18 NOTE — TELEPHONE ENCOUNTER
----- Message from BEBO Cifuentes sent at 2/16/2022  3:43 PM CST -----  Stuart Figueroa,  Please let Harman Rosa know that I reviewed her echo report. Good news, EF normal at 55%. She has delayed relaxation of left ventricle. The aortic valve has mild to moderate stiffening. Mitral valve replacement looks good. Her right heart pressure is high - we know that. I ran this by Dr. Guillermo Lugo and he said as long as Harman Rosa doing good, just continue same medication. Right ventricle functions normally. Recheck echo in one year.   Thanks [employer]

## 2022-03-02 NOTE — TELEPHONE ENCOUNTER
Patient's daughter states the patient is having problems with shortness of air. Her oxygen levels at rest on 5L is staying about 88%. She said it really bottoms out if she gets up and moves around.   She states she has her normal cough but no signs of infection or any other problems like wheezing or fever.   She is also asking about whether she should start back on the Budesonide respules? Right now she is taking the albuterol nebs, brovana and performist nebs.  She is also asking if she could mix her albuterol and brovana nebulizer treatments? Pharmacy told her not to.  She is scheduled to see you on 3/11/22.  Please advise.

## 2022-03-02 NOTE — TELEPHONE ENCOUNTER
Please call the patient's daughter and let her know that I have refilled the budesonide and if she wants to resume it that is fine but it generally does not work that quickly but it may benefit her after a few doses.  We also do not recommend mixing both albuterol and Brovana.  If she was going to mix anything it would be the budesonide and Brovana.  That may not be recommended but that would be safer than mixing albuterol and Brovana.  If she has increasing issues with shortness of breath or worsening O2 saturations prior to her appointment she should go to the emergency room or urgent care and otherwise I will see her next week as scheduled.

## 2022-03-11 PROBLEM — I51.89 DIASTOLIC DYSFUNCTION: Status: ACTIVE | Noted: 2022-01-01

## 2022-03-11 PROBLEM — Z87.891 FORMER SMOKER: Status: ACTIVE | Noted: 2022-01-01

## 2022-03-11 PROBLEM — I35.9 AORTIC VALVE DISEASE: Status: ACTIVE | Noted: 2022-01-01

## 2022-03-11 PROBLEM — I35.0 AORTIC VALVE STENOSIS: Status: ACTIVE | Noted: 2022-01-01

## 2022-03-11 NOTE — ASSESSMENT & PLAN NOTE
Mild to moderate aortic valve stenosis was noted on a recent 2D echocardiogram as well as mild aortic regurgitation.

## 2022-03-11 NOTE — PROGRESS NOTES
Chief Complaint  chronic respiratory failure with hypoxia    Subjective    History of Present Illness     Steffi Michelle presents to [facility] for COPD and chronic respiratory failure with hypoxemia.    History of Present Illness   The patient has had issues with increasing shortness of breath recently.  She had been off her budesonide but has resumed this and is doing better.  However she and her daughter who accompanies her today still wonder if there is anything else she can do to help her respiratory status.  I did advise we will switch her lev albuterol treatments to DuoNebs to use 4 times daily.  She also has had Proventil neb solution in the past but again should utilize DuoNeb treatments up to 4 times daily can use it routinely to continue her Brovana twice a day and budesonide twice a day.  I did advise her that we will have her return in about 4 to 5 weeks with pulmonary functions here in the office.  She would certainly potentially benefit from a pulmonary rehab program and I am going to try to improve her respiratory symptomatology with intensification of her neb treatments first and will discuss this further on her return visit.  We also could consider a CAT scan at some point.  She does have pulmonary hypertension and although this certainly may relate to some extent to her chronic lung disease with group 3 pulmonary hypertension, she clearly has group 2 pulmonary hypertension based on a 2D echo that was performed at [facility] on February 14 of this year.  This did reveal normal systolic function but grade 3 LV diastolic dysfunction with mild to moderate aortic stenosis and a bioprosthetic mitral valve.  She had severe dilatation of the left atrium with severe pulmonary hypertension so there clearly is significant group 2 pulmonary hypertension present.  I did tell her at present there was no specific pharmacologic therapy indicated for group  2 pulmonary hypertension in terms of pulmonary vasodilators.  There are some approved therapies for group 3 pulmonary hypertension associated with interstitial lung disease but at this point not COPD.  I think the likelihood of any Group 1 pulmonary hypertension is extremely small.  I did tell the patient and her daughter if there was any question of assessing her further as to something other than group 2 pulmonary hypertension, particularly if any specific pharmacologic therapy were to be considered, then a right heart catheterization would need to be performed to further assess this.  She has had her COVID-19 vaccine in the form of the Moderna vaccine, she has had her flu shot, and has also had her Pneumovax.  Prior to Admission medications    Medication Sig Start Date End Date Taking? Authorizing Provider   albuterol (PROVENTIL) (2.5 MG/3ML) 0.083% nebulizer solution Take 2.5 mg by nebulization Every 4 (Four) Hours As Needed for Wheezing. 5/3/21  Yes Kwabena Raymond APRN   amLODIPine (NORVASC) 5 MG tablet  1/29/21  Yes Carlos A Garcia MD   arformoterol (Brovana) 15 MCG/2ML nebulizer solution Take 2 mL by nebulization 2 (Two) Times a Day. 3/5/21  Yes Keyon Syed MD   aspirin 81 MG EC tablet Take 81 mg by mouth.   Yes Carlos A Garcia MD   atorvastatin (LIPITOR) 40 MG tablet Take 40 mg by mouth.   Yes Carlos A Garcia MD   Biotin 5000 MCG tablet Take  by mouth.   Yes Carlos A Garcia MD   budesonide (PULMICORT) 0.5 MG/2ML nebulizer solution Take 2 mL by nebulization Daily. 3/2/22  Yes Keyon Syed MD   Calcium Carbonate-Vitamin D (CALCIUM 500/D PO) Take  by mouth.   Yes Carlos A Garcia MD   Calcium Carbonate-Vitamin D (calcium-vitamin D) 500-200 MG-UNIT tablet per tablet Take 1 tablet by mouth.   Yes Carlos A Garcia MD   clopidogrel (PLAVIX) 75 MG tablet  11/20/19  Yes Carlos A Garcia MD   Fexofenadine HCl (MUCINEX ALLERGY PO) Take  by mouth.    "Yes Provider, MD Carlos A   furosemide (LASIX) 40 MG tablet  19  Yes Provider, MD Carlos A   isosorbide mononitrate (IMDUR) 30 MG 24 hr tablet  19  Yes Provider, MD Carlos A   levothyroxine (SYNTHROID, LEVOTHROID) 25 MCG tablet  19  Yes Provider, MD Carlos A   metoprolol tartrate (LOPRESSOR) 100 MG tablet Take 50 mg by mouth 2 (Two) Times a Day. 18  Yes Carlos A Garcia MD   nitroglycerin (NITROSTAT) 0.4 MG SL tablet  19  Yes ProviderCarlos A MD   O2 (OXYGEN) Inhale 1.5 L/min Every Night.   Yes ProviderCarlos A MD   potassium chloride (K-DUR,KLOR-CON) 20 MEQ CR tablet Take 20 mEq by mouth Daily. 22  Yes Carlos A Garcia MD   ipratropium-albuterol (DUO-NEB) 0.5-2.5 mg/3 ml nebulizer Take 3 mL by nebulization 4 (Four) Times a Day As Needed for Wheezing. Use in place of levalbuterol. 3/11/22   Keyon Syed MD   formoterol (PERFOROMIST) 20 MCG/2ML nebulizer solution Take 2 mL by nebulization 2 (Two) Times a Day. 3/5/21 3/11/22  Keyon Syed MD   ipratropium-albuterol (DUO-NEB) 0.5-2.5 mg/3 ml nebulizer Take 3 mL by nebulization Every 4 (Four) Hours. 2/2/21 3/11/22  Kwabena Raymond APRN       Social History     Socioeconomic History   • Marital status:    Tobacco Use   • Smoking status: Former Smoker     Packs/day: 0.25     Years: 10.00     Pack years: 2.50     Types: Cigarettes     Quit date:      Years since quittin.2   • Smokeless tobacco: Never Used   Substance and Sexual Activity   • Alcohol use: No   • Drug use: No   • Sexual activity: Defer       Objective   Vital Signs:   /72   Pulse 74   Ht 162.6 cm (64\")   Wt 36.3 kg (80 lb)   SpO2 97% Comment: 4 L  BMI 13.73 kg/m²     Physical Exam  Vitals and nursing note reviewed.   Constitutional:       Comments: She is a very thin chronically ill-appearing white female.   HENT:      Head: Normocephalic.      Comments: Nasal oxygen is in place.  She is " also wearing a mask.  Eyes:      Extraocular Movements: Extraocular movements intact.      Pupils: Pupils are equal, round, and reactive to light.   Cardiovascular:      Rate and Rhythm: Normal rate and regular rhythm.      Heart sounds: Murmur heard.      Comments: A 2/6 systolic murmur is heard along the upper left sternal border.  Pulmonary:      Effort: Pulmonary effort is normal.      Comments: Breath sounds are diminished but no adventitious sounds are heard.  Skin:     General: Skin is warm and dry.   Neurological:      General: No focal deficit present.      Mental Status: She is alert and oriented to person, place, and time.   Psychiatric:         Mood and Affect: Mood normal.         Behavior: Behavior normal.        Result Review :          Results for orders placed in visit on 03/03/20    Pulmonary Function Test    Narrative  Pulmonary Function Test  Performed by: Keyon Syed MD  Authorized by: Keyon Syed MD    Pre Drug  FVC: 60.62%  FEV1: 43.26%  FEF 25-75%: 27.11%  FEV1/FVC: 54%      Results for orders placed in visit on 05/01/19    Full Pulmonary Function Test Without Bronchodilator                 My interpretation of imaging:    I did review her recent 2D echo report from [facility] with the results as noted particularly in regards to the presence of pulmonary hypertension and diastolic dysfunction with some valvular heart disease as well.        Assessment and Plan     Diagnoses and all orders for this visit:    1. Chronic respiratory failure with hypoxia (HCC) (Primary)  Assessment & Plan:  She has adequate O2 saturations on supplemental oxygen today.      2. Stage 3 severe COPD by GOLD classification (HCC)  Assessment & Plan:  We will obtain pulmonary functions on return visit.  I did transition her levalbuterol treatments to DuoNeb's with a prescription sent to her local pharmacy.  If she does well with this and wants to get it through mail order in the future we can certainly  arrange to have this done.  She will continue her Brovana and Pulmicort twice daily for age.      Orders:  -     ipratropium-albuterol (DUO-NEB) 0.5-2.5 mg/3 ml nebulizer; Take 3 mL by nebulization 4 (Four) Times a Day As Needed for Wheezing. Use in place of levalbuterol.  Dispense: 120 mL; Refill: 11  -     Full Pulmonary Function Test Without Bronchodilator; Future    3. Pulmonary hypertension (HCC)  Assessment & Plan:  I think this likely results predominately to group 2 pulmonary hypertension although some component of group 3 pulmonary hypertension could certainly be present as well.  This was noted on a recent 2D echo.      4. H/O prosthetic heart valve  Assessment & Plan:  She is status post mitral valve replacement.      5. Aortic valve disease  Assessment & Plan:  Mild to moderate aortic valve stenosis was noted on a recent 2D echocardiogram as well as mild aortic regurgitation.      6. Diastolic dysfunction  Assessment & Plan:  Grade 3 diastolic dysfunction was noted on her recent 2D echo.      7. Former smoker  Assessment & Plan:  She has a minimal smoking history having quit in 1978.      8. Underweight  Assessment & Plan:  She will follow up with her primary care physician regarding her low BMI.        Patient's Body mass index is 13.73 kg/m². indicating that she is underweight (BMI < 18.5). Recommendations include: referral to primary care.        Keyon Syed MD  3/11/2022  15:03 CST    Follow Up   Return in about 4 weeks (around 4/8/2022) for Complete PFT.    Patient was given instructions and counseling regarding her condition or for health maintenance advice. Please see specific information pulled into the AVS if appropriate.

## 2022-03-11 NOTE — ASSESSMENT & PLAN NOTE
I think this likely results predominately to group 2 pulmonary hypertension although some component of group 3 pulmonary hypertension could certainly be present as well.  This was noted on a recent 2D echo.

## 2022-03-11 NOTE — PATIENT INSTRUCTIONS
The patient was having increased issues with shortness of breath but is better since weaned back on her Pulmicort.  She is also on leave albuterol and Brovana.  I am going to switch her leave albuterol to DuoNeb's to see if this results in better improvement in her respiratory symptoms.  Will get PFTs on return visit in about a month.  We might consider a chest CT at a later date as well.  She does have pulmonary hypertension and I told the patient and her daughter that at present if she has pulmonary hypertension either due to chronic lung disease with COPD or valvular heart disease there is currently not any specific approved pulmonary vasodilator therapy approved for these conditions although that may change in the future.

## 2022-03-11 NOTE — ASSESSMENT & PLAN NOTE
We will obtain pulmonary functions on return visit.  I did transition her levalbuterol treatments to DuoNeb's with a prescription sent to her local pharmacy.  If she does well with this and wants to get it through mail order in the future we can certainly arrange to have this done.  She will continue her Brovana and Pulmicort twice daily for age.

## 2022-04-08 NOTE — TELEPHONE ENCOUNTER
I did have a phone conversation with the patient's daughter.  We will send in prescriptions for tapering prednisone and Omnicef.  She should still keep her appointment to see me this coming Wednesday but if she does not feel that she can perform the pulmonary functions we can cancel those and reschedule those for a later date.

## 2022-04-08 NOTE — TELEPHONE ENCOUNTER
"Patient's daughter left a voicemail.   \"Mom thinks she has bronchitis. She has been coughing for about 3 days and is coughing up clear sputum\".    I did call the patient and she states she has not had a fever and has not been around anyone who is sick. She did say her back was hurting from \"coughing\". She is taking Duoneb, Brovana and Budesonide.     She is scheduled for PFT's/OV next Wednesday.  "

## 2022-04-14 PROBLEM — J44.1 COPD EXACERBATION (HCC): Status: ACTIVE | Noted: 2022-01-01

## 2022-04-14 NOTE — ED NOTES
Ambulated patient to bedside commode. Patient on 5L and de sated to low 80's. Dr. Lore Baxter aware.      Delores Fang  04/14/22 1143

## 2022-04-14 NOTE — PROGRESS NOTES
Chief Complaint  Chronic respiratory failure with hypoxia and Shortness of Breath    Subjective    History of Present Illness     Steffi Michelle presents to [facility] for increasing shortness of breath.    History of Present Illness   The patient is accompanied by her daughter today.  She has had increasing issues with shortness of breath and I had E prescribed Omnicef and prednisone last week and she states this really has not helped her to any degree.  She states she is so short of breath now she cannot even dress herself.  Her daughter felt that she may very well need admission and I did advise her I would recommend she go to the emergency room at [facility] as that is where she receives her routine care and in particular her cardiac care for evaluation and possible admission.  She has severe COPD but also severe pulmonary hypertension.  The pulmonary hypertension likely is related to both group 2 and group 3 pulmonary hypertension.  Her group 2 risk factors are diastolic dysfunction and valvular heart disease.  Her group 3 risk factor obviously is her severe chronic lung disease.  She does have adequate oxygen saturations on supplemental oxygen today but is requiring 5 L of O2 via nasal cannula.  She states she cannot sleep flat.  I definitely think she has a significant component of heart failure causing her current symptoms but all of severe COPD is almost certainly playing a role as well.  She has had her COVID-19 vaccine including a booster in the form of the maternal vaccine would be a potential candidate for second booster.  She has also had her flu shot and her Pneumovax.  I will plan to obtain pulmonary functions today but she is too short of breath to perform these.  Again she is going to go the emergency room at [facility] and I did notify the charge nurse at the [facility] emergency room that she was en route to the hospital and did go  over her current clinical situation with the charge nurse as well.  Prior to Admission medications    Medication Sig Start Date End Date Taking? Authorizing Provider   albuterol (PROVENTIL) (2.5 MG/3ML) 0.083% nebulizer solution Take 2.5 mg by nebulization Every 4 (Four) Hours As Needed for Wheezing. 5/3/21  Yes Kwabena Raymond APRN   amLODIPine (NORVASC) 5 MG tablet  1/29/21  Yes Carlos A Garcia MD   arformoterol (Brovana) 15 MCG/2ML nebulizer solution Take 2 mL by nebulization 2 (Two) Times a Day. 3/5/21  Yes Keyon Syed MD   aspirin 81 MG EC tablet Take 81 mg by mouth.   Yes Carlos A Garcia MD   atorvastatin (LIPITOR) 40 MG tablet Take 40 mg by mouth.   Yes Carlos A Garcia MD   Biotin 5000 MCG tablet Take  by mouth.   Yes Carlos A Garcia MD   budesonide (PULMICORT) 0.5 MG/2ML nebulizer solution Take 2 mL by nebulization Daily. 3/2/22  Yes Keyon Syed MD   Calcium Carbonate-Vitamin D (CALCIUM 500/D PO) Take  by mouth.   Yes Carlos A Garcia MD   Calcium Carbonate-Vitamin D (calcium-vitamin D) 500-200 MG-UNIT tablet per tablet Take 1 tablet by mouth.   Yes Carlos A Garcia MD   cefdinir (OMNICEF) 300 MG capsule Take 1 capsule by mouth 2 (Two) Times a Day. 4/8/22  Yes Keyon Syed MD   clopidogrel (PLAVIX) 75 MG tablet  11/20/19  Yes Carlos A Garcia MD   Fexofenadine HCl (MUCINEX ALLERGY PO) Take  by mouth.   Yes Carlos A Garcia MD   furosemide (LASIX) 40 MG tablet  11/20/19  Yes Carlos A Garcia MD   ipratropium-albuterol (DUO-NEB) 0.5-2.5 mg/3 ml nebulizer Take 3 mL by nebulization 4 (Four) Times a Day As Needed for Wheezing. Use in place of levalbuterol. 3/11/22  Yes Keyon Syed MD   isosorbide mononitrate (IMDUR) 30 MG 24 hr tablet  12/30/19  Yes Carlos A Garcia MD   levothyroxine (SYNTHROID, LEVOTHROID) 25 MCG tablet  11/7/19  Yes Provider, Historical, MD   metoprolol tartrate (LOPRESSOR) 100 MG tablet  "Take 50 mg by mouth 2 (Two) Times a Day. 18  Yes Carlos A Garcia MD   nitroglycerin (NITROSTAT) 0.4 MG SL tablet  19  Yes Carlos A Garcia MD   O2 (OXYGEN) Inhale 1.5 L/min Every Night.   Yes ProviderCarlos A MD   potassium chloride (K-DUR,KLOR-CON) 20 MEQ CR tablet Take 20 mEq by mouth Daily. 22  Yes Carlos A Garcia MD   predniSONE (DELTASONE) 10 MG tablet Take 4 tabs daily x 3 days, then take 3 tabs daily x 3 days, then take 2 tabs daily x 3 days, then take 1 tab daily x 3 days 22  Yes Keyon Syed MD       Social History     Socioeconomic History   • Marital status:    Tobacco Use   • Smoking status: Former Smoker     Packs/day: 0.25     Years: 10.00     Pack years: 2.50     Types: Cigarettes     Quit date:      Years since quittin.3   • Smokeless tobacco: Never Used   Substance and Sexual Activity   • Alcohol use: No   • Drug use: No   • Sexual activity: Defer       Objective   Vital Signs:   /76   Pulse 88   Ht 162.6 cm (64\")   Wt 40.4 kg (89 lb)   SpO2 93% Comment: 5 L  BMI 15.28 kg/m²     Physical Exam  Vitals and nursing note reviewed.   Constitutional:       Comments: She is a very thin chronically ill-appearing white female in a wheelchair.  She has nasal oxygen in place.   HENT:      Head: Normocephalic.      Comments: She is wearing a mask and has nasal oxygen in place.  Eyes:      Extraocular Movements: Extraocular movements intact.      Pupils: Pupils are equal, round, and reactive to light.   Cardiovascular:      Rate and Rhythm: Normal rate. Rhythm irregular.      Heart sounds: Murmur heard.      Comments: A 2/6 systolic murmur is heard along the upper left sternal border.  Pulmonary:      Comments: Breath sounds are diminished throughout.  No adventitious sounds are heard.  Musculoskeletal:      Right lower leg: Edema present.      Left lower leg: Edema present.      Comments: She is in a wheelchair.  She does have trace " to 1+ edema of the calves.   Neurological:      General: No focal deficit present.      Mental Status: She is alert and oriented to person, place, and time.      Motor: Weakness present.      Comments: She has no focal neurologic deficits but has generalized weakness.   Psychiatric:         Mood and Affect: Mood normal.         Behavior: Behavior normal.        Result Review :          Results for orders placed in visit on 03/03/20    Pulmonary Function Test    Narrative  Pulmonary Function Test  Performed by: Keyon Syed MD  Authorized by: Keyon Syed MD    Pre Drug  FVC: 60.62%  FEV1: 43.26%  FEF 25-75%: 27.11%  FEV1/FVC: 54%      Results for orders placed in visit on 05/01/19    Full Pulmonary Function Test Without Bronchodilator                 My interpretation of echocardiogram:  Her most recent 2D echo was reviewed.  This was performed at [facility] on February 14 of this year.  Multiple abnormalities were noted including mild to moderate aortic stenosis, grade 3 diastolic dysfunction, severe concentric LV hypertrophy, mild aortic regurgitation, and evidence of previous mitral valve replacement.  She also had evidence of severe pulmonary hypertension and severe dilation of the left atrium.  M      Assessment and Plan     Diagnoses and all orders for this visit:    1. Chronic respiratory failure with hypoxia (HCC) (Primary)  Assessment & Plan:  She has adequate O2 saturations on supplemental oxygen today but is requiring oxygen at 5 L/min.      2. Pulmonary hypertension (HCC)  Assessment & Plan:  This is almost certainly due to a combination of group 2 and group 3 pulmonary hypertension.      3. PAF (paroxysmal atrial fibrillation) (HCC)  Assessment & Plan:  Her rhythm is irregular today and she may very well be in atrial fibrillation.      4. H/O prosthetic heart valve  Assessment & Plan:  She is status post mitral valve replacement in the past.      5. Stage 3 severe COPD by GOLD  classification (HCC)  Assessment & Plan:  He is on DuoNeb treatments as well as Pulmicort and Brovana neb treatments.  I would recommend she continue these as an outpatient but again she may require admission for treatment of her worsening shortness of breath.  I did contact the [facility] emergency room to let them know she was en route.          6. Diastolic dysfunction  Assessment & Plan:  Grade 3 diastolic dysfunction was noted on her most recent 2D echo.      7. Aortic valve disease  Assessment & Plan:  Aortic stenosis was noted on her last echocardiogram.      8. Lung nodule  Assessment & Plan:  This was stable on her most recent scan.  No routine follow-up scans would be indicated.      9. Former smoker  Assessment & Plan:  She quit smoking in 1978.  She has a very minimal smoking history.      10. Underweight  Assessment & Plan:  She will follow-up with her primary care physician regarding her low BMI.        Patient's Body mass index is 15.28 kg/m². indicating that she is underweight (BMI < 18.5). Recommendations include: referral to primary care.        Keyon Syed MD  4/14/2022  18:56 CDT    Follow Up   Return in about 6 weeks (around 5/26/2022).    Patient was given instructions and counseling regarding her condition or for health maintenance advice. Please see specific information pulled into the AVS if appropriate.

## 2022-04-14 NOTE — ASSESSMENT & PLAN NOTE
He is on DuoNeb treatments as well as Pulmicort and Brovana neb treatments.  I would recommend she continue these as an outpatient but again she may require admission for treatment of her worsening shortness of breath.  I did contact the [facility] emergency room to let them know she was en route.

## 2022-04-14 NOTE — PROGRESS NOTES
PHARMACY NOTE:  Lindsay Go was ordered biotin. As per the [facility], herbals and certain dietary supplements will be discontinued.   The herbal or dietary supplement may be continued after discharge from the hospital.    Tejas Hooker, PHARM D, 4/14/2022, 1:38 PM

## 2022-04-14 NOTE — ED NOTES
Pt SpO2 initially 81 on home 5lpm after placing pt in bed. Pt SpO2 up to 93% after resting for a few minutes.       Spencer Galeana RN  04/14/22 [id number]

## 2022-04-14 NOTE — ED PROVIDER NOTES
[facility] EMERGENCY DEPT  eMERGENCY dEPARTMENT eNCOUnter      Pt Name: Lorrayne Moritz  MRN: [id number]  [place] 1945  Date of evaluation: 4/14/2022  Provider: Vernia Setter, MD Matthew Holter       Chief Complaint   Patient presents with    Shortness of Breath    Leg Swelling     left leg         HISTORY OF PRESENT ILLNESS   (Location/Symptom, Timing/Onset,Context/Setting, Quality, Duration, Modifying Factors, Severity)  Note limiting factors. Lorrayne Moritz is a 68 y.o. female who presents to the emergency department for shortness of breath. This has been increasing over the last several days and typically she wears 2 to 3 L of oxygen however has been wearing 5 the last several days due to feeling short of breath. Admits to productive cough with yellow-green sputum but denies any fevers. Known history of COPD and CHF. She saw Dr. Gavi Escobedo her pulmonologist earlier today and he sent her to the hospital.  There was also some concern for lower extremity swelling. Chronically her right lower extremity is slightly larger due to a prior skin graft. Denies any calf pain. No history of PE or DVT. HPI    NursingNotes were reviewed. REVIEW OF SYSTEMS    (2-9 systems for level 4, 10 or more for level 5)     Review of Systems   Constitutional: Positive for fatigue. Negative for chills and fever. HENT: Negative for rhinorrhea and sore throat. Respiratory: Positive for cough, shortness of breath and wheezing. Cardiovascular: Positive for leg swelling. Negative for chest pain. Gastrointestinal: Negative for abdominal pain, diarrhea, nausea and vomiting. Genitourinary: Negative for dysuria, frequency and urgency. Musculoskeletal: Negative for back pain and neck pain. Neurological: Negative for dizziness and headaches. All other systems reviewed and are negative.            PAST MEDICALHISTORY     Past Medical History:   Diagnosis Date    Aortic stenosis     Arthritis     Atherosclerosis of native arteries of the extremities with intermittent claudication 2011    CAD (coronary artery disease)     Carotid aneurysm, right (HCC)     Carotid artery occlusion     [facility]     COPD [facility]     History of blood transfusion 2016    Post op, was taking blood thinner    Hyperlipidemia     Hypertension     MI (myocardial infarction) ([address] 2009    x2    Mitral valve stenosis     Pneumonia     Post-menopausal     PUD (peptic ulcer disease) 2009    Renal artery stenosis ([address] 08/10/2021    s/p stenting to L    Renal failure 2009    after ulcer perforation sepsis.     Severe malnutrition (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 AND REPAIR WITH REVERSED LEFT GREATER SAPHENOUS VEIN INTERPOSITIONAL BYPASS GRAFT performed by Jacqueline Mccormick MD at [id number] [place] [address] Right early 's    long ago    CATARACT REMOVAL WITH IMPLANT Bilateral      SECTION  1972    COLONOSCOPY  2012    Dr Peam Robison:  HP    CORONARY ANGIOPLASTY WITH STENT PLACEMENT  08/10/2021    RM- RCA    CORONARY ANGIOPLASTY WITH STENT PLACEMENT      CORONARY ARTERY BYPASS GRAFT      DIAGNOSTIC CARDIAC CATH LAB PROCEDURE      DILATION AND CURETTAGE OF UTERUS      ILIO-FEMORAL BYPASS GRAFT N/A 2016    OPEN TRANSLUMINAL BALLOON ANGIOPLASTY AND STENTING OF RIGHT COMMON AND EXTERNAL  ILIAC ARTERIES; RIGHT FEMORAL ENDARTERECTOMY WITH VEIN PATCH ANGIOPLASTY performed by Lorna Collazo MD at [address] N/A 04/07/2016    MITRAL VALVE  REPLACEMENT LUONG-MAZE ABLATION WITH CRYO PROCEDURE, CORONARY ARTERY BYPASS GRAFT X 1 WITH ENDOSCOPIC VEIN HARVESTING WITH PERFUSION TRANSESOPHAGEAL ECHOCARDIOGRAM performed by Sean Arango MD at [facility]  2016    PERIPHERAL PERCUTANEOUS ARTERIAL INTERVENTION Left 08/10/2021    RM to L renal artery    NM REOPER, CAROTID ENDARTEC>1 MON Left 01/11/2018    REMOVAL OF HEMATOMA, LEFT CAROTID ARTERY performed by Lorna Collazo MD at [address] Left 01/11/2018    LEFT CAROTID ENDARTERECTOMY WITH EEG MONITORING AND COMPLETION DUPLEX ULTRASOUND performed by Lorna Collazo MD at [address] [facility]      SKIN GRAFT Right 07/22/2016    Skin graft split thickness foot,ankle,and leg. Right leg 26x8cm and 12x6cm total area 280cm squared. TJR    UPPER GASTROINTESTINAL ENDOSCOPY  07/14/2015    Dr William Bolton: normal    UPPER GASTROINTESTINAL ENDOSCOPY  03/15/2016    Dr Florence Marquis: normal    UPPER GASTROINTESTINAL ENDOSCOPY  05/27/2015    Dr William Bolton:  paul neg, multiple gastric antial and duodenal ulcers    VASCULAR SURGERY  5/27/16 TJR    Aortagram and right leg runoff,right leg runoff,right common iliac artery selection for right leg run off views.  VASCULAR SURGERY      . Open transluminal angioplasty and stenting of the external iliac artery. TJR    VASCULAR SURGERY  07/11/2019    TJR. 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          CURRENT MEDICATIONS     Previous Medications    ALBUTEROL (ACCUNEB) 1.25 MG/3ML NEBULIZER SOLUTION    Inhale 1 ampule into the lungs every 6 hours as needed     AMLODIPINE (NORVASC) 5 MG TABLET    Take 1 tablet by mouth 2 times daily    ARFORMOTEROL TARTRATE (BROVANA) 15 MCG/2ML NEBU    Inhale 15 mcg into the lungs 2 times daily     ASPIRIN EC 81 MG EC TABLET    Take 1 tablet by mouth daily    ATORVASTATIN (LIPITOR) 40 MG TABLET    Take 1 tablet by mouth daily    BIOTIN 5000 MCG TABS    Take 1 tablet by mouth daily     BUDESONIDE (PULMICORT) 0.5 MG/2ML NEBULIZER SUSPENSION    Inhale 0.5 mg into the lungs Daily    CEFDINIR (OMNICEF) 300 MG CAPSULE    Take 300 mg by mouth 2 times daily    CLOPIDOGREL (PLAVIX) 75 MG TABLET    Take 1 tablet by mouth daily    FEXOFENADINE HCL (MUCINEX ALLERGY PO)    Take 1 tablet by mouth 2 times daily     FUROSEMIDE (LASIX) 40 MG TABLET    Take 1 tablet by mouth daily    IPRATROPIUM-ALBUTEROL (DUONEB) 0.5-2.5 (3) MG/3ML SOLN NEBULIZER SOLUTION    Inhale 3 mLs into the lungs every 4 hours    ISOSORBIDE MONONITRATE (IMDUR) 30 MG EXTENDED RELEASE TABLET    Take 30 mg by mouth daily    LEVOTHYROXINE (SYNTHROID) 25 MCG TABLET    Take 25 mcg by mouth Daily     METOPROLOL (LOPRESSOR) 100 MG TABLET    Take 0.5 tablets by mouth 2 times daily 50 mg in the AM & 50 mg in the PM    NITROGLYCERIN (NITROSTAT) 0.4 MG SL TABLET    Place 1 tablet under the tongue every 5 minutes as needed for Chest pain    OXYGEN    Inhale 2 L into the lungs daily     POTASSIUM CHLORIDE (KLOR-CON M) 20 MEQ EXTENDED RELEASE TABLET    Take 20 mEq by mouth daily    PROBIOTIC PRODUCT (PROBIOTIC DAILY PO)    Take 1 tablet by mouth daily       ALLERGIES     Levaquin [levofloxacin in d5w], Xarelto [rivaroxaban], and Morphine    FAMILY HISTORY       Family History   Problem Relation Age of Onset    Diabetes Mother     Heart Disease Mother     Heart Failure Mother     Diabetes Sister     Heart Disease Sister     High Blood Pressure Sister     Colon Cancer Sister     Liver Disease Sister     Cirrhosis Sister     Colon Cancer Maternal Aunt     Colon Polyps Neg Hx     Esophageal Cancer Neg Hx           SOCIAL HISTORY       Social History     Socioeconomic History    Marital status:      Spouse name: None    Number of children: None    Years of education: None    Highest education level: None   Occupational History    None   Tobacco Use    Smoking status: Former Smoker     Years: 2.00     Types: Cigarettes     Quit date: 1980     Years since quittin.3    Smokeless tobacco: Never Used   Vaping Use    Vaping Use: Never used   Substance and Sexual Activity    Alcohol use: Yes     Comment: rarely maybe twice a year    Drug use: No    Sexual activity: Not Currently     Partners: Male   Other Topics Concern    None   Social History Narrative    None     Social Determinants of Health     Financial Resource Strain:     Difficulty of Paying Living Expenses: Not on file   Food Insecurity:     Worried About Running Out of Food in the Last Year: Not on file    Ward of Food in the Last Year: Not on file   Transportation Needs:     Lack of Transportation (Medical): Not on file    Lack of Transportation (Non-Medical):  Not on file   Physical Activity:     Days of Exercise per Week: Not on file    Minutes of Exercise per Session: Not on file   Stress:     Feeling of Stress : Not on file   Social Connections:     Frequency of Communication with Friends and Family: Not on file    Frequency of Social Gatherings with Friends and Family: Not on file    Attends [employer] Services: Not on file    Active Member of [address] [place] [address] [employer] or Organizations: Not on file    Attends Club or Organization Meetings: Not on file    Marital Status: Not on file   Intimate Partner Violence:     Fear of Current or Ex-Partner: Not on file    Emotionally Abused: Not on file    Physically Abused: Not on file    Sexually Abused: Not on file   Housing Stability:     Unable to Pay for Housing in the Last Year: Not on file    Number of [place] in the Last Year: Not on file    Unstable Housing in the Last Year: Not on file       SCREENINGS    [place] Coma Scale  Eye Opening: Spontaneous  Best Verbal Response: Oriented  Best Motor Response: Obeys commands  [place] Coma Scale Score: 15        PHYSICAL EXAM    (up to 7 for level 4, 8 or more for level 5)     ED Triage Vitals   BP Temp Temp src Pulse Resp SpO2 Height Weight   04/14/22 1043 04/14/22 1038 -- 04/14/22 1038 04/14/22 1038 04/14/22 1038 -- --   (!) 179/76 97 °F (36.1 °C)  90 30 (!) 81 %         Physical Exam  Vitals and nursing note reviewed. Constitutional:       Appearance: She is ill-appearing. She is not diaphoretic. Comments: Chronically ill appearing, under weight   HENT:      Head: Normocephalic and atraumatic. Right Ear: External ear normal.      Left Ear: External ear normal.   Eyes:      Conjunctiva/sclera: Conjunctivae normal.   Neck:      Trachea: No tracheal deviation. Cardiovascular:      Rate and Rhythm: Normal rate and regular rhythm. Heart sounds: Normal heart sounds. No murmur heard. Pulmonary:      Effort: No respiratory distress. Breath sounds: Examination of the right-middle field reveals wheezing. Examination of the left-middle field reveals wheezing. Examination of the right-lower field reveals wheezing. Examination of the left-lower field reveals wheezing. Wheezing present. No rales. Abdominal:      Palpations: Abdomen is soft. There is no mass. Tenderness: There is no abdominal tenderness. Musculoskeletal:         General: Normal range of motion. Cervical back: Normal range of motion. Right lower leg: No edema. Left lower leg: No edema. Comments: Right leg slightly larger than left chronic since skin graft in past   Skin:     General: Skin is warm and dry. Neurological:      Mental Status: She is alert and oriented to person, place, and time. GCS: GCS eye subscore is 4. GCS verbal subscore is 5. GCS motor subscore is 6.          DIAGNOSTIC RESULTS     EKG: All EKG's areinterpreted by the Emergency Department Physician who either signs or Co-signs this chart in the absence of a cardiologist.    79 normal sinus rhythm no obvious ST changes nondiagnostic EKG    RADIOLOGY:  Non-plain film images such as CT, Ultrasound and MRI are read by the radiologist. Plain radiographic images are visualized and preliminarily interpreted bythe emergency physician with the below findings:          XR CHEST PORTABLE   Final Result   1. COPD. Stable right inferolateral pleural thickening with underlying   parenchymal scarring. Slightly increasing right infrahilar hazy   densities may be patchy atelectasis or possibly pneumonia. Prior   mediastinal surgery with similar cardiomegaly.    Signed by Dr Maykel Chen:  Bk Bearded - Abnormal; Notable for the following components:       Result Value    Pro-BNP 3,581 (*)     All other components within normal limits   CBC WITH AUTO DIFFERENTIAL - Abnormal; Notable for the following components:    WBC 19.1 (*)     RBC 3.78 (*)     Hemoglobin 10.2 (*)     Hematocrit 34.4 (*)     MCHC 29.7 (*)     RDW 15.2 (*)     Neutrophils % 89.0 (*)     Lymphocytes % 1.0 (*)     Neutrophils Absolute 17.0 (*)     Lymphocytes Absolute 0.2 (*)     Monocytes Absolute 1.90 (*)     Hypochromia 1+ (*)     All other components within normal limits   COMPREHENSIVE METABOLIC PANEL - Abnormal; Notable for the following components:    Chloride 92 (*)     CO2 42 (*)     BUN 30 (*)     CREATININE 1.0 (*)     GFR Non-African American 54 (*)     Calcium 12.1 (*)     Albumin 3.4 (*)     All other components within normal limits    Narrative:     CALL  [facility] tel. ,  Chemistry results called to and read back by [address] [place] [address], 04/14/2022 11:48,  by [employer]   BLOOD GAS, ARTERIAL - Abnormal; Notable for the following components:    pCO2, Arterial 74.0 (*)     pO2, Arterial 69.0 (*)     HCO3, Arterial 49.1 (*)     Base Excess, Arterial 21.2 (*)     Hemoglobin, Art, Extended 10.7 (*)     All other components within normal limits Narrative:     [facility] tel. ,  Carito Jimenez MD ER, 04/14/2022 11:57, by Марина Ott   IRON AND TIBC - Abnormal; Notable for the following components:    Iron 31 (*)     Iron Saturation 11 (*)     All other components within normal limits   COVID-19, RAPID   CULTURE, BLOOD 1   CULTURE, BLOOD 2   TROPONIN   LACTIC ACID   MAGNESIUM   VITAMIN D 25 HYDROXY   VITAMIN B12   FOLATE   FERRITIN   TSH       All other labs were within normal range or not returned as of this dictation. EMERGENCY DEPARTMENT COURSE and DIFFERENTIAL DIAGNOSIS/MDM:   Vitals:    Vitals:    04/14/22 1043 04/14/22 1044 04/14/22 1212 04/14/22 1231   BP: (!) 179/76  (!) 170/79 (!) 161/71   Pulse:    96   Resp:    24   Temp:       SpO2:  93%  96%       MDM  Number of Diagnoses or Management Options     Amount and/or Complexity of Data Reviewed  Clinical lab tests: ordered and reviewed  Tests in the radiology section of CPT®: ordered and reviewed  Discuss the patient with other providers: yes  Independent visualization of images, tracings, or specimens: yes      Has been wearing 5L at home vs typical home 02 of 2-3, diffuse wheezing on exam, copd vs mild RLL pneumonia have covered with antibx as well, pt barely stood to bedside commode and sat dropped to 80, have d/w hospitalist for admission      CONSULTS:  None    PROCEDURES:  Unless otherwise noted below, none     Procedures    FINAL IMPRESSION      1. Pneumonia of right lung due to infectious organism, unspecified part of lung    2. COPD exacerbation ([address]    3. Hypoxia    4. Acute on chronic respiratory failure with hypoxia ([address]          DISPOSITION/PLAN   DISPOSITION Admitted 04/14/2022 12:09:45 PM      PATIENT REFERRED TO:  No follow-up provider specified.     DISCHARGE MEDICATIONS:  New Prescriptions    No medications on file          (Please note that portions of this note were completed with a voice recognition program.  Efforts were made to edit thedictations but occasionally words are mis-transcribed.)    Gibson Whalen MD (electronically signed)  Attending Emergency Physician        Lynn Marlow MD  04/14/22 [id number]

## 2022-04-14 NOTE — H&P
transfusion 2016    Post op, was taking blood thinner    Hyperlipidemia     Hypertension     MI (myocardial infarction) ([address] 2009    x2    Mitral valve stenosis     Pneumonia     Post-menopausal     PUD (peptic ulcer disease) 2009    Renal artery stenosis ([address] 08/10/2021    s/p stenting to L    Renal failure     after ulcer perforation sepsis.    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 AND REPAIR WITH REVERSED LEFT GREATER SAPHENOUS VEIN INTERPOSITIONAL BYPASS GRAFT performed by Bandar Grijalva MD at [place] [address] Right early 's    long ago    CATARACT REMOVAL WITH IMPLANT Bilateral      SECTION  1972    COLONOSCOPY  2012    Dr Libia Dow:  HP    CORONARY ANGIOPLASTY WITH STENT PLACEMENT  08/10/2021    RM- RCA    CORONARY ANGIOPLASTY WITH STENT PLACEMENT      CORONARY ARTERY BYPASS GRAFT  2016    DIAGNOSTIC CARDIAC CATH LAB PROCEDURE      DILATION AND CURETTAGE OF UTERUS      ILIO-FEMORAL BYPASS GRAFT N/A 2016    OPEN TRANSLUMINAL BALLOON ANGIOPLASTY AND STENTING OF RIGHT COMMON AND EXTERNAL  ILIAC ARTERIES; RIGHT FEMORAL ENDARTERECTOMY WITH VEIN PATCH ANGIOPLASTY performed by Bandar Grijalva MD at [address] [place] [address] N/A 2016    MITRAL VALVE  REPLACEMENT LUONG-MAZE ABLATION WITH CRYO PROCEDURE, CORONARY ARTERY BYPASS GRAFT X 1 WITH ENDOSCOPIC VEIN HARVESTING WITH PERFUSION TRANSESOPHAGEAL ECHOCARDIOGRAM performed by Hector Baxter MD at [facility] 2016    PERIPHERAL PERCUTANEOUS ARTERIAL INTERVENTION Left 08/10/2021    RM to L renal artery    ND REOPER, CAROTID ENDARTEC>1 MON Left 01/11/2018    REMOVAL OF HEMATOMA, LEFT CAROTID ARTERY performed by Racehal Jain MD at [address] Left 01/11/2018    LEFT CAROTID ENDARTERECTOMY WITH EEG MONITORING AND COMPLETION DUPLEX ULTRASOUND performed by Racheal Jain MD at [address]      SKIN GRAFT Right 07/22/2016    Skin graft split thickness foot,ankle,and leg. Right leg 26x8cm and 12x6cm total area 280cm squared. TJR    UPPER GASTROINTESTINAL ENDOSCOPY  07/14/2015    Dr Giancarlo Valdez: normal    UPPER GASTROINTESTINAL ENDOSCOPY  03/15/2016    Dr Nicole Dacosta: normal    UPPER GASTROINTESTINAL ENDOSCOPY  05/27/2015    Dr Giancarlo Valdez:  paul neg, multiple gastric antial and duodenal ulcers    VASCULAR SURGERY  5/27/16 TJR    Aortagram and right leg runoff,right leg runoff,right common iliac artery selection for right leg run off views.  VASCULAR SURGERY      . Open transluminal angioplasty and stenting of the external iliac artery. TJR    VASCULAR SURGERY  07/11/2019    TJR. 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 Home Medications:  Prior to Admission medications    Medication Sig Start Date End Date Taking?  Authorizing Provider   cefdinir (OMNICEF) 300 MG capsule Take 300 mg by mouth 2 times daily 4/8/22  Yes Historical Provider, MD   potassium chloride (KLOR-CON M) 20 MEQ extended release tablet Take 20 mEq by mouth daily 2/1/22  Yes Historical Provider, MD   furosemide (LASIX) 40 MG tablet Take 1 tablet by mouth daily 1/31/22   BEBO Meza   clopidogrel (PLAVIX) 75 MG tablet Take 1 tablet by mouth daily 8/10/21   Cinthya Webber MD   isosorbide mononitrate (IMDUR) 30 MG extended release tablet Take 30 mg by mouth daily    Historical Provider, MD   amLODIPine (NORVASC) 5 MG tablet Take 1 tablet by mouth 2 times daily 7/30/21   Yassine Cooley MD   metoprolol (LOPRESSOR) 100 MG tablet Take 0.5 tablets by mouth 2 times daily 50 mg in the AM & 50 mg in the PM 6/30/21   BEBO Recinos   albuterol (ACCUNEB) 1.25 MG/3ML nebulizer solution Inhale 1 ampule into the lungs every 6 hours as needed  1/22/21   Historical Provider, MD   budesonide (PULMICORT) 0.5 MG/2ML nebulizer suspension Inhale 0.5 mg into the lungs Daily 2/2/21   Historical Provider, MD   ipratropium-albuterol (DUONEB) 0.5-2.5 (3) MG/3ML SOLN nebulizer solution Inhale 3 mLs into the lungs every 4 hours 2/2/21   Historical Provider, MD   Probiotic Product (PROBIOTIC DAILY PO) Take 1 tablet by mouth daily    Historical Provider, MD   nitroGLYCERIN (NITROSTAT) 0.4 MG SL tablet Place 1 tablet under the tongue every 5 minutes as needed for Chest pain 8/18/20   BEBO Jimenes   levothyroxine (SYNTHROID) 25 MCG tablet Take 25 mcg by mouth Daily  11/7/19   Historical Provider, MD   Fexofenadine HCl (MUCINEX ALLERGY PO) Take 1 tablet by mouth 2 times daily     Historical Provider, MD   Arformoterol Tartrate (BROVANA) 15 MCG/2ML NEBU Inhale 15 mcg into the lungs 2 times daily  1/2/20   Historical Provider, MD   OXYGEN Inhale 2 L into the lungs daily     Historical Provider, MD   aspirin EC 81 MG EC tablet Take 1 tablet by mouth daily  Patient taking differently: Take 81 mg by mouth nightly PT TAKES AT NIGHT. 11/20/19   Yassine Cooley MD   atorvastatin (LIPITOR) 40 MG tablet Take 1 tablet by mouth daily  Patient taking differently: Take 40 mg by mouth nightly PT TAKES AT NIGHT.  11/20/19   Yassine Cooley MD   Biotin 5000 MCG TABS Take 1 tablet by mouth daily     Historical Provider, MD       Allergies:    Levaquin [levofloxacin in d5w], Xarelto [rivaroxaban], and Morphine    Social History:    The patient currently lives home alone  Tobacco:   reports that she quit smoking about 42 years ago. Her smoking use included cigarettes. She quit after 2.00 years of use. She has never used smokeless tobacco.  Alcohol:   reports current alcohol use. Illicit Drugs: denies    Family History:      Problem Relation Age of Onset    Diabetes Mother     Heart Disease Mother     Heart Failure Mother     Diabetes Sister     Heart Disease Sister     High Blood Pressure Sister     Colon Cancer Sister     Liver Disease Sister     Cirrhosis Sister     Colon Cancer Maternal Aunt     Colon Polyps Neg Hx     Esophageal Cancer Neg Hx        Review of Systems:   Review of Systems   Constitutional: Positive for activity change, appetite change, chills and fatigue. Negative for fever. HENT: Negative for sore throat, trouble swallowing and voice change. Respiratory: Positive for cough, chest tightness, shortness of breath and wheezing. Cardiovascular: Negative for chest pain, palpitations and leg swelling. Gastrointestinal: Negative for abdominal pain, constipation, diarrhea, nausea and vomiting. Genitourinary: Negative for difficulty urinating, dysuria, frequency, hematuria and urgency. Musculoskeletal: Negative for arthralgias and myalgias. Skin: Negative for color change and wound. Neurological: Positive for dizziness. Negative for headaches. Psychiatric/Behavioral: Negative for agitation, behavioral problems and confusion. 14 point review of systems is negative except as specifically addressed above. Physical Examination:  BP (!) 149/44   Pulse (!) 44   Temp 96.8 °F (36 °C) (Temporal)   Resp 24   Ht 5' 3\" (1.6 m)   Wt 78 lb (35.4 kg)   SpO2 94%   BMI 13.82 kg/m²   Physical Exam  Vitals reviewed. Constitutional:       Appearance: She is underweight. She is ill-appearing. HENT:      Head: Normocephalic. Mouth/Throat:      Mouth: Mucous membranes are dry. Pharynx: Oropharynx is clear.    Eyes: Pupils: Pupils are equal, round, and reactive to light. Cardiovascular:      Rate and Rhythm: Normal rate and regular rhythm. Pulses: Normal pulses. Heart sounds: Normal heart sounds. Pulmonary:      Effort: Pulmonary effort is normal.      Breath sounds: Wheezing and rales present. Abdominal:      General: Abdomen is flat. Bowel sounds are normal. There is no distension. Palpations: Abdomen is soft. Tenderness: There is no abdominal tenderness. There is no guarding. Musculoskeletal:         General: Normal range of motion. Cervical back: Normal range of motion and neck supple. Right lower leg: No edema. Left lower leg: No edema. Skin:     General: Skin is warm and dry. Capillary Refill: Capillary refill takes less than 2 seconds. Neurological:      General: No focal deficit present. Mental Status: She is alert and oriented to person, place, and time. Diagnostic Data:  CBC:  Recent Labs     04/14/22  1105   WBC 19.1*   HGB 10.2*   HCT 34.4*        BMP:  Recent Labs     04/14/22  1105 04/14/22  1155     --    K 3.7 3.4   CL 92*  --    CO2 42*  --    BUN 30*  --    CREATININE 1.0*  --    CALCIUM 12.1*  --      Recent Labs     04/14/22  1105   AST 26   ALT 17   BILITOT <0.2   ALKPHOS 73     Coag Panel: No results for input(s): INR, PROTIME, APTT in the last 72 hours. Cardiac Enzymes:   Recent Labs     04/14/22  1105   TROPONINI 0.03     ABGs:  Lab Results   Component Value Date    PHART 7.430 04/14/2022    PO2ART 69.0 04/14/2022    QPC2RCC 74.0 04/14/2022     Urinalysis:  Lab Results   Component Value Date    NITRU Negative 08/02/2016    WBCUA 1 08/02/2016    BACTERIA NEGATIVE 08/02/2016    RBCUA 1 08/02/2016    BLOODU Negative 08/02/2016    SPECGRAV 1.008 08/02/2016    GLUCOSEU Negative 08/02/2016     A1C: No results for input(s): LABA1C in the last 72 hours.   ABG:  Recent Labs     04/14/22  1155   PHART 7.430   IPP0GFH 74.0*   PO2ART 69.0*   IYG2CBR 49.1*   BEART 21.2*   HGBAE 10.7*   G1UMMHFG 92.7   CARBOXHGBART 1.7       XR CHEST PORTABLE    Result Date: 4/14/2022  XR CHEST PORTABLE 4/14/2022 11:14 AM HISTORY: Shortness of breath COMPARISON: 3/26/2021 and 11/16/2019 CXR: A single frontal view of the chest is performed. Findings: Lungs are hyperinflated. . Stable pleural thickening and parenchymal scarring of the right lateral lower hemithorax. Hazy right infrahilar/basilar densities may represent atelectasis or early pneumonia. No radiographic evidence of edema. Stable clinically. Prior mediastinal surgery. Surgical clips of the bilateral neck. No pleural effusion or pneumothorax. Diffuse vascular calcification. 1. COPD. Stable right inferolateral pleural thickening with underlying parenchymal scarring. Slightly increasing right infrahilar hazy densities may be patchy atelectasis or possibly pneumonia. Prior mediastinal surgery with similar cardiomegaly. Signed by Dr Pam Lott      Assessment/Plan:  Principal Problem:    COPD exacerbation ([address]  Pulmonary emphysema ([address]   -Admit   -Rocephin and doxycycline   -Bronchodilators   -Acapella   -Solu-Medrol   -Monitor need for oxygen, maintain O2 sats between 88 and 92%    Active Problems:   Coronary artery disease involving native coronary artery of native heart   -Continue atorvastatin and Plavix   -Continue Imdur      Severe malnutrition (HCC)   -Dietitian consult      CHF (congestive heart failure) ([facility])   -Monitor intake and output   -Resume home Lasix   -Daily weight      CKD (chronic kidney disease), stage III ([facility])   -Monitor BMP   -Monitor intake and output   -Daily weight      PAF (paroxysmal atrial fibrillation) ([address]   -Patient reports she is no longer on anticoagulant    Resolved Problems:    * No resolved hospital problems.  *       Signed:  Gaines Sacks, APRN - CNP, 4/14/2022 3:53 PM       Attestation Statement     I have independently seen and examined this patient Objective:   Vitals: BP (!) 149/44   Pulse (!) 44   Temp 96.8 °F (36 °C) (Temporal)   Resp 24   Ht 5' 3\" (1.6 m)   Wt 78 lb (35.4 kg)   SpO2 94%   BMI 13.82 kg/m²   General appearance: alert, appears stated age and cooperative  Skin: Skin color, texture, turgor normal.   HEENT: Head: Normocephalic, no lesions, without obvious abnormality.   Neck: no adenopathy, no carotid bruit, no JVD and supple, symmetrical, trachea midline  Lungs: wheezes bilaterally  Heart: regular rate and rhythm, S1, S2 normal, no murmur, click, rub or gallop  Abdomen: soft, non-tender; bowel sounds normal; no masses,  no organomegaly  Extremities: extremities normal, atraumatic, no cyanosis or edema  Lymphatic: No significant lymph node enlargement papable  Neurologic: Mental status: Alert, oriented, thought content appropriate      Assessment & Plan:    COPD AE- steroids nebs ab    Laurent Gillespie MD

## 2022-04-14 NOTE — PATIENT INSTRUCTIONS
The patient is having increasing issues with severe shortness of breath with minimal exertion to the point she can not even dress herself.  I did advise her I think would be reasonable to go to the emergency room as she likely is going to require admission for treatment of both her chronic heart and chronic lung disease.  I did discuss this with the patient her daughter who accompanies her today.  I will see her back in about 6 weeks otherwise.  I did contact the emergency room at [facility] regarding the patient's current clinical status and that she was en route to the emergency room.

## 2022-04-15 NOTE — PROGRESS NOTES
Comprehensive Nutrition Assessment    Type and Reason for Visit:  Initial,Consult    Nutrition Recommendations/Plan: Pt meets criteria for severe malnutrition. Will add ROGELIO per pt preference. Will order Ensure ONS and 2% milk once daily. Nutrition Assessment:  Following pt for consult r/t poor po intake. Pt meets criteria for severe malnutrition AEB fat wasting, muscle wasting, a BMI of 13.8, and reported poor po intake. No po documented. Pt c/o saltiness of foods, states she does not use salt at home. Per preference of pt, will add ROGELIO to diet. Preferences obtained. Pt agreeable to trial Ensure Enlive ONS with 2% milk once daily. Pt states that she \"just doesn't eat much, I have always been this way\". Regardless of her situation, pt remains in good spirits and was pleasant to speak to. Will continue to monitor nutrition status while inpatient. Malnutrition Assessment:  Malnutrition Status:  Severe malnutrition    Context:  Chronic Illness     Findings of the 6 clinical characteristics of malnutrition:  Energy Intake:  Unable to assess  Weight Loss:  No significant weight loss (11 lb (13%) wt loss in 1 year, BMI 13.8)     Body Fat Loss:  7 - Moderate body fat loss Buccal region,Orbital,Triceps   Muscle Mass Loss:  7 - Moderate muscle mass loss Temples (temporalis),Clavicles (pectoralis & deltoids),Hand (interosseous)  Fluid Accumulation:  No significant fluid accumulation     Strength:  Not Performed    Estimated Daily Nutrient Needs:  Energy (kcal):  6633-3491 kcal/d (30-35 kcal/d); Weight Used for Energy Requirements:  Current     Protein (g):  52 g/d (1.5 g/kg); Weight Used for Protein Requirements:  Current        Fluid (ml/day):  8873-8636 ml/d; Method Used for Fluid Requirements:  1 ml/kcal      Nutrition Related Findings:  Solu-Medrol, BUN 32, cachectic/thin/frail      Current Nutrition Therapies:    ADULT DIET;  Regular    Anthropometric Measures:  · Height: 5' 3\" (160 cm)  · Current Body Weight: 78 lb (35.4 kg)   · Admission Body Weight: 78 lb (35.4 kg)    · Usual Body Weight: 89 lb (40.4 kg) (4/2/2021)     · Ideal Body Weight: 115 lbs; % Ideal Body Weight 67.8 %   · BMI: 13.8  · BMI Categories: Underweight (BMI less than 22) age over 72       Nutrition Diagnosis:   · Severe malnutrition,In context of acute illness or injury related to inadequate protein-energy intake,impaired respiratory function as evidenced by weight loss,BMI,poor intake prior to admission,severe loss of subcutaneous fat,severe muscle loss    Nutrition Interventions:   Food and/or Nutrient Delivery:  Modify Current Diet,Start Oral Nutrition Supplement  Nutrition Education/Counseling:  No recommendation at this time   Coordination of Nutrition Care:  Continue to monitor while inpatient    Goals:  Po intake >50% meals and ONS       Nutrition Monitoring and Evaluation:   Food/Nutrient Intake Outcomes:  Diet Advancement/Tolerance,Food and Nutrient Intake,Supplement Intake  Physical Signs/Symptoms Outcomes:  Biochemical Data,Hemodynamic Status,Nutrition Focused Physical Findings,Weight     Electronically signed by Violetta Hardy on 4/15/22 at 10:54 AM CDT    Contact: [phone]

## 2022-04-15 NOTE — TELEPHONE ENCOUNTER
I did contact the patient's daughter and actually advised her that I was already aware that the patient had been admitted to [facility] at [facility] as I had checked with [facility] information earlier this morning.  There was some concern about her being on excessive oxygen.  When she was in the office her O2 sats were 93% on 5 L and that certainly would be a reasonable O2 sat goal particularly if she was under a lot of stress or had just exerted herself.  However at rest if she is stable then the goal would be to adjust her O2 flows to keep her sats again in the low 90s.  If they are in the mid to high 90s she can turn her oxygen down.  I will try to check on her at [facility] sometime this weekend and I will review her records from [facility] when available.

## 2022-04-15 NOTE — TELEPHONE ENCOUNTER
[facility] called stating that Steffi is at [facility] room # 438  They are sure that she has pneumonia and had her oxygen turned up too high.  If you need to call [facility] back you can.

## 2022-04-15 NOTE — PROGRESS NOTES
Physician Progress Note      PATIENT:               Elverna Apley  CSN #:                  [id number]  :                       1945  ADMIT DATE:       2022 10:32 AM  DISCH DATE:  RESPONDING  PROVIDER #:        Debra Jerome          QUERY TEXT:    Pt admitted with COPD exacerbation. Pt noted to have worsening respiratory   status on home oxygen 2 liters at home and increased to 5 liters due to Sat of   81%. and putting hi flow oxygen on to increase Saturation. . If possible,   please document in the progress notes and discharge summary if you are   evaluating and/or treating any of the following: The medical record reflects the following:  Risk Factors: COPD, Emphysema  Clinical Indicators: pc02 [id number],  Patient on 5L and de sated to low 80's. Treatment: ABG, Oxygen up to hi flow, n Duonebs, Solumedrol 125 m IV,    Thank you    Randy Rizvi RN, BSN, [facility]  [phone]  Options provided:  -- Chronic respiratory failure with hypoxia  -- Chronic respiratory failure with hypercapnia  -- Chronic respiratory failure with hypoxia and hypercapnia  -- Acute on chronic respiratory failure with hypoxia  -- Acute on chronic respiratory failure with hypercapnia  -- Acute on chronic respiratory failure with hypoxia and hypercapnia  -- Other - I will add my own diagnosis  -- Disagree - Not applicable / Not valid  -- Disagree - Clinically unable to determine / Unknown  -- Refer to Clinical Documentation Reviewer    PROVIDER RESPONSE TEXT:    This patient is in acute on chronic respiratory failure with hypoxia and   hypercapnia.     Query created by: Amber Hawk on 4/15/2022 12:04 PM      Electronically signed by:  Debra Jerome 4/15/2022 12:06 PM

## 2022-04-15 NOTE — PROGRESS NOTES
Speech Language Pathology  Facility/Department: [facility] ONCOLOGY UNIT   CLINICAL BEDSIDE SWALLOW EVALUATION  SPEECH PRODUCTION EVALUATION     NAME: Jo Lamar  : 1945  MRN: [id number]    ADMISSION DATE: 2022  ADMITTING DIAGNOSIS: has Mixed hyperlipidemia; Arthritis; Coronary artery disease involving native coronary artery of native heart; Carotid artery stenosis; Calculus of gallbladder without cholecystitis without obstruction; Mitral valve stenosis; Mitral valve insufficiency; PUD (peptic ulcer disease); Atherosclerosis of native artery of extremity with intermittent claudication ([address] Pulmonary hypertension; Nocturnal hypoxia; Acute superficial venous thrombosis of right lower extremity; Non-pressure chronic ulcer of right calf with fat layer exposed ([address] Decubitus ulcer of right buttock, stage 3 ([address] Severe malnutrition ([address] Diastolic dysfunction; Anemia in chronic kidney disease (CKD); Nonhealing nonsurgical wound with fat layer exposed; Atherosclerosis of native arteries of left leg with ulceration of calf ([address] Atherosclerosis of native artery of right lower extremity with ulceration of ankle ([address] Atherosclerosis of native arteries of right leg with ulceration of other part of lower right leg; Atherosclerosis of native arteries of right leg with ulceration of other part of foot; Nonhealing ulcer of right lower leg with fat layer exposed ([address] Non-pressure chronic ulcer of right ankle with fat layer exposed ([address] Neuropathic ulcer of right foot with fat layer exposed ([address] Hypervolemia; GI bleed; Atherosclerosis of native artery of extremity with intermittent claudication (HCC); CHF (congestive heart failure) ([address] CKD (chronic kidney disease), stage III ([address] Pulmonary emphysema ([address] Chronic obstructive pulmonary disease (HCC); PVD (peripheral vascular disease) ([address] Wound of sacral region; Elevated TSH; Bilateral carotid artery stenosis;  Obstruction of left carotid artery; Bradycardia; Sepsis with organ dysfunction [facility]); NSTEMI (non-ST elevated myocardial infarction) ([facility] HCAP (healthcare-associated pneumonia); Acute renal failure (ARF) ([facility] Syncope and collapse; Essential hypertension; Mild aortic stenosis; 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[facility] Carotid aneurysm, right ([facility] Postoperative anemia due to acute blood loss; Status post Maze operation for atrial fibrillation; PAF (paroxysmal atrial fibrillation) ([facility] S/P coronary artery bypass graft x 1; S/P mitral valve replacement; Chest pain; SOB (shortness of breath); Fatigue; Pain in both lower extremities; History of coronary artery stent placement; Myalgia; Unstable angina ([facility] Acute diastolic heart failure ([facility] Chronic atrial fibrillation ([facility] Acute on chronic anemia; OB + stool; and COPD exacerbation ([facility] on their problem list.    Date of Eval: 4/15/2022  Evaluating Therapist: JUAN Montalvo    Reason for Referral  Joel Chery was referred for a bedside swallow evaluation to assess the efficiency of her swallow function, identify signs and symptoms of aspiration and make recommendations regarding safe dietary consistencies, effective compensatory strategies, and safe eating environment. Impression  Assessed patient's swallowing function. Patient exhibited decreased oral prep of more solid consistencies, inconsistently fast oral transit and suspected swallow delay with thin liquids, and sluggish, moderately decreased laryngeal elevation for swallow airway protection. Even so, no outward S/S penetration/aspiration was noted with any regular solid consistency trial or thin H2O trial presented during evaluation this date. At this time, would recommend continuation regular solid consistency with thin liquids. Self-feed. Recommend meds whole in pudding/applesauce. Will continue to follow. Thank you for this consult.      Treatment Plan  Requires SLP Intervention: Yes     Recommended Diet and Intervention  Diet Solids Recommendation: Regular solid  Liquid Consistency Recommendation: Thin  Recommended Form of Meds: Meds whole in puree as able  Therapeutic Interventions: Patient/Family education;Diet tolerance monitoring     Compensatory Swallowing Strategies  Compensatory Swallowing Strategies: Upright as possible for all oral intake;Small bites/sips;Eat/Feed slowly; Alternate solids and liquids; Remain upright for 30-45 minutes after meals     Treatment/Goals  Timeframe for Short-term Goals: 1x/day for 3 days   Goal 1: Patient will tolerate regular solid consistency and thin liquids with min S/S penetration/aspiration during PO intake. Goal 2: Patient staff will follow swallow safety recommendations to decrease risk of penetration/aspiration during PO intake. Goal 4: Monitor speech production. General  Chart Reviewed: Yes  Behavior/Cognition: Alert; Cooperative  O2 Device: Nasal Cannula  Communication Observation: (Assessed patient's speech production. Patient exhibited slowed lingual movements during verbalizations. SLP still ranked functional intelligibility of speech for unfamiliar listeners at 100% in utterances with background noise present.)  Follows Directions: Simple   Dentition: Dentures  Patient Positioning: Upright in bed  Consistencies Administered: Regular solid; Thin - straw     Oral Motor Examination   Labial ROM: (Adequate during labial retraction trials and labial protrusion trials.)  Labial Strength: (Adequate during labial compression trials.)  Labial Coordination: (Adequate movements were noted.)  Lingual ROM: (Adequate during lingual extension trials with full point achieved; adequate during lingual elevation trials without use of accessory jaw movement; adequate movements noted bilaterally.)  Lingual Strength: (Decreased with fasciculations noted during lingual extension trials.)  Lingual Coordination: (Slowed movements were noted.)     Assessed patient's swallowing function with the following observations noted:     Oral Phase  Mastication: Regular solid (Patient exhibited adequate vertical jaw movement at the front of the mouth during oral prep of regular solid consistency trials presented independently.)  Suspected Premature Bolus Loss: Regular solid; Thin - straw (Oral transit of regular solid consistency trials varied from 1-3 seconds in length. Patient exhibited inconsistently fast oral transit of thin H2O trials presented independently via straw.)  Oral Phase - Comment: Min regular solid consistency residue was noted post swallows; residue cleared from the mouth with additional dry swallows. Pharyngeal Phase  Suspected Swallow Delay: Regular solid; Thin - straw (Suspect secondary to oral transit times.)  Laryngeal Elevation: (Patient exhibited sluggish, moderately decreased laryngeal elevation for swallow airway protection.)  Pharyngeal Phase - Comment: No outward S/S penetration/aspiration was noted with any regular solid consistency trial or thin H2O trial presented during assessment this date. At this time, would recommend continuation regular solid consistency with thin liquids. Self-feed. Recommend meds whole in pudding/applesauce. Will continue to follow.     Electronically signed by JUAN Gonzalez on 4/15/2022 at 1:01 PM

## 2022-04-15 NOTE — PROGRESS NOTES
Physical Therapy    Facility/Department: [facility] ONCOLOGY UNIT  Initial Assessment    NAME: Lindsay Go  : 1945  MRN: [id number]    Date of Service: 4/15/2022    Discharge Recommendations:  Continue to assess pending progress,Patient would benefit from continued therapy after discharge,24 hour supervision or assist        Assessment   Body structures, Functions, Activity limitations: Decreased functional mobility ; Decreased endurance;Decreased balance; Increased pain  Assessment: Pt. will benefit from cont. PT to decrease impairments. Will encourage out of bed activity and progressive mobility. O2 sat needs to be monitored. Pt. reports spouse is in worse shape than she is, so not able to help her much. Pt. limited also by anxiety. Treatment Diagnosis: deconditioning  Prognosis: Fair  Decision Making: Medium Complexity  PT Education: Goals;PT Role;Plan of Care;General Safety; Functional Mobility Training;Precautions;Gait Training;Energy Conservation;Transfer Training  Patient Education: use of call light and staff A  Barriers to Learning: anxiety  REQUIRES PT FOLLOW UP: Yes  Activity Tolerance  Activity Tolerance: Patient limited by endurance       Patient Diagnosis(es): The primary encounter diagnosis was Pneumonia of right lung due to infectious organism, unspecified part of lung. Diagnoses of COPD exacerbation ([address] Hypoxia, and Acute on chronic respiratory failure with hypoxia ([address] were also pertinent to this visit.      has a past medical history of Aortic stenosis, Arthritis, Atherosclerosis of native arteries of the extremities with intermittent claudication, CAD (coronary artery disease), Carotid aneurysm, right (HCC), Carotid artery occlusion, CHF (congestive heart failure) ([address] COPD (chronic obstructive pulmonary disease) ([address] History of blood transfusion, Hyperlipidemia, Hypertension, MI (myocardial infarction) ([address] Mitral valve stenosis, Pneumonia, Post-menopausal, PUD (peptic ulcer disease), Renal artery stenosis ([facility] Renal failure, Severe malnutrition ([facility] Thyroid disease, and Wound infection after surgery. has a past surgical history that includes  section (); Dilation and curettage of uterus; Colonoscopy (2012); Carpal tunnel release (Right, early ); Upper gastrointestinal endoscopy (2015); Upper gastrointestinal endoscopy (03/15/2016); maze procedure (N/A, 2016); Mitral valve replacement (); Coronary artery bypass graft (); Abdomen surgery (); vascular surgery (16 [facility]); Ilio-femoral Bypass Graft (N/A, 2016); Skin graft (Right, 2016); vascular surgery; Cardiac surgery; Carotid endarterectomy; pr thromboendartectmy neck,neck incis (Left, 2018); pr reoper, carotid endartec>1 mon (Left, 2018); Carotid endarterectomy (Left); Carotid endarterectomy (Right, 2019); vascular surgery (2019); Cataract removal with implant (Bilateral); Percutaneous Transluminal Coronary Angio; Coronary angioplasty with stent (08/10/2021); Diagnostic Cardiac Cath Lab Procedure; Coronary angioplasty with stent; Upper gastrointestinal endoscopy (2015); and PERIPHERAL PERCUTANEOUS ARTERIAL INTERVENTION (Left, 08/10/2021). Restrictions  Restrictions/Precautions  Restrictions/Precautions: Fall Risk  Required Braces or Orthoses?: No  Vision/Hearing  Vision Exceptions: Wears glasses for reading  Hearing: Within functional limits     Subjective  General  Chart Reviewed: Yes  Patient assessed for rehabilitation services?: Yes  Response To Previous Treatment: Not applicable  Family / Caregiver Present: No  Referring Practitioner: Georgette Chambers MD  Referral Date : 22  Diagnosis: PNA, R lung, COPD exacerbation, hypoxia, Acute on Chronic respiratory failure  Follows Commands: Within Functional Limits  General Comment  Comments: RNSilvia PT. Subjective  Subjective: Pt. willing to get up.  States she had a nosebleed all night. Pain from anxiety, hurting in chest.  Pain Screening  Patient Currently in Pain: Yes  Pain Assessment  Pain Assessment: 0-10  Pain Level: 1  Pain Location: Chest  Functional Pain Assessment: Activities are not prevented  Non-Pharmaceutical Pain Intervention(s): Repositioned; Ambulation/Increased Activity  Response to Pain Intervention: Patient Satisfied  Vital Signs  Pulse: 124 (with mobility)  Patient Currently in Pain: Yes  Oxygen Therapy  SpO2: (!) 86 % (with mobility, with rest, increased to 92-94%)  O2 Device: High flow nasal cannula  O2 Flow Rate (L/min): 9 L/min  Pre Treatment Pain Screening  Intervention List: Patient able to continue with treatment    Orientation  Orientation  Overall Orientation Status: Within Functional Limits  Social/Functional History  Social/Functional History  Lives With: Spouse  Type of Home: House  Home Layout: One level  Home Access: Stairs to enter with rails  Entrance Stairs - Number of Steps: 2  Bathroom Shower/Tub: Walk-in shower  Bathroom Toilet: Handicap height  Bathroom Equipment: Shower chair  ADL Assistance: Independent  Homemaking Assistance:  (does some housework)  Ambulation Assistance: Independent  Transfer Assistance: Independent  Active : Yes (once a week to [employer])  Cognition   Cognition  Overall Cognitive Status: WNL    Objective     Observation/Palpation  Posture: Good  Observation: 9LO2 on hfnc, telemetry    AROM RLE (degrees)  RLE AROM: WNL  AROM LLE (degrees)  LLE AROM : WNL  Strength RLE  Strength RLE: WFL  Comment: 3+/5  Strength LLE  Strength LLE: WFL  Comment: 3+/5        Bed mobility  Supine to Sit: Independent  Transfers  Sit to Stand: Contact guard assistance  Stand to sit: Contact guard assistance  Bed to Chair: Contact guard assistance  Comment: bed to [facility] and then to chair, sat 94% on BSC and then dropped to 86% in chair.   Ambulation  Ambulation?: Yes  Ambulation 1  Surface: level tile  Device: No Device  Assistance: Contact guard assistance  Quality of Gait: 9LO2  Gait Deviations: Slow Arlene;Decreased step length;Decreased step height;Staggers  Distance: 3', 1'     Balance  Posture: Good  Sitting - Static: Good;+  Sitting - Dynamic: Good;-  Standing - Static: Fair;+  Standing - Dynamic: Fair;-        Plan   Plan  Times per week: 3-7  Times per day: Daily  Plan weeks: 2  Current Treatment Recommendations: Strengthening,Balance Training,Functional Mobility Training,Transfer Training,Gait Training,Safety Education & Training,Positioning,Equipment Evaluation, Education, & procurement,Patient/Caregiver Education & Training,Endurance Training  Plan Comment: cont. PT per POC.   Safety Devices  Type of devices: Gait belt,Nurse notified,Patient at risk for falls,Call light within reach,Left in chair,Chair alarm in place (pillow under BLEs)    G-Code       OutComes Score                                                  AM-PAC Score             Goals  Short term goals  Time Frame for Short term goals: 2 wks  Short term goal 1: amb. 48' with RW SBA  Short term goal 2: sit to stand indep  Patient Goals   Patient goals : go home       Therapy Time   Individual Concurrent Group Co-treatment   Time In           Time Out           Minutes                   Janey Pablo PT     Electronically signed by Janey Pablo PT on 4/15/2022 at 11:01 AM

## 2022-04-15 NOTE — PROGRESS NOTES
Physician Progress Note      PATIENT:               Kyrie Dennis  CSN #:                  [id number]  :                       1945  ADMIT DATE:       2022 10:32 AM  DISCH DATE:  RESPONDING  PROVIDER #:        Ross Nixon          QUERY TEXT:    Patient admitted with COPD exacerbation. Noted documentation of  Pneumonia   in the ED by Dr. Aracelis Cote. If possible, please document in progress notes and discharge summary if you   are evaluating and /or treating any of the following:     The medical record reflects the following:  Risk Factors: COPD, Emphysema  Clinical Indicators: CXR -Hazy right infrahilar/basilar densities may   represent atelectasis or early pneumonia  Treatment: Rocephin, Azithromax, labs, CXR    Thank you    Diana Barraza RN, BSN, [facility]  [phone]  Options provided:  -- Pneumonia  confirmed  -- Pneumonia ruled out  -- Other - I will add my own diagnosis  -- Disagree - Not applicable / Not valid  -- Disagree - Clinically unable to determine / Unknown  -- Refer to Clinical Documentation Reviewer    PROVIDER RESPONSE TEXT:    After study, Pneumonia confirmed    Query created by: Sindi Delgado on 4/15/2022 11:55 AM      Electronically signed by:  Ross Nixon 4/15/2022 12:01 PM

## 2022-04-15 NOTE — CONSULTS
Nutrition Assessment       Please see Student Dietitian progress note from today, 4/15, for comprehensive assessment. Malnutrition Assessment:  Malnutrition Status: Severe malnutrition    Current Nutrition Therapies:    ADULT ORAL NUTRITION SUPPLEMENT; Lunch; Standard High Calorie/High Protein Oral Supplement  ADULT DIET; Regular;  No Added Salt (3-4 gm); no gravy    Electronically signed by Zane Zuleta MS, RD, LD on 4/15/22 at 11:09 AM CDT    Contact: [phone]

## 2022-04-15 NOTE — PLAN OF CARE
Nutrition Problem #1: Severe malnutrition,In context of acute illness or injury  Intervention: Food and/or Nutrient Delivery: Modify Current Diet,Start Oral Nutrition Supplement  Nutritional Goals: Po intake >50% meals and ONS    Problem: Nutrition  Goal: Optimal nutrition therapy  Outcome: Ongoing

## 2022-04-15 NOTE — CARE COORDINATION
Initial CM Assessment    Initial Assessment Completed at bedside with:    [x]   Patient  []   Family/Caregiver/Guardian   []   Other:      Patient Contact Information:  [address]  [phone] (home)   Above information verified? [x]   Yes  []   No    ADLS:   Patient lives at home with her spouse. Prior to admission, patient was independent with ADLs. Support System:   Spouse/Significant Other,Children  Plan to return to current housing:   [x]   Yes  []   No    Transportation plan for Discharge:  Daughter, Jackson Perez you have any unmet social needs that would keep you from returning home safely:  []   Yes  [x]   No              Unmet Social Needs Notes:       Had [address] prior to admission:    [x]   Yes - uses 2.5 to 5L  []   No    Oxygen Company:      [employer] [facility] [phone]   [phone]    Has a pulse oximetry unit at home:   [x]   Yes  []   No    Currently ACTIVE with [address]:    []   Yes  []   No  [x]   Interested at discharge  [address] [place] [address]:      Current PCP:  Josias Bear MD  PCP verified? [x]   Yes  []   No    Have you been vaccinated for COVID-19 (SARS-CoV-2)? [x]   Yes  []   No                   If so, when? Which :  []   Pfizer-[employer]  [x]   Moderna  []   [employer]  []   Other:       Pharmacy:    [facility], [address] [phone] Herb Douse [phone]  [address] [place] [address]  Phone: [phone] Fax: [phone]    Prefer to use Meds to Bed? []   Yes  [x]   No  Potential assistance purchasing medications?      []   Yes  [x]   No    Active with HD/PD prior to admission:           []   Yes  [x]   No  HD Center:       Financial:  Payor: [address] [place] [address], Ne / Plan: Sandra Juan / Product Type: *No Product type* /     Pre-Cert required for SNF:   [x]   Yes  []   No    Patient Deficits:  []   Yes   [x]   No    If yes:  []   Confusion/Memory  [] Visual  []   Motor/Sensory         []   Right arm         []   Right leg         []   Left arm         []   Left leg  []   Language/Speech         []   Aphasia         []   Dysarthria         []   Swallow         Janki Coma Scale  Eye Opening: Spontaneous  Best Verbal Response: Oriented  Best Motor Response: Obeys commands  Janki Coma Scale Score: 15    Patient Deficit Notes: Additional CM/SW Notes:   Explained [employer] Worker program.  Patient declined to participate in Cono-C program..     Angela Mandujano and/or her family were provided with choice of provider:  [x]   Yes   []   No      Jordan Cullen RN  [facility]  Electronically signed by Jordan Cullen RN on 4/15/2022 at 11:33 AM

## 2022-04-15 NOTE — PROGRESS NOTES
[facility]      Patient:  Dawson Salinas  YOB: 1945  Date of Service: 4/15/2022  MRN: [id number]   Acct: [de-identified]   Primary Care Physician: Kalyani Black MD  Advance Directive: Full Code  Admit Date: 4/14/2022       Hospital Day: 1  Portions of this note have been copied forward, however, changed to reflect the most current clinical status of this patient. CHIEF COMPLAINT shortness of breath    SUBJECTIVE:  Patient reports increased need of oxygen use. She reports overall she actually feels somewhat better today. She reports she is very anxious. CUMULATIVE HOSPITAL COURSE:  The patient is a 68 y.o. female medical history of CAD, CHF, COPD, hyperlipidemia, hypertension, PUD, and thyroid disorder who presented to [address] [place] [address] ED via direction of her pulmonologist complaining of shortness of breath. The patient indicated for about a week prior to admission she had increasing fatigue, shortness of breath, cough, sputum production, and decreased appetite. Patient reported her daughter attempted to get her to go to the ED night before admission however she had a pulmonology appointment day of admission and declined. Patient was seen by her pulmonologist in the office day of admission and recommended going to the ED. Patient reported at home she had increased her oxygen up to 5 L. She reported she is normally on 2 L at home. She reported increase in frequency of her cough and increasing sputum production over the past 2 to 3 days. She reported her sputum production was thick and yellow in nature. Patient's daughter at bedside reported decrease in activity and appetite. The daughter was concerned that the patient had not been eating well for well over a week. ED work-up indicated chest x-ray with COPD and slightly increasing right infrahilar hazy densities, venous CO2 42, calcium 12.1, proBNP 3581, WBC 19.1, arterial CO2 74, arterial PO2 69, arterial HCO3 49.1.   Patient admitted to the hospitalist service for COPD exacerbation. Placed on empiric antibiotic therapy of doxycycline and Rocephin, bronchodilators, and Solu-Medrol. Patient noted to have significant tachycardia overnight and very slightly elevated D-dimer, VQ scan pending. Required increase up to 9 L of oxygen via high flow nasal cannula. Review of Systems:   Review of Systems   Constitutional: Positive for activity change, appetite change, chills and fatigue. Negative for fever. HENT: Negative for sore throat, trouble swallowing and voice change. Respiratory: Positive for cough, chest tightness, shortness of breath and wheezing. Cardiovascular: Negative for chest pain, palpitations and leg swelling. Gastrointestinal: Negative for abdominal pain, constipation, diarrhea, nausea and vomiting. Genitourinary: Negative for difficulty urinating, dysuria, frequency, hematuria and urgency. Musculoskeletal: Negative for arthralgias and myalgias. Skin: Negative for color change and wound. Neurological: Negative for dizziness and headaches. Psychiatric/Behavioral: Negative for agitation, behavioral problems and confusion. The patient is nervous/anxious. 14 point review of systems is negative except as specifically addressed above. Objective:   VITALS:  BP (!) 170/68   Pulse 99   Temp 96.8 °F (36 °C)   Resp 16   Ht 5' 3\" (1.6 m)   Wt 78 lb (35.4 kg)   SpO2 90%   BMI 13.82 kg/m²   24HR INTAKE/OUTPUT:    Intake/Output Summary (Last 24 hours) at 4/15/2022 1406  Last data filed at 4/15/2022 1301  Gross per 24 hour   Intake 503.52 ml   Output 300 ml   Net 203.52 ml       Physical Exam  Vitals reviewed. Constitutional:       Appearance: She is underweight. She is ill-appearing. HENT:      Head: Normocephalic. Mouth/Throat:      Mouth: Mucous membranes are dry. Pharynx: Oropharynx is clear. Eyes:      Pupils: Pupils are equal, round, and reactive to light.    Cardiovascular:      Rate and Rhythm: Normal rate and regular rhythm. Pulses: Normal pulses. Heart sounds: Normal heart sounds. Pulmonary:      Effort: Pulmonary effort is normal.      Breath sounds: Wheezing present. No rales. Abdominal:      General: Abdomen is flat. Bowel sounds are normal. There is no distension. Palpations: Abdomen is soft. Tenderness: There is no abdominal tenderness. There is no guarding. Musculoskeletal:         General: Normal range of motion. Cervical back: Normal range of motion and neck supple. Right lower leg: No edema. Left lower leg: No edema. Skin:     General: Skin is warm and dry. Capillary Refill: Capillary refill takes less than 2 seconds. Neurological:      General: No focal deficit present. Mental Status: She is alert and oriented to person, place, and time. Medications:      sodium chloride        metoprolol succinate  50 mg Oral Daily    sodium chloride flush  5-40 mL IntraVENous 2 times per day    amLODIPine  5 mg Oral BID    Arformoterol Tartrate  15 mcg Nebulization BID    aspirin EC  81 mg Oral Nightly    atorvastatin  40 mg Oral Nightly    budesonide  0.5 mg Nebulization BID    clopidogrel  75 mg Oral Daily    furosemide  40 mg Oral Daily    isosorbide mononitrate  30 mg Oral Daily    levothyroxine  25 mcg Oral Daily    potassium chloride  20 mEq Oral Daily    methylPREDNISolone  40 mg IntraVENous Q6H    iron sucrose  200 mg IntraVENous Q24H    cefTRIAXone (ROCEPHIN) IV  1,000 mg IntraVENous Q24H    doxycycline hyclate  100 mg Oral 2 times per day    guaiFENesin  1,200 mg Oral BID     LORazepam, levalbuterol, technetium albumin aggregated, sodium chloride flush, sodium chloride, ondansetron **OR** ondansetron, polyethylene glycol, acetaminophen **OR** acetaminophen, nitroGLYCERIN  ADULT ORAL NUTRITION SUPPLEMENT; Lunch; Standard High Calorie/High Protein Oral Supplement  ADULT DIET; Regular;  No Added Salt (3-4 gm); no gravy Lab and other Data:     Recent Labs     04/14/22  1105 04/15/22  0344   WBC 19.1* 17.5*   HGB 10.2* 9.6*    276     Recent Labs     04/14/22  1105 04/14/22  1155 04/15/22  0344     --  138   K 3.7 3.4 4.1   CL 92*  --  98   CO2 42*  --  33*   BUN 30*  --  32*   CREATININE 1.0*  --  1.2*   GLUCOSE 102  --  129*     Recent Labs     04/14/22  1105   AST 26   ALT 17   BILITOT <0.2   ALKPHOS 73     Troponin T:   Recent Labs     04/14/22  1105   TROPONINI 0.03     Pro-BNP: No results for input(s): BNP in the last 72 hours. INR:   Recent Labs     04/15/22  1142   INR 1.17     UA:No results for input(s): NITRITE, COLORU, PHUR, LABCAST, WBCUA, RBCUA, MUCUS, TRICHOMONAS, YEAST, BACTERIA, CLARITYU, SPECGRAV, LEUKOCYTESUR, UROBILINOGEN, BILIRUBINUR, BLOODU, GLUCOSEU, AMORPHOUS in the last 72 hours. Invalid input(s): Gadiel Porteous  A1C: No results for input(s): LABA1C in the last 72 hours. ABG:  Recent Labs     04/14/22  1155   PHART 7.430   SGK6DXH 74.0*   PO2ART 69.0*   SBU7GDZ 49.1*   BEART 21.2*   HGBAE 10.7*   [id number] 92.7   CARBOXHGBART 1.7       RAD:   XR CHEST PORTABLE    Result Date: 4/14/2022  1. COPD. Stable right inferolateral pleural thickening with underlying parenchymal scarring. Slightly increasing right infrahilar hazy densities may be patchy atelectasis or possibly pneumonia. Prior mediastinal surgery with similar cardiomegaly.  Signed by Dr Sherly Pacheco         Assessment/Plan   Assessment/Plan:  Principal Problem:    COPD exacerbation ([facility]  Pulmonary emphysema ([facility] PNA, acute on chronic hypoxemic and hypercapnic respiratory failure              -Rocephin and doxycycline              -Bronchodilators              -Acapella              -Solu-Medrol              -Monitor need for oxygen, maintain O2 sats between 88 and 92%   -VQ scan     Active Problems:   Coronary artery disease involving native coronary artery of native heart              -Continue atorvastatin and Plavix -Continue Imdur       Severe malnutrition (HCC)              -Dietitian consult       CHF (congestive heart failure) ([facility])              -Monitor intake and output              -Resume home Lasix              -Daily weight       CKD (chronic kidney disease), stage III ([facility])              -Monitor BMP              -Monitor intake and output              -Daily weight       PA[facility]oxysmal [facility] fibrillation) [facility]              -Patient reports she is no longer on anticoagulant     Antibiotic: Rocephin and doxycycline    DVT Prophylaxis: Lovenox    BEBO Leslie - CNP, 4/15/2022 2:06 PM       Attestation Statement     I have independently seen and examined this patient and agree with the asesment and plan by mid level provider                                                           [facility] MEDICINE  - PROGRESS NOTE         Objective:   Vitals: BP (!) 170/64   Pulse 111   Temp 97.2 °F (36.2 °C) (Temporal)   Resp 16   Ht 5' 3\" (1.6 m)   Wt 78 lb (35.4 kg)   SpO2 90%   BMI 13.82 kg/m²   General appearance: alert, appears stated age and cooperative  Skin: Skin color, texture, turgor normal.   HEENT: Head: Normocephalic, no lesions, without obvious abnormality.   Neck: no adenopathy, no carotid bruit, no JVD and supple, symmetrical, trachea midline  Lungs: wheezes bilaterally  Heart: regular rate and rhythm, S1, S2 normal, no murmur, click, rub or gallop  Abdomen: soft, non-tender; bowel sounds normal; no masses,  no organomegaly  Extremities: extremities normal, atraumatic, no cyanosis or edema  Lymphatic: No significant lymph node enlargement papable  Neurologic: Mental status: Alert, oriented, thought content appropriate      Assessment & Plan:    COPD AE- steroids, nebs, ab    Esme Hernandez MD

## 2022-04-15 NOTE — PROGRESS NOTES
Occupational Therapy Initial Assessment  Date: 4/15/2022   Patient Name: Alcides Childers  MRN: [id number]     : 1945    Date of Service: 4/15/2022    Discharge Recommendations:  Continue to assess pending progress  OT Equipment Recommendations  Equipment Needed: No    Assessment   Assessment: Evaluation completed and tx initiated. The patient appears to be limited only by her respiratory status. PT following for higher level mobility. All education completed this visit, no further visits planned. Treatment Diagnosis: COPD exacerbation     REQUIRES OT FOLLOW UP: No  Activity Tolerance  Activity Tolerance: on 9L high flow. SPO2 98% at rest, dipped to 86% during light activity which included standing/moving but quickly returned to the [address] [place] [address] in place: Yes  Type of devices: Call light within reach; Chair alarm in place           Patient Diagnosis(es): The primary encounter diagnosis was Pneumonia of right lung due to infectious organism, unspecified part of lung. Diagnoses of COPD exacerbation ([address] Hypoxia, and Acute on chronic respiratory failure with hypoxia ([address] were also pertinent to this visit. has a past medical history of Aortic stenosis, Arthritis, Atherosclerosis of native arteries of the extremities with intermittent claudication, CAD (coronary artery disease), Carotid aneurysm, right (HCC), Carotid artery occlusion, CHF (congestive heart failure) ([address] COPD (chronic obstructive pulmonary disease) ([address] History of blood transfusion, Hyperlipidemia, Hypertension, MI (myocardial infarction) ([address] Mitral valve stenosis, Pneumonia, Post-menopausal, PUD (peptic ulcer disease), Renal artery stenosis ([address] Renal failure, Severe malnutrition ([address] Thyroid disease, and Wound infection after surgery. has a past surgical history that includes  section (); Dilation and curettage of uterus; Colonoscopy (2012);  Carpal tunnel release (Right, early 1990's); Upper gastrointestinal endoscopy (07/14/2015); Upper gastrointestinal endoscopy (03/15/2016); maze procedure (N/A, 04/07/2016); Mitral valve replacement (2016); Coronary artery bypass graft (2016); Abdomen surgery (2009); vascular surgery (5/27/16 [facility]); Ilio-femoral Bypass Graft (N/A, 06/02/2016); Skin graft (Right, 07/22/2016); vascular surgery; Cardiac surgery; Carotid endarterectomy; pr thromboendartectmy neck,neck incis (Left, 01/11/2018); pr reoper, carotid endartec>1 mon (Left, 01/11/2018); Carotid endarterectomy (Left); Carotid endarterectomy (Right, 07/11/2019); vascular surgery (07/11/2019); Cataract removal with implant (Bilateral); Percutaneous Transluminal Coronary Angio; Coronary angioplasty with stent (08/10/2021); Diagnostic Cardiac Cath Lab Procedure; Coronary angioplasty with stent; Upper gastrointestinal endoscopy (05/27/2015); and PERIPHERAL PERCUTANEOUS ARTERIAL INTERVENTION (Left, 08/10/2021).     Treatment Diagnosis: COPD exacerbation      Restrictions  Restrictions/Precautions  Restrictions/Precautions: Fall Risk    Subjective   General  Chart Reviewed: Yes  Patient assessed for rehabilitation services?: Yes  Family / Caregiver Present: No  Patient Currently in Pain: Yes  Pain Assessment  Pain Assessment: 0-10  Pain Level: 1  Pain Location: Generalized  Pain Descriptors: Aching  Functional Pain Assessment: Activities are not prevented  Non-Pharmaceutical Pain Intervention(s): Ambulation/Increased Activity;Repositioned  Response to Pain Intervention: Patient Satisfied  Vital Signs  Patient Currently in Pain: Yes    Social/Functional History  Social/Functional History  Lives With: Spouse  Type of Home: House  Home Layout: One level  Home Access: Stairs to enter with rails  Entrance Stairs - Number of Steps: 2  Bathroom Shower/Tub: Walk-in shower  Bathroom Toilet: Handicap height  Bathroom Equipment: Shower chair  ADL Assistance: Independent  Homemaking Assistance:  (does some housework)  Ambulation Assistance: Independent  Transfer Assistance: Independent  Active : Yes (once a week to [employer])       Objective   Vision Exceptions: Wears glasses for reading  Hearing: Within functional limits    Orientation  Overall Orientation Status: Within Normal Limits     Balance  Sitting Balance: Independent  Standing Balance: Stand by assistance  Functional Mobility  Functional - Mobility Device: No device  Assist Level: Stand by assistance  Toilet Transfers  Toilet - Technique: Ambulating  Toilet Transfer: Stand by assistance  ADL  Feeding: Independent  Grooming: Independent  UE Bathing: Independent  LE Bathing: Stand by assistance  UE Dressing: Independent  LE Dressing: Stand by assistance  Toileting: Stand by assistance        Bed mobility  Supine to Sit: Independent  Transfers  Stand Step Transfers: Stand by assistance     Cognition  Overall Cognitive Status: WNL                 LUE PROM (degrees)  LUE PROM: WNL  LUE AROM (degrees)  LUE AROM : WNL  RUE PROM (degrees)  RUE PROM: WNL  RUE AROM (degrees)  RUE AROM : WNL                    Plan   Plan  Times per week: 3-5    Goals  Short term goals  Short term goal 1: Independent to restate/demo pacing and energy conservation strategies for ADL (met)  Short term goal 2:  Independent with therapeutic activity recommendations (met)           Tx initiated:  Education and training on pacing and energy conservation and pacing and therapeutic activity training (25 mins)          Marc Montgomery OT/RAMANDEEP  Electronically signed by Marc CORONA on 4/15/2022 at 10:18 AM.

## 2022-04-16 NOTE — PLAN OF CARE
Problem: Falls - Risk of:  Goal: Will remain free from falls  Description: Will remain free from falls  Outcome: Ongoing  Goal: Absence of physical injury  Description: Absence of physical injury  Outcome: Ongoing     Problem: Breathing Pattern - Ineffective:  Goal: Ability to achieve and maintain a regular respiratory rate will improve  Description: Ability to achieve and maintain a regular respiratory rate will improve  Outcome: Ongoing     Problem: Skin Integrity:  Goal: Will show no infection signs and symptoms  Description: Will show no infection signs and symptoms  Outcome: Ongoing  Goal: Absence of new skin breakdown  Description: Absence of new skin breakdown  Outcome: Ongoing     Problem: Nutrition  Goal: Optimal nutrition therapy  Outcome: Ongoing     Problem: Pain:  Goal: Pain level will decrease  Description: Pain level will decrease  Outcome: Ongoing  Goal: Control of acute pain  Description: Control of acute pain  Outcome: Ongoing  Goal: Control of chronic pain  Description: Control of chronic pain  Outcome: Ongoing

## 2022-04-16 NOTE — PROGRESS NOTES
[facility]      Progress Note    Patient:  Samantha Russell  YOB: 1945  Date of Service: 4/16/2022  MRN: [id number]   Acct: [de-identified]   Primary Care Physician: Jeferson Bolanos MD  Advance Directive: Full Code  Admit Date: 4/14/2022       Hospital Day: 2    Portions of this note have been copied forward, however, updated to reflect the most current clinical status of this patient. CHIEF COMPLAINT shortness of breath    SUBJECTIVE:  Patient reports feeling better this morning. Breathing improved, O2 NC at 4L. Up to [facility] with assist. Very pleasant. CUMULATIVE HOSPITAL COURSE:   The patient is a 79 y. o. female medical history of CAD, CHF, COPD, hyperlipidemia, hypertension, PUD, and thyroid disorder who presented to [facility] ED via direction of her pulmonologist complaining of shortness of breath.  The patient indicated for about a week prior to admission she had increasing fatigue, shortness of breath, cough, sputum production, and decreased appetite.  Patient reported her daughter attempted to get her to go to the ED night before admission however she had a pulmonology appointment day of admission and declined. Luz Killian was seen by her pulmonologist in the office day of admission and recommended going to the ED.  Patient reported at home she had increased her oxygen up to 5 L.  She reported she is normally on 2 L at home.  She reported increase in frequency of her cough and increasing sputum production over the past 2 to 3 days. Rafael Dominguez reported her sputum production was thick and yellow in nature.  Patient's daughter at bedside reported decrease in activity and appetite.  The daughter was concerned that the patient had not been eating well for well over a week.  ED work-up indicated chest x-ray with COPD and slightly increasing right infrahilar hazy densities, venous CO2 42, calcium 12.1, proBNP 3581, WBC 19.1, arterial CO2 74, arterial PO2 69, arterial HCO3 49.1.  Patient admitted to the hospitalist service for COPD exacerbation. Placed on empiric antibiotic therapy of doxycycline and Rocephin, bronchodilators, and Solu-Medrol. Patient noted to have significant tachycardia overnight and very slightly elevated D-dimer, VQ scan low probibility. Required increase up to 9 L of oxygen via high flow nasal cannula. Able to wean to 4 liters over night. Continues to slowly improve. Review of Systems   Constitutional: Positive for activity change, appetite change and fatigue. Negative for chills and fever. HENT: Negative for sore throat, trouble swallowing and voice change. Respiratory: Positive for cough, chest tightness, shortness of breath and wheezing. Cardiovascular: Negative for chest pain, palpitations and leg swelling. Gastrointestinal: Negative for abdominal pain, constipation, diarrhea, nausea and vomiting. Genitourinary: Negative for difficulty urinating, dysuria, frequency, hematuria and urgency. Musculoskeletal: Negative for arthralgias and myalgias. Skin: Negative for color change and wound. Neurological: Negative for dizziness and headaches. Psychiatric/Behavioral: Negative for agitation, behavioral problems and confusion. The patient is nervous/anxious. Objective:   VITALS:  BP (!) 157/62   Pulse 92   Temp 97.7 °F (36.5 °C) (Temporal)   Resp 16   Ht 5' 3\" (1.6 m)   Wt 78 lb (35.4 kg)   SpO2 95%   BMI 13.82 kg/m²   24HR INTAKE/OUTPUT:    Intake/Output Summary (Last 24 hours) at 4/16/2022 1104  Last data filed at 4/15/2022 1301  Gross per 24 hour   Intake 120 ml   Output --   Net 120 ml       Physical Exam  Vitals reviewed. Constitutional:       Appearance: She is underweight. She is ill-appearing. HENT:      Head: Normocephalic. Mouth/Throat:      Mouth: Mucous membranes are dry. Pharynx: Oropharynx is clear. Eyes:      Pupils: Pupils are equal, round, and reactive to light.    Cardiovascular:      Rate and Rhythm: Normal rate and regular rhythm. Pulses: Normal pulses. Heart sounds: Normal heart sounds. Pulmonary:      Effort: Pulmonary effort is normal.      Breath sounds: Wheezing present. No rales. Abdominal:      General: Abdomen is flat. Bowel sounds are normal. There is no distension. Palpations: Abdomen is soft. Tenderness: There is no abdominal tenderness. There is no guarding. Musculoskeletal:         General: Normal range of motion. Cervical back: Normal range of motion and neck supple. Right lower leg: No edema. Left lower leg: No edema. Skin:     General: Skin is warm and dry. Capillary Refill: Capillary refill takes less than 2 seconds. Neurological:      General: No focal deficit present. Mental Status: She is alert and oriented to person, place, and time. Medications:      sodium chloride        methylPREDNISolone  40 mg IntraVENous Q12H    metoprolol succinate  50 mg Oral BID    sodium chloride flush  5-40 mL IntraVENous 2 times per day    amLODIPine  5 mg Oral BID    Arformoterol Tartrate  15 mcg Nebulization BID    aspirin EC  81 mg Oral Nightly    atorvastatin  40 mg Oral Nightly    budesonide  0.5 mg Nebulization BID    clopidogrel  75 mg Oral Daily    furosemide  40 mg Oral Daily    isosorbide mononitrate  30 mg Oral Daily    levothyroxine  25 mcg Oral Daily    potassium chloride  20 mEq Oral Daily    iron sucrose  200 mg IntraVENous Q24H    cefTRIAXone (ROCEPHIN) IV  1,000 mg IntraVENous Q24H    doxycycline hyclate  100 mg Oral 2 times per day    guaiFENesin  1,200 mg Oral BID     metoprolol, LORazepam, levalbuterol, sodium chloride flush, sodium chloride, ondansetron **OR** ondansetron, polyethylene glycol, acetaminophen **OR** acetaminophen, nitroGLYCERIN  ADULT ORAL NUTRITION SUPPLEMENT; Lunch; Standard High Calorie/High Protein Oral Supplement  ADULT DIET; Regular;  No Added Salt (3-4 gm); no gravy     Lab and other Data:     Recent Labs     04/14/22  1105 04/15/22  0344 04/16/22  0324   WBC 19.1* 17.5* 22.8*   HGB 10.2* 9.6* 8.2*    276 289     Recent Labs     04/14/22  1105 04/14/22  1155 04/15/22  0344 04/16/22  0324     --  138 140   K 3.7 3.4 4.1 4.5   CL 92*  --  98 97*   CO2 42*  --  33* 32*   BUN 30*  --  32* 47*   CREATININE 1.0*  --  1.2* 1.1*   GLUCOSE 102  --  129* 159*     Recent Labs     04/14/22  1105   AST 26   ALT 17   BILITOT <0.2   ALKPHOS 73     Troponin T:   Recent Labs     04/14/22  1105   TROPONINI 0.03     Pro-BNP: No results for input(s): BNP in the last 72 hours. INR:   Recent Labs     04/15/22  1142   INR 1.17     UA:No results for input(s): NITRITE, COLORU, PHUR, LABCAST, WBCUA, RBCUA, MUCUS, TRICHOMONAS, YEAST, BACTERIA, CLARITYU, SPECGRAV, LEUKOCYTESUR, UROBILINOGEN, BILIRUBINUR, BLOODU, GLUCOSEU, AMORPHOUS in the last 72 hours. Invalid input(s): Dalila Sidle  A1C: No results for input(s): LABA1C in the last 72 hours. ABG:  Recent Labs     04/14/22  1155   PHART 7.430   ICC9AMJ 74.0*   PO2ART 69.0*   HJA9DYW 49.1*   BEART 21.2*   HGBAE 10.7*   T9GKHQJI 92.7   CARBOXHGBART 1.7       RAD:   NM LUNG SCAN PERFUSION ONLY    Result Date: 4/15/2022  EXAMINATION: NM LUNG SCAN PERFUSION ONLY 4/15/2022 3:21 PM HISTORY: Tachycardia, shortness of breath. Dose: 5.2 mCi technetium 99m MAA intravenously. COMPARISON: Chest x-ray 4/14/2022. Report: Perfusion only scintigraphic images of the lungs were obtained in the anterior , posterior, lateral and oblique dimensions. Distribution of activity within the lungs is somewhat heterogeneous and is most likely related to advanced emphysema as demonstrated on chest x-ray. There are no focal peripheral segmental defects to indicate the presence of significant pulmonary emboli. Low probability perfusion-only lung scintigraphy. COPD. Signed by Dr Danielle Bolivar.  [place]    XR CHEST PORTABLE    Result Date: 4/14/2022  XR CHEST PORTABLE 4/14/2022 11:14 AM HISTORY: Shortness of breath COMPARISON: 3/26/2021 and 11/16/2019 CXR: A single frontal view of the chest is performed. Findings: Lungs are hyperinflated. . Stable pleural thickening and parenchymal scarring of the right lateral lower hemithorax. Hazy right infrahilar/basilar densities may represent atelectasis or early pneumonia. No radiographic evidence of edema. Stable clinically. Prior mediastinal surgery. Surgical clips of the bilateral neck. No pleural effusion or pneumothorax. Diffuse vascular calcification. 1. COPD. Stable right inferolateral pleural thickening with underlying parenchymal scarring. Slightly increasing right infrahilar hazy densities may be patchy atelectasis or possibly pneumonia. Prior mediastinal surgery with similar cardiomegaly. Signed by Dr Roly Way        Assessment/Plan   Principal Problem:    COPD exacerbation (HCC),  Pulmonary emphysema ([address] PNA, acute on chronic hypoxemic and hypercapnic respiratory failure              -Rocephin and doxycycline              -Bronchodilators              -Acapella              -Solu-Medrol changed to Q12              -Monitor need for oxygen, maintain O2 sats between 88 and 92%              -VQ scan- Low probability perfusion-only lung scintigraphy.  COPD.     Active Problems:   Coronary artery disease involving native coronary artery of native heart              -Continue atorvastatin and Plavix              -Continue Imdur       Severe malnutrition ([address]              -Dietitian consult- Nutrition supplement added       CHF (congestive heart failure) ([facility])              -Monitor intake and output              -Resume home Lasix              -Daily weight       CKD (chronic kidney disease), stage III ([facility])              -Monitor BMP              -Monitor intake and output              -Daily weight       PAF (paroxysmal atrial fibrillation) ([address]              -Patient reports she is no longer on anticoagulant       Antibiotic: Rocephin and doxycycline    DVT Prophylaxis:  Lovenox    Further Orders per Clinical course/attending. Electronically signed by BEBO Cooley CNP on 4/16/2022 at 11:04 AM       EMR Dragon/Transcription disclaimer:   Much of this encounter note is an electronic transcription/translation of spoken language to printed text.  The electronic translation of spoken language may permit erroneous, or at times, nonsensical words or phrases to be inadvertently transcribed; although attempts have made to review the note for such errors, some may still exist.

## 2022-04-17 NOTE — PROGRESS NOTES
[facility]      Progress Note    Patient:  Sagar Lanza  YOB: 1945  Date of Service: 4/17/2022  MRN: [id number]   Acct: [de-identified]   Primary Care Physician: Yamilet Nielson MD  Advance Directive: Full Code  Admit Date: 4/14/2022       Hospital Day: 3    Portions of this note have been copied forward, however, updated to reflect the most current clinical status of this patient. CHIEF COMPLAINT shortness of breath    SUBJECTIVE: Patient reports continued shortness of breath with exertion. She reports at rest she feels like she is almost back to baseline. Patient continues to have significant anxiety. CUMULATIVE HOSPITAL COURSE:   The patient is a 79 y. o. female medical history of CAD, CHF, COPD, hyperlipidemia, hypertension, PUD, and thyroid disorder who presented to [facility] ED via direction of her pulmonologist complaining of shortness of breath.  The patient indicated for about a week prior to admission she had increasing fatigue, shortness of breath, cough, sputum production, and decreased appetite.  Patient reported her daughter attempted to get her to go to the ED night before admission however she had a pulmonology appointment day of admission and declined. Lily Iglesias was seen by her pulmonologist in the office day of admission and recommended going to the ED.  Patient reported at home she had increased her oxygen up to 5 L.  She reported she is normally on 2 L at home. Phillip Trivedi reported increase in frequency of her cough and increasing sputum production over the past 2 to 3 days. Phillip Trivedi reported her sputum production was thick and yellow in nature.  Patient's daughter at bedside reported decrease in activity and appetite.  The daughter was concerned that the patient had not been eating well for well over a week.  ED work-up indicated chest x-ray with COPD and slightly increasing right infrahilar hazy densities, venous CO2 42, calcium 12.1, proBNP 3581, WBC 19.1, arterial CO2 74, arterial PO2 69, arterial HCO3 49.1.  Patient admitted to the hospitalist service for COPD exacerbation. Placed on empiric antibiotic therapy of doxycycline and Rocephin, bronchodilators, and Solu-Medrol. Patient noted to have significant tachycardia overnight and very slightly elevated D-dimer, VQ scan low probibility. Required increase up to 9 L of oxygen via high flow nasal cannula. Able to wean to 4 liters over night. Continues to slowly improve. Increased Xopenex treatments to every 4 hours scheduled. Transition to oral prednisone. Discontinue antibiotic therapy after today's doses. Review of Systems   Constitutional: Positive for activity change, appetite change and fatigue. Negative for chills and fever. HENT: Negative for sore throat, trouble swallowing and voice change. Respiratory: Positive for cough, chest tightness, shortness of breath and wheezing. Cardiovascular: Negative for chest pain, palpitations and leg swelling. Gastrointestinal: Negative for abdominal pain, constipation, diarrhea, nausea and vomiting. Genitourinary: Negative for difficulty urinating, dysuria, frequency, hematuria and urgency. Musculoskeletal: Negative for arthralgias and myalgias. Skin: Negative for color change and wound. Neurological: Negative for dizziness and headaches. Psychiatric/Behavioral: Negative for agitation, behavioral problems and confusion. The patient is nervous/anxious. Objective:   VITALS:  BP (!) 153/56   Pulse 112   Temp 97.3 °F (36.3 °C) (Temporal)   Resp 20   Ht 5' 3\" (1.6 m)   Wt 78 lb (35.4 kg)   SpO2 97%   BMI 13.82 kg/m²   24HR INTAKE/OUTPUT:      Intake/Output Summary (Last 24 hours) at 4/17/2022 1540  Last data filed at 4/17/2022 1003  Gross per 24 hour   Intake 210 ml   Output --   Net 210 ml       Physical Exam  Vitals reviewed. Constitutional:       Appearance: She is underweight. She is ill-appearing. HENT:      Head: Normocephalic. Mouth/Throat:      Mouth: Mucous membranes are dry. Pharynx: Oropharynx is clear. Eyes:      Pupils: Pupils are equal, round, and reactive to light. Cardiovascular:      Rate and Rhythm: Normal rate and regular rhythm. Pulses: Normal pulses. Heart sounds: Normal heart sounds. Pulmonary:      Effort: Pulmonary effort is normal.      Breath sounds: Wheezing present. No rales. Abdominal:      General: Abdomen is flat. Bowel sounds are normal. There is no distension. Palpations: Abdomen is soft. Tenderness: There is no abdominal tenderness. There is no guarding. Musculoskeletal:         General: Normal range of motion. Cervical back: Normal range of motion and neck supple. Right lower leg: No edema. Left lower leg: No edema. Skin:     General: Skin is warm and dry. Capillary Refill: Capillary refill takes less than 2 seconds. Neurological:      General: No focal deficit present. Mental Status: She is alert and oriented to person, place, and time. Medications:      sodium chloride        predniSONE  60 mg Oral Daily    levalbuterol  0.63 mg Nebulization Q4H    metoprolol succinate  50 mg Oral BID    sodium chloride flush  5-40 mL IntraVENous 2 times per day    amLODIPine  5 mg Oral BID    Arformoterol Tartrate  15 mcg Nebulization BID    aspirin EC  81 mg Oral Nightly    atorvastatin  40 mg Oral Nightly    budesonide  0.5 mg Nebulization BID    clopidogrel  75 mg Oral Daily    furosemide  40 mg Oral Daily    isosorbide mononitrate  30 mg Oral Daily    levothyroxine  25 mcg Oral Daily    guaiFENesin  1,200 mg Oral BID     metoprolol, LORazepam, sodium chloride flush, sodium chloride, ondansetron **OR** ondansetron, polyethylene glycol, acetaminophen **OR** acetaminophen, nitroGLYCERIN  ADULT ORAL NUTRITION SUPPLEMENT; Lunch; Standard High Calorie/High Protein Oral Supplement  ADULT DIET; Regular;  No Added Salt (3-4 gm); no gravy Lab and other Data:     Recent Labs     04/15/22  0344 04/16/22  0324 04/17/22  0737   WBC 17.5* 22.8* 20.7*   HGB 9.6* 8.2* 8.2*    289 332     Recent Labs     04/15/22  0344 04/16/22  0324 04/17/22  0737    140 140   K 4.1 4.5 4.5   CL 98 97* 98   CO2 33* 32* 34*   BUN 32* 47* 50*   CREATININE 1.2* 1.1* 1.1*   GLUCOSE 129* 159* 137*     No results for input(s): AST, ALT, ALB, BILITOT, ALKPHOS in the last 72 hours. Troponin T:   No results for input(s): TROPONINI in the last 72 hours. Pro-BNP: No results for input(s): BNP in the last 72 hours. INR:   Recent Labs     04/15/22  1142   INR 1.17     UA:No results for input(s): NITRITE, COLORU, PHUR, LABCAST, WBCUA, RBCUA, MUCUS, TRICHOMONAS, YEAST, BACTERIA, CLARITYU, SPECGRAV, LEUKOCYTESUR, UROBILINOGEN, BILIRUBINUR, BLOODU, GLUCOSEU, AMORPHOUS in the last 72 hours. Invalid input(s): Gadiel Porteous  A1C: No results for input(s): LABA1C in the last 72 hours. ABG:  No results for input(s): PHART, LXD7ISM, PO2ART, MHW9ECJ, BEART, HGBAE, J5PUIJSG, CARBOXHGBART in the last 72 hours. RAD:   NM LUNG SCAN PERFUSION ONLY    Result Date: 4/15/2022  EXAMINATION: NM LUNG SCAN PERFUSION ONLY 4/15/2022 3:21 PM HISTORY: Tachycardia, shortness of breath. Dose: 5.2 mCi technetium 99m MAA intravenously. COMPARISON: Chest x-ray 4/14/2022. Report: Perfusion only scintigraphic images of the lungs were obtained in the anterior , posterior, lateral and oblique dimensions. Distribution of activity within the lungs is somewhat heterogeneous and is most likely related to advanced emphysema as demonstrated on chest x-ray. There are no focal peripheral segmental defects to indicate the presence of significant pulmonary emboli. Low probability perfusion-only lung scintigraphy. COPD. Signed by Dr Viky Méndez.  [place]    XR CHEST PORTABLE    Result Date: 4/14/2022  XR CHEST PORTABLE 4/14/2022 11:14 AM HISTORY: Shortness of breath COMPARISON: 3/26/2021 and 11/16/2019 CXR: A single frontal view of the chest is performed. Findings: Lungs are hyperinflated. . Stable pleural thickening and parenchymal scarring of the right lateral lower hemithorax. Hazy right infrahilar/basilar densities may represent atelectasis or early pneumonia. No radiographic evidence of edema. Stable clinically. Prior mediastinal surgery. Surgical clips of the bilateral neck. No pleural effusion or pneumothorax. Diffuse vascular calcification. 1. COPD. Stable right inferolateral pleural thickening with underlying parenchymal scarring. Slightly increasing right infrahilar hazy densities may be patchy atelectasis or possibly pneumonia. Prior mediastinal surgery with similar cardiomegaly. Signed by Dr Marv Waldron        Assessment/Plan   Principal Problem:    COPD exacerbation (HCC),  Pulmonary emphysema ([address] PNA, acute on chronic hypoxemic and hypercapnic respiratory failure              -Rocephin and doxycycline, stop after today's doses              -Bronchodilators, adjusted to scheduled              -Acapella              -Transition to oral prednisone              -Monitor need for oxygen, maintain O2 sats between 88 and 92%              -VQ scan- Low probability perfusion-only lung scintigraphy.  COPD.     Active Problems:   Coronary artery disease involving native coronary artery of native heart              -Continue atorvastatin and Plavix              -Continue Imdur       Severe malnutrition ([address]              -Dietitian consult- Nutrition supplement added       CHF (congestive heart failure) ([facility])              -Monitor intake and output              -Resume home Lasix              -Daily weight       CKD (chronic kidney disease), stage III ([facility])              -Monitor BMP              -Monitor intake and output              -Daily weight       PAF (paroxysmal atrial fibrillation) ([address]              -Patient reports she is no longer on anticoagulant       Antibiotic: Rocephin and doxycycline    DVT Prophylaxis:  Lovenox    Further Orders per Clinical course/attending. Electronically signed by BEBO Henao CNP on 4/17/2022 at 3:40 PM       EMR Dragon/Transcription disclaimer:   Much of this encounter note is an electronic transcription/translation of spoken language to printed text.  The electronic translation of spoken language may permit erroneous, or at times, nonsensical words or phrases to be inadvertently transcribed; although attempts have made to review the note for such errors, some may still exist.

## 2022-04-17 NOTE — PROGRESS NOTES
Physical Therapy  Name: Ramandeep Bledsoe  MRN:  [id number]  Date of service:  4/17/2022 04/17/22 1015   Restrictions/Precautions   Restrictions/Precautions Fall Risk   Required Braces or Orthoses? No   General   Chart Reviewed Yes   Subjective   Subjective Pt sitting EOB, agrees to work with therapy. Pain Screening   Patient Currently in Pain Denies   Oxygen Therapy   O2 Device Nasal cannula   O2 Flow Rate (L/min) 4 L/min   Transfers   Sit to Stand Contact guard assistance   Stand to sit Contact guard assistance   Ambulation   Ambulation? Yes   Ambulation 1   Surface level tile   Device No Device   Assistance Contact guard assistance   Quality of Gait slightly unsteady, no LOB   Gait Deviations Slow Arlene;Decreased step length;Decreased step height;Staggers   Distance 20'   Exercises   Hip Flexion 15   Hip Abduction 15   Knee Long Arc Quad 15   Ankle Pumps 30   Comments Seated BLE ther ex AROM   Short term goals   Time Frame for Short term goals 2 wks   Short term goal 1 amb. 48' with RW SBA   Short term goal 2 sit to stand indep   Conditions Requiring Skilled Therapeutic Intervention   Body structures, Functions, Activity limitations Decreased functional mobility ; Decreased endurance;Decreased balance; Increased pain   Assessment Pt cont to exhibit decreased endurance and SOA with any exertion, requires frequent rest breaks with any activity. Able to amb short distance in room and tolerated seated ther ex well. Will cont to progress as tolerated. Activity Tolerance   Activity Tolerance Patient limited by endurance   Safety Devices   Type of devices Call light within reach; Chair alarm in place;Gait belt;Left in chair         Electronically signed by Nancy Humphreys PTA on 4/17/2022 at 10:17 AM

## 2022-04-18 NOTE — PLAN OF CARE
Nutrition Problem #1: Severe malnutrition,In context of acute illness or injury  Intervention: Food and/or Nutrient Delivery: Continue Current Diet,Modify Oral Nutrition Supplement  Nutritional Goals: Po intake >50% meals and ONS    Problem: Nutrition  Goal: Optimal nutrition therapy  4/18/2022 1418 by Tabby Sanchez, MS, RD, LD  Outcome: Ongoing  4/18/2022 0410 by Mari Brothers RN  Outcome: Ongoing

## 2022-04-18 NOTE — PLAN OF CARE
Problem: Falls - Risk of:  Goal: Will remain free from falls  Description: Will remain free from falls  4/18/2022 0410 by Ileana Donohue RN  Outcome: Ongoing  4/17/2022 1410 by Jean-Claude Robins RN  Outcome: Ongoing  Goal: Absence of physical injury  Description: Absence of physical injury  Outcome: Ongoing     Problem: Breathing Pattern - Ineffective:  Goal: Ability to achieve and maintain a regular respiratory rate will improve  Description: Ability to achieve and maintain a regular respiratory rate will improve  Outcome: Ongoing     Problem: Skin Integrity:  Goal: Will show no infection signs and symptoms  Description: Will show no infection signs and symptoms  4/18/2022 0410 by Ileana Donohue RN  Outcome: Ongoing  4/17/2022 1410 by Jean-Claude Robins RN  Outcome: Ongoing  Goal: Absence of new skin breakdown  Description: Absence of new skin breakdown  Outcome: Ongoing     Problem: Nutrition  Goal: Optimal nutrition therapy  Outcome: Ongoing     Problem: Pain:  Goal: Pain level will decrease  Description: Pain level will decrease  Outcome: Ongoing  Goal: Control of acute pain  Description: Control of acute pain  Outcome: Ongoing  Goal: Control of chronic pain  Description: Control of chronic pain  Outcome: Ongoing

## 2022-04-18 NOTE — ADT AUTH CERT
Utilization Reviews         Chronic Obstructive Pulmonary Disease - Care Day 4 (4/17/2022) by Jen Barraza RN       Review Status Review Entered   Completed 4/18/2022 14:43      Criteria Review      Care Day: 4 Care Date: 4/17/2022 Level of Care: Inpatient Floor    Guideline Day 2    Level Of Care    (X) Floor or intermediate care    4/18/2022 3:43 PM EDT by Mago Núñez      medical    Clinical Status    (X) * Hemodynamic stability    4/18/2022 3:43 PM EDT by Mago Núñez      VSS    (X) * Mechanical and noninvasive ventilation absent    4/18/2022 3:43 PM EDT by Mago Núñez      91% on 5LNC    ( ) * Uncompensated acidosis absent    4/18/2022 3:43 PM EDT by Mago Núñez      CO2: 34 (H)    Activity    (X) Ambulatory    4/18/2022 3:43 PM EDT by Mago Núñez      up as tolerated    Routes    (X) Diet as tolerated    4/18/2022 3:43 PM EDT by Kayden Jordan adult regular diet    (X) IV medications    4/18/2022 3:43 PM EDT by Mago Núñez      IV meds    Interventions    (X) Oxygen    4/18/2022 3:43 PM EDT by Mago Núñez      91% on 5LNC    (X) Pulse oximetry    4/18/2022 3:43 PM EDT by Mago Núñez      91% on [facility]    (X) DVT prophylaxis    4/18/2022 3:43 PM EDT by Mago Núñez      SCDs    Medications    (X) Systemic corticosteroids    4/18/2022 3:43 PM EDT by Mago Núñez      solu-medrol 40mg IV q12    (X) Inhaled bronchodilators    4/18/2022 3:43 PM EDT by Shantel Curio 0.63mg nebulization q4  Xopenex 0.63mg nebulization q8 PRN x2    ( ) Possible IV antibiotics    4/18/2022 3:43 PM EDT by Mago Núñez      Doxycycline 100mg PO BID    * Milestone   Additional Notes   Date of care: 4/17/2022      IP-LOC-Medical      IM PN: SUBJECTIVE: Patient reports continued shortness of breath with exertion.  She reports at rest she feels like she is almost back to baseline.  Patient continues to have significant anxiety Constitutional:        Appearance: She is underweight. She is ill-appearing. HENT:       Head: Normocephalic.       Mouth/Throat:       Mouth: Mucous membranes are dry.       Pharynx: Oropharynx is clear. Eyes:       Pupils: Pupils are equal, round, and reactive to light. Cardiovascular:       Rate and Rhythm: Normal rate and regular rhythm.       Pulses: Normal pulses.       Heart sounds: Normal heart sounds. Pulmonary:       Effort: Pulmonary effort is normal.       Breath sounds: Wheezing present. No rales. Abdominal:       General: Abdomen is flat. Bowel sounds are normal. There is no distension.       Palpations: Abdomen is soft.       Tenderness: There is no abdominal tenderness. There is no guarding. Musculoskeletal:          General: Normal range of motion.       Cervical back: Normal range of motion and neck supple.       Right lower leg: No edema.       Left lower leg: No edema. Skin:      General: Skin is warm and dry.       Capillary Refill: Capillary refill takes less than 2 seconds. Neurological:       General: No focal deficit present.       Mental Status: She is alert and oriented to person, place, and time. Assessment/Plan   Principal Problem:     COPD exacerbation ([address]  Pulmonary emphysema ([address] PNA, acute on chronic hypoxemic and hypercapnic respiratory failure               -Rocephin and doxycycline, stop after today's doses               -Bronchodilators, adjusted to scheduled               -Acapella               -Transition to oral prednisone               -Monitor need for oxygen, maintain O2 sats between 88 and 92%               -VQ scan- Low probability perfusion-only lung scintigraphy.  COPD.       Active Problems:    Coronary artery disease involving native coronary artery of native heart               -Continue atorvastatin and Plavix               -Continue Imdur         Severe malnutrition (HCC)               -Dietitian consult- Nutrition supplement added       CHF (congestive heart failure) ([facility])               -Monitor intake and output               -Resume home Lasix               -Daily weight         CKD (chronic kidney disease), stage III ([facility])               -Monitor BMP               -Monitor intake and output               -Daily weight         PAF (paroxysmal atrial fibrillation) ([facility])               -Patient reports she is no longer on anticoagulant      Vitals: Temp 97.8, HR 94, /56, RR 19, 20, 26, 91% on 5LNC      Labs:   4/17/2022 07:37   Sodium: 140   Potassium: 4.5   Chloride: 98   CO2: 34 (H)   BUN,BUNPL: 50 (H)   Creatinine: 1.1 (H)   Anion Gap: 8   GFR Non-: 48 (A)   GFR : 58 (L)   GLUCOSE, FASTING,GF: 137 (H)   CALCIUM, SERUM, 680092: 9.1   Procalcitonin: 0.11 (H)   WBC: 20.7 (H)   RBC: 3.08 (L)   Hemoglobin Quant: 8.2 (L)   Hematocrit: 28.8 (L)   MCV: 93.5   MCH: 26.6 (L)   MCHC: 28.5 (L)   MPV: 10.2   RDW: 15.9 (H)   Platelet Count: 882      Medications:    Amlodipine 5mg PO BID   Brovana 15mcg nebulization BID   Aspirin 81mg PO nightly   Atorvastatin 40mg PO nightly   Pulmicort 500mcg nebulization BID   Plavix 75mg PO daily   Doxycycline 100mg PO BID   Lasix 40mg PO daily   Guaifenesin 1,200mg PO BID   Imdur 30mg PO daily   Levothyroxine 25mcg PO daily   Metoprolol 50mg PO BID   Prednisone 60mg PO daily   Acetaminophen 650mg PO q6 PRN x1   Potassium chloride 20mEq PO daily   Ativan 0.5mg PO q8 PRN x1      Plan: daily BMP, daily CBC, acapella daily, continuous pulse oximetry, incentive spirometry q2, SCDs, remote cardiac monitor, up as tolerated, adult regular diet                    Chronic Obstructive Pulmonary Disease - Care Day 3 (4/16/2022) by Pepper Cornell RN       Review Status Review Entered   Completed 4/18/2022 14:35      Criteria Review      Care Day: 3 Care Date: 4/16/2022 Level of Care: Inpatient Floor    Guideline Day 2    Level Of Care    (X) Floor or intermediate care    4/18/2022 3:35 PM EDT by Arthor Rita      medical    Clinical Status    (X) * Hemodynamic stability    4/18/2022 3:35 PM EDT by Arthor Sit      VSS    (X) * Mechanical and noninvasive ventilation absent    4/18/2022 3:35 PM EDT by Scout Toledo 95% on 5LNC    ( ) * Uncompensated acidosis absent    4/18/2022 3:35 PM EDT by Arthor Sit      CO2: 32 (H)    Activity    (X) Ambulatory    4/18/2022 3:35 PM EDT by Arthor Sit      up as tolerated    Routes    (X) Diet as tolerated    4/18/2022 3:35 PM EDT by Scout Toledo adult regular diet    (X) IV medications    4/18/2022 3:35 PM EDT by Arthor Rita      IV meds    Interventions    (X) Oxygen    4/18/2022 3:35 PM EDT by Corwin Salinas      95% on 5LNC    (X) Pulse oximetry    4/18/2022 3:35 PM EDT by Corwin Salinas      95% on [facility]    (X) DVT prophylaxis    4/18/2022 3:35 PM EDT by Arthor Sit      SCDs    Medications    (X) Systemic corticosteroids    4/18/2022 3:35 PM EDT by Arthor Sit      Solu-medrol 40mg IV q12  Solu-medrol 40mg IV q6    (X) Inhaled bronchodilators    4/18/2022 3:35 PM EDT by Zahida Herron 0.63mg nebulization q8 PRN x1    (X) Possible IV antibiotics    4/18/2022 3:35 PM EDT by Arthor Sit      Ceftriaxone 1,000mg IV q24    * Milestone   Additional Notes   Date of care: 4/16/2022      IP-LOC-Medical      IM PN: SUBJECTIVE:  Patient reports feeling better this morning. Breathing improved, O2 NC at 4L. Up to [facility] with assist. Very pleasant. Constitutional:        Appearance: She is underweight. She is ill-appearing. HENT:       Head: Normocephalic.       Mouth/Throat:       Mouth: Mucous membranes are dry.       Pharynx: Oropharynx is clear. Eyes:       Pupils: Pupils are equal, round, and reactive to light. Cardiovascular:       Rate and Rhythm: Normal rate and regular rhythm.       Pulses: Normal pulses.       Heart sounds: Normal heart sounds. Pulmonary:       Effort: Pulmonary effort is normal.       Breath sounds: Wheezing present. No rales. Abdominal:       General: Abdomen is flat. Bowel sounds are normal. There is no distension.       Palpations: Abdomen is soft.       Tenderness: There is no abdominal tenderness. There is no guarding. Musculoskeletal:          General: Normal range of motion.       Cervical back: Normal range of motion and neck supple.       Right lower leg: No edema.       Left lower leg: No edema. Skin:      General: Skin is warm and dry.       Capillary Refill: Capillary refill takes less than 2 seconds. Neurological:       General: No focal deficit present.       Mental Status: She is alert and oriented to person, place, and time. Assessment/Plan   Principal Problem:     COPD exacerbation ([address]  Pulmonary emphysema ([address] PNA, acute on chronic hypoxemic and hypercapnic respiratory failure               -Rocephin and doxycycline               -Bronchodilators               -Acapella               -Solu-Medrol changed to Q12               -Monitor need for oxygen, maintain O2 sats between 88 and 92%               -VQ scan- Low probability perfusion-only lung scintigraphy.  COPD.       Active Problems:    Coronary artery disease involving native coronary artery of native heart               -Continue atorvastatin and Plavix               -Continue Imdur         Severe malnutrition ([facility])               -Dietitian consult- Nutrition supplement added         CHF (congestive heart failure) ([facility])               -Monitor intake and output               -Resume home Lasix               -Daily weight         CKD (chronic kidney disease), stage III ([facility])               -Monitor BMP               -Monitor intake and output               -Daily weight         PAF (paroxysmal atrial fibrillation) ([facility])               -Patient reports she is no longer on anticoagulant   Antibiotic: Rocephin and doxycycline      Vitals: Temp 98.2, HR 92, /71, RR 16, 95% on 5LNC      Labs:   4/16/2022 03:24   Sodium: 140   Potassium: 4.5   Chloride: 97 (L)   CO2: 32 (H)   BUN,BUNPL: 47 (H)   Creatinine: 1.1 (H)   Anion Gap: 11   GFR Non-: 48 (A)   GFR : 58 (L)   GLUCOSE, FASTING,GF: 159 (H)   CALCIUM, SERUM, 470749: 9.4   WBC: 22.8 (H)   RBC: 3.06 (L)   Hemoglobin Quant: 8.2 (L)   Hematocrit: 27.8 (L)   MCV: 90.8   MCH: 26.8 (L)   MCHC: 29.5 (L)   MPV: 10.2   RDW: 15.8 (H)   Platelet Count: 190      Medications:    Amlodipine 5mg PO BID   Brovana 15mcg nebulization BID   Aspirin 81mg PO nightly   Atorvastatin 40mg PO nightly   Pulmicort 500mcg nebulization BID   Plavix 75mg PO daily   Doxycycline 100mg PO BID   Lasix 40mg PO daily   Guaifenesin 1,200mg PO BID   Imdur 30mg PO daily   Levothyroxine 25mcg PO daily   Metoprolol 50mg PO BID   Metoprolol 50mg PO daily   Potassium chloride 20mEq PO daily   Ativan 0.5mg PO q8 PRN x2      Plan: daily BMP, daily CBC, acapella daily, continuous pulse oximetry, incentive spirometry q2, SCDs, remote cardiac monitor, up as tolerated, adult regular diet

## 2022-04-18 NOTE — PROGRESS NOTES
[facility] Hospitalists      Progress Note    Patient:  Phillip Dean  YOB: 1945  Date of Service: 4/18/2022  MRN: [id number]   Acct: [de-identified]   Primary Care Physician: Sheree Brown MD  Advance Directive: Full Code  Admit Date: 4/14/2022       Hospital Day: 4    Portions of this note have been copied forward, however, updated to reflect the most current clinical status of this patient. CHIEF COMPLAINT shortness of breath    SUBJECTIVE: Ms. Ashley Durand reported having increased SOB this morning after going to bathroom. CUMULATIVE HOSPITAL COURSE:   The patient is a 79 y. o. female medical history of CAD, CHF, COPD, hyperlipidemia, hypertension, PUD, and thyroid disorder who presented to [facility] ED via direction of her pulmonologist complaining of shortness of breath.  The patient indicated for about a week prior to admission she had increasing fatigue, shortness of breath, cough, sputum production, and decreased appetite.  Patient reported her daughter attempted to get her to go to the ED night before admission however she had a pulmonology appointment day of admission and declined. Eva Ramey was seen by her pulmonologist in the office day of admission and recommended going to the ED.  Patient reported at home she had increased her oxygen up to 5 L.  She reported she is normally on 2 L at home.  She reported increase in frequency of her cough and increasing sputum production over the past 2 to 3 days. Echeverria Marlton reported her sputum production was thick and yellow in nature.  Patient's daughter at bedside reported decrease in activity and appetite.  The daughter was concerned that the patient had not been eating well for well over a week.  ED work-up indicated chest x-ray with COPD and slightly increasing right infrahilar hazy densities, venous CO2 42, calcium 12.1, proBNP 3581, WBC 19.1, arterial CO2 74, arterial PO2 69, arterial HCO3 49.1.  Patient was admitted to the hospitalist service for COPD exacerbation. Placed on empiric antibiotic therapy of doxycycline and Rocephin, bronchodilators, and Solu-Medrol. Patient noted to have significant tachycardia overnight and slightly elevated D-dimer, VQ scan low probability for PE. Required increase up to 9 L of oxygen via high flow nasal cannula. Able to wean to 4 liters over night. Continues to slowly improve. Increased Xopenex treatments to every 4 hours scheduled. IV steroids transitioned to oral prednisone. Completed IV antibiotic therapy. Review of Systems   Constitutional: Positive for activity change, appetite change and fatigue. Negative for chills and fever. HENT: Negative for sore throat, trouble swallowing and voice change. Respiratory: Positive for cough, chest tightness and shortness of breath. Negative for wheezing. Cardiovascular: Negative for chest pain, palpitations and leg swelling. Gastrointestinal: Negative for abdominal pain, constipation, diarrhea, nausea and vomiting. Genitourinary: Negative for difficulty urinating, dysuria, frequency, hematuria and urgency. Musculoskeletal: Negative for arthralgias and myalgias. Skin: Negative for color change and wound. Neurological: Negative for dizziness and headaches. Psychiatric/Behavioral: Negative for agitation, behavioral problems and confusion. The patient is not nervous/anxious. Objective:   VITALS:  /62   Pulse 94   Temp 97.4 °F (36.3 °C) (Temporal)   Resp 18   Ht 5' 3\" (1.6 m)   Wt 78 lb (35.4 kg)   SpO2 96%   BMI 13.82 kg/m²   24HR INTAKE/OUTPUT:      Intake/Output Summary (Last 24 hours) at 4/18/2022 1721  Last data filed at 4/18/2022 1711  Gross per 24 hour   Intake 240 ml   Output 400 ml   Net -160 ml       Physical Exam  Vitals reviewed. Constitutional:       Appearance: She is underweight. She is ill-appearing. HENT:      Head: Normocephalic. Mouth/Throat:      Mouth: Mucous membranes are dry. Pharynx: Oropharynx is clear. Eyes:      Pupils: Pupils are equal, round, and reactive to light. Cardiovascular:      Rate and Rhythm: Normal rate and regular rhythm. Pulses: Normal pulses. Heart sounds: Normal heart sounds. Pulmonary:      Effort: Pulmonary effort is normal.      Breath sounds: Wheezing present. No rales. Comments: Diminished breath sounds bilaterally   Abdominal:      General: Abdomen is flat. Bowel sounds are normal. There is no distension. Palpations: Abdomen is soft. Tenderness: There is no abdominal tenderness. There is no guarding. Musculoskeletal:         General: Normal range of motion. Cervical back: Normal range of motion and neck supple. Right lower leg: No edema. Left lower leg: No edema. Skin:     General: Skin is warm and dry. Capillary Refill: Capillary refill takes less than 2 seconds. Neurological:      General: No focal deficit present. Mental Status: She is alert and oriented to person, place, and time. Medications:      sodium chloride        predniSONE  60 mg Oral Daily    levalbuterol  0.63 mg Nebulization Q4H    metoprolol succinate  50 mg Oral BID    sodium chloride flush  5-40 mL IntraVENous 2 times per day    amLODIPine  5 mg Oral BID    Arformoterol Tartrate  15 mcg Nebulization BID    aspirin EC  81 mg Oral Nightly    atorvastatin  40 mg Oral Nightly    budesonide  0.5 mg Nebulization BID    clopidogrel  75 mg Oral Daily    furosemide  40 mg Oral Daily    isosorbide mononitrate  30 mg Oral Daily    levothyroxine  25 mcg Oral Daily    guaiFENesin  1,200 mg Oral BID     hydrOXYzine, metoprolol, sodium chloride flush, sodium chloride, ondansetron **OR** ondansetron, polyethylene glycol, acetaminophen **OR** acetaminophen, nitroGLYCERIN  ADULT ORAL NUTRITION SUPPLEMENT; Lunch, Breakfast, Dinner; Fortified Pudding Oral Supplement  ADULT DIET; Regular;  No Added Salt (3-4 gm); Mildly Thick ([place]); no gravy     Lab and other Data:     Recent Labs     04/16/22  0324 04/17/22  0737 04/18/22  0408   WBC 22.8* 20.7* 15.0*   HGB 8.2* 8.2* 7.7*    332 298     Recent Labs     04/16/22  0324 04/17/22  0737 04/18/22  0408    140 139   K 4.5 4.5 4.3   CL 97* 98 99   CO2 32* 34* 34*   BUN 47* 50* 49*   CREATININE 1.1* 1.1* 1.0*   GLUCOSE 159* 137* 102       RAD:     NM LUNG SCAN PERFUSION ONLY  Result Date: 4/15/2022    Low probability perfusion-only lung scintigraphy. COPD. Signed by Dr Vladimir Butt. [place]      XR CHEST PORTABLE  Result Date: 4/14/2022    1. COPD. Stable right inferolateral pleural thickening with underlying parenchymal scarring. Slightly increasing right infrahilar hazy densities may be patchy atelectasis or possibly pneumonia. Prior mediastinal surgery with similar cardiomegaly. Signed by Dr Darrell Donis        Assessment/Plan     Principal Problem:    COPD exacerbation [facility])  Active Problems:    Mixed hyperlipidemia    Coronary artery disease involving native coronary artery of native heart    Severe malnutrition (HCC)    CHF (congestive heart failure) (HCC)    CKD (chronic kidney disease), stage III (HCC)    Pulmonary emphysema (HCC)    PAF (paroxysmal atrial fibrillation) ([facility]  Resolved Problems:    * No resolved hospital problems.  *        Principal Problem:    COPD exacerbation (HCC)/ Pulmonary emphysema (HCC)/ PNA-    - IV Abx therapy completed               - Continue Bronchodilators               - Continue PO Prednisone               - Supplemental oxygen as needed               - Incentive spirometry, Acapella                - Encourage deep breathing and cough    - VQ scan- low probability for PE        Active Problems:    Mixed hyperlipidemia-    - Continue atorvastatin       Coronary artery disease involving native coronary artery of native heart-   - Continue Imdur    - Continue Atorvastatin and Plavix       Severe malnutrition (HCC)- noted       CHF (congestive heart failure) ([facility]-    - continue Home Lasix    - Monitor I's and O's    - Daily weight       CKD (chronic kidney disease), stage III ([facility]-    - monitor labs closely    - Monitor I's and O's closely       PAF (paroxysmal atrial fibrillation) ([facility]-    - monitor on telemetry        Antibiotic:  Completed Rocephin and doxycycline    DVT Prophylaxis:  Lovenox    Further Orders per Clinical course/attending. Electronically signed by BEBO Coleman CNP on 4/18/2022 at 5:21 PM       EMR Dragon/Transcription disclaimer:   Much of this encounter note is an electronic transcription/translation of spoken language to printed text.  The electronic translation of spoken language may permit erroneous, or at times, nonsensical words or phrases to be inadvertently transcribed; although attempts have made to review the note for such errors, some may still exist.

## 2022-04-18 NOTE — PROGRESS NOTES
Pt became very SOA when assisted up to the chair by PCA. Oxygen increased to 7L NC. Per PCA her SpO2 88%. She was very tachypneic with shallow breaths. Instructed pt to take slow deep breaths. Pt stated she is exhausted and very anxious and just wants to be able to rest. I offered to give her ativan that she has ordered, she tells me she doesn't like to take it much because it makes her confused but right now she would take anything to calm down and get some sleep. Ativan given. SpO2 92% on 4L NC.  Electronically signed by Mari Brothers RN on 4/18/2022 at 3:18 AM

## 2022-04-18 NOTE — PROGRESS NOTES
Speech Language Pathology  Facility/Department: [facility] ONCOLOGY UNIT  SWALLOW THERAPY  SPEECH THERAPY     NAME: Cliff Castorena  : 1945  MRN: [id number]    ADMISSION DATE: 2022  ADMITTING DIAGNOSIS: has Mixed hyperlipidemia; Arthritis; Coronary artery disease involving native coronary artery of native heart; Carotid artery stenosis; Calculus of gallbladder without cholecystitis without obstruction; Mitral valve stenosis; Mitral valve insufficiency; PUD (peptic ulcer disease); Atherosclerosis of native artery of extremity with intermittent claudication ([address] Pulmonary hypertension; Nocturnal hypoxia; Acute superficial venous thrombosis of right lower extremity; Non-pressure chronic ulcer of right calf with fat layer exposed ([address] Decubitus ulcer of right buttock, stage 3 ([address] Severe malnutrition ([address] Diastolic dysfunction; Anemia in chronic kidney disease (CKD); Nonhealing nonsurgical wound with fat layer exposed; Atherosclerosis of native arteries of left leg with ulceration of calf ([address] Atherosclerosis of native artery of right lower extremity with ulceration of ankle ([address] Atherosclerosis of native arteries of right leg with ulceration of other part of lower right leg; Atherosclerosis of native arteries of right leg with ulceration of other part of foot; Nonhealing ulcer of right lower leg with fat layer exposed ([address] Non-pressure chronic ulcer of right ankle with fat layer exposed ([address] Neuropathic ulcer of right foot with fat layer exposed ([address] Hypervolemia; GI bleed; Atherosclerosis of native artery of extremity with intermittent claudication (HCC); CHF (congestive heart failure) ([address] CKD (chronic kidney disease), stage III ([address] Pulmonary emphysema ([address] Chronic obstructive pulmonary disease (HCC); PVD (peripheral vascular disease) ([address] Wound of sacral region; Elevated TSH; Bilateral carotid artery stenosis; Obstruction of left carotid artery; Bradycardia;  Sepsis with organ dysfunction [facility]); NSTEMI (non-ST elevated myocardial infarction) ([facility] [address] [facility] HCAP (healthcare-associated pneumonia); Acute renal failure (ARF) ([facility] [address] Syncope and collapse; Essential hypertension; Mild aortic stenosis; 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[facility] [address] Carotid aneurysm, right ([facility] [address] Postoperative anemia due to acute blood loss; Status post Maze operation for atrial fibrillation; PAF (paroxysmal atrial fibrillation) ([facility] [address] S/P coronary artery bypass graft x 1; S/P mitral valve replacement; Chest pain; SOB (shortness of breath); Fatigue; Pain in both lower extremities; History of coronary artery stent placement; Myalgia; Unstable angina ([facility] [address] Acute diastolic heart failure ([facility] [address] Chronic atrial fibrillation ([facility] [address] Acute on chronic anemia; OB + stool; and COPD exacerbation (HCC) on their problem list.    Date of Treat: 4/18/2022  Evaluating Therapist: JUAN Hicks    Current Diet level:  Regular solid consistency with thin liquids    Reason for Referral  Kaitlin Macdonald was referred for a bedside swallow evaluation to assess the efficiency of her swallow function, identify signs and symptoms of aspiration and make recommendations regarding safe dietary consistencies, effective compensatory strategies, and safe eating environment. Impression  Monitored patient's swallowing function. Patient exhibited inconsistently fast oral transit and suspected swallow delay with thin liquids as well as sluggish, mild-moderately decreased laryngeal elevation for swallow airway protection. It is noted that inconsistent delayed coughs were observed with both regular solid consistency presentations and thin liquid presentations administered during treatment session this date.  However, do not feel that all coughs were related to penetration/aspiration secondary to timing of swallows and presence of throat movement and patient exhibited coughs inconsistently at rest.     At this time, would recommend continuation regular solid consistency, Trial mildly thick/nectar thick liquids. Self-feed. Recommend meds whole in pudding/applesauce. If patient receives mouth care prior to intake, okay for ice chips and small sips thin H2O IN BETWEEN MEALS for comfort. Will continue to follow. Treatment Plan  Requires SLP Intervention: Yes     Recommended Diet and Intervention  Diet Solids Recommendation: Regular solid  Liquid Consistency Recommendation: Mildly thick (nectar)  Recommended Form of Meds: Meds whole in puree as able  Therapeutic Interventions: Patient/Family education;Diet tolerance monitoring     Compensatory Swallowing Strategies  Compensatory Swallowing Strategies: Upright as possible for all oral intake;Small bites/sips;Eat/Feed slowly; Alternate solids and liquids; Remain upright for 30-45 minutes after meals     Treatment/Goals  Timeframe for Short-term Goals: 1x/day for 3 days   Goal 1: Patient will tolerate regular solid consistency and mildly thick/nectar thick liquids with min S/S penetration/aspiration during PO intake. Goal 2: Patient staff will follow swallow safety recommendations to decrease risk of penetration/aspiration during PO intake. Goal 3: Re-assessment of swallow function for potential diet upgrade. Goal 4: Monitor speech production.      General  Chart Reviewed: Yes  Behavior/Cognition: Alert; Cooperative  O2 Device: High flow  Communication Observation: (Monitored patient's speech production. Patient exhibited weak voicing and slowed lingual movements during verbalizations. SLP still ranked functional intelligibility of speech for unfamiliar listeners at 100% in utterances with background noise present.)  Follows Directions: Simple   Dentition: Dentures  Patient Positioning: Upright in chair  Consistencies Administered: Regular solid; Thin - cup     Oral Motor Examination   Labial ROM: (Adequate during labial retraction trials and labial protrusion trials.)  Labial Strength: (Adequate during labial ice chips and small sips thin H2O IN BETWEEN MEALS for comfort. Will continue to follow.     Electronically signed by JUAN Hicks on 4/18/2022 at 12:50 PM

## 2022-04-18 NOTE — PROGRESS NOTES
0850 P. O. @ Rest HFNC @ 4 LPM Sat was 93%  0919 P.O. R/A @ Rest 87 %  0921 P.O. on HFNC @ 4 LPM @ Exercise 86 % just standing up in front chair. 0930 P. O. on HFNC @ 8 LPM Sat was 87%-90% a total of 12 step from chair back to chair sitting down.

## 2022-04-18 NOTE — PROGRESS NOTES
Nutrition Assessment     Type and Reason for Visit: Reassess    Nutrition Recommendations/Plan:   Modified ONS to Boost pudding TID. Nutrition Assessment:  Pt remains malnourished AEB variable po intakes and continued fair appetite. Pt frustrated with changes in diet order and downgrade to thickened liquids. Pt agreeable to change in ONS to better comply with liquid restrctions and preferences. Modified order to Boost Pudding ONS, will send TID. Malnutrition Assessment:  Malnutrition Status: Severe malnutrition    Current Nutrition Therapies:    ADULT DIET; Regular; No Added Salt (3-4 gm); Mildly Thick (Nectar); no gravy  ADULT ORAL NUTRITION SUPPLEMENT; Lunch; Fortified Pudding Oral Supplement    Anthropometric Measures:  · Height: 5' 3\" (160 cm)  · Current Body Wt: 78 lb (35.4 kg)   · BMI: 13.8    Nutrition Interventions:   Food and/or Nutrient Delivery:  Continue Current Diet,Modify Oral Nutrition Supplement   Coordination of Nutrition Care:  Continue to monitor while inpatient    Goals:  Po intake >50% meals and ONS       Nutrition Monitoring and Evaluation:   Food/Nutrient Intake Outcomes:  Diet Advancement/Tolerance,Food and Nutrient Intake,Supplement Intake  Physical Signs/Symptoms Outcomes:  Biochemical Data,Hemodynamic Status,Nutrition Focused Physical Findings,Weight     Discharge Planning:     Too soon to determine     Electronically signed by Shay Scott MS, RD, LD on 4/18/22 at 2:16 PM CDT    Contact: [phone]

## 2022-04-19 NOTE — PROGRESS NOTES
04/19/22 1124   Restrictions/Precautions   Restrictions/Precautions Fall Risk   Required Braces or Orthoses? No   General   Chart Reviewed Yes   Subjective   Subjective I have to use the BR. No they told me to use [facility] not walk to the BR. Pain Screening   Patient Currently in Pain No   Intervention List Patient able to continue with treatment   Oxygen Therapy   O2 Device High flow nasal cannula   O2 Flow Rate (L/min) 4 L/min   Transfers   Sit to Stand Stand by assistance   Stand to sit Stand by assistance   Bed to Chair Stand by assistance   Comment Steps from recliner to [facility].   STEFANO Pemberton present   Activity Tolerance   Activity Tolerance Patient Tolerated treatment well   Safety Devices   Type of devices Call light within reach  (ON C STEFANO Pemberton present.)   Physical Therapy    Electronically signed by Garcia March PTA on 4/19/2022 at 11:30 AM

## 2022-04-19 NOTE — PLAN OF CARE
Problem: Falls - Risk of:  Goal: Will remain free from falls  Description: Will remain free from falls  4/18/2022 2124 by Toby Guerra RN  Outcome: Ongoing  4/18/2022 1808 by Regan Fair RN  Outcome: Ongoing  Goal: Absence of physical injury  Description: Absence of physical injury  4/18/2022 2124 by Toby Guerra RN  Outcome: Ongoing  4/18/2022 1808 by Regan Fair RN  Outcome: Ongoing     Problem: Breathing Pattern - Ineffective:  Goal: Ability to achieve and maintain a regular respiratory rate will improve  Description: Ability to achieve and maintain a regular respiratory rate will improve  4/18/2022 2124 by Toby Guerra RN  Outcome: Ongoing  4/18/2022 1808 by Regan Fair RN  Outcome: Ongoing     Problem: Skin Integrity:  Goal: Will show no infection signs and symptoms  Description: Will show no infection signs and symptoms  4/18/2022 2124 by Toby Guerra RN  Outcome: Ongoing  4/18/2022 1808 by Regan Fair RN  Outcome: Ongoing  Goal: Absence of new skin breakdown  Description: Absence of new skin breakdown  4/18/2022 2124 by Toby Guerra RN  Outcome: Ongoing  4/18/2022 1808 by Regan Fair RN  Outcome: Ongoing

## 2022-04-19 NOTE — PROGRESS NOTES
[facility]      Progress Note    Patient:  Joel Chery  YOB: 1945  Date of Service: 4/19/2022  MRN: [id number]   Acct: [de-identified]   Primary Care Physician: Lynda Soliman MD  Advance Directive: Full Code  Admit Date: 4/14/2022       Hospital Day: 5    Portions of this note have been copied forward, however, updated to reflect the most current clinical status of this patient. CHIEF COMPLAINT shortness of breath    SUBJECTIVE: Ms. Divya Fountain was resting in chair this morning. Reported SOB and cough is improving. Stated she is upset about food. CUMULATIVE HOSPITAL COURSE:   The patient is a 79 y. o. female medical history of CAD, CHF, COPD, hyperlipidemia, hypertension, PUD, and thyroid disorder who presented to [address] [place] [address] ED via direction of her pulmonologist complaining of shortness of breath. The patient indicated for about a week prior to admission she had increasing fatigue, shortness of breath, cough, sputum production, and decreased appetite. Patient reported her daughter attempted to get her to go to the ED night before admission however she had a pulmonology appointment on day of admission and declined. Patient was seen by her pulmonologist in the office on day of admission and recommended going to the ED. Patient reported at home she had increased her oxygen up to 5 L. She reported she is normally on 2 L at home. She reported increase in frequency of her cough and increasing sputum production over the past 2 to 3 days. Carmen Barber reported her sputum production was thick and yellow in nature. Patient's daughter at bedside reported decrease in activity and appetite. The daughter was concerned that the patient had not been eating well for well over a week.  ED work-up indicated chest x-ray with COPD and slightly increasing right infrahilar hazy densities, venous CO2 42, calcium 12.1, proBNP 3581, WBC 19.1, arterial CO2 74, arterial PO2 69, arterial HCO3 49.1.  Patient was admitted to the hospitalist service for COPD exacerbation. Placed on empiric antibiotic therapy of doxycycline and Rocephin, bronchodilators, and Solu-Medrol. Patient noted to have significant tachycardia overnight and slightly elevated D-dimer, VQ scan low probability for PE. Required increase up to 9 L of oxygen via high flow nasal cannula. Able to wean to 4 liters over night. Continues to slowly improve. Increased Xopenex treatments to every 4 hours scheduled. IV steroids transitioned to oral prednisone. Completed IV antibiotic therapy. Maintaining adequate oxygenation on 4L HF, wean O2 as able to. Review of Systems   Constitutional: Positive for appetite change and fatigue. Negative for activity change, chills and fever. HENT: Negative for sore throat, trouble swallowing and voice change. Respiratory: Positive for cough and shortness of breath. Negative for chest tightness and wheezing. Reports SOB and cough is improving    Cardiovascular: Negative for chest pain, palpitations and leg swelling. Gastrointestinal: Negative for abdominal pain, constipation, diarrhea, nausea and vomiting. Genitourinary: Negative for difficulty urinating, dysuria, frequency, hematuria and urgency. Musculoskeletal: Negative for arthralgias and myalgias. Skin: Negative for color change and wound. Neurological: Negative for dizziness and headaches. Psychiatric/Behavioral: Negative for agitation, behavioral problems and confusion. The patient is not nervous/anxious. Objective:   VITALS:  BP (!) 141/56   Pulse 92   Temp 96.8 °F (36 °C) (Temporal)   Resp 20   Ht 5' 3\" (1.6 m)   Wt 78 lb (35.4 kg)   SpO2 97%   BMI 13.82 kg/m²   24HR INTAKE/OUTPUT:      Intake/Output Summary (Last 24 hours) at 4/19/2022 1327  Last data filed at 4/19/2022 0913  Gross per 24 hour   Intake 1020 ml   Output 700 ml   Net 320 ml       Physical Exam  Vitals reviewed. Constitutional:       Appearance: She is underweight.  She is ill-appearing. HENT:      Head: Normocephalic. Mouth/Throat:      Mouth: Mucous membranes are dry. Pharynx: Oropharynx is clear. Eyes:      Pupils: Pupils are equal, round, and reactive to light. Cardiovascular:      Rate and Rhythm: Normal rate and regular rhythm. Pulses: Normal pulses. Heart sounds: Murmur heard. Pulmonary:      Effort: Pulmonary effort is normal.      Breath sounds: Wheezing present. No rales. Comments: Diminished breath sounds bilaterally   Abdominal:      General: Abdomen is flat. Bowel sounds are normal. There is no distension. Palpations: Abdomen is soft. Tenderness: There is no abdominal tenderness. There is no guarding. Musculoskeletal:         General: Normal range of motion. Cervical back: Normal range of motion and neck supple. Right lower leg: No edema. Left lower leg: No edema. Skin:     General: Skin is warm and dry. Capillary Refill: Capillary refill takes less than 2 seconds. Neurological:      General: No focal deficit present. Mental Status: She is alert and oriented to person, place, and time.             Medications:      sodium chloride        predniSONE  60 mg Oral Daily    levalbuterol  0.63 mg Nebulization Q4H    metoprolol succinate  50 mg Oral BID    sodium chloride flush  5-40 mL IntraVENous 2 times per day    amLODIPine  5 mg Oral BID    Arformoterol Tartrate  15 mcg Nebulization BID    aspirin EC  81 mg Oral Nightly    atorvastatin  40 mg Oral Nightly    budesonide  0.5 mg Nebulization BID    clopidogrel  75 mg Oral Daily    furosemide  40 mg Oral Daily    isosorbide mononitrate  30 mg Oral Daily    levothyroxine  25 mcg Oral Daily    guaiFENesin  1,200 mg Oral BID     hydrOXYzine, metoprolol, sodium chloride flush, sodium chloride, ondansetron **OR** ondansetron, polyethylene glycol, acetaminophen **OR** acetaminophen, nitroGLYCERIN  ADULT ORAL NUTRITION SUPPLEMENT; Lunch, Continue Imdur    - Continue Atorvastatin and Plavix       Severe malnutrition (HCC)- noted       CHF (congestive heart failure) ([facility] [address] [address]-    - Continue Home Lasix    - Monitor I's and O's    - Daily weight       CKD (chronic kidney disease), stage III ([facility] [address]-    - monitor labs closely    - Monitor I's and O's closely       PAF (paroxysmal atrial fibrillation) ([facility] [address]-    - monitor on telemetry            DVT Prophylaxis:  Lovenox    Further Orders per Clinical course/attending. Electronically signed by BEBO Nicole CNP on 4/19/2022 at 1:27 PM       EMR Dragon/Transcription disclaimer:   Much of this encounter note is an electronic transcription/translation of spoken language to printed text.  The electronic translation of spoken language may permit erroneous, or at times, nonsensical words or phrases to be inadvertently transcribed; although attempts have made to review the note for such errors, some may still exist.

## 2022-04-19 NOTE — PROGRESS NOTES
Speech Language Pathology  Facility/Department: [facility] ONCOLOGY UNIT  SWALLOW THERAPY     NAME: Lizette Bui  : 1945  MRN: [id number]    ADMISSION DATE: 2022  ADMITTING DIAGNOSIS: has Mixed hyperlipidemia; Arthritis; Coronary artery disease involving native coronary artery of native heart; Carotid artery stenosis; Calculus of gallbladder without cholecystitis without obstruction; Mitral valve stenosis; Mitral valve insufficiency; PUD (peptic ulcer disease); Atherosclerosis of native artery of extremity with intermittent claudication ([address] Pulmonary hypertension; Nocturnal hypoxia; Acute superficial venous thrombosis of right lower extremity; Non-pressure chronic ulcer of right calf with fat layer exposed ([address] Decubitus ulcer of right buttock, stage 3 ([address] Severe malnutrition ([address] Diastolic dysfunction; Anemia in chronic kidney disease (CKD); Nonhealing nonsurgical wound with fat layer exposed; Atherosclerosis of native arteries of left leg with ulceration of calf ([address] Atherosclerosis of native artery of right lower extremity with ulceration of ankle ([address] Atherosclerosis of native arteries of right leg with ulceration of other part of lower right leg; Atherosclerosis of native arteries of right leg with ulceration of other part of foot; Nonhealing ulcer of right lower leg with fat layer exposed ([address] Non-pressure chronic ulcer of right ankle with fat layer exposed ([address] Neuropathic ulcer of right foot with fat layer exposed ([address] Hypervolemia; GI bleed; Atherosclerosis of native artery of extremity with intermittent claudication (HCC); CHF (congestive heart failure) ([address] CKD (chronic kidney disease), stage III ([address] Pulmonary emphysema ([address] Chronic obstructive pulmonary disease (HCC); PVD (peripheral vascular disease) ([address] Wound of sacral region; Elevated TSH; Bilateral carotid artery stenosis; Obstruction of left carotid artery; Bradycardia;  Sepsis with organ dysfunction [facility]); NSTEMI (non-ST elevated myocardial infarction) ([facility] [address] HCAP (healthcare-associated pneumonia); Acute renal failure (ARF) ([facility] [address] Syncope and collapse; Essential hypertension; Mild aortic stenosis; 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[facility] [address] Carotid aneurysm, right ([facility] [address] Postoperative anemia due to acute blood loss; Status post Maze operation for atrial fibrillation; PAF (paroxysmal atrial fibrillation) ([facility] [address] S/P coronary artery bypass graft x 1; S/P mitral valve replacement; Chest pain; SOB (shortness of breath); Fatigue; Pain in both lower extremities; History of coronary artery stent placement; Myalgia; Unstable angina ([facility] [address] [facility] Acute diastolic heart failure ([facility] [address] Chronic atrial fibrillation ([facility] [address] Acute on chronic anemia; OB + stool; and COPD exacerbation (HCC) on their problem list.    Date of Treat: 4/19/2022  Evaluating Therapist: JUAN Sousa    Current Diet level:  Regular solid consistency with mildly thick/nectar thick liquids    Primary Complaint  Patient verbalized dislike of thickened liquids    Reason for Referral  Lindsay Go was referred for a bedside swallow evaluation to assess the efficiency of her swallow function, identify signs and symptoms of aspiration and make recommendations regarding safe dietary consistencies, effective compensatory strategies, and safe eating environment. Impression  Re-assessed patient's swallowing function with liquids. Patient exhibited inconsistently fast oral transit and suspected swallow delay with thin liquids as well as sluggish, mild-moderately decreased laryngeal elevation for swallow airway protection. Even so, just mild delayed coughs were noted with thin H2O trials presented during treatment session this date.     At this time, would recommend continuation regular solid consistency, Thin liquids, per patient request. Recommend meds whole in pudding/applesauce.  Will continue to follow.     Treatment Plan  Requires SLP Intervention: Yes     Recommended Diet and Intervention  Diet Solids Recommendation: Regular solid  Liquid Consistency Recommendation: Thin (per patient request)  Recommended Form of Meds: Meds whole in puree as able  Therapeutic Interventions: Patient/Family education;Diet tolerance monitoring     Compensatory Swallowing Strategies  Compensatory Swallowing Strategies: Upright as possible for all oral intake;Small bites/sips;Eat/Feed slowly; Alternate solids and liquids; Remain upright for 30-45 minutes after meals     Treatment/Goals  Timeframe for Short-term Goals: 1x/day for 3 days   Goal 1: Patient will tolerate regular solid consistency and mildly thick/nectar thick liquids with min S/S penetration/aspiration during PO intake. Goal 2: Patient staff will follow swallow safety recommendations to decrease risk of penetration/aspiration during PO intake. Goal 3: Re-assessment of swallow function for potential diet upgrade. Goal 4: Monitor speech production.      General  Chart Reviewed: Yes  Behavior/Cognition: Alert; Cooperative  O2 Device: High flow  Communication Observation: (SLP ranked functional intelligibility of speech for unfamiliar listeners at 100% in utterances with background noise present.)  Follows Directions: Simple   Dentition: Dentures  Patient Positioning: Upright in chair  Consistencies Administered: Thin - cup     Re-assessed patient's swallowing function with the following observations noted:     Oral Phase  Suspected Premature Bolus Loss: Thin - cup (Patient exhibited inconsistently fast oral transit of thin H2O trials presented independently via cup.)    Pharyngeal Phase  Suspected Swallow Delay: Thin - cup (Suspect secondary to oral transit times.)  Laryngeal Elevation: (Patient exhibited sluggish, mild-moderately decreased laryngeal elevation for swallow airway protection.)  Delayed Cough:  Thin - cup   Pharyngeal Phase - Comment: Just mild delayed coughs were noted with thin H2O trials presented

## 2022-04-20 NOTE — CARE COORDINATION
Spoke with patient regarding MD orders for [facility] services. Patient agreeable and has chosen [facility]. Referral Faxed. [address] [place] [address] [phone]. [phone]. Please notify [address] [place] [address] when patient discharges and fax DC Summary,  DC med list and any new [facility] orders. The Patient and/or patient representative was provided with a choice of provider and agrees   with the discharge plan. [x] Yes [] No    Freedom of choice list was provided with basic dialogue that supports the patient's individualized plan of care/goals, treatment preferences and shares the quality data associated with the providers.  [x] Yes [] No  Electronically signed by Elieser Mart on 4/20/2022 at 2:50 PM

## 2022-04-20 NOTE — PROGRESS NOTES
11:05a - Home oxygen evaluation performed and results are as follows: SaO2 -=84% on R/A at rest, SaO2 = 86% n 2 lpm O2(NC) at rest, SaO2 = 88% on 3 lpm O2(NC) at rest, SaO2 = 91% on 4 lpm O2(NC) at rest, SaO2 = 83% on R/A while ambulating(minimal), SaO2 = 85% on 2 lpm O2(NC) while ambulating(minimal), SO2 = 88% on 3 lpm O2(NC) while ambulating(minimal), SaO2 = 91% on 4 lpm O2(NC) while ambulating(minimal).  TS RRT

## 2022-04-20 NOTE — PROGRESS NOTES
Speech Language Pathology  Facility/Department: [facility] ONCOLOGY UNIT  SWALLOW THERAPY     NAME: Steven Urena  : 1945  MRN: [id number]    ADMISSION DATE: 2022  ADMITTING DIAGNOSIS: has Mixed hyperlipidemia; Arthritis; Coronary artery disease involving native coronary artery of native heart; Carotid artery stenosis; Calculus of gallbladder without cholecystitis without obstruction; Mitral valve stenosis; Mitral valve insufficiency; PUD (peptic ulcer disease); Atherosclerosis of native artery of extremity with intermittent claudication ([address] Pulmonary hypertension; Nocturnal hypoxia; Acute superficial venous thrombosis of right lower extremity; Non-pressure chronic ulcer of right calf with fat layer exposed ([address] Decubitus ulcer of right buttock, stage 3 ([address] Severe malnutrition ([address] Diastolic dysfunction; Anemia in chronic kidney disease (CKD); Nonhealing nonsurgical wound with fat layer exposed; Atherosclerosis of native arteries of left leg with ulceration of calf ([address] Atherosclerosis of native artery of right lower extremity with ulceration of ankle ([address] Atherosclerosis of native arteries of right leg with ulceration of other part of lower right leg; Atherosclerosis of native arteries of right leg with ulceration of other part of foot; Nonhealing ulcer of right lower leg with fat layer exposed ([address] Non-pressure chronic ulcer of right ankle with fat layer exposed ([address] Neuropathic ulcer of right foot with fat layer exposed ([address] Hypervolemia; GI bleed; Atherosclerosis of native artery of extremity with intermittent claudication (HCC); CHF (congestive heart failure) ([address] CKD (chronic kidney disease), stage III ([address] Pulmonary emphysema ([address] Chronic obstructive pulmonary disease (HCC); PVD (peripheral vascular disease) ([address] Wound of sacral region; Elevated TSH; Bilateral carotid artery stenosis; Obstruction of left carotid artery; Bradycardia;  Sepsis with organ dysfunction [facility]); NSTEMI (non-ST elevated myocardial infarction) ([facility] HCAP (healthcare-associated pneumonia); Acute renal failure (ARF) ([facility] Syncope and collapse; Essential hypertension; Mild aortic stenosis; 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[facility] Carotid aneurysm, right ([facility] Postoperative anemia due to acute blood loss; Status post Maze operation for atrial fibrillation; PAF (paroxysmal atrial fibrillation) ([facility] S/P coronary artery bypass graft x 1; S/P mitral valve replacement; Chest pain; SOB (shortness of breath); Fatigue; Pain in both lower extremities; History of coronary artery stent placement; Myalgia; Unstable angina ([facility] Acute diastolic heart failure ([facility] Chronic atrial fibrillation ([facility] Acute on chronic anemia; OB + stool; and COPD exacerbation (HCC) on their problem list.    Date of Treat: 4/20/2022  Evaluating Therapist: JUAN Sprague    Current Diet level:  Regular solid consistency with thin liquids    Reason for Referral  Farzaneh Abbott was referred for a bedside swallow evaluation to assess the efficiency of her swallow function, identify signs and symptoms of aspiration and make recommendations regarding safe dietary consistencies, effective compensatory strategies, and safe eating environment. Impression  Monitored patient's swallowing function. Patient exhibited inconsistently fast oral transit and suspected swallow delay with thin liquids as well as sluggish, mild-moderately decreased laryngeal elevation for swallow airway protection. Even so, no outward S/S penetration/aspiration was noted with any regular solid consistency presentation administered during treatment session this date. Just mild delayed coughs were observed with thin liquid presentations. At this time, would recommend continuation regular solid consistency with least restrictive thin liquids. Self-feed. Recommend meds whole in pudding/applesauce.   Will continue to follow.     Treatment Plan  Requires SLP Intervention: Yes     Recommended Diet and Intervention  Diet Solids Recommendation: Regular solid  Liquid Consistency Recommendation: Thin  Recommended Form of Meds: Meds whole in puree as able  Therapeutic Interventions: Patient/Family education;Diet tolerance monitoring     Compensatory Swallowing Strategies  Compensatory Swallowing Strategies: Upright as possible for all oral intake;Small bites/sips;Eat/Feed slowly; Alternate solids and liquids; Remain upright for 30-45 minutes after meals     Treatment/Goals  Timeframe for Short-term Goals: 1x/day for 3 days   Goal 1: Patient will tolerate regular solid consistency and thin liquids with min S/S penetration/aspiration during PO intake. Goal 2: Patient staff will follow swallow safety recommendations to decrease risk of penetration/aspiration during PO intake. Goal 3: Monitor speech production.      General  Chart Reviewed: Yes  Behavior/Cognition: Alert; Cooperative  O2 Device: High flow  Communication Observation: (SLP ranked functional intelligibility of speech for unfamiliar listeners at 100% in utterances with background noise present.)  Follows Directions: Simple   Dentition: Dentures  Patient Positioning: Upright in chair  Consistencies Administered: Regular solid; Thin - cup     Monitored patient's swallowing function with the following observations noted:     Oral Phase  Suspected Premature Bolus Loss: Regular solid; Thin - cup (Oral transit of regular solid consistency varied from 1-3 seconds in length. Patient exhibited inconsistently fast oral transit of thin liquid presentations administered independently via cup.)  Oral Phase - Comment: Patient exhibited adequate oral prep of regular solid consistency presentations administered independently. Min regular solid consistency residue was noted post swallows; residue cleared from the mouth with additional dry swallows.     Pharyngeal Phase  Suspected Swallow Delay: Regular solid; Thin - cup (Suspect secondary to oral transit times.)  Laryngeal Elevation: (Patient exhibited sluggish, mild-moderately decreased laryngeal elevation for swallow airway protection.)  Delayed Cough: Thin - cup   Pharyngeal Phase - Comment: No outward S/S penetration/aspiration was noted with any regular solid consistency presentation administered during treatment session this date. Just mild delayed coughs were observed with thin liquid presentations. At this time, would recommend continuation regular solid consistency with least restrictive thin liquids. Self-feed. Recommend meds whole in pudding/applesauce. Will continue to follow.     Electronically signed by JUAN Penn on 4/20/2022 at 10:30 AM

## 2022-04-20 NOTE — PROGRESS NOTES
04/20/22 1506   Pre Treatment Pain Screening   Intervention List Patient able to continue with treatment   Restrictions/Precautions   Restrictions/Precautions Fall Risk   Required Braces or Orthoses? No   General   Chart Reviewed Yes   Subjective   Subjective I just don't feel good today. I am not hurting. Vital Signs   Level of Consciousness Alert (0)   Oxygen Therapy   O2 Device Nasal cannula   O2 Flow Rate (L/min) 3 L/min  (SpO2 97% on 4L pre-gait. Decrreased to 3L)   Transfers   Sit to Stand Stand by assistance   Stand to sit Stand by assistance   Ambulation   Surface level tile   Device No Device   Assistance Contact guard assistance   Quality of Gait Slightly unsteady one standing rest break. Gait Deviations Slow Arlene;Decreased step length;Decreased step height  (Narrow HEIDI)   Distance 110'   Comments Patient in chair post gait.    Exercises   Heelslides 10   Hip Flexion 10   Knee Long Arc Quad 10   Knee Active Range of Motion yes   Ankle Pumps 10   Activity Tolerance   Activity Tolerance Patient tolerated treatment well   PT Plan of Care   Wednesday X   Safety Devices   Type of Devices Left in chair;Call light within reach  (Daughter present)   Physical Therapy    Electronically signed by Jatin Oakes PTA on 4/20/2022 at 3:17 PM

## 2022-04-20 NOTE — DISCHARGE SUMMARY
[address] [facility]    Discharge Summary      Cecilia Carroll  :  1945  MRN:  [id number]    Admit date:  2022  Discharge date:    2022    Discharging Physician:  Dr. Trejo Setting     Advance Directive: Full Code    Consults: none    Primary Care Physician:  Sunshine Newman MD    Discharge Diagnoses:  Principal Problem:    COPD exacerbation ([facility]  Active Problems:    Mixed hyperlipidemia    Coronary artery disease involving native coronary artery of native heart    Severe malnutrition (HCC)    CHF (congestive heart failure) (HCC)    CKD (chronic kidney disease), stage III ([facility]    Pulmonary emphysema (HCC)    PAF (paroxysmal atrial fibrillation) ([facility]  Resolved Problems:    * No resolved hospital problems. *      Portions of this note have been copied forward, however, changed to reflect the most current clinical status of this patient. Hospital Course: The patient is a 79 y. o. female medical history of CAD, CHF, COPD, hyperlipidemia, hypertension, PUD, and thyroid disorder who presented to [address] [place] [address] ED via direction of her pulmonologist complaining of shortness of breath. The patient indicated for about a week prior to admission she had increasing fatigue, shortness of breath, cough, sputum production, and decreased appetite. Patient reported her daughter attempted to get her to go to the ED night before admission however she had a pulmonology appointment on day of admission and declined. Patient was seen by her pulmonologist in the office on [address] recommended going to the ED. Patient reported at home she had increased her oxygen up to 5 L. She reported she is normally on 2 L at home. She reported increase in frequency of her cough and increasing sputum production over the past 2 to 3 days.  She reported her sputum production was thick and yellow in nature. Patient's daughter at bedside reported decrease in activity and appetite.  The daughter was concerned that the patient had not been eating well for well over a week.  ED work-up indicated chest x-ray with COPD and slightly increasing right infrahilar hazy densities, venous CO2 42, calcium 12.1, proBNP 3581, WBC 19.1, arterial CO2 74, arterial PO2 69, arterial HCO3 49.1. Patient was admitted to the hospitalist service for COPD exacerbation. Placed on empiric antibiotic therapy of doxycycline and Rocephin, bronchodilators, and Solu-Medrol. Patient noted to have significant tachycardia overnight and slightly elevated D-dimer, VQ scan low probability for PE. Required increase up to 9 L of oxygen via high flow nasal cannula. Able to wean to 4 liters over night. Continues to slowly improve. Increased Xopenex treatments to every 4 hours scheduled. IV steroids transitioned to oral prednisone. Completed IV antibiotic therapy. Maintaining adequate oxygenation on 4L HF, wean O2 as able to. Patient was weaned down to 2L of oxygen, currently has home O2 of 2L at home. Patient is being discharged on Prednisone taper and Mucinex. She has been advised to follow-up with PCP. Patient is currently in stable condition to be discharged home with home health. Significant Diagnostic Studies:     NM LUNG SCAN PERFUSION ONLY  Result Date: 4/15/2022    Low probability perfusion-only lung scintigraphy. COPD. Signed by Dr Malka Cornell. [place]      XR CHEST PORTABLE  Result Date: 4/14/2022    1. COPD. Stable right inferolateral pleural thickening with underlying parenchymal scarring. Slightly increasing right infrahilar hazy densities may be patchy atelectasis or possibly pneumonia. Prior mediastinal surgery with similar cardiomegaly.  Signed by Dr Roly Way      Pertinent Labs:   CBC:   Recent Labs     04/20/22  0109 04/21/22  0130 04/22/22  0326   WBC 12.2* 13.8* 14.4*   HGB 7.8* 7.5* 7.3*    302 322     BMP:    Recent Labs     04/20/22  0109 04/21/22  0130 04/22/22  0326    138 138   K 4.1 3.4* 4.0   CL 95* 96* 98   CO2 32* 31* 31* BUN 49* 41* 32*   CREATININE 1.3* 1.1* 0.9   GLUCOSE 165* 138* 145*       Physical Exam:   Vital Signs: BP (!) 143/58   Pulse 94   Temp 98.4 °F (36.9 °C)   Resp 18   Ht 5' 3\" (1.6 m)   Wt 78 lb (35.4 kg)   SpO2 92%   BMI 13.82 kg/m²   General appearance:. Alert and Cooperative   HEENT: Normocephalic. Chest: Diminished breath sounds bilaterally without wheezes or rhonchi. Cardiac: RRR, S1, S2 normal. No murmurs, gallops, or rubs auscultated. Abdomen: soft, non-tender; non-distended normal bowel sounds no masses, no organomegaly. Extremities: No clubbing or cyanosis. No peripheral edema. Peripheral pulses palpable. Neurologic: Grossly intact. Discharge Medications:          Medication List      START taking these medications    guaiFENesin 600 MG extended release tablet  Commonly known as: MUCINEX  Take 2 tablets by mouth 2 times daily        CHANGE how you take these medications    aspirin EC 81 MG EC tablet  Take 1 tablet by mouth daily  What changed:   · when to take this  · additional instructions     atorvastatin 40 MG tablet  Commonly known as: LIPITOR  Take 1 tablet by mouth daily  What changed:   · when to take this  · additional instructions     predniSONE 10 MG tablet  Commonly known as: DELTASONE  Take 6 tablets by mouth daily for 3 days, THEN 4 tablets daily for 5 days, THEN 2 tablets daily for 5 days, THEN 1 tablet daily for 5 days. Start taking on: April 21, 2022  What changed: See the new instructions.         CONTINUE taking these medications    albuterol 1.25 MG/3ML nebulizer solution  Commonly known as: ACCUNEB     amLODIPine 5 MG tablet  Commonly known as: NORVASC  Take 1 tablet by mouth 2 times daily     Arformoterol Tartrate 15 MCG/2ML Nebu  Commonly known as: BROVANA     Biotin 5000 MCG Tabs     budesonide 0.5 MG/2ML nebulizer suspension  Commonly known as: PULMICORT     clopidogrel 75 MG tablet  Commonly known as: PLAVIX  Take 1 tablet by mouth daily     furosemide 40 MG tablet  Commonly known as: LASIX  Take 1 tablet by mouth daily     ipratropium-albuterol 0.5-2.5 (3) MG/3ML Soln nebulizer solution  Commonly known as: DUONEB     isosorbide mononitrate 30 MG extended release tablet  Commonly known as: IMDUR     levothyroxine 25 MCG tablet  Commonly known as: SYNTHROID     metoprolol 100 MG tablet  Commonly known as: LOPRESSOR  Take 0.5 tablets by mouth 2 times daily 50 mg in the AM & 50 mg in the PM     MUCINEX ALLERGY PO     nitroGLYCERIN 0.4 MG SL tablet  Commonly known as: Nitrostat  Place 1 tablet under the tongue every 5 minutes as needed for Chest pain     OXYGEN     potassium chloride 20 MEQ extended release tablet  Commonly known as: KLOR-CON M     PROBIOTIC DAILY PO        STOP taking these medications    Calcium 500+D 500-200 MG-UNIT per tablet  Generic drug: calcium-cholecalciferol     cefdinir 300 MG capsule  Commonly known as: OMNICEF           Where to Get Your Medications      These medications were sent to [facility], [address] [facility]  [address] [address]    Phone: [phone]   · guaiFENesin 600 MG extended release tablet  · predniSONE 10 MG tablet             Discharge Instructions: Follow up with Hilda Mix MD in 7 days. Take medications as directed. Resume activity as tolerated. Diet: ADULT ORAL NUTRITION SUPPLEMENT; Lunch, Breakfast, Dinner; Fortified Pudding Oral Supplement  ADULT DIET; Regular; No Added Salt (3-4 gm); no gravy     Disposition: Patient is medically stable and will be discharged home with home health. Time spent on discharge 40 minutes spent in assessing patient, reviewing medications, discussion with nursing, confirming safe discharge plan and preparation of discharge summary.     Signed:  Electronically signed by BEBO Arevalo CNP on 4/20/22 at 1:56 PM CDT         EMR Dragon/Transcription disclaimer:   Much of this encounter note is an electronic transcription/translation of spoken language to printed text.  The electronic translation of spoken language may permit erroneous, or at times, nonsensical words or phrases to be inadvertently transcribed; although attempts have made to review the note for such errors, some may still exist.

## 2022-04-21 NOTE — PLAN OF CARE
Problem: Falls - Risk of:  Goal: Will remain free from falls  Description: Will remain free from falls  Outcome: Progressing  Goal: Absence of physical injury  Description: Absence of physical injury  Outcome: Progressing     Problem: Breathing Pattern - Ineffective:  Goal: Ability to achieve and maintain a regular respiratory rate will improve  Description: Ability to achieve and maintain a regular respiratory rate will improve  Outcome: Progressing     Problem: Skin Integrity:  Goal: Will show no infection signs and symptoms  Description: Will show no infection signs and symptoms  Outcome: Progressing  Goal: Absence of new skin breakdown  Description: Absence of new skin breakdown  Outcome: Progressing     Problem: Nutrition  Goal: Optimal nutrition therapy  Outcome: Progressing     Problem: Pain:  Goal: Pain level will decrease  Description: Pain level will decrease  Outcome: Progressing  Goal: Control of acute pain  Description: Control of acute pain  Outcome: Progressing  Goal: Control of chronic pain  Description: Control of chronic pain  Outcome: Progressing

## 2022-04-21 NOTE — PROGRESS NOTES
Physician Progress Note      PATIENT:               Malena Donohue  CSN #:                  [id number]  :                       1945  ADMIT DATE:       2022 10:32 AM  DISCH DATE:  RESPONDING  PROVIDER #:        Brittney Castaneda          QUERY TEXT:    Pt admitted with  COPD exacerbation and has CHF documented. If possible,   please document in progress notes and discharge summary further specificity   regarding the type and acuity of CHF:    The medical record reflects the following:  Risk Factors: Hx of CHF, Aortic stenosis CAD  Clinical Indicators: EF 30% , BNP 3581, sob,  Treatment: Lasix 40 mg po daily    Thank you    Miguel Ángel Herbert RN,BSN, [facility]  [phone]  Options provided:  -- Acute on Chronic Systolic CHF/HFrEF  -- Acute on Chronic Systolic and Diastolic CHF  -- Acute Systolic CHF/HFrEF  -- Acute Systolic and Diastolic CHF  -- Chronic Systolic CHF/HFrEF  -- Chronic Systolic and Diastolic CHF  -- Other - I will add my own diagnosis  -- Disagree - Not applicable / Not valid  -- Disagree - Clinically unable to determine / Unknown  -- Refer to Clinical Documentation Reviewer    PROVIDER RESPONSE TEXT:    This patient has chronic systolic CHF/HFrEF.     Query created by: Chay Fong on 2022 2:25 PM      Electronically signed by:  Brittney Castaneda 2022 2:46 PM

## 2022-04-21 NOTE — PROGRESS NOTES
[facility]      Progress Note    Patient:  Jamison Kelley  YOB: 1945  Date of Service: 4/21/2022  MRN: [id number]   Acct: [de-identified]   Primary Care Physician: David Perla MD  Advance Directive: Full Code  Admit Date: 4/14/2022       Hospital Day: 7    Portions of this note have been copied forward, however, updated to reflect the most current clinical status of this patient. CHIEF COMPLAINT shortness of breath    SUBJECTIVE: Ms. Karlene Burleson was resting comfortably in chair this morning. Continues to report shortness of breath with exertion. CUMULATIVE HOSPITAL COURSE:   The patient is a 79 y. o. female medical history of CAD, CHF, COPD, hyperlipidemia, hypertension, PUD, and thyroid disorder who presented to [address] [place] [address] ED via direction of her pulmonologist complaining of shortness of breath. The patient indicated for about a week prior to admission she had increasing fatigue, shortness of breath, cough, sputum production, and decreased appetite. Patient reported her daughter attempted to get her to go to the ED night before admission however she had a pulmonology appointment on day of admission and declined. Patient was seen by her pulmonologist in the office on day of admission and recommended going to the ED. Patient reported at home she had increased her oxygen up to 5 L. She reported she is normally on 2 L at home. She reported increase in frequency of her cough and increasing sputum production over the past 2 to 3 days. Eliasdeni Bonnie reported her sputum production was thick and yellow in nature. Patient's daughter at bedside reported decrease in activity and appetite. The daughter was concerned that the patient had not been eating well for well over a week.  ED work-up indicated chest x-ray with COPD and slightly increasing right infrahilar hazy densities, venous CO2 42, calcium 12.1, proBNP 3581, WBC 19.1, arterial CO2 74, arterial PO2 69, arterial HCO3 49.1.  Patient was admitted to the hospitalist service for COPD exacerbation. Placed on empiric antibiotic therapy of doxycycline and Rocephin, bronchodilators, and Solu-Medrol. Patient noted to have significant tachycardia overnight and slightly elevated D-dimer, VQ scan low probability for PE. Required increase up to 9 L of oxygen via high flow nasal cannula. Able to wean to 4 liters over night. Continues to slowly improve. Increased Xopenex treatments to every 4 hours scheduled. IV steroids transitioned to oral prednisone. Completed IV antibiotic therapy. Maintaining adequate oxygenation on 2L at rest.       Review of Systems   Constitutional: Positive for appetite change and fatigue. Negative for activity change, chills and fever. HENT: Negative for sore throat, trouble swallowing and voice change. Respiratory: Positive for cough and shortness of breath. Negative for chest tightness and wheezing. Reports shortness of breath with exertion   Cardiovascular: Negative for chest pain, palpitations and leg swelling. Gastrointestinal: Negative for abdominal pain, constipation, diarrhea, nausea and vomiting. Genitourinary: Negative for difficulty urinating, dysuria, frequency, hematuria and urgency. Musculoskeletal: Negative for arthralgias and myalgias. Skin: Negative for color change and wound. Neurological: Negative for dizziness and headaches. Psychiatric/Behavioral: Negative for agitation, behavioral problems and confusion. The patient is not nervous/anxious. Objective:   VITALS:  /69   Pulse 92   Temp 96.4 °F (35.8 °C)   Resp 18   Ht 5' 3\" (1.6 m)   Wt 78 lb (35.4 kg)   SpO2 93%   BMI 13.82 kg/m²   24HR INTAKE/OUTPUT:      Intake/Output Summary (Last 24 hours) at 4/21/2022 1110  Last data filed at 4/21/2022 0950  Gross per 24 hour   Intake 600 ml   Output --   Net 600 ml       Physical Exam  Vitals reviewed. Constitutional:       Appearance: She is underweight. She is ill-appearing.    HENT: Head: Normocephalic. Mouth/Throat:      Mouth: Mucous membranes are dry. Pharynx: Oropharynx is clear. Eyes:      Pupils: Pupils are equal, round, and reactive to light. Cardiovascular:      Rate and Rhythm: Normal rate and regular rhythm. Pulses: Normal pulses. Heart sounds: Murmur heard. Pulmonary:      Effort: Pulmonary effort is normal.      Breath sounds: Wheezing present. No rales. Comments: Diminished breath sounds with wheezes bilaterally   Abdominal:      General: Abdomen is flat. Bowel sounds are normal. There is no distension. Palpations: Abdomen is soft. Tenderness: There is no abdominal tenderness. There is no guarding. Musculoskeletal:         General: Normal range of motion. Cervical back: Normal range of motion and neck supple. Right lower leg: Edema present. Left lower leg: Edema present. Skin:     General: Skin is warm and dry. Capillary Refill: Capillary refill takes less than 2 seconds. Neurological:      General: No focal deficit present. Mental Status: She is alert and oriented to person, place, and time.             Medications:      sodium chloride        predniSONE  60 mg Oral Daily    levalbuterol  0.63 mg Nebulization Q4H    metoprolol succinate  50 mg Oral BID    sodium chloride flush  5-40 mL IntraVENous 2 times per day    amLODIPine  5 mg Oral BID    Arformoterol Tartrate  15 mcg Nebulization BID    aspirin EC  81 mg Oral Nightly    atorvastatin  40 mg Oral Nightly    budesonide  0.5 mg Nebulization BID    clopidogrel  75 mg Oral Daily    furosemide  40 mg Oral Daily    isosorbide mononitrate  30 mg Oral Daily    levothyroxine  25 mcg Oral Daily    guaiFENesin  1,200 mg Oral BID     hydrOXYzine, metoprolol, sodium chloride flush, sodium chloride, ondansetron **OR** ondansetron, polyethylene glycol, acetaminophen **OR** acetaminophen, nitroGLYCERIN  ADULT ORAL NUTRITION SUPPLEMENT; Lunch, Breakfast, Dinner; Fortified Pudding Oral Supplement  ADULT DIET; Regular; No Added Salt (3-4 gm); no gravy     Lab and other Data:     Recent Labs     04/19/22  0415 04/20/22  0109 04/21/22  0130   WBC 14.8* 12.2* 13.8*   HGB 7.9* 7.8* 7.5*    298 302     Recent Labs     04/19/22  0415 04/20/22  0109 04/21/22  0130   * 138 138   K 4.5 4.1 3.4*    95* 96*   CO2 35* 32* 31*   BUN 46* 49* 41*   CREATININE 1.1* 1.3* 1.1*   GLUCOSE 101 165* 138*       RAD:     NM LUNG SCAN PERFUSION ONLY  Result Date: 4/15/2022    Low probability perfusion-only lung scintigraphy. COPD. Signed by Dr Daniel Kendrick. [place]      XR CHEST PORTABLE  Result Date: 4/14/2022    1. COPD. Stable right inferolateral pleural thickening with underlying parenchymal scarring. Slightly increasing right infrahilar hazy densities may be patchy atelectasis or possibly pneumonia. Prior mediastinal surgery with similar cardiomegaly. Signed by Dr Celestine Russell        Assessment/Plan     Principal Problem:    COPD exacerbation [facility])  Active Problems:    Mixed hyperlipidemia    Coronary artery disease involving native coronary artery of native heart    Severe malnutrition (HCC)    CHF (congestive heart failure) (HCC)    CKD (chronic kidney disease), stage III (HCC)    Pulmonary emphysema (HCC)    PAF (paroxysmal atrial fibrillation) ([facility] [address]  Resolved Problems:    * No resolved hospital problems.  *        Principal Problem:    COPD exacerbation (HCC)/ Pulmonary emphysema (HCC)/ PNA-    - IV Abx therapy completed               - Continue Bronchodilators               - Continue PO Prednisone               - Supplemental oxygen as needed, wean O2 as able to                - Incentive spirometry, Acapella                - Encourage deep breathing and cough    - VQ scan- low probability for PE        Active Problems:    Mixed hyperlipidemia-    - Continue atorvastatin       Coronary artery disease involving native coronary artery of native heart-   - Continue Imdur    - Continue Atorvastatin and Plavix       Severe malnutrition (HCC)- noted       CHF (congestive heart failure) ([facility] [address]-    - Continue Home Lasix    - Monitor I's and O's    - Daily weight       CKD (chronic kidney disease), stage III ([facility] [address]-    - monitor labs closely    - Monitor I's and O's closely       PAF (paroxysmal atrial fibrillation) ([facility] [address]-    - monitor on telemetry            DVT Prophylaxis:  Lovenox    DC planning: Likely to DC home in am       Further Orders per Clinical course/attending. Electronically signed by BEBO Marie CNP on 4/21/2022 at 11:10 AM       EMR Dragon/Transcription disclaimer:   Much of this encounter note is an electronic transcription/translation of spoken language to printed text.  The electronic translation of spoken language may permit erroneous, or at times, nonsensical words or phrases to be inadvertently transcribed; although attempts have made to review the note for such errors, some may still exist.

## 2022-04-21 NOTE — CARE COORDINATION
Next appt NONE  Last appt 3/17/2020     Refill request for  Disp Refills Start End     dextroamphetamine (DEXEDRINE) 5 MG 24 hr capsule 28 capsule 0 7/30/2020     Sig - Route: Take 1 capsule by mouth daily. - Oral        PDMP reviewed; no aberrant behavior identified, prescription authorized.   Met with patient to discuss OP Palliative Support service. Provided patient with brochure. Patient plans to review brochure, but she is hesitant at agreeing to service at this time.     Electronically signed by Siobhan Lou RN on 4/21/2022 at 12:59 PM

## 2022-04-21 NOTE — CARE COORDINATION
o2 DME order placed for pt. Pt utilizes home o2 at 2-5L at baseline at home provided by [facility].    Electronically signed by Beto Campos on 4/21/2022 at 7:58 AM

## 2022-04-22 NOTE — PROGRESS NOTES
Pt got up to bedside commode nearby and became winded with exertion. O2 dropped to low 80s with a 10 minute recover time on o2 cannula to get to 88% Pt stated she \"needs to slow down. \" Worried about pt plans to discharge home with the extent of exertion and tolerance of activity.

## 2022-04-22 NOTE — PLAN OF CARE
Problem: Falls - Risk of:  Goal: Will remain free from falls  Description: Will remain free from falls  4/22/2022 0914 by Niurka Dorsey RN  Outcome: Progressing  4/21/2022 2226 by Marsha Anderson  Outcome: Progressing  4/21/2022 2224 by Marsha Anderson  Outcome: Progressing  Goal: Absence of physical injury  Description: Absence of physical injury  4/22/2022 0914 by Niurka Dorsey RN  Outcome: Progressing  4/21/2022 2226 by Marsha Anderson  Outcome: Progressing  4/21/2022 2224 by Marsha Anderson  Outcome: Progressing     Problem: Breathing Pattern - Ineffective:  Goal: Ability to achieve and maintain a regular respiratory rate will improve  Description: Ability to achieve and maintain a regular respiratory rate will improve  4/22/2022 0914 by Niurka Dorsey RN  Outcome: Progressing  4/22/2022 0402 by Marsha Anderson  Outcome: Not Progressing  4/21/2022 2226 by Marsha Anderson  Outcome: Progressing  4/21/2022 2224 by Marsha Anderson  Outcome: Progressing     Problem: Skin Integrity:  Goal: Will show no infection signs and symptoms  Description: Will show no infection signs and symptoms  4/22/2022 0914 by Niurka Dorsey RN  Outcome: Progressing  4/21/2022 2226 by Marsha Anderson  Outcome: Progressing  4/21/2022 2224 by Marsha Anderson  Outcome: Progressing  Goal: Absence of new skin breakdown  Description: Absence of new skin breakdown  4/22/2022 0914 by Niurka Dorsey RN  Outcome: Progressing  4/21/2022 2226 by Marsha Anderson  Outcome: Progressing  4/21/2022 2224 by Marsha Anderson  Outcome: Progressing     Problem: Nutrition  Goal: Optimal nutrition therapy  4/22/2022 0914 by Niurka Dorsey RN  Outcome: Progressing  4/21/2022 2226 by Marsha Anderson  Outcome: Progressing  4/21/2022 2224 by Marsha Anderson  Outcome: Progressing     Problem: Pain:  Goal: Pain level will decrease  Description: Pain level will decrease  4/22/2022 0914 by Niurka Dorsey RN  Outcome: Progressing  4/21/2022 2226 by Marsha Anderson  Outcome: Progressing  4/21/2022 2224 by Henna Engel  Outcome: Progressing  Goal: Control of acute pain  Description: Control of acute pain  4/22/2022 0914 by Edin Abraham RN  Outcome: Progressing  4/21/2022 2226 by Henna Engel  Outcome: Progressing  4/21/2022 2224 by Henna Engel  Outcome: Progressing  Goal: Control of chronic pain  Description: Control of chronic pain  4/22/2022 0914 by Edin Abraham RN  Outcome: Progressing  4/21/2022 2226 by Henna Engel  Outcome: Progressing  4/21/2022 2224 by Henna Engel  Outcome: Progressing     Problem: Chronic Conditions and Co-morbidities  Goal: Patient's chronic conditions and co-morbidity symptoms are monitored and maintained or improved  4/22/2022 0914 by Edin Abraham RN  Outcome: Progressing  4/21/2022 2226 by Henna Engel  Outcome: Progressing  4/21/2022 2224 by Henna Engel  Outcome: Progressing     Problem: ABCDS Injury Assessment  Goal: Absence of physical injury  Outcome: Progressing

## 2022-04-22 NOTE — PROGRESS NOTES
Physical Therapy  Name: Viky Sanchez  MRN:  [id number]  Date of service:  4/22/2022 04/22/22 1120   Subjective   Subjective Attempt with patient sitting up in her chair stating that she is waiting on her daughter to get here so she can leave and doesnt want to get up so she can save her energy.           Electronically signed by Samanta Khanna PTA on 4/22/2022 at 11:21 AM

## 2022-04-22 NOTE — PROGRESS NOTES
Nutrition Assessment     Type and Reason for Visit: Reassess    Nutrition Recommendations/Plan:   1. Continue ONS and current diet. Malnutrition Assessment:  Malnutrition Status: Severe malnutrition    Nutrition Assessment:  Pt improving nutritionally AEB po intakes 50% or greater of most meals since previous assessment. No new weight since admission. Aware planned d/c home with home health today, 4/22. Will follow up if pt remains admitted. Estimated Daily Nutrient Needs:  Energy (kcal):  3980-8720 kcal/d (30-35 kcal/d) Weight Used for Energy Requirements: Current     Protein (g):  52 g/d (1.5 g/kg) Weight Used for Protein Requirements: Current        Fluid (ml/day):  3763-4261 ml/d Method Used for Fluid Requirements: 1 ml/kcal    Nutrition Related Findings:   . Wound Type: None    Current Nutrition Therapies:    ADULT ORAL NUTRITION SUPPLEMENT; Lunch, Breakfast, Dinner; Fortified Pudding Oral Supplement  ADULT DIET; Regular;  No Added Salt (3-4 gm); no gravy    Anthropometric Measures:  · Height: 5' 3\" (160 cm)  · Current Body Wt: 78 lb (35.4 kg)   · BMI: 13.8    Nutrition Diagnosis:   · Severe malnutrition,In context of acute illness or injury related to inadequate protein-energy intake,impaired respiratory function as evidenced by weight loss,BMI,poor intake prior to admission,severe loss of subcutaneous fat,severe muscle loss    Nutrition Interventions:   Food and/or Nutrient Delivery: Continue Current Diet,Continue Oral Nutrition Supplement  Nutrition Education/Counseling: No recommendation at this time  Coordination of Nutrition Care: Continue to monitor while inpatient    Goals:  Previous Goal Met: Progressing toward Goal(s)  Goals: PO intake 50% or greater,prior to discharge    Nutrition Monitoring and Evaluation:   Food/Nutrient Intake Outcomes: Food and Nutrient Intake,Supplement Intake  Physical Signs/Symptoms Outcomes: Biochemical Data,Hemodynamic Status,Nutrition Focused Physical Findings,Weight    Discharge Planning:     Too soon to determine     Alyciaannemarie Collins, MS, RD, LD  Contact: [phone]

## 2022-04-25 NOTE — TELEPHONE ENCOUNTER
I did stop by [facility] and had an informal visit with the patient on Saturday April 16.

## 2022-04-26 NOTE — CARE COORDINATION
Placed a call to Dr. Austyn Rosado office and spoke with phone nurse and requested orders for therapy.

## 2022-04-26 NOTE — CARE COORDINATION
Placed a call to [address] [place] [address] to check on orders for PT and OT for patient. They do not have orders for therapy. Will check with Dr. Karan Mayfield office for orders.

## 2022-04-26 NOTE — CARE COORDINATION
a little winded, has a drop in her pulse ox, didn't say to what, despite my asking a couple of times. Did say that she recovers well when she stops whatever she is doing to cause it to drop. She said that she is not having cough, congestion, other issues. She did say she has some edema is her feet and legs. She is doing a daily weight. She did start doing that this morning. She weighed 80.2 today. She said she is watching her sodium intake, but the steroids are making her eat and she is eating anything she can get her hands on, junk included, so she is getting preservative laden food too. Discussed trying to avoid this as much as possible. She said she has all of her meds, but did not want to do a med review. Said she was in the middle of something else and did not want to drag all of those out. She has a hospital follow up scheduled for next Tuesday with her PCP and also pulmonology. She is not aware of any other issues. Actually sounded good, sounded strong. Cheerful. Said the home health nurse has been out. Hasn't heard from therapy though. I will check on this. No other issues noted. Discussed COVID 19 information, signs and symptoms, testing, quarantine, risks to others, steps to help prevention, such as wearing masks, social distancing, proper handwashing and other infection control cleansing procedures, as well as vaccines, etc.  Patient aware and voices understanding. Patient has been vaccinated. Informed/reminded of Care Transitions Coordination process, purpose, future calls, etc and is agreeable with appropriate follow up. Ensured to have CTN contact information to be used as needed. Encouraged to call for new/ongoing issues, questions, concerns, etc.  Encouraged to make contact with PCP as indicated for any further needs as well. Given the opportunity to ask questions and answered appropriately. CTN to follow up as indicated.      Transitions of Care Initial Call    Was this an external facility discharge? No.      Challenges to be reviewed by the provider   Additional needs identified to be addressed with provider: No       Method of communication with provider : none    Advance Care Planning:   Does patient have an Advance Directive: reviewed and current. Advance Care Planning        Was this a readmission? No  Patients top risk factors for readmission: functional physical ability  medical condition-COPD, CHF, et al  medication management    Care Transition Nurse (CTN) contacted the patient by telephone to perform post hospital discharge assessment. Verified name and  with patient as identifiers. Provided introduction to self, and explanation of the CTN role. CTN reviewed discharge instructions, medical action plan and red flags with patient who verbalized understanding. Patient given an opportunity to ask questions and does not have any further questions or concerns at this time. Were discharge instructions available to patient? Yes. Reviewed appropriate site of care based on symptoms and resources available to patient including: PCP  Specialist  Urgent care clinics  [facility]  When to call [address] The patient agrees to contact the PCP office for questions related to their healthcare. Medication reconciliation was performed with patient, who verbalizes understanding of administration of home medications. Advised obtaining a 90-day supply of all daily and as-needed medications. Covid Risk Education     Educated patient about risk for severe COVID-19 due to risk factors according to CDC guidelines. CTN reviewed discharge instructions, medical action plan and red flag symptoms with the patient who verbalized understanding. Discussed COVID vaccination status: Yes. Education provided on COVID-19 vaccination as appropriate. Discussed exposure protocols and quarantine with CDC Guidelines.  Patient was given an opportunity to verbalize any questions and concerns and agrees to contact CTN or health care provider for questions related to their healthcare. CTN provided contact information. Plan for follow-up call in 3-5 days based on severity of symptoms and risk factors. Plan for next call:    Plan for next call - assess overall status, pain, appetite, sleep patterns, abnormal signs and symptoms, availability and effectiveness of medications, activity level and tolerance, falls/other unexpected events, findings from follow up appointments, medication/treatment changes, any additional teaching needs, etc.  Education regarding self care mgmt - disease process mgmt, symptom mgmt, diet/hydration, pain control needs, medication mgmt, activity level, fall precautions & home safety needs, infection control, seeking medical attention, who/when to call prn any needs, etc.    Follow Up  No future appointments.     Lacie Noyola RN

## 2022-04-29 NOTE — CARE COORDINATION
[facility] Transitions Follow Up Call    2022    Patient: Bigg Spray  Patient : 1945   MRN: [id number]    Discharge Date: 22 RARS: Readmission Risk Score: 17.4 ( )         Spoke with: Mic Velez, daughter    Care Transitions Subsequent and Final Call    Schedule Follow Up Appointment with PCP: Completed  Subsequent and Final Calls  Have your medications changed?: No  Do you have any questions related to your medications?: No  Do you currently have any active services?: Yes  Are you currently active with any services?: Home Health  Do you have any needs or concerns that I can assist you with?: No  Identified Barriers: None  Care Transitions Interventions  Other Interventions:         Spoke with daughter, Mic Velez, at bedside with patient. She said her mom is lying down, not feeling too great today. She said she woke up in the middle of the night with a nose bleed from the oxygen. She said she has these occasionally. She said they have a vaporizer in the corner of the bedroom and it helps some. They also have a \"bubbler\" on the concentrator and it helps some, but she just gets dried out. I did suggest KY jelly, but instructed to avoid using vaseline, neosporin or other petroleum based products. I also suggested saline nasal spray. She said she would try that. She said she is also having some nausea. She said her bowels are not moving as regularly as she thinks they should and she feels the nausea is worse when this happens. Daughter said she is trying to help this along with her diet. She is giving her fresh vegetables. Did discuss vegetables for fiber, fruits, whole grain bread, muffins, etc.  Also, other foods and fluids to help. She asked about stool softeners and laxatives over the counter. Discussed activity, etc.  She did say that she is eating better than she normally does as she is cooking for her and fixing more of a variety of foods and putting it before her.   Will work on diet more of weekend and call prn any needs. Reported that breathing is not too bad today. Tolerated oxygen at about 2L most times. Pulse ox staying up in low to mid 90s. Weight is stable. The home health nurse has been out. PT did eval yesterday. No other issues noted. Will follow up as indicated. Follow Up  No future appointments.     Dominik Cazares RN

## 2022-05-02 PROBLEM — J96.12 CHRONIC RESPIRATORY FAILURE WITH HYPOXIA AND HYPERCAPNIA (HCC): Status: ACTIVE | Noted: 2021-03-05

## 2022-05-02 NOTE — PROGRESS NOTES
Chief Complaint  Chronic respiratory failure with hypoxia and hypercapnia    Subjective    History of Present Illness     Steffi Michelle presents to [facility] for chronic respiratory failure and severe COPD.    History of Present Illness   The patient was hospitalized at [facility] with what I suspect was a combination of pneumonia and volume overload.  She has elevation of her BNP and per report her x-ray did show some haziness at the right base.  A follow-up chest x-ray 6 days later was read as no acute process.  Again she may have had some pneumonia and also some volume overload she also has anemia and did not tolerate oral iron therapy.  Apparently she did receive some IV iron.  Her primary care physician will follow-up on this.  Her blood gases did show evidence of CO2 retention with metabolic compensation and a normal pH.  She has had an elevated PCO2 in the past but the elevation was more marked on her most recent blood gases.  She had an O2 sat of 92.7 with a PO2 of 69 oxygen at 5 L.  She has improved since that time and is now not requiring her oxygen on a regular basis at rest and in fact her sat today initially was 89% on room air.  I still encouraged to wear oxygen anytime she exerts herself and if at rest her sats are running below the 89 to 90% range she should wear her oxygen.  I did tell her that the goal would be an O2 sat in the low 90s at rest if she is on oxygen therapy and turning her O2 flows up higher would not be recommended at rest but could be done with exertion.  She is wearing oxygen at night.  I did discussed the possibly of a respiratory assist device with the patient and her daughter who accompanies her today but she prefer to hold off.  This could always be considered in the future but I would want to get a follow-up blood gas when she was stable.  Again however she clearly has evidence of chronic CO2 retention.  She is on  tapering prednisone and is currently on 10 mg of prednisone per day.  She is concerned about going off totally.  I did E prescribe prednisone 10 mg a total of 90 which she can take once daily if need be in the future and can even take a higher dose for exacerbations.  I did tell her if she think she needs to be on the prednisone long-term she can try the 10 mg/day but also try to go down to 5 mg/day.  If she is off the prednisone for a few days and has increasing symptoms then she can again go back to 5 to 10 mg a day as maintenance therapy.  I would recommend she have her bone density status monitored closely by her primary care physician if she does require the prednisone on a chronic basis.  She at this time is also having issues with peripheral edema which does do better when she has her feet propped up and per tickly when she first gets up in the morning and then worsens during the day.  She has developed again significant anemia consistent with an iron deficiency picture and I told her this along with a decreased albumin that was noted on her recent laboratory studies could contribute to peripheral edema independent of her cardiopulmonary issues.  Her steroid therapy could also be a contributing factor.  Again she will follow-up with her primary care physician on this.  She has had her COVID-19 vaccine including a booster in the form of the Moderna vaccine and would be a candidate for a second booster.  She has received her flu shot and her Pneumovax.  Prior to Admission medications    Medication Sig Start Date End Date Taking? Authorizing Provider   albuterol (PROVENTIL) (2.5 MG/3ML) 0.083% nebulizer solution Take 2.5 mg by nebulization Every 4 (Four) Hours As Needed for Wheezing. 5/3/21  Yes Kwabena Raymond APRN   amLODIPine (NORVASC) 5 MG tablet  1/29/21  Yes Provider, MD Carlos A   arformoterol (Brovana) 15 MCG/2ML nebulizer solution Take 2 mL by nebulization 2 (Two) Times a Day. 3/5/21  Yes  Keyon Syed MD   aspirin 81 MG EC tablet Take 81 mg by mouth.   Yes Carlos A Garcia MD   atorvastatin (LIPITOR) 40 MG tablet Take 40 mg by mouth.   Yes Carlos A Garcia MD   Biotin 5000 MCG tablet Take  by mouth.   Yes Carlos A Garcia MD   budesonide (PULMICORT) 0.5 MG/2ML nebulizer solution Take 2 mL by nebulization Daily. 3/2/22  Yes Keyon Syed MD   Calcium Carbonate-Vitamin D (CALCIUM 500/D PO) Take  by mouth.   Yes Carlos A Garcia MD   Calcium Carbonate-Vitamin D (calcium-vitamin D) 500-200 MG-UNIT tablet per tablet Take 1 tablet by mouth.   Yes Carlos A Garcia MD   clopidogrel (PLAVIX) 75 MG tablet  11/20/19  Yes Carlos A Garcia MD   Fexofenadine HCl (MUCINEX ALLERGY PO) Take  by mouth.   Yes Carlos A Garcia MD   furosemide (LASIX) 40 MG tablet 80 mg. 11/20/19  Yes Carlos A Garcia MD   guaiFENesin (MUCINEX) 600 MG 12 hr tablet Take 2 tablets by mouth 2 (Two) Times a Day. 4/20/22  Yes Carlos A Garcia MD   ipratropium-albuterol (DUO-NEB) 0.5-2.5 mg/3 ml nebulizer Take 3 mL by nebulization 4 (Four) Times a Day As Needed for Wheezing. Use in place of levalbuterol. 3/11/22  Yes Keyon Syed MD   isosorbide mononitrate (IMDUR) 30 MG 24 hr tablet  12/30/19  Yes Carlos A Garcia MD   levothyroxine (SYNTHROID, LEVOTHROID) 25 MCG tablet  11/7/19  Yes Carlos A Garcia MD   metoprolol tartrate (LOPRESSOR) 100 MG tablet Take 50 mg by mouth 2 (Two) Times a Day. 6/20/18  Yes Carlos A Garcia MD   nitroglycerin (NITROSTAT) 0.4 MG SL tablet  11/20/19  Yes Carlos A Garcia MD   O2 (OXYGEN) Inhale 1.5 L/min Every Night.   Yes ProviderCarlos A MD   potassium chloride (K-DUR,KLOR-CON) 20 MEQ CR tablet Take 20 mEq by mouth Daily. 2/1/22  Yes Carlos A Garcia MD   predniSONE (DELTASONE) 10 MG tablet Take 4 tabs daily x 3 days, then take 3 tabs daily x 3 days, then take 2 tabs daily x 3 days, then take 1 tab daily x 3  "days 22  Yes Keyon Syed MD   predniSONE (DELTASONE) 10 MG tablet Take 1 tablet by mouth Daily. 22   Keyon Syed MD   cefdinir (OMNICEF) 300 MG capsule Take 1 capsule by mouth 2 (Two) Times a Day. 22  Keyon Syed MD       Social History     Socioeconomic History   • Marital status:    Tobacco Use   • Smoking status: Former Smoker     Packs/day: 0.25     Years: 10.00     Pack years: 2.50     Types: Cigarettes     Quit date:      Years since quittin.3   • Smokeless tobacco: Never Used   Substance and Sexual Activity   • Alcohol use: No   • Drug use: No   • Sexual activity: Defer       Objective   Vital Signs:   /78   Pulse 91   Ht 162.6 cm (64\")   Wt 37.2 kg (82 lb)   SpO2 (!) 89% Comment: RA  BMI 14.08 kg/m²     Physical Exam  Vitals and nursing note reviewed.   Constitutional:       Comments: She is noted chronically appearing white female in a wheelchair.  She initially did not have her oxygen in place but then subsidy did put on her nasal oxygen via her portable concentrator.   HENT:      Head: Normocephalic.      Comments: She was wearing a mask and also did eventually put on her nasal oxygen.  Eyes:      Extraocular Movements: Extraocular movements intact.      Pupils: Pupils are equal, round, and reactive to light.   Cardiovascular:      Rate and Rhythm: Normal rate and regular rhythm.   Pulmonary:      Effort: Pulmonary effort is normal.      Comments: Breath sounds are diminished throughout but no adventitious sounds are heard.  Musculoskeletal:      Right lower leg: Edema present.      Left lower leg: Edema present.      Comments: She is in a wheelchair.  Bilateral lower extremity edema is noted per tickly of the calves and the feet.   Skin:     Comments: She does have again peripheral edema in the calves and feet but no erythema.   Neurological:      General: No focal deficit present.      Mental Status: She is alert and oriented " to person, place, and time.   Psychiatric:         Mood and Affect: Mood normal.         Behavior: Behavior normal.        Result Review :          Results for orders placed in visit on 03/03/20    Pulmonary Function Test    Narrative  Pulmonary Function Test  Performed by: Keyon Syed MD  Authorized by: Keyon Syed MD    Pre Drug  FVC: 60.62%  FEV1: 43.26%  FEF 25-75%: 27.11%  FEV1/FVC: 54%      Results for orders placed in visit on 05/01/19    Full Pulmonary Function Test Without Bronchodilator                 My interpretation of imaging:    XR CHEST PORTABLE (04/20/2022 16:06 EDT)  NM LUNG SCAN PERFUSION ONLY (04/15/2022 15:13 EDT)  XR CHEST PORTABLE (04/14/2022 12:23 EDT)    My interpretation of labs:  CBC WITH AUTO DIFFERENTIAL (04/28/2022 13:00 EDT)  COMPREHENSIVE METABOLIC PANEL (04/28/2022 13:00 EDT)      Assessment and Plan     Diagnoses and all orders for this visit:    1. Stage 3 severe COPD by GOLD classification ([facility]) (Primary)  Assessment & Plan:  She will continue her DuoNeb treatments, her Pulmicort neb treatments, and her Brovana neb treatments.      Orders:  -     predniSONE (DELTASONE) 10 MG tablet; Take 1 tablet by mouth Daily.  Dispense: 90 tablet; Refill: 3    2. Chronic respiratory failure with hypoxia and hypercapnia ([facility])  Assessment & Plan:  I did discuss a possible respiratory assist device with the patient and her daughter and she would prefer to hold off.  She will continue her oxygen therapy with again the goal of maintaining O2 sat in the low 89% to low 90% range.  If we were going to consider a respiratory assist device in the future I would need to get a follow-up blood gas when she was stable cardiopulmonary wise.      3. Pulmonary hypertension (HCC)  Assessment & Plan:  This is almost certainly due to a combination of group 2 and group 3 pulmonary hypertension.  In terms of any group 3 component she will continue her oxygen with a goal of maintaining an O2  sat of at least approximately 90% but should avoid higher O2 flows if her sats are then running in the high 90s.  She will also continue her inhaled medications including her DuoNeb's, Pulmicort, and Brovana.      4. Diastolic dysfunction  Assessment & Plan:  This has been noted on prior 2D echocardiograms.      5. H/O prosthetic heart valve  Assessment & Plan:  She is being followed regularly by cardiology.      6. Former smoker  Assessment & Plan:  She has a minimal smoking history and quit smoking in 1978.      7. Underweight  Assessment & Plan:  She will follow-up with her primary care physician on this.        BMI is below normal parameters (malnutrition). Recommendations: referral to primary care        Keyon Syed MD  5/2/2022  12:29 CDT    Follow Up   Return in about 2 months (around 7/5/2022) for To see me specifically.    Patient was given instructions and counseling regarding her condition or for health maintenance advice. Please see specific information pulled into the AVS if appropriate.

## 2022-05-02 NOTE — PATIENT INSTRUCTIONS
The patient is doing much better since being hospitalized and treated for I suspect was a combination of pneumonia and volume overload.  She still has some peripheral edema and some issues with anemia but overall is much improved.  She is on tapering prednisone and I will send in a prescription for 10 mg of prednisone with a 90-day supply to take up to 1 a day if need be.  I will see her back otherwise in about 2 months.  She does have some CO2 retention on blood gases and I did discuss the possibility of a respiratory assist device with her but the plan will be to hold off on this for now.

## 2022-05-02 NOTE — ASSESSMENT & PLAN NOTE
She will continue her DuoNeb treatments, her Pulmicort neb treatments, and her Brovana neb treatments.

## 2022-05-02 NOTE — ASSESSMENT & PLAN NOTE
This is almost certainly due to a combination of group 2 and group 3 pulmonary hypertension.  In terms of any group 3 component she will continue her oxygen with a goal of maintaining an O2 sat of at least approximately 90% but should avoid higher O2 flows if her sats are then running in the high 90s.  She will also continue her inhaled medications including her DuoNeb's, Pulmicort, and Brovana.

## 2022-05-02 NOTE — ASSESSMENT & PLAN NOTE
I did discuss a possible respiratory assist device with the patient and her daughter and she would prefer to hold off.  She will continue her oxygen therapy with again the goal of maintaining O2 sat in the low 89% to low 90% range.  If we were going to consider a respiratory assist device in the future I would need to get a follow-up blood gas when she was stable cardiopulmonary wise.

## 2022-05-03 NOTE — CARE COORDINATION
[facility] Transitions Follow Up Call    5/3/2022    Patient: Joel Chery  Patient : 1945   MRN: [id number]    Discharge Date: 22 RARS: Readmission Risk Score: 17.4 ( )         Spoke with: [address] [place] Transitions Subsequent and Final Call    Schedule Follow Up Appointment with PCP: Completed  Subsequent and Final Calls  Have your medications changed?: No  Do you have any questions related to your medications?: No  Do you currently have any active services?: Yes  Are you currently active with any services?: Home Health  Do you have any needs or concerns that I can assist you with?: No  Identified Barriers: None  Care Transitions Interventions  Other Interventions:         Spoke with patient for a CTC follow up call. She reported that the home health nurse just left. She said that she told her that her lungs sounded good. She said her pulse ox is staying up where it needs to be, in the 90s. She said she is not even wearing her oxygen right now. Her voice sounds stronger. She is able to speak longer sentences without any noted shortness of breath. She said she is doing some simple chores around the house without issues off and on. She said she is eating well, drinking well. She denied any further issues with her bowels, patterns are good. She saw the pulmonologist and her PCP both yesterday. No changes in meds or treatments. No new issues. Will follow up next week. To call prn any needs. Follow Up  No future appointments.     Martha Renae RN

## 2022-05-10 NOTE — TELEPHONE ENCOUNTER
Patient called to request refills on Brovana to be sent to [facility].   Rx Refill Note  Requested Prescriptions     Pending Prescriptions Disp Refills   • arformoterol (Brovana) 15 MCG/2ML nebulizer solution 360 mL 3     Sig: Take 2 mL by nebulization 2 (Two) Times a Day.      Last office visit with prescribing clinician: 5/2/2022      Next office visit with prescribing clinician: 5/26/2022            Cuco Valdez CMA  05/10/22, 11:00 CDT

## 2022-05-10 NOTE — TELEPHONE ENCOUNTER
Patient called to request refills on Brovana.Duplicate refill. Previous refill was printed instead of being electronically sent to [place].  Rx Refill Note  Requested Prescriptions     Signed Prescriptions Disp Refills   • arformoterol (Brovana) 15 MCG/2ML nebulizer solution 360 mL 3     Sig: Take 2 mL by nebulization 2 (Two) Times a Day.     Authorizing Provider: LULU SULLIVAN     Ordering User: SANDHYA WATTS      Last office visit with prescribing clinician: 5/2/2022      Next office visit with prescribing clinician: 5/26/2022            Sandhya Watts CMA  05/10/22, 11:03 CDT

## 2022-05-11 NOTE — CARE COORDINATION
[facility] Transitions Follow Up Call    2022    Patient: Luis Fuentes  Patient : 1945   MRN: [id number]    Discharge Date: 22 RARS: Readmission Risk Score: 17.4 ( )         Spoke with: N/A    Care Transitions Subsequent and Final Call    Subsequent and Final Calls  Are you currently active with any services?: Home Health  Care Transitions Interventions  Other Interventions:         Attempted to make contact with patient/caregiver for a routine Care Transitions Coordination follow up call without success. Unable to leave a HIPAA compliant message regarding intent of call and call back information. Follow Up  No future appointments.     Evelyne Godwin RN

## 2022-05-17 NOTE — CARE COORDINATION
[facility] Transitions Follow Up Call    2022    Patient: Lucrecia Martin  Patient : 1945   MRN: [id number]  Reason for Admission: COPD exacerbation, CHF  Discharge Date: 22 RARS: Readmission Risk Score: 17.4 ( )         Spoke with: [address] [facility] Transitions Follow Up Call    Needs to be reviewed by the provider   Additional needs identified to be addressed with provider: Yes  reports increased SOB, edema up to the hip on (R) side, edema to LLE, weight 88.6             Method of communication with provider : chart routing      Care Transition Nurse (CTN) contacted the patient by telephone to follow up after admission. Verified name and  with patient as identifiers. Addressed changes since last contact: medications-Lasix 80 mg per day  Discussed follow-up appointments. If no appointment was previously scheduled, appointment scheduling offered: Yes. Is follow up appointment scheduled within 7 days of discharge? Yes. Advance Care Planning:   Does patient have an Advance Directive: reviewed and current. CTN reviewed discharge instructions, medical action plan and red flags with patient and discussed any barriers to care and/or understanding of plan of care after discharge. Discussed appropriate site of care based on symptoms and resources available to patient including: PCP  Specialist  Home health. The patient agrees to contact the PCP office for questions related to their healthcare. Patients top risk factors for readmission: functional physical ability  falls  medical condition-COPD exacerbation, CHF    Patient verified  and was pleasant and agreeable to transition calls. States she is not so good. Has been having trouble breathing. States she spoke to provider yesterday & has been instructed to increase diuretic to 80 mg per day & call back in 3 days. Weight today 88.6. O2 has been running 87-92%. States pulmonology told her that she should be 88-92%.  LPN CC instructed patient to ensure she is wearing O2 when below 88%. States she is sticking to low Na diet. LPN CC discussed fluid restrictions with patient. HC active. States edema is pretty high up on her (R) leg. LPN CC to route note to PCP r/t weight, SOB, and edema. CTN provided contact information for future needs. Plan for follow-up call in 1-2 days based on severity of symptoms and risk factors. Care Transitions Subsequent and Final Call    Subsequent and Final Calls  Do you have any ongoing symptoms?: Yes  Onset of Patient-reported symptoms: Today  Patient-reported symptoms: Shortness of Breath, Other  Interventions for patient-reported symptoms: Notified PCP/Physician  Have your medications changed?: Yes  Patient Reports: increased Lasix to 80 mg per day  Do you have any questions related to your medications?: No  Do you currently have any active services?: Yes  Are you currently active with any services?: Home Health  Do you have any needs or concerns that I can assist you with?: Yes  Patient-reported Needs or Concerns: FYI SOB & edema & weight to PCP  Identified Barriers: None  Care Transitions Interventions  Other Interventions: Follow Up  No future appointments.     Beto Hunt LPN

## 2022-05-19 NOTE — CARE COORDINATION
[facility] Transitions Follow Up Call    2022    Patient: Maddie Locke  Patient : 1945   MRN: [id number]  Reason for Admission: COPD exacerbation, CHF  Discharge Date: 22 RARS: Readmission Risk Score: 17.4 ( )         Spoke with: Sam Antonyse briefly with patient who states she is about the same. Weight 88.6. O2 88-92%. HC active, states they listened to her lungs yesterday and stated they sounded clear. Reports SOB, edema up the right leg to her hip. Sl edema to LLE. States she's pretty uncomfortable. Was instructed by PCP Monday to take Lasix 80 mg daily for 3 days and has been doing so. This has not seemed to help. LPN CC to route note to cardiology. Paula Dos Santos  [facility]  Care Transitions  [phone]    Care Transitions Subsequent and Final Call    Subsequent and Final Calls  Do you have any ongoing symptoms?: Yes  Onset of Patient-reported symptoms: Today  Patient-reported symptoms: Shortness of Breath, Other, Weight Gain  Interventions for patient-reported symptoms: Notified PCP/Physician  Have your medications changed?: Yes  Patient Reports: Lasix 80 mg   Do you have any questions related to your medications?: No  Do you currently have any active services?: Yes  Are you currently active with any services?: Home Health  Do you have any needs or concerns that I can assist you with?: Yes  Patient-reported Needs or Concerns: SOB, weight gain, edema  Identified Barriers: None  Care Transitions Interventions  Other Interventions: Follow Up  No future appointments.     Kiera Boogie LPN

## 2022-05-19 NOTE — TELEPHONE ENCOUNTER
Physical therapist calling concerned that pt is telling her some foreign man called wanting her medicare number to send her leg wraps ordered by someone. They are concerned it was a scammer since they do not know anything about getting leg wraps ordered but she gave her info to person. I see a care manager note to Gracy Amaro today but Eduardo Nicole is out of office today. See nothing about intervention.

## 2022-05-20 NOTE — CARE COORDINATION
[facility] Transitions Follow Up Call    2022    Patient: Viky Sanchez  Patient : 1945   MRN: [id number]    Discharge Date: 22 RARS: Readmission Risk Score: 17.4 ( )         Spoke with: N/A    Care Transitions Subsequent and Final Call    Subsequent and Final Calls  Are you currently active with any services?: Home Health  Care Transitions Interventions  Other Interventions:         Attempted to make contact with patient/caregiver for a routine Care Transitions Coordination follow up call without success. Unable to leave a HIPAA compliant message regarding intent of call and call back information. CTN to follow up at a later time. Follow Up  No future appointments.     Melvina Strauss RN

## 2022-05-20 NOTE — TELEPHONE ENCOUNTER
Lore Angulo has been out of the office all week. Just now seeing this. Is the swelling more unilateral?  Signs of DVT? Do not see any recent labs. If they are by Dr. Stephanie Watkins we cannot see them in epic. And increase Lasix for a couple more days. Recheck BMP and BNP early next week.

## 2022-05-24 NOTE — CARE COORDINATION
[facility] Transitions Follow Up Call    2022    Patient: Rainer Stokes  Patient : 1945   MRN: [id number]   Discharge Date: 22 RARS: Readmission Risk Score: 17.4 ( )         Spoke with: [address] [facility] Transitions Subsequent and Final Call    Schedule Follow Up Appointment with PCP: Completed  Subsequent and Final Calls  Have your medications changed?: No  Do you have any questions related to your medications?: No  Do you currently have any active services?: Yes  Are you currently active with any services?: Home Health  Do you have any needs or concerns that I can assist you with?: No  Identified Barriers: None  Care Transitions Interventions  Other Interventions: Follow Up  No future appointments. Spoke with patient for follow up. She reported that she is having some shortness of breath and breathing issues. Said she talked with Dr. Marcelino Bahena and he has placed her back on steroids. He does not think she has fluid on her lungs, but rather is thinking that he took her off the steroids too soon and she needs more to help relieve her symptoms. She said she is having some ongoing fluid in her feet and legs off and on. She said when she is up much at all, she has it, but if she gets down and keeps her legs elevated, it goes down fairly quickly. She said the PTA and her daughter fixed her a place on the sofa where she can lie down and keep her legs elevated and it really helps. She said she has been up today a lot because she has had a lot to do around the house and they are quite big, but she knows once she is able to lie down, they will go down. She said her weight has been staying fairly steady in the mornings. She denied any chest pain, palpitations, etc.  No productive cough, other symptoms. She is eating fair, drinking well. No other issues noted. Therapy is going well. Voice sounds stronger. To call prn any issues.   CTN to follow a little longer until more stable.       Avery Sears RN

## 2022-05-26 PROBLEM — Z23 NEED FOR PNEUMOCOCCAL VACCINE: Status: ACTIVE | Noted: 2022-01-01

## 2022-05-26 NOTE — ASSESSMENT & PLAN NOTE
She definitely has component of group 2 pulmonary hypertension related to her valvular heart disease but also most certainly has some group 3 pulmonary hypertension related to her chronic lung disease.  She will continue with the current treatment of her COPD with her inhaled medications and will taper her prednisone down to 10 mg/day.  Her diuretic therapy will be monitored by her other physicians and she will continue her oxygen therapy as well.

## 2022-05-26 NOTE — PROGRESS NOTES
Chief Complaint  Stage 3 severe COPD and Shortness of Breath    Subjective    History of Present Illness     Steffi Michelle presents to [facility] for severe COPD, pulmonary hypertension, valvular heart disease, increasing shortness of breath, and increasing peripheral edema.    History of Present Illness   The patient had contact the office earlier in the week regarding increasing shortness of breath and I did call her and advise she could go back on her steroids and she is on 40 mg of prednisone per day.  This has helped her somewhat but she is also having a lot of issues with fluid retention related to her cardiac disease and pulmonary hypertension.  Her diuretic dose had been decreased but she is back on an increased dose now.  I did tell her I think her issues of shortness of breath are related both to her chronic lung disease but also her cardiac disease with pulmonary hypertension again associated with her history of valvular heart disease.  At this point I told her I would recommend she try to taper her steroids by 10 mg every 2 to 3 days and then stay on 10 mg a day when she is down to that dose.  She also follow-up with her other physicians per tickly her cardiologist regarding her edema issues.  She is accompanied by her daughter today.  She is in a wheelchair and has nasal oxygen in place.  She has had her COVID-19 vaccine including a booster in the form of the Moderna vaccine and would be a candidate for a second booster.  She is also had her flu shot and a Pneumovax and a Prevnar 20 was administered today.  Prior to Admission medications    Medication Sig Start Date End Date Taking? Authorizing Provider   albuterol (PROVENTIL) (2.5 MG/3ML) 0.083% nebulizer solution Take 2.5 mg by nebulization Every 4 (Four) Hours As Needed for Wheezing. 5/3/21  Yes Kwabena Raymond APRN   amLODIPine (NORVASC) 5 MG tablet  1/29/21  Yes Provider,  MD Carlos A   arformoterol (Brovana) 15 MCG/2ML nebulizer solution Take 2 mL by nebulization 2 (Two) Times a Day. 5/10/22  Yes Keyon Syed MD   aspirin 81 MG EC tablet Take 81 mg by mouth.   Yes Carlos A Garcia MD   atorvastatin (LIPITOR) 40 MG tablet Take 40 mg by mouth.   Yes Carlos A Garcia MD   Biotin 5000 MCG tablet Take  by mouth.   Yes Carlos A Garcia MD   budesonide (PULMICORT) 0.5 MG/2ML nebulizer solution Take 2 mL by nebulization Daily. 3/2/22  Yes Keyon Syed MD   Calcium Carbonate-Vitamin D (CALCIUM 500/D PO) Take  by mouth.   Yes Carlos A Garcia MD   Calcium Carbonate-Vitamin D (calcium-vitamin D) 500-200 MG-UNIT tablet per tablet Take 1 tablet by mouth.   Yes Carlos A Garcia MD   clopidogrel (PLAVIX) 75 MG tablet  11/20/19  Yes Carlos A Garcia MD   Fexofenadine HCl (MUCINEX ALLERGY PO) Take  by mouth.   Yes Carlos A Garcia MD   furosemide (LASIX) 40 MG tablet 80 mg. 11/20/19  Yes Carlos A Garcia MD   guaiFENesin (MUCINEX) 600 MG 12 hr tablet Take 2 tablets by mouth 2 (Two) Times a Day. 4/20/22  Yes Carlos A Garcia MD   ipratropium-albuterol (DUO-NEB) 0.5-2.5 mg/3 ml nebulizer Take 3 mL by nebulization 4 (Four) Times a Day As Needed for Wheezing. Use in place of levalbuterol. 3/11/22  Yes Keyon Syed MD   isosorbide mononitrate (IMDUR) 30 MG 24 hr tablet  12/30/19  Yes Carlos A Garcia MD   levothyroxine (SYNTHROID, LEVOTHROID) 25 MCG tablet  11/7/19  Yes Carlos A Garcia MD   metoprolol tartrate (LOPRESSOR) 100 MG tablet Take 50 mg by mouth 2 (Two) Times a Day. 6/20/18  Yes Carlos A Garcia MD   nitroglycerin (NITROSTAT) 0.4 MG SL tablet  11/20/19  Yes Carlos A Garcia MD   O2 (OXYGEN) Inhale 1.5 L/min Every Night.   Yes Carlos A Garcia MD   potassium chloride (K-DUR,KLOR-CON) 20 MEQ CR tablet Take 20 mEq by mouth Daily. 2/1/22  Yes Provider, MD Carlos A   predniSONE (DELTASONE) 10 MG  "tablet Take 1 tablet by mouth Daily. 22  Yes Keyon Syed MD   predniSONE (DELTASONE) 10 MG tablet Take 4 tabs daily x 3 days, then take 3 tabs daily x 3 days, then take 2 tabs daily x 3 days, then take 1 tab daily x 3 days 22  Keyon Syed MD       Social History     Socioeconomic History   • Marital status:    Tobacco Use   • Smoking status: Former Smoker     Packs/day: 0.25     Years: 10.00     Pack years: 2.50     Types: Cigarettes     Quit date:      Years since quittin.4   • Smokeless tobacco: Never Used   Substance and Sexual Activity   • Alcohol use: No   • Drug use: No   • Sexual activity: Defer       Objective   Vital Signs:   /78   Pulse 58   Ht 162.6 cm (64\")   Wt 39.9 kg (88 lb)   SpO2 94% Comment: 2 L  BMI 15.11 kg/m²     Physical Exam  Vitals and nursing note reviewed.   Constitutional:       Comments: She is a thin chronically ill-appearing white female in a wheelchair.   HENT:      Head: Normocephalic.      Comments: She is wearing a mask and has nasal oxygen in place.  Eyes:      Extraocular Movements: Extraocular movements intact.      Pupils: Pupils are equal, round, and reactive to light.   Cardiovascular:      Rate and Rhythm: Regular rhythm. Bradycardia present.   Pulmonary:      Effort: Pulmonary effort is normal.      Comments: Breath sounds are diminished throughout.  Musculoskeletal:      Comments: She is in a wheelchair.  She has 3+ edema of the left calf and 2+ to 3+ edema of the right calf.  There is also some scarring in the right calf.   Skin:     Comments: Again she has scarring of the right calf area with bilateral lower extremity edema.   Neurological:      General: No focal deficit present.      Mental Status: She is alert and oriented to person, place, and time.   Psychiatric:         Mood and Affect: Mood normal.         Behavior: Behavior normal.        Result Review :          Results for orders placed in visit on " 03/03/20    Pulmonary Function Test    Narrative  Pulmonary Function Test  Performed by: Keyon Syed MD  Authorized by: Keyon Syed MD    Pre Drug  FVC: 60.62%  FEV1: 43.26%  FEF 25-75%: 27.11%  FEV1/FVC: 54%      Results for orders placed in visit on 05/01/19    Full Pulmonary Function Test Without Bronchodilator                 My interpretation of labs:   COMPREHENSIVE METABOLIC PANEL (04/28/2022 13:00 EDT)      Assessment and Plan     Diagnoses and all orders for this visit:    1. Chronic respiratory failure with hypoxia and hypercapnia (HCC) (Primary)  Assessment & Plan:  Continue her oxygen therapy.      2. Stage 3 severe COPD by GOLD classification (HCC)  Assessment & Plan:  She will continue her Brovana and Pulmicort neb treatments along with her DuoNeb treatments.  Also she will taper her prednisone down to 10 mg a day over the next several days.        3. Pulmonary hypertension (HCC)  Assessment & Plan:  She definitely has component of group 2 pulmonary hypertension related to her valvular heart disease but also most certainly has some group 3 pulmonary hypertension related to her chronic lung disease.  She will continue with the current treatment of her COPD with her inhaled medications and will taper her prednisone down to 10 mg/day.  Her diuretic therapy will be monitored by her other physicians and she will continue her oxygen therapy as well.      4. H/O prosthetic heart valve  Assessment & Plan:  She is status post mitral valve replacement.      5. Underweight  Assessment & Plan:  She will follow-up with her primary care physician regarding her low BMI.      6. Need for pneumococcal vaccine  Assessment & Plan:  The Prevnar 20 was administered today.    Orders:  -     Pneumococcal Conjugate Vaccine 20-Valent (PCV20)      BMI is below normal parameters (malnutrition). Recommendations: referral to primary care        Keyon Syed MD  5/26/2022  17:23 CDT    Follow Up    Return in about 2 months (around 7/26/2022).    Patient was given instructions and counseling regarding her condition or for health maintenance advice. Please see specific information pulled into the AVS if appropriate.

## 2022-05-26 NOTE — ASSESSMENT & PLAN NOTE
She will continue her Brovana and Pulmicort neb treatments along with her DuoNeb treatments.  Also she will taper her prednisone down to 10 mg a day over the next several days.

## 2022-05-26 NOTE — PATIENT INSTRUCTIONS
The patient is doing a bit better with the increased dose of prednisone but is having problems with fluid retention.  Her diuretic therapy was recently increased.  I did advise her she could taper her prednisone by 10 mg every 3 days until she gets down to 10 mg a day and then can stay on that dose.  I will see her back in 2 months otherwise and a Prevnar 20 was administered today.

## 2022-05-27 NOTE — CARE COORDINATION
[facility] Transitions Follow Up Call    2022    Patient: Steven Urena  Patient : 1945   MRN: [id number]    Discharge Date: 22 RARS: Readmission Risk Score: 17.4 ( )         Spoke with: N/A    Care Transitions Subsequent and Final Call    Subsequent and Final Calls  Are you currently active with any services?: Home Health  Care Transitions Interventions  Other Interventions: Follow Up  No future appointments. Attempted to make contact with patient/caregiver for a routine Care Transitions Coordination follow up call without success. Unable to leave a HIPAA compliant message regarding intent of call and call back information. Multiple attempts made over several minutes. Kept getting a busy signal.  CTN will try again at another time.         Ervin Pires RN

## 2022-06-02 NOTE — CARE COORDINATION
[facility] Transitions Follow Up Call    2022    Patient: Irl Poor  Patient : 1945   MRN: [id number]    Discharge Date: 22 RARS: Readmission Risk Score: 17.4 ( )         Spoke with: N/A    Care Transitions Subsequent and Final Call    Subsequent and Final Calls  Are you currently active with any services?: Home Health  Care Transitions Interventions  Other Interventions: Follow Up  No future appointments. Attempted to make contact with patient/caregiver for a routine Care Transitions Coordination follow up call without success. Unable to leave a HIPAA compliant message regarding intent of call and call back information. Line was busy. CTN will try again at a later time.       Lacie Noyola RN

## 2022-06-02 NOTE — CARE COORDINATION
[facility] Transitions Follow Up Call    2022    Patient: Christoph Jain  Patient : 1945   MRN: [id number]    Discharge Date: 22 RARS: Readmission Risk Score: 17.4 ( )         Spoke with: N/A    Care Transitions Subsequent and Final Call    Subsequent and Final Calls  Are you currently active with any services?: Home Health  Care Transitions Interventions  Other Interventions: Follow Up  No future appointments. Attempted to make contact with patient/caregiver for a routine Care Transitions Coordination follow up call without success. Unable to leave a HIPAA compliant message regarding intent of call and call back information. Line busy. CTN to try another time.     Mark Arzate RN

## 2022-06-04 PROBLEM — R55 NEAR SYNCOPE: Status: ACTIVE | Noted: 2022-01-01

## 2022-06-04 NOTE — PROGRESS NOTES
Pharmacy Adjustment per [facility] protocol    Rosa Ruiz is a 68 y.o. female. Pharmacy has adjusted medications per [facility] protocol. Recent Labs     06/04/22  1315   BUN 54*       Recent Labs     06/04/22  1315   CREATININE 0.9       Estimated Creatinine Clearance: 34 mL/min (based on SCr of 0.9 mg/dL).     Height:   Ht Readings from Last 1 Encounters:   06/04/22 5' 3\" (1.6 m)     Weight:  Wt Readings from Last 1 Encounters:   06/04/22 90 lb 3.2 oz (40.9 kg)         Plan: Adjust the following medications based on [facility] protocol:           Cefepime to 2 g IV once over 30 minutes followed by 2 g IV every 12 hours extended infusion over 4 hours    Electronically signed by Marco Antonio Dean [facility] on 6/4/2022 at 4:44 PM

## 2022-06-04 NOTE — H&P
[facility]      Hospitalist - History & Physical      PCP: Joshua Lazar MD    Date of Admission: 6/4/2022    Date of Service: 6/4/2022    Chief Complaint:  Shortness of breath    History Of Present Illness: The patient is a 68 y.o. female who presented to [facility] ER with PMH CAD, CABG, carotid endarterectomy, COPD continuous oxygen therapy 2L, CHF, Hyperlipidemia,  complaining of worsening shortness of breath. Patient states that she has felt ill ongoing for the past few days. She states that she has been getting dyspneic on minimal exertion, dry cough, and generalized weakness. She states yesterday while at rest she began to experience constant chest heaviness and she took sublingual nitroglycerin with relief of symptoms. Today she states she noted even worsening fatigue, she took a breathing treatment thinking that would help however provided minimal to no relief. This afternoon at about 1300 her daughter was assisting her to the bathroom patient had to stop midway and sat on her Rollator walker to rest.  Daughter states that the patient became very gray, cool and clammy, tachypnic and increased heart rate. Daughter notifies EMS. Denies fever, vomiting, diarrhea, and abdominal pain. Patient states she has a chronic cough however has not noticed change in color or consistency. Denies hemoptysis. Currently patient is chest pain-free. Patient is on chronic steroids however recently her respiratory doctor has reduced her steroids to 2 pills a day. Work-up in ER CXR worsening bilateral lower lobe infiltrates, enlarging small bilateral pleural effusion, WBC 25.8, Hgb of 5, HCT 17.3, troponin 0.04, BNP 55872, ABG pH 7.47 PCO2 44 PO2 54 HCO3 32, EKG NSR 82 no acute ischemic change. Guaiac negative. Patient is to be admitted to the hospitalist service due to acute on chronic anemia.   Past Medical History:        Diagnosis Date    Aortic stenosis     Arthritis     Atherosclerosis of native arteries of the extremities with intermittent claudication 2011    CAD (coronary artery disease)     Carotid aneurysm, right (HCC)     Carotid artery occlusion     [facility]     [facility]     History of blood transfusion 2016    Post op, was taking blood thinner    Hyperlipidemia     Hypertension     MI (myocardial infarction) ([address] 2009    x2    Mitral valve stenosis     Pneumonia     Post-menopausal     PUD (peptic ulcer disease) 2009    Renal artery stenosis ([address] 08/10/2021    s/p stenting to L    Renal failure 2009    after ulcer perforation sepsis.     Severe malnutrition ([facility] [facility]     Thyroid disease     Wound infection after surgery     right foot infection after cabg       Past Surgical History:        Procedure Laterality Date    ABDOMEN SURGERY      perforated ulcer resection of 1/3 stomach    CARDIAC SURGERY      artificial valve and bypass    CAROTID ENDARTERECTOMY      Right  with Dacron patch angioplasty    CAROTID ENDARTERECTOMY Left     CAROTID ENDARTERECTOMY Right 2019    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 AND REPAIR WITH REVERSED LEFT GREATER SAPHENOUS VEIN INTERPOSITIONAL BYPASS GRAFT performed by Ezio Benavides MD at [facility] Right early 's    long ago    CATARACT REMOVAL WITH IMPLANT Bilateral      SECTION  1972    COLONOSCOPY  2012    Dr Dixon Maria A:  HP    CORONARY ANGIOPLASTY WITH STENT PLACEMENT  08/10/2021    RM- RCA    CORONARY ANGIOPLASTY WITH STENT PLACEMENT      CORONARY ARTERY BYPASS GRAFT      DIAGNOSTIC CARDIAC CATH LAB PROCEDURE      DILATION AND CURETTAGE OF UTERUS      ILIO-FEMORAL BYPASS GRAFT N/A 2016    OPEN TRANSLUMINAL BALLOON ANGIOPLASTY AND STENTING OF RIGHT COMMON AND EXTERNAL  ILIAC ARTERIES; RIGHT FEMORAL ENDARTERECTOMY WITH VEIN PATCH ANGIOPLASTY performed by Ezio Benavides MD at [facility] extended release tablet Take 20 mEq by mouth daily 2/1/22   Historical Provider, MD   furosemide (LASIX) 40 MG tablet Take 1 tablet by mouth daily  Patient taking differently: Take 80 mg by mouth daily  1/31/22   BEBO Judge   clopidogrel (PLAVIX) 75 MG tablet Take 1 tablet by mouth daily 8/10/21   Loly Burton MD   isosorbide mononitrate (IMDUR) 30 MG extended release tablet Take 30 mg by mouth daily    Historical Provider, MD   amLODIPine (NORVASC) 5 MG tablet Take 1 tablet by mouth 2 times daily 7/30/21   Loly Burton MD   metoprolol (LOPRESSOR) 100 MG tablet Take 0.5 tablets by mouth 2 times daily 50 mg in the AM & 50 mg in the PM 6/30/21   Jenniffer Aschoff, APRN   albuterol (ACCUNEB) 1.25 MG/3ML nebulizer solution Inhale 1 ampule into the lungs every 6 hours as needed  1/22/21   Historical Provider, MD   budesonide (PULMICORT) 0.5 MG/2ML nebulizer suspension Inhale 0.5 mg into the lungs Daily 2/2/21   Historical Provider, MD   ipratropium-albuterol (DUONEB) 0.5-2.5 (3) MG/3ML SOLN nebulizer solution Inhale 3 mLs into the lungs every 4 hours 2/2/21   Historical Provider, MD   Probiotic Product (PROBIOTIC DAILY PO) Take 1 tablet by mouth daily    Historical Provider, MD   nitroGLYCERIN (NITROSTAT) 0.4 MG SL tablet Place 1 tablet under the tongue every 5 minutes as needed for Chest pain 8/18/20   BEBO Judge   levothyroxine (SYNTHROID) 25 MCG tablet Take 25 mcg by mouth Daily  11/7/19   Historical Provider, MD   Fexofenadine HCl (MUCINEX ALLERGY PO) Take 1 tablet by mouth 2 times daily     Historical Provider, MD   Arformoterol Tartrate (BROVANA) 15 MCG/2ML NEBU Inhale 15 mcg into the lungs 2 times daily  1/2/20   Historical Provider, MD   OXYGEN Inhale 2 L into the lungs daily     Historical Provider, MD   aspirin EC 81 MG EC tablet Take 1 tablet by mouth daily  Patient taking differently: Take 81 mg by mouth nightly PT TAKES AT NIGHT.  11/20/19   Loly Burton MD   atorvastatin (LIPITOR) 40 MG tablet Take 1 tablet by mouth daily  Patient taking differently: Take 40 mg by mouth nightly PT TAKES AT NIGHT. 11/20/19   Brenda Dorado MD   Biotin 5000 MCG TABS Take 1 tablet by mouth daily     Historical Provider, MD       Allergies:    Levaquin [levofloxacin in d5w], Xarelto [rivaroxaban], and Morphine    Social History:    The patient currently lives home   tobacco:   reports that she quit smoking about 42 years ago. Her smoking use included cigarettes. She quit after 2.00 years of use. She has never used smokeless tobacco.  Alcohol:   reports current alcohol use. Illicit Drugs: denies    Family History:      Problem Relation Age of Onset    Diabetes Mother     Heart Disease Mother     Heart Failure Mother     Diabetes Sister     Heart Disease Sister     High Blood Pressure Sister     Colon Cancer Sister     Liver Disease Sister     Cirrhosis Sister     Colon Cancer Maternal Aunt     Colon Polyps Neg Hx     Esophageal Cancer Neg Hx        Review of Systems:   Review of Systems   Constitutional: Positive for activity change, appetite change, fatigue and unexpected weight change (gain + 5 pounds). HENT: Negative. Respiratory: Positive for cough, chest tightness and shortness of breath. Cardiovascular: Positive for leg swelling. Gastrointestinal: Negative. Genitourinary: Negative. Musculoskeletal: Positive for arthralgias, gait problem and myalgias. Skin: Positive for pallor. Neurological: Positive for weakness. 14 point review of systems is negative except as specifically addressed above. Physical Examination:  BP (!) 152/69   Pulse 79   Temp 98.4 °F (36.9 °C)   Resp 27   Ht 5' 3\" (1.6 m)   Wt 90 lb 3.2 oz (40.9 kg)   SpO2 (!) 30%   BMI 15.98 kg/m²   Physical Exam  Vitals and nursing note reviewed. Constitutional:       General: She is not in acute distress. Appearance: She is ill-appearing (chronically and frail).    HENT:      Mouth/Throat:      Mouth: Mucous membranes are dry. Pharynx: Oropharynx is clear. Eyes:      Extraocular Movements: Extraocular movements intact. Conjunctiva/sclera: Conjunctivae normal.      Pupils: Pupils are equal, round, and reactive to light. Cardiovascular:      Rate and Rhythm: Normal rate and regular rhythm. Pulses: Normal pulses. Heart sounds: Normal heart sounds. No murmur heard. Pulmonary:      Effort: Tachypnea present. No respiratory distress. Breath sounds: Decreased breath sounds present. Chest:      Chest wall: No tenderness. Abdominal:      General: Bowel sounds are normal. There is no distension. Palpations: Abdomen is soft. Tenderness: There is no abdominal tenderness. There is no guarding or rebound. Musculoskeletal:         General: No swelling. Normal range of motion. Cervical back: Normal range of motion and neck supple. No rigidity or tenderness. Right lower le+ Pitting Edema present. Left lower le+ Pitting Edema present. Skin:     General: Skin is warm and dry. Capillary Refill: Capillary refill takes less than 2 seconds. Coloration: Skin is pale. Findings: Bruising present. Neurological:      General: No focal deficit present. Mental Status: She is alert and oriented to person, place, and time. Cranial Nerves: No cranial nerve deficit. Motor: Weakness present. Psychiatric:         Mood and Affect: Mood normal.         Behavior: Behavior normal.          Diagnostic Data:  CBC:  Recent Labs     22  1315   WBC 25.8*   HGB 5.0*   HCT 17.3*   *     BMP:  Recent Labs     22  1315 22  1459   *  --    K 4.3 3.7   CL 98  --    CO2 31*  --    BUN 54*  --    CREATININE 0.9  --    CALCIUM 8.0*  --      Recent Labs     22  1315   AST 20   ALT 16   BILITOT <0.2   ALKPHOS 49     Coag Panel: No results for input(s): INR, PROTIME, APTT in the last 72 hours.   Cardiac Enzymes:   Recent Labs 06/04/22  1315   TROPONINI 0.04*     ABGs:  Lab Results   Component Value Date    PHART 7.470 06/04/2022    PO2ART 54.0 06/04/2022    ZSK8ACN 44.0 06/04/2022     Urinalysis:  Lab Results   Component Value Date    NITRU Negative 08/02/2016    WBCUA 1 08/02/2016    BACTERIA NEGATIVE 08/02/2016    RBCUA 1 08/02/2016    BLOODU Negative 08/02/2016    SPECGRAV 1.008 08/02/2016    GLUCOSEU Negative 08/02/2016     A1C: No results for input(s): LABA1C in the last 72 hours. ABG:  Recent Labs     06/04/22  1459   PHART 7.470*   KUU9VAW 44.0   PO2ART 54.0*   JGP6UDF 32.0*   BEART 7.7*   HGBAE 5.2*   H3IOQEYF 89.1*   CARBOXHGBART 4.1       XR CHEST PORTABLE    Result Date: 6/4/2022  NO PRIOR REPORT AVAILABLE Exam: X-RAY OF THE CHEST Clinical data: COPD, respiratory distress. Technique: Single view of the chest. Prior studies: Radiograph of the chest dated 04/20/2022. Findings:  COPD. Interval worsening bilateral lower lobe infiltrates. Enlarging small bilateral  pleural effusions. Cardiac silhouette is within normal limits. No acute osseous abnormality is detected. No other change. COPD. Interval worsening bilateral lower lobe infiltrates. Enlarging small bilateral  pleural effusions. Recommendation: Follow up as clinically indicated.  Electronically Signed by Mellissa Young MD at 04-Jun-2022 04:59:51 PM               Assessment/Plan:    Acute on chronic anemia   -Guaiac negative   -Iron, TIBC, Ferritin               - Monitor H&H closely              - Transfuse for hemoglobin less than 7 per protocol              - Monitor stools for color              - Monitor on telemetry   -Daily labs     Elevated troponin/ Near syncope   -Trend troponin     - ECHO 7/92/34: LV systolic function EF 08%, severe  LVH, grade 3 diastolic dysfunction, moderate AS,  severe pulmonary HTN   -IV Lasix twice daily    -EKG daily               - Neuro checks              - Orthostatic blood pressure and pulse every shift                - PT/OT              - Fall precaution               - Bed alarm on              - Monitor on telemetry   COPD/ JONES (dyspnea on exertion)   -CXR   -ABG   -Procalcitonin    -CRP   - IV Antibiotics, Cefepime & Rocephin               - Bronchodilators               - IV steroids               - Supplemental oxygen as needed               - Incentive spirometry               - Encourage deep breathing and cough       CKD (chronic kidney disease), stage III ([facility])    Essential hypertension    PAF (paroxysmal atrial fibrillation) ([facility])     Coronary artery disease involving native coronary artery of native heart    Severe malnutrition ([facility])    DVT prophylactic: SCD    Signed:  BEBO Wilkins - CNP, 6/4/2022 4:22 PM

## 2022-06-04 NOTE — ED PROVIDER NOTES
[facility] EMERGENCY DEPT  eMERGENCY dEPARTMENT eNCOUnter      Pt Name: Alcides Childers  MRN: [id number]  [place] 1945  Date of evaluation: 6/4/2022  Provider: King Staton MD    [address] [place] [address]       Chief Complaint   Patient presents with    Shortness of Breath         HISTORY OF PRESENT ILLNESS   (Location/Symptom, Timing/Onset,Context/Setting, Quality, Duration, Modifying Factors, Severity)  Note limiting factors. Alcides Childers is a 68 y.o. female who presents to the emergency department valuation of shortness of breath and chest pain. 79-year-old female with advanced COPD on chronic O2 therapy. Comes in with complaint of increasing shortness of breath and chest pain. She is having episodes of chest pain. Near syncope at home pale according to family. She denies any known fever. Cough is unchanged. She also denies any black stools or melena. Right now she is denying chest pain active. But he is she is tachypneic. And with 2 L O2 she is satting 97. The history is provided by the patient, a relative and medical records. NursingNotes were reviewed. REVIEW OF SYSTEMS    (2-9 systems for level 4, 10 or more for level 5)     Review of Systems   Constitutional: Negative for chills and fever. HENT: Negative for congestion, drooling, facial swelling, nosebleeds, sinus pressure, sore throat and voice change. Eyes: Negative for discharge. Respiratory: Positive for cough and wheezing. Negative for apnea, choking and shortness of breath. Cardiovascular: Negative for chest pain and leg swelling. Gastrointestinal: Negative for abdominal pain, anal bleeding, blood in stool, constipation, diarrhea, nausea and vomiting. Does have some hemorrhoids   Genitourinary: Negative for dysuria and enuresis. Musculoskeletal: Negative for joint swelling. Skin: Positive for color change. Negative for rash and wound. Neurological: Negative for seizures and syncope. Psychiatric/Behavioral: Negative for behavioral problems, hallucinations and suicidal ideas. All other systems reviewed and are negative. A complete review of systems was performed and is negative except as noted above in the HPI. PAST MEDICAL HISTORY     Past Medical History:   Diagnosis Date    Aortic stenosis     Arthritis     Atherosclerosis of native arteries of the extremities with intermittent claudication 2011    CAD (coronary artery disease)     Carotid aneurysm, right (HCC)     Carotid artery occlusion     [facility]     COPD (chronic obstructive pulmonary disease) ([address]     History of blood transfusion 2016    Post op, was taking blood thinner    Hyperlipidemia     Hypertension     MI (myocardial infarction) ([address] 2009    x2    Mitral valve stenosis     Pneumonia     Post-menopausal     PUD (peptic ulcer disease)     Renal artery stenosis ([address] 08/10/2021    s/p stenting to L    Renal failure     after ulcer perforation sepsis.    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 AND REPAIR WITH REVERSED LEFT GREATER SAPHENOUS VEIN INTERPOSITIONAL BYPASS GRAFT performed by César Zambrano MD at [facility] Right early 's    long ago    CATARACT REMOVAL WITH IMPLANT Bilateral      SECTION  1972    COLONOSCOPY  2012    Dr Miranda Ran:  HP    CORONARY ANGIOPLASTY WITH STENT PLACEMENT  08/10/2021    RM- RCA    CORONARY ANGIOPLASTY WITH STENT PLACEMENT      CORONARY ARTERY BYPASS GRAFT  2016    DIAGNOSTIC CARDIAC CATH LAB PROCEDURE      DILATION AND CURETTAGE OF UTERUS      ILIO-FEMORAL BYPASS GRAFT N/A 06/02/2016    OPEN TRANSLUMINAL BALLOON ANGIOPLASTY AND STENTING OF RIGHT COMMON AND EXTERNAL  ILIAC ARTERIES; RIGHT FEMORAL ENDARTERECTOMY WITH VEIN PATCH ANGIOPLASTY performed by Geo Pratt MD at [address] N/A 04/07/2016    MITRAL VALVE  REPLACEMENT LUONG-MAZE ABLATION WITH CRYO PROCEDURE, CORONARY ARTERY BYPASS GRAFT X 1 WITH ENDOSCOPIC VEIN HARVESTING WITH PERFUSION TRANSESOPHAGEAL ECHOCARDIOGRAM performed by Irina Weiner MD at [facility]  2016    PERIPHERAL PERCUTANEOUS ARTERIAL INTERVENTION Left 08/10/2021    RM to L renal artery    LA REOPER, CAROTID ENDARTEC>1 MON Left 01/11/2018    REMOVAL OF HEMATOMA, LEFT CAROTID ARTERY performed by Geo Pratt MD at [address] Left 01/11/2018    LEFT CAROTID ENDARTERECTOMY WITH EEG MONITORING AND COMPLETION DUPLEX ULTRASOUND performed by Geo Pratt MD at [facility]      SKIN GRAFT Right 07/22/2016    Skin graft split thickness foot,ankle,and leg. Right leg 26x8cm and 12x6cm total area 280cm squared. TJR    UPPER GASTROINTESTINAL ENDOSCOPY  07/14/2015    Dr Federica Willett: normal    UPPER GASTROINTESTINAL ENDOSCOPY  03/15/2016    Dr Darek Ruggiero: normal    UPPER GASTROINTESTINAL ENDOSCOPY  05/27/2015    Dr Federica Willett:  paul neg, multiple gastric antial and duodenal ulcers    VASCULAR SURGERY  5/27/16 TJR    Aortagram and right leg runoff,right leg runoff,right common iliac artery selection for right leg run off views.  VASCULAR SURGERY      . Open transluminal angioplasty and stenting of the external iliac artery. TJR    VASCULAR SURGERY  07/11/2019    TJR. 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          CURRENT MEDICATIONS       Previous Medications    ALBUTEROL (ACCUNEB) 1.25 MG/3ML NEBULIZER SOLUTION    Inhale 1 ampule into the lungs every 6 hours as needed     AMLODIPINE (NORVASC) 5 MG TABLET    Take 1 tablet by mouth 2 times daily    ARFORMOTEROL TARTRATE (BROVANA) 15 MCG/2ML NEBU    Inhale 15 mcg into the lungs 2 times daily     ASPIRIN EC 81 MG EC TABLET    Take 1 tablet by mouth daily    ATORVASTATIN (LIPITOR) 40 MG TABLET    Take 1 tablet by mouth daily    BIOTIN 5000 MCG TABS    Take 1 tablet by mouth daily     BUDESONIDE (PULMICORT) 0.5 MG/2ML NEBULIZER SUSPENSION    Inhale 0.5 mg into the lungs Daily    CLOPIDOGREL (PLAVIX) 75 MG TABLET    Take 1 tablet by mouth daily    FEXOFENADINE HCL (MUCINEX ALLERGY PO)    Take 1 tablet by mouth 2 times daily     FUROSEMIDE (LASIX) 40 MG TABLET    Take 1 tablet by mouth daily    GUAIFENESIN (MUCINEX) 600 MG EXTENDED RELEASE TABLET    Take 2 tablets by mouth 2 times daily    IPRATROPIUM-ALBUTEROL (DUONEB) 0.5-2.5 (3) MG/3ML SOLN NEBULIZER SOLUTION    Inhale 3 mLs into the lungs every 4 hours    ISOSORBIDE MONONITRATE (IMDUR) 30 MG EXTENDED RELEASE TABLET    Take 30 mg by mouth daily    LEVOTHYROXINE (SYNTHROID) 25 MCG TABLET    Take 25 mcg by mouth Daily     METOPROLOL (LOPRESSOR) 100 MG TABLET    Take 0.5 tablets by mouth 2 times daily 50 mg in the AM & 50 mg in the PM    NITROGLYCERIN (NITROSTAT) 0.4 MG SL TABLET    Place 1 tablet under the tongue every 5 minutes as needed for Chest pain    OXYGEN    Inhale 2 L into the lungs daily     POTASSIUM CHLORIDE (KLOR-CON M) 20 MEQ EXTENDED RELEASE TABLET    Take 20 mEq by mouth daily    PROBIOTIC PRODUCT (PROBIOTIC DAILY PO)    Take 1 tablet by mouth daily       ALLERGIES     Levaquin [levofloxacin in d5w], Xarelto [rivaroxaban], and Morphine    FAMILY HISTORY       Family History   Problem Relation Age of Onset    Diabetes Mother     Heart Disease Mother     Heart Failure Mother     Diabetes Sister     Heart Disease Sister     High Blood Pressure Sister     Colon Cancer Sister     Liver Disease Sister     Cirrhosis Sister     Colon Cancer Maternal Aunt     Colon Polyps Neg Hx     Esophageal Cancer Neg Hx           SOCIAL HISTORY       Social History     Socioeconomic History    Marital status:      Spouse name: None    Number of children: None    Years of education: None    Highest education level: None   Occupational History    None   Tobacco Use    Smoking status: Former Smoker     Years: 2.00     Types: Cigarettes     Quit date: 1980     Years since quittin.4    Smokeless tobacco: Never Used   Vaping Use    Vaping Use: Never used   Substance and Sexual Activity    Alcohol use: Yes     Comment: rarely maybe twice a year    Drug use: No    Sexual activity: Not Currently     Partners: Male   Other Topics Concern    None   Social History Narrative    None     Social Determinants of Health     Financial Resource Strain:     Difficulty of Paying Living Expenses: Not on file   Food Insecurity:     Worried About Running Out of Food in the Last Year: Not on file    Ward of Food in the Last Year: Not on file   Transportation Needs:     Lack of Transportation (Medical): Not on file    Lack of Transportation (Non-Medical):  Not on file   Physical Activity:     Days of Exercise per Week: Not on file    Minutes of Exercise per Session: Not on file   Stress:     Feeling of Stress : Not on file   Social Connections:     Frequency of Communication with Friends and Family: Not on file    Frequency of Social Gatherings with Friends and Family: Not on file    Attends Yazdanism Services: Not on file    Active Member of Clubs or Organizations: Not on file    Attends Club or Organization Meetings: Not on file    Marital Status: Not on file   Intimate Partner Violence:     Fear of Current or Ex-Partner: Not on file    Emotionally Abused: Not on file    Physically Abused: Not on file    Sexually Abused: Not on file   Housing Stability:     Unable to Pay for Housing in the Last Year: Not on file    Number of Places Lived in the Last Year: Not on file    Unstable Housing in the Last Year: Not on file       SCREENINGS    [place] Coma Scale  Eye Opening: Spontaneous  Best Verbal Response: Oriented  Best Motor Response: Obeys commands  Janki Coma Scale Score: 15        PHYSICAL EXAM    (up to 7 for level 4, 8 or more for level 5)     ED Triage Vitals [06/04/22 1343]   BP Temp Temp Source Heart Rate Resp SpO2 Height Weight   97/79 98.2 °F (36.8 °C) Oral 80 (!) 32 97 % 5' 3\" (1.6 m) 90 lb 3.2 oz (40.9 kg)       Physical Exam  Vitals and nursing note reviewed. Constitutional:       Appearance: She is well-developed. She is ill-appearing (She looks appears chronically ill. She is frail and underweight). HENT:      Head: Normocephalic and atraumatic. Right Ear: External ear normal.      Left Ear: External ear normal.      Nose: Nose normal.      Mouth/Throat:      Mouth: Mucous membranes are dry. Pharynx: Oropharynx is clear. Eyes:      General: No scleral icterus. Conjunctiva/sclera: Conjunctivae normal.      Pupils: Pupils are equal, round, and reactive to light. Cardiovascular:      Rate and Rhythm: Normal rate and regular rhythm. Pulses: Normal pulses. Heart sounds: Normal heart sounds. Pulmonary:      Effort: Tachypnea present. Breath sounds: Wheezing present. Abdominal:      General: Bowel sounds are normal.      Palpations: Abdomen is soft. Tenderness: There is no abdominal tenderness. Genitourinary:     Rectum: Guaiac result negative. Musculoskeletal:         General: Normal range of motion. Cervical back: Normal range of motion and neck supple. Right lower leg: Edema present. Left lower leg: Edema present.    Skin: General: Skin is warm and dry. Coloration: Skin is pale. Skin is not jaundiced. Neurological:      General: No focal deficit present. Mental Status: She is alert and oriented to person, place, and time. Psychiatric:         Mood and Affect: Mood normal.         Behavior: Behavior normal.         DIAGNOSTIC RESULTS     EKG: All EKG's are interpreted by the Emergency Department Physician who either signs or Co-signs this chart in the absence of a cardiologist.    Sinus rhythm rate 82. NE interval 113. QTc 443. No ST changes to indicate ischemia. When compared to April 2022 EKG this EKG looks better. RADIOLOGY:   Non-plain film images such as CT, Ultrasound and MRI are read by the radiologist. Plainradiographic images are visualized and preliminarily interpreted by the emergency physician with the below findings:    I have reviewed the images. Advanced COPD no definite infiltrate by my reading official report is pending at the time of this dictation. Interpretation per the Radiologist below, if available at the time of this note:    XR CHEST PORTABLE   Final Result   COPD. Interval worsening bilateral lower lobe infiltrates. Enlarging small bilateral  pleural effusions. Recommendation: Follow up as clinically indicated.    Electronically Signed by Willis Ortega MD at 04-Jun-2022 04:59:51 PM                     ED BEDSIDE ULTRASOUND:   Performed by ED Physician - none    LABS:  Labs Reviewed   CBC WITH AUTO DIFFERENTIAL - Abnormal; Notable for the following components:       Result Value    WBC 25.8 (*)     RBC 1.83 (*)     Hemoglobin 5.0 (*)     Hematocrit 17.3 (*)     MCHC 28.9 (*)     RDW 16.4 (*)     Platelets 035 (*)     Neutrophils % 81.0 (*)     Lymphocytes % 6.9 (*)     Neutrophils Absolute 20.9 (*)     Monocytes Absolute 1.70 (*)     Anisocytosis 1+ (*)     Polychromasia 1+ (*)     Hypochromia 2+ (*)     Basophilic Stippling Occasional (*)     All other components within normal limits Narrative:     CALL  Zepeda  MAGGIE tel. ,  Hematology results called to and read back by [facility], RN/ED, 06/04/2022  14:09, by [address] [place] [address]  Hematology results called to and read back by [facility], RN/ED, 06/04/2022  14:09, by [facility]   COMPREHENSIVE METABOLIC PANEL W/ REFLEX TO MG FOR LOW K - Abnormal; Notable for the following components:    Sodium 135 (*)     CO2 31 (*)     Anion Gap 6 (*)     Glucose 165 (*)     BUN 54 (*)     Calcium 8.0 (*)     Total Protein 4.0 (*)     Albumin 2.3 (*)     All other components within normal limits   TROPONIN - Abnormal; Notable for the following components:    Troponin 0.04 (*)     All other components within normal limits   BRAIN NATRIURETIC PEPTIDE - Abnormal; Notable for the following components:    Pro-BNP 11,933 (*)     All other components within normal limits   BLOOD GAS, ARTERIAL - Abnormal; Notable for the following components:    pH, Arterial 7.470 (*)     pO2, Arterial 54.0 (*)     HCO3, Arterial 32.0 (*)     Base Excess, Arterial 7.7 (*)     Hemoglobin, Art, Extended 5.2 (*)     O2 Sat, Arterial 89.1 (*)     All other components within normal limits    Narrative:     CALL  Zepeda  Jeremias Collier RN ER, 06/04/2022 15:00, by [facility]   COVID-19, RAPID   TYPE AND SCREEN   PREPARE RBC (CROSSMATCH)       All other labs were within normal range or not returned as of this dictation. EMERGENCY DEPARTMENT COURSE and DIFFERENTIALDIAGNOSIS/MDM:   Vitals:    Vitals:    06/04/22 1343 06/04/22 1550   BP: 97/79 95/77   Pulse: 80 81   Resp: (!) 32 27   Temp: 98.2 °F (36.8 °C) 98.3 °F (36.8 °C)   TempSrc: Oral    SpO2: 97% 94%   Weight: 90 lb 3.2 oz (40.9 kg)    Height: 5' 3\" (1.6 m)        MDM  Number of Diagnoses or Management Options  Acute on chronic anemia  Dyspnea and respiratory abnormalities  Leukocytosis, unspecified type  Myocardial injury  Diagnosis management comments: Hemoccult was negative but her hemoglobin is dropped down to 5.   At this is caused some hypoxic injury to myocardium. It making her respirations more difficult. Her white count was 25 and I think it is a result of being on high-dose chronic steroids. Her respiratory doctor just reduced her steroids down to 2 pills a day. I am going to discussed the hospitalist service for admission. CONSULTS:  None    PROCEDURES:  Unless otherwise notedbelow, none     Procedures    FINAL IMPRESSION     1. Acute on chronic anemia    2. Dyspnea and respiratory abnormalities    3. Myocardial injury    4.  Leukocytosis, unspecified type          DISPOSITION/PLAN   DISPOSITION Decision To Admit 06/04/2022 02:45:44 PM      PATIENT REFERRED TO:  @FUP@    DISCHARGE MEDICATIONS:  New Prescriptions    No medications on file          (Please note that portions of this note were completed with a voice recognition program.  Efforts were made to edit the dictations butoccasionally words are mis-transcribed.)    Angel Mckeon MD (electronically signed)  AttendingEmergency Physician          Ruthann Morton MD  06/04/22 [id number]

## 2022-06-04 NOTE — ED NOTES
Pt became slight hypertensive during first 15min of blood transfusion. Pt denies symptoms at this time.  [facility] made aware and will continue to monitor patient while blood is transfusing.       Donte Collado RN  06/04/22 9443

## 2022-06-05 PROBLEM — D63.8 ANEMIA OF CHRONIC DISEASE: Status: ACTIVE | Noted: 2022-01-01

## 2022-06-05 NOTE — PROGRESS NOTES
4 Eyes Skin Assessment    Cliff Castorena is being assessed upon: Admission    I agree that I, Gray Mojica RN, along with Korina Palma RN  have performed a thorough Head to Toe Skin Assessment on the patient. ALL assessment sites listed below have been assessed. Areas assessed by both nurses:     [x]   Head, Face, and Ears   [x]   Shoulders, Back, and Chest  [x]   Arms, Elbows, and Hands   [x]   Coccyx, Sacrum, and Ischium  [x]   Legs, Feet, and Heels    Does the Patient have Skin Breakdown? Yes, wound(s) noted upon assessment. It is the responsibility of the Primary Nurse to assure that the following documentation, preventions, orders, and consults are complete on the above noted wound(s): Wound LDA initiated. LDA Flowsheet Documentation includes the Denise-wound, Wound Assessment, Measurements, Dressing Treatment, Drainage, and Color. Gabino Prevention initiated: Yes  Wound Care Orders initiated: Yes    [id number]  nurse consulted for Pressure Injury (Stage 3,4, Unstageable, DTI, NWPT, and Complex wounds) and New or Established Ostomies: NA        Primary Nurse eSignature:  Gray Mojica RN on 6/5/2022 at 7:28 AM      Co-Signer eSignature: Electronically signed by Korina Palma RN on 6/5/22 at 7:30 AM CDT

## 2022-06-05 NOTE — PROGRESS NOTES
Patient has two open areas on coccyx/right buttock area one is  1 X 1 cm and other is 0.5 X 0.5 cm. Areas cleaned and Mepilex placed over them. She also has a skin tear on her right elbow that has been cleaned and a Mepilex placed over it. All wounds are present on admission.

## 2022-06-05 NOTE — PROGRESS NOTES
Daily Progress Note    Date:2022  Patient: Ramandeep Bledsoe  : 1945  UWX:[id number]  CODE:Full Code No additional code details  Rama Hamilton MD    Admit Date: 2022  1:42 PM   LOS: 1 day       Subjective:     endorses some dyspnea and orthopnea. No fevers or chills. Cough but no sputum production. Denies chest pain this AM.  No palpitations. Has significant fatigue at baseline.       Review of Systems    14 point review systems completed and is negative except as otherwise noted      Objective:      Vital signs in last 24 hours:  Patient Vitals for the past 24 hrs:   BP Temp Temp src Pulse Resp SpO2   22 1105 -- 96.9 °F (36.1 °C) Temporal -- 22 96 %   22 0930 -- -- -- 84 -- --   22 0559 (!) 141/45 -- -- 94 -- --   22 0557 119/61 -- -- 90 -- --   22 0553 (!) 125/91 98.4 °F (36.9 °C) Temporal 88 16 94 %   22 0012 (!) 155/51 (!) 96.6 °F (35.9 °C) Temporal 80 16 95 %   22 1915 127/60 98.6 °F (37 °C) Temporal 84 22 90 %   22 1728 (!) 180/62 -- -- 82 -- 96 %   22 1700 87/78 -- -- -- -- --   22 1645 (!) 159/56 -- -- -- -- --   22 1630 (!) 159/65 -- -- -- -- --   22 1615 (!) 160/61 -- -- -- -- --   22 1612 (!) 152/69 98.4 °F (36.9 °C) -- 79 27 (!) 30 %   22 1610 (!) 149/55 -- -- 81 27 95 %   22 1606 (!) 165/53 -- -- 79 24 97 %   22 1600 (!) 130/56 -- -- 81 25 96 %   22 1550 95/77 98.3 °F (36.8 °C) -- 81 27 94 %     Physical exam    General: Chronically ill/fatigued appearing, no distress  Psych: Calm and cooperative  HEENT: NC/AT, no scleral icterus  Cardiovascular: Normal rate, pulses equal bilaterally  Respiratory: Diminished with poor air movement bilaterally  Abdomen: Soft, nontender, bowel sounds present  Neurologic: Alert, follows commands, nonfocal  Musculoskeletal: Loss of subcutaneous fat, appears malnourished  Extremities: BLE pitting edema, no clubbing or cyanosis  Skin: Decubitus ulcer on buttock-present on admission; warm and dry, well perfused    Lab Review   Recent Results (from the past 24 hour(s))   Blood Gas, Arterial    Collection Time: 06/04/22  2:59 PM   Result Value Ref Range    pH, Arterial 7.470 (H) 7.350 - 7.450    pCO2, Arterial 44.0 35.0 - 45.0 mmHg    pO2, Arterial 54.0 (L) 80.0 - 100.0 mmHg    HCO3, Arterial 32.0 (H) 22.0 - 26.0 mmol/L    Base Excess, Arterial 7.7 (H) -2.0 - 2.0 mmol/L    Hemoglobin, Art, Extended 5.2 (LL) 12.0 - 16.0 g/dL    O2 Sat, Arterial 89.1 (L) >92 %    Carboxyhgb, Arterial 4.1 0.0 - 5.0 %    Methemoglobin, Arterial 2.0 <1.5 %    O2 Content, Arterial 6.6 Not Established mL/dL    O2 Therapy Unknown     Oxygen Flow 2.0     Spont Rate(bpm) 26     ALLENS TEST POS     Sample Source RR     Potassium, Whole Blood 3.7    SARS-CoV-2 NAAT (Rapid)    Collection Time: 06/04/22  3:12 PM    Specimen: Nasopharyngeal Swab   Result Value Ref Range    SARS-CoV-2, NAAT Not Detected Not Detected   CBC    Collection Time: 06/04/22  6:50 PM   Result Value Ref Range    WBC 26.5 (H) 4.8 - 10.8 K/uL    RBC 2.77 (L) 4.20 - 5.40 M/uL    Hemoglobin 8.0 (L) 12.0 - 16.0 g/dL    Hematocrit 26.3 (L) 37.0 - 47.0 %    MCV 94.9 81.0 - 99.0 fL    MCH 28.9 27.0 - 31.0 pg    MCHC 30.4 (L) 33.0 - 37.0 g/dL    RDW 15.7 (H) 11.5 - 14.5 %    Platelets 530 (H) 571 - 400 K/uL    MPV 9.6 9.4 - 12.3 fL   Troponin    Collection Time: 06/04/22  6:50 PM   Result Value Ref Range    Troponin 0.03 0.00 - 0.03 ng/mL   Troponin    Collection Time: 06/05/22 12:25 AM   Result Value Ref Range    Troponin 0.03 0.00 - 0.03 ng/mL   Basic Metabolic Panel w/ Reflex to MG    Collection Time: 06/05/22 12:25 AM   Result Value Ref Range    Sodium 135 (L) 136 - 145 mmol/L    Potassium reflex Magnesium 3.8 3.5 - 5.0 mmol/L    Chloride 95 (L) 98 - 111 mmol/L    CO2 25 22 - 29 mmol/L    Anion Gap 15 7 - 19 mmol/L    Glucose 248 (H) 74 - 109 mg/dL    BUN 56 (H) 8 - 23 mg/dL    CREATININE 1.1 (H) 0.5 - 0.9 mg/dL    GFR Non- American 48 (A) >60    GFR  58 (L) >59    Calcium 8.1 (L) 8.8 - 10.2 mg/dL   Procalcitonin    Collection Time: 06/05/22 12:25 AM   Result Value Ref Range    Procalcitonin 0.35 (H) 0.00 - 0.09 ng/mL   C-Reactive Protein    Collection Time: 06/05/22 12:25 AM   Result Value Ref Range    CRP 2.86 (H) 0.00 - 0.50 mg/dL   CBC    Collection Time: 06/05/22 12:25 AM   Result Value Ref Range    WBC 27.8 (H) 4.8 - 10.8 K/uL    RBC 2.79 (L) 4.20 - 5.40 M/uL    Hemoglobin 8.1 (L) 12.0 - 16.0 g/dL    Hematocrit 26.4 (L) 37.0 - 47.0 %    MCV 94.6 81.0 - 99.0 fL    MCH 29.0 27.0 - 31.0 pg    MCHC 30.7 (L) 33.0 - 37.0 g/dL    RDW 15.4 (H) 11.5 - 14.5 %    Platelets 217 (H) 126 - 400 K/uL    MPV 9.7 9.4 - 12.3 fL   POCT Glucose    Collection Time: 06/05/22  9:31 AM   Result Value Ref Range    POC Glucose 277 (H) 70 - 99 mg/dl    Performed on [employer]    POCT Glucose    Collection Time: 06/05/22 11:37 AM   Result Value Ref Range    POC Glucose 264 (H) 70 - 99 mg/dl    Performed on [employer]        I/O last 3 completed shifts: In: 971 [I.V.:672; Blood:299]  Out: 867 [DSUNI:043]  I/O this shift:   In: 18 [P.O.:570]  Out: -       Current Facility-Administered Medications:     insulin NPH (HUMULIN N;NOVOLIN N) injection vial 5 Units, 5 Units, SubCUTAneous, BID ACCharles MD    glucose chewable tablet 16 g, 4 tablet, Oral, PRNCharles MD    dextrose bolus 10% 125 mL, 125 mL, IntraVENous, PRN **OR** dextrose bolus 10% 250 mL, 250 mL, IntraVENous, PRNCharles MD    glucagon (rDNA) injection 1 mg, 1 mg, IntraMUSCular, PRN, Charles Villarreal MD    dextrose 5 % solution, 100 mL/hr, IntraVENous, PRN, Charles Villarreal MD    cefepime (MAXIPIME) 1,000 mg in sterile water 10 mL IV syringe, 1,000 mg, IntraVENous, Q12H, Charles Villarreal MD, 1,000 mg at 06/05/22 1013    bumetanide (BUMEX) injection 2 mg, 2 mg, IntraVENous, BID, Charles Villarreal MD    0.9 % sodium chloride infusion, , IntraVENous, PRN, Xiang Dumont MD    sodium chloride flush 0.9 % injection 5-40 mL, 5-40 mL, IntraVENous, 2 times per day, BEBO Veras - CNP, 10 mL at 06/05/22 0846    sodium chloride flush 0.9 % injection 5-40 mL, 5-40 mL, IntraVENous, PRN, BEBO Veras - CNP, 10 mL at 06/04/22 2046    0.9 % sodium chloride infusion, , IntraVENous, PRN, BEBO Veras - CNP    ondansetron (ZOFRAN-ODT) disintegrating tablet 4 mg, 4 mg, Oral, Q8H PRN **OR** ondansetron (ZOFRAN) injection 4 mg, 4 mg, IntraVENous, Q6H PRN, Heri Miller APRN - CNP    polyethylene glycol (GLYCOLAX) packet 17 g, 17 g, Oral, Daily PRN, BEBO Veras - CNP    acetaminophen (TYLENOL) tablet 650 mg, 650 mg, Oral, Q6H PRN **OR** acetaminophen (TYLENOL) suppository 650 mg, 650 mg, Rectal, Q6H PRN, Heri Miller APRN - CNP    ipratropium-albuterol (DUONEB) nebulizer solution 1 ampule, 1 ampule, Inhalation, Q4H WA, BEBO Veras - CNP, 1 ampule at 06/05/22 1402    albuterol (PROVENTIL) nebulizer solution 2.5 mg, 2.5 mg, Nebulization, Q4H PRN, BEBO Veras - CNP    methylPREDNISolone sodium (SOLU-MEDROL) injection 40 mg, 40 mg, IntraVENous, Q6H, 40 mg at 06/05/22 0949 **FOLLOWED BY** [START ON 6/7/2022] predniSONE (DELTASONE) tablet 40 mg, 40 mg, Oral, Daily, BEBO Veras - CNP    atorvastatin (LIPITOR) tablet 40 mg, 40 mg, Oral, Nightly, Heri Miller, APRN - CNP, 40 mg at 06/04/22 2047    guaiFENesin (MUCINEX) extended release tablet 1,200 mg, 1,200 mg, Oral, BID, BEBO Veras CNP, 1,200 mg at 06/05/22 0846    levothyroxine (SYNTHROID) tablet 25 mcg, 25 mcg, Oral, Daily, BEBO Veras CNP, 25 mcg at 06/05/22 0540    aspirin EC tablet 81 mg, 81 mg, Oral, Nightly, BEBO Veras CNP    clopidogrel (PLAVIX) tablet 75 mg, 75 mg, Oral, Daily, BEBO Veras CNP, 75 mg at 06/05/22 0845    metoprolol tartrate (LOPRESSOR) tablet 50 mg, 50 mg, Oral, BID, Haris Villa, MD, 50 mg at 06/05/22 0845    Arformoterol Tartrate (BROVANA) nebulizer solution 15 mcg, 15 mcg, Nebulization, BID, Malka Sanchez MD, 15 mcg at 06/05/22 0616    budesonide (PULMICORT) nebulizer suspension 500 mcg, 0.5 mg, Nebulization, BID, Malka Sanchez MD, 500 mcg at 06/05/22 7947    doxycycline hyclate (VIBRAMYCIN) capsule 100 mg, 100 mg, Oral, 2 times per day, Malka Sanchez MD, 100 mg at 06/05/22 0918        Assessment/plan  Principal Problem:    Acute on chronic anemia  Active Problems:    Acute on chronic diastolic heart failure (HCC)    Near syncope    Anemia of chronic disease    Coronary artery disease involving native coronary artery of native heart    Severe malnutrition (HCC)    CKD (chronic kidney disease), stage III ([facility])    Chronic obstructive pulmonary disease ([facility]    Essential hypertension    PAF (paroxysmal atrial fibrillation) ([facility])    JONES (dyspnea on exertion)  Resolved Problems:    * No resolved hospital problems. *      Summary: 80-year-old female with chronic respiratory failure on 2 L nasal cannula at baseline, diastolic heart failure, COPD, CAD with prior CABG, carotid endarterectomy, recurrent hospitalizations most recently in April for COPD exacerbation, presented with worsening shortness of breath over several days with minimal exertion, dry cough, fatigue and generalized weakness, admitted with decompensated heart failure with significantly elevated proBNP from previous labs, signs of pulmonary edema with bilateral opacities, also difficult to exclude pneumonia. Noted severe acute on chronic anemia with hemoglobin of 5, but no recent bleeding episodes, improved following PRBC transfusions. No evidence of any blood loss, normal iron and ferritin levels, low TIBC suggestive of anemia of inflammation/chronic disease. Developed worsening heart failure despite compliance with oral Lasix 80 mg so was managed with IV Bumex.   Review of last echo 2/14/2022 revealed significant diastolic dysfunction and severe pulmonary hypertension, otherwise preserved LVEF. Received empiric antibiotics for possible pneumonia (chronic leukocytosis but worse from prior labs), nebulized bronchodilators and steroids given uncontrolled severe COPD with chronic steroid use. Symptomatology felt to be multifactorial with symptomatic anemia, heart failure and COPD. Symptomatic acute on chronic anemia of chronic disease, inflammation, CKD  - S/p PRBC transfusion with improvement  - Monitor CBC and transfuse as appropriate    Acute on chronic diastolic heart failure, severe pulmonary hypertension  Monitor telemetry  -IV Bumex 2 mg twice daily, I's and O's, daily weights, echo reviewed from 2/2022    Empiric antibiotics for possible bilateral pneumonia    Nebulized bronchodilators, steroids for severe uncontrolled COPD    Supplemental O2 to maintain adequate gas exchange with goal SPO2 88-92%    Diabetes with hyperglycemia  - Exacerbated by steroids, NPH for steroid-induced hyperglycemia, monitor glucose with further adjustment as needed, avoid hypoglycemia    CAD s/p CABG  /PAD with prior iliac stents, carotid endarterectomy  -aspirin, Plavix    A.  Fib  - Home metoprolol for rate control  - Not on anticoagulation    Malnutrition-nutritional support    CKD with occluded right renal artery, solitary left kidney with severely stenotic left renal artery status post endovascular intervention in 2020- monitor renal function, avoid nephrotoxins    Hypertension  - Monitor BP, continue home antihypertensive regimen including amlodipine, metoprolol, Imdur,  further adjustment as warranted    Decubitus ulcer of right buttock-present on admission  Skin care/wound care      DVT prophylaxis: Richard Mcdaniel MD 6/5/2022 2:18 PM

## 2022-06-05 NOTE — PROGRESS NOTES
Geralynn Scheuermann arrived to room # 075-6. Presented with: COPD  Mental Status: Patient is oriented, alert, coherent, logical, thought processes intact and able to concentrate and follow conversation. Vitals:    06/04/22 1728   BP: (!) 180/62   Pulse: 82   Resp:    Temp:    SpO2: 96%     Patient safety contract and falls prevention contract reviewed with patient Yes. Oriented Patient to room. Call light within reach. Yes.   Needs, issues or concerns expressed at this time: no.      Electronically signed by Su Irby RN on 6/4/2022 at 7:03 PM

## 2022-06-06 NOTE — PROGRESS NOTES
Pharmacy Consult      Phillip Dean is a 68 y.o. female for whom pharmacy has been consulted to review this patient's medication profile to evaluate IV medications and change all base solutions to 0.9% sodium chloride if possible based on compatibility and product availability.      Patient Active Problem List   Diagnosis    Mixed hyperlipidemia    Arthritis    Coronary artery disease involving native coronary artery of native heart    Carotid artery stenosis    Calculus of gallbladder without cholecystitis without obstruction    Mitral valve stenosis    Mitral valve insufficiency    PUD (peptic ulcer disease)    Atherosclerosis of native artery of extremity with intermittent claudication (HCC)    Pulmonary hypertension    Nocturnal hypoxia    Elevated troponin    Acute superficial venous thrombosis of right lower extremity    Non-pressure chronic ulcer of right calf with fat layer exposed ([address]    Decubitus ulcer of right buttock, stage 3 (HCC)    Severe malnutrition (HCC)    Diastolic dysfunction    Anemia in chronic kidney disease (CKD)    Nonhealing nonsurgical wound with fat layer exposed    Atherosclerosis of native arteries of left leg with ulceration of calf (HCC)    Atherosclerosis of native artery of right lower extremity with ulceration of ankle ([address]    Atherosclerosis of native arteries of right leg with ulceration of other part of lower right leg    Atherosclerosis of native arteries of right leg with ulceration of other part of foot    Nonhealing ulcer of right lower leg with fat layer exposed ([address]    Non-pressure chronic ulcer of right ankle with fat layer exposed ([address]    Neuropathic ulcer of right foot with fat layer exposed ([address]    Hypervolemia    GI bleed    Atherosclerosis of native artery of extremity with intermittent claudication (HCC)    CHF (congestive heart failure) ([address]    CKD (chronic kidney disease), stage III ([address]    Pulmonary emphysema (HCC)    Chronic obstructive pulmonary disease ([facility] [address]    PVD (peripheral vascular disease) ([facility] [address]    Wound of sacral region    Elevated TSH    Bilateral carotid artery stenosis    Obstruction of left carotid artery    Bradycardia    Sepsis with organ dysfunction (HCC)    NSTEMI (non-ST elevated myocardial infarction) ([facility] [address]    HCAP (healthcare-associated pneumonia)    Acute renal failure (ARF) ([facility])    Syncope and collapse    Essential hypertension    Mild aortic stenosis    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[facility])    Carotid aneurysm, right ([facility])    Postoperative anemia due to acute blood loss    Status post Maze operation for atrial fibrillation    PAF (paroxysmal atrial fibrillation) ([facility])    S/P coronary artery bypass graft x 1    S/P mitral valve replacement    Chest pain    JONES (dyspnea on exertion)    Fatigue    Pain in both lower extremities    History of coronary artery stent placement    Myalgia    Unstable angina ([facility])    Acute on chronic diastolic heart failure ([facility])    Chronic atrial fibrillation ([facility])    Acute on chronic anemia    OB + stool    COPD exacerbation ([facility])    Near syncope    Anemia of chronic disease       Allergies:  Ativan [lorazepam], Levaquin [levofloxacin in d5w], Xarelto [rivaroxaban], and Morphine     Recent Labs     06/05/22  0025 06/06/22  0139   CREATININE 1.1* 1.1*       Ht/Wt:   Ht Readings from Last 1 Encounters:   06/04/22 5' 3\" (1.6 m)        Wt Readings from Last 1 Encounters:   06/06/22 106 lb 8 oz (48.3 kg)         Estimated Creatinine Clearance: 33 mL/min (A) (based on SCr of 1.1 mg/dL (H)). Assessment/Plan:    Cefepime changed from dextrose to sodium chloride    Thank you for the consult. Will continue to follow.     Electronically signed by Mary Manuel [address] [place] [address] on 6/6/2022 at 9:03 AM

## 2022-06-06 NOTE — CONSULTS
Comprehensive Nutrition Assessment    Type and Reason for Visit:  Initial,Consult    Nutrition Recommendations/Plan:   1. Modify current ONS  Extra gravies/sauces on foods     Malnutrition Assessment:  Malnutrition Status:  Severe malnutrition (06/06/22 1441)    Context:  Chronic Illness     Findings of the 6 clinical characteristics of malnutrition:  Energy Intake:  Mild decrease in energy intake (Comment)  Weight Loss:  No significant weight loss     Body Fat Loss:  Severe body fat loss Orbital,Buccal region   Muscle Mass Loss:  Severe muscle mass loss Hand (interosseous),Temples (temporalis)  Fluid Accumulation:  Mild Extremities   Strength:  Not Performed    Nutrition Assessment:    Received consult for decreased Gabino risk score and severe malnutrition. PO intake is slowly improving, but asking for more gravy and sauces on food as \"they are too dry for me. \"  ONS changed to glucerna d/t elevated gluscoe--takes chalky. Will stop and tryEnsure High Protein chocolate. Nutrition Related Findings:    very thin Wound Type: Open Wounds,Pressure Injury       Current Nutrition Intake & Therapies:    Average Meal Intake: NPO  Average Supplements Intake: NPO  ADULT DIET; Regular; 4 carb choices (60 gm/meal); Low Sodium (2 gm)  ADULT ORAL NUTRITION SUPPLEMENT; Breakfast, Lunch, Dinner; Low Calorie/High Protein Oral Supplement    Anthropometric Measures:  Height: 5' 3\" (160 cm)  Ideal Body Weight (IBW): 115 lbs (52 kg)    Admission Body Weight: 90 lb 3.2 oz (40.9 kg)  Current Body Weight: 106 lb 8 oz (48.3 kg), 92.6 % IBW.     Current BMI (kg/m2): 18.9        Weight Adjustment For: No Adjustment  BMI Categories: Underweight (BMI less than 22) age over 72    Estimated Daily Nutrient Needs:  Energy Requirements Based On: Kcal/kg  Weight Used for Energy Requirements: Current  Energy (kcal/day): 8058-2056 kcals (30-35 kcals/kg)  Weight Used for Protein Requirements: Current  Protein (g/day): 72  Method Used for Fluid Requirements: 1 ml/kcal  Fluid (ml/day): 6574-9961 ml    Nutrition Diagnosis:   · Inadequate oral intake,Altered nutrition-related lab values related to acute injury/trauma,increase demand for energy/nutrients,endocrine dysfuntion as evidenced by wounds,BMI,severe muscle loss,severe loss of subcutaneous fat,lab values      Nutrition Interventions:   Food and/or Nutrient Delivery: Continue Current Diet,Modify Oral Nutrition Supplement  Nutrition Education/Counseling: No recommendation at this time  Coordination of Nutrition Care: Continue to monitor while inpatient  Plan of Care discussed with: pt    Goals:     Goals: PO intake 50% or greater,Meet at least 75% of estimated needs       Nutrition Monitoring and Evaluation:   Behavioral-Environmental Outcomes: None Identified  Food/Nutrient Intake Outcomes: Food and Nutrient Intake,Supplement Intake  Physical Signs/Symptoms Outcomes: Biochemical Data    Discharge Planning:    Continue current diet,Continue Oral Nutrition Supplement     Jayesh Domingo MS, RD, LD  Contact: [phone]

## 2022-06-06 NOTE — PROGRESS NOTES
IV ACCESS obtained. iv antibiotics given. Patient has 2 open areas on bottom. Sits with her head elevated due to her shortness of breath and doesn't turn in bed. Was going to order her a speciality air mattress to help heal open areas, she stated \"I hate those and I don't want one. \"

## 2022-06-06 NOTE — PROGRESS NOTES
Pharmacy Adjustment per [facility] protocol    Rainer Stokes is a 68 y.o. female. Pharmacy has adjusted medications per [facility] protocol. Recent Labs     06/05/22  0025 06/06/22 0139   BUN 56* 60*       Recent Labs     06/05/22  0025 06/06/22 0139   CREATININE 1.1* 1.1*       Estimated Creatinine Clearance: 33 mL/min (A) (based on SCr of 1.1 mg/dL (H)).     Height:   Ht Readings from Last 1 Encounters:   06/04/22 5' 3\" (1.6 m)     Weight:  Wt Readings from Last 1 Encounters:   06/06/22 106 lb 8 oz (48.3 kg)         Plan: Adjust the following medications based on [facility] protocol:           Cefepime 1 g IV every 12 hours extended infusion to 2 g IV every 12 hours extended infusion    Electronically signed by Arron Vick, [facility] on 6/6/2022 at 9:02 AM

## 2022-06-07 PROBLEM — Z51.5 PALLIATIVE CARE PATIENT: Status: ACTIVE | Noted: 2022-01-01

## 2022-06-07 NOTE — CONSULTS
MEDICAL ONCOLOGY CONSULTATION    Pt Name: Samantha Russell  MRN: [id number]  YOB: 1945  Date of evaluation: 6/7/2022    REASON FOR CONSULTATION:  Anemia  REQUESTING PHYSICIAN:Hospitalist    History Obtained From:    patient, electronic medical record    HISTORY OF PRESENT ILLNESS:    I am being consulted for severe anemia. The patient was first seen by me on 6/7/2022 during patient was positioned [facility].  She has multiple comorbidities including history of coronary artery disease status post CABG, carotid endarterectomy, advanced COPD on continuous O2 supplementation, history of congestive heart failure, hyperlipidemia. The patient has had prior admissions in the past for exacerbation of COPD and heart failure. She presented on 6/4/2022 with complaints of worsening shortness of breath. She was admitted for concern for COPD exacerbation/heart failure exacerbation. Work-up in the emergency showed elevated white blood cell count with severe anemia with hemoglobin 5.0/MCV 94, RDW 16.4, thrombocytosis platelet count 918,365. Iron profile showed ferritin 110, iron saturation 40, iron 89, TIBC 220, folate 20, vitamin B12 574. The patient denied any overt GI bleed or hematuria. Of note, she has been on aspirin and Plavix for a history of CAD. She was transfused 2 units PRBCs with a hemoglobin recovery of 8.0. Her hemoglobin is trended down again this morning on 6/7/2022 down to 6.8. She had a normal TSH. I do not see a hemolytic panel. Review of past CBCs showed that the patient has been anemic since at least May 2016. She never had a normal hemoglobin. Most of the time her hemoglobin is in the range of 7-10. She was admitted here in April 2022 with a hemoglobin 10.2 at admission and left with a hemoglobin 7.9.       Past Medical History:    Past Medical History:   Diagnosis Date    Aortic stenosis     Arthritis     Atherosclerosis of native arteries of the extremities with intermittent claudication 2011    Blood circulation, collateral     bilat stent lower extremities    CAD (coronary artery disease)     Carotid aneurysm, right (HCC)     Carotid artery occlusion     [facility]     COPD (chronic obstructive pulmonary disease) ([address]     History of blood transfusion 2016    Post op, was taking blood thinner    Hyperlipidemia     Hypertension     MI (myocardial infarction) ([address] 2009    x2    Mitral valve stenosis     Movement disorder     arthritis    Pneumonia     Post-menopausal     PUD (peptic ulcer disease) 2009    Renal artery stenosis ([address] 08/10/2021    s/p stenting to L    Renal failure 2009    after ulcer perforation sepsis.    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 AND REPAIR WITH REVERSED LEFT GREATER SAPHENOUS VEIN INTERPOSITIONAL BYPASS GRAFT performed by Lorna Collazo MD at [address] [place] [address] Right early 1990's    long ago    CATARACT REMOVAL WITH IMPLANT Bilateral      SECTION  1972    COLONOSCOPY  2012    Dr Zina Suárez:  HP    CORONARY ANGIOPLASTY WITH STENT PLACEMENT  08/10/2021    RM- RCA    CORONARY ANGIOPLASTY WITH STENT PLACEMENT      CORONARY ARTERY BYPASS GRAFT      DIAGNOSTIC CARDIAC CATH LAB PROCEDURE      DILATION AND CURETTAGE OF UTERUS      ILIO-FEMORAL BYPASS GRAFT N/A 2016    OPEN TRANSLUMINAL BALLOON ANGIOPLASTY AND STENTING OF RIGHT COMMON AND EXTERNAL  ILIAC ARTERIES; RIGHT FEMORAL ENDARTERECTOMY WITH VEIN PATCH ANGIOPLASTY performed by Franko Millard MD at [address] N/A 04/07/2016    MITRAL VALVE  REPLACEMENT LUONG-MAZE ABLATION WITH CRYO PROCEDURE, CORONARY ARTERY BYPASS GRAFT X 1 WITH ENDOSCOPIC VEIN HARVESTING WITH PERFUSION TRANSESOPHAGEAL ECHOCARDIOGRAM performed by Mihaela Bacon MD at [facility]  2016    PERIPHERAL PERCUTANEOUS ARTERIAL INTERVENTION Left 08/10/2021    RM to L renal artery    NM REOPER, CAROTID ENDARTEC>1 MON Left 01/11/2018    REMOVAL OF HEMATOMA, LEFT CAROTID ARTERY performed by Franko Millard MD at [address] Left 01/11/2018    LEFT CAROTID ENDARTERECTOMY WITH EEG MONITORING AND COMPLETION DUPLEX ULTRASOUND performed by Franko Millard MD at [address] [facility]      SKIN GRAFT Right 07/22/2016    Skin graft split thickness foot,ankle,and leg. Right leg 26x8cm and 12x6cm total area 280cm squared. TJR    UPPER GASTROINTESTINAL ENDOSCOPY  07/14/2015    Dr Akash Stark: normal    UPPER GASTROINTESTINAL ENDOSCOPY  03/15/2016    Dr Eugenia Cosme: normal    UPPER GASTROINTESTINAL ENDOSCOPY  05/27/2015    Dr Akash Stark:  paul neg, multiple gastric antial and duodenal ulcers    VASCULAR SURGERY  5/27/16 TJR    Aortagram and right leg runoff,right leg runoff,right common iliac artery selection for right leg run off views.  VASCULAR SURGERY      . Open transluminal angioplasty and stenting of the external iliac artery. TJR    VASCULAR SURGERY  07/11/2019    TJR. 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 Social History:    The patient currently lives home   tobacco:   reports that she quit smoking about 42 years ago. Her smoking use included cigarettes. She quit after 2.00 years of use.  She has never used smokeless tobacco.  Alcohol:   reports current alcohol use.   Illicit Drugs: denies    Family History:   Family History   Problem Relation Age of Onset    Diabetes Mother     Heart Disease Mother     Heart Failure Mother     Diabetes Sister     Heart Disease Sister     High Blood Pressure Sister     Colon Cancer Sister     Liver Disease Sister     Cirrhosis Sister     Colon Cancer Maternal Aunt     Colon Polyps Neg Hx     Esophageal Cancer Neg Hx        Current Hospital Medications:    Current Facility-Administered Medications   Medication Dose Route Frequency Provider Last Rate Last Admin    pantoprazole (PROTONIX) tablet 40 mg  40 mg Oral BID AC Eufemia Juares MD   40 mg at 06/07/22 0528    0.9 % sodium chloride infusion   IntraVENous PRN Eufemia Juares MD        potassium chloride (KLOR-CON M) extended release tablet 20 mEq  20 mEq Oral BID Eufemia Juares MD   20 mEq at 06/07/22 0559    cefepime (MAXIPIME) 2000 mg IVPB minibag in NS  2,000 mg IntraVENous Q12H Jess Mistry MD   Stopped at 06/07/22 0601    insulin lispro (HUMALOG) injection vial 0-6 Units  0-6 Units SubCUTAneous TID WC Jess Mistry MD   3 Units at 06/06/22 1739    insulin lispro (HUMALOG) injection vial 0-3 Units  0-3 Units SubCUTAneous Nightly Jess Mistry MD   2 Units at 06/06/22 2059    sildenafil (REVATIO) tablet 20 mg  20 mg Oral TID Jess Mistry MD   20 mg at 06/06/22 2049    insulin NPH (HUMULIN N;NOVOLIN N) injection vial 10 Units  10 Units SubCUTAneous BID AC Jess Mistry MD   10 Units at 06/06/22 1740    glucose chewable tablet 16 g  4 tablet Oral PRN Jess Mistry MD        dextrose bolus 10% 125 mL  125 mL IntraVENous PRN Jess Mistry MD        Or    dextrose bolus 10% 250 mL  250 mL IntraVENous PRN Jess Mistry MD        glucagon (rDNA) injection 1 mg  1 mg IntraMUSCular PRN Jess Mistry MD        dextrose 5 % solution  100 mL/hr IntraVENous PRN Rigo Bauer MD        bumetanide [place]) injection 2 mg  2 mg IntraVENous BID Rigo Bauer MD   2 mg at 06/06/22 2049    isosorbide mononitrate (IMDUR) extended release tablet 30 mg  30 mg Oral Daily Rigo Bauer MD   30 mg at 06/06/22 0849    0.9 % sodium chloride infusion   IntraVENous PRN Arlin Castillo MD        sodium chloride flush 0.9 % injection 5-40 mL  5-40 mL IntraVENous 2 times per day BEBO Haji CNP   10 mL at 06/06/22 2050    sodium chloride flush 0.9 % injection 5-40 mL  5-40 mL IntraVENous PRN BEBO Haji CNP   10 mL at 06/04/22 2046    0.9 % sodium chloride infusion   IntraVENous PRN BEBO Haji CNP        ondansetron (ZOFRAN-ODT) disintegrating tablet 4 mg  4 mg Oral Q8H PRN BEBO Haji CNP        Or    ondansetron [facility]) injection 4 mg  4 mg IntraVENous Q6H PRN BEBO Haji CNP        polyethylene glycol (GLYCOLAX) packet 17 g  17 g Oral Daily PRN BEBO Haji CNP        acetaminophen (TYLENOL) tablet 650 mg  650 mg Oral Q6H PRN BEBO Haji CNP        Or    acetaminophen (TYLENOL) suppository 650 mg  650 mg Rectal Q6H PRN BEBO Haji CNP        ipratropium-albuterol (DUONEB) nebulizer solution 1 ampule  1 ampule Inhalation Q4H WA BEBO Haji CNP   1 ampule at 06/06/22 1823    albuterol (PROVENTIL) nebulizer solution 2.5 mg  2.5 mg Nebulization Q4H PRN BEBO Haji CNP        predniSONE (DELTASONE) tablet 40 mg  40 mg Oral Daily Rigo Bauer MD   40 mg at 06/06/22 0854    atorvastatin (LIPITOR) tablet 40 mg  40 mg Oral Nightly BEBO Haji CNP   40 mg at 06/06/22 2049    guaiFENesin (MUCINEX) extended release tablet 1,200 mg  1,200 mg Oral BID BEBO Haji CNP   1,200 mg at 06/06/22 2049    levothyroxine (SYNTHROID) tablet 25 mcg  25 mcg Oral Daily BEBO Haji CNP   25 mcg at 06/07/22 5428    aspirin EC tablet 81 mg  81 mg Oral Nightly Socrates MeyerBEBO CNP   81 mg at 06/06/22 2049    clopidogrel (PLAVIX) tablet 75 mg  75 mg Oral Daily ManawoodrowBEBO Garcia CNP   75 mg at 06/06/22 0849    metoprolol tartrate (LOPRESSOR) tablet 50 mg  50 mg Oral BID Hubert Ling MD   50 mg at 06/06/22 2050    Arformoterol Tartrate (BROVANA) nebulizer solution 15 mcg  15 mcg Nebulization BID Hubert Ling MD   15 mcg at 06/06/22 1823    budesonide (PULMICORT) nebulizer suspension 500 mcg  0.5 mg Nebulization BID Hubert Ling MD   500 mcg at 06/06/22 1823    doxycycline hyclate (VIBRAMYCIN) capsule 100 mg  100 mg Oral 2 times per day Hubert Ling MD   100 mg at 06/06/22 2049       Allergies: Allergies   Allergen Reactions    Ativan [Lorazepam] Hallucinations    Levaquin [Levofloxacin In D5w] Nausea And Vomiting    Xarelto [Rivaroxaban] Other (See Comments)     Made anemic. CANNOT HAVE DUE TO HEART VALVE REPLACEMENT.  Morphine Itching and Rash         Subjective   REVIEW OF SYSTEMS:   Constitutional: Positive for activity change, appetite change, fatigue and unexpected weight change (gain + 5 pounds). HENT: Negative. Respiratory: Positive for cough, chest tightness and shortness of breath. Cardiovascular: Positive for leg swelling. Gastrointestinal: Negative. Genitourinary: Negative. Musculoskeletal: Positive for arthralgias, gait problem and myalgias. Skin: Positive for pallor. Neurological: Positive for weakness. Objective   BP (!) 142/72   Pulse 92   Temp (!) 96 °F (35.6 °C) (Temporal)   Resp 18   Ht 5' 3\" (1.6 m)   Wt 106 lb 8 oz (48.3 kg)   SpO2 98%   BMI 18.87 kg/m²     PHYSICAL EXAM:  CONSTITUTIONAL: Alert, appropriate, looks chronically ill  EYES: Non icteric, EOM intact, pupils equal round   ENT: Mucus membranes moist,external inspection of ears and nose are normal  NECK: Supple, no masses.   No palpable thyroid mass  CHEST/LUNGS: Decreased breath sounds CARDIOVASCULAR: RRR, no murmurs. bilateral extremity edema  ABDOMEN: soft non-tender, active bowel sounds, no HSM. No palpable masses  EXTREMITIES: warm, full ROM in all 4 extremities, no focal weakness. SKIN: Bruises, warm, dry with no rashes or lesions  LYMPH: No cervical, clavicular, axillary, or inguinal lymphadenopathy  NEUROLOGIC: follows commands, non focal   PSYCH: mood and affect appropriate. Alert and oriented to time, place, person        LABORATORY RESULTS REVIEWED/ANALYZED BY ME:  Recent Labs     06/07/22  0252 06/06/22  0139 06/05/22  0025   WBC 22.9* 24.8* 27.8*   HGB 6.8* 7.1* 8.1*   HCT 22.7* 23.6* 26.4*   MCV 97.0 94.0 94.6    400 434*       Lab Results   Component Value Date     (L) 06/07/2022    K 3.1 (L) 06/07/2022    CL 96 (L) 06/07/2022    CO2 28 06/07/2022    BUN 60 (H) 06/07/2022    CREATININE 1.0 (H) 06/07/2022    GLUCOSE 121 (H) 06/07/2022    CALCIUM 7.9 (L) 06/07/2022    PROT 4.0 (L) 06/04/2022    LABALBU 2.3 (L) 06/04/2022    BILITOT <0.2 06/04/2022    ALKPHOS 49 06/04/2022    AST 20 06/04/2022    ALT 16 06/04/2022    LABGLOM 54 (A) 06/07/2022    GFRAA >59 06/07/2022    GLOB 4.6 08/29/2016       Lab Results   Component Value Date    INR 1.25 (H) 06/04/2022    INR 1.17 04/15/2022    INR 1.14 03/26/2021    PROTIME 15.7 (H) 06/04/2022    PROTIME 14.9 (H) 04/15/2022    PROTIME 14.6 03/26/2021       RADIOLOGY STUDIES REPORT/REVIEWED AND INTERPRETED BY ME:  XR CHEST PORTABLE    Result Date: 6/4/2022  NO PRIOR REPORT AVAILABLE Exam: X-RAY OF THE CHEST Clinical data: COPD, respiratory distress. Technique: Single view of the chest. Prior studies: Radiograph of the chest dated 04/20/2022. Findings:  COPD. Interval worsening bilateral lower lobe infiltrates. Enlarging small bilateral  pleural effusions. Cardiac silhouette is within normal limits. No acute osseous abnormality is detected. No other change. COPD. Interval worsening bilateral lower lobe infiltrates. Enlarging small bilateral  pleural effusions. Recommendation: Follow up as clinically indicated. Electronically Signed by Bienvenido Johnson MD at 04-Jun-2022 04:59:51 PM               ASSESSMENT:  #Normocytic anemia-  -Iron profile compatible with anemia of chronic disease  -Cannot rule out hemolysis or GI blood loss  -Normal TSH  -Patient has been anemic for a long time. Hemoglobin in the range of 7-there for the last 5 years  -Transfused 2 units PRBCs during this admission with hemoglobin trended down again.  -Patient on aspirin and Plavix which increases risk for GI bleed  -Patients with heart failure and chronic anemia and may benefit from IV iron infusion  -Venofer 500 mg IV x2 doses      PLAN:  Check occult blood stools  Check hemolytic panel  Transfuse additional unit PRBC for hemoglobin 6.8 this morning  IV Venofer  500 mg x 2 doses  Discussed bedside RN      I have seen, examined and reviewed this patient medication list, appropriate labs and imaging studies. I reviewed relevant medical records and others physicians notes. I discussed the plans of care with the patient. I answered all the questions to the patients satisfaction. I have also reviewed the chief complaint (CC) and part of the history (History of Present Illness (HPI), Past Family Social History [facility]), or Review of Systems (ROS) and made changes when appropriated.        (Please note that portions of this note were completed with a voice recognition program. Efforts were made to edit the dictations but occasionally words are mis-transcribed.)          Sandra Caldwell MD    06/07/22  6:56 AM

## 2022-06-07 NOTE — PROGRESS NOTES
Daily Progress Note    Date:2022  Patient: Luh Cough  : 1945  NCC:[id number]  CODE:Full Code No additional code details  Shoaib De La Rosa MD    Admit Date: 2022  1:42 PM   LOS: 3 days       Subjective:    Dyspnea improving, good urine output with diuresis. Still with severe fatigue and dyspnea on exertion worse from her baseline. No sputum production. No fevers or chills. BLE swelling still present but continues to improve. Hemoglobin dropped further on labs overnight and transfused 1 unit PRBCs.              Review of Systems    14 point review systems completed and is negative except as otherwise noted      Objective:      Vital signs in last 24 hours:  Patient Vitals for the past 24 hrs:   BP Temp Temp src Pulse Resp SpO2 Weight   22 1603 (!) 122/56 -- -- (!) 103 20 93 % --   22 1559 (!) 156/56 97.5 °F (36.4 °C) Temporal 98 20 94 % --   22 1418 -- -- -- 88 16 94 % --   22 1211 (!) 167/65 97.9 °F (36.6 °C) Temporal 92 20 93 % --   22 0754 -- -- -- -- -- -- 93 lb 8 oz (42.4 kg)   22 0640 -- -- -- 94 20 97 % --   22 0454 (!) 142/72 (!) 96 °F (35.6 °C) Temporal 92 18 98 % --   22 0451 (!) 142/72 (!) 96 °F (35.6 °C) Temporal 91 16 98 % --   22 0435 (!) 147/68 97.8 °F (36.6 °C) Temporal 94 16 98 % --   22 0021 (!) 164/39 97.8 °F (36.6 °C) Temporal 81 16 99 % --   22 2109 -- -- -- (!) 105 -- -- --   22 1758 139/71 98.4 °F (36.9 °C) Temporal (!) 101 20 95 % --     Physical exam    General: Chronically ill/fatigued appearing, no distress  Psych: Calm and cooperative  HEENT: NC/AT, no scleral icterus  Cardiovascular: Normal rate, pulses equal bilaterally  Respiratory: Diminished breath sounds bilaterally, no wheezes  Abdomen: Soft, nontender, bowel sounds present  Neurologic: Alert, follows commands, nonfocal  Musculoskeletal: Loss of subcutaneous fat, appears malnourished  Extremities: Improvement in BLE pitting edema, no clubbing or cyanosis  Skin: Decubitus ulcer on buttock-present on admission; warm and dry, well perfused        Lab Review   Recent Results (from the past 24 hour(s))   POCT Glucose    Collection Time: 06/06/22  8:11 PM   Result Value Ref Range    POC Glucose 303 (H) 70 - 99 mg/dl    Performed on AccuChek    Haptoglobin    Collection Time: 06/07/22  2:51 AM   Result Value Ref Range    Haptoglobin 86.0 30.0 - 200.0 mg/dL   Lactate Dehydrogenase    Collection Time: 06/07/22  2:51 AM   Result Value Ref Range     (H) 91 - 215 U/L   Basic Metabolic Panel w/ Reflex to MG    Collection Time: 06/07/22  2:52 AM   Result Value Ref Range    Sodium 134 (L) 136 - 145 mmol/L    Potassium reflex Magnesium 3.1 (L) 3.5 - 5.0 mmol/L    Chloride 96 (L) 98 - 111 mmol/L    CO2 28 22 - 29 mmol/L    Anion Gap 10 7 - 19 mmol/L    Glucose 121 (H) 74 - 109 mg/dL    BUN 60 (H) 8 - 23 mg/dL    CREATININE 1.0 (H) 0.5 - 0.9 mg/dL    GFR Non-African American 54 (A) >60    GFR African American >59 >59    Calcium 7.9 (L) 8.8 - 10.2 mg/dL   CBC with Auto Differential    Collection Time: 06/07/22  2:52 AM   Result Value Ref Range    WBC 22.9 (H) 4.8 - 10.8 K/uL    RBC 2.34 (L) 4.20 - 5.40 M/uL    Hemoglobin 6.8 (L) 12.0 - 16.0 g/dL    Hematocrit 22.7 (L) 37.0 - 47.0 %    MCV 97.0 81.0 - 99.0 fL    MCH 29.1 27.0 - 31.0 pg    MCHC 30.0 (L) 33.0 - 37.0 g/dL    RDW 17.2 (H) 11.5 - 14.5 %    Platelets 706 556 - 471 K/uL    MPV 10.0 9.4 - 12.3 fL    Neutrophils % 86.5 (H) 50.0 - 65.0 %    Lymphocytes % 3.1 (L) 20.0 - 40.0 %    Monocytes % 6.2 0.0 - 10.0 %    Eosinophils % 0.0 0.0 - 5.0 %    Basophils % 0.1 0.0 - 1.0 %    Neutrophils Absolute 19.8 (H) 1.5 - 7.5 K/uL    Immature Granulocytes # 0.9 K/uL    Lymphocytes Absolute 0.7 (L) 1.1 - 4.5 K/uL    Monocytes Absolute 1.40 (H) 0.00 - 0.90 K/uL    Eosinophils Absolute 0.00 0.00 - 0.60 K/uL    Basophils Absolute 0.00 0.00 - 0.20 K/uL   Magnesium    Collection Time: 06/07/22  2:52 AM   Result Value Ref Range    Magnesium 1.9 1.6 - 2.4 mg/dL   POCT Glucose    Collection Time: 06/07/22  5:44 AM   Result Value Ref Range    POC Glucose 105 (H) 70 - 99 mg/dl    Performed on AccuChek    EKG 12 lead    Collection Time: 06/07/22  6:51 AM   Result Value Ref Range    P-R Interval 120 ms    QRS Duration 102 ms    Q-T Interval 370 ms    QTc Calculation (Bazett) 420 ms    P Axis 77 degrees    T Axis 68 degrees   POCT Glucose    Collection Time: 06/07/22  7:25 AM   Result Value Ref Range    POC Glucose 99 70 - 99 mg/dl    Performed on AccuChek    POCT Glucose    Collection Time: 06/07/22 10:56 AM   Result Value Ref Range    POC Glucose 200 (H) 70 - 99 mg/dl    Performed on AccuChek        I/O last 3 completed shifts:   In: 2160 [P.O.:2160]  Out: 2850 [Urine:2850]  I/O this shift:  In: 1143.8 [P.O.:930; Blood:213.8]  Out: 700 [Urine:700]      Current Facility-Administered Medications:     pantoprazole (PROTONIX) tablet 40 mg, 40 mg, Oral, BID AC, Zina Schilder, MD, 40 mg at 06/07/22 0528    potassium chloride (KLOR-CON M) extended release tablet 40 mEq, 40 mEq, Oral, BID, Nathan Peralta MD, 40 mEq at 06/07/22 1017    insulin NPH (HUMULIN N;NOVOLIN N) injection vial 8 Units, 8 Units, SubCUTAneous, BID AC, Nathan Peralta MD    iron sucrose (VENOFER) 500 mg in sodium chloride 0.9 % 250 mL IVPB, 500 mg, IntraVENous, Q24H, Nathan Peralta MD, Last Rate: 78.6 mL/hr at 06/07/22 1455, 500 mg at 06/07/22 1455    cefepime (MAXIPIME) 2000 mg IVPB minibag in NS, 2,000 mg, IntraVENous, Q12H, Nathan Peralta MD, Stopped at 06/07/22 1451    insulin lispro (HUMALOG) injection vial 0-6 Units, 0-6 Units, SubCUTAneous, TID WC, Nathan Peralta MD, 2 Units at 06/07/22 1403    insulin lispro (HUMALOG) injection vial 0-3 Units, 0-3 Units, SubCUTAneous, Nightly, Nathan Peralta MD, 2 Units at 06/06/22 2059    sildenafil (REVATIO) tablet 20 mg, 20 mg, Oral, TID, Nathan Peralta MD, 20 mg at 06/07/22 1403    glucose chewable tablet 16 g, 4 tablet, Oral, PRN, Jess Jaime MD    dextrose bolus 10% 125 mL, 125 mL, IntraVENous, PRN **OR** dextrose bolus 10% 250 mL, 250 mL, IntraVENous, PRN, Jess Jaime MD    glucagon (rDNA) injection 1 mg, 1 mg, IntraMUSCular, PRN, Jess Jaime MD    dextrose 5 % solution, 100 mL/hr, IntraVENous, PRN, Jess Jaime MD    bumetanide [facility]) injection 2 mg, 2 mg, IntraVENous, BID, Jess Jaime MD, 2 mg at 22 1017    isosorbide mononitrate (IMDUR) extended release tablet 30 mg, 30 mg, Oral, Daily, Jess Jaime MD, 30 mg at 22 1017    sodium chloride flush 0.9 % injection 5-40 mL, 5-40 mL, IntraVENous, 2 times per day, Ozell Prima, APRN - CNP, 10 mL at 22 1020    sodium chloride flush 0.9 % injection 5-40 mL, 5-40 mL, IntraVENous, PRN, Ozell Prima, APRN - CNP, 10 mL at 22 2046    0.9 % sodium chloride infusion, , IntraVENous, PRN, Ozell Prima, APRN - CNP    ondansetron (ZOFRAN-ODT) disintegrating tablet 4 mg, 4 mg, Oral, Q8H PRN **OR** ondansetron (ZOFRAN) injection 4 mg, 4 mg, IntraVENous, Q6H PRN, Ozell Prima, APRN - CNP    polyethylene glycol (GLYCOLAX) packet 17 g, 17 g, Oral, Daily PRN, Ozell Prima, APRN - CNP    acetaminophen (TYLENOL) tablet 650 mg, 650 mg, Oral, Q6H PRN **OR** acetaminophen (TYLENOL) suppository 650 mg, 650 mg, Rectal, Q6H PRN, Ozell Prima, APRN - CNP    ipratropium-albuterol (DUONEB) nebulizer solution 1 ampule, 1 ampule, Inhalation, Q4H WA, Ozell Prima, APRN - CNP, 1 ampule at 22 1418    albuterol (PROVENTIL) nebulizer solution 2.5 mg, 2.5 mg, Nebulization, Q4H PRN, BEBO Wright - CNP    [] methylPREDNISolone sodium (SOLU-MEDROL) injection 40 mg, 40 mg, IntraVENous, Q6H, 40 mg at 22 1658 **FOLLOWED BY** predniSONE (DELTASONE) tablet 40 mg, 40 mg, Oral, Daily, Jess Jaime MD, 40 mg at 22 1020    atorvastatin (LIPITOR) tablet 40 mg, 40 mg, Oral, Nightly, Viktoriya Ghosh BEBO Mendes - CNP, 40 mg at 06/06/22 2049    guaiFENesin [facility]) extended release tablet 1,200 mg, 1,200 mg, Oral, BID, BEBO Ayers - CNP, 1,200 mg at 06/07/22 1018    levothyroxine (SYNTHROID) tablet 25 mcg, 25 mcg, Oral, Daily, BEBO Ayers - CNP, 25 mcg at 06/07/22 1995    aspirin EC tablet 81 mg, 81 mg, Oral, Nightly, BEBO Ayers - CNP, 81 mg at 06/06/22 2049    clopidogrel (PLAVIX) tablet 75 mg, 75 mg, Oral, Daily, BEBO Ayers - CNP, 75 mg at 06/07/22 1018    metoprolol tartrate (LOPRESSOR) tablet 50 mg, 50 mg, Oral, BID, Charles Villarreal MD, 50 mg at 06/07/22 1018    Arformoterol Tartrate (BROVANA) nebulizer solution 15 mcg, 15 mcg, Nebulization, BID, Charles Villarreal MD, 15 mcg at 06/07/22 0650    budesonide (PULMICORT) nebulizer suspension 500 mcg, 0.5 mg, Nebulization, BID, Charles Villarreal MD, 500 mcg at 06/07/22 9767    doxycycline hyclate (VIBRAMYCIN) capsule 100 mg, 100 mg, Oral, 2 times per day, Charles Villarreal MD, 100 mg at 06/07/22 1017        Assessment/plan  Principal Problem:    Acute on chronic anemia  Active Problems:    Acute on chronic diastolic heart failure ([address]    Near syncope    Anemia of chronic disease    Palliative care patient    Coronary artery disease involving native coronary artery of native heart    Severe malnutrition (HCC)    CKD (chronic kidney disease), stage III ([address]    Chronic obstructive pulmonary disease ([address]    Essential hypertension    PAF (paroxysmal atrial fibrillation) ([address]    JONES (dyspnea on exertion)  Resolved Problems:    * No resolved hospital problems.  *      Summary: 79-year-old female with chronic respiratory failure on 2 L nasal cannula at baseline, diastolic heart failure, COPD, CAD with prior CABG, carotid endarterectomy, recurrent hospitalizations most recently in April for COPD exacerbation, presented with worsening shortness of breath over several days with minimal exertion, dry cough, fatigue and Exacerbated by steroids, NPH for steroid-induced hyperglycemia, monitor glucose with further adjustment as needed, sliding scale insulin, avoid hypoglycemia    CAD s/p CABG  PAD with prior iliac stents, carotid endarterectomy  -aspirin, Plavix    A.  Fib  - Home metoprolol for rate control  - Not on anticoagulation per patient preference with significant bleeding risk    Malnutrition-nutritional support    CKD with occluded right renal artery, solitary left kidney with severely stenotic left renal artery status post endovascular intervention in 2020- monitor renal function, avoid nephrotoxins    Hypertension  - Monitor BP, continue home antihypertensive regimen including amlodipine, metoprolol, Imdur,  further adjustment as warranted    Decubitus ulcer of right buttock-present on admission  Skin care/wound care    Hypokalemia  - Monitor and replete electrolytes    Debility, deconditioning  - Encourage ambulation, PT/OT       DVT prophylaxis: SCDs          Tracy Roy MD 6/7/2022 4:07 PM

## 2022-06-07 NOTE — PROGRESS NOTES
Doc replied at [address]. She  ordered to collect stools on her. Then she said if you want, you can order one unit. Asked the charge nurse and we agreed to transfuse 1 unit.

## 2022-06-07 NOTE — CONSULTS
Palliative Care Consult Note    6/7/2022 7:52 AM  Subjective:  Admit Date: 6/4/2022  PCP: David Perla MD    Date of Service: 6/7/2022    Reason for Consultation:  Goals of Care, Code Status, Family Support     History Obtained From: EMR/Patient and their Family    History Of Present Illness: The patient is a 68 y.o. female with PMH CAD with CABG, carotid endarterectomy, COPD with continuous home O2 at 2L, HTN, HLD, CHF with diastolic dysfunction and pulmonary hypertension, and multiple  other comorbiditieswho presented [facility] complaining of shortness of breath and CP. She has had multiple admissions for CHF/COPD exacerbation. She presented this stay with increased dyspnea on minimal exertion, dry cough, and generalized weakness. Daughter called EMS after attempting to assist pt to bathroom with rollator walker when she became cool, clammy, tachypnic and tachycardic, and grey in color. Patient is on chronic steroids however recently her respiratory doctor has reduced her steroids to 2 pills a day. Work-up in ER CXR worsening bilateral lower lobe infiltrates, enlarging small bilateral pleural effusion, WBC 25.8, Hgb of 5, HCT 17.3, troponin 0.04, BNP 21885, ABG pH 7.47 PCO2 44 PO2 54 HCO3 32, EKG NSR 82 no acute ischemic change. Guaiac negative with no overt evidence of GI bleeding at home per pt/family. She was admitted under hospitalist services and transfused 2 units PRBCs with hgb recovery of 8 but downtrended again morning of 6/7/2022 to 6.8. Hematology has evaluated with venofer, OB stool, and hemolytic panel ordered. Palliative care is consulted for goals of care, code status discussion, family support, and symptom management.      Past Medical History:        Diagnosis Date    Aortic stenosis     Arthritis     Atherosclerosis of native arteries of the extremities with intermittent claudication 07/25/2011    Blood circulation, collateral     bilat stent lower extremities    CAD (coronary artery disease)     Carotid aneurysm, right (HCC)     Carotid artery occlusion     CHF (congestive heart failure) (HCC)     [facility]) [facility]     History of blood transfusion 2016    Post op, was taking blood thinner    Hyperlipidemia     Hypertension     MI (myocardial infarction) ([address] 2009    x2    Mitral valve stenosis     Movement disorder     arthritis    Pneumonia     Post-menopausal     PUD (peptic ulcer disease) 2009    Renal artery stenosis ([facility] 08/10/2021    s/p stenting to L    Renal failure 2009    after ulcer perforation sepsis.     Severe malnutrition ([facility]     Thyroid disease     Wound infection after surgery     right foot infection after cabg       Past Surgical History:        Procedure Laterality Date    ABDOMEN SURGERY  2009    perforated ulcer resection of 1/3 stomach    CARDIAC SURGERY      artificial valve and bypass    CAROTID ENDARTERECTOMY      Right  with Dacron patch angioplasty    CAROTID ENDARTERECTOMY Left     CAROTID ENDARTERECTOMY Right 2019    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 AND REPAIR WITH REVERSED LEFT GREATER SAPHENOUS VEIN INTERPOSITIONAL BYPASS GRAFT performed by Karla Reyes MD at [place] [address] Right early 's    long ago    CATARACT REMOVAL WITH IMPLANT Bilateral      SECTION  1972    COLONOSCOPY  2012    Dr Shah Mediate:  HP    CORONARY ANGIOPLASTY WITH STENT PLACEMENT  08/10/2021    RM- RCA    CORONARY ANGIOPLASTY WITH STENT PLACEMENT      CORONARY ARTERY BYPASS GRAFT  2016    DIAGNOSTIC CARDIAC CATH LAB PROCEDURE      DILATION AND CURETTAGE OF UTERUS      ILIO-FEMORAL BYPASS GRAFT N/A 2016    OPEN TRANSLUMINAL BALLOON ANGIOPLASTY AND STENTING OF RIGHT COMMON AND EXTERNAL  ILIAC ARTERIES; RIGHT FEMORAL ENDARTERECTOMY WITH VEIN PATCH ANGIOPLASTY performed by Karla Reyes MD at [facility] N/A 2016    MITRAL VALVE REPLACEMENT LUONG-MAZE ABLATION WITH CRYO PROCEDURE, CORONARY ARTERY BYPASS GRAFT X 1 WITH ENDOSCOPIC VEIN HARVESTING WITH PERFUSION TRANSESOPHAGEAL ECHOCARDIOGRAM performed by Lenard Sousa MD at [address] [place] [address] MITRAL VALVE REPLACEMENT  2016    PERIPHERAL PERCUTANEOUS ARTERIAL INTERVENTION Left 08/10/2021    RM to L renal artery    AK REOPER, CAROTID ENDARTEC>1 MON Left 01/11/2018    REMOVAL OF HEMATOMA, LEFT CAROTID ARTERY performed by Karla Reyes MD at [address] [place] Left 01/11/2018    LEFT CAROTID ENDARTERECTOMY WITH EEG MONITORING AND COMPLETION DUPLEX ULTRASOUND performed by Karla Reyes MD at [address] [place] [address] PTCA      SKIN GRAFT Right 07/22/2016    Skin graft split thickness foot,ankle,and leg. Right leg 26x8cm and 12x6cm total area 280cm squared. TJR    UPPER GASTROINTESTINAL ENDOSCOPY  07/14/2015    Dr Viral Wallace: normal    UPPER GASTROINTESTINAL ENDOSCOPY  03/15/2016    Dr Deneen Kirby: normal    UPPER GASTROINTESTINAL ENDOSCOPY  05/27/2015    Dr Viral Wallace:  paul neg, multiple gastric antial and duodenal ulcers    VASCULAR SURGERY  5/27/16 TJR    Aortagram and right leg runoff,right leg runoff,right common iliac artery selection for right leg run off views.  VASCULAR SURGERY      . Open transluminal angioplasty and stenting of the external iliac artery. TJR    VASCULAR SURGERY  07/11/2019    TJR. 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 Home Medications:  Prior to Admission medications    Medication Sig Start Date End Date Taking?  Authorizing Provider   guaiFENesin (MUCINEX) 600 MG extended release tablet Take 2 tablets by mouth 2 times daily 4/20/22   BEBO Malik - CNP   potassium chloride (KLOR-CON M) 20 MEQ extended release tablet Take 20 mEq by mouth daily 2/1/22   Historical Provider, MD   furosemide (LASIX) 40 MG tablet Take 1 tablet by mouth daily  Patient taking differently: Take 80 mg by mouth daily  1/31/22   BEBO Cook   clopidogrel (PLAVIX) 75 MG tablet Take 1 tablet by mouth daily 8/10/21   Salena Barba MD   isosorbide mononitrate (IMDUR) 30 MG extended release tablet Take 30 mg by mouth daily    Historical Provider, MD   amLODIPine (NORVASC) 5 MG tablet Take 1 tablet by mouth 2 times daily 7/30/21   Salena Barba MD   metoprolol (LOPRESSOR) 100 MG tablet Take 0.5 tablets by mouth 2 times daily 50 mg in the AM & 50 mg in the PM 6/30/21   BEBO Herrera   albuterol (ACCUNEB) 1.25 MG/3ML nebulizer solution Inhale 1 ampule into the lungs every 6 hours as needed  1/22/21   Historical Provider, MD   budesonide (PULMICORT) 0.5 MG/2ML nebulizer suspension Inhale 0.5 mg into the lungs Daily 2/2/21   Historical Provider, MD   ipratropium-albuterol (DUONEB) 0.5-2.5 (3) MG/3ML SOLN nebulizer solution Inhale 3 mLs into the lungs every 4 hours  Patient not taking: Reported on 6/4/2022 2/2/21   Historical Provider, MD   Probiotic Product (PROBIOTIC DAILY PO) Take 1 tablet by mouth daily    Historical Provider, MD   nitroGLYCERIN (NITROSTAT) 0.4 MG SL tablet Place 1 tablet under the tongue every 5 minutes as needed for Chest pain 8/18/20   BEBO Cook   levothyroxine (SYNTHROID) 25 MCG tablet Take 25 mcg by mouth Daily  11/7/19   Historical Provider, MD   Fexofenadine HCl (MUCINEX ALLERGY PO) Take 1 tablet by mouth 2 times daily     Historical Provider, MD   Arformoterol Tartrate (BROVANA) 15 MCG/2ML NEBU Inhale 15 mcg into the lungs 2 times daily  1/2/20   Historical Provider, MD   OXYGEN Inhale 2 L into the lungs daily     Historical Provider, MD   aspirin EC 81 MG EC tablet Take 1 tablet by mouth daily  Patient taking differently: Take 81 mg by mouth nightly PT TAKES AT NIGHT.  11/20/19   Salena Barba MD   atorvastatin (LIPITOR) 40 MG tablet Take 1 tablet by mouth daily  Patient taking differently: Take 40 mg by mouth nightly PT TAKES AT NIGHT. 11/20/19   Andrea Chao MD   Biotin 5000 MCG TABS Take 1 tablet by mouth daily     Historical Provider, MD       Allergies:    Ativan [lorazepam], Levaquin [levofloxacin in d5w], Xarelto [rivaroxaban], and Morphine    Social History:    The patient currently lives at home with spouse  Tobacco:   reports that she quit smoking about 42 years ago. Her smoking use included cigarettes. She quit after 2.00 years of use. She has never used smokeless tobacco.  Alcohol:   reports current alcohol use.   Illicit Drugs: None known    Family History:      Problem Relation Age of Onset    Diabetes Mother     Heart Disease Mother     Heart Failure Mother     Diabetes Sister     Heart Disease Sister     High Blood Pressure Sister     Colon Cancer Sister     Liver Disease Sister     Cirrhosis Sister     Colon Cancer Maternal Aunt     Colon Polyps Neg Hx     Esophageal Cancer Neg Hx        Review of Systems:   Constitutional / general:  Denies fever / chills / sweats / +fatigue +activity change +appetite change  Head:  Denies headache / neck stiffness / trauma / visual change  Eyes:  Denies blurry vision / acute visual change or loss / itching / redness  ENT: Denies sore throat / hoarseness / nasal drainage / ear pain  CV:  Denies palpitations/ orthopnea / +chest tightness  Respiratory:  Denies hemoptysis / +cough +SOB +sputum  GI: Denies nausea / vomiting / abdominal pain / diarrhea / constipation  :  Denies dysuria / hesitancy / urgency / hematuria   Neuro: Denies paralysis / syncope / seizure / dysphagia / headache / paresthesias / +weakness  Musculoskeletal:  Denies joint stiffness / +pain +gait disturbance  Vascular: Denies edema / claudication / varicosities  Heme / endocrine: Denies easy bruising / bleeding / excessive sweating / heat or cold intolerance  Psychiatric:  Denies depression / anxiety / insomnia / mood changes  Skin:  Denies new rashes / lesions / skin hair or nail changes    14 point review of systems is negative except as specifically addressed above. Physical Examination:  BP (!) 142/72   Pulse 94   Temp (!) 96 °F (35.6 °C) (Temporal)   Resp 20   Ht 5' 3\" (1.6 m)   Wt 106 lb 8 oz (48.3 kg)   SpO2 97%   BMI 18.87 kg/m²      General appearance: 67 yo female, chronically ill appearing, no acute distress  Head: Normocephalic, without obvious abnormality, atraumatic  Eyes: conjunctivae/corneas clear. PERRL, EOM's intact. Ears: normal external ears and nose  Neck: no JVD, supple, symmetrical, trachea midline   Lungs: diminished to auscultation bilaterally, no rales or wheezes, shallow respiration, cough, NC in place   Heart: RRR, S1, S2 normal, no murmur  Abdomen: soft, non-tender; non-distended, normal bowel sounds   Extremities:No lower extremity edema,  No erythema, no tenderness to palpation  Skin: Warm, dry  Neurologic: Alert and oriented X 3, generalized weakness and normal tone, no focal deficits. Psychiatric: Calm and cooperative    Diagnostic Data:  CBC:  Recent Labs     06/05/22  0025 06/06/22  0139 06/07/22  0252   WBC 27.8* 24.8* 22.9*   HGB 8.1* 7.1* 6.8*   HCT 26.4* 23.6* 22.7*   * 400 378     BMP:  Recent Labs     06/05/22  0025 06/06/22  0139 06/07/22  0252   * 135* 134*   K 3.8 3.7 3.1*   CL 95* 93* 96*   CO2 25 28 28   BUN 56* 60* 60*   CREATININE 1.1* 1.1* 1.0*   CALCIUM 8.1* 8.1* 7.9*     Recent Labs     06/04/22  1315   AST 20   ALT 16   BILITOT <0.2   ALKPHOS 49       XR CHEST PORTABLE  Result Date: 6/4/2022  COPD. Interval worsening bilateral lower lobe infiltrates. Enlarging small bilateral  pleural effusions. Recommendation: Follow up as clinically indicated.  Electronically Signed by Jessica Dobbs MD at 04-Jun-2022 04:59:51 PM             Palliative Performance Scale:  [x] 50% Mainly sit/lie  Extensive disease: Can't do any work  Considerable assistance Normal/reduced intake  LOC full/confusion    Palliative Review of Advance Directives:     Surrogate Decision Maker: Sagar Meléndez, daughter/HCS    Durable Power of : No    Advanced Directives/Living Aguiar: Yes, copy on file    Out of hospital medical orders in place to reflect resuscitation status (MOLST/POLST): No    Information Sharing:  Patient's awareness of illness:  [] Terminal [] Life-Threatening [x] Serious [] Non life-threatening [] Not serious   [] Not discussed    Family awareness of illness:   [] Terminal [] Life-Threatening [x] Serious [] Nonlife-threatening [] Not serious   [] Not discussed    Assessment/Plan:  Principal Problem:    Acute on chronic anemia  Active Problems:    Acute on chronic diastolic heart failure ([facility]    Near syncope    Anemia of chronic disease    Palliative care patient    Coronary artery disease involving native coronary artery of native heart    Severe malnutrition (HCC)    CKD (chronic kidney disease), stage III (HCC)    Chronic obstructive pulmonary disease ([facility] [address]    Essential hypertension    PAF (paroxysmal atrial fibrillation) ([facility] [address]    JONES (dyspnea on exertion)  Resolved Problems:    * No resolved hospital problems. *       Visit Summary:  Chart reviewed, patient discussed with nursing staff. Reviewed health issues, work up and treatment plan as well as factors that lead to hospitalization. Report obtained from RN with no acute events overnight. Ms. Divya Fountain is seen at bedside with her daughter, Jesus Manuel Petty, present. I introduced myself, explained the role of palliative care, and reason for consult. Pt reports feeling somewhat improved since time of admission after receiving blood transfusions. She denies pain at this time but continues to endorse dyspnea and weakness. We had a long discussion regarding patient's current status and plan of care.   They report that she has declined since her admission in April and are relieved hematology has been consulted for chronic anemia. She had been discharged home with [facility] services and progressing with PT at home prior to this exacerbation. We discussed additional test ordered per hematology and awaiting these results while receiving transfusional support as needed. Patient denies BM or any overt signs of GI bleeding since admission. They are also concerned with patient's blood sugar being high and we discussed that patient had been tapering chronic steroids due to hyperglycemia. I explained that they would continue to monitor and treat with insulin as needed while here. Patient lives at home with her spouse however patient's daughter and her partner live close and help patient/spouse often including cooking her meals. They are hopeful that patient will be able to discharge with [facility] services again and request PT/OT evaluations which I have ordered. We also discussed as [facility] services and the ability for palliative care to follow patient at home once discharged and help with medication management and provide supportive care during transition back home. They are agreeable to this service and feel like they need a good plan in place and steps to follow for when patient begins having symptoms leading to acute exacerbations in order to help prevent hospitalization. I left informational pamphlet for [facility] and my contact information with Karla Bledsoe. Opportunity for questions and emotional support provided. Will continue to follow. Recommendations:     1. Palliative Care- GOC continue all medical treatments and monitor for improvement. D/c home with spouse and [facility] services once medically stable, may require HH. Code status- FULL CODE  2. Normocytic anemia- hematology consulted. Iron profile compatible with anemia of chronic disease. Occult blood stool and hemolytic panel ordered with Venofer [id number]. 3 units PRBCs this stay. 3. Acute on chronic CHF- IV bumex with good diuresis. Sildenafil for severe pulmonary HTN.  Echo 2/2022 reviewed with EF 55%, grade III diastolic dysfunction, severe pulmonary hypertension, mild to moderate aortic stenosis, mild AR and TR  4. COPD exacerbation- mgmt per hospitalist. O2 support, 2L NC baseline. Nebs, steroids, empiric abx for possible harshil pneumonia. 5. Hyperglycemia- exacerbated by steroids, NPH ordered per hospitalist  6. CAD s/p CABS- aspirin and plavix resumed  7. Afib- rate controlled  8. Deconditioning- PT/OT to eval, dietician consulted. Encouraged OOB    Thank you for consulting palliative care and allowing us to participate in the care of the patient.       CounselingTopics: Goals of care, Code Status, Disease process education, pt/family support    Time Spent Counseling > 50%:  YES                                   Total Time Spent with patient/family counseling, workup/treatment review, counseling and placement of orders/preparation of this note: 79 minutes    Electronically signed by BEBO Rios CNP on 6/7/2022 at 7:52 AM    (Please note that portions of this note were completed with a voice recognition program.  Efforts were made to edit the dictations but occasionally words are mis-transcribed.)

## 2022-06-07 NOTE — TELEPHONE ENCOUNTER
[facility] Consult      Alcides Childers 1945 (not est with our office)    Room 713    Profound anemia no GI blled reported    Dr. Missy Chiu    Nurse: Denisse [phone]

## 2022-06-07 NOTE — PLAN OF CARE
Problem: Discharge Planning  Goal: Discharge to home or other facility with appropriate resources  Outcome: Progressing     Problem: Safety - Adult  Goal: Free from fall injury  Outcome: Progressing     Problem: Pain  Goal: Verbalizes/displays adequate comfort level or baseline comfort level  Outcome: Progressing     Problem: Skin/Tissue Integrity  Goal: Absence of new skin breakdown  Description: 1. Monitor for areas of redness and/or skin breakdown  2. Assess vascular access sites hourly  3. Every 4-6 hours minimum:  Change oxygen saturation probe site  4. Every 4-6 hours:  If on nasal continuous positive airway pressure, respiratory therapy assess nares and determine need for appliance change or resting period.   Outcome: Progressing     Problem: ABCDS Injury Assessment  Goal: Absence of physical injury  Outcome: Progressing     Problem: Chronic Conditions and Co-morbidities  Goal: Patient's chronic conditions and co-morbidity symptoms are monitored and maintained or improved  Outcome: Progressing

## 2022-06-08 NOTE — PROGRESS NOTES
Occupational Therapy  Facility/Department: [facility] PROGRESSIVE CARE  Occupational Therapy Initial Assessment    Name: Cliff Castorena  : 1945  MRN: [id number]  Date of Service: 2022    Discharge Recommendations:             Patient Diagnosis(es): The primary encounter diagnosis was Acute on chronic anemia. Diagnoses of Dyspnea and respiratory abnormalities, Myocardial injury, Leukocytosis, unspecified type, Acute on chronic diastolic heart failure ([address] COPD exacerbation ([address] and Anemia of chronic disease were also pertinent to this visit. Past Medical History:  has a past medical history of Aortic stenosis, Arthritis, Atherosclerosis of native arteries of the extremities with intermittent claudication, Blood circulation, collateral, CAD (coronary artery disease), Carotid aneurysm, right (HCC), Carotid artery occlusion, CHF (congestive heart failure) ([address] COPD (chronic obstructive pulmonary disease) ([address] History of blood transfusion, Hyperlipidemia, Hypertension, MI (myocardial infarction) ([address] Mitral valve stenosis, Movement disorder, Pneumonia, Post-menopausal, PUD (peptic ulcer disease), Renal artery stenosis ([address] Renal failure, Severe malnutrition ([address] Thyroid disease, and Wound infection after surgery. Past Surgical History:  has a past surgical history that includes  section (); Dilation and curettage of uterus; Colonoscopy (2012); Carpal tunnel release (Right, early ); Upper gastrointestinal endoscopy (2015); Upper gastrointestinal endoscopy (03/15/2016); maze procedure (N/A, 2016); Mitral valve replacement (); Coronary artery bypass graft (); Abdomen surgery (); vascular surgery (16 [facility]); Ilio-femoral Bypass Graft (N/A, 2016); Skin graft (Right, 2016); vascular surgery; Cardiac surgery;  Carotid endarterectomy; pr thromboendartectmy neck,neck incis (Left, 2018); pr reoper, carotid endartec>1 mon (Left, 2018); Carotid endarterectomy (Left); Carotid endarterectomy (Right, 07/11/2019); vascular surgery (07/11/2019); Cataract removal with implant (Bilateral); Percutaneous Transluminal Coronary Angio; Coronary angioplasty with stent (08/10/2021); Diagnostic Cardiac Cath Lab Procedure; Coronary angioplasty with stent; Upper gastrointestinal endoscopy (05/27/2015); and PERIPHERAL PERCUTANEOUS ARTERIAL INTERVENTION (Left, 08/10/2021). Treatment Diagnosis: MI      Assessment   Performance deficits / Impairments: Decreased functional mobility ; Decreased endurance;Decreased ADL status; Decreased balance  Assessment: Will progress as tolerated  Treatment Diagnosis: MI  Prognosis: Good  Decision Making: Low Complexity  REQUIRES OT FOLLOW-UP: Yes  Activity Tolerance  Activity Tolerance: Patient Tolerated treatment well        Plan   Plan  Times per Week: 3-5x/week  Times per Day: Daily     Restrictions       Subjective   General  Chart Reviewed: Yes  Patient assessed for rehabilitation services?: Yes  Family / Caregiver Present: Yes  Diagnosis: MI  General Comment  Comments: Pt. states she has no pain but tingling in B feet     Social/Functional History  Social/Functional History  Lives With: Spouse  Type of Home: House  Home Layout: One level  Home Equipment: Walker, 4 wheeled,Oxygen (Uses 2 liters O2)  ADL Assistance: Independent  Ambulation Assistance: Independent  Transfer Assistance: Independent  Occupation: Retired       Objective   Heart Rate: 92  Heart Rate Source: Monitor  BP: (!) 158/59  BP Location: Right upper arm  Patient Position: Sitting  MAP (Calculated): 92  Resp: 16  SpO2: 96 %  O2 Device: Nasal cannula  Hearing: Within functional limits          Safety Devices  Type of Devices: Call light within reach;Gait belt;Left in chair;Nurse notified        AROM: Within functional limits  Strength: Generally decreased, functional  Coordination: Within functional limits  ADL  Feeding: Independent  Grooming: Independent  UE Bathing: Supervision  LE Bathing: Minimal assistance  UE Dressing: Supervision  LE Dressing: Minimal assistance  Toileting: Minimal assistance     Activity Tolerance  Activity Tolerance: Patient tolerated evaluation without incident  Bed mobility  Supine to Sit: Independent  Transfers  Stand Step Transfers: Contact guard assistance  Sit to stand: Supervision;Contact guard assistance  Transfer Comments: RW and O2     Cognition  Overall Cognitive Status: WNL                                           G-Code     OutComes Score                                                  AM-PAC Score             Goals  Short Term Goals  Time Frame for Short term goals: 1 week  Short Term Goal 1: Supervision with toilet tfers  Short Term Goal 2: Supervision with seated LB dsg tasks  Short Term Goal 3: Supervision with clothing mgmt and toilet hygiene  Short Term Goal 4: Tolerate light ambulatory ADLs in room without excessive shortness of breath or fatigue  Long Term Goals  Long Term Goal 1: Return to PLOF       Therapy Time   Individual Concurrent Group Co-treatment   Time In           Time Out           Minutes                   Gabriel Chester, OT

## 2022-06-08 NOTE — PROGRESS NOTES
Progress Note    Patient name: Bigg Devries  Patient : 1945  MR #[id number]  Room: [facility]    Portions of this note have been copied forward, however, changed to reflect the most current clinical status of this patient. Subjective: Patient is without new complaint this morning. States breathing is better. Currently receiving nebulizer. HISTORY OF PRESENT ILLNESS:  The patient is a 68 y.o. female with PMH CAD with CABG, carotid endarterectomy, COPD with continuous home O2 at 2L, HTN, HLD, CHF with diastolic dysfunction and pulmonary hypertension, and multiple  other comorbiditieswho presented to [facility] ED 2022 complaining of shortness of breath and CP. Hematology consultation was requested regarding severe anemia. The patient was first seen by Dr. Sridhar Linn on 2022 during patient was positioned [facility].  She has multiple comorbidities including history of coronary artery disease status post CABG, carotid endarterectomy, advanced COPD on continuous O2 supplementation, history of congestive heart failure, hyperlipidemia. The patient has had prior admissions in the past for exacerbation of COPD and heart failure. She presented on 2022 with complaints of worsening shortness of breath. She was admitted for concern for COPD exacerbation/heart failure exacerbation. Work-up in the emergency showed elevated white blood cell count with severe anemia with hemoglobin 5.0/MCV 94, RDW 16.4, thrombocytosis platelet count 449,857. Iron profile showed ferritin 110, iron saturation 40, iron 89, TIBC 220, folate 20, vitamin B12 574. The patient denied any overt GI bleed or hematuria. Of note, she has been on aspirin and Plavix for a history of CAD. She was transfused 2 units PRBCs with a hemoglobin recovery of 8.0. Her hemoglobin is trended down again this morning on 2022 down to 6.8. She had a normal TSH. I do not see a hemolytic panel.   Review of past CBCs showed that the patient has been anemic since at least May 2016. She never had a normal hemoglobin. Most of the time her hemoglobin is in the range of 7-10. She was admitted here in April 2022 with a hemoglobin 10.2 at admission and left with a hemoglobin 7.9. Objective   PHYSICAL EXAM:  CONSTITUTIONAL: Alert, appropriate, no acute distress  EYES: Non icteric, EOM intact, pupils equal round  ENT: Oral mucus membranes moist, External inspection of ears and nose are normal.   NECK: Supple, no masses. No JVD  CHEST/LUNGS: CTA bilaterally, diminished breath sounds in the bases. Wearing supplemental O2  CARDIOVASCULAR: RRR, no murmurs. No lower extremity edema   ABDOMEN: soft non-tender, active bowel sounds  EXTREMITIES: warm, Full ROM of all four extremities. No focal weakness. SKIN: warm, dry with no rashes or lesions  NEUROLOGIC: follows commands, non focal.   PSYCH: mood and affect appropriate. Alert and oriented to time and place and person. Vital Signs  BP (!) 155/73   Pulse 92   Temp 97 °F (36.1 °C) (Temporal)   Resp 18   Ht 5' 3\" (1.6 m)   Wt 93 lb 8 oz (42.4 kg)   SpO2 93%   BMI 16.56 kg/m²     Intake/Output Summary (Last 24 hours) at 6/8/2022 0650  Last data filed at 6/8/2022 0615  Gross per 24 hour   Intake 1743.75 ml   Output 1725 ml   Net 18.75 ml       Labs:  CBC:   Recent Labs     06/06/22  0139 06/06/22  0139 06/07/22  0252 06/07/22  1541 06/08/22  0301   WBC 24.8*  --  22.9*  --  24.9*   HGB 7.1*   < > 6.8* 10.1* 9.4*     --  378  --  336    < > = values in this interval not displayed. CMP:   Recent Labs     06/06/22  0139 06/07/22  0252 06/08/22  0301   GLUCOSE 326* 121* 212*   BUN 60* 60* 47*   CREATININE 1.1* 1.0* 1.0*   CO2 28 28 26   CALCIUM 8.1* 7.9* 7.5*     ASSESSMENT:  #Normocytic anemia-  -Iron profile compatible with anemia of chronic disease  -Cannot rule out hemolysis or GI blood loss  -Normal TSH  -[place]: 86, LDH: 303 ()  -Patient has been anemic for a long time. Hemoglobin in the range of 7-there for the last 5 years  -Transfused 2 units PRBCs during this admission with hemoglobin trended down again.  -Patient on aspirin and Plavix which increases risk for GI bleed  -Patients with heart failure and chronic anemia and may benefit from IV iron infusion  -Venofer 500 mg IV x2 doses, dose #2 today     #Neutrophilic leukocytosis  Recent Labs     06/08/22  0301 06/07/22  1541 06/07/22  0252 06/06/22  0139 06/06/22  0139   WBC 24.9*  --  22.9*  --  24.8*   HGB 9.4* 10.1* 6.8*   < > 7.1*   HCT 30.3* 32.4* 22.7*   < > 23.6*   MCV 95.6  --  97.0  --  94.0     --  378  --  400    < > = values in this interval not displayed. #AECOPD/possible pneumonia  As per attending  Receiving Maxipime    PLAN:   Heme positive stool - patient reports h/o OR for gastric ulcer 2009  No evidence of hemolysis  Has received 3 units PRBC this admit  IV Venofer  500 mg x 2 doses, dose #2 today, 6/8/2022  Hgb 9.4 today  Consult GI     (Please note that portions of this note were completed with a voice recognition program. Efforts were made to edit the dictations but occasionally words are mis-transcribed.)    BEBO Liang  06/08/22  6:50 AM  Physician's attestation/substantial contribution:  I, Dr Grace Hickey, independently performed an evaluation on [facility]. I have reviewed relevant medical information/data to include but not limited to medication list, relevant appropriate labs and imaging when applicable. I reviewed other physician's notes, ancillary services and nurses assessment. I have reviewed the above documentation completed by the Nurse Practitioner or Physician Assistant. Please see my additional contributions to the history of present illness, physical examination, and assessment/medical decision-making that reflect my findings and impressions. I have seen and examined the patient and the key elements of all parts of the encounter have been performed by me. I agree with the assessment and plan as outlined by the ARNP/PA. Subjective-no new complaints. Objective- as above  Assessment/plan:  Occult blood positive-history of gastric ulcers in the past.  Consult GI. Hemoglobin 9.4.   Continue to monitor

## 2022-06-08 NOTE — PROGRESS NOTES
Palliative Care Progress Note  6/8/2022 8:34 AM    Patient:  Christoph Jain  YOB: 1945  Primary Care Physician: Dimitrios Valencia MD  Advance Directive: Full Code  Admit Date: 6/4/2022       Hospital Day: 4  Portions of this note have been copied forward, however, changed to reflect the most current clinical status of this patient. CHIEF COMPLAINT/REASON FOR CONSULTATION goals of care, code status discussion, symptom management, and family support    SUBJECTIVE:  Ms. Ramana Tanner is up in recliner chair. Reports feeling better today. Review of Systems:   14 point review of systems is negative except as specifically addressed above. Objective:   VITALS:  BP (!) 155/73   Pulse 90   Temp 97 °F (36.1 °C) (Temporal)   Resp 20   Ht 5' 3\" (1.6 m)   Wt 93 lb 8 oz (42.4 kg)   SpO2 92%   BMI 16.56 kg/m²   24HR INTAKE/OUTPUT:    Intake/Output Summary (Last 24 hours) at 6/8/2022 0834  Last data filed at 6/8/2022 0615  Gross per 24 hour   Intake 1530 ml   Output 1725 ml   Net -195 ml     General appearance: 69 yo female, chronically ill appearing, no acute distress  Head: Normocephalic, without obvious abnormality, atraumatic  Eyes: conjunctivae/corneas clear. PERRL, EOM's intact. Ears: normal external ears and nose  Neck: no JVD, supple, symmetrical, trachea midline   Lungs: diminished to auscultation bilaterally, no rales or wheezes, shallow respiration, cough, NC in place   Heart: RRR, S1, S2 normal, no murmur  Abdomen: soft, non-tender; non-distended, normal bowel sounds   Extremities:No lower extremity edema,  No erythema, no tenderness to palpation  Skin: Warm, dry  Neurologic: Alert and oriented X 3, generalized weakness and normal tone, no focal deficits.    Psychiatric: Calm and cooperative     Medications:      dextrose      sodium chloride        cefdinir  300 mg Oral 2 times per day    pantoprazole  40 mg Oral BID AC    insulin NPH  8 Units SubCUTAneous BID AC    iron sucrose 500 mg IntraVENous Q24H    predniSONE  20 mg Oral Daily    Followed by   Bethany Lopez ON 6/11/2022] predniSONE  15 mg Oral Daily    Followed by   Bethany Lopez ON 6/14/2022] predniSONE  10 mg Oral Daily    Followed by   Bethany Lopez ON 6/17/2022] predniSONE  5 mg Oral Daily    insulin lispro  0-6 Units SubCUTAneous TID WC    insulin lispro  0-3 Units SubCUTAneous Nightly    sildenafil  20 mg Oral TID    bumetanide  2 mg IntraVENous BID    isosorbide mononitrate  30 mg Oral Daily    sodium chloride flush  5-40 mL IntraVENous 2 times per day    ipratropium-albuterol  1 ampule Inhalation Q4H WA    atorvastatin  40 mg Oral Nightly    guaiFENesin  1,200 mg Oral BID    levothyroxine  25 mcg Oral Daily    aspirin EC  81 mg Oral Nightly    clopidogrel  75 mg Oral Daily    metoprolol  50 mg Oral BID    Arformoterol Tartrate  15 mcg Nebulization BID    budesonide  0.5 mg Nebulization BID     glucose, dextrose bolus **OR** dextrose bolus, glucagon (rDNA), dextrose, sodium chloride flush, sodium chloride, ondansetron **OR** ondansetron, polyethylene glycol, acetaminophen **OR** acetaminophen, albuterol  ADULT ORAL NUTRITION SUPPLEMENT; Breakfast, Lunch, Dinner; Low Calorie/High Protein Oral Supplement  ADULT DIET; Regular; 4 carb choices (60 gm/meal); Low Sodium (2 gm)     Lab and other Data:     Recent Labs     06/06/22 0139 06/06/22  0139 06/07/22  0252 06/07/22  1541 06/08/22  0301   WBC 24.8*  --  22.9*  --  24.9*   HGB 7.1*   < > 6.8* 10.1* 9.4*     --  378  --  336    < > = values in this interval not displayed. Recent Labs     06/06/22 0139 06/07/22  0252 06/08/22  0301   * 134* 136   K 3.7 3.1* 4.2   CL 93* 96* 100   CO2 28 28 26   BUN 60* 60* 47*   CREATININE 1.1* 1.0* 1.0*   GLUCOSE 326* 121* 212*     No results for input(s): AST, ALT, ALB, BILITOT, ALKPHOS in the last 72 hours. RAD:   XR CHEST PORTABLE  Result Date: 6/4/2022  COPD. Interval worsening bilateral lower lobe infiltrates. Enlarging small bilateral  pleural effusions. Recommendation: Follow up as clinically indicated. Electronically Signed by Ludin Green MD at 04-Jun-2022 04:59:51 PM             Assessment/Plan   Principal Problem:    Acute on chronic anemia  Active Problems:    Acute on chronic diastolic heart failure (HCC)    Near syncope    Anemia of chronic disease    Palliative care patient    Coronary artery disease involving native coronary artery of native heart    Severe malnutrition (HCC)    CKD (chronic kidney disease), stage III (HCC)    Chronic obstructive pulmonary disease (HCC)    Essential hypertension    PAF (paroxysmal atrial fibrillation) (HCC)    JONES (dyspnea on exertion)  Resolved Problems:    * No resolved hospital problems. *      Visit Summary:  Chart reviewed, patient discussed with nursing staff. Reviewed health issues, work up and treatment plan as well as factors that lead to hospitalization. Ms. Willam Childers is seen at bedside this morning with no new complaints. Report obtained from RN and no acute events over night. She feels she is breathing easier but still having difficulty with activity. Crystal Claudiose to continue working with therapy today. We discussed here positive OB stool and GI consult. There is not plans for scoping at this time as pt is unable to stop her plavix. She is agreeable to conservative management and hopeful to continue outpatient iron therapy with hematology as PO iron makes her very ill. She states she does not feel ready for d/c home and would like to get enough strength back to be able to ambulate to the bathroom without assistance. Emotional support provided. Will continue to follow. Recommendations:   1. Palliative Care- GOC continue all medical treatments and monitor for improvement. D/c home with spouse and SCOP services once medically stable. May require HH, PT/OT to eval. Code status- FULL CODE    2. Normocytic anemia- hematology following.  3 units PRBCs since admission, hgb 9.4

## 2022-06-08 NOTE — PROGRESS NOTES
Daily Progress Note    Date:2022  Patient: Geralynn Scheuermann  : 1945  DYS:[id number]  CODE:Full Code No additional code details  Merry Yun MD    Admit Date: 2022  1:42 PM   LOS: 4 days       Subjective:    Dyspnea improving, good urine output with diuresis. Still with severe fatigue and dyspnea on exertion worse from her baseline. No sputum production. No fevers or chills. BLE swelling still present but continues to improve. FOBT was positive, however no observed melena or hematochezia in her bowel movements.              Review of Systems    14 point review systems completed and is negative except as otherwise noted      Objective:      Vital signs in last 24 hours:  Patient Vitals for the past 24 hrs:   BP Temp Temp src Pulse Resp SpO2   22 1425 -- -- -- 88 15 96 %   22 1203 (!) 158/59 97.9 °F (36.6 °C) Temporal 92 16 96 %   22 0635 -- -- -- 90 20 92 %   22 0615 (!) 155/73 97 °F (36.1 °C) Temporal 92 18 93 %   22 2356 (!) 147/65 97.3 °F (36.3 °C) Temporal 53 18 94 %   22 1746 (!) 166/66 97.3 °F (36.3 °C) Temporal 97 18 93 %     Physical exam    General: Chronically ill/fatigued appearing, no distress  Psych: Calm and cooperative  HEENT: NC/AT, no scleral icterus  Cardiovascular: Normal rate, pulses equal bilaterally  Respiratory: Diminished breath sounds bilaterally, no wheezes  Abdomen: Soft, nontender, bowel sounds present  Neurologic: Alert, follows commands, nonfocal  Musculoskeletal: Loss of subcutaneous fat, appears malnourished  Extremities: BLE edema present but with some improvement, no clubbing or cyanosis  Skin: Decubitus ulcer on buttock-present on admission; warm and dry, well perfused        Lab Review   Recent Results (from the past 24 hour(s))   Blood Occult Stool Screen #1    Collection Time: 22  7:30 PM   Result Value Ref Range    Occult Blood Screening Result: POSITIVE  Normal range: Negative   (A)     Occult Blood QC CANCELED POCT Glucose    Collection Time: 06/07/22  8:44 PM   Result Value Ref Range    POC Glucose 228 (H) 70 - 99 mg/dl    Performed on AccuChek    Basic Metabolic Panel w/ Reflex to MG    Collection Time: 06/08/22  3:01 AM   Result Value Ref Range    Sodium 136 136 - 145 mmol/L    Potassium reflex Magnesium 4.2 3.5 - 5.0 mmol/L    Chloride 100 98 - 111 mmol/L    CO2 26 22 - 29 mmol/L    Anion Gap 10 7 - 19 mmol/L    Glucose 212 (H) 74 - 109 mg/dL    BUN 47 (H) 8 - 23 mg/dL    CREATININE 1.0 (H) 0.5 - 0.9 mg/dL    GFR Non-African American 54 (A) >60    GFR African American >59 >59    Calcium 7.5 (L) 8.8 - 10.2 mg/dL   CBC with Auto Differential    Collection Time: 06/08/22  3:01 AM   Result Value Ref Range    WBC 24.9 (H) 4.8 - 10.8 K/uL    RBC 3.17 (L) 4.20 - 5.40 M/uL    Hemoglobin 9.4 (L) 12.0 - 16.0 g/dL    Hematocrit 30.3 (L) 37.0 - 47.0 %    MCV 95.6 81.0 - 99.0 fL    MCH 29.7 27.0 - 31.0 pg    MCHC 31.0 (L) 33.0 - 37.0 g/dL    RDW 16.9 (H) 11.5 - 14.5 %    Platelets 557 219 - 640 K/uL    MPV 10.1 9.4 - 12.3 fL    Neutrophils % 89.7 (H) 50.0 - 65.0 %    Lymphocytes % 1.4 (L) 20.0 - 40.0 %    Monocytes % 4.7 0.0 - 10.0 %    Eosinophils % 0.0 0.0 - 5.0 %    Basophils % 0.1 0.0 - 1.0 %    Neutrophils Absolute 22.3 (H) 1.5 - 7.5 K/uL    Immature Granulocytes # 1.0 K/uL    Lymphocytes Absolute 0.4 (L) 1.1 - 4.5 K/uL    Monocytes Absolute 1.20 (H) 0.00 - 0.90 K/uL    Eosinophils Absolute 0.00 0.00 - 0.60 K/uL    Basophils Absolute 0.00 0.00 - 0.20 K/uL   EKG 12 lead    Collection Time: 06/08/22  6:38 AM   Result Value Ref Range    P-R Interval 120 ms    QRS Duration 94 ms    Q-T Interval 372 ms    QTc Calculation (Bazett) 424 ms    P Axis 90 degrees    T Axis 70 degrees   POCT Glucose    Collection Time: 06/08/22  7:20 AM   Result Value Ref Range    POC Glucose 184 (H) 70 - 99 mg/dl    Performed on AccuChek    POCT Glucose    Collection Time: 06/08/22 11:09 AM   Result Value Ref Range    POC Glucose 125 (H) 70 - 99 mg/dl    Performed on AccuChek        I/O last 3 completed shifts:   In: 2043.8 [P.O.:1830; Blood:213.8]  Out: 9914 [Urine:3075]  I/O this shift:  In: 330 [P.O.:330]  Out: 550 [Urine:550]      Current Facility-Administered Medications:     cefdinir (OMNICEF) capsule 300 mg, 300 mg, Oral, 2 times per day, Flaquita Reveles MD, 300 mg at 06/08/22 0824    pantoprazole (PROTONIX) tablet 40 mg, 40 mg, Oral, BID AC, Raleigh Fry MD, 40 mg at 06/08/22 0531    insulin NPH (HUMULIN N;NOVOLIN N) injection vial 8 Units, 8 Units, SubCUTAneous, BID AC, Flaquita Reveles MD, 8 Units at 06/08/22 0819    predniSONE (DELTASONE) tablet 20 mg, 20 mg, Oral, Daily, 20 mg at 06/08/22 0818 **FOLLOWED BY** [START ON 6/11/2022] predniSONE (DELTASONE) tablet 15 mg, 15 mg, Oral, Daily **FOLLOWED BY** [START ON 6/14/2022] predniSONE (DELTASONE) tablet 10 mg, 10 mg, Oral, Daily **FOLLOWED BY** [START ON 6/17/2022] predniSONE (DELTASONE) tablet 5 mg, 5 mg, Oral, Daily, Flaquita Reveles MD    insulin lispro (HUMALOG) injection vial 0-6 Units, 0-6 Units, SubCUTAneous, TID WC, Flaquita Reveles MD, 1 Units at 06/08/22 5987    insulin lispro (HUMALOG) injection vial 0-3 Units, 0-3 Units, SubCUTAneous, Nightly, Flaquita Reveles MD, 1 Units at 06/07/22 2102    sildenafil (REVATIO) tablet 20 mg, 20 mg, Oral, TID, Flaquita Reveles MD, 20 mg at 06/08/22 1316    glucose chewable tablet 16 g, 4 tablet, Oral, PRN, Flaquita Reveles MD    dextrose bolus 10% 125 mL, 125 mL, IntraVENous, PRN **OR** dextrose bolus 10% 250 mL, 250 mL, IntraVENous, PRN, Flaquita Reveles MD    glucagon (rDNA) injection 1 mg, 1 mg, IntraMUSCular, PRN, Flaquita Reveles MD    dextrose 5 % solution, 100 mL/hr, IntraVENous, PRN, Flaquita Reveles MD    [facility]) injection 2 mg, 2 mg, IntraVENous, BID, Flaquita Reveles MD, 2 mg at 06/08/22 0818    isosorbide mononitrate (IMDUR) extended release tablet 30 mg, 30 mg, Oral, Daily, Flaquita Reveles MD, 30 mg at 06/08/22 0818    sodium chloride flush 0.9 % injection 5-40 mL, 5-40 mL, IntraVENous, 2 times per day, BEBO Lucio - CNP, 10 mL at 06/08/22 0818    sodium chloride flush 0.9 % injection 5-40 mL, 5-40 mL, IntraVENous, PRN, BEBO Lucio - CNP, 10 mL at 06/04/22 2046    0.9 % sodium chloride infusion, , IntraVENous, PRN, BEBO Lucio CNP    ondansetron (ZOFRAN-ODT) disintegrating tablet 4 mg, 4 mg, Oral, Q8H PRN **OR** ondansetron (ZOFRAN) injection 4 mg, 4 mg, IntraVENous, Q6H PRN, BEBO Lucio CNP    polyethylene glycol (GLYCOLAX) packet 17 g, 17 g, Oral, Daily PRN, BEBO Lucio CNP    acetaminophen (TYLENOL) tablet 650 mg, 650 mg, Oral, Q6H PRN **OR** acetaminophen (TYLENOL) suppository 650 mg, 650 mg, Rectal, Q6H PRN, BEBO Lucio CNP    ipratropium-albuterol (DUONEB) nebulizer solution 1 ampule, 1 ampule, Inhalation, Q4H WA, BEBO Lucio CNP, 1 ampule at 06/08/22 1425    albuterol (PROVENTIL) nebulizer solution 2.5 mg, 2.5 mg, Nebulization, Q4H PRN, BEBO Lucio CNP    atorvastatin (LIPITOR) tablet 40 mg, 40 mg, Oral, Nightly, BEBO Lucio - CNP, 40 mg at 06/07/22 2048    guaiFENesin (MUCINEX) extended release tablet 1,200 mg, 1,200 mg, Oral, BID, BEBO Lucio - CNP, 1,200 mg at 06/08/22 0818    levothyroxine (SYNTHROID) tablet 25 mcg, 25 mcg, Oral, Daily, BEBO Lucio - CNP, 25 mcg at 06/08/22 0531    aspirin EC tablet 81 mg, 81 mg, Oral, Nightly, BEBO Lucio - CNP, 81 mg at 06/07/22 2048    clopidogrel (PLAVIX) tablet 75 mg, 75 mg, Oral, Daily, BEBO Lucio CNP, 75 mg at 06/08/22 0818    metoprolol tartrate (LOPRESSOR) tablet 50 mg, 50 mg, Oral, BID, Chito Evans MD, 50 mg at 06/08/22 0818    Arformoterol Tartrate (BROVANA) nebulizer solution 15 mcg, 15 mcg, Nebulization, BID, Chito Evans MD, 15 mcg at 06/08/22 0645    budesonide (PULMICORT) nebulizer suspension 500 mcg, 0.5 mg, Nebulization, BID, Nathan Peralta MD, 500 mcg at 06/08/22 1364        Assessment/plan  Principal Problem:    Acute on chronic anemia  Active Problems:    Acute on chronic diastolic heart failure (HCC)    Near syncope    Anemia of chronic disease    Palliative care patient    History of colon polyps    Coronary artery disease involving native coronary artery of native heart    Leukocytosis    Severe malnutrition (HCC)    CKD (chronic kidney disease), stage III (HCC)    Chronic obstructive pulmonary disease ([address]    Essential hypertension    PAF (paroxysmal atrial fibrillation) (HCC)    JONES (dyspnea on exertion)    Occult gastrointestinal hemorrhage  Resolved Problems:    * No resolved hospital problems. *      Summary: 68-year-old female with chronic respiratory failure on 2 L nasal cannula at baseline, diastolic heart failure, COPD, CAD with prior CABG, carotid endarterectomy, recurrent hospitalizations most recently in April for COPD exacerbation, presented with worsening shortness of breath over several days with minimal exertion, dry cough, fatigue and generalized weakness, admitted with decompensated heart failure with significantly elevated proBNP from previous labs, signs of pulmonary edema with bilateral opacities, also difficult to exclude pneumonia. Noted severe acute on chronic anemia with hemoglobin of 5, but no recent bleeding episodes, improved following PRBC transfusions. No evidence of any blood loss, normal iron and ferritin levels, low TIBC suggestive of anemia of inflammation/chronic disease. FOBT was positive, however without any overt melena or bright red blood. GI noted patient high risk for endoscopic procedure and obtain previous records for review. Developed worsening heart failure despite compliance with oral Lasix 80 mg so was managed with IV Bumex and increased dose.   Review of last echo 2/14/2022 revealed significant diastolic dysfunction and severe pulmonary hypertension, otherwise preserved LVEF. Sildenafil added given severe pulmonary hypertension and discussed avoiding concomitant use of sildenafil and nitrates. Received empiric antibiotics for possible pneumonia (chronic leukocytosis but worse from prior labs), nebulized bronchodilators and steroids given uncontrolled severe COPD with chronic steroid use. Overall, symptomatology felt to be multifactorial with symptomatic anemia, decompensated heart failure with severe pulmonary hypertension and severe COPD. Acute on chronic diastolic heart failure, severe pulmonary hypertension  Monitor telemetry  --Continue IV Bumex 2 mg twice daily, I's and O's, daily weights, echo reviewed from 2/2022, goal net negative fluid balance  --- Continue sildenafil for severe pulmonary hypertension    Symptomatic acute on chronic anemia of chronic disease, inflammation, CKD  FOBT positive, history of gastric and duodenal ulcers  - High risk for endoscopy per GI  --Will hold Plavix for now  --Anemia work-up reviewed  - S/p multiple transfusions of PRBCs  - Continue to follow CBC with further transfusion as appropriate    Continue empiric antibiotics for possible bilateral pneumonia, de-escalate to oral cefdinir, okay to observe off antibiotics after completing 5 days    Severe COPD  - Nebulized bronchodilators  - Tapering steroids    Supplemental O2 to maintain adequate gas exchange with goal SPO2 88-92%, currently near baseline    Diabetes with hyperglycemia  - Exacerbated by steroids, NPH for steroid-induced hyperglycemia, monitor glucose with further adjustment as needed, sliding scale insulin, avoid hypoglycemia    CAD s/p CABG  PAD with prior iliac stents, carotid endarterectomy  -aspirin  -- Will temporarily hold Plavix given concern for occult bleeding     A.  Fib  - Home metoprolol for rate control  - Not on anticoagulation per patient preference with significant bleeding risk    Malnutrition-nutritional support    CKD with occluded right renal artery, solitary left kidney with severely stenotic left renal artery status post endovascular intervention in 2020- monitor renal function, avoid nephrotoxins    Hypertension  - Monitor BP, continue home antihypertensive regimen including amlodipine, metoprolol, Imdur,  further adjustment as warranted    Decubitus ulcer of right buttock-present on admission  Skin care/wound care    Hypokalemia  - Improved, monitor and replete electrolytes    Debility, deconditioning  - Encourage ambulation, PT/OT        DVT prophylaxis: SCDs          Herbert Barnes MD 6/8/2022 4:55 PM

## 2022-06-08 NOTE — PROGRESS NOTES
Physical Therapy  Facility/Department: [facility] PROGRESSIVE CARE  Physical Therapy Initial Assessment    Name: Rosa Ruiz  : 1945  MRN: [id number]  Date of Service: 2022    Discharge Recommendations:  Home with Home health PT          Patient Diagnosis(es): The primary encounter diagnosis was Acute on chronic anemia. Diagnoses of Dyspnea and respiratory abnormalities, Myocardial injury, Leukocytosis, unspecified type, Acute on chronic diastolic heart failure ([address] COPD exacerbation ([address] and Anemia of chronic disease were also pertinent to this visit. Past Medical History:  has a past medical history of Aortic stenosis, Arthritis, Atherosclerosis of native arteries of the extremities with intermittent claudication, Blood circulation, collateral, CAD (coronary artery disease), Carotid aneurysm, right (HCC), Carotid artery occlusion, CHF (congestive heart failure) ([address] COPD (chronic obstructive pulmonary disease) ([address] History of blood transfusion, Hyperlipidemia, Hypertension, MI (myocardial infarction) ([address] Mitral valve stenosis, Movement disorder, Pneumonia, Post-menopausal, PUD (peptic ulcer disease), Renal artery stenosis ([address] Renal failure, Severe malnutrition ([address] Thyroid disease, and Wound infection after surgery. Past Surgical History:  has a past surgical history that includes  section (); Dilation and curettage of uterus; Colonoscopy (2012); Carpal tunnel release (Right, early ); Upper gastrointestinal endoscopy (2015); Upper gastrointestinal endoscopy (03/15/2016); maze procedure (N/A, 2016); Mitral valve replacement (); Coronary artery bypass graft (); Abdomen surgery (); vascular surgery (16 [facility]); Ilio-femoral Bypass Graft (N/A, 2016); Skin graft (Right, 2016); vascular surgery; Cardiac surgery;  Carotid endarterectomy; pr thromboendartectmy neck,neck incis (Left, 2018); pr reoper, carotid endartec>1 mon (Left, 01/11/2018); Carotid endarterectomy (Left); Carotid endarterectomy (Right, 07/11/2019); vascular surgery (07/11/2019); Cataract removal with implant (Bilateral); Percutaneous Transluminal Coronary Angio; Coronary angioplasty with stent (08/10/2021); Diagnostic Cardiac Cath Lab Procedure; Coronary angioplasty with stent; Upper gastrointestinal endoscopy (05/27/2015); and PERIPHERAL PERCUTANEOUS ARTERIAL INTERVENTION (Left, 08/10/2021).     Assessment   Body Structures, Functions, Activity Limitations Requiring Skilled Therapeutic Intervention: Decreased functional mobility   Assessment: Patient will benefit from continuing skilled physical therapy to improve mobility  Treatment Diagnosis: Decline in mobility  Therapy Prognosis: Good  Decision Making: Low Complexity  Requires PT Follow-Up: Yes  Activity Tolerance  Activity Tolerance: Patient tolerated evaluation without incident     Plan   Plan  Plan: 5-7 times per week  Plan weeks: 2  Current Treatment Recommendations: Strengthening,Balance training,Functional mobility training,Gait training  Safety Devices  Type of Devices: Call light within reach,Gait belt,Left in chair,Nurse notified     Restrictions        Subjective   General  Chart Reviewed: Yes  Patient assessed for rehabilitation services?: Yes  Diagnosis: MI  Follows Commands: Within Functional Limits  Subjective  Subjective: States that she wants to get out of bed and walk         Social/Functional History  Social/Functional History  Home Equipment: Maebelle Zeke, 4 wheeled  Ambulation Assistance: Independent  Transfer Assistance: Independent  Occupation: Retired  Vision/Hearing       Cognition   Orientation  Overall Orientation Status: Within Normal Limits  Cognition  Overall Cognitive Status: WNL     Objective   Heart Rate: 90  Heart Rate Source: Monitor  BP: (!) 155/73  BP Location: Right upper arm  MAP (Calculated): 100.33  Resp: 20  SpO2: 92 %  O2 Device: Nasal cannula              PROM RLE (degrees)  RLE PROM: WFL  AROM RLE (degrees)  RLE AROM: WFL  PROM LLE (degrees)  LLE PROM: WFL  AROM LLE (degrees)  LLE AROM : WFL  Strength RLE  Strength RLE: WFL  Strength LLE  Strength LLE: WFL           Bed mobility  Rolling to Left: Independent  Rolling to Right: Independent  Supine to Sit: Independent  Sit to Supine: Independent  Scooting: Independent  Transfers  Sit to Stand: Contact guard assistance  Stand to sit: Contact guard assistance  Bed to Chair: Minimal assistance  Ambulation  WB Status: WBAT  Ambulation  Device: Rolling Walker  Other Apparatus: O2  Assistance: Minimal assistance  Quality of Gait: Unsteady  Gait Deviations: Slow Arlene;Decreased step length;Decreased step height  Distance: 10 feet     Balance  Posture: Good  Sitting - Static: Good  Sitting - Dynamic: Good  Standing - Static: Fair;-  Standing - Dynamic: Poor;+    Goals  Short Term Goals  Time Frame for Short term goals: 2 weeks  Short term goal 1: Independent with all bed mobility and transfers  Short term goal 2: Ambulate 250 feet independently with assistive device       Education  Patient Education  Education Given To: Patient  Education Provided: Precautions; Fall Prevention Strategies  Education Method: Verbal  Barriers to Learning: None  Education Outcome: Verbalized understanding      Therapy Time   Individual Concurrent Group Co-treatment   Time In           Time Out           Minutes                   Rc Bae PT         Electronically signed by Rc Bae PT on 6/8/2022 at 9:31 AM

## 2022-06-08 NOTE — PROGRESS NOTES
PHYSICAL THERAPY  Daily Treatment Note    DATE:  2022  NAME:  Rafael Matthews  :  1945  (68 y.o.,female)  MRN:  [id number]      Subjective  General  Chart Reviewed: Yes  Patient assessed for rehabilitation services?: Yes  Diagnosis: MI  Follows Commands: Within Functional Limits  Subjective  Subjective: States that she wants to get out of bed and walk          HOME LIVING:  Social/Functional History  Home Equipment: [place], 4 wheeled  Ambulation Assistance: Independent  Transfer Assistance: Independent  Occupation: Retired    RESTRICTIONS/PRECAUTIONS:         OVERALL  ORIENTATION STATUS:  Overall Orientation Status: Within Normal Limits    STRENGTH  Strength RLE  Strength RLE: WFL  Strength LLE  Strength LLE: WFL    ROM   PROM RLE (degrees)  RLE PROM: WFL  PROM LLE (degrees)  LLE PROM: WFL     BALANCE  Balance  Posture: Good  Sitting - Static: Good  Sitting - Dynamic: Good  Standing - Static: Fair,-  Standing - Dynamic: Poor,+    BED MOBILITY  Bed Mobility  Scooting: Independent    TRANSFERS  Transfers  Sit to Stand: Contact guard assistance  Stand to sit: Contact guard assistance  Bed to Chair: Minimal assistance    AMBULATION  Device: Rolling Walker  Assistance: Minimal assistance  Distance: 10 feet      COMMENTS:  Returned to bed at patient request  Call light within reach  Bed alarm activated  Patient instructed to request assistance before getting up  Nursing updated        Electronically signed by Tyler Rivera PT on 2022 at 9:32 AM

## 2022-06-08 NOTE — PROGRESS NOTES
Visited with pt and pt's daughter Avery Bird to provide spiritual care. Pt says she is a little better but the dr wants her to a lot better. This  provided spiritual care with sustaining presence, nurtured hope, and prayer. Pt expressed gratitude for spiritual care.     Electronically signed by Johnny Fleming on 6/8/2022 at 1:40 PM

## 2022-06-08 NOTE — CONSULTS
Pt Name: Juliet Blakely  MRN: [id number]  [id number]  YOB: 1945  Admit Date: 6/4/2022  1:42 PM  Date of evaluation: 6/8/2022  Primary Care Physician: Jyotsna Kumar MD   [id number]       Requesting Provider: Hospitalist service. Admission history and physical examination from 6/4/2022: Reviewed: Chief complaint was shortness of breath. Hemoglobin was 5. Oncology was consulted. 6/7/2022: Consultation by hematology, Dr. Gary Johnson: Reason for consultation was anemia. Patient with multiple core morbidities including CAD, status post CABG, carotid endarterectomy, advanced COPD on continuous O2. History of CHF, hyperlipidemia. Presented on 6/4/2022 with complaints of worsening shortness breath. Hemoglobin was 5. MCV 94. Ferritin 110, iron saturation 40, iron 89, TIBC 713, folic acid 20, vitamin B12 574. Patient had denied any overt GI bleed or hematuria. Patient has been on aspirin and Plavix for history of CAD. Fused 2 units of packed RBCs. Hemoglobin recovered to 8. Then it fell down to 6.8. Patient apparently has been anemic since May 2016 as per Dr. Liza Allison note. Never had normal hemoglobin. Most that time hemoglobin is in the range of 7-10. In April 22 hemoglobin was 10.2 at admission. Globin was 7.9. Social history: Currently lives at home. Quit smoking 42 years ago. Reports current alcohol use. Family history: Colon cancer in maternal aunt. GI Consult    Reason for consult / Chief complaint: Severe anemia. History:    The patient is a 68 y.o. female. Has history of chronic anemia going back to 2016. Usual hemoglobin is in the range of 7-10. This time she was admitted with hemoglobin of 5 and required blood transfusion with improvement of hemoglobin to 8 g. Hemoglobin fell down to 6.8 again.   Patient has past medical history of anemia, aortic stenosis, arthritis, peripheral vascular disease, status post bilateral stent lower extremities, CAD, right carotid aneurysm, carotid artery occlusion, CHF, COPD, history of blood transfusion in 2016, hypertension, MI x2, mitral valve stenosis, movement disorder, arthritis, pneumonia, peptic ulcer disease, renal artery stenosis status post stenting to the left side in , renal failure, severe malnutrition, thyroid disease, wound infection after surgery, right foot after CABG. Past surgical history: Perforated ulcer resection of one third of stomach , cardiac surgery artificial valve and bypass, right carotid endarterectomy with a dacryon patch angioplasty, carotid endarterectomy in the left side, carotid endarterectomy in the right side in 2019, resection of right common carotid artery pseudoaneurysm and repair with reversed left greater saphenous vein interposition bypass graft. History of carpal tunnel release, cataract removal, . Patient had a colonoscopy on 2012 by Dr. Phillip Bennett. Had hyperplastic polyp. Coronary angioplasty with stent placement drug-eluting-RCA on 8/10/2021. Coronary artery bypass . Iliofemoral bypass graft 2016. Maze procedure, mitral valve replacement, CABG 2016, mitral valve replacement , drug-eluting stent to left renal artery , reperfusion left carotid endarterectomy , reoperation left carotid , thromboendarterectomy left side . EGD 2015 at [facility]: Multiple gastric antral and duodenal ulcers. Multiple other vascular surgeries. Seen in GI clinic in  due to on chronic anemia and fecal occult blood positivity and plan about EGD and colonoscopy. Unable to find any EGD or colonoscopy report from last year of this year in the chart or under care everywhere tab. Prior to hospital admission medication list includes Plavix, aspirin. In the hospital medication list includes iron sucrose Protonix 40 mg p.o. twice daily, Zofran, prednisone, GlycoLax. Patient had occult GI bleeding last year also.   She states she did not have EGD colonoscopy last year because of cardiologist felt he could not take the patient on Plavix and gastroenterologist could not do EGD and colonoscopy without taking her off Plavix. Patient states she had colonoscopy by Dr. Nawaf Valdez 3 years ago at [facility].  Colonoscopy report is not available. Patient had some nausea last night. She denies hematemesis, melena, hematochezia. Her stools are guaiac positive during this hospitalization. Her sister had primary biliary cirrhosis. Denies abdominal pain. Review of systems positive for shortness of breath at rest and multiple medical problems. Last EGD: 5/27/2015: Multiple gastric antral and duodenal ulcers. 3/15/2016: EGD normal.  7/14/2015 EGD normal.    Last Colonoscopy: Colonoscopy by Dr. Shah Mediate: 1/13/2012: Hyperplastic polyp. The patient told me that she had colonoscopy 3 years ago by Dr. Nawaf Valdez at [facility].  Colonoscopy report not available at this time. Past Medical History:         Diagnosis Date    Aortic stenosis     Arthritis     Atherosclerosis of native arteries of the extremities with intermittent claudication 07/25/2011    Blood circulation, collateral     bilat stent lower extremities    CAD (coronary artery disease)     Carotid aneurysm, right (HCC)     Carotid artery occlusion     [facility]est[facility] failure) ([facility]     COPD (chronic obstructive pulmonary disease) ([address]     History of blood transfusion 2016    Post op, was taking blood thinner    Hyperlipidemia     Hypertension     MI (myocardial infarction) ([address] 2009    x2    Mitral valve stenosis     Movement disorder     arthritis    Pneumonia     Post-menopausal     PUD (peptic ulcer disease) 2009    Renal artery stenosis ([address] 08/10/2021    s/p stenting to L    Renal failure 2009    after ulcer perforation sepsis.     Severe malnutrition (HCC)     Thyroid disease     Wound infection after surgery     right foot infection after cabg       Past Surgical History:          Procedure Laterality Date    ABDOMINAL SURGERY  2009    perforated ulcer resection of 1/3 stomach    CARDIAC SURGERY      artificial valve and bypass    CAROTID ENDARTERECTOMY      Right  with Dacron patch angioplasty    CAROTID ENDARTERECTOMY Left     CAROTID ENDARTERECTOMY Right 07/11/2019    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 AND REPAIR WITH REVERSED LEFT GREATER SAPHENOUS VEIN INTERPOSITIONAL BYPASS GRAFT performed by Caterina Lopez MD at [address] [facility] Right early 1990's    long ago    CATARACT EXTRACTION W/  INTRAOCULAR LENS IMPLANT Bilateral    [address]    COLONOSCOPY  01/13/2012    Dr Adriana Gonzalez:  [facility]    CORONARY ANGIOPLASTY WITH STENT PLACEMENT  08/10/2021    RM- RCA    CORONARY ANGIOPLASTY WITH STENT PLACEMENT      CORONARY ARTERY BYPASS GRAFT  2016    DIAGNOSTIC CARDIAC CATH LAB PROCEDURE      DILATION AND CURETTAGE OF UTERUS      INVASIVE VASCULAR PROCEDURE N/A 06/02/2016    OPEN TRANSLUMINAL BALLOON ANGIOPLASTY AND STENTING OF RIGHT COMMON AND EXTERNAL  ILIAC ARTERIES; RIGHT FEMORAL ENDARTERECTOMY WITH VEIN PATCH ANGIOPLASTY performed by Caterina Lopez MD at [address] N/A 04/07/2016    MITRAL VALVE  REPLACEMENT LUONG-MAZE ABLATION WITH CRYO PROCEDURE, CORONARY ARTERY BYPASS GRAFT X 1 WITH ENDOSCOPIC VEIN HARVESTING WITH PERFUSION TRANSESOPHAGEAL ECHOCARDIOGRAM performed by Jeronimo Al MD at [facility]  2016    PERIPHERAL PERCUTANEOUS ARTERIAL INTERVENTION Left 08/10/2021    RM to L renal artery    [place] REOPER, CAROTID ENDARTEC>1 MON Left 01/11/2018    REMOVAL OF HEMATOMA, LEFT CAROTID ARTERY performed by Caterina Lopez MD at [address] Left 01/11/2018    LEFT CAROTID ENDARTERECTOMY WITH EEG MONITORING AND COMPLETION DUPLEX ULTRASOUND performed by Caterina Lopez MD at [address]      SKIN GRAFT Right 07/22/2016 [facility]   albuterol (ACCUNEB) 1.25 MG/3ML nebulizer solution Inhale 1 ampule into the lungs every 6 hours as needed  1/22/21   Historical Provider, MD   budesonide (PULMICORT) 0.5 MG/2ML nebulizer suspension Inhale 0.5 mg into the lungs Daily 2/2/21   Historical Provider, MD   ipratropium-albuterol (DUONEB) 0.5-2.5 (3) MG/3ML SOLN nebulizer solution Inhale 3 mLs into the lungs every 4 hours  Patient not taking: Reported on 6/4/2022 2/2/21   Historical Provider, MD   Probiotic Product (PROBIOTIC DAILY PO) Take 1 tablet by mouth daily    Historical Provider, MD   nitroGLYCERIN (NITROSTAT) 0.4 MG SL tablet Place 1 tablet under the tongue every 5 minutes as needed for Chest pain 8/18/20   [facility] [place]   levothyroxine (SYNTHROID) 25 MCG tablet Take 25 mcg by mouth Daily  11/7/19   Historical Provider, MD   Fexofenadine HCl (MUCINEX ALLERGY PO) Take 1 tablet by mouth 2 times daily     Historical Provider, MD   Arformoterol Tartrate (BROVANA) 15 MCG/2ML NEBU Inhale 15 mcg into the lungs 2 times daily  1/2/20   Historical Provider, MD   OXYGEN Inhale 2 L into the lungs daily     Historical Provider, MD   aspirin EC 81 MG EC tablet Take 1 tablet by mouth daily  Patient taking differently: Take 81 mg by mouth nightly PT TAKES AT NIGHT. 11/20/19   Jose Antonio Eng MD   atorvastatin (LIPITOR) 40 MG tablet Take 1 tablet by mouth daily  Patient taking differently: Take 40 mg by mouth nightly PT TAKES AT NIGHT.  11/20/19   Jose Antonio Eng MD   Biotin 5000 MCG TABS Take 1 tablet by mouth daily     Historical Provider, MD        Current Meds:         cefdinir  300 mg Oral 2 times per day    pantoprazole  40 mg Oral BID AC    insulin NPH  8 Units SubCUTAneous BID AC    iron sucrose  500 mg IntraVENous Q24H    predniSONE  20 mg Oral Daily    Followed by   Margurette [place] ON 6/11/2022] predniSONE  15 mg Oral Daily    Followed by   Margure[place] ON 6/14/2022] predniSONE  10 mg Oral Daily    Followed by   Margurette [place] ON 6/17/2022] predniSONE  5 mg Oral Daily    insulin lispro  0-6 Units SubCUTAneous TID WC    insulin lispro  0-3 Units SubCUTAneous Nightly    sildenafil  20 mg Oral TID    bumetanide  2 mg IntraVENous BID    isosorbide mononitrate  30 mg Oral Daily    sodium chloride flush  5-40 mL IntraVENous 2 times per day    ipratropium-albuterol  1 ampule Inhalation Q4H WA    atorvastatin  40 mg Oral Nightly    guaiFENesin  1,200 mg Oral BID    levothyroxine  25 mcg Oral Daily    aspirin EC  81 mg Oral Nightly    clopidogrel  75 mg Oral Daily    metoprolol  50 mg Oral BID    Arformoterol Tartrate  15 mcg Nebulization BID    budesonide  0.5 mg Nebulization BID        dextrose      sodium chloride         PRN Meds:    glucose, dextrose bolus **OR** dextrose bolus, glucagon (rDNA), dextrose, sodium chloride flush, sodium chloride, ondansetron **OR** ondansetron, polyethylene glycol, acetaminophen **OR** acetaminophen, albuterol    Social History:     Social History     Socioeconomic History    Marital status:      Spouse name: Not on file    Number of children: Not on file    Years of education: Not on file    Highest education level: Not on file   Occupational History    Not on file   Tobacco Use    Smoking status: Former Smoker     Years: 2.00     Types: Cigarettes     Quit date: 1980     Years since quittin.4    Smokeless tobacco: Never Used   Vaping Use    Vaping Use: Never used   Substance and Sexual Activity    Alcohol use: Yes     Comment: rarely maybe twice a year    Drug use: No    Sexual activity: Not Currently     Partners: Male   Other Topics Concern    Not on file   Social History Narrative    Not on file     Social Determinants of Health     Financial Resource Strain:     Difficulty of Paying Living Expenses: Not on file   Food Insecurity:     Worried About Running Out of Food in the Last Year: Not on file    Ward of Food in the Last Year: Not on file   Transportation Needs:  Lack of Transportation (Medical): Not on file    Lack of Transportation (Non-Medical): Not on file   Physical Activity:     Days of Exercise per Week: Not on file    Minutes of Exercise per Session: Not on file   Stress:     Feeling of Stress : Not on file   Social Connections:     Frequency of Communication with Friends and Family: Not on file    Frequency of Social Gatherings with Friends and Family: Not on file    Attends Sikhism Services: Not on file    Active Member of [address] [place] [address] [employer] or Organizations: Not on file    Attends Club or Organization Meetings: Not on file    Marital Status: Not on file   Intimate Partner Violence:     Fear of Current or Ex-Partner: Not on file    Emotionally Abused: Not on file    Physically Abused: Not on file    Sexually Abused: Not on file   Housing Stability:     Unable to Pay for Housing in the Last Year: Not on file    Number of [place] in the Last Year: Not on file    Unstable Housing in the Last Year: Not on file       Family History:     Also see HPI. Family History   Problem Relation Age of Onset    Diabetes Mother     Heart Disease Mother     Heart Failure Mother     Diabetes Sister     Heart Disease Sister     High Blood Pressure Sister     Colon Cancer Sister     Liver Disease Sister     Cirrhosis Sister     Colon Cancer Maternal Aunt     Colon Polyps Neg Hx     Esophageal Cancer Neg Hx        ROS: Also see HPI. GENERAL: No fever or chills. NEURO: No headache or seizure. EYES: No diplopia or vision loss. CARDIOVASCULAR: No chest pain or palpitations. RESPIRATORY: No dyspnea or cough. GI: See HPI. : No dysuria or hematuria. GYN: No discharge or abnormal bleeding. MUSCULOSKELETAL: No new arthralgias or myalgias  DERM: No rash or jaundice. ENDOCRINE: No polyuria or polydipsia. PSYCH: No anxiety or depression.     Physical Exam:    VITALS:     Vitals:    06/07/22 1746 06/07/22 2356 06/08/22 0615 06/08/22 0635   BP: (!) 166/66 (!) 147/65 (!) 155/73    Pulse: 97 53 92 90   Resp: 18 18 18 20   Temp: 97.3 °F (36.3 °C) 97.3 °F (36.3 °C) 97 °F (36.1 °C)    TempSrc: Temporal Temporal Temporal    SpO2: 93% 94% 93% 92%   Weight:       Height:           General appearance: Thinly built. She is short of breath at rest.  Appears stated     Head: normal cephalic, atraumatic     Neck: No nuchal rigidity       Abdomen: Nondistended. Abdomen soft, nontender. No gross organomegaly, masses or tense ascites. Skin: No palmar erythema    Extremities: No leg edema. Neurologic: Alert and oriented      Labs:     Recent Labs     06/06/22 0139 06/06/22 0139 06/07/22  0252 06/07/22  1541 06/08/22  0301   WBC 24.8*  --  22.9*  --  24.9*   RBC 2.51*  --  2.34*  --  3.17*   HGB 7.1*   < > 6.8* 10.1* 9.4*   HCT 23.6*   < > 22.7* 32.4* 30.3*   MCV 94.0  --  97.0  --  95.6   MCH 28.3  --  29.1  --  29.7   MCHC 30.1*  --  30.0*  --  31.0*     --  378  --  336    < > = values in this interval not displayed. Recent Labs     06/06/22 0139 06/07/22  0252 06/08/22  0301   * 134* 136   K 3.7 3.1* 4.2   ANIONGAP 14 10 10   CL 93* 96* 100   CO2 28 28 26   BUN 60* 60* 47*   CREATININE 1.1* 1.0* 1.0*   GLUCOSE 326* 121* 212*   CALCIUM 8.1* 7.9* 7.5*     Recent Labs     06/07/22  0252   MG 1.9     No results for input(s): AST, ALT, ALB, BILITOT, ALKPHOS, ALB in the last 72 hours. HgBA1c:  No components found for: HGBA1C  FLP:    Lab Results   Component Value Date    TRIG 73 03/27/2021    HDL 61 03/27/2021    LDLCALC 66 03/27/2021     TSH:    Lab Results   Component Value Date    TSH 1.400 06/04/2022     Troponin T: No results for input(s): TROPONINI in the last 72 hours. INR: No results for input(s): INR in the last 72 hours. No results for input(s): LIPASE, AMYLASE in the last 72 hours. Radiology:      Assessment:    Chronic normocytic anemia at least going back to 2016. Status post blood transfusion. Hemoglobin 9.4 on 6/8/2022. Lowest hemoglobin 5 on 2022. Iron studies consistent with anemia of chronic disease and iron deficiency anemia. Haptoglobin is not low. INR is 1.25. Stool occult blood is positive. History of hyperplastic polyp by colonoscopy in . Report not available. He confirmed hyperplastic polyp on 2012. Normal EGD on 2015, normal EGD on 3/15/2016, multiple gastric antral and duodenal ulcers on 2015. Sister #3, Macrina Noyola,  had liver disease/cirrhosis. MRI of patient's abdomen in the past did not show any cirrhotic liver. Patient's LFTs were normal on 2022. Maternal aunt had colon cancer. No recent abdominal/pelvic imaging in the chart. Stones by MRI of the abdomen without contrast on 3/15/2016. Blood confirmed by limited abdominal ultrasound on 3/14/2016. Impression:     Patient has occult GI bleeding, anemia of chronic disease and iron deficiency anemia. She has history of gastric ulcer and duodenal ulcers. She had a hyperplastic colon polyp in . Her last colonoscopy was at [facility] by Dr. Gemma Reeves. Report is not available. Patient is on Plavix. Patient has multiple comorbidities and she is increased risk for complications of sedation, colonoscopy preparation and complications from EGD and colonoscopy. Ideally patient should have EGD and colonoscopy. However, because of multiple comorbidities she is at high risk of complications. I have discussed the above in detail with patient. She understand. She does not wish to proceed with EGD or colonoscopy. Plan:    Obtain colonoscopy report from [facility] and an EGD report if any. Patient told me her last colonoscopy was 3 years ago at [facility].    Thank you for the consultation and allowing me to participate in this patient's care alongside with you!     Fanta Jones MD  2022

## 2022-06-09 NOTE — PROGRESS NOTES
Palliative Care Progress Note  6/9/2022 7:15 AM    Patient:  Mercedes Sanchez  YOB: 1945  Primary Care Physician: Deloris Spencer MD  Advance Directive: Full Code  Admit Date: 6/4/2022       Hospital Day: 5  Portions of this note have been copied forward, however, changed to reflect the most current clinical status of this patient. CHIEF COMPLAINT/REASON FOR CONSULTATION goals of care, code status discussion, symptom management, and family support    SUBJECTIVE:  Ms. Telma Christianson is alert, supin in bed. She denies pain or n/v. Continues with dyspnea and fatigue. Had issues with nose bleed this morning and humidification is now on O2. Review of Systems:   14 point review of systems is negative except as specifically addressed above. Objective:   VITALS:  BP (!) 165/58   Pulse 92   Temp 98.4 °F (36.9 °C) (Temporal)   Resp 15   Ht 5' 3\" (1.6 m)   Wt 93 lb 8 oz (42.4 kg)   SpO2 93%   BMI 16.56 kg/m²   24HR INTAKE/OUTPUT:      Intake/Output Summary (Last 24 hours) at 6/9/2022 0715  Last data filed at 6/9/2022 5909  Gross per 24 hour   Intake 1255 ml   Output 2950 ml   Net -1695 ml     General appearance: 67 yo female, chronically ill appearing, no acute distress, NC in place  Head: Normocephalic, without obvious abnormality, atraumatic  Eyes: conjunctivae/corneas clear. PERRL, EOM's intact. Ears: normal external ears and nose  Neck: no JVD, supple, symmetrical, trachea midline   Lungs: diminished with zena inspiratory wheeze to auscultation bilaterally, no rales, shallow respiration, cough, conversational dyspnea today   Heart: RRR, S1, S2 normal, no murmur  Abdomen: soft, non-tender; non-distended, normal bowel sounds   Extremities: No lower extremity edema,  No erythema, no tenderness to palpation  Skin: Warm, dry  Neurologic: Alert and oriented X 3, generalized weakness and normal tone, no focal deficits.    Psychiatric: Calm and cooperative     Medications:      dextrose      sodium chloride        cefdinir  300 mg Oral 2 times per day    pantoprazole  40 mg Oral BID AC    insulin NPH  8 Units SubCUTAneous BID AC    predniSONE  20 mg Oral Daily    Followed by   Kate Ni ON 6/11/2022] predniSONE  15 mg Oral Daily    Followed by   Kate Ni ON 6/14/2022] predniSONE  10 mg Oral Daily    Followed by   Kate Ni ON 6/17/2022] predniSONE  5 mg Oral Daily    insulin lispro  0-6 Units SubCUTAneous TID WC    insulin lispro  0-3 Units SubCUTAneous Nightly    sildenafil  20 mg Oral TID    bumetanide  2 mg IntraVENous BID    isosorbide mononitrate  30 mg Oral Daily    sodium chloride flush  5-40 mL IntraVENous 2 times per day    ipratropium-albuterol  1 ampule Inhalation Q4H WA    atorvastatin  40 mg Oral Nightly    guaiFENesin  1,200 mg Oral BID    levothyroxine  25 mcg Oral Daily    aspirin EC  81 mg Oral Nightly    [Held by provider] clopidogrel  75 mg Oral Daily    metoprolol  50 mg Oral BID    Arformoterol Tartrate  15 mcg Nebulization BID    budesonide  0.5 mg Nebulization BID     glucose, dextrose bolus **OR** dextrose bolus, glucagon (rDNA), dextrose, sodium chloride flush, sodium chloride, ondansetron **OR** ondansetron, polyethylene glycol, acetaminophen **OR** acetaminophen, albuterol  ADULT ORAL NUTRITION SUPPLEMENT; Breakfast, Lunch, Dinner; Low Calorie/High Protein Oral Supplement  ADULT DIET; Regular; 4 carb choices (60 gm/meal); Low Sodium (2 gm)     Lab and other Data:     Recent Labs     06/07/22 0252 06/07/22 0252 06/07/22  1541 06/08/22  0301 06/09/22  0139   WBC 22.9*  --   --  24.9* 25.1*   HGB 6.8*   < > 10.1* 9.4* 9.0*     --   --  336 300    < > = values in this interval not displayed.      Recent Labs     06/07/22 0252 06/08/22  0301 06/09/22  0139   * 136 139   K 3.1* 4.2 3.5   CL 96* 100 101   CO2 28 26 28   BUN 60* 47* 40*   CREATININE 1.0* 1.0* 0.8   GLUCOSE 121* 212* 93     No results for input(s): AST, ALT, ALB, BILITOT, ALKPHOS in the last 72 hours.    RAD:   XR CHEST PORTABLE  Result Date: 6/4/2022  COPD. Interval worsening bilateral lower lobe infiltrates. Enlarging small bilateral  pleural effusions. Recommendation: Follow up as clinically indicated. Electronically Signed by Brittney Hood MD at 04-Jun-2022 04:59:51 PM             Assessment/Plan   Principal Problem:    Acute on chronic anemia  Active Problems:    Acute on chronic diastolic heart failure (HCC)    Near syncope    Anemia of chronic disease    Palliative care patient    History of colon polyps    Coronary artery disease involving native coronary artery of native heart    Leukocytosis    Severe malnutrition (HCC)    CKD (chronic kidney disease), stage III (HCC)    Chronic obstructive pulmonary disease (HCC)    Essential hypertension    PAF (paroxysmal atrial fibrillation) (HCC)    JONES (dyspnea on exertion)    Occult gastrointestinal hemorrhage  Resolved Problems:    * No resolved hospital problems. *      Visit Summary:  Chart reviewed, patient discussed with nursing staff. Reviewed health issues, work up and treatment plan as well as factors that lead to hospitalization. Ms. Moses Restrepo is seen at bedside this morning with RN present. She reports feeling more SOB today with some worsening conversational dyspnea. Working with PT/OT. Hgb remains stable. Plavix has been placed on hold with protonix for positive heme stool. Plans to follow up with GI outpatient. I called her daughter, Florin Argueta, to update on pts status and plan of care. Amelia Eng is on her way to the hospital and we discussed that I instructed [facility] to contact her for f/u visit when pt is able to d/c home and she is agreeable. Opportunity for questions and emotional support provided. Will follow. I will be off service tomorrow but my colleague Abdias Monzon PA-C will be available for any needs. Thank you    Recommendations:   1. Palliative Care- GOC continue all medical treatments and monitor for improvement.  D/c home with spouse and [facility] services once medically stable. May require HH, PT/OT to eval. Code status- FULL CODE    2. Normocytic anemia with GI bleed- hematology following. 3 units PRBCs since admission, hgb 9.0 today. Heme positive stool and GI consulted with plans for conservative management. IV venofer x2 doses completed. 3. Acute on chronic CHF- IV bumex with good diuresis. Sildenafil for severe pulm HTN. reviewed with EF 55%, grade III diastolic dysfunction, severe pulmonary hypertension, mild to moderate aortic stenosis, mild AR and TR    4. COPD exacerbation- mgmt per hospitalist. O2 support, 2L NC which is baseline for pt. Nebs, steroids, empiric abx for possible harshil pneumonia    5. Hyperglycemia- exacerbated by steroids. NPH per hospitalist    6. CAD s/p CABG- plavix and aspirin     7. Afib- rate controlled    8. Deconditioning- PT/OT eval, dietician following. Encouraged OOB    Thank you for consulting Palliative Care and allowing us to participate in the care of this patient.    Time Spent Counseling > 50%:  YES                                   Total Time Spent with patient/family counseling, workup/treatment review, counseling and placement of orders/preparation of this note: 28 minutes    Electronically signed by BEBO Marie CNP on 6/9/2022 at 7:15 AM    (Please note that portions of this note were completed with a voice recognition program.  Toney Diego made to edit the dictations but occasionally words are mis-transcribed.)

## 2022-06-09 NOTE — PROGRESS NOTES
GI  - PROGRESS NOTE    Subjective:   Admit Date: 6/4/2022  PCP: Aman Burris MD    Patient being seen for: Anemia. Multiple gastric antral ulcer and duodenal ulcer in May 2015. In March 2016 EGD normal.  In July 2015 EGD normal.  Colonoscopy in January 2012 hyperplastic polyp. Patient said her last colonoscopy was 3 years ago by Dr. Lennie Ram at [facility]    24hr events/Interval History:     Denies nausea, vomiting, abdominal pain, rectal bleeding, melena. Clopidogrel held. On aspirin and prednisone. Gastric and duodenal ulcers were healed by EGD in 2016. Patient still short of breath. 6/9/22: WBC 25.1, hemoglobin 9, MCV 95, platelet count 469. When yesterday was 9.4.        Medications:    Scheduled Meds:   cefdinir  300 mg Oral 2 times per day    pantoprazole  40 mg Oral BID AC    predniSONE  20 mg Oral Daily    Followed by   Luisito Murillo ON 6/11/2022] predniSONE  15 mg Oral Daily    Followed by   Luisito Murillo ON 6/14/2022] predniSONE  10 mg Oral Daily    Followed by   Luisito Murillo ON 6/17/2022] predniSONE  5 mg Oral Daily    insulin lispro  0-6 Units SubCUTAneous TID WC    insulin lispro  0-3 Units SubCUTAneous Nightly    sildenafil  20 mg Oral TID    bumetanide  2 mg IntraVENous BID    isosorbide mononitrate  30 mg Oral Daily    sodium chloride flush  5-40 mL IntraVENous 2 times per day    ipratropium-albuterol  1 ampule Inhalation Q4H WA    atorvastatin  40 mg Oral Nightly    guaiFENesin  1,200 mg Oral BID    levothyroxine  25 mcg Oral Daily    aspirin EC  81 mg Oral Nightly    [Held by provider] clopidogrel  75 mg Oral Daily    metoprolol  50 mg Oral BID    Arformoterol Tartrate  15 mcg Nebulization BID    budesonide  0.5 mg Nebulization BID       Continuous Infusions:   dextrose      sodium chloride         PRN Meds:.glucose, dextrose bolus **OR** dextrose bolus, glucagon (rDNA), dextrose, sodium chloride flush, sodium chloride, ondansetron **OR** ondansetron, polyethylene glycol, acetaminophen **OR** acetaminophen, albuterol      Labs:     Recent Labs     06/07/22 0252 06/07/22 0252 06/07/22  1541 06/08/22 0301 06/09/22  0139   WBC 22.9*  --   --  24.9* 25.1*   RBC 2.34*  --   --  3.17* 3.16*   HGB 6.8*   < > 10.1* 9.4* 9.0*   HCT 22.7*   < > 32.4* 30.3* 30.1*   MCV 97.0  --   --  95.6 95.3   MCH 29.1  --   --  29.7 28.5   MCHC 30.0*  --   --  31.0* 29.9*     --   --  336 300    < > = values in this interval not displayed. Recent Labs     06/07/22 0252 06/08/22 0301 06/09/22 0139   * 136 139   K 3.1* 4.2 3.5   ANIONGAP 10 10 10   CL 96* 100 101   CO2 28 26 28   BUN 60* 47* 40*   CREATININE 1.0* 1.0* 0.8   GLUCOSE 121* 212* 93   CALCIUM 7.9* 7.5* 8.0*       Recent Labs     06/07/22 0252 06/09/22 0139   MG 1.9 1.6       No results for input(s): AST, ALT, ALB, BILITOT, ALKPHOS, ALB in the last 72 hours. HgBA1c:  No components found for: HGBA1C    FLP:    Lab Results   Component Value Date    TRIG 73 03/27/2021    HDL 61 03/27/2021    LDLCALC 66 03/27/2021       TSH:    Lab Results   Component Value Date    TSH 1.400 06/04/2022       Troponin T: No results for input(s): TROPONINI in the last 72 hours. INR: No results for input(s): INR in the last 72 hours. No results for input(s): LIPASE in the last 72 hours.   -----------------------------------------------------------------  RAD:       Physical Exam:     Vitals:    06/08/22 2258 06/09/22 0101 06/09/22 0314 06/09/22 0628   BP: (!) 173/56 (!) 147/57 (!) 133/54 (!) 165/58   Pulse: 92 93 89 92   Resp: 16  15 15   Temp: 98.9 °F (37.2 °C)  98.7 °F (37.1 °C) 98.4 °F (36.9 °C)   TempSrc: Temporal  Temporal Temporal   SpO2: 95%  93% 93%   Weight:       Height:           24HR INTAKE/OUTPUT:      Intake/Output Summary (Last 24 hours) at 6/9/2022 0945  Last data filed at 6/9/2022 4827  Gross per 24 hour   Intake 1135 ml   Output 2950 ml   Net -1815 ml       General appearance: Alert and oriented. She is short of breath at rest.  Sitting in chair. Abdomen: Soft and nontender. Extremities: Trace bilateral lower leg edema. Neurologic: Alert. Skin: No jaundice. Good turgor. Impression:       Patient has occult GI bleeding, anemia of chronic disease and iron deficiency anemia. Status post IV iron and 3 units of packed RBC infusion. She has history of gastric ulcer and duodenal ulcers. The ulcers had healed by EGD on 3/15/2016. On Protonix 40 mg twice daily. She is on aspirin and prednisone. She had a hyperplastic colon polyp in 2012. Her last colonoscopy was 3 years ago at [facility] by Dr. Linda Paul, according to patient. Report is not available. Patient has multiple comorbidities and she is increased risk for complications of sedation, colonoscopy preparation and complications from EGD and colonoscopy.     Ideally patient should have EGD and colonoscopy. However, because of multiple comorbidities she is at high risk of complications.     I have discussed the above in detail with patient. She understands. She does not wish to proceed with EGD or colonoscopy.       Plan:     Continue Protonix as patient has a history of gastric or duodenal ulcers and she is on aspirin and prednisone. Patient is at high risk for recurrent ulcers. Therefore, she needs Protonix or any other PPI indefinitely to prevent recurrent ulcers. Check H. pylori urea breath test as an outpatient basis and treat with 2 weeks course of antibiotics if that test is positive. I have checked with lab. This test is not available as an inpatient test.    Advised patient to follow-up with PCP, Dr. Long Dears or in the [facility] GI clinic after discharge for continued of care. I will not be able to see her as an outpatient basis since I am locum tenens physician and I do not have an office practice. She verbalized understanding. I have signed off.   Please call me if further assistance is needed in the management of this patient.     Adama Andersen MD    6/9/2022    9:45 AM

## 2022-06-09 NOTE — PROGRESS NOTES
Daily Progress Note    Date:2022  Patient: Cliff Castorena  : 1945  ISV:[id number]  CODE:Full Code No additional code details  Mj Gregory MD    Admit Date: 2022  1:42 PM   LOS: 5 days       Subjective:    Dyspnea is slowly improving over hospital course with good urine output with diuresis. Still with malaise and severe fatigue, deconditioning as has not been out of bed very much. Supplemental O2 near baseline. No fevers or chills. Intermittent cough without sputum production today.              Review of Systems    14 point review systems completed and is negative except as otherwise noted      Objective:      Vital signs in last 24 hours:  Patient Vitals for the past 24 hrs:   BP Temp Temp src Pulse Resp SpO2   22 1454 (!) 157/55 98 °F (36.7 °C) Temporal 99 22 100 %   22 1014 123/72 97.9 °F (36.6 °C) Temporal (!) 101 24 99 %   22 0628 (!) 165/58 98.4 °F (36.9 °C) Temporal 92 15 93 %   22 0314 (!) 133/54 98.7 °F (37.1 °C) Temporal 89 15 93 %   22 0101 (!) 147/57 -- -- 93 -- --   22 2258 (!) 173/56 98.9 °F (37.2 °C) Temporal 92 16 95 %   22 1820 (!) 171/68 98.3 °F (36.8 °C) -- 95 16 94 %     Physical exam    General: Chronically ill/fatigued appearing, no distress  Psych: Calm and cooperative  HEENT: NC/AT, no scleral icterus  Cardiovascular: Normal rate, pulses equal bilaterally  Respiratory: Diminished breath sounds bilaterally, no wheezes  Abdomen: Soft, nontender, bowel sounds present  Neurologic: Alert, follows commands, nonfocal  Musculoskeletal: Loss of subcutaneous fat, appears malnourished  Extremities: BLE edema present but with some improvement, no clubbing or cyanosis  Skin: Decubitus ulcer on buttock-present on admission; warm and dry, well perfused        Lab Review   Recent Results (from the past 24 hour(s))   POCT Glucose    Collection Time: 22  5:09 PM   Result Value Ref Range    POC Glucose 256 (H) 70 - 99 mg/dl Performed on AccuChek    POCT Glucose    Collection Time: 06/08/22  8:27 PM   Result Value Ref Range    POC Glucose 197 (H) 70 - 99 mg/dl    Performed on AccuChek    Basic Metabolic Panel w/ Reflex to MG    Collection Time: 06/09/22  1:39 AM   Result Value Ref Range    Sodium 139 136 - 145 mmol/L    Potassium reflex Magnesium 3.5 3.5 - 5.0 mmol/L    Chloride 101 98 - 111 mmol/L    CO2 28 22 - 29 mmol/L    Anion Gap 10 7 - 19 mmol/L    Glucose 93 74 - 109 mg/dL    BUN 40 (H) 8 - 23 mg/dL    CREATININE 0.8 0.5 - 0.9 mg/dL    GFR Non-African American >60 >60    GFR African American >59 >59    Calcium 8.0 (L) 8.8 - 10.2 mg/dL   CBC with Auto Differential    Collection Time: 06/09/22  1:39 AM   Result Value Ref Range    WBC 25.1 (H) 4.8 - 10.8 K/uL    RBC 3.16 (L) 4.20 - 5.40 M/uL    Hemoglobin 9.0 (L) 12.0 - 16.0 g/dL    Hematocrit 30.1 (L) 37.0 - 47.0 %    MCV 95.3 81.0 - 99.0 fL    MCH 28.5 27.0 - 31.0 pg    MCHC 29.9 (L) 33.0 - 37.0 g/dL    RDW 16.3 (H) 11.5 - 14.5 %    Platelets 617 085 - 460 K/uL    MPV 9.9 9.4 - 12.3 fL    Neutrophils % 85.4 (H) 50.0 - 65.0 %    Lymphocytes % 2.4 (L) 20.0 - 40.0 %    Monocytes % 7.5 0.0 - 10.0 %    Eosinophils % 0.0 0.0 - 5.0 %    Basophils % 0.2 0.0 - 1.0 %    Neutrophils Absolute 21.4 (H) 1.5 - 7.5 K/uL    Immature Granulocytes # 1.1 K/uL    Lymphocytes Absolute 0.6 (L) 1.1 - 4.5 K/uL    Monocytes Absolute 1.90 (H) 0.00 - 0.90 K/uL    Eosinophils Absolute 0.00 0.00 - 0.60 K/uL    Basophils Absolute 0.00 0.00 - 0.20 K/uL   Magnesium    Collection Time: 06/09/22  1:39 AM   Result Value Ref Range    Magnesium 1.6 1.6 - 2.4 mg/dL   EKG 12 lead    Collection Time: 06/09/22  6:41 AM   Result Value Ref Range    P-R Interval 102 ms    QRS Duration 94 ms    Q-T Interval 370 ms    QTc Calculation (Bazett) 429 ms    P Axis 86 degrees    T Axis 82 degrees   POCT Glucose    Collection Time: 06/09/22  7:01 AM   Result Value Ref Range    POC Glucose 75 70 - 99 mg/dl    Performed on AccuChek POCT Glucose    Collection Time: 06/09/22 11:12 AM   Result Value Ref Range    POC Glucose 140 (H) 70 - 99 mg/dl    Performed on AccuChek    POCT Glucose    Collection Time: 06/09/22  4:06 PM   Result Value Ref Range    POC Glucose 303 (H) 70 - 99 mg/dl    Performed on AccuChek        I/O last 3 completed shifts: In: Buck Zazueta [P.O.:1855]  Out: 5838 [ZKQSE:1814]  I/O this shift:   In: 450 [P.O.:450]  Out: 700 [Urine:700]      Current Facility-Administered Medications:     cefdinir (OMNICEF) capsule 300 mg, 300 mg, Oral, 2 times per day, Anuradha Cheng MD, 300 mg at 06/09/22 0830    pantoprazole (PROTONIX) tablet 40 mg, 40 mg, Oral, BID AC, Ning Cline MD, 40 mg at 06/09/22 0600    predniSONE (DELTASONE) tablet 20 mg, 20 mg, Oral, Daily, 20 mg at 06/09/22 0830 **FOLLOWED BY** [START ON 6/11/2022] predniSONE (DELTASONE) tablet 15 mg, 15 mg, Oral, Daily **FOLLOWED BY** [START ON 6/14/2022] predniSONE (DELTASONE) tablet 10 mg, 10 mg, Oral, Daily **FOLLOWED BY** [START ON 6/17/2022] predniSONE (DELTASONE) tablet 5 mg, 5 mg, Oral, Daily, Anuradha Cheng MD    insulin lispro (HUMALOG) injection vial 0-6 Units, 0-6 Units, SubCUTAneous, TID WC, Anuradha Cheng MD, 3 Units at 06/08/22 1822    insulin lispro (HUMALOG) injection vial 0-3 Units, 0-3 Units, SubCUTAneous, Nightly, Anuradha Cheng MD, 1 Units at 06/08/22 2108    sildenafil (REVATIO) tablet 20 mg, 20 mg, Oral, TID, Anuradha Cheng MD, 20 mg at 06/09/22 1448    glucose chewable tablet 16 g, 4 tablet, Oral, PRN, Anuradha Cheng MD    dextrose bolus 10% 125 mL, 125 mL, IntraVENous, PRN **OR** dextrose bolus 10% 250 mL, 250 mL, IntraVENous, PRN, Anuradha Cheng MD    glucagon (rDNA) injection 1 mg, 1 mg, IntraMUSCular, PRN, Anuradha Cheng MD    dextrose 5 % solution, 100 mL/hr, IntraVENous, PRN, Anuradha Cheng MD    bumetanide Lanney Ross) injection 2 mg, 2 mg, IntraVENous, BID, Anuradha Cheng MD, 2 mg at 06/09/22 0830    isosorbide mononitrate (IMDUR) extended release tablet 30 mg, 30 mg, Oral, Daily, Helene River MD, 30 mg at 06/09/22 0830    sodium chloride flush 0.9 % injection 5-40 mL, 5-40 mL, IntraVENous, 2 times per day, Juan Ray, APRN - CNP, 10 mL at 06/09/22 0830    sodium chloride flush 0.9 % injection 5-40 mL, 5-40 mL, IntraVENous, PRN, Juanrobbie Bell, APRN - CNP, 10 mL at 06/04/22 2046    0.9 % sodium chloride infusion, , IntraVENous, PRN, Juanrobbie Bell, APRN - CNP    ondansetron (ZOFRAN-ODT) disintegrating tablet 4 mg, 4 mg, Oral, Q8H PRN **OR** ondansetron (ZOFRAN) injection 4 mg, 4 mg, IntraVENous, Q6H PRN, Juan Bell, APRN - CNP    polyethylene glycol (GLYCOLAX) packet 17 g, 17 g, Oral, Daily PRN, Juan Bell, APRN - CNP    acetaminophen (TYLENOL) tablet 650 mg, 650 mg, Oral, Q6H PRN **OR** acetaminophen (TYLENOL) suppository 650 mg, 650 mg, Rectal, Q6H PRN, Juan Bell, APRN - CNP    ipratropium-albuterol (DUONEB) nebulizer solution 1 ampule, 1 ampule, Inhalation, Q4H WA, Juan Bell, APRN - CNP, 1 ampule at 06/09/22 1427    albuterol (PROVENTIL) nebulizer solution 2.5 mg, 2.5 mg, Nebulization, Q4H PRN, Juan Bell, APRN - CNP, 2.5 mg at 06/09/22 0842    atorvastatin (LIPITOR) tablet 40 mg, 40 mg, Oral, Nightly, Juan Bell, APRN - CNP, 40 mg at 06/08/22 2102    guaiFENesin (MUCINEX) extended release tablet 1,200 mg, 1,200 mg, Oral, BID, Juanrobbie Bell, APRN - CNP, 1,200 mg at 06/09/22 0830    levothyroxine (SYNTHROID) tablet 25 mcg, 25 mcg, Oral, Daily, Juan Peers, APRN - CNP, 25 mcg at 06/09/22 0600    aspirin EC tablet 81 mg, 81 mg, Oral, Nightly, BEBO Silva CNP, 81 mg at 06/08/22 2102    [Held by provider] clopidogrel (PLAVIX) tablet 75 mg, 75 mg, Oral, Daily, JuanBEBO Gomez CNP, 75 mg at 06/08/22 0818    metoprolol tartrate (LOPRESSOR) tablet 50 mg, 50 mg, Oral, BID, Helene River MD, 50 mg at 06/09/22 0830    Arformoterol Tartrate (BROVANA) nebulizer solution 15 mcg, 15 mcg, Nebulization, BID, Anuradha Cheng MD, 15 mcg at 06/09/22 5758    budesonide (PULMICORT) nebulizer suspension 500 mcg, 0.5 mg, Nebulization, BID, Anuradha Cheng MD, 500 mcg at 06/09/22 1391        Assessment/plan  Principal Problem:    Acute on chronic anemia  Active Problems:    Acute on chronic diastolic heart failure (HCC)    Near syncope    Anemia of chronic disease    Palliative care patient    History of colon polyps    History of gastric ulcer    History of duodenal ulcer    Coronary artery disease involving native coronary artery of native heart    Leukocytosis    Severe malnutrition (HCC)    CKD (chronic kidney disease), stage III (HCC)    Chronic obstructive pulmonary disease ([facility] [address]    Essential hypertension    PAF (paroxysmal atrial fibrillation) ([facility])    JONES (dyspnea on exertion)    Occult GI bleeding  Resolved Problems:    * No resolved hospital problems. *      Summary:   59-year-old female with chronic respiratory failure on 2 L nasal cannula at baseline, diastolic heart failure, COPD, CAD with prior CABG, carotid endarterectomy, recurrent hospitalizations most recently in April for COPD exacerbation, presented with worsening shortness of breath over several days with minimal exertion, dry cough, fatigue and generalized weakness, admitted with decompensated heart failure with significantly elevated proBNP from previous labs, signs of pulmonary edema with bilateral opacities, also difficult to exclude pneumonia. Noted severe acute on chronic anemia with hemoglobin of 5, but no recent bleeding episodes, improved following PRBC transfusions. No evidence of any blood loss, normal iron and ferritin levels, low TIBC suggestive of anemia of inflammation/chronic disease. FOBT was positive, however without any overt melena or bright red blood.   GI noted patient high risk for endoscopic procedure and obtain previous records for review, and after discussion with patient declined endoscopy. Developed worsening heart failure despite compliance with oral Lasix 80 mg so was managed with IV Bumex and increased dose. Review of last echo 2/14/2022 revealed significant diastolic dysfunction and severe pulmonary hypertension, otherwise preserved LVEF. Sildenafil added given severe pulmonary hypertension and discussed avoiding concomitant use of sildenafil and nitrates. Received empiric antibiotics for possible pneumonia (chronic leukocytosis but worse from prior labs), nebulized bronchodilators and steroids given uncontrolled severe COPD with chronic steroid use. Overall, symptomatology felt to be multifactorial with symptomatic anemia, decompensated heart failure with severe pulmonary hypertension and severe COPD.          Acute on chronic diastolic heart failure, severe pulmonary hypertension  Monitor telemetry  --Continue IV Bumex 2 mg twice daily, I's and O's, daily weights, echo reviewed from 2/2022, goal net negative fluid balance  --- Continue sildenafil for severe pulmonary hypertension    Symptomatic acute on chronic anemia of chronic disease, inflammation, CKD  FOBT positive, history of gastric and duodenal ulcers  --hold Plavix for now  --Anemia work-up reviewed  - S/p multiple transfusions of PRBCs  - Improved, continue to follow CBC with further transfusion as appropriate    Bilateral infiltrates with leukocytosis, questionable pneumonia, can stop antibiotics after completing 5 days    Severe COPD  - Nebulized bronchodilators  - Tapering steroids    Supplemental O2 to maintain adequate gas exchange with goal SPO2 88-92%, currently near baseline    Diabetes with hyperglycemia  - Exacerbated by steroids, NPH for steroid-induced hyperglycemia, monitor glucose with further adjustment as needed, sliding scale insulin, avoid hypoglycemia    CAD s/p CABG  PAD with prior iliac stents, carotid endarterectomy  -aspirin  -- Will temporarily hold Plavix given concern for possible occult bleeding     A.  Fib  - Home metoprolol for rate control  - Not on anticoagulation per patient preference with significant bleeding risk    Malnutrition-nutritional support    CKD with occluded right renal artery, solitary left kidney with severely stenotic left renal artery status post endovascular intervention in 2020  - monitor renal function, avoid nephrotoxins    Hypertension  - Monitor BP, continue home antihypertensive regimen including amlodipine, metoprolol, Imdur,  further adjustment as warranted    Decubitus ulcer of right buttock-present on admission  Skin care/wound care    Hypokalemia  - Improved, monitor and replete electrolytes    Debility, deconditioning  - Encourage ambulation, PT/OT        DVT prophylaxis: SCDs          Judge Naomi MD 6/9/2022 4:42 PM

## 2022-06-09 NOTE — PROGRESS NOTES
Progress Note    Patient name: Maddie Locke  Patient : 1945  MR #[id number]  Room: [facility]    Portions of this note have been copied forward, however, changed to reflect the most current clinical status of this patient. Subjective: States \"rough night\". A little more labored during conversation this morning. HISTORY OF PRESENT ILLNESS:  The patient is a 68 y.o. female with PMH CAD with CABG, carotid endarterectomy, COPD with continuous home O2 at 2L, HTN, HLD, CHF with diastolic dysfunction and pulmonary hypertension, and multiple  other comorbiditieswho presented to [address] [place] [address] ED 2022 complaining of shortness of breath and CP. Hematology consultation was requested regarding severe anemia. The patient was first seen by Dr. Deanna Sneed on 2022 during patient was positioned [facility].  She has multiple comorbidities including history of coronary artery disease status post CABG, carotid endarterectomy, advanced COPD on continuous O2 supplementation, history of congestive heart failure, hyperlipidemia. The patient has had prior admissions in the past for exacerbation of COPD and heart failure. She presented on 2022 with complaints of worsening shortness of breath. She was admitted for concern for COPD exacerbation/heart failure exacerbation. Work-up in the emergency showed elevated white blood cell count with severe anemia with hemoglobin 5.0/MCV 94, RDW 16.4, thrombocytosis platelet count 202,739. Iron profile showed ferritin 110, iron saturation 40, iron 89, TIBC 220, folate 20, vitamin B12 574. The patient denied any overt GI bleed or hematuria. Of note, she has been on aspirin and Plavix for a history of CAD. She was transfused 2 units PRBCs with a hemoglobin recovery of 8.0. Her hemoglobin is trended down again this morning on 2022 down to 6.8. She had a normal TSH. I do not see a hemolytic panel.   Review of past CBCs showed that the patient has been anemic since at least May 2016. She never had a normal hemoglobin. Most of the time her hemoglobin is in the range of 7-10. She was admitted here in April 2022 with a hemoglobin 10.2 at admission and left with a hemoglobin 7.9. Objective   PHYSICAL EXAM:  CONSTITUTIONAL: Alert, appropriate, no acute distress  EYES: Non icteric, EOM intact, pupils equal round  ENT: Oral mucus membranes moist, External inspection of ears and nose are normal.   NECK: Supple, no masses. No JVD  CHEST/LUNGS: CTA bilaterally, diminished breath sounds in the bases. Mildly labored breathing with conversation. Wearing supplemental O2  CARDIOVASCULAR: RRR, no murmurs. No lower extremity edema   ABDOMEN: soft non-tender, active bowel sounds  EXTREMITIES: warm, Full ROM of all four extremities. No focal weakness. SKIN: warm, dry with no rashes or lesions  NEUROLOGIC: follows commands, non focal.   PSYCH: mood and affect appropriate. Alert and oriented to time and place and person. Vital Signs  BP (!) 165/58   Pulse 92   Temp 98.4 °F (36.9 °C) (Temporal)   Resp 15   Ht 5' 3\" (1.6 m)   Wt 93 lb 8 oz (42.4 kg)   SpO2 93%   BMI 16.56 kg/m²     Intake/Output Summary (Last 24 hours) at 6/9/2022 0644  Last data filed at 6/9/2022 2409  Gross per 24 hour   Intake 1255 ml   Output 2950 ml   Net -1695 ml     Labs:  CBC:   Recent Labs     06/07/22  0252 06/07/22  0252 06/07/22  1541 06/08/22  0301 06/09/22  0139   WBC 22.9*  --   --  24.9* 25.1*   HGB 6.8*   < > 10.1* 9.4* 9.0*     --   --  336 300    < > = values in this interval not displayed. CMP:   Recent Labs     06/07/22  0252 06/08/22  0301 06/09/22  0139   GLUCOSE 121* 212* 93   BUN 60* 47* 40*   CREATININE 1.0* 1.0* 0.8   CO2 28 26 28   CALCIUM 7.9* 7.5* 8.0*     ASSESSMENT:  #Normocytic anemia-  -Iron profile compatible with anemia of chronic disease  -Probable GI blood loss  -Normal TSH  -[place]: 86, LDH: 303 ()  -Patient has been anemic for a long time.   Hemoglobin in the range of 7-there for the last 5 years  -Transfused 2 units PRBCs during this admission with hemoglobin trended down again.  -Patient on aspirin and Plavix which increases risk for GI bleed  -Patients with heart failure and chronic anemia and may benefit from IV iron infusion  -S/p Venofer 500 mg IV x2 doses     #Neutrophilic leukocytosis  Recent Labs     06/09/22  0139 06/08/22  0301 06/07/22  1541 06/07/22  0252 06/07/22  0252   WBC 25.1* 24.9*  --   --  22.9*   HGB 9.0* 9.4* 10.1*   < > 6.8*   HCT 30.1* 30.3* 32.4*   < > 22.7*   MCV 95.3 95.6  --   --  97.0    336  --   --  378    < > = values in this interval not displayed. #AECOPD/possible pneumonia  As per attending  Maxipime changed to cefdinir, per attending    PLAN:   Heme positive stool - patient reports h/o OR for gastric ulcer 2009  Evaluated by GI  Patient opted against endoscopy/colonoscopy due to high risk  No evidence of hemolysis  Has received 3 units PRBC this admit  IV Venofer  500 mg x 2 doses, dose #2, completed 6/8/2022  Hgb stable, 9.4 today       (Please note that portions of this note were completed with a voice recognition program. Efforts were made to edit the dictations but occasionally words are mis-transcribed.)    Madhavi Bernal, BEBO  06/09/22  6:44 AM  Physician's attestation/substantial contribution:  I, Dr Chanel Castro, independently performed an evaluation on [facility]. I have reviewed relevant medical information/data to include but not limited to medication list, relevant appropriate labs and imaging when applicable. I reviewed other physician's notes, ancillary services and nurses assessment. I have reviewed the above documentation completed by the Nurse Practitioner or Physician Assistant. Please see my additional contributions to the history of present illness, physical examination, and assessment/medical decision-making that reflect my findings and impressions.  I have seen and examined the patient and the key elements of all parts of the encounter have been performed by me. I agree with the assessment and plan as outlined by the ARNP/PA. Subjective-complains of short of breath this morning  Objective- respiratory distress  Assessment/plan:  Hemoglobin 9.4 this morning. She received 2 units PRBCs and IV iron therapy. She was seen by GI yesterday. No procedure at this time due to her respiratory status. Continue current supportive care.

## 2022-06-09 NOTE — PROGRESS NOTES
Physical Therapy  Name: Joel Chery  MRN:  [id number]  Date of service:  6/9/2022 06/09/22 1527   General   Chart Reviewed Yes   Subjective   Subjective Pt up in recliner, agrees to work with therapy. Pain Assessment   Pain Assessment None - Denies Pain   Oxygen Therapy   O2 Device Nasal cannula   O2 Flow Rate (L/min) 2 L/min   Transfers   Sit to Stand Contact guard assistance   Stand to sit Contact guard assistance   Ambulation   WB Status WBAT   Ambulation   Device Rolling Walker   Other Apparatus O2   Assistance Contact guard assistance   Quality of Gait unsteady overall, fatigues quickly   Gait Deviations Slow Arlene;Decreased step length;Decreased step height   Distance 15'   Short Term Goals   Time Frame for Short term goals 2 weeks   Short term goal 1 Independent with all bed mobility and transfers   Short term goal 2 Ambulate 250 feet independently with assistive device   Conditions Requiring Skilled Therapeutic Intervention   Body Structures, Functions, Activity Limitations Requiring Skilled Therapeutic Intervention Decreased functional mobility    Assessment Pt very motivated to work with therapy, tolerated short amb distance well with rwx but fatigues quickly and must sit. Pt positioned for comfort with all needs in reach.     Activity Tolerance   Activity Tolerance Patient tolerated treatment well;Patient limited by fatigue;Patient limited by endurance   PT Plan of Care   Thursday X   Safety Devices   Type of Devices Call light within reach;Gait belt;Left in chair         Electronically signed by Cinthia Simmons PTA on 6/9/2022 at 3:33 PM

## 2022-06-10 NOTE — PROGRESS NOTES
Daily Progress Note    Date:6/10/2022  Patient: Bigg Devries  : 1945  F:[id number]  CODE:Full Code No additional code details  Maribeth Rico MD    Admit Date: 2022  1:42 PM   LOS: 6 days       Subjective:    Dyspnea continues to slowly improve with good urine output with diuresis, now with significant net negative fluid balance. Still with malaise and severe fatigue, deconditioning however now up and out of bed and in chair. Supplemental O2 near baseline. No fevers or chills.                Review of Systems    14 point review systems completed and is negative except as otherwise noted      Objective:      Vital signs in last 24 hours:  Patient Vitals for the past 24 hrs:   BP Temp Temp src Pulse Resp SpO2 Height Weight   06/10/22 1447 (!) 133/43 98.1 °F (36.7 °C) Oral 90 18 92 % -- --   06/10/22 1326 -- -- -- -- -- -- 5' 3\" (1.6 m) --   06/10/22 0557 -- 98.6 °F (37 °C) Temporal 92 18 95 % 5' 3\" (1.6 m) 96 lb 12.8 oz (43.9 kg)   06/10/22 0028 (!) 146/59 99 °F (37.2 °C) Temporal 100 18 95 % -- --   22 -- -- -- 95 -- -- -- --   22 1746 (!) 135/57 98.7 °F (37.1 °C) Temporal 96 20 96 % -- --     Physical exam    General: Chronically ill/fatigued appearing, no distress  Psych: Calm and cooperative  HEENT: NC/AT, no scleral icterus  Cardiovascular: Normal rate, pulses equal bilaterally  Respiratory: Diminished breath sounds bilaterally, no wheezes  Abdomen: Soft, nontender, bowel sounds present  Neurologic: Alert, follows commands, nonfocal  Musculoskeletal: Loss of subcutaneous fat, appears malnourished  Extremities: BLE edema present but with some improvement, no clubbing or cyanosis  Skin: Decubitus ulcer on buttock-present on admission; warm and dry, well perfused        Lab Review   Recent Results (from the past 24 hour(s))   POCT Glucose    Collection Time: 06/09/22  8:33 PM   Result Value Ref Range    POC Glucose 214 (H) 70 - 99 mg/dl    Performed on AccuChek    Basic Metabolic Panel w/ Reflex to MG    Collection Time: 06/10/22  2:06 AM   Result Value Ref Range    Sodium 134 (L) 136 - 145 mmol/L    Potassium reflex Magnesium 3.4 (L) 3.5 - 5.0 mmol/L    Chloride 96 (L) 98 - 111 mmol/L    CO2 27 22 - 29 mmol/L    Anion Gap 11 7 - 19 mmol/L    Glucose 245 (H) 74 - 109 mg/dL    BUN 39 (H) 8 - 23 mg/dL    CREATININE 1.0 (H) 0.5 - 0.9 mg/dL    GFR Non-African American 54 (A) >60    GFR African American >59 >59    Calcium 8.1 (L) 8.8 - 10.2 mg/dL   CBC with Auto Differential    Collection Time: 06/10/22  2:06 AM   Result Value Ref Range    WBC 19.3 (H) 4.8 - 10.8 K/uL    RBC 3.01 (L) 4.20 - 5.40 M/uL    Hemoglobin 8.8 (L) 12.0 - 16.0 g/dL    Hematocrit 29.6 (L) 37.0 - 47.0 %    MCV 98.3 81.0 - 99.0 fL    MCH 29.2 27.0 - 31.0 pg    MCHC 29.7 (L) 33.0 - 37.0 g/dL    RDW 16.3 (H) 11.5 - 14.5 %    Platelets 161 580 - 004 K/uL    MPV 9.8 9.4 - 12.3 fL    Neutrophils % 85.4 (H) 50.0 - 65.0 %    Lymphocytes % 2.5 (L) 20.0 - 40.0 %    Monocytes % 5.7 0.0 - 10.0 %    Eosinophils % 0.0 0.0 - 5.0 %    Basophils % 0.2 0.0 - 1.0 %    Neutrophils Absolute 16.5 (H) 1.5 - 7.5 K/uL    Immature Granulocytes # 1.2 K/uL    Lymphocytes Absolute 0.5 (L) 1.1 - 4.5 K/uL    Monocytes Absolute 1.10 (H) 0.00 - 0.90 K/uL    Eosinophils Absolute 0.00 0.00 - 0.60 K/uL    Basophils Absolute 0.00 0.00 - 0.20 K/uL   Magnesium    Collection Time: 06/10/22  2:06 AM   Result Value Ref Range    Magnesium 1.6 1.6 - 2.4 mg/dL   EKG 12 lead    Collection Time: 06/10/22  6:59 AM   Result Value Ref Range    P-R Interval 154 ms    QRS Duration 96 ms    Q-T Interval 370 ms    QTc Calculation (Bazett) 422 ms    P Axis 73 degrees    T Axis 75 degrees   POCT Glucose    Collection Time: 06/10/22  7:39 AM   Result Value Ref Range    POC Glucose 105 (H) 70 - 99 mg/dl    Performed on AccuChek    POCT Glucose    Collection Time: 06/10/22 10:58 AM   Result Value Ref Range    POC Glucose 165 (H) 70 - 99 mg/dl    Performed on AccuChek POCT Glucose    Collection Time: 06/10/22  4:49 PM   Result Value Ref Range    POC Glucose 339 (H) 70 - 99 mg/dl    Performed on AccuChek        I/O last 3 completed shifts: In: 1116 [P.O.:1415; I.V.:10]  Out: 3900 [Urine:3900]  I/O this shift:   In: 480 [P.O.:480]  Out: 226 [Urine:225; Stool:1]      Current Facility-Administered Medications:     melatonin disintegrating tablet 5 mg, 5 mg, Oral, Nightly, Cuong Perrin MD    mupirocin (BACTROBAN) 2 % ointment, , Topical, BID, Birsa Soto PA-C, Given at 06/10/22 0901    [COMPLETED] predniSONE (DELTASONE) tablet 20 mg, 20 mg, Oral, Daily, 20 mg at 06/10/22 0900 **FOLLOWED BY** [START ON 6/11/2022] predniSONE (DELTASONE) tablet 15 mg, 15 mg, Oral, Daily **FOLLOWED BY** [START ON 6/14/2022] predniSONE (DELTASONE) tablet 10 mg, 10 mg, Oral, Daily **FOLLOWED BY** [DISCONTINUED] predniSONE (DELTASONE) tablet 5 mg, 5 mg, Oral, Daily, MD Delbert العليon Sicard  [START ON 6/11/2022] bumetanide (BUMEX) tablet 2 mg, 2 mg, Oral, BID, Kye Tejada MD    insulin NPH (HUMULIN N;NOVOLIN N) injection vial 5 Units, 5 Units, SubCUTAneous, BID ACKye MD    cefdinir (OMNICEF) capsule 300 mg, 300 mg, Oral, 2 times per day, Kye Tejada MD, 300 mg at 06/10/22 0900    pantoprazole (PROTONIX) tablet 40 mg, 40 mg, Oral, BID AC, Juanna Saint, MD, 40 mg at 06/10/22 2239    insulin lispro (HUMALOG) injection vial 0-6 Units, 0-6 Units, SubCUTAneous, TID , Kye Tejada MD, 4 Units at 06/09/22 1651    insulin lispro (HUMALOG) injection vial 0-3 Units, 0-3 Units, SubCUTAneous, Nightly, Kye Tejada MD, 1 Units at 06/09/22 2033    sildenafil (REVATIO) tablet 20 mg, 20 mg, Oral, TID, Kye Tejada MD, 20 mg at 06/10/22 0900    glucose chewable tablet 16 g, 4 tablet, Oral, PRN, Kye Tejada MD    dextrose bolus 10% 125 mL, 125 mL, IntraVENous, PRN **OR** dextrose bolus 10% 250 mL, 250 mL, IntraVENous, PRN, Kye Tejada MD    glucagon 2014    clopidogrel (PLAVIX) tablet 75 mg, 75 mg, Oral, Daily, BEBO Villa CNP, 75 mg at 06/08/22 0818    metoprolol tartrate (LOPRESSOR) tablet 50 mg, 50 mg, Oral, BID, Asiya Perez MD, 50 mg at 06/10/22 0900    Arformoterol Tartrate (BROVANA) nebulizer solution 15 mcg, 15 mcg, Nebulization, BID, Asiya Perez MD, 15 mcg at 06/10/22 0636    budesonide (PULMICORT) nebulizer suspension 500 mcg, 0.5 mg, Nebulization, BID, Asiya Perez MD, 500 mcg at 06/10/22 0636        Assessment/plan  Principal Problem:    Acute on chronic anemia  Active Problems:    Acute on chronic diastolic heart failure (HCC)    Near syncope    Anemia of chronic disease    Palliative care patient    History of colon polyps    History of gastric ulcer    History of duodenal ulcer    Coronary artery disease involving native coronary artery of native heart    Leukocytosis    Severe malnutrition (HCC)    CKD (chronic kidney disease), stage III ([facility])    Chronic obstructive pulmonary disease ([facility])    Essential hypertension    PAF (paroxysmal atrial fibrillation) ([facility])    JONES (dyspnea on exertion)    Occult GI bleeding  Resolved Problems:    * No resolved hospital problems. *      Summary:   60-year-old female with chronic respiratory failure on 2 L nasal cannula at baseline, diastolic heart failure, COPD, CAD with prior CABG, carotid endarterectomy, recurrent hospitalizations most recently in April for COPD exacerbation, presented with worsening shortness of breath over several days with minimal exertion, dry cough, fatigue and generalized weakness, admitted with decompensated heart failure with significantly elevated proBNP from previous labs, signs of pulmonary edema with bilateral opacities, also difficult to exclude pneumonia. Noted severe acute on chronic anemia with hemoglobin of 5, but no recent bleeding episodes, improved following PRBC transfusions.   No evidence of any blood loss, normal iron and ferritin levels, low TIBC suggestive of anemia of inflammation/chronic disease. FOBT was positive, however without any overt melena or bright red blood. GI noted patient high risk for endoscopic procedure and obtain previous records for review, and after discussion with patient declined endoscopy. Developed worsening heart failure despite compliance with oral Lasix 80 mg so was managed with IV Bumex 2 mg twice daily with good response. Review of last echo 2/14/2022 revealed significant diastolic dysfunction and severe pulmonary hypertension, otherwise preserved LVEF. Sildenafil added given severe pulmonary hypertension and discussed avoiding concomitant use of sildenafil and nitrates. Received empiric antibiotics for possible pneumonia (chronic leukocytosis but worse from prior labs), nebulized bronchodilators and steroids given uncontrolled severe COPD with chronic steroid use. Overall, symptomatology felt to be multifactorial with symptomatic anemia, decompensated heart failure with severe pulmonary hypertension and severe COPD.          Acute on chronic diastolic heart failure, severe pulmonary hypertension  Monitor telemetry  --Significant net negative fluid balance  - -Continue IV Bumex today then transition to oral Bumex twice daily tomorrow  --- Continue sildenafil for severe pulmonary hypertension, may benefit from this at discharge  -- Would benefit from Jardiance (SGLT2 inhibitor) for both heart failure and her diabetes at discharge (medication not available inpatient)    Symptomatic acute on chronic anemia of chronic disease, inflammation, CKD  FOBT positive, history of gastric and duodenal ulcers  No signs of active bleed  - Evaluated by GI, no plan for endoscopy  - Continue home antiplatelet therapy as long as H&H stable and no signs of bleed  --Anemia work-up reviewed  - S/p multiple transfusions of PRBCs  - Improved, continue to follow CBC with further transfusion as appropriate    Bilateral infiltrates with chronic leukocytosis, questionable pneumonia, finish course of antibiotics today    Severe COPD  - Nebulized bronchodilators  - Continue steroids, taper down to prednisone 10 mg daily then continue    Supplemental O2 to maintain adequate gas exchange with goal SPO2 88-92%, currently near baseline    Diabetes with hyperglycemia  - Exacerbated by steroids, NPH for steroid-induced hyperglycemia, monitor glucose with further adjustment as needed, sliding scale insulin, avoid hypoglycemia    CAD s/p CABG  PAD with prior iliac stents, carotid endarterectomy  -aspirin, Plavix     A. Fib  - Home metoprolol for rate control  - Not on anticoagulation per patient preference with significant bleeding risk    Malnutrition  -nutritional support    CKD with occluded right renal artery, solitary left kidney with severely stenotic left renal artery status post endovascular intervention in 2020  - monitor renal function, avoid nephrotoxins    Hypertension  - Monitor BP, continue home antihypertensive regimen including amlodipine, metoprolol, Imdur,  further adjustment as warranted    Decubitus ulcer of right buttock-present on admission  Skin care/wound care    Hypokalemia  - Replete and continue to monitor    Debility, deconditioning  - Encourage ambulation, PT/OT        DVT prophylaxis: SCDs      Disposition:   Hopeful discharge home with [facility] within the next few days with further clinical improvement and improvement in functional status. Patient has previously refused consideration for SNF multiple times.       Herbert Barnes MD 6/10/2022 5:28 PM

## 2022-06-10 NOTE — PROGRESS NOTES
Progress Note    Patient name: Dawson Salinas  Patient : 1945  MR #[id number]  Room: [facility]    Portions of this note have been copied forward, however, changed to reflect the most current clinical status of this patient. Subjective: Resting OK this am.  Having epistaxis at times    HISTORY OF PRESENT ILLNESS:  The patient is a 68 y.o. female with PMH CAD with CABG, carotid endarterectomy, COPD with continuous home O2 at 2L, HTN, HLD, CHF with diastolic dysfunction and pulmonary hypertension, and multiple  other comorbiditieswho presented to [address] [place] [address] ED 2022 complaining of shortness of breath and CP. Hematology consultation was requested regarding severe anemia. The patient was first seen by Dr. Hilda Soto on 2022 during patient was positioned [facility].  She has multiple comorbidities including history of coronary artery disease status post CABG, carotid endarterectomy, advanced COPD on continuous O2 supplementation, history of congestive heart failure, hyperlipidemia. The patient has had prior admissions in the past for exacerbation of COPD and heart failure. She presented on 2022 with complaints of worsening shortness of breath. She was admitted for concern for COPD exacerbation/heart failure exacerbation. Work-up in the emergency showed elevated white blood cell count with severe anemia with hemoglobin 5.0/MCV 94, RDW 16.4, thrombocytosis platelet count 042,556. Iron profile showed ferritin 110, iron saturation 40, iron 89, TIBC 220, folate 20, vitamin B12 574. The patient denied any overt GI bleed or hematuria. Of note, she has been on aspirin and Plavix for a history of CAD. She was transfused 2 units PRBCs with a hemoglobin recovery of 8.0. Her hemoglobin is trended down again this morning on 2022 down to 6.8. She had a normal TSH. I do not see a hemolytic panel. Review of past CBCs showed that the patient has been anemic since at least May 2016.   She never had a normal hemoglobin. Most of the time her hemoglobin is in the range of 7-10. She was admitted here in April 2022 with a hemoglobin 10.2 at admission and left with a hemoglobin 7.9. Objective   PHYSICAL EXAM:  CONSTITUTIONAL: Alert, appropriate, no acute distress  EYES: Non icteric, EOM intact, pupils equal round  ENT: Oral mucus membranes moist, External inspection of ears and nose are normal.   NECK: Supple, no masses. No JVD  CHEST/LUNGS: CTA bilaterally, diminished breath sounds in the bases. Mildly labored breathing with conversation. Wearing supplemental O2  CARDIOVASCULAR: RRR, no murmurs. No lower extremity edema   ABDOMEN: soft non-tender, active bowel sounds  EXTREMITIES: warm, Full ROM of all four extremities. No focal weakness. SKIN: warm, dry with no rashes or lesions  NEUROLOGIC: follows commands, non focal.   PSYCH: mood and affect appropriate. Alert and oriented to time and place and person. Vital Signs  BP (!) 146/59   Pulse 92   Temp 98.6 °F (37 °C) (Temporal)   Resp 18   Ht 5' 3\" (1.6 m)   Wt 96 lb 12.8 oz (43.9 kg)   SpO2 95%   BMI 17.15 kg/m²     Intake/Output Summary (Last 24 hours) at 6/10/2022 0646  Last data filed at 6/10/2022 0536  Gross per 24 hour   Intake 710 ml   Output 1500 ml   Net -790 ml     Labs:  CBC:   Recent Labs     06/08/22  0301 06/09/22  0139 06/10/22  0206   WBC 24.9* 25.1* 19.3*   HGB 9.4* 9.0* 8.8*    300 274     CMP:   Recent Labs     06/08/22  0301 06/09/22  0139 06/10/22  0206   GLUCOSE 212* 93 245*   BUN 47* 40* 39*   CREATININE 1.0* 0.8 1.0*   CO2 26 28 27   CALCIUM 7.5* 8.0* 8.1*     ASSESSMENT:  #Normocytic anemia-  -Iron profile compatible with anemia of chronic disease  -Probable GI blood loss  -Normal TSH  -[place]: 86, LDH: 303 ()  -Patient has been anemic for a long time.   Hemoglobin in the range of 7-there for the last 5 years  -Transfused 2 units PRBCs during this admission with hemoglobin trended down again.  -Patient on aspirin and Plavix which increases risk for GI bleed  -Patients with heart failure and chronic anemia and may benefit from IV iron infusion  -S/p Venofer 500 mg IV x2 doses         #Neutrophilic leukocytosis  Recent Labs     06/10/22  0206 06/09/22  0139 06/08/22  0301   WBC 19.3* 25.1* 24.9*   HGB 8.8* 9.0* 9.4*   HCT 29.6* 30.1* 30.3*   MCV 98.3 95.3 95.6    300 336     #AECOPD/possible pneumonia  As per attending  Maxipime changed to cefdinir, per attending    PLAN:   Heme positive stool - patient reports h/o OR for gastric ulcer 2009  Evaluated by GI - Patient opted against endoscopy/colonoscopy due to high risk  No evidence of hemolysis  Has received 3 units PRBC this admit  IV Venofer  500 mg x 2 doses, dose #2, completed 6/8/2022  Hgb stable, 8.8 today  Bactroban to nares       (Please note that portions of this note were completed with a voice recognition program. Efforts were made to edit the dictations but occasionally words are mis-transcribed.)    Indira Shaw PA-C  06/10/22  6:46 AM   Physician's attestation/substantial contribution:  I, Dr Erik Flores, independently performed an evaluation on [facility]. I have reviewed relevant medical information/data to include but not limited to medication list, relevant appropriate labs and imaging when applicable. I reviewed other physician's notes, ancillary services and nurses assessment. I have reviewed the above documentation completed by the Nurse Practitioner or Physician Assistant. Please see my additional contributions to the history of present illness, physical examination, and assessment/medical decision-making that reflect my findings and impressions. I have seen and examined the patient and the key elements of all parts of the encounter have been performed by me. I agree with the assessment and plan as outlined by the ARNP/PA.   Subjective-short of breath is better this morning  Objective-chronically appearing  Assessment/plan:  Continue current supportive care. Hemoglobin 8.8 today. Status post IV iron replacement. Hemoccult positive but high risk for endoscopic procedure  Continue to monitor.     River Eli MD

## 2022-06-10 NOTE — PROGRESS NOTES
Physical Therapy    Name: Alcides Childers  MRN:  [id number]  Date of service:  6/10/2022           06/10/22 1100   General   Chart Reviewed Yes   Subjective   Subjective Pt up in recliner, agrees to work with therapy. Pain Assessment   Pain Assessment None - Denies Pain   Pain Level 0   Transfers   Sit to Stand Contact guard assistance   Stand to sit Contact guard assistance   Ambulation   WB Status WBAT   Ambulation   Device Rolling Walker   Other Apparatus O2   Assistance Contact guard assistance   Quality of Gait unsteady overall, fatigues quickly   Gait Deviations Slow Arlene;Decreased step length;Decreased step height   Distance 15'   Balance   Posture Good   Sitting - Static Good   Sitting - Dynamic Good   Standing - Static Fair;-   Standing - Dynamic Poor;+   Exercises   Heelslides x 10   Gluteal Sets x 10   Hip Flexion x 10   Hip Abduction x 10   Knee Long Arc Quad x 10   Ankle Pumps x 10   Comments ball squueze x 10   Short Term Goals   Time Frame for Short term goals 2 weeks   Short term goal 1 Independent with all bed mobility and transfers   Short term goal 2 Ambulate 250 feet independently with assistive device   Conditions Requiring Skilled Therapeutic Intervention   Body Structures, Functions, Activity Limitations Requiring Skilled Therapeutic Intervention Decreased functional mobility    Assessment Pt very motivated to work with therapy, tolerated short amb distance well with rwx but fatigues quickly and must sit. Pt positioned for comfort with all needs in reach.     Treatment Diagnosis Decline in mobility   Therapy Prognosis Good   Decision Making Low Complexity   Discharge Recommendations Home with Home health PT   Activity Tolerance   Activity Tolerance Patient tolerated treatment well;Patient limited by fatigue;Patient limited by endurance   Plan   Plan 5-7 times per week   Plan weeks 2   Current Treatment Recommendations Strengthening;Balance training;Functional mobility training;Gait training PT Plan of Care   PT Plan of Care Daily   PT Re-Eval Due 06/22/22   PT Visit Days  7 Wednesday X   Thursday X   Friday X   Safety Devices   Type of Devices Call light within reach;Gait belt;Left in chair

## 2022-06-10 NOTE — PLAN OF CARE
Nutrition Problem #1: Inadequate oral intake,Altered nutrition-related lab values  Intervention: Food and/or Nutrient Delivery: Continue Current Diet,Modify Oral Nutrition Supplement  Nutritional

## 2022-06-10 NOTE — PROGRESS NOTES
Comprehensive Nutrition Assessment    Type and Reason for Visit:  Reassess    Nutrition Recommendations/Plan:   1. Start 2pm snack. Increase Carb to 5 for more choices     Malnutrition Assessment:  Malnutrition Status:  Severe malnutrition (06/10/22 1330)    Context:  Chronic Illness     Findings of the 6 clinical characteristics of malnutrition:  Energy Intake:  Mild decrease in energy intake (Comment)  Weight Loss:  Mild weight loss (specify amount and time period)     Body Fat Loss:  Severe body fat loss Orbital   Muscle Mass Loss:  Severe muscle mass loss Hand (interosseous)  Fluid Accumulation:  Mild Extremities   Strength:  Not Performed    Nutrition Assessment:    Decreasing Ensure High Protein to just 1x/day per pt request.  \"I'm getting to many. I can't drink all of them,\"  Aware pt s/o stomach hurting so po intake has been slightly decreased. Glucose/Accuchek's still elevated d/t Prednisone. Nutrition Related Findings:    very thin Wound Type: Open Wounds,Pressure Injury       Current Nutrition Intake & Therapies:    Average Meal Intake: %,26-50%  Average Supplements Intake: 26-50%  ADULT ORAL NUTRITION SUPPLEMENT; Breakfast, Lunch, Dinner; Low Calorie/High Protein Oral Supplement  ADULT DIET; Regular; Low Sodium (2 gm)    Anthropometric Measures:  Height: 5' 3\" (160 cm)  Ideal Body Weight (IBW): 115 lbs (52 kg)    Admission Body Weight: 90 lb 3.2 oz (40.9 kg)  Current Body Weight: 96 lb 12.8 oz (43.9 kg), 84.2 % IBW.     Current BMI (kg/m2): 17.2        Weight Adjustment For: No Adjustment   BMI Categories: Underweight (BMI less than 22) age over 72    Estimated Daily Nutrient Needs:  Energy Requirements Based On: Kcal/kg  Weight Used for Energy Requirements: Current  Energy (kcal/day): 5990-1420 kcals (30-35 kcals/kg)  Weight Used for Protein Requirements: Current  Protein (g/day): 72  Method Used for Fluid Requirements: 1 ml/kcal  Fluid (ml/day): 6574-6899 ml    Nutrition Diagnosis: · Inadequate oral intake,Altered nutrition-related lab values related to acute injury/trauma,increase demand for energy/nutrients,endocrine dysfuntion as evidenced by wounds,severe loss of subcutaneous fat,severe muscle loss,weight loss greater than or equal to 2% in 1 week      Nutrition Interventions:   Food and/or Nutrient Delivery: Continue Current Diet,Modify Oral Nutrition Supplement  Nutrition Education/Counseling: No recommendation at this time  Coordination of Nutrition Care: Continue to monitor while inpatient  Plan of Care discussed with: pt    Goals:  Previous Goal Met: Progressing toward Goal(s)  Goals: PO intake 50% or greater,Meet at least 75% of estimated needs       Nutrition Monitoring and Evaluation:   Behavioral-Environmental Outcomes: None Identified  Food/Nutrient Intake Outcomes: Food and Nutrient Intake,Supplement Intake  Physical Signs/Symptoms Outcomes: Biochemical Data,Weight,Skin,Nutrition Focused Physical Findings    Discharge Planning:    Continue current diet,Continue Oral Nutrition Supplement     Germaine Philippe MS, RD, LD  Contact: [phone]

## 2022-06-11 NOTE — PROGRESS NOTES
Progress Note    Patient name: Airam Serrano  Patient : 1945  MR #[id number]  Room: [facility]    Portions of this note have been copied forward, however, changed to reflect the most current clinical status of this patient. Subjective: Sitting up in the chair. Feels better this morning. Breathing is also improving. HISTORY OF PRESENT ILLNESS:  The patient is a 68 y.o. female with PMH CAD with CABG, carotid endarterectomy, COPD with continuous home O2 at 2L, HTN, HLD, CHF with diastolic dysfunction and pulmonary hypertension, and multiple  other comorbiditieswho presented to [address] [place] [address] ED 2022 complaining of shortness of breath and CP. Hematology consultation was requested regarding severe anemia. The patient was first seen by Dr. Naldo Doran on 2022 during patient was positioned [facility].  She has multiple comorbidities including history of coronary artery disease status post CABG, carotid endarterectomy, advanced COPD on continuous O2 supplementation, history of congestive heart failure, hyperlipidemia. The patient has had prior admissions in the past for exacerbation of COPD and heart failure. She presented on 2022 with complaints of worsening shortness of breath. She was admitted for concern for COPD exacerbation/heart failure exacerbation. Work-up in the emergency showed elevated white blood cell count with severe anemia with hemoglobin 5.0/MCV 94, RDW 16.4, thrombocytosis platelet count 301,996. Iron profile showed ferritin 110, iron saturation 40, iron 89, TIBC 220, folate 20, vitamin B12 574. The patient denied any overt GI bleed or hematuria. Of note, she has been on aspirin and Plavix for a history of CAD. She was transfused 2 units PRBCs with a hemoglobin recovery of 8.0. Her hemoglobin is trended down again this morning on 2022 down to 6.8. She had a normal TSH. I do not see a hemolytic panel.   Review of past CBCs showed that the patient has been anemic since at least May 2016. She never had a normal hemoglobin. Most of the time her hemoglobin is in the range of 7-10. She was admitted here in April 2022 with a hemoglobin 10.2 at admission and left with a hemoglobin 7.9. Objective   PHYSICAL EXAM:  CONSTITUTIONAL: Alert, appropriate, no acute distress  EYES: Non icteric, EOM intact, pupils equal round  ENT: Oral mucus membranes moist, External inspection of ears and nose are normal.   NECK: Supple, no masses. No JVD  CHEST/LUNGS: CTA bilaterally, diminished breath sounds in the bases. Mildly labored breathing with conversation. Wearing supplemental O2  CARDIOVASCULAR: RRR, no murmurs. No lower extremity edema   ABDOMEN: soft non-tender, active bowel sounds  EXTREMITIES: warm, Full ROM of all four extremities. No focal weakness. SKIN: warm, dry with no rashes or lesions  NEUROLOGIC: follows commands, non focal.   PSYCH: mood and affect appropriate. Alert and oriented to time and place and person. Vital Signs  BP (!) 128/51   Pulse 77   Temp 98 °F (36.7 °C)   Resp 18   Ht 5' 3\" (1.6 m)   Wt 96 lb 12.8 oz (43.9 kg)   SpO2 92%   BMI 17.15 kg/m²     Intake/Output Summary (Last 24 hours) at 6/11/2022 0839  Last data filed at 6/11/2022 0600  Gross per 24 hour   Intake 1220 ml   Output 1026 ml   Net 194 ml     Labs:  CBC:   Recent Labs     06/09/22  0139 06/10/22  0206 06/11/22  0157   WBC 25.1* 19.3* 25.3*   HGB 9.0* 8.8* 9.4*    274 344     CMP:   Recent Labs     06/09/22  0139 06/10/22  0206 06/11/22  0157   GLUCOSE 93 245* 178*   BUN 40* 39* 37*   CREATININE 0.8 1.0* 0.8   CO2 28 27 28   CALCIUM 8.0* 8.1* 8.2*     ASSESSMENT:  #Normocytic anemia-  -Iron profile compatible with anemia of chronic disease  -Probable GI blood loss: Patient is on Plavix/aspirin which makes her high risk for GI bleed  -Normal TSH  -[place]: 86, LDH: 303 ()  -Patient has been anemic for a long time.   Hemoglobin in the range of 7-there for the last 5 years  -Transfused 2 units PRBCs during this admission with hemoglobin trended down again.  -Patient on aspirin and Plavix which increases risk for GI bleed  -Patients with heart failure and chronic anemia and may benefit from IV iron infusion  -S/p Venofer 500 mg IV x2 doses  -Patient could not undergo EGD/colonoscopy at this time. Please arrange some sort of GI follow-up as outpatient  -Hemoglobin 9.4 on 6/11/2022       #Neutrophilic leukocytosis  Recent Labs     06/11/22  0157 06/10/22  0206 06/09/22  0139   WBC 25.3* 19.3* 25.1*   HGB 9.4* 8.8* 9.0*   HCT 31.4* 29.6* 30.1*   MCV 97.8 98.3 95.3    274 300     #AECOPD/possible pneumonia  As per attending  Maxipime changed to cefdinir, per attending    PLAN:   Heme positive stool - patient reports h/o OR for gastric ulcer 2009  Evaluated by GI - Patient opted against endoscopy/colonoscopy due to high risk at this time. Please arrange outpatient follow-up.   No evidence of hemolysis  Has received 3 units PRBC this admit  IV Venofer  500 mg x 2 doses, dose #2, completed 6/8/2022       (Please note that portions of this note were completed with a voice recognition program. Efforts were made to edit the dictations but occasionally words are mis-transcribed.)    Shalom Pickard MD  06/11/22  8:39 AM

## 2022-06-11 NOTE — PROGRESS NOTES
06/11/22 1500   Subjective   Subjective I am weak I have been in that bed too long. Pain Assessment   Pain Assessment None - Denies Pain   Vitals   O2 Device Nasal cannula  (2L)   Transfer Training   Transfer Training Yes   Sit to Stand Minimum assistance  (From recliner)   Stand to Sit Contact-guard assistance   Gait Training   Gait Training Yes   Right Side Weight Bearing As tolerated   Left Side Weight Bearing As tolerated   Gait   Overall Level of Assistance Contact-guard assistance   Distance (ft) 150 Feet  (Steady NO LOB  2L NC)   Assistive Device Walker, rolling   PT Exercises   A/AROM Exercises BL LE EX AROM in sitting   Assessment   Activity Tolerance Patient tolerated treatment well  (One brief stand break with gait)   PT Equipment Recommendations   Other Patient in chair with call light and all needs in reach.    PT Plan of Care   Saturday X   Physical Therapy    Electronically signed by Ramirez Reyes PTA on 6/11/2022 at 3:34 PM

## 2022-06-11 NOTE — PROGRESS NOTES
Daily Progress Note    Date:2022  Patient: Chastity Herron  : 1945  VD  CODE:Full Code No additional code details  Germaine Marin MD    Admit Date: 2022  1:42 PM   LOS: 7 days       Subjective:    Patient still feels weak and tired. She does not feel ready to go home and does not want to go to skilled nursing facility. She wants to work with PT and OT before going home with home health care . Patient with malaise, severe fatigue and deconditioning.            Review of Systems    14 point review systems completed and is negative except as otherwise noted      Objective:      Vital signs in last 24 hours:  Patient Vitals for the past 24 hrs:   BP Temp Temp src Pulse Resp SpO2   22 1456 (!) 151/69 97.9 °F (36.6 °C) Oral 91 18 94 %   22 0600 (!) 128/51 98 °F (36.7 °C) -- 77 18 92 %   22 0159 -- -- -- -- 14 --   22 0129 -- -- -- -- 18 --   22 0000 (!) 159/59 97.4 °F (36.3 °C) -- 92 20 96 %   06/10/22 2217 -- -- -- 93 -- --   06/10/22 1807 (!) 142/55 97.7 °F (36.5 °C) Oral 93 18 95 %     Physical exam    General: Chronically ill/fatigued appearing, no distress  Psych: Calm and cooperative  HEENT: NC/AT, no scleral icterus  Cardiovascular: Normal rate, pulses equal bilaterally  Respiratory: Diminished breath sounds bilaterally, no wheezes  Abdomen: Soft, nontender, bowel sounds present  Neurologic: Alert, follows commands, nonfocal  Musculoskeletal: Loss of subcutaneous fat, appears malnourished  Extremities: BLE edema present but with some improvement, no clubbing or cyanosis  Skin: Decubitus ulcer on buttock-present on admission; warm and dry, well perfused        Lab Review   Recent Results (from the past 24 hour(s))   POCT Glucose    Collection Time: 06/10/22  7:44 PM   Result Value Ref Range    POC Glucose 285 (H) 70 - 99 mg/dl    Performed on AccuChek    Basic Metabolic Panel w/ Reflex to MG    Collection Time: 22  1:57 AM   Result Value Ref Range    Sodium 137 136 - 145 mmol/L    Potassium reflex Magnesium 4.3 3.5 - 5.0 mmol/L    Chloride 98 98 - 111 mmol/L    CO2 28 22 - 29 mmol/L    Anion Gap 11 7 - 19 mmol/L    Glucose 178 (H) 74 - 109 mg/dL    BUN 37 (H) 8 - 23 mg/dL    CREATININE 0.8 0.5 - 0.9 mg/dL    GFR Non-African American >60 >60    GFR African American >59 >59    Calcium 8.2 (L) 8.8 - 10.2 mg/dL   CBC with Auto Differential    Collection Time: 06/11/22  1:57 AM   Result Value Ref Range    WBC 25.3 (H) 4.8 - 10.8 K/uL    RBC 3.21 (L) 4.20 - 5.40 M/uL    Hemoglobin 9.4 (L) 12.0 - 16.0 g/dL    Hematocrit 31.4 (L) 37.0 - 47.0 %    MCV 97.8 81.0 - 99.0 fL    MCH 29.3 27.0 - 31.0 pg    MCHC 29.9 (L) 33.0 - 37.0 g/dL    RDW 16.1 (H) 11.5 - 14.5 %    Platelets 274 646 - 121 K/uL    MPV 10.2 9.4 - 12.3 fL    Neutrophils % 85.9 (H) 50.0 - 65.0 %    Lymphocytes % 2.9 (L) 20.0 - 40.0 %    Monocytes % 5.0 0.0 - 10.0 %    Eosinophils % 0.1 0.0 - 5.0 %    Basophils % 0.3 0.0 - 1.0 %    Neutrophils Absolute 21.8 (H) 1.5 - 7.5 K/uL    Immature Granulocytes # 1.5 K/uL    Lymphocytes Absolute 0.7 (L) 1.1 - 4.5 K/uL    Monocytes Absolute 1.30 (H) 0.00 - 0.90 K/uL    Eosinophils Absolute 0.00 0.00 - 0.60 K/uL    Basophils Absolute 0.10 0.00 - 0.20 K/uL   POCT Glucose    Collection Time: 06/11/22  7:37 AM   Result Value Ref Range    POC Glucose 147 (H) 70 - 99 mg/dl    Performed on AccuChek    POCT Glucose    Collection Time: 06/11/22 11:01 AM   Result Value Ref Range    POC Glucose 79 70 - 99 mg/dl    Performed on AccuChek        I/O last 3 completed shifts: In: 18 [P.O.:1470; I.V.:10]  Out: 1826 [Urine:1825; Stool:1]  I/O this shift:   In: 480 [P.O.:480]  Out: 300 [Urine:300]      Current Facility-Administered Medications:     HYDROcodone-acetaminophen (NORCO) 7.5-325 MG per tablet 1 tablet, 1 tablet, Oral, Q6H PRN, Eric Wooten DO, 1 tablet at 06/11/22 1119    melatonin disintegrating tablet 5 mg, 5 mg, Oral, Nightly, Cuong Perrin MD, 5 mg at 06/10/22 2055    mupirocin (BACTROBAN) 2 % ointment, , Topical, BID, Jose Ballard PA-C, Given at 06/11/22 2769    [COMPLETED] predniSONE (DELTASONE) tablet 20 mg, 20 mg, Oral, Daily, 20 mg at 06/10/22 0900 **FOLLOWED BY** predniSONE (DELTASONE) tablet 15 mg, 15 mg, Oral, Daily, 15 mg at 06/11/22 0940 **FOLLOWED BY** [START ON 6/14/2022] predniSONE (DELTASONE) tablet 10 mg, 10 mg, Oral, Daily **FOLLOWED BY** [DISCONTINUED] predniSONE (DELTASONE) tablet 5 mg, 5 mg, Oral, Daily, Ayad Castellon MD    bumetanide (BUMEX) tablet 2 mg, 2 mg, Oral, BID, Ayad Castellon MD, 2 mg at 06/11/22 0940    insulin NPH (HUMULIN N;NOVOLIN N) injection vial 5 Units, 5 Units, SubCUTAneous, BID AC, Ayad Castellon MD    pantoprazole (PROTONIX) tablet 40 mg, 40 mg, Oral, BID AC, Joanne Nguyen MD, 40 mg at 06/11/22 0605    insulin lispro (HUMALOG) injection vial 0-6 Units, 0-6 Units, SubCUTAneous, TID WC, Ayad Castellon MD, 1 Units at 06/11/22 0935    insulin lispro (HUMALOG) injection vial 0-3 Units, 0-3 Units, SubCUTAneous, Nightly, Ayad Castellon MD, 2 Units at 06/10/22 2105    sildenafil (REVATIO) tablet 20 mg, 20 mg, Oral, TID, Ayad Castellon MD, 20 mg at 06/11/22 1343    glucose chewable tablet 16 g, 4 tablet, Oral, PRN, Ayad Castellon MD    dextrose bolus 10% 125 mL, 125 mL, IntraVENous, PRN **OR** dextrose bolus 10% 250 mL, 250 mL, IntraVENous, PRN, Ayad Castellon MD    glucagon (rDNA) injection 1 mg, 1 mg, IntraMUSCular, PRN, Ayad Castellon MD    dextrose 5 % solution, 100 mL/hr, IntraVENous, PRN, Ayad Castellon MD    isosorbide mononitrate (IMDUR) extended release tablet 30 mg, 30 mg, Oral, Daily, Ayad Castellon MD, 30 mg at 06/11/22 0940    sodium chloride flush 0.9 % injection 5-40 mL, 5-40 mL, IntraVENous, 2 times per day, BEBO Manriquez CNP, 10 mL at 06/11/22 0943    sodium chloride flush 0.9 % injection 5-40 mL, 5-40 mL, IntraVENous, PRN, BEBO Manriquez CNP, 10 mL at 06/04/22 2046    0.9 % sodium chloride infusion, , IntraVENous, PRN, Viasalvatore Henryste, APRN - CNP    ondansetron (ZOFRAN-ODT) disintegrating tablet 4 mg, 4 mg, Oral, Q8H PRN, 4 mg at 06/11/22 1343 **OR** ondansetron (ZOFRAN) injection 4 mg, 4 mg, IntraVENous, Q6H PRN, Viasalvatore Henryste, APRN - CNP, 4 mg at 06/11/22 0054    polyethylene glycol (GLYCOLAX) packet 17 g, 17 g, Oral, Daily PRN, Viasalvatore Henryste, APRN - CNP    acetaminophen (TYLENOL) tablet 650 mg, 650 mg, Oral, Q6H PRN **OR** acetaminophen (TYLENOL) suppository 650 mg, 650 mg, Rectal, Q6H PRN, Viasalvatore Agreste, APRN - CNP    ipratropium-albuterol (DUONEB) nebulizer solution 1 ampule, 1 ampule, Inhalation, Q4H WA, Viasalvatore Henryste, APRN - CNP, 1 ampule at 06/11/22 1423    albuterol (PROVENTIL) nebulizer solution 2.5 mg, 2.5 mg, Nebulization, Q4H PRN, Viasalvatore Henryste, APRN - CNP, 2.5 mg at 06/09/22 0842    atorvastatin (LIPITOR) tablet 40 mg, 40 mg, Oral, Nightly, Viasalvatore Agreste, APRN - CNP, 40 mg at 06/10/22 2110    guaiFENesin (MUCINEX) extended release tablet 1,200 mg, 1,200 mg, Oral, BID, Viasalvatore Henryste, APRN - CNP, 1,200 mg at 06/11/22 0940    levothyroxine (SYNTHROID) tablet 25 mcg, 25 mcg, Oral, Daily, Viann Agreste, APRN - CNP, 25 mcg at 06/11/22 0605    aspirin EC tablet 81 mg, 81 mg, Oral, Nightly, Viann Agreste, APRN - CNP, 81 mg at 06/10/22 2110    clopidogrel (PLAVIX) tablet 75 mg, 75 mg, Oral, Daily, Viann Agreste, APRN - CNP, 75 mg at 06/11/22 0940    metoprolol tartrate (LOPRESSOR) tablet 50 mg, 50 mg, Oral, BID, Ericka Chavez MD, 50 mg at 06/11/22 0941    Arformoterol Tartrate (BROVANA) nebulizer solution 15 mcg, 15 mcg, Nebulization, BID, Ericka Chavez MD, 15 mcg at 06/11/22 0558    budesonide (PULMICORT) nebulizer suspension 500 mcg, 0.5 mg, Nebulization, BID, Ericka Chavez MD, 500 mcg at 06/11/22 1412        Assessment/plan  Principal Problem:    Acute on chronic anemia  Active Problems:    Acute on chronic diastolic heart failure (HCC)    Near syncope    Anemia of chronic disease    Palliative care patient    History of colon polyps    History of gastric ulcer    History of duodenal ulcer    Coronary artery disease involving native coronary artery of native heart    Leukocytosis    Severe malnutrition (HCC)    CKD (chronic kidney disease), stage III (HCC)    Chronic obstructive pulmonary disease (HCC)    Essential hypertension    PAF (paroxysmal atrial fibrillation) (HCC)    JONES (dyspnea on exertion)    Occult GI bleeding  Resolved Problems:    * No resolved hospital problems. *      Summary:   66-year-old female with chronic respiratory failure on 2 L nasal cannula at baseline, diastolic heart failure, COPD, CAD with prior CABG, carotid endarterectomy, recurrent hospitalizations most recently in April for COPD exacerbation, presented with worsening shortness of breath over several days with minimal exertion, dry cough, fatigue and generalized weakness, admitted with decompensated heart failure with significantly elevated proBNP from previous labs, signs of pulmonary edema with bilateral opacities, also difficult to exclude pneumonia. Noted severe acute on chronic anemia with hemoglobin of 5, but no recent bleeding episodes, improved following PRBC transfusions. No evidence of any blood loss, normal iron and ferritin levels, low TIBC suggestive of anemia of inflammation/chronic disease. FOBT was positive, however without any overt melena or bright red blood. GI noted patient high risk for endoscopic procedure and obtain previous records for review, and after discussion with patient declined endoscopy. Developed worsening heart failure despite compliance with oral Lasix 80 mg so was managed with IV Bumex 2 mg twice daily with good response. Review of last echo 2/14/2022 revealed significant diastolic dysfunction and severe pulmonary hypertension, otherwise preserved LVEF.   Sildenafil added given severe pulmonary hypertension and discussed avoiding concomitant use of sildenafil and nitrates. Received empiric antibiotics for possible pneumonia (chronic leukocytosis but worse from prior labs), nebulized bronchodilators and steroids given uncontrolled severe COPD with chronic steroid use. Overall, symptomatology felt to be multifactorial with symptomatic anemia, decompensated heart failure with severe pulmonary hypertension and severe COPD. Acute on chronic diastolic heart failure, severe pulmonary hypertension  Monitor telemetry  --Significant net negative fluid balance  - -Continue IV Bumex today then transition to oral Bumex twice daily tomorrow  --- Continue sildenafil for severe pulmonary hypertension, may benefit from this at discharge  -- Would benefit from Jardiance (SGLT2 inhibitor) for both heart failure and her diabetes at discharge (medication not available inpatient)    Symptomatic acute on chronic anemia of chronic disease, inflammation, CKD  FOBT positive, history of gastric and duodenal ulcers  No signs of active bleed  - Evaluated by GI, no plan for endoscopy  - Continue home antiplatelet therapy as long as H&H stable and no signs of bleed  --Anemia work-up reviewed  - S/p multiple transfusions of PRBCs  - Improved, continue to follow CBC with further transfusion as appropriate    Bilateral infiltrates with chronic leukocytosis, questionable pneumonia, finish course of antibiotics today    Severe COPD  - Nebulized bronchodilators  - Continue steroids, taper down to prednisone 10 mg daily then continue  -- Continue with Supplemental O2 to maintain adequate gas exchange with goal SPO2 88-92%, currently near baseline    Diabetes with hyperglycemia  - Exacerbated by steroids, NPH for steroid-induced hyperglycemia, monitor glucose with further adjustment as needed, sliding scale insulin, avoid hypoglycemia    CAD s/p CABG  PAD with prior iliac stents, carotid endarterectomy  -aspirin, Plavix     A.  Fib  - Home metoprolol for rate control  - Not on anticoagulation per patient preference with significant bleeding risk    Malnutrition  -nutritional support    CKD with occluded right renal artery, solitary left kidney with severely stenotic left renal artery status post endovascular intervention in 2020  - monitor renal function, avoid nephrotoxins    Hypertension  - Monitor BP, continue home antihypertensive regimen including amlodipine, metoprolol, Imdur,  further adjustment as warranted    Decubitus ulcer of right buttock-present on admission  Skin care/wound care    Hypokalemia  - Replete and continue to monitor    Debility, deconditioning  - Encourage ambulation, PT/OT     Chronic medical issues . .. Continue with home meds. Monitor patient closely while admitted. Advised very close f/u with patient's PCP as an outpatient to address chronic medical issues. Repeat labs in a.m. Electrolyte replacement as per protocol. Patient will be monitored very closely on the floor. Further recommendations as per the hospital course. DC planning :  Hopeful discharge home with [facility] within the next few days with further clinical improvement and improvement in functional status. Patient is adamantly refusing SNF placement. Patient  is on DVT prophylaxis  Current medications reviewed  Lab work reviewed  Radiology/Chest x-ray films reviewed  Treatment recommendations from suspecialities reviewed, appreciated and agreed with  Discussed with the nurse and addressed all questions/concerns  Discussed with Patient and/or Family at the bedside in detail . .. they understand and agree with the management plan.        DVT prophylaxis: SCDs         Cuong Perrin MD 6/11/2022 5:14 PM

## 2022-06-11 NOTE — PLAN OF CARE
Problem: Discharge Planning  Goal: Discharge to home or other facility with appropriate resources  Outcome: Progressing     Problem: Safety - Adult  Goal: Free from fall injury  Outcome: Progressing     Problem: Pain  Goal: Verbalizes/displays adequate comfort level or baseline comfort level  Outcome: Progressing     Problem: Skin/Tissue Integrity  Goal: Absence of new skin breakdown  Description: 1. Monitor for areas of redness and/or skin breakdown  2. Assess vascular access sites hourly  3. Every 4-6 hours minimum:  Change oxygen saturation probe site  4. Every 4-6 hours:  If on nasal continuous positive airway pressure, respiratory therapy assess nares and determine need for appliance change or resting period.   Outcome: Progressing     Problem: ABCDS Injury Assessment  Goal: Absence of physical injury  Outcome: Progressing     Problem: Chronic Conditions and Co-morbidities  Goal: Patient's chronic conditions and co-morbidity symptoms are monitored and maintained or improved  Outcome: Progressing     Problem: Nutrition Deficit:  Goal: Optimize nutritional status  Outcome: Progressing

## 2022-06-12 NOTE — PROGRESS NOTES
Daily Progress Note    Date:2022  Patient: Cliff Castorena  : 1945  PYF:[id number]  CODE:Full Code No additional code details  Mj Gregory MD    Admit Date: 2022  1:42 PM   LOS: 8 days       Subjective:    Patient states she is feeling a little better. Her goal is to go home in the next couple of days with home health care.          Review of Systems    14 point review systems completed and is negative except as otherwise noted      Objective:      Vital signs in last 24 hours:  Patient Vitals for the past 24 hrs:   BP Temp Temp src Pulse Resp SpO2 Weight   22 1448 (!) 150/57 98.2 °F (36.8 °C) Oral 88 22 100 % --   22 0551 (!) 167/67 97.6 °F (36.4 °C) -- 89 22 -- 99 lb 8 oz (45.1 kg)   22 2305 (!) 166/73 98.6 °F (37 °C) -- 92 18 100 % --   22 1759 (!) 129/48 97.7 °F (36.5 °C) Oral 84 18 95 % --     Physical exam    General: Chronically ill/fatigued appearing, no distress  Psych: Calm and cooperative  HEENT: NC/AT, no scleral icterus  Cardiovascular: Normal rate, pulses equal bilaterally  Respiratory: Diminished breath sounds bilaterally, no wheezes  Abdomen: Soft, nontender, bowel sounds present  Neurologic: Alert, follows commands, nonfocal  Musculoskeletal: Loss of subcutaneous fat, appears malnourished  Extremities: BLE edema present but with some improvement, no clubbing or cyanosis  Skin: Decubitus ulcer on buttock-present on admission; warm and dry, well perfused        Lab Review   Recent Results (from the past 24 hour(s))   POCT Glucose    Collection Time: 22  4:52 PM   Result Value Ref Range    POC Glucose 214 (H) 70 - 99 mg/dl    Performed on AccuChek    POCT Glucose    Collection Time: 22  8:07 PM   Result Value Ref Range    POC Glucose 122 (H) 70 - 99 mg/dl    Performed on AccuChek    Basic Metabolic Panel w/ Reflex to MG    Collection Time: 22  1:44 AM   Result Value Ref Range    Sodium 134 (L) 136 - 145 mmol/L    Potassium reflex Magnesium 4.3 3.5 - 5.0 mmol/L    Chloride 98 98 - 111 mmol/L    CO2 27 22 - 29 mmol/L    Anion Gap 9 7 - 19 mmol/L    Glucose 119 (H) 74 - 109 mg/dL    BUN 40 (H) 8 - 23 mg/dL    CREATININE 1.0 (H) 0.5 - 0.9 mg/dL    GFR Non-African American 54 (A) >60    GFR African American >59 >59    Calcium 8.4 (L) 8.8 - 10.2 mg/dL   CBC with Auto Differential    Collection Time: 06/12/22  1:44 AM   Result Value Ref Range    WBC 21.7 (H) 4.8 - 10.8 K/uL    RBC 3.09 (L) 4.20 - 5.40 M/uL    Hemoglobin 9.0 (L) 12.0 - 16.0 g/dL    Hematocrit 30.0 (L) 37.0 - 47.0 %    MCV 97.1 81.0 - 99.0 fL    MCH 29.1 27.0 - 31.0 pg    MCHC 30.0 (L) 33.0 - 37.0 g/dL    RDW 16.3 (H) 11.5 - 14.5 %    Platelets 842 384 - 892 K/uL    MPV 9.8 9.4 - 12.3 fL    Neutrophils % 85.6 (H) 50.0 - 65.0 %    Lymphocytes % 3.0 (L) 20.0 - 40.0 %    Monocytes % 5.6 0.0 - 10.0 %    Eosinophils % 0.7 0.0 - 5.0 %    Basophils % 0.1 0.0 - 1.0 %    Neutrophils Absolute 18.6 (H) 1.5 - 7.5 K/uL    Immature Granulocytes # 1.1 K/uL    Lymphocytes Absolute 0.7 (L) 1.1 - 4.5 K/uL    Monocytes Absolute 1.20 (H) 0.00 - 0.90 K/uL    Eosinophils Absolute 0.20 0.00 - 0.60 K/uL    Basophils Absolute 0.00 0.00 - 0.20 K/uL   POCT Glucose    Collection Time: 06/12/22  7:43 AM   Result Value Ref Range    POC Glucose 108 (H) 70 - 99 mg/dl    Performed on AccuChek    POCT Glucose    Collection Time: 06/12/22 11:23 AM   Result Value Ref Range    POC Glucose 147 (H) 70 - 99 mg/dl    Performed on AccuChek        I/O last 3 completed shifts:   In: 730 [P.O.:730]  Out: 1450 [Urine:1450]  I/O this shift:  In: 600 [P.O.:600]  Out: 800 [Urine:800]      Current Facility-Administered Medications:     HYDROcodone-acetaminophen (NORCO) 7.5-325 MG per tablet 1 tablet, 1 tablet, Oral, Q6H PRN, Genesis Roberts DO, 1 tablet at 06/12/22 1508    melatonin disintegrating tablet 5 mg, 5 mg, Oral, Nightly, Cuong Perrin MD, 5 mg at 06/11/22 2125    mupirocin (BACTROBAN) 2 % ointment, , Topical, BID, Taisha Espinoza PA-C, Given at 06/12/22 2885    [COMPLETED] predniSONE (DELTASONE) tablet 20 mg, 20 mg, Oral, Daily, 20 mg at 06/10/22 0900 **FOLLOWED BY** predniSONE (DELTASONE) tablet 15 mg, 15 mg, Oral, Daily, 15 mg at 06/12/22 0805 **FOLLOWED BY** [START ON 6/14/2022] predniSONE (DELTASONE) tablet 10 mg, 10 mg, Oral, Daily **FOLLOWED BY** [DISCONTINUED] predniSONE (DELTASONE) tablet 5 mg, 5 mg, Oral, Daily, Asiya Perez MD    bumetanide (BUMEX) tablet 2 mg, 2 mg, Oral, BID, Asiya Perez MD, 2 mg at 06/12/22 0803    insulin NPH (HUMULIN N;NOVOLIN N) injection vial 5 Units, 5 Units, SubCUTAneous, BID AC, Asiya Perez MD, 5 Units at 06/12/22 0800    pantoprazole (PROTONIX) tablet 40 mg, 40 mg, Oral, BID ACAdam MD, 40 mg at 06/12/22 1715    insulin lispro (HUMALOG) injection vial 0-6 Units, 0-6 Units, SubCUTAneous, TID WC, Asiya Perez MD, 1 Units at 06/12/22 1215    insulin lispro (HUMALOG) injection vial 0-3 Units, 0-3 Units, SubCUTAneous, Nightly, Asiya Perez MD, 2 Units at 06/10/22 2105    sildenafil (REVATIO) tablet 20 mg, 20 mg, Oral, TID, Asiya Perez MD, 20 mg at 06/12/22 1358    glucose chewable tablet 16 g, 4 tablet, Oral, PRN, Asiya Perez MD    dextrose bolus 10% 125 mL, 125 mL, IntraVENous, PRN **OR** dextrose bolus 10% 250 mL, 250 mL, IntraVENous, PRNAsiya MD    glucagon (rDNA) injection 1 mg, 1 mg, IntraMUSCular, PRN, Asiya Perez MD    dextrose 5 % solution, 100 mL/hr, IntraVENous, PRNAsiya MD    isosorbide mononitrate (IMDUR) extended release tablet 30 mg, 30 mg, Oral, Daily, Asiya Perez MD, 30 mg at 06/12/22 0803    sodium chloride flush 0.9 % injection 5-40 mL, 5-40 mL, IntraVENous, 2 times per day, BEBO Villa CNP, 10 mL at 06/12/22 0811    sodium chloride flush 0.9 % injection 5-40 mL, 5-40 mL, IntraVENous, PRN, BEBO Villa CNP, 10 mL at 06/04/22 2046    0.9 % sodium chloride infusion, , IntraVENous, PRN, Lenord Sabot, APRN - CNP    ondansetron (ZOFRAN-ODT) disintegrating tablet 4 mg, 4 mg, Oral, Q8H PRN, 4 mg at 06/11/22 1343 **OR** ondansetron (ZOFRAN) injection 4 mg, 4 mg, IntraVENous, Q6H PRN, Lenord Sabot, APRN - CNP, 4 mg at 06/12/22 1358    polyethylene glycol (GLYCOLAX) packet 17 g, 17 g, Oral, Daily PRN, Lenord Sabot, APRN - CNP    acetaminophen (TYLENOL) tablet 650 mg, 650 mg, Oral, Q6H PRN **OR** acetaminophen (TYLENOL) suppository 650 mg, 650 mg, Rectal, Q6H PRN, Lenord Sabot, APRN - CNP    ipratropium-albuterol (DUONEB) nebulizer solution 1 ampule, 1 ampule, Inhalation, Q4H WA, Lenord Sabot, APRN - CNP, 1 ampule at 06/12/22 1424    albuterol (PROVENTIL) nebulizer solution 2.5 mg, 2.5 mg, Nebulization, Q4H PRN, Lenord Sabot, APRN - CNP, 2.5 mg at 06/09/22 0842    atorvastatin (LIPITOR) tablet 40 mg, 40 mg, Oral, Nightly, Lenord Sabot, APRN - CNP, 40 mg at 06/11/22 2125    guaiFENesin (MUCINEX) extended release tablet 1,200 mg, 1,200 mg, Oral, BID, Lenord Sabot, APRN - CNP, 1,200 mg at 06/12/22 0803    levothyroxine (SYNTHROID) tablet 25 mcg, 25 mcg, Oral, Daily, Lenord Sabot, APRN - CNP, 25 mcg at 06/12/22 1631    aspirin EC tablet 81 mg, 81 mg, Oral, Nightly, Lenord Sabot, APRN - CNP, 81 mg at 06/11/22 2125    clopidogrel (PLAVIX) tablet 75 mg, 75 mg, Oral, Daily, Lenord Sabot, APRN - CNP, 75 mg at 06/12/22 0803    metoprolol tartrate (LOPRESSOR) tablet 50 mg, 50 mg, Oral, BID, Tracy Roy MD, 50 mg at 06/12/22 0803    Arformoterol Tartrate (BROVANA) nebulizer solution 15 mcg, 15 mcg, Nebulization, BID, Tracy Roy MD, 15 mcg at 06/12/22 5689    budesonide (PULMICORT) nebulizer suspension 500 mcg, 0.5 mg, Nebulization, BID, Tracy Roy MD, 500 mcg at 06/12/22 0039        Assessment/plan  Principal Problem:    Acute on chronic anemia  Active Problems:    Acute on chronic diastolic heart failure (HCC)    Near syncope    Anemia of chronic disease    Palliative care patient    History of colon polyps    History of gastric ulcer    History of duodenal ulcer    Coronary artery disease involving native coronary artery of native heart    Leukocytosis    Severe malnutrition (HCC)    CKD (chronic kidney disease), stage III (HCC)    Chronic obstructive pulmonary disease (HCC)    Essential hypertension    PAF (paroxysmal atrial fibrillation) (HCC)    JONES (dyspnea on exertion)    Occult GI bleeding  Resolved Problems:    * No resolved hospital problems. *      Summary:   77-year-old female with chronic respiratory failure on 2 L nasal cannula at baseline, diastolic heart failure, COPD, CAD with prior CABG, carotid endarterectomy, recurrent hospitalizations most recently in April for COPD exacerbation, presented with worsening shortness of breath over several days with minimal exertion, dry cough, fatigue and generalized weakness, admitted with decompensated heart failure with significantly elevated proBNP from previous labs, signs of pulmonary edema with bilateral opacities, also difficult to exclude pneumonia. Noted severe acute on chronic anemia with hemoglobin of 5, but no recent bleeding episodes, improved following PRBC transfusions. No evidence of any blood loss, normal iron and ferritin levels, low TIBC suggestive of anemia of inflammation/chronic disease. FOBT was positive, however without any overt melena or bright red blood. GI noted patient high risk for endoscopic procedure and obtain previous records for review, and after discussion with patient declined endoscopy. Developed worsening heart failure despite compliance with oral Lasix 80 mg so was managed with IV Bumex 2 mg twice daily with good response. Review of last echo 2/14/2022 revealed significant diastolic dysfunction and severe pulmonary hypertension, otherwise preserved LVEF.   Sildenafil added given severe pulmonary hypertension and discussed avoiding concomitant use of sildenafil and nitrates. Received empiric antibiotics for possible pneumonia (chronic leukocytosis but worse from prior labs), nebulized bronchodilators and steroids given uncontrolled severe COPD with chronic steroid use. Overall, symptomatology felt to be multifactorial with symptomatic anemia, decompensated heart failure with severe pulmonary hypertension and severe COPD. Acute on chronic diastolic heart failure, severe pulmonary hypertension  Monitor telemetry  --Significant net negative fluid balance  --Started on IV Bumex today then transitioned to oral Bumex twice daily tomorrow  --- Continue sildenafil for severe pulmonary hypertension, may benefit from this at discharge  -- Would benefit from Jardiance (SGLT2 inhibitor) for both heart failure and her diabetes at discharge (medication not available inpatient)    Symptomatic acute on chronic anemia of chronic disease, inflammation, CKD  FOBT positive, history of gastric and duodenal ulcers  No signs of active bleed  - Evaluated by GI, no plan for endoscopy  - Continue home antiplatelet therapy as long as H&H stable and no signs of bleed  --Anemia work-up reviewed  - S/p multiple transfusions of PRBCs  - Improved, continue to follow CBC with further transfusion as appropriate    Bilateral infiltrates with chronic leukocytosis, questionable pneumonia, finish course of antibiotics today    Severe COPD  - Nebulized bronchodilators  - Continue steroids, taper down to prednisone 10 mg daily then continue  -- Continue with Supplemental O2 to maintain adequate gas exchange with goal SPO2 88-92%, currently near baseline    Diabetes with hyperglycemia  - Exacerbated by steroids, NPH for steroid-induced hyperglycemia, monitor glucose with further adjustment as needed, sliding scale insulin, avoid hypoglycemia    CAD s/p CABG  PAD with prior iliac stents, carotid endarterectomy  -aspirin, Plavix     A.  Fib  - Home metoprolol for rate control  - Not on anticoagulation per patient preference with significant bleeding risk    Malnutrition  -nutritional support    CKD with occluded right renal artery, solitary left kidney with severely stenotic left renal artery status post endovascular intervention in 2020  - monitor renal function, avoid nephrotoxins    Hypertension  - Monitor BP, continue home antihypertensive regimen including amlodipine, metoprolol, Imdur,  further adjustment as warranted    Decubitus ulcer of right buttock-present on admission  Skin care/wound care    Hypokalemia  - Replete and continue to monitor    Debility, deconditioning  - Encourage ambulation, PT/OT  --Patient adamantly refusing discharge to skilled nursing facility, and wants to go home with home health care     Chronic medical issues . .. Continue with home meds. Monitor patient closely while admitted. Advised very close f/u with patient's PCP as an outpatient to address chronic medical issues. Repeat labs in a.m. Electrolyte replacement as per protocol. Patient will be monitored very closely on the floor. Further recommendations as per the hospital course. DC planning :  Hopeful discharge home with [facility] [place] within the next couple of days with further clinical improvement and improvement in functional status. Patient is adamantly refusing SNF placement. Patient  is on DVT prophylaxis  Current medications reviewed  Lab work reviewed  Radiology/Chest x-ray films reviewed  Treatment recommendations from suspecialities reviewed, appreciated and agreed with  Discussed with the nurse and addressed all questions/concerns  Discussed with Patient and/or Family at the bedside in detail . .. they understand and agree with the management plan.        DVT prophylaxis: SCDs         Cuong Perrin MD 6/12/2022 3:29 PM

## 2022-06-13 NOTE — ACP (ADVANCE CARE PLANNING)
Advance Care Planning     Advance Care Planning (ACP) Physician/NP/PA (Provider) Conversation      Date of ACP Conversation: 6/13/2022    Conversation Conducted with:   Competent patient at bedside. Health Care Decision Maker:    Current Designated Health Care Decision Maker:      Primary Decision Maker: Tawanna Grey - Child - [phone]      Care Preferences:  Ventilation: \"If you were in your present state of health and suddenly became very ill and were unable to breathe on your own, what would your preference be about the use of a ventilator (breathing machine) if it was available to you? \"    YES    \"If your health worsens and it becomes clear that your chance of recovery is unlikely, what would your preference be about the use of a ventilator (breathing machine) if it was available to you? \"   NO    Resuscitation:  \"CPR works best to restart the heart when there is a sudden event, like a heart attack, in someone who is otherwise healthy. Unfortunately, CPR does not typically restart the heart for people who have serious health conditions or who are very sick. \"    \"In the event your heart stopped as a result of an underlying serious health condition, would you want attempts to be made to restart your heart (answer \"yes\" for attempt to resuscitate) or would you prefer a natural death (answer \"no\" for do not attempt to resuscitate)? \"   YES    Conversation Outcomes / Follow-Up Plan:   Patient reports she would want resuscitation attempted including CPR and intubation, but would not want life sustaining measures prolonged in the event there would be little/no improvement or quality of life.  Would not want long term artifical nutrition or trach      Length of Voluntary ACP Conversation in minutes: 13 minutes    Wendy Young, APRN - CNP

## 2022-06-13 NOTE — PROGRESS NOTES
Progress Note    Patient name: Rainer Stokes  Patient : 1945  MR #[id number]  Room: [facility]    Portions of this note have been copied forward, however, changed to reflect the most current clinical status of this patient. Subjective: sitting up in chair. Feels better from a respiratory standpoint    HISTORY OF PRESENT ILLNESS:  The patient is a 68 y.o. female with PMH CAD with CABG, carotid endarterectomy, COPD with continuous home O2 at 2L, HTN, HLD, CHF with diastolic dysfunction and pulmonary hypertension, and multiple  other comorbiditieswho [facility] complaining of shortness of breath and CP. Hematology consultation was requested regarding severe anemia. The patient was first seen by Dr. Adam Alvarez on 2022 during patient was positioned [facility].  She has multiple comorbidities including history of coronary artery disease status post CABG, carotid endarterectomy, advanced COPD on continuous O2 supplementation, history of congestive heart failure, hyperlipidemia. The patient has had prior admissions in the past for exacerbation of COPD and heart failure. She presented on 2022 with complaints of worsening shortness of breath. She was admitted for concern for COPD exacerbation/heart failure exacerbation. Work-up in the emergency showed elevated white blood cell count with severe anemia with hemoglobin 5.0/MCV 94, RDW 16.4, thrombocytosis platelet count 874,175. Iron profile showed ferritin 110, iron saturation 40, iron 89, TIBC 220, folate 20, vitamin B12 574. The patient denied any overt GI bleed or hematuria. Of note, she has been on aspirin and Plavix for a history of CAD. She was transfused 2 units PRBCs with a hemoglobin recovery of 8.0. Her hemoglobin is trended down again this morning on 2022 down to 6.8. She had a normal TSH. I do not see a hemolytic panel.   Review of past CBCs showed that the patient has been anemic since at least May 2016.  She never had a normal hemoglobin. Most of the time her hemoglobin is in the range of 7-10. She was admitted here in April 2022 with a hemoglobin 10.2 at admission and left with a hemoglobin 7.9. Objective   PHYSICAL EXAM:  CONSTITUTIONAL: Alert, appropriate, no acute distress  EYES: Non icteric, EOM intact, pupils equal round  ENT: Oral mucus membranes moist, External inspection of ears and nose are normal.   NECK: Supple, no masses. No JVD  CHEST/LUNGS: CTA bilaterally, diminished breath sounds in the bases. Wearing supplemental O2  CARDIOVASCULAR: RRR, no murmurs. No lower extremity edema   ABDOMEN: soft non-tender, active bowel sounds  EXTREMITIES: warm, Full ROM of all four extremities. No focal weakness. SKIN: warm, dry with no rashes or lesions  NEUROLOGIC: follows commands, non focal.   PSYCH: mood and affect appropriate. Alert and oriented to time and place and person. Vital Signs  BP (!) 158/55   Pulse 84   Temp 98 °F (36.7 °C)   Resp 16   Ht 5' 3\" (1.6 m)   Wt 102 lb 9.6 oz (46.5 kg)   SpO2 93%   BMI 18.17 kg/m²     Intake/Output Summary (Last 24 hours) at 6/13/2022 0624  Last data filed at 6/12/2022 2119  Gross per 24 hour   Intake 700 ml   Output 1100 ml   Net -400 ml     Labs:  CBC:   Recent Labs     06/11/22 0157 06/12/22  0144 06/13/22  0144   WBC 25.3* 21.7* 18.2*   HGB 9.4* 9.0* 8.5*    275 256     CMP:   Recent Labs     06/11/22 0157 06/12/22  0144 06/13/22  0144   GLUCOSE 178* 119* 106   BUN 37* 40* 42*   CREATININE 0.8 1.0* 0.9   CO2 28 27 29   CALCIUM 8.2* 8.4* 8.2*     ASSESSMENT:  #Normocytic anemia-  -Iron profile compatible with anemia of chronic disease  -Probable GI blood loss: Patient is on Plavix/aspirin which makes her high risk for GI bleed  -Normal TSH  -Haptoglobin: 86, LDH: 303 ()  -Patient has been anemic for a long time.   Hemoglobin in the range of 7-there for the last 5 years  -Transfused 2 units PRBCs during this admission with hemoglobin trended down again.  -Patient on aspirin and Plavix which increases risk for GI bleed  -Patients with heart failure and chronic anemia and may benefit from IV iron infusion  -S/p Venofer 500 mg IV x2 doses  -Patient could not undergo EGD/colonoscopy at this time. Please arrange some sort of GI follow-up as outpatient  -Hemoglobin 8.5/MCV 98.6 on 2/22/7445    #Neutrophilic leukocytosis  Recent Labs     06/13/22  0144 06/12/22  0144 06/11/22  0157   WBC 18.2* 21.7* 25.3*   HGB 8.5* 9.0* 9.4*   HCT 27.9* 30.0* 31.4*   MCV 98.6 97.1 97.8    275 344     #AECOPD/possible pneumonia  As per attending  Maxipime changed to cefdinir; completed  Receiving Bumex    #Afib  Not on anti-coagulation r/t bleeding risk      PLAN:   Heme positive stool - patient reports h/o OR for gastric ulcer 2009  Has received several units PRBC this admit  IV Venofer  500 mg x 2 doses, dose #2, completed 6/8/2022    Evaluated by GI - Patient opted against endoscopy/colonoscopy due to high risk at this time. Reconsider GI evaluation as Hgb decreasing again  Consider discontinuation of Asa/Plavix - if able  Continue PPI, Protonix BID    Discussed with patient and her nurse, Joyce Syed RN     (Please note that portions of this note were completed with a voice recognition program. Efforts were made to edit the dictations but occasionally words are mis-transcribed.)    Sharif Ruth, BEBO  06/13/22  6:24 AM  Physician's attestation/substantial contribution:  I, Dr Orpha Kanner, independently performed an evaluation on Joel Plane. I have reviewed relevant medical information/data to include but not limited to medication list, relevant appropriate labs and imaging when applicable. I reviewed other physician's notes, ancillary services and nurses assessment. I have reviewed the above documentation completed by the Nurse Practitioner or Physician Assistant.  Please see my additional contributions to the history of present illness, physical examination, and assessment/medical decision-making that reflect my findings and impressions. I have seen and examined the patient and the key elements of all parts of the encounter have been performed by me. I agree with the assessment and plan as outlined by the ARNP/PA. Subjective-sitting up in the chair this morning. Feels tired. [id number]  Assessment/plan:  Worsening anemia-patient still on aspirin/Plavix. Patient had occult blood positive before.

## 2022-06-13 NOTE — PROGRESS NOTES
predniSONE  10 mg Oral Daily    Followed by   Robina Dunaway predniSONE  15 mg Oral Daily    bumetanide  2 mg Oral BID    insulin NPH  5 Units SubCUTAneous BID AC    pantoprazole  40 mg Oral BID AC    insulin lispro  0-6 Units SubCUTAneous TID WC    insulin lispro  0-3 Units SubCUTAneous Nightly    sildenafil  20 mg Oral TID    isosorbide mononitrate  30 mg Oral Daily    sodium chloride flush  5-40 mL IntraVENous 2 times per day    ipratropium-albuterol  1 ampule Inhalation Q4H WA    atorvastatin  40 mg Oral Nightly    guaiFENesin  1,200 mg Oral BID    levothyroxine  25 mcg Oral Daily    aspirin EC  81 mg Oral Nightly    clopidogrel  75 mg Oral Daily    metoprolol  50 mg Oral BID    Arformoterol Tartrate  15 mcg Nebulization BID    budesonide  0.5 mg Nebulization BID     HYDROcodone-acetaminophen, glucose, dextrose bolus **OR** dextrose bolus, glucagon (rDNA), dextrose, sodium chloride flush, sodium chloride, ondansetron **OR** ondansetron, polyethylene glycol, acetaminophen **OR** acetaminophen, albuterol  ADULT ORAL NUTRITION SUPPLEMENT; Lunch; Low Calorie/High Protein Oral Supplement  ADULT DIET; Regular; Low Sodium (2 gm)     Lab and other Data:     Recent Labs     06/11/22  0157 06/12/22  0144 06/13/22  0144   WBC 25.3* 21.7* 18.2*   HGB 9.4* 9.0* 8.5*    275 256     Recent Labs     06/11/22  0157 06/12/22  0144 06/13/22  0144    134* 135*   K 4.3 4.3 3.5   CL 98 98 97*   CO2 28 27 29   BUN 37* 40* 42*   CREATININE 0.8 1.0* 0.9   GLUCOSE 178* 119* 106     No results for input(s): AST, ALT, ALB, BILITOT, ALKPHOS in the last 72 hours. RAD:   XR CHEST PORTABLE  Result Date: 6/4/2022  COPD. Interval worsening bilateral lower lobe infiltrates. Enlarging small bilateral  pleural effusions. Recommendation: Follow up as clinically indicated.  Electronically Signed by Jennifer Burdick MD at 04-Jun-2022 04:59:51 PM             CXR 6/12/2022  Impression   Hyperinflated emphysematous lungs.  Bilateral pleural effusions.  Slightly enlarged cardiac silhouette. Recommendation:    Follow up as clinically indicated.       Electronically Signed by Jg Lowery MD at 12-Jun-2022 10:47:39 PM          Assessment/Plan   Principal Problem:    Acute on chronic anemia  Active Problems:    Acute on chronic diastolic heart failure (HCC)    Near syncope    Anemia of chronic disease    Palliative care patient    History of colon polyps    History of gastric ulcer    History of duodenal ulcer    Coronary artery disease involving native coronary artery of native heart    Leukocytosis    Severe malnutrition (HCC)    CKD (chronic kidney disease), stage III (HCC)    Chronic obstructive pulmonary disease (HCC)    Essential hypertension    PAF (paroxysmal atrial fibrillation) (HCC)    JONES (dyspnea on exertion)    Occult GI bleeding  Resolved Problems:    * No resolved hospital problems. *      Visit Summary:  Chart reviewed. Report obtained from RN with no acute events over the weekend. Ms. Raissa Gomez is seen at bedside this morning following transfer to [facility] from [facility]. She continues to c/o weakness, fatigue, and dyspnea but is somewhat better. She is unsure what the plan is for her or when she will be able to d/c. I explained this will be deferred to hospitalist but she is to go with [facility] services and [facility] as she continues to refuse SNF placement for continued rehab. She reports that hematology would like her to get colonscopy/EGD but she is unable to come off plavix to have this done. She is hopefule she can continue to have anemia monitored with hematology outpatient and is concerned about iron replacement as oral iron makes her severely nauseous. Hgb remains stable. Will continue to follow. Received call from pts daughter, Davidson Prakash, this afternoon. We discussed plans for pts possible d/c tomorrow. I explained [facility] services will be set up for when pt is able to go home and [facility] is to follow.  We reviewed CXR from 6/12/22 and if there are plans for hematology outpatient follow up and plans for BS control at home. I explained that d/c medications and follow up will be deferred to primary team but I would update her tomorrow if there are any changes/needs. Toña Martinez will be busy with work tomorrow and has arranged for her godfather to pick pt up from hospital and help her into home. Recommendations:   1. Palliative Care- C d/c home with [facility] once medically stable. Does not want rehab at [facility]. Code status- FULL CODE    2. Normocytic anemia with GI bleed- hematology following. 3 units PRBCs since admission, hgb 8.5 today. Heme positive stool and GI consulted with plans for conservative management. IV venofer x2 doses completed. 3. Acute on chronic CHF- IV bumex transitioned to PO. Sildenafil for severe pulm HTN. reviewed with EF 55%, grade III diastolic dysfunction, severe pulmonary hypertension, mild to moderate aortic stenosis, mild AR and TR    4. COPD exacerbation- mgmt per hospitalist. Improved, stable on 2L NC.    5. Hyperglycemia- exacerbated by steroids, tapering prednisone. NPH per hospitalist with recommendations for jardiance at d/c    6. CAD s/p CABG- plavix and aspirin continued    7. Afib- rate controlled    8. Deconditioning- PT/OT eval, dietician following. Encouraged OOB    Thank you for consulting Palliative Care and allowing us to participate in the care of this patient.    Time Spent Counseling > 50%:  YES                                   Total Time Spent with patient/family counseling, workup/treatment review, counseling and placement of orders/preparation of this note: 29 minutes    Electronically signed by BEBO Ignacio CNP on 6/13/2022 at 8:33 AM    (Please note that portions of this note were completed with a voice recognition program.  Keara Echols made to edit the dictations but occasionally words are mis-transcribed.)

## 2022-06-13 NOTE — CARE COORDINATION
Spoke with pt about other dc services/referrals. Pt reports has agreed to [facility] program for dc and already has current [facility] HH services with a [facility] order for dc. SW discussed [facility] program and pt still declined services stating \"I think I already have too much\" and SW explained ability to request services as OP if she changes her mind. Pt denies further dc needs at this time and SW will continue following for further needs if identified.

## 2022-06-13 NOTE — PROGRESS NOTES
Physical Therapy  Name: Christoph Jain  MRN:  [id number]  Date of service:  6/13/2022 06/13/22 1452   General   Chart Reviewed Yes   Subjective   Subjective Pt in bed, agrees to work with therapy. Oxygen Therapy   O2 Device Nasal cannula   O2 Flow Rate (L/min) 2 L/min   Bed Mobility   Supine to Sit Stand by assistance   Sit to Supine Minimal assistance   Scooting Minimal assistance   Transfers   Sit to Stand Contact guard assistance   Stand to sit Contact guard assistance   Ambulation   WB Status WBAT   Ambulation   Device Rolling Walker   Other Apparatus O2   Assistance Contact guard assistance   Quality of Gait unsteady overall, fatigues quickly   Gait Deviations Slow Arlene;Decreased step length;Decreased step height   Distance 50'   Short Term Goals   Time Frame for Short term goals 2 weeks   Short term goal 1 Independent with all bed mobility and transfers   Short term goal 2 Ambulate 250 feet independently with assistive device   Conditions Requiring Skilled Therapeutic Intervention   Body Structures, Functions, Activity Limitations Requiring Skilled Therapeutic Intervention Decreased functional mobility    Assessment Pt cont to be motivated to work with therapy, limited by decreased endurance and stomach upset this tx but able to amb ~50' with rwx and CGA. Will cont to progress as tolerated. Activity Tolerance   Activity Tolerance Patient tolerated treatment well;Patient limited by endurance; Patient limited by fatigue   PT Plan of Care   Monday X   Safety Devices   Type of Devices Bed alarm in place;Call light within reach;Gait belt;Left in bed         Electronically signed by Brenda Gama PTA on 6/13/2022 at 2:55 PM

## 2022-06-13 NOTE — PROGRESS NOTES
Daily Progress Note    Date:2022  Patient: Rainer Stokes  : 1945  TOR:[id number]  CODE:Full Code No additional code details  Porter Messer MD    Admit Date: 2022  1:42 PM   LOS: 9 days       Subjective:    Patient still complaining of abdominal pain. She says her pain meds are not working.        Review of Systems    14 point review systems completed and is negative except as otherwise noted      Objective:      Vital signs in last 24 hours:  Patient Vitals for the past 24 hrs:   BP Temp Temp src Pulse Resp SpO2 Weight   22 1815 (!) 129/52 -- -- 91 -- 96 % --   22 1739 (!) 179/62 97 °F (36.1 °C) Temporal 88 16 93 % --   22 1138 (!) 137/50 98.2 °F (36.8 °C) Temporal 81 18 91 % --   22 1118 -- -- -- -- 20 -- --   22 0915 -- -- -- 86 -- -- --   22 0615 (!) 158/55 98 °F (36.7 °C) -- 84 16 93 % 102 lb 9.6 oz (46.5 kg)   22 0030 131/62 99.1 °F (37.3 °C) -- 85 22 93 % --     Physical exam    General: Chronically ill/fatigued appearing, no distress  Psych: Calm and cooperative  HEENT: NC/AT, no scleral icterus  Cardiovascular: Normal rate, pulses equal bilaterally  Respiratory: Diminished breath sounds bilaterally, no wheezes  Abdomen: Soft, + epigastric tenderness on palpation, bowel sounds present  Neurologic: Alert, follows commands, nonfocal  Musculoskeletal: Loss of subcutaneous fat, appears malnourished  Extremities: BLE edema present but with some improvement, no clubbing or cyanosis  Skin: Decubitus ulcer on buttock-present on admission; warm and dry, well perfused        Lab Review   Recent Results (from the past 24 hour(s))   POCT Glucose    Collection Time: 22  8:38 PM   Result Value Ref Range    POC Glucose 186 (H) 70 - 99 mg/dl    Performed on AccuChek    Basic Metabolic Panel w/ Reflex to MG    Collection Time: 22  1:44 AM   Result Value Ref Range    Sodium 135 (L) 136 - 145 mmol/L    Potassium reflex Magnesium 3.5 3.5 - 5.0 mmol/L Chloride 97 (L) 98 - 111 mmol/L    CO2 29 22 - 29 mmol/L    Anion Gap 9 7 - 19 mmol/L    Glucose 106 74 - 109 mg/dL    BUN 42 (H) 8 - 23 mg/dL    CREATININE 0.9 0.5 - 0.9 mg/dL    GFR Non-African American >60 >60    GFR African American >59 >59    Calcium 8.2 (L) 8.8 - 10.2 mg/dL   CBC with Auto Differential    Collection Time: 06/13/22  1:44 AM   Result Value Ref Range    WBC 18.2 (H) 4.8 - 10.8 K/uL    RBC 2.83 (L) 4.20 - 5.40 M/uL    Hemoglobin 8.5 (L) 12.0 - 16.0 g/dL    Hematocrit 27.9 (L) 37.0 - 47.0 %    MCV 98.6 81.0 - 99.0 fL    MCH 30.0 27.0 - 31.0 pg    MCHC 30.5 (L) 33.0 - 37.0 g/dL    RDW 16.4 (H) 11.5 - 14.5 %    Platelets 935 163 - 956 K/uL    MPV 9.8 9.4 - 12.3 fL    Neutrophils % 83.0 (H) 50.0 - 65.0 %    Lymphocytes % 3.8 (L) 20.0 - 40.0 %    Monocytes % 6.9 0.0 - 10.0 %    Eosinophils % 1.2 0.0 - 5.0 %    Basophils % 0.2 0.0 - 1.0 %    Neutrophils Absolute 15.1 (H) 1.5 - 7.5 K/uL    Immature Granulocytes # 0.9 K/uL    Lymphocytes Absolute 0.7 (L) 1.1 - 4.5 K/uL    Monocytes Absolute 1.30 (H) 0.00 - 0.90 K/uL    Eosinophils Absolute 0.20 0.00 - 0.60 K/uL    Basophils Absolute 0.00 0.00 - 0.20 K/uL   Magnesium    Collection Time: 06/13/22  1:44 AM   Result Value Ref Range    Magnesium 1.9 1.6 - 2.4 mg/dL   EKG 12 lead    Collection Time: 06/13/22  6:32 AM   Result Value Ref Range    P-R Interval 126 ms    QRS Duration 98 ms    Q-T Interval 372 ms    QTc Calculation (Bazett) 412 ms    P Axis 85 degrees    T Axis 71 degrees   POCT Glucose    Collection Time: 06/13/22  7:16 AM   Result Value Ref Range    POC Glucose 93 70 - 99 mg/dl    Performed on AccuChek    POCT Glucose    Collection Time: 06/13/22 11:39 AM   Result Value Ref Range    POC Glucose 182 (H) 70 - 99 mg/dl    Performed on AccuChek    POCT Glucose    Collection Time: 06/13/22  4:16 PM   Result Value Ref Range    POC Glucose 218 (H) 70 - 99 mg/dl    Performed on AccuChek        I/O last 3 completed shifts:   In: 700 [P.O.:700]  Out: 1450 [[facility]:4365]  I/O this shift:  In: 330 [P.O.:330]  Out: -       Current Facility-Administered Medications:     HYDROmorphone HCl PF (DILAUDID) injection 0.5 mg, 0.5 mg, IntraVENous, Once, Cuong Perrin MD    HYDROcodone-acetaminophen (NORCO) 7.5-325 MG per tablet 1 tablet, 1 tablet, Oral, Q6H PRN, Cuong Perrin MD    melatonin disintegrating tablet 5 mg, 5 mg, Oral, Nightly, Cuong Perrin MD, 5 mg at 06/12/22 2112    mupirocin (BACTROBAN) 2 % ointment, , Topical, BID, Crow Rosenberg PA-C, Given at 06/12/22 2118    [COMPLETED] predniSONE (DELTASONE) tablet 20 mg, 20 mg, Oral, Daily, 20 mg at 06/10/22 0900 **FOLLOWED BY** [COMPLETED] predniSONE (DELTASONE) tablet 15 mg, 15 mg, Oral, Daily, 15 mg at 06/13/22 0921 **FOLLOWED BY** [START ON 6/14/2022] predniSONE (DELTASONE) tablet 10 mg, 10 mg, Oral, Daily **FOLLOWED BY** [DISCONTINUED] predniSONE (DELTASONE) tablet 5 mg, 5 mg, Oral, Daily, Rosendo Brandt MD    bumetanide (BUMEX) tablet 2 mg, 2 mg, Oral, BID, Rosendo Brandt MD, 2 mg at 06/13/22 0921    insulin NPH (HUMULIN N;NOVOLIN N) injection vial 5 Units, 5 Units, SubCUTAneous, BID AC, Rosendo Brandt MD, 5 Units at 06/13/22 1726    pantoprazole (PROTONIX) tablet 40 mg, 40 mg, Oral, BID AC, Pérez Gutierrez MD, 40 mg at 06/13/22 1459    insulin lispro (HUMALOG) injection vial 0-6 Units, 0-6 Units, SubCUTAneous, TID WC, Rosendo Brandt MD, 2 Units at 06/13/22 1726    insulin lispro (HUMALOG) injection vial 0-3 Units, 0-3 Units, SubCUTAneous, Nightly, Rosendo Brandt MD, 1 Units at 06/12/22 2113    sildenafil (REVATIO) tablet 20 mg, 20 mg, Oral, TID, Rosendo Brandt MD, 20 mg at 06/13/22 1459    glucose chewable tablet 16 g, 4 tablet, Oral, PRN, Rosendo Brandt MD    dextrose bolus 10% 125 mL, 125 mL, IntraVENous, PRN **OR** dextrose bolus 10% 250 mL, 250 mL, IntraVENous, PRN, Rosendo Brandt MD    glucagon (rDNA) injection 1 mg, 1 mg, IntraMUSCular, PRN, Rosendo Brandt MD    dextrose 5 % solution, 100 mL/hr, IntraVENous, PRN, Hubert Ling MD    isosorbide mononitrate (IMDUR) extended release tablet 30 mg, 30 mg, Oral, Daily, Hubert Ling MD, 30 mg at 06/13/22 0933    sodium chloride flush 0.9 % injection 5-40 mL, 5-40 mL, IntraVENous, 2 times per day, Socrates Meyer APRN - CNP, 10 mL at 06/13/22 0923    sodium chloride flush 0.9 % injection 5-40 mL, 5-40 mL, IntraVENous, PRN, Socrates Meyer, APRN - CNP, 10 mL at 06/04/22 2046    0.9 % sodium chloride infusion, , IntraVENous, PRN, Socrates Meyer APRN - CNP    ondansetron (ZOFRAN-ODT) disintegrating tablet 4 mg, 4 mg, Oral, Q8H PRN, 4 mg at 06/11/22 1343 **OR** ondansetron (ZOFRAN) injection 4 mg, 4 mg, IntraVENous, Q6H PRN, Socrates Meyer APRN - CNP, 4 mg at 06/13/22 1722    polyethylene glycol (GLYCOLAX) packet 17 g, 17 g, Oral, Daily PRN, Socrates Meyer APRN - CNP    acetaminophen (TYLENOL) tablet 650 mg, 650 mg, Oral, Q6H PRN **OR** acetaminophen (TYLENOL) suppository 650 mg, 650 mg, Rectal, Q6H PRN, Socrates Meyer APRN - CNP    ipratropium-albuterol (DUONEB) nebulizer solution 1 ampule, 1 ampule, Inhalation, Q4H WA, Socrates Meyer APRN - CNP, 1 ampule at 06/13/22 1829    albuterol (PROVENTIL) nebulizer solution 2.5 mg, 2.5 mg, Nebulization, Q4H PRN, Socratse Meyer APRN - CNP, 2.5 mg at 06/09/22 0842    atorvastatin (LIPITOR) tablet 40 mg, 40 mg, Oral, Nightly, Lugraeme Meyer APRN - CNP, 40 mg at 06/12/22 2111    guaiFENesin (MUCINEX) extended release tablet 1,200 mg, 1,200 mg, Oral, BID, BEBO Man - CNP, 1,200 mg at 06/13/22 6250    levothyroxine (SYNTHROID) tablet 25 mcg, 25 mcg, Oral, Daily, BEBO Man - CNP, 25 mcg at 06/13/22 4070    aspirin EC tablet 81 mg, 81 mg, Oral, Nightly, BEBO Man - CNP, 81 mg at 06/12/22 2112    clopidogrel (PLAVIX) tablet 75 mg, 75 mg, Oral, Daily, BEBO Man - CNP, 75 mg at 06/13/22 0275    metoprolol tartrate (LOPRESSOR) tablet 50 mg, 50 mg, Oral, BID, Anuradha Cheng MD, 50 mg at 06/13/22 3078    Arformoterol Tartrate (BROVANA) nebulizer solution 15 mcg, 15 mcg, Nebulization, BID, Anuradha Cheng MD, 15 mcg at 06/13/22 1835    budesonide (PULMICORT) nebulizer suspension 500 mcg, 0.5 mg, Nebulization, BID, Anuradha Cheng MD, 500 mcg at 06/13/22 1835        Assessment/plan  Principal Problem:    Acute on chronic anemia  Active Problems:    Acute on chronic diastolic heart failure (HCC)    Near syncope    Anemia of chronic disease    Palliative care patient    History of colon polyps    History of gastric ulcer    History of duodenal ulcer    Coronary artery disease involving native coronary artery of native heart    Leukocytosis    Severe malnutrition ([facility])    CKD (chronic kidney disease), stage III ([facility])    Chronic obstructive pulmonary disease (HCC)    Essential hypertension    PAF (paroxysmal atrial fibrillation) ([facility])    JONES (dyspnea on exertion)    Occult GI bleeding  Resolved Problems:    * No resolved hospital problems. *      Summary:   49-year-old female with chronic respiratory failure on 2 L nasal cannula at baseline, diastolic heart failure, COPD, CAD with prior CABG, carotid endarterectomy, recurrent hospitalizations most recently in April for COPD exacerbation, presented with worsening shortness of breath over several days with minimal exertion, dry cough, fatigue and generalized weakness, admitted with decompensated heart failure with significantly elevated proBNP from previous labs, signs of pulmonary edema with bilateral opacities, also difficult to exclude pneumonia. Noted severe acute on chronic anemia with hemoglobin of 5, but no recent bleeding episodes, improved following PRBC transfusions. No evidence of any blood loss, normal iron and ferritin levels, low TIBC suggestive of anemia of inflammation/chronic disease. FOBT was positive, however without any overt melena or bright red blood.   GI noted patient high risk for endoscopic procedure and obtain previous records for review, and after discussion with patient declined endoscopy. Developed worsening heart failure despite compliance with oral Lasix 80 mg so was managed with IV Bumex 2 mg twice daily with good response. Review of last echo 2/14/2022 revealed significant diastolic dysfunction and severe pulmonary hypertension, otherwise preserved LVEF. Sildenafil added given severe pulmonary hypertension and discussed avoiding concomitant use of sildenafil and nitrates. Received empiric antibiotics for possible pneumonia (chronic leukocytosis but worse from prior labs), nebulized bronchodilators and steroids given uncontrolled severe COPD with chronic steroid use. Overall, symptomatology felt to be multifactorial with symptomatic anemia, decompensated heart failure with severe pulmonary hypertension and severe COPD.        Acute on chronic diastolic heart failure, severe pulmonary hypertension  Monitor telemetry  --Significant net negative fluid balance  --Started on IV Bumex then transitioned to oral Bumex twice daily   -- Continue sildenafil for severe pulmonary hypertension, may benefit from this at discharge  -- Would benefit from Jardiance (SGLT2 inhibitor) for both heart failure and her diabetes at discharge (medication not available inpatient)    Symptomatic acute on chronic anemia of chronic disease, inflammation, CKD  FOBT positive, history of gastric and duodenal ulcers  No signs of active bleed  - Evaluated by GI, no plan for endoscopy  - Continue home antiplatelet therapy as long as H&H stable and no signs of bleed  --Anemia work-up reviewed  - S/p multiple transfusions of PRBCs  - continue to follow CBC with further transfusion as appropriate  --     Bilateral infiltrates with chronic leukocytosis, questionable pneumonia, finished course of IV and p.o. antibiotics     Epigastric pain  Leukocytosis  Worsening anemia  -- Patient has been offered EGD by GI which she has refused  -- Oncology wants GI to reevaluate the patient for her persistent symptoms and slowly decreasing hemoglobin  -- I spoke with the patient and offered her to do a CT scan of the abdomen pelvis without contrast which she agreed to  -- Dilaudid 0.5 mg IV x1 dose given  -- Continue with Norco as needed for pain    Severe COPD  - Nebulized bronchodilators  - Continue steroids, taper down to prednisone 10 mg daily then continue  -- Continue with Supplemental O2 to maintain adequate gas exchange with goal SPO2 88-92%, currently near baseline    Diabetes with hyperglycemia  - Exacerbated by steroids, NPH for steroid-induced hyperglycemia, monitor glucose with further adjustment as needed, sliding scale insulin, avoid hypoglycemia    CAD s/p CABG  PAD with prior iliac stents, carotid endarterectomy  -aspirin, Plavix     A. Fib  - Home metoprolol for rate control  - Not on anticoagulation per patient preference with significant bleeding risk    Malnutrition  -nutritional support    CKD with occluded right renal artery, solitary left kidney with severely stenotic left renal artery status post endovascular intervention in 2020  - monitor renal function, avoid nephrotoxins    Hypertension  - Monitor BP, continue home antihypertensive regimen including amlodipine, metoprolol, Imdur,  further adjustment as warranted    Decubitus ulcer of right buttock-present on admission  Skin care/wound care    Hypokalemia  - Replete and continue to monitor  -- Continue with oral potassium daily    Debility, deconditioning  - Encourage ambulation, PT/OT  --Patient adamantly refusing discharge to skilled nursing facility, and wants to go home with home health care     Chronic medical issues . .. Continue with home meds. Monitor patient closely while admitted. Advised very close f/u with patient's PCP as an outpatient to address chronic medical issues. Repeat labs in a.m. Electrolyte replacement as per protocol.  Patient will be monitored very closely on the floor. Further recommendations as per the hospital course. DC planning :  Hopeful discharge home with [facility] within the next couple of days with further clinical improvement and improvement in functional status. Patient is adamantly refusing SNF placement. Patient  is on DVT prophylaxis  Current medications reviewed  Lab work reviewed  Radiology/Chest x-ray films reviewed  Treatment recommendations from suspecialities reviewed, appreciated and agreed with  Discussed with the nurse and addressed all questions/concerns  Discussed with Patient and/or Family at the bedside in detail . .. they understand and agree with the management plan.        DVT prophylaxis: SCDs         Cuong Perrin MD 6/13/2022 6:40 PM

## 2022-06-13 NOTE — CARE COORDINATION
Patient is current with [facility]. Will follow. Please advise when patient discharges. WILL NEED RESUMPTION OF CARE ORDERS TO RESUME HH SERVICES WHEN PATIENT DISCHARGES. Thank you. [address] [place] [address] [phone]. [phone].     Migdalia Torres RN, Home Care Liaison  [phone] P  Electronically signed by Migdalia Torres on 6/13/2022 at 10:42 AM

## 2022-06-14 PROBLEM — Z91.199 PERSONAL HISTORY OF NONCOMPLIANCE WITH MEDICAL TREATMENT AND REGIMEN: Status: ACTIVE | Noted: 2022-01-01

## 2022-06-14 NOTE — PROGRESS NOTES
Progress Note    Patient name: Luis Fuentes  Patient : 1945  MR #[id number]  Room: [facility]    Portions of this note have been copied forward, however, changed to reflect the most current clinical status of this patient. Subjective: Resting in bed. No bleeding noted. HISTORY OF PRESENT ILLNESS:  The patient is a 68 y.o. female with PMH CAD with CABG, carotid endarterectomy, COPD with continuous home O2 at 2L, HTN, HLD, CHF with diastolic dysfunction and pulmonary hypertension, and multiple  other comorbiditieswho presented to [facility] 2022 complaining of shortness of breath and CP. Hematology consultation was requested regarding severe anemia. The patient was first seen by Dr. Laura Hernandez on 2022 during patient was positioned [facility].  She has multiple comorbidities including history of coronary artery disease status post CABG, carotid endarterectomy, advanced COPD on continuous O2 supplementation, history of congestive heart failure, hyperlipidemia. The patient has had prior admissions in the past for exacerbation of COPD and heart failure. She presented on 2022 with complaints of worsening shortness of breath. She was admitted for concern for COPD exacerbation/heart failure exacerbation. Work-up in the emergency showed elevated white blood cell count with severe anemia with hemoglobin 5.0/MCV 94, RDW 16.4, thrombocytosis platelet count [phone],429. Iron profile showed ferritin 110, iron saturation 40, iron 89, TIBC 220, folate 20, vitamin B12 574. The patient denied any overt GI bleed or hematuria. Of note, she has been on aspirin and Plavix for a history of CAD. She was transfused 2 units PRBCs with a hemoglobin recovery of 8.0. Her hemoglobin is trended down again this morning on 2022 down to 6.8. She had a normal TSH. I do not see a hemolytic panel. Review of past CBCs showed that the patient has been anemic since at least May 2016.   She never had a normal hemoglobin. Most of the time her hemoglobin is in the range of 7-10. She was admitted here in April 2022 with a hemoglobin 10.2 at admission and left with a hemoglobin 7.9. Objective   PHYSICAL EXAM:  CONSTITUTIONAL: Alert, appropriate, no acute distress  EYES: Non icteric, EOM intact, pupils equal round  ENT: Oral mucus membranes moist, External inspection of ears and nose are normal.   NECK: Supple, no masses. No JVD  CHEST/LUNGS: CTA bilaterally, diminished breath sounds in the bases. Wearing supplemental O2  CARDIOVASCULAR: RRR, no murmurs. No lower extremity edema   ABDOMEN: soft non-tender, active bowel sounds  EXTREMITIES: warm, Full ROM of all four extremities. No focal weakness. SKIN: warm, dry with no rashes or lesions  NEUROLOGIC: follows commands, non focal.   PSYCH: mood and affect appropriate. Alert and oriented to time and place and person. Vital Signs  BP (!) 131/51   Pulse 83   Temp (!) 96 °F (35.6 °C) (Temporal)   Resp 18   Ht 5' 3\" (1.6 m)   Wt 102 lb 9.6 oz (46.5 kg)   SpO2 91%   BMI 18.17 kg/m²     Intake/Output Summary (Last 24 hours) at 6/14/2022 0635  Last data filed at 6/14/2022 0549  Gross per 24 hour   Intake 630 ml   Output 900 ml   Net -270 ml     Labs:  CBC:   Recent Labs     06/12/22  0144 06/13/22  0144 06/14/22  0107   WBC 21.7* 18.2* 17.3*   HGB 9.0* 8.5* 8.8*    256 266     CMP:   Recent Labs     06/12/22  0144 06/13/22  0144 06/14/22  0107   GLUCOSE 119* 106 72*   BUN 40* 42* 42*   CREATININE 1.0* 0.9 0.8   CO2 27 29 30*   CALCIUM 8.4* 8.2* 8.3*     ASSESSMENT:  #Normocytic anemia-  -Iron profile compatible with anemia of chronic disease  -Probable GI blood loss: Patient is on Plavix/aspirin which makes her high risk for GI bleed  -Normal TSH  -Haptoglobin: 86, LDH: 303 ()  -Patient has been anemic for a long time.   Hemoglobin in the range of 7-there for the last 5 years  -Transfused 2 units PRBCs during this admission with hemoglobin trended down again.  -Patient on aspirin and Plavix which increases risk for GI bleed  -Patients with heart failure and chronic anemia and may benefit from IV iron infusion  -S/p Venofer 500 mg IV x2 doses  -Intolerant to oral iron replacement due to GI upset  -Patient could not undergo EGD/colonoscopy at this time. Please arrange some sort of GI follow-up as outpatient  -Hemoglobin 8.8/MCV 98.7 on 0/655/3044    #Neutrophilic leukocytosis  Recent Labs     06/14/22  0107 06/13/22  0144 06/12/22  0144   WBC 17.3* 18.2* 21.7*   HGB 8.8* 8.5* 9.0*   HCT 29.9* 27.9* 30.0*   MCV 98.7 98.6 97.1    256 275     #AECOPD/possible pneumonia  As per attending  Maxipime changed to cefdinir; completed  Receiving Bumex    #Afib  Not on anti-coagulation r/t bleeding risk      PLAN:   Heme positive stool - patient reports h/o OR for gastric ulcer 2009  Has received several units PRBC this admit  IV Venofer  500 mg x 2 doses, dose #2, completed 6/8/2022    Evaluated by GI - Patient opted against endoscopy/colonoscopy due to high risk at this time. Anticipate follow-up appointment in the outpatient setting after discharge  Consider discontinuation  ASA or Plavix or both - if able, defer to cardiology  Continue PPI, Protonix BID    Discussed with patient and her nurse, Steffany Sun RN    Follow-up appointment will be arranged in clinic with BEBO Yoo in 2 to 3 weeks postdischarge     (Please note that portions of this note were completed with a voice recognition program. Efforts were made to edit the dictations but occasionally words are mis-transcribed.)    BEBO Tobin  06/14/22  6:35 AM    Physician's attestation/substantial contribution:  I, Dr Gabby Resendiz, independently performed an evaluation on [facility]. I have reviewed relevant medical information/data to include but not limited to medication list, relevant appropriate labs and imaging when applicable.  I reviewed other physician's notes, ancillary services and nurses assessment. I have reviewed the above documentation completed by the Nurse Practitioner or Physician Assistant. Please see my additional contributions to the history of present illness, physical examination, and assessment/medical decision-making that reflect my findings and impressions. I have seen and examined the patient and the key elements of all parts of the encounter have been performed by me. I agree with the assessment and plan as outlined by the ARNP/PA. Subjective-resting bed. No new complaints. Wants to go home  [id number]  Assessment/plan:  Iron deficiency anemia-occult blood positive stools. Patient aspirin/Plavix. I would suggest hospitalist to discuss with cardiology the need for dual antiplatelet therapy at this time. In addition, would suggest to arrange outpatient follow-up with GI for possible scope when she is stable. Okay to discharge from my standpoint when okay without this. We will arrange follow-up in clinic. Discussed bedside RN.     Denisse Diehl MD

## 2022-06-14 NOTE — CONSULTS
[facility]  Cardiology Consult      Requesting MD:  No att. providers found   Admit Status:         History obtained from:   [] Patient  [] Other (specify):     Patient:  Gerardo Cassidy  [id number]     Chief Complaint:   Chief Complaint   Patient presents with    Shortness of Breath         HPI: Ms. Get Squires is a 68 y.o. female with a history of coronary artery disease and peripheral vascular disease previous CABG underwent catheterization 8/10/2021 stent placement ostial right coronary artery at that time also left renal artery. Admitted 6/4/2022 worsening shortness of breath cough chronic respiratory failure severe COPD.  proBNP 11,933 on admission. Hemoglobin 5.0 hematocrit 17.3 WBC 25,800 MCV 94.5 platelet count 968 on 6/4/2022. A weighted 6/7/2022 by Dr. Naomie Fleming oncology for normocytic anemia noted to have iron profile compatible with anemia of chronic disease cannot rule out hemolysis or GI blood loss noted to be anemic for a long time has had a hemoglobin in the range of 7 for a number of years. At that point had had transfusion 2 units of packed blood blood cells during that admission hemoglobin trending down again. Patient on aspirin and Plavix which increases the risk for GI bleed. Venofer 500 mg IV x2 doses ordered. Cardiology requested to see if we could consider discontinuing dual antiplatelet therapy at this time. Patient denies any awareness of chest discomfort or other cardiovascular complaints at this time describe exertional angina. Does complain of weakness and fatigue. Review of Systems:  Review of Systems   Constitutional: Negative. Negative for chills, fever and unexpected weight change. HENT: Negative. Eyes: Negative. Respiratory: Negative. Negative for shortness of breath. Cardiovascular: Negative. Negative for chest pain. Gastrointestinal: Negative. Negative for diarrhea, nausea and vomiting. Endocrine: Negative.     Genitourinary: Negative. Musculoskeletal: Negative. Skin: Negative. Neurological: Negative. All other systems reviewed and are negative.       Cardiac Specific Data:  Specialty Problems        Cardiology Problems    Mitral valve insufficiency        Mitral valve stenosis        Acute superficial venous thrombosis of right lower extremity        Bradycardia        Acute on chronic diastolic heart failure ([facility])        Unstable angina ([facility])        Near syncope        Atherosclerosis of native artery of extremity with intermittent claudication ([facility])        Atherosclerosis of native artery of extremity with intermittent claudication ([facility])        Coronary artery disease involving native coronary artery of native heart        Mixed hyperlipidemia        Carotid artery stenosis        Pulmonary hypertension        Atherosclerosis of native arteries of left leg with ulceration of calf ([facility])        Atherosclerosis of native arteries of right leg with ulceration of other part of foot        Atherosclerosis of native artery of right lower extremity with ulceration of ankle ([facility])        CHF (congestive heart failure) ([facility])        PVD (peripheral vascular disease) ([facility])        Bilateral carotid artery stenosis        Obstruction of left carotid artery        NSTEMI (non-ST elevated myocardial infarction) ([facility])        Essential hypertension        Mild aortic stenosis        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[facility])        Carotid aneurysm, right ([facility])        Chest pain        PAF (paroxysmal atrial fibrillation) ([facility])        Chronic atrial fibrillation ([facility])              Past Medical History:  Past Medical History:   Diagnosis Date    Aortic stenosis     Arthritis     Atherosclerosis of native arteries of the extremities with intermittent claudication 07/25/2011    Blood circulation, collateral     bilat stent lower extremities    CAD (coronary artery disease)     Carotid aneurysm, right ([facility])     Carotid artery occlusion     CHF (congestive heart failure) ([facility])     COPD (chronic obstructive pulmonary disease) ([address]     History of blood transfusion 2016    Post op, was taking blood thinner    Hyperlipidemia     Hypertension     MI (myocardial infarction) ([address] 2009    x2    Mitral valve stenosis     Movement disorder     arthritis    Pneumonia     Post-menopausal     PUD (peptic ulcer disease) 2009    Renal artery stenosis ([address] 08/10/2021    s/p stenting to L    Renal failure 2009    after ulcer perforation sepsis.     Severe malnutrition ([facility]     Thyroid disease     Wound infection after surgery     right foot infection after cabg        Past Surgical History:  Past Surgical History:   Procedure Laterality Date    ABDOMEN SURGERY  2009    perforated ulcer resection of 1/3 stomach    CARDIAC SURGERY      artificial valve and bypass    CAROTID ENDARTERECTOMY      Right  with Dacron patch angioplasty    CAROTID ENDARTERECTOMY Left     CAROTID ENDARTERECTOMY Right 2019    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 AND REPAIR WITH REVERSED LEFT GREATER SAPHENOUS VEIN INTERPOSITIONAL BYPASS GRAFT performed by Cesia Rebollar MD at [address] Right early 's    long ago    CATARACT REMOVAL WITH IMPLANT Bilateral      SECTION  1972    COLONOSCOPY  2012    Dr Husam Aleman:  HP    CORONARY ANGIOPLASTY WITH STENT PLACEMENT  08/10/2021    RM- RCA    CORONARY ANGIOPLASTY WITH STENT PLACEMENT      CORONARY ARTERY BYPASS GRAFT  2016    DIAGNOSTIC CARDIAC CATH LAB PROCEDURE      DILATION AND CURETTAGE OF UTERUS      ILIO-FEMORAL BYPASS GRAFT N/A 2016    OPEN TRANSLUMINAL BALLOON ANGIOPLASTY AND STENTING OF RIGHT COMMON AND EXTERNAL  ILIAC ARTERIES; RIGHT FEMORAL ENDARTERECTOMY WITH VEIN PATCH ANGIOPLASTY performed by Cesia Rebollar MD at [address] [facility] N/A 2016    MITRAL VALVE  REPLACEMENT LUONG-MAZE ABLATION WITH CRYO PROCEDURE, CORONARY ARTERY BYPASS GRAFT X 1 WITH ENDOSCOPIC VEIN HARVESTING WITH PERFUSION TRANSESOPHAGEAL ECHOCARDIOGRAM performed by Giovana Chilel MD at [facility] MITRAL VALVE REPLACEMENT  2016    PERIPHERAL PERCUTANEOUS ARTERIAL INTERVENTION Left 08/10/2021    RM to L renal artery    SD REOPER, CAROTID ENDARTEC>1 MON Left 01/11/2018    REMOVAL OF HEMATOMA, LEFT CAROTID ARTERY performed by Candi Phalen, MD at [address] Left 01/11/2018    LEFT CAROTID ENDARTERECTOMY WITH EEG MONITORING AND COMPLETION DUPLEX ULTRASOUND performed by Candi Phalen, MD at [address] [place] [address] PTCA      SKIN GRAFT Right 07/22/2016    Skin graft split thickness foot,ankle,and leg. Right leg 26x8cm and 12x6cm total area 280cm squared. TJR    UPPER GASTROINTESTINAL ENDOSCOPY  07/14/2015    Dr Blanco Johnson: normal    UPPER GASTROINTESTINAL ENDOSCOPY  03/15/2016    Dr Janelle Kerr: normal    UPPER GASTROINTESTINAL ENDOSCOPY  05/27/2015    Dr Blanco Johnson:  paul neg, multiple gastric antial and duodenal ulcers    VASCULAR SURGERY  5/27/16 TJR    Aortagram and right leg runoff,right leg runoff,right common iliac artery selection for right leg run off views.  VASCULAR SURGERY      . Open transluminal angioplasty and stenting of the external iliac artery. TJR    VASCULAR SURGERY  07/11/2019    T[facility]. 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        Past Family History:  Family History   Problem Relation Age of Onset    Diabetes Mother     Heart Disease Mother     Heart Failure Mother     Diabetes Sister     Heart Disease Sister     High Blood Pressure Sister     Colon Cancer Sister     Liver Disease Sister     Cirrhosis Sister     Colon Cancer Maternal Aunt     Colon Polyps Neg Hx     Esophageal Cancer Neg Hx        Past Social History:  Social History     Socioeconomic History    Marital status:      Spouse name: Not on file    Number of children: Not on file    Years of education: Not on file    Highest education level: Not on file   Occupational History    Not on file   Tobacco Use    Smoking status: Former Smoker     Years: 2.00     Types: Cigarettes     Quit date: 1980     Years since quittin.4    Smokeless tobacco: Never Used   Vaping Use    Vaping Use: Never used   Substance and Sexual Activity    Alcohol use: Yes     Comment: rarely maybe twice a year    Drug use: No    Sexual activity: Not Currently     Partners: Male   Other Topics Concern    Not on file   Social History Narrative    Not on file     Social Determinants of Health     Financial Resource Strain:     Difficulty of Paying Living Expenses: Not on file   Food Insecurity:     Worried About [address] in the Last Year: Not on file    [address] in the Last Year: Not on file   Transportation Needs:     Lack of Transportation (Medical): Not on file    Lack of Transportation (Non-Medical):  Not on file   Physical Activity:     Days of Exercise per Week: Not on file    Minutes of Exercise per Session: Not on file   Stress:     Feeling of Stress : Not on file   Social Connections:     Frequency of Communication with Friends and Family: Not on file    Frequency of Social Gatherings with Friends and Family: Not on file    Attends Restorationist Services: Not on file    Active Member of Clubs or Organizations: Not on file    Attends Club or Organization Meetings: Not on file    Marital Status: Not on file   Intimate Partner Violence:     Fear of Current or Ex-Partner: Not on file    Emotionally Abused: Not on file    Physically Abused: Not on file    Sexually Abused: Not on file   Housing Stability:     Unable to Pay for Housing in the Last Year: Not on file    Number of [place] in the Last Year: Not on file    Unstable Housing in the Last Year: Not on file       Allergies: Allergies   Allergen Reactions    Ativan [Lorazepam] Hallucinations    Levaquin [Levofloxacin In D5w] Nausea And Vomiting    Xarelto [Rivaroxaban] Other (See Comments)     Made anemic. CANNOT HAVE DUE TO HEART VALVE REPLACEMENT.  Morphine Itching and Rash       Home Meds:  Prior to Admission medications    Medication Sig Start Date End Date Taking? Authorizing Provider   HYDROcodone-acetaminophen (NORCO) 5-325 MG per tablet Take 1 tablet by mouth every 6 hours as needed for Pain for up to 3 days. Intended supply: 3 days.  Take lowest dose possible to manage pain 6/14/22 6/17/22 Yes Cuong Perrin MD   sildenafil (REVATIO) 20 MG tablet Take 1 tablet by mouth 3 times daily 6/14/22 7/14/22 Yes Taty Rojo MD   predniSONE (DELTASONE) 5 MG tablet Take 1 tablet by mouth daily for 10 days 6/15/22 6/25/22 Yes Cuong Perrin MD   bumetanide (BUMEX) 2 MG tablet Take 1 tablet by mouth 2 times daily 6/14/22 7/14/22 Yes Taty Rojo MD   potassium chloride (KLOR-CON M) 20 MEQ extended release tablet Take 1 tablet by mouth 2 times daily 6/14/22 7/14/22 Yes Cuong Perrin MD   pantoprazole (PROTONIX) 40 MG tablet Take 1 tablet by mouth 2 times daily (before meals) 6/14/22 7/14/22 Yes Cuong Perrin MD   guaiFENesin (MUCINEX) 600 MG extended release tablet Take 2 tablets by mouth 2 times daily 4/20/22   BEBO Denson - CNP   isosorbide mononitrate (IMDUR) 30 MG extended release tablet Take 30 mg by mouth daily    Historical Provider, MD   amLODIPine (NORVASC) 5 MG tablet Take 1 tablet by mouth 2 times daily 7/30/21   Cailin Bolton MD   metoprolol (LOPRESSOR) 100 MG tablet Take 0.5 tablets by mouth 2 times daily 50 mg in the AM & 50 mg in the PM 6/30/21   Sheilda Prader, APRN   albuterol (ACCUNEB) 1.25 MG/3ML nebulizer solution Inhale 1 ampule into the lungs every 6 hours as needed  1/22/21   Historical Provider, MD   budesonide (PULMICORT) 0.5 MG/2ML nebulizer suspension Inhale 0.5 mg into the lungs Daily 2/2/21   Historical Provider, MD   ipratropium-albuterol (DUONEB) 0.5-2.5 (3) MG/3ML SOLN nebulizer solution Inhale 3 mLs into the lungs every 4 hours  Patient not taking: Reported on 6/4/2022 2/2/21   Historical Provider, MD   Probiotic Product (PROBIOTIC DAILY PO) Take 1 tablet by mouth daily    Historical Provider, MD   nitroGLYCERIN (NITROSTAT) 0.4 MG SL tablet Place 1 tablet under the tongue every 5 minutes as needed for Chest pain 8/18/20   BEBO Hayes   levothyroxine (SYNTHROID) 25 MCG tablet Take 25 mcg by mouth Daily  11/7/19   Historical Provider, MD   Fexofenadine HCl (MUCINEX ALLERGY PO) Take 1 tablet by mouth 2 times daily     Historical Provider, MD   Arformoterol Tartrate (BROVANA) 15 MCG/2ML NEBU Inhale 15 mcg into the lungs 2 times daily  1/2/20   Historical Provider, MD   OXYGEN Inhale 2 L into the lungs daily     Historical Provider, MD   atorvastatin (LIPITOR) 40 MG tablet Take 1 tablet by mouth daily  Patient taking differently: Take 40 mg by mouth nightly PT TAKES AT NIGHT.  11/20/19   Amarilis Jordan MD   Biotin 5000 MCG TABS Take 1 tablet by mouth daily     Historical Provider, MD       Current Meds:   polyethylene glycol  17 g Oral Daily    [START ON 6/15/2022] cefepime  2,000 mg IntraVENous Q24H    potassium chloride  40 mEq Oral Daily    melatonin  5 mg Oral Nightly    mupirocin   Topical BID    predniSONE  10 mg Oral Daily    bumetanide  2 mg Oral BID    insulin NPH  5 Units SubCUTAneous BID AC    pantoprazole  40 mg Oral BID AC    insulin lispro  0-6 Units SubCUTAneous TID WC    insulin lispro  0-3 Units SubCUTAneous Nightly    sildenafil  20 mg Oral TID    isosorbide mononitrate  30 mg Oral Daily    sodium chloride flush  5-40 mL IntraVENous 2 times per day    ipratropium-albuterol  1 ampule Inhalation Q4H WA    atorvastatin  40 mg Oral Nightly    guaiFENesin  1,200 mg Oral BID    levothyroxine  25 mcg Oral Daily    clopidogrel  75 mg Oral Daily    metoprolol  50 mg Oral BID    Arformoterol Tartrate  15 mcg Nebulization BID    budesonide  0.5 mg Nebulization BID       Current Infused Meds:   dextrose      sodium chloride         Physical Exam:  Vitals:    06/14/22 1512   BP:    Pulse:    Resp: 16   Temp:    SpO2:        Intake/Output Summary (Last 24 hours) at 6/14/2022 1616  Last data filed at 6/14/2022 1438  Gross per 24 hour   Intake 720 ml   Output 1300 ml   Net -580 ml     Estimated body mass index is 17.63 kg/m² as calculated from the following:    Height as of this encounter: 5' 3\" (1.6 m). Weight as of this encounter: 99 lb 8 oz (45.1 kg). Physical Exam  Vitals reviewed. Constitutional:       General: She is not in acute distress. Appearance: Normal appearance. She is well-developed and normal weight. She is not ill-appearing, toxic-appearing or diaphoretic. HENT:      Head: Normocephalic and atraumatic. Nose: Nose normal.      Mouth/Throat:      Mouth: Mucous membranes are moist.      Pharynx: Oropharynx is clear. Eyes:      General: No scleral icterus. Extraocular Movements: Extraocular movements intact. Pupils: Pupils are equal, round, and reactive to light. Neck:      Vascular: No carotid bruit or JVD. Comments: Left carotid bruit  Cardiovascular:      Rate and Rhythm: Normal rate and regular rhythm. Heart sounds: Normal heart sounds. No murmur heard. No friction rub. No gallop. Pulmonary:      Effort: Pulmonary effort is normal. No respiratory distress. Breath sounds: Normal breath sounds. No stridor. No wheezing, rhonchi or rales. Chest:      Chest wall: No tenderness. Abdominal:      General: Abdomen is flat. Bowel sounds are normal. There is no distension. Palpations: Abdomen is soft. There is no mass. Tenderness: There is no abdominal tenderness. There is no right CVA tenderness, left CVA tenderness, guarding or rebound.       Hernia: No hernia is present. Musculoskeletal:         General: No swelling, tenderness, deformity or signs of injury. Cervical back: Normal range of motion and neck supple. No rigidity or tenderness. Right lower leg: No edema. Left lower leg: No edema. Lymphadenopathy:      Cervical: No cervical adenopathy. Skin:     General: Skin is warm and dry. Neurological:      General: No focal deficit present. Mental Status: She is alert and oriented to person, place, and time. Mental status is at baseline. Cranial Nerves: No cranial nerve deficit. Sensory: No sensory deficit. Motor: No weakness. Coordination: Coordination normal.   Psychiatric:         Mood and Affect: Mood normal.         Behavior: Behavior normal.         Thought Content: Thought content normal.         Judgment: Judgment normal.         Labs:  Recent Labs     06/12/22 0144 06/13/22 0144 06/14/22  0107   WBC 21.7* 18.2* 17.3*   HGB 9.0* 8.5* 8.8*    256 266       Recent Labs     06/12/22 0144 06/13/22 0144 06/14/22  0107   * 135* 137   K 4.3 3.5 4.2   CL 98 97* 99   CO2 27 29 30*   BUN 40* 42* 42*   CREATININE 1.0* 0.9 0.8   LABGLOM 54* >60 >60   MG  --  1.9  --    CALCIUM 8.4* 8.2* 8.3*       CK, CKMB, Troponin: @LABRCNT (CKTOTAL:3, CKMB:3, TROPONINI:3)@    Last 3 BNP:  No results for input(s): BNP in the last 72 hours. IMAGING:  CT ABDOMEN PELVIS WO CONTRAST Additional Contrast? None    Result Date: 6/14/2022  NO PRIOR REPORT AVAILABLE Exam: CT OF THE ABDOMEN/PELVIS WITHOUT CONTRAST Clinical data: Abdominal pain. Technique: Axial CT images were acquired through the abdomen and pelvis without contrast using soft tissue and bone algorithms. Reformatted/MPR images were performed. Radiation dose: CTDIvol =16.41 mGy, DLP =701 mGy x cm. Limitations: Lack of intravenous contrast limits evaluation of solid viscera. Lack of oral contrast limits evaluation of the bowel loops.  Prior Studies: Renal arterial ultrasound dated 7/30/2020 images. Findings: Lung bases:Trace pleural effusion is noted bilaterally. Mild subsegmental atelectasis is noted in the included bilateral lungs. Liver:Unremarkable size andcontour. Normal density. No evidence of mass. No evidence of dilated ducts. Gallbladder Fossa: Cholelithiasis without evidence of acute cholecystitis. Spleen: Grossly unremarkable. Pancreas/adrenal glands: Grossly unremarkable size, contour and density. Kidneys: In anatomic position. There is mild atrophy of the right kidney and compensatory hypertrophy of the left kidney. Bilateral non-obstructing renal calculi, measuring approximately 2 mm in the upper pole of right kidney, 1 mm and 5 mm in the upper pole of left kidney. No ureteral calculi. No evidence of a renal mass or cyst. Perinephric space is unremarkable. Retroperitoneum: No enlarged retroperitoneal lymphadenopathy. The IVC appears unremarkable. Severe calcific atherosclerotic changes noted in the aorta and its branches. Peritoneal cavity: No evidence of free air. Trace ascites is noted. Gastrointestinal tract: Small inguinal hernia is noted on right, it contains portion of small bowel without evidence of obstruction. Tiny fat containing inguinal hernia is noted on left. Tiny hiatal hernia is noted. Changes of mild constipation. There are twisted mesenteric vessels in the right lower quadrant without evidence of obstruction. Large amount of stool and gas in the colon. Appendix is not visualized. Pelvis: Solid and hollow viscera grossly unremarkable. Osseous structures: No acute or destructive bony process identified. Mild scoliosis. Degenerative changes noted in the spine and pelvis    1. Small inguinal hernia is noted on right, it contains portion of small bowel without evidence of obstruction. Tiny fat containing inguinal hernia is noted on left. 2. Tiny hiatal hernia is noted.  3. There are twisted mesenteric vessels in the right lower quadrant without evidence of obstruction. Changes of mild constipation. 4. Trace pleural effusion is noted bilaterally. Mild subsegmental atelectasis is noted in the included bilateral lungs. 5. Cholelithiasis without evidence of acute cholecystitis. 6. There is mild atrophy of the right kidney and compensatory hypertrophy of the left kidney. Bilateral non-obstructing renal calculi. 7. Generalized anasarca. Trace ascites is noted. 8. Moderately large amount of stool in the colon, please correlate with constipation. Recommendation: Follow up as clinically indicated. All CT scans at this facility utilize dose modulation, iterative reconstruction, and/or weight based dosing when appropriate to reduce radiation dose to as low as reasonably achievable. Electronically Signed by Nico Brothers MD at 14-Jun-2022 01:21:42 AM             XR CHEST (2 VW)    Result Date: 6/12/2022  NO PRIOR REPORT AVAILABLE Exam: X-RAYS OF THE CHEST Clinical data:COPD/CHF/pneumonia. Technique: PA and lateral views of the chest. Prior studies: Radiograph of the chest dated 06/04/2022. NM lung scan dated 04/15/2022. Findings:Hyperinflated emphysematous lungs. Bilateral pleural effusions. Slightly enlarged cardiac silhouette. Hyperinflated emphysematous lungs. Bilateral pleural effusions. Slightly enlarged cardiac silhouette. Recommendation: Follow up as clinically indicated. Electronically Signed by Adam Allen MD at 12-Jun-2022 10:47:39 PM             XR CHEST PORTABLE    Result Date: 6/4/2022  NO PRIOR REPORT AVAILABLE Exam: X-RAY OF THE CHEST Clinical data: COPD, respiratory distress. Technique: Single view of the chest. Prior studies: Radiograph of the chest dated 04/20/2022. Findings:  COPD. Interval worsening bilateral lower lobe infiltrates. Enlarging small bilateral  pleural effusions. Cardiac silhouette is within normal limits. No acute osseous abnormality is detected. No other change. COPD.   Interval worsening bilateral lower lobe infiltrates. Enlarging small bilateral  pleural effusions. Recommendation: Follow up as clinically indicated. Electronically Signed by Karlie Gr MD at 04-Jun-2022 04:59:51 PM               Assessment:  1. Admitted 6/4/2022 worsening shortness of breath  2. COPD  3. Chronic hypoxemia on home oxygen therapy 2 L  4. Hyperlipidemia  5. Coronary artery disease  6. Status post CABG x1  7. Status post Maze procedure for atrial fibrillation  8. Status post mitral valve replacement  9. Previous carotid endarterectomy  10. Chronic systolic and diastolic congestive heart failure  11. Hypertension  12. Peptic ulcer disease  13. Anemia chronic disease  14. History of colon polyps  15. History gastric ulcer/duodenal ulcer  16. Medically noncompliant  17. Pulmonary hypertension  18. Malnutrition  19. Paroxysmal atrial fibrillation  20. History of occult GI bleeding  21. Mitral stenosis  22. History of decubitus ulcer  23. Peripheral arterial disease  24. History of bradycardia  25. History of pleural effusion  26. Elevated liver function test  27. Elevated alkaline phosphatase  28. Enterocolitis  29. Hyponatremia  30. History of aspiration pneumonia left upper lobe  31. History of thyroid disease  32. History of movement disorder 9 CT abdomen pelvis yesterday small inguinal hernia on the right contains portion of small bowel no evidence of obstruction tiny hiatal hernia twisted mesenteric vessels right lower quadrant no evidence of obstruction mild constipation trace pleural effusion mild subsegmental atelectasis noted in the included bilateral lungs cholelithiasis no evidence of acute cholecystitis mild atrophy of the right kidney and compensatory hypertrophy of the left kidney bilateral nonobstructing renal calculi generalized anasarca trace ascites   33. Lung scan 4/15/2022 low probability perfusion only lung scintigraphy  34.  Echocardiogram 1/31/2022 severe concentric LVH EF ejection fraction 55% moderately decreased excursion mild to moderate aortic stenosis peak gradient 20 mean gradient 12 mild AR bioprosthetic mitral valve seen no significant gradient trace MR mild TR severe pulmonary hypertension severe dilated left atrium cardiac catheterization 8/10/2021 two-vessel disease severe critical in-stent restenosis ostial RCA stable intermediate stenosis mid RCA ostial circumflex and mid circumflex severely stenosed vein graft to distal right coronary artery normal LV systolic function severe in-stent restenosis proximal left renal artery known occluded right renal artery successful PCI ostial RCA 4.0 x 12 mm resolute integrity postdilatation using flash 4.5/8 ostial balloon system successful endovascular intervention left renal artery 5.0 x 12 resolute Pierce taken to 24 saud utilizing drug-eluting stent  35. Carotid artery stenosis  36. History of syncope and collapse      Recommendations:  1. At this time I believe it would be reasonable to discontinue aspirin and continue Plavix only in the setting of occult GI bleeding per request of hematology/oncology. It has been more than 6 months since the previous percutaneous interventional procedure 8/10/2021 and a stent placed in the right coronary artery was a 4.0 mm stent electively. Risk of stent thrombosis should be fairly low at this point but not completely eliminated I had a ozzie discussion with the patient regarding this and she is aware of the potential benefits and risks. Follow-up with Dr. Flory Onofre as previously recommended otherwise.

## 2022-06-14 NOTE — PROGRESS NOTES
06/14/22 0925   Subjective   Subjective I am feeling better today. Pain Assessment   Pain Level 4   Patient's Stated Pain Goal 0 - No pain   Pain Location Abdomen   Pain Orientation Upper   Pain Descriptors Aching   Cognition   Overall Cognitive Status WNL   Orientation   Overall Orientation Status WNL   Vitals   O2 Device Nasal cannula  (2L)   Transfer Training   Transfer Training Yes   Overall Level of Assistance Stand-by assistance   Sit to Stand Stand-by assistance   Stand to Sit Stand-by assistance   Gait Training   Gait Training Yes   Right Side Weight Bearing As tolerated   Left Side Weight Bearing As tolerated   Gait   Overall Level of Assistance Stand-by assistance   Speed/Arlene Slow   Distance (ft) 150 Feet  (Steady NO-LOB)   Assistive Device Walker, rolling   PT Exercises   A/AROM Exercises BL LE EX AROM in sitting   Assessment   Activity Tolerance Patient tolerated treatment well   Plan   Plan Comment Patient in chair with call light.  O2 on @ 2L   PT Plan of Care   Tuesday X   Physical Therapy    Electronically signed by Anali Cunningham PTA on 6/14/2022 at 9:55 AM

## 2022-06-14 NOTE — PROGRESS NOTES
Comprehensive Nutrition Assessment    Type and Reason for Visit:  Reassess    Nutrition Recommendations/Plan:   1. Continue current POC     Malnutrition Assessment:  Malnutrition Status:  Severe malnutrition (06/14/22 1119)    Context:  Chronic Illness     Findings of the 6 clinical characteristics of malnutrition:  Energy Intake:  Mild decrease in energy intake (Comment)  Weight Loss:  Mild weight loss (specify amount and time period)     Body Fat Loss:  Severe body fat loss Orbital,Buccal region   Muscle Mass Loss:  Severe muscle mass loss Hand (interosseous),Clavicles (pectoralis & deltoids)  Fluid Accumulation:  Mild Extremities   Strength:  Not Performed    Nutrition Assessment:    PO intake remains variable with intake ranging % and 25-50%. Still c/o some pain. Doing much better with just 1 Ensure High Protein in 24 hours. accuchuk's still elevated     Nutrition Related Findings:    very thin Wound Type: Multiple,Open Wounds       Current Nutrition Intake & Therapies:    Average Meal Intake: %,26-50%  Average Supplements Intake: %  ADULT ORAL NUTRITION SUPPLEMENT; Lunch; Low Calorie/High Protein Oral Supplement  ADULT DIET; Regular; Low Sodium (2 gm)    Anthropometric Measures:  Height: 5' 3\" (160 cm)  Ideal Body Weight (IBW): 115 lbs (52 kg)    Admission Body Weight: 90 lb 3.2 oz (40.9 kg)  Current Body Weight: 99 lb 8 oz (45.1 kg), 86.5 % IBW.  Weight Source: Bed Scale  Current BMI (kg/m2): 17.6  Weight Adjustment For: No Adjustment  BMI Categories: Underweight (BMI less than 22) age over 72    Estimated Daily Nutrient Needs:  Energy Requirements Based On: Kcal/kg  Weight Used for Energy Requirements: Current  Energy (kcal/day): 8935-9333 kcals (30-35 kcals/kg)  Weight Used for Protein Requirements: Current  Protein (g/day): 72  Method Used for Fluid Requirements: 1 ml/kcal  Fluid (ml/day): 6909-2022 ml    Nutrition Diagnosis:   · Inadequate oral intake,Altered nutrition-related lab values related to acute injury/trauma,increase demand for energy/nutrients,endocrine dysfuntion as evidenced by wounds,severe loss of subcutaneous fat,severe muscle loss,weight loss greater than or equal to 2% in 1 week      Nutrition Interventions:   Food and/or Nutrient Delivery: Continue Current Diet,Continue Oral Nutrition Supplement  Nutrition Education/Counseling: No recommendation at this time  Coordination of Nutrition Care: Continue to monitor while inpatient  Plan of Care discussed with: pt    Goals:  Previous Goal Met: Progressing toward Goal(s)  Goals: PO intake 50% or greater,Meet at least 75% of estimated needs       Nutrition Monitoring and Evaluation:   Behavioral-Environmental Outcomes: None Identified  Food/Nutrient Intake Outcomes: Food and Nutrient Intake,Supplement Intake  Physical Signs/Symptoms Outcomes: Biochemical Data,Weight,Skin,Nutrition Focused Physical Findings,Fluid Status or Edema    Discharge Planning:    Continue current diet,Continue Oral Nutrition Supplement     Giuliano Sahu, MS, RD, LD  Contact: [phone]

## 2022-06-14 NOTE — CARE COORDINATION
[facility] notified of patient discharge today. DC Summary and DC med list faxed.   Electronically signed by Carlene Gutierrez on 6/14/22 at 4:15 PM CDT

## 2022-06-14 NOTE — PROGRESS NOTES
Palliative Care Progress Note  6/14/2022 8:35 AM    Patient:  Jo Lamar  YOB: 1945  Primary Care Physician: Jose Manuel Wood MD  Advance Directive: Full Code  Admit Date: 6/4/2022       Hospital Day: 10  Portions of this note have been copied forward, however, changed to reflect the most current clinical status of this patient. CHIEF COMPLAINT/REASON FOR CONSULTATION goals of care, code status discussion, symptom management, and family support    SUBJECTIVE:  Ms. Paige Rodriguez is up in recliner chair and reports feeling much better today. Dyspnea and abdominal pain has improved. She has taken miralax to help with BM    Review of Systems:   14 point review of systems is negative except as specifically addressed above. Objective:   VITALS:  BP (!) 131/51   Pulse 83   Temp (!) 96 °F (35.6 °C) (Temporal)   Resp 18   Ht 5' 3\" (1.6 m)   Wt 99 lb 8 oz (45.1 kg)   SpO2 97%   BMI 17.63 kg/m²   24HR INTAKE/OUTPUT:      Intake/Output Summary (Last 24 hours) at 6/14/2022 0835  Last data filed at 6/14/2022 0755  Gross per 24 hour   Intake 630 ml   Output 1300 ml   Net -670 ml     General appearance: 67 yo female, chronically ill appearing, no acuter distress  Head: Normocephalic, without obvious abnormality, atraumatic  Eyes: conjunctivae/corneas clear. PERRL, EOM's intact. Ears: normal external ears and nose  Neck: no JVD, supple, symmetrical, trachea midline   Lungs: diminished auscultation bilaterally, NC in place, shallow respiration  Heart: RRR, S1, S2 normal, no murmur  Abdomen: soft, non-tender; non-distended, normal bowel sounds   Extremities: 1+ BLE edema,  No erythema, no tenderness to palpation  Skin: Warm, dry  Neurologic: Alert and oriented X 3, generalized weakness and normal tone, no focal deficits.    Psychiatric: Calm and cooperative     Medications:      dextrose      sodium chloride        potassium chloride  40 mEq Oral Daily    melatonin  5 mg Oral Nightly    mupirocin Topical BID    predniSONE  10 mg Oral Daily    bumetanide  2 mg Oral BID    insulin NPH  5 Units SubCUTAneous BID AC    pantoprazole  40 mg Oral BID AC    insulin lispro  0-6 Units SubCUTAneous TID WC    insulin lispro  0-3 Units SubCUTAneous Nightly    sildenafil  20 mg Oral TID    isosorbide mononitrate  30 mg Oral Daily    sodium chloride flush  5-40 mL IntraVENous 2 times per day    ipratropium-albuterol  1 ampule Inhalation Q4H WA    atorvastatin  40 mg Oral Nightly    guaiFENesin  1,200 mg Oral BID    levothyroxine  25 mcg Oral Daily    aspirin EC  81 mg Oral Nightly    clopidogrel  75 mg Oral Daily    metoprolol  50 mg Oral BID    Arformoterol Tartrate  15 mcg Nebulization BID    budesonide  0.5 mg Nebulization BID     HYDROcodone-acetaminophen, glucose, dextrose bolus **OR** dextrose bolus, glucagon (rDNA), dextrose, sodium chloride flush, sodium chloride, ondansetron **OR** ondansetron, polyethylene glycol, acetaminophen **OR** acetaminophen, albuterol  ADULT ORAL NUTRITION SUPPLEMENT; Lunch; Low Calorie/High Protein Oral Supplement  ADULT DIET; Regular; Low Sodium (2 gm)     Lab and other Data:     Recent Labs     06/12/22  0144 06/13/22  0144 06/14/22  0107   WBC 21.7* 18.2* 17.3*   HGB 9.0* 8.5* 8.8*    256 266     Recent Labs     06/12/22  0144 06/13/22  0144 06/14/22  0107   * 135* 137   K 4.3 3.5 4.2   CL 98 97* 99   CO2 27 29 30*   BUN 40* 42* 42*   CREATININE 1.0* 0.9 0.8   GLUCOSE 119* 106 72*     No results for input(s): AST, ALT, ALB, BILITOT, ALKPHOS in the last 72 hours. RAD:   XR CHEST PORTABLE  Result Date: 6/4/2022  COPD. Interval worsening bilateral lower lobe infiltrates. Enlarging small bilateral  pleural effusions. Recommendation: Follow up as clinically indicated.  Electronically Signed by Jaspal Muhammad MD at 04-Jun-2022 04:59:51 PM             CXR 6/12/2022  Impression   Hyperinflated emphysematous lungs.  Bilateral pleural effusions.  Slightly for few days in order to have scoping done with GI    3. Acute on chronic CHF- IV bumex transitioned to PO. Sildenafil for severe pulm HTN. reviewed with EF 55%, grade III diastolic dysfunction, severe pulmonary hypertension, mild to moderate aortic stenosis, mild AR and TR    4. COPD exacerbation- mgmt per hospitalist. Improved, stable on 2L NC.    5. Hyperglycemia- exacerbated by steroids, tapering prednisone. NPH per hospitalist with recommendations for jardiance at d/c    6. CAD s/p CABG- plavix and aspirin continued    7. Afib- rate controlled    8. Deconditioning- PT/OT eval, dietician following. Encouraged OOB    Thank you for consulting Palliative Care and allowing us to participate in the care of this patient.    Time Spent Counseling > 50%:  YES                                   Total Time Spent with patient/family counseling, workup/treatment review, counseling and placement of orders/preparation of this note: 26 minutes    Electronically signed by BEBO Sanabria CNP on 6/14/2022 at 8:35 AM    (Please note that portions of this note were completed with a voice recognition program.  Iram Rincon made to edit the dictations but occasionally words are mis-transcribed.)

## 2022-06-14 NOTE — PROGRESS NOTES
Pharmacy Adjustment per [facility] protocol    Jamison Kelley is a 68 y.o. female. Pharmacy has adjusted medications per [facility] protocol. Recent Labs     06/13/22  0144 06/14/22  0107   BUN 42* 42*       Recent Labs     06/13/22  0144 06/14/22  0107   CREATININE 0.9 0.8       Estimated Creatinine Clearance: 43 mL/min (based on SCr of 0.8 mg/dL). Height:   Ht Readings from Last 1 Encounters:   06/10/22 5' 3\" (1.6 m)     Weight:  Wt Readings from Last 1 Encounters:   06/14/22 99 lb 8 oz (45.1 kg)         Plan: Adjust the following medications based on [facility] protocol:  Change cefepime to 2gm IV x 1 dose over 30 min today followed by cefepime 2gm IV q 24 hours extended infusion.     PETRONA CABRALES, PHARM D, 6/14/2022, 9:07 AM

## 2022-06-14 NOTE — DISCHARGE SUMMARY
[facility], [address] [facility], [address] [place] [address] MEDICINE      DISCHARGE SUMMARY:      PATIENT NAME:  Farzaneh Abbott  :  1945  MRN:  [id number]    Admission Date:   2022  1:42 PM Attending: Mark Nguyen MD   Discharge Date:   2022              PCP: Roya Kendrick MD  Length of Stay: 10 days     Chief Complaint on Admission:   Chief Complaint   Patient presents with    Shortness of Breath       Consultants:     IP CONSULT TO DIETITIAN  IP CONSULT TO DIETITIAN  IP CONSULT TO PHARMACY  IP CONSULT TO PALLIATIVE CARE  IP CONSULT TO HEM/ONC  IP CONSULT TO GI  IP CONSULT TO HOME CARE NEEDS  IP CONSULT TO CASE MANAGEMENT  IP CONSULT TO CARDIOLOGY       Discharge Problem List:   Principal Problem:    Acute on chronic anemia  Active Problems:    Acute on chronic diastolic heart failure ([address]    PUD (peptic ulcer disease)    Near syncope    Anemia of chronic disease    Palliative care patient    History of colon polyps    History of gastric ulcer    History of duodenal ulcer    Personal history of noncompliance with medical treatment and regimen    Coronary artery disease involving native coronary artery of native heart    Leukocytosis    Pulmonary hypertension    Severe malnutrition (HCC)    Anemia in chronic kidney disease (CKD)    CKD (chronic kidney disease), stage III (HCC)    Chronic obstructive pulmonary disease (HCC)    Essential hypertension    PAF (paroxysmal atrial fibrillation) (HCC)    JONES (dyspnea on exertion)    Occult GI bleeding  Resolved Problems:    * No resolved hospital problems. *       Personal history of noncompliance with medical treatment and regimen  STRICTLY advised patient to be compliant with meds and medical recommendations  Advised patient to get established with a PCP upon discharge, take care of meds, diet and bring patient's self and life back on track    Last dated Assessment and Plan . .. 2022:    Acute on chronic diastolic heart failure, severe pulmonary hypertension  Monitor telemetry  --Significant net negative fluid balance  --Started on IV Bumex then transitioned to oral Bumex twice daily   -- Continue sildenafil for severe pulmonary hypertension, may benefit from this at discharge  -- Would benefit from Jardiance (SGLT2 inhibitor) for both heart failure and her diabetes at discharge (medication not available inpatient)     Symptomatic acute on chronic anemia of chronic disease, inflammation, CKD  FOBT positive, history of gastric and duodenal ulcers  No signs of active bleed  - Evaluated by GI, no plan for endoscopy  - Continue home antiplatelet therapy as long as H&H stable and no signs of bleed  --Anemia work-up reviewed  - S/p multiple transfusions of PRBCs  - continue to follow CBC with further transfusion as appropriate  --      Bilateral infiltrates with chronic leukocytosis, questionable pneumonia, finished course of IV and p.o. antibiotics      Epigastric pain  Leukocytosis  Worsening anemia  -- Patient has been offered EGD by GI which she has refused  -- Oncology wants GI to reevaluate the patient for her persistent symptoms and slowly decreasing hemoglobin  -- I spoke with the patient and offered her to do a CT scan of the abdomen pelvis without contrast which she agreed to  -- Dilaudid 0.5 mg IV x1 dose given  -- Continue with Norco as needed for pain     Severe COPD  - Nebulized bronchodilators  - Continue steroids, taper down to prednisone 10 mg daily then continue  -- Continue with Supplemental O2 to maintain adequate gas exchange with goal SPO2 88-92%, currently near baseline     Diabetes with hyperglycemia  - Exacerbated by steroids, NPH for steroid-induced hyperglycemia, monitor glucose with further adjustment as needed, sliding scale insulin, avoid hypoglycemia     CAD s/p CABG  PAD with prior iliac stents, carotid endarterectomy  -aspirin, Plavix     A.  Fib  - Home metoprolol for rate control  - Not on anticoagulation per patient preference with significant bleeding risk     Malnutrition  -nutritional support     CKD with occluded right renal artery, solitary left kidney with severely stenotic left renal artery status post endovascular intervention in 2020  - monitor renal function, avoid nephrotoxins     Hypertension  - Monitor BP, continue home antihypertensive regimen including amlodipine, metoprolol, Imdur,  further adjustment as warranted     Decubitus ulcer of right buttock-present on admission  Skin care/wound care     Hypokalemia  - Replete and continue to monitor  -- Continue with oral potassium daily     Debility, deconditioning  - Encourage ambulation, PT/OT  --Patient adamantly refusing discharge to skilled nursing facility, and wants to go home with home health care     Chronic medical issues . .. Continue with home meds. Monitor patient closely while admitted. Advised very close f/u with patient's PCP as an outpatient to address chronic medical issues. CUMULATIVE  HOSPITAL  COURSE  AND  TREATMENT:    51-year-old female with chronic respiratory failure on 2 L nasal cannula at baseline, diastolic heart failure, COPD, CAD with prior CABG, carotid endarterectomy, recurrent hospitalizations most recently in April for COPD exacerbation, presented with worsening shortness of breath over several days with minimal exertion, dry cough, fatigue and generalized weakness, admitted with decompensated heart failure with significantly elevated proBNP from previous labs, signs of pulmonary edema with bilateral opacities, also difficult to exclude pneumonia. Noted severe acute on chronic anemia with hemoglobin of 5, but no recent bleeding episodes, improved following PRBC transfusions. No evidence of any blood loss, normal iron and ferritin levels, low TIBC suggestive of anemia of inflammation/chronic disease. FOBT was positive, however without any overt melena or bright red blood.   GI noted patient high risk for endoscopic procedure and obtain previous records for review, and after discussion with patient declined endoscopy. Developed worsening heart failure despite compliance with oral Lasix 80 mg so was managed with IV Bumex 2 mg twice daily with good response. Review of last echo 2/14/2022 revealed significant diastolic dysfunction and severe pulmonary hypertension, otherwise preserved LVEF. Sildenafil added given severe pulmonary hypertension and discussed avoiding concomitant use of sildenafil and nitrates. Received empiric antibiotics for possible pneumonia (chronic leukocytosis but worse from prior labs), nebulized bronchodilators and steroids given uncontrolled severe COPD with chronic steroid use. Overall, symptomatology felt to be multifactorial with symptomatic anemia, decompensated heart failure with severe pulmonary hypertension and severe COPD. Patient had abdominal pain yesterday. CT scan abdomen pelvis was done which failed to show any acute etiology. Patient is feeling better now. She wants to do an EGD as an outpatient. Patient with Iron deficiency anemia & occult blood positive stools, currently on aspirin & Plavix. Patient continues to refuse EGD in the hospital. Heme-Onc requesting cardiology to evaluete risk/benefit ratio and need for dual antiplatelet therapy. Patient has CAD and had stent insertion for in-stent stenosis 10 months ago. Cardiology is okay with holding off on aspirin and Plavix for a few days until patient is seen by GI as an outpatient and gets her EGD done. They can be restarted after EGD is done. She is feeling better today and wants to go home. Her home meds are adjusted as per med rec sheet below. She is being discharged home with home health care. She is advised close follow-up with ECP, GI and cardiology as an outpatient.        OBJECTIVE:  /63   Pulse 78   Temp 97.9 °F (36.6 °C) (Temporal)   Resp 20   Ht 5' 3\" (1.6 m)   Wt 99 lb 8 oz (45.1 kg)   SpO2 97%   BMI 17.63 kg/m² Heart: RRR   Lungs: Bilateral decreased air entry   Abdomen: Soft, non-tender   Extremities: No edema   Neurologic: Alert and oriented   Skin: Warm and dry          Laboratory Data:  Recent Labs     06/12/22 0144 06/13/22 0144 06/14/22  0107   WBC 21.7* 18.2* 17.3*   HGB 9.0* 8.5* 8.8*    256 266     Recent Labs     06/12/22  0144 06/13/22 0144 06/14/22  0107   * 135* 137   K 4.3 3.5 4.2   CL 98 97* 99   CO2 27 29 30*   BUN 40* 42* 42*   CREATININE 1.0* 0.9 0.8   GLUCOSE 119* 106 72*     No results for input(s): AST, ALT, ALB, BILITOT, ALKPHOS in the last 72 hours. Troponin T: No results for input(s): TROPONINI in the last 72 hours. Pro-BNP: No results for input(s): BNP in the last 72 hours. INR: No results for input(s): INR in the last 72 hours. UA:No results for input(s): NITRITE, COLORU, PHUR, LABCAST, WBCUA, RBCUA, MUCUS, TRICHOMONAS, YEAST, BACTERIA, CLARITYU, SPECGRAV, LEUKOCYTESUR, UROBILINOGEN, BILIRUBINUR, BLOODU, GLUCOSEU, AMORPHOUS in the last 72 hours. Invalid input(s): Teja Seller  A1C: No results for input(s): LABA1C in the last 72 hours. ABG:No results for input(s): PHART, LHV9JBF, PO2ART, QID2YGI, BEART, HGBAE, E2JRUYIY, CARBOXHGBART in the last 72 hours. Impressions of imaging performed in 48 hours before discharge:    CT ABDOMEN PELVIS WO CONTRAST Additional Contrast? None    Result Date: 6/14/2022  1. Small inguinal hernia is noted on right, it contains portion of small bowel without evidence of obstruction. Tiny fat containing inguinal hernia is noted on left. 2. Tiny hiatal hernia is noted. 3. There are twisted mesenteric vessels in the right lower quadrant without evidence of obstruction. Changes of mild constipation. 4. Trace pleural effusion is noted bilaterally. Mild subsegmental atelectasis is noted in the included bilateral lungs. 5. Cholelithiasis without evidence of acute cholecystitis.  6. There is mild atrophy of the right kidney and compensatory hypertrophy of the left kidney. Bilateral non-obstructing renal calculi. 7. Generalized anasarca. Trace ascites is noted. 8. Moderately large amount of stool in the colon, please correlate with constipation. Recommendation: Follow up as clinically indicated. All CT scans at this facility utilize dose modulation, iterative reconstruction, and/or weight based dosing when appropriate to reduce radiation dose to as low as reasonably achievable. Electronically Signed by Kendrick Hartman MD at 14-Jun-2022 01:21:42 AM                   Medication List      START taking these medications    bumetanide 2 MG tablet  Commonly known as: BUMEX  Take 1 tablet by mouth 2 times daily     HYDROcodone-acetaminophen 5-325 MG per tablet  Commonly known as: Norco  Take 1 tablet by mouth every 6 hours as needed for Pain for up to 3 days. Intended supply: 3 days.  Take lowest dose possible to manage pain     pantoprazole 40 MG tablet  Commonly known as: PROTONIX  Take 1 tablet by mouth 2 times daily (before meals)     predniSONE 5 MG tablet  Commonly known as: DELTASONE  Take 1 tablet by mouth daily for 10 days  Start taking on: Ignacia 15, 2022     sildenafil 20 MG tablet  Commonly known as: REVATIO  Take 1 tablet by mouth 3 times daily        CHANGE how you take these medications    atorvastatin 40 MG tablet  Commonly known as: LIPITOR  Take 1 tablet by mouth daily  What changed:   · when to take this  · additional instructions     potassium chloride 20 MEQ extended release tablet  Commonly known as: KLOR-CON M  Take 1 tablet by mouth 2 times daily  What changed: when to take this        CONTINUE taking these medications    albuterol 1.25 MG/3ML nebulizer solution  Commonly known as: ACCUNEB     amLODIPine 5 MG tablet  Commonly known as: NORVASC  Take 1 tablet by mouth 2 times daily     Arformoterol Tartrate 15 MCG/2ML Nebu  Commonly known as: BROVANA     Biotin 5000 MCG Tabs     budesonide 0.5 MG/2ML nebulizer suspension  Commonly known as: PULMICORT     guaiFENesin 600 MG extended release tablet  Commonly known as: MUCINEX  Take 2 tablets by mouth 2 times daily     ipratropium-albuterol 0.5-2.5 (3) MG/3ML Soln nebulizer solution  Commonly known as: DUONEB     isosorbide mononitrate 30 MG extended release tablet  Commonly known as: IMDUR     levothyroxine 25 MCG tablet  Commonly known as: SYNTHROID     metoprolol 100 MG tablet  Commonly known as: LOPRESSOR  Take 0.5 tablets by mouth 2 times daily 50 mg in the AM & 50 mg in the PM     MUCINEX ALLERGY PO     nitroGLYCERIN 0.4 MG SL tablet  Commonly known as: Nitrostat  Place 1 tablet under the tongue every 5 minutes as needed for Chest pain     OXYGEN     PROBIOTIC DAILY PO        STOP taking these medications    aspirin EC 81 MG EC tablet     clopidogrel 75 MG tablet  Commonly known as: PLAVIX     furosemide 40 MG tablet  Commonly known as: LASIX           Where to Get Your Medications      These medications were sent to [facility], [address] [place] [address]    Phone: [phone]   · bumetanide 2 MG tablet  · pantoprazole 40 MG tablet  · potassium chloride 20 MEQ extended release tablet  · predniSONE 5 MG tablet  · sildenafil 20 MG tablet     You can get these medications from any pharmacy    Bring a paper prescription for each of these medications  · HYDROcodone-acetaminophen 5-325 MG per tablet           ISSUES TO BE ADDRESSED AT HOSPITAL FOLLOW-UP VISIT:    Advised patient to follow-up closely with PCP upon discharge for management of chronic medical issues  Please see the detailed discharge directions delivered from earlier today!     Condition on Discharge: gradually improving  [facility]    Recommended Follow Up:  Par[facility] Henderson, [address]  [phone]    On 6/27/2022  at 2:30 PM    Followup Appointments Scheduled at Time of Discharge:  Future Appointments   Date Time Provider Andrade Bonny   6/27/2022  2:15 PM SCHEDULE, [facility] MED [facility]C [facility]   6/27/2022  2:30 PM [facility]        Discharge Instructions:   Please see the discharge paperwork. Patient was seen at bedside today, and the examination shows improvement since yesterday. Detailed discharge directions delivered to the patient by myself and our nursing staff, who verbalizes understanding and is very happy and satisfied with the plan. Patient has been advised to continue all medications as prescribed and advised, and f/u with PCP within 1 week. Patient is stable from medical standpoint to be discharged. Total time spent during patient evaluation and assessment, discussion with the nurse/family, addressing discharge medications/scripts and coordination of care for safe discharge was in excess of 45 minutes.       Signed Electronically:    Randal Soto MD  12:48 PM 6/14/2022

## 2022-06-14 NOTE — PLAN OF CARE
Nutrition Problem #1: Inadequate oral intake,Altered nutrition-related lab values  Intervention: Food and/or Nutrient Delivery: Continue Current Diet,Continue Oral Nutrition Supplement  Nutritional

## 2022-06-14 NOTE — PROGRESS NOTES
Spoke with on call cardiologist Dr. Yancey Goltz per Dr. Mata Rao request regarding if it was ok for patient to remain off aspirin and plavix for patient to receive EGD/Colonoscopy to check for cause of abdominal pain and blood counts dropping. Dr. Yancey Goltz said if she had stents paced for at least 6 months, then she can be off aspirin and plavix for a few days in order to receive testing then she would go back on medications once testing is complete. Attending physician Dr. Param Salgado made aware.

## 2022-06-15 NOTE — TELEPHONE ENCOUNTER
Nimisha with [facility] called to clarify if patient should be on plavix. DC summary states to stop plavix but patient told her that Dr. Nael Adamson told her to d/c asa and stay on plavix. Verified that advisement from Dr. Duron's \[facility]\"" note from 6/14/22 at [phone]. I will add medication back to patients list. Nimisha voiced understanding. Recommendations:  1. At this time I believe it would be reasonable to discontinue aspirin and continue Plavix only in the setting of occult GI bleeding per request of hematology/oncology. It has been more than 6 months since the previous percutaneous interventional procedure 8/10/2021 and a stent placed in the right coronary artery was a 4.0 mm stent electively. Risk of stent thrombosis should be fairly low at this point but not completely eliminated I had a ozzie discussion with the patient regarding this and she is aware of the potential benefits and risks.   Follow-up with Dr. Naida Long as previously recommended otherwise.                  ED to Hosp-Admission (Discharged) on 6/4/2022           ED to Hosp-Admission (Discharged) on 6/4/2022                Detailed Report            Note shared with patient        Consults Info    Author Note Status Last Update User Last Update Date/Time   Noemi Olson MD Signed Noemi Olson MD 6/

## 2022-06-15 NOTE — CARE COORDINATION
[facility] Transitions Initial Follow Up Call    Call within 2 business days of discharge: Yes    Patient: Maddie Locke Patient : 1945   MRN: [id number]  Reason for Admission: Anemia, GI Bleed, et al  Discharge Date: 22 RARS: Readmission Risk Score: 23.7 ( )      Last Discharge [facility]       Complaint Diagnosis Description Type Department Provider    22 Shortness of Breath Acute on chronic anemia . .. ED to Hosp-Admission (Discharged) (ADMITTED) GISELA Kendrick MD; Minor Chatman .. Spoke with: Jen 19: [facility]      Non-face-to-face services provided:  Reviewed encounter information for continuity of care prior to follow up Care Transitions Coordination phone call - chart notes, consults, progress notes, test results, med list, appointments, AVS, other information. Care Transitions 24 Hour Call    Schedule Follow Up Appointment with PCP: Completed  Do you have a copy of your discharge instructions?: Yes  Do you have all of your prescriptions and are they filled?: No  Have you been contacted by a [facility] Pharmacist?: No  Have you scheduled your follow up appointment?: Yes  How are you going to get to your appointment?: Car - family or friend to transport  [address]: [address] [facility] (Comment: [facility])  Care Transitions Interventions         Follow Up  Future Appointments   Date Time Provider Andrade Draper   2022  2:15 PM SCHEDULE, L MED ONC MA L MED ONC Ke[facility]   2022  2:30 PM Denisse Reed APRN [facility]   2022  1:45 PM Mandeep Fan APRN [facility]   2022 11:50 AM BEBO Sheldon Renae [facility]     Placed a call to the number listed for patient for an initial follow up call for Care Transitions Coordination following discharge from the hospital.  Introduced myself and explained the purpose of my call as well as verifying name, date of birth of patient.   Spoke with patient regarding hospitalization, discharge and status thus far. She said she is not a lot better this am.  Gisselle Bowen she is having issues. Said she has been sleeping in her recliner chair and has not been able to get up this morning. She said that her legs are swollen badly this morning, so much so that she has not been able to get up by herself. She said her daughter is at her home next door and has not been over to help as yet. She said she could not get her  awake to help her to get up. She said she has got to do something soon though, as she is getting stiff and sore and needs to go the the bathroom. She said that she is wearing her oxygen and the pulse ox is staying right where it needs to be, her breathing is not too bad. She said that she is not really having any cough or other issues such as that. She denied any chest pain or other issues. She said it is the edema in her legs and the weakness that it is causing. She said she needs help to get out of the chair and to walk. She said home health PT is supposed to be out there in about an hour and a half. She is supposed to go in to see her PCP in the am, doesn't think she is going to be strong enough to make it. She doesn't think she can make it to the car, much less get out and into the building, etc.  She said she may have to call and get it changed to a virtual visit. She is concerned about all of the others made so soon as well. She said that she was not able to get all of her discharge meds from the pharmacy yesterday as they did not have them. She said she is going to have to try to get them today. She has not had them since yesterday morning's doses at the hospital.  She cannot even get up to get the meds she has on hand. She said that is probably part of the reason that she is having issues with edema this am.  She is aware that the Lasix was changed to Bumex and how it is different.   She is aware of being on steroids and how they will affect edema. She is aware of stopping blood thinners. Reviewed other meds. She said she is going to get in touch with her daughter and get her on it and also get her to help her get bathed, etc.  She said she has a good appetite, drinking well. No other issues reported. Encouraged her to check with the pharmacy to see if they have her meds and if not, see how long it will be until they do. She will do that. She does sound fair otherwise. To call prn any needs. CTN to follow up as indicated. Transitions of Care Initial Call    Was this an external facility discharge? No    Challenges to be reviewed by the provider   Additional needs identified to be addressed with provider: No       Method of communication with provider : none    Advance Care Planning:   Does patient have an Advance Directive: reviewed and current. Advance Care Planning   Healthcare Decision Maker:    Primary Decision Maker: Tawanna Grey - Child - [phone]    Care Transition Nurse contacted the patient by telephone to perform post hospital discharge assessment. Verified name and  with patient as identifiers. Provided introduction to self, and explanation of the CTN role. CTN reviewed discharge instructions, medical action plan and red flags with patient who verbalized understanding. Patient given an opportunity to ask questions and does not have any further questions or concerns at this time. Were discharge instructions available to patient? Yes. Reviewed appropriate site of care based on symptoms and resources available to patient including: PCP  Specialist  Urgent care clinics  [facility]  When to call [address] The patient agrees to contact the PCP office for questions related to their healthcare. Medication reconciliation was not performed with patient, unable. She verbalizes understanding of administration of home medications.  Advised obtaining a 90-day supply of all daily and as-needed medications. Was patient discharged with a pulse oximeter? no, had one on hand already. Aware of parameters. CTN provided contact information. Plan for follow-up call in 1-2 days based on severity of symptoms and risk factors.   Plan for next call: - Plan for next call - assess overall status, abnormal signs and symptoms, availability and effectiveness of medications, activity level and tolerance, falls/other unexpected events, findings from follow up appointments, medication/treatment changes, disease process mgmt, symptom mgmt, diet/hydration, home safety needs, infection control, seeking medical attention, who/when to call prn any needs, etc    Ruth Alicia RN, CTN

## 2022-06-16 NOTE — TELEPHONE ENCOUNTER
Patients daughter notified to increase bumex in the morning for 4 days and let us know if that doesn't help. She voiced understanding.

## 2022-06-16 NOTE — TELEPHONE ENCOUNTER
Medhat Murillo with [facility] called and left message that she spoke with patient and she has fallen 3 times since she got home, her legs are swollen, pitting, and painful. Breathing better, o2 stats are 92 walking and high 90's sitting. Lasix was changed to bumex and she has gained 1.5 lbs overnight. She was just d/c on 6/14/22. Does have home health.

## 2022-06-16 NOTE — TELEPHONE ENCOUNTER
I spoke to the patient about the referral for Supportive Care. She declined at this time but will call when she is ready. I am mailing her a brochure to review. It has our contact information on it for her to use when she is ready to schedule a visit.

## 2022-06-16 NOTE — CARE COORDINATION
[facility] Transitions Follow Up Call    2022    Patient: Viky Sanchez  Patient : 1945   MRN: [id number]    Discharge Date: 22 RARS: Readmission Risk Score: 23.7 ( )         Spoke with: N/A    Care Transitions Subsequent and Final Call    Subsequent and Final Calls  Are you currently active with any services?: Home Health  Care Transitions Interventions  Other Interventions: Follow Up  Future Appointments   Date Time Provider Andrade Draper   2022  2:15 PM SCHEDULE, L MED ONC [facility]   2022  2:30 PM BEBO Guzman PAD [facility]EMON[facility]   2022  1:45 PM BEBO Reyes Cardio [facility]   2022 11:50 AM BEBO Marcum Otilia [facility]aremeterio [facility]     Attempted to make contact with patient/caregiver for a routine Care Transitions Coordination follow up call without success. Unable to leave a HIPAA compliant message regarding intent of call and call back information. CTN to follow up as indicated.        Melvina Strauss RN

## 2022-06-16 NOTE — TELEPHONE ENCOUNTER
Bigg Culver,  Can you confirm she is taking Bumex 2 mg BID. If yes, have her take 1-1/2 tab AM and 1 tab PM the next 4 days, then back to (1) twice daily.   Thanks, [employer]

## 2022-06-17 NOTE — CARE COORDINATION
[facility] Transitions Follow Up Call    2022    Patient: Ramandeep Bledsoe  Patient : 1945   MRN: <[id number]>  Reason for Admission: anemia  Discharge Date: 22 RARS: Readmission Risk Score: 23.7 ( )       Attempted to reach pt for follow up call. Left message requesting call back. Care Transitions Subsequent and Final Call    Subsequent and Final Calls  Care Transitions Interventions  Other Interventions:            Follow Up  Future Appointments   Date Time Provider Andrade Draper   2022  2:15 PM SCHEDULE, [facility] MED ONC MA [facility] MED ONC Keena [facility] [place] [facility]\""   2022  2:30 PM BEBO Egan PAD HEMONC [facility]   2022  1:45 PM BEBO Pro Cardio [facility]   2022 11:50 AM BEBO Mcclelland Rondall Stake [facility]       Abe Stone

## 2022-06-20 PROBLEM — J96.11 HYPOXEMIC RESPIRATORY FAILURE, CHRONIC (HCC): Status: ACTIVE | Noted: 2022-01-01

## 2022-06-20 NOTE — CONSULTS
Pt with diastolic dysfunction/ CHF symptoms/ elevated BNP enhanced by chronic anemia HGB 7.5. Pt. Seeing gastro and oncology. Has had elevated BNP since 2016. Pt needs to get anemia controlled to be able to manage BNP.

## 2022-06-20 NOTE — H&P
[facility] [address]    DEPARTMENT OF HOSPITALIST MEDICINE      HISTORY & PHYSICAL:          REASON FOR ADMISSION:  Chief Complaint   Patient presents with    Shortness of Breath     Pt presents to ED with SOA, initial O2 in the 60s at home. Possible STEMI per EMS        HISTORY OF PRESENT ILLNESS:  Rainer Stokes is an 68 y.o. female with past medical history of aortic stenosis, CHF, ASCVD, COPD, hyperlipidemia, hypertension, history of MI, mitral valve stenosis, and arthritis. Patient was recently discharged on  with complaint of dyspnea. Patient states she did well for the first few days after discharge at home. Then her daughter reports she has gained about a pound a day since fourth day post discharge. Has become increasingly short of breath her pulse ox dropping to as much as in the 50s at home. Confirmed by EMS. Patient denies chest pain , significantly more short of breath. ER evaluation shows progressive infiltrate and effusion left lung base with new alveolar infiltrates in the right upper and lower lobes with effusion. Chemistry BUN 29 creatinine 1 glucose 150 BNP 18,884 white count is 15.9 hemoglobin 7.5 hematocrit 25.9 platelets 685  PT 3.43 PCO2 43 PO2 30 bicarb 33. Be admitted to hospitalist services with cardiology consult she is an established Dr. Rosibel Winters patient. Been in contact with the office and she was instructed to come yesterday but wanted to wait.       PAST MEDICAL HISTORY:  Past Medical History:   Diagnosis Date    Aortic stenosis     Arthritis     Atherosclerosis of native arteries of the extremities with intermittent claudication 07/25/2011    Blood circulation, collateral     bilat stent lower extremities    CAD (coronary artery disease)     Carotid aneurysm, right (HCC)     Carotid artery occlusion     CHF (congestive heart failure) ([address]     COPD [facility]     History of blood transfusion 2016    Post op, was taking blood thinner    Hyperlipidemia     Hypertension     MI (myocardial infarction) ([address] 2009    x2    Mitral valve stenosis     Movement disorder     arthritis    Pneumonia     Post-menopausal     PUD (peptic ulcer disease) 2009    Renal artery stenosis ([address] 08/10/2021    s/p stenting to L    Renal failure 2009    after ulcer perforation sepsis.    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 AND REPAIR WITH REVERSED LEFT GREATER SAPHENOUS VEIN INTERPOSITIONAL BYPASS GRAFT performed by Brody Guerrero MD at [address] [facility] Right early 's    long ago    CATARACT REMOVAL WITH IMPLANT Bilateral      SECTION  1972    COLONOSCOPY  2012    Dr Cecilia Chowdary:  [facility]    CORONARY ANGIOPLASTY WITH STENT PLACEMENT  08/10/2021    RM- RCA    CORONARY ANGIOPLASTY WITH STENT PLACEMENT      CORONARY ARTERY BYPASS GRAFT  2016    DIAGNOSTIC CARDIAC CATH LAB PROCEDURE      DILATION AND CURETTAGE OF UTERUS      ILIO-FEMORAL BYPASS GRAFT N/A 2016    OPEN TRANSLUMINAL BALLOON ANGIOPLASTY AND STENTING OF RIGHT COMMON AND EXTERNAL  ILIAC ARTERIES; RIGHT FEMORAL ENDARTERECTOMY WITH VEIN PATCH ANGIOPLASTY performed by Brody Guerrero MD at [facility] N/A 2016    MITRAL VALVE  REPLACEMENT LUONG-MAZE ABLATION WITH CRYO PROCEDURE, CORONARY ARTERY BYPASS GRAFT X 1 WITH ENDOSCOPIC VEIN HARVESTING WITH PERFUSION TRANSESOPHAGEAL ECHOCARDIOGRAM performed by Michael Burleson MD at [facility]  MITRAL VALVE REPLACEMENT  2016    PERIPHERAL PERCUTANEOUS ARTERIAL INTERVENTION Left 08/10/2021    RM to L renal artery    CT REOPER, CAROTID ENDARTEC>1 MON Left 2018    REMOVAL OF HEMATOMA, LEFT CAROTID ARTERY performed by Toy Peralta MD at [address] Left 2018    LEFT CAROTID ENDARTERECTOMY WITH EEG MONITORING AND COMPLETION DUPLEX ULTRASOUND performed by Toy Peralta MD at [facility]      SKIN GRAFT Right 2016    Skin graft split thickness foot,ankle,and leg. Right leg 26x8cm and 12x6cm total area 280cm squared. TJR    UPPER GASTROINTESTINAL ENDOSCOPY  2015    Dr German Beltran: normal    UPPER GASTROINTESTINAL ENDOSCOPY  03/15/2016    Dr Tessa Negro: normal    UPPER GASTROINTESTINAL ENDOSCOPY  2015    Dr German Beltran:  paul neg, multiple gastric antial and duodenal ulcers    VASCULAR SURGERY  16 TJR    Aortagram and right leg runoff,right leg runoff,right common iliac artery selection for right leg run off views.  VASCULAR SURGERY      . Open transluminal angioplasty and stenting of the external iliac artery. TJR    VASCULAR SURGERY  2019    TJR. 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         SOCIAL HISTORY:  Social History     Socioeconomic History    Marital status:      Spouse name: None    Number of children: None    Years of education: None    Highest education level: None   Occupational History    None   Tobacco Use    Smoking status: Former Smoker     Years: 2.00     Types: Cigarettes     Quit date: 1980     Years since quittin.4    Smokeless tobacco: Never Used   Vaping Use    Vaping Use: Never used   Substance and Sexual Activity    Alcohol use: Yes     Comment: rarely maybe twice a year    Drug use: No    Sexual activity: Not Currently     Partners: Male   Other Topics Concern    None   Social History Narrative    None     Social Determinants of Health     Financial Resource Strain:     Difficulty of Paying Living Expenses: Not on file   Food Insecurity:     Worried About Running Out of Food in the Last Year: Not on file    Ward of Food in the Last Year: Not on file   Transportation Needs:     Lack of Transportation (Medical): Not on file    Lack of Transportation (Non-Medical):  Not on file   Physical Activity:     Days of Exercise per Week: Not on file    Minutes of Exercise per Session: Not on file   Stress:     Feeling of Stress : Not on file   Social Connections:     Frequency of Communication with Friends and Family: Not on file    Frequency of Social Gatherings with Friends and Family: Not on file    Attends Jew Services: Not on file    Active Member of Clubs or Organizations: Not on file    Attends Club or Organization Meetings: Not on file    Marital Status: Not on file   Intimate Partner Violence:     Fear of Current or Ex-Partner: Not on file    Emotionally Abused: Not on file    Physically Abused: Not on file    Sexually Abused: Not on file   Housing Stability:     Unable to Pay for Housing in the Last Year: Not on file    Number of [place] in the Last Year: Not on file    Unstable Housing in the Last Year: Not on file        FAMILY HISTORY:  Family History   Problem Relation Age of Onset    Diabetes Mother     Heart Disease Mother     Heart Failure Mother     Diabetes Sister     Heart Disease Sister     High Blood Pressure Sister     Colon Cancer Sister     Liver Disease Sister     Cirrhosis Sister     Colon Cancer Maternal Aunt     Colon Polyps Neg Hx     Esophageal Cancer Neg Hx          ALLERGIES:  Allergies   Allergen Reactions    Ativan [Lorazepam] Hallucinations    Levaquin [Levofloxacin In D5w] Nausea And Vomiting    Xarelto [Rivaroxaban] Other (See Comments)     Made anemic. CANNOT HAVE DUE TO HEART VALVE REPLACEMENT.  Morphine Itching and Rash        PRIOR TO ADMISSION MEDS:  Medications Prior to Admission: Multiple Vitamins-Minerals (THERAPEUTIC MULTIVITAMIN-MINERALS) tablet, Take 1 tablet by mouth daily  HYDROcodone-acetaminophen (NORCO) 5-325 MG per tablet, Take 1 tablet by mouth every 6 hours as needed for Pain.   Calcium Polycarbophil (FIBER-CAPS PO), Take 1 capsule by mouth Daily with supper  clopidogrel (PLAVIX) 75 MG tablet, Take 75 mg by mouth daily  sildenafil (REVATIO) 20 MG tablet, Take 1 tablet by mouth 3 times daily  predniSONE (DELTASONE) 5 MG tablet, Take 1 tablet by mouth daily for 10 days  bumetanide (BUMEX) 2 MG tablet, Take 1 tablet by mouth 2 times daily (Patient taking differently: Take 2 mg by mouth 2 times daily Dr. Monet Dimas instructed patient to take 3 mg once daily prior to admission and to end on 6/20/22.)  potassium chloride (KLOR-CON M) 20 MEQ extended release tablet, Take 1 tablet by mouth 2 times daily  pantoprazole (PROTONIX) 40 MG tablet, Take 1 tablet by mouth 2 times daily (before meals)  guaiFENesin (MUCINEX) 600 MG extended release tablet, Take 2 tablets by mouth 2 times daily  isosorbide mononitrate (IMDUR) 30 MG extended release tablet, Take 60 mg by mouth in the morning and at bedtime   amLODIPine (NORVASC) 5 MG tablet, Take 1 tablet by mouth 2 times daily  metoprolol (LOPRESSOR) 100 MG tablet, Take 0.5 tablets by mouth 2 times daily 50 mg in the AM & 50 mg in the PM  albuterol (ACCUNEB) 1.25 MG/3ML nebulizer solution, Inhale 1 ampule into the lungs every 6 hours as needed   budesonide (PULMICORT) 0.5 MG/2ML nebulizer suspension, Inhale 0.5 mg into the lungs at bedtime   ipratropium-albuterol (DUONEB) 0.5-2.5 (3) MG/3ML SOLN nebulizer solution, Inhale 3 mLs into the lungs every 4 hours While Awake  Probiotic Product (PROBIOTIC DAILY PO), Take 1 tablet by mouth daily  levothyroxine (SYNTHROID) 25 MCG tablet, Take 25 mcg by mouth Daily   Fexofenadine HCl (MUCINEX ALLERGY PO), Take 1 tablet by mouth 2 times daily   Arformoterol Tartrate (BROVANA) 15 MCG/2ML NEBU, Inhale 15 mcg into the lungs 2 times daily   OXYGEN, Inhale 2 L into the lungs daily   atorvastatin (LIPITOR) 40 MG tablet, Take 1 tablet by mouth daily (Patient taking differently: Take 40 mg by mouth nightly PT TAKES AT NIGHT.)  Biotin 5000 MCG TABS, Take 1 tablet by mouth daily      REVIEW OF SYSTEMS:  Constitutional:  No fevers, chills, nausea, vomiting, + tiredness & fatigue   Head:  No head injury, facial trauma   Eyes:  No acute visual changes, exudate, trauma   Ears:  No acute hearing loss, earaches   Nose: No nasal discharge, epistaxis   Neck: No new hoarseness, voice change, or new masses   Lungs:   No hemoptysis, pleurisy   Heart:  No chest pressure with exertion, palpitations,    Abdomen:   No new masses, no bright red blood per rectum   Extremities: No acute pain while ambulating, no new lesions   Skin: No new changes in skin color, no rashes or lesions   Neurologic: No new motor or sensory changes     14 point review of systems addressed with patient which is essentially negative except as specifically addressed above:    PHYSICAL EXAM:  /62   Pulse (!) 108   Temp 98.2 °F (36.8 °C) (Temporal)   Resp 22   Ht 5' 3\" (1.6 m)   Wt 107 lb 1.6 oz (48.6 kg)   SpO2 (!) 89%   BMI 18.97 kg/m²   No intake/output data recorded.       PHYSICAL EXAMINATION:    Vital Signs: Please see the chart   LORA:  Awake, alert, oriented x 3, patient appears tired and fatigued   Head/Eyes:  Normocephalic, atraumatic, EOMI and PERRLA bilaterally   ENT: Moist mucous membranes, nasal passages clear   Neck: Supple, full range of motion, no carotid bruit, trachea midline   Respiratory:   Bilateral fair air entry in both lung fields, mild B/L crackles, symmetric expansion of chest   Cardiovascular:  Regular rate and rhythm, S1+S2+0, no murmurs/rubs   Urology: No bilateral CVA tenderness, no suprapubic tenderness   Abdomen:   Soft, non-tender, bowel sounds +ve, no organomegaly   Muscle/Joints: Moves all, full range of motion, no muscle spasms   Extremities: No clubbing, no cyanosis, no calf tenderness, no edema   Pulses: 2+ bilaterally, symmetrical   Skin: Warm, dry, no pallor/cyanosis/jaundice, no rashes/lesions   Neurologic: Awake, alert, oriented x 3, cranial nerves II-XII intact, no focal neurological deficits, sensory system intact   Psychiatric: Normal mood, non-suicidal         LABORATORY DATA:    CBC:  Recent Labs     06/20/22  1007   WBC 15.9*   HGB 7.5*   HCT 25.9*        BMP:  Recent Labs     06/20/22  1007      K 4.0   CL 98   CO2 30*   BUN 29*   CREATININE 1.0*   CALCIUM 8.1*     Recent Labs     06/20/22  1007   AST 33*   ALT 31   BILITOT 0.3   ALKPHOS 171*     Coag Panel: No results for input(s): INR, PROTIME, APTT in the last 72 hours. Cardiac Enzymes: No results for input(s): Gary Lacon in the last 72 hours. ABGs:  Lab Results   Component Value Date    PHART 7.470 06/04/2022    PO2ART 54.0 06/04/2022    HHH0UCC 44.0 06/04/2022     Urinalysis:  Lab Results   Component Value Date    NITRU Negative 06/20/2022    WBCUA 2 06/20/2022    BACTERIA NEGATIVE 06/20/2022    RBCUA 3 06/20/2022    BLOODU Negative 06/20/2022    SPECGRAV 1.012 06/20/2022    GLUCOSEU 100 06/20/2022     A1C: No results for input(s): LABA1C in the last 72 hours. ABG:No results for input(s): PHART, MQZ5OPF, PO2ART, PBU6KHL, BEART, HGBAE, Z8UPHUXW, CARBOXHGBART in the last 72 hours. EKG:   Please see chart      IMAGING:  XR CHEST PORTABLE    Result Date: 6/20/2022  Progressive infiltrate and effusion left lung base New alveolar infiltrates right upper and lower lobes with effusion.  Recommendation: Follow up short term  Electronically Signed by Noreen Cuevas MD at 20-Jun-2022 12:43:34 PM                 Assessment and Plan:    Principal Problem:    Hypoxemic respiratory failure, chronic (HCC)   O2 to keep pulse ox above 90   Diurese   Strict intake and output   ABGs for acute hypoxic episodes  Active Problems:    Mitral valve insufficiency   Noted    Acute on chronic diastolic heart failure (HCC)   Lasix 60 IV given in ER   picked intake and output   Serial labs   Telemetry   Continue sildenafil   Daily weight    Anemia of chronic disease   Infuse if hemoglobin less than 7  COPD   Continue home neb treatments   Supplemental O2 to keep SPO2 between 88 and 92%  Diabetes   Accu-Cheks before meals and at bedtime   Hemoglobin A1C   Is not on home medication for diabetes   Recently on steroids  Hypothyroid   New Synthroid as at home  Hypertension    continue home medication   Monitor for need to adjust    Resolved Problems:    * No resolved hospital problems. *      Patient  is on DVT prophylaxis  Current medications reviewed  Lab work reviewed  Radiology/Chest x-ray films reviewed  Discussed with the nurse and addressed all questions/concerns  Discussed with Patient and/or Family at the bedside in detail . .. they verbalize understanding and agree with the management plan. Attestation:  Inpatient status is used for patients with an expected LOS extending past two midnights due to medical therapy and/or critical care needs  . .. all other patients are placed under OBServation status. BEBO Lovelace CNP  5:12 PM 6/20/2022      DISCLAIMER: This note was created with electronic voice recognition which does have occasional errors. If you have any questions regarding the content within the note please do not hesitate to contact me. .. Thanks.

## 2022-06-20 NOTE — ED PROVIDER NOTES
[facility] [address] [facility]      Pt Name: Rita Redmond  MRN: [id number]  [place] 1945  Date of evaluation: 6/20/2022  Provider: Caridad Melvin MD    CHIEF COMPLAINT       Chief Complaint   Patient presents with    Shortness of Breath     Pt presents to ED with SOA, initial O2 in the 60s at home. Possible STEMI per EMS         HISTORY OF PRESENT ILLNESS   (Location/Symptom, Timing/Onset,Context/Setting, Quality, Duration, Modifying Factors, Severity)  Note limiting factors. Rita Redmond is a 68 y.o. female who presents to the emergency department for evaluation after having increasing shortness of breath since yesterday and having low oxygen saturations this morning. Patient was hospitalized here last week for similar issue where she was treated for acute on chronic diastolic heart failure complicated by anemia, pulmonary hypertension, COPD, CKD. Patient says that she started having more shortness of breath yesterday and gradually worsened throughout the day and into this morning. Says that she checked her oxygen level at home this morning and it was running as low as the 50s at which point she contacted her doctor who told her to come here for further evaluation. On initial EMS arrival, oxygen saturation was confirmed in the 50s. Patient was initially started on a nonrebreather and had improvement in oxygen saturation in route. No other interventions were given in route. Patient has bilateral lower extremity edema which she says is still present but has improved since recent hospitalization. The history is provided by the patient, a relative and medical records. NursingNotes were reviewed. REVIEW OF SYSTEMS    (2-9 systems for level 4, 10 or more for level 5)     Review of Systems   Constitutional: Negative for fatigue and fever. HENT: Negative for congestion, rhinorrhea and voice change. Eyes: Negative for pain and redness.    Respiratory: Positive for shortness of breath. Cardiovascular: Positive for leg swelling. Negative for chest pain. Gastrointestinal: Negative for abdominal pain, diarrhea and vomiting. Endocrine: Negative. Genitourinary: Negative. Musculoskeletal: Negative for arthralgias and gait problem. Skin: Negative for rash and wound. Neurological: Negative for weakness and headaches. Hematological: Negative. Psychiatric/Behavioral: Negative. All other systems reviewed and are negative. A complete review of systems was performed and is negative except as noted above in the HPI. PAST MEDICAL HISTORY     Past Medical History:   Diagnosis Date    Aortic stenosis     Arthritis     Atherosclerosis of native arteries of the extremities with intermittent claudication 07/25/2011    Blood circulation, collateral     bilat stent lower extremities    CAD (coronary artery disease)     Carotid aneurysm, right (HCC)     Carotid artery occlusion     CHF (congestive heart failure) ([address]     COPD (chronic obstructive pulmonary disease) ([address]     History of blood transfusion 2016    Post op, was taking blood thinner    Hyperlipidemia     Hypertension     MI (myocardial infarction) ([address] 2009    x2    Mitral valve stenosis     Movement disorder     arthritis    Pneumonia     Post-menopausal     PUD (peptic ulcer disease) 2009    Renal artery stenosis ([address] 08/10/2021    s/p stenting to L    Renal failure 2009    after ulcer perforation sepsis.    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 AND REPAIR WITH REVERSED LEFT GREATER SAPHENOUS VEIN INTERPOSITIONAL BYPASS GRAFT performed by Toy Peralta MD at [address] [facility] Right early 's    long ago    CATARACT REMOVAL WITH IMPLANT Bilateral      SECTION  1972    COLONOSCOPY  2012    Dr Bobby Davis:  HP    CORONARY ANGIOPLASTY WITH STENT PLACEMENT  08/10/2021    RM- RCA    CORONARY ANGIOPLASTY WITH STENT PLACEMENT      CORONARY ARTERY BYPASS GRAFT      DIAGNOSTIC CARDIAC CATH LAB PROCEDURE      DILATION AND CURETTAGE OF UTERUS      ILIO-FEMORAL BYPASS GRAFT N/A 2016    OPEN TRANSLUMINAL BALLOON ANGIOPLASTY AND STENTING OF RIGHT COMMON AND EXTERNAL  ILIAC ARTERIES; RIGHT FEMORAL ENDARTERECTOMY WITH VEIN PATCH ANGIOPLASTY performed by Toy Peralta MD at [address] [facility] N/A 2016    MITRAL VALVE  REPLACEMENT LUONG-MAZE ABLATION WITH CRYO PROCEDURE, CORONARY ARTERY BYPASS GRAFT X 1 WITH ENDOSCOPIC VEIN HARVESTING WITH PERFUSION TRANSESOPHAGEAL ECHOCARDIOGRAM performed by Zack Allen MD at [facility]  2016    PERIPHERAL PERCUTANEOUS ARTERIAL INTERVENTION Left 08/10/2021    RM to L renal artery    MI REOPER, CAROTID ENDARTEC>1 MON Left 2018    REMOVAL OF HEMATOMA, LEFT CAROTID ARTERY performed by Toy Peralta MD at [address] [place] Left 2018    LEFT CAROTID ENDARTERECTOMY WITH EEG MONITORING AND COMPLETION DUPLEX ULTRASOUND performed by Toy Peralta MD at [address] [facility]      SKIN GRAFT Right 2016    Skin graft split thickness foot,ankle,and leg. Right leg 26x8cm and 12x6cm total area 280cm squared. TJR    UPPER GASTROINTESTINAL ENDOSCOPY  2015    Dr German Beltran: normal    UPPER GASTROINTESTINAL ENDOSCOPY  03/15/2016    Dr Tessa Negro: normal    UPPER GASTROINTESTINAL ENDOSCOPY  2015    Dr German Beltran:  paul neg, multiple gastric antial and duodenal ulcers    VASCULAR SURGERY  5/27/16 TJR    Aortagram and right leg runoff,right leg runoff,right common iliac artery selection for right leg run off views.  VASCULAR SURGERY      . Open transluminal angioplasty and stenting of the external iliac artery. TJR    VASCULAR SURGERY  07/11/2019    TJR. 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 CURRENT MEDICATIONS       Current Discharge Medication List      CONTINUE these medications which have NOT CHANGED    Details   Multiple Vitamins-Minerals (THERAPEUTIC MULTIVITAMIN-MINERALS) tablet Take 1 tablet by mouth daily      HYDROcodone-acetaminophen (NORCO) 5-325 MG per tablet Take 1 tablet by mouth every 6 hours as needed for Pain.       Calcium Polycarbophil (FIBER-CAPS PO) Take 1 capsule by mouth Daily with supper      clopidogrel (PLAVIX) 75 MG tablet Take 75 mg by mouth daily      sildenafil (REVATIO) 20 MG tablet Take 1 tablet by mouth 3 times daily  Qty: 90 tablet, Refills: 0      predniSONE (DELTASONE) 5 MG tablet Take 1 tablet by mouth daily for 10 days  Qty: 10 tablet, Refills: 0      bumetanide (BUMEX) 2 MG tablet Take 1 tablet by mouth 2 times daily  Qty: 60 tablet, Refills: 0      potassium chloride (KLOR-CON M) 20 MEQ extended release tablet Take 1 tablet by mouth 2 times daily  Qty: 60 tablet, Refills: 0      pantoprazole (PROTONIX) 40 MG tablet Take 1 tablet by mouth 2 times daily (before meals)  Qty: 60 tablet, Refills: 0      guaiFENesin (MUCINEX) 600 MG extended release tablet Take 2 tablets by mouth 2 times daily  Qty: 60 tablet, Refills: 0      isosorbide mononitrate (IMDUR) 30 MG extended release tablet Take 60 mg by mouth in the morning and at bedtime       amLODIPine (NORVASC) 5 MG tablet Take 1 tablet by mouth 2 times daily  Qty: 180 tablet, Refills: 3      metoprolol (LOPRESSOR) 100 MG tablet Take 0.5 tablets by mouth 2 times daily 50 mg in the AM & 50 mg in the PM  Qty: 180 tablet, Refills: 3      albuterol (ACCUNEB) 1.25 MG/3ML nebulizer solution Inhale 1 ampule into the lungs every 6 hours as needed       budesonide (PULMICORT) 0.5 MG/2ML nebulizer suspension Inhale 0.5 mg into the lungs at bedtime       ipratropium-albuterol (DUONEB) 0.5-2.5 (3) MG/3ML SOLN nebulizer solution Inhale 3 mLs into the lungs every 4 hours While Awake      Probiotic Product (PROBIOTIC DAILY PO) Take 1 tablet by mouth daily      levothyroxine (SYNTHROID) 25 MCG tablet Take 25 mcg by mouth Daily       Fexofenadine HCl (MUCINEX ALLERGY PO) Take 1 tablet by mouth 2 times daily       Arformoterol Tartrate (BROVANA) 15 MCG/2ML NEBU Inhale 15 mcg into the lungs 2 times daily       OXYGEN Inhale 2 L into the lungs daily       atorvastatin (LIPITOR) 40 MG tablet Take 1 tablet by mouth daily  Qty: 90 tablet, Refills: 3    Comments: Patient must have labs before any further refills      Biotin 5000 MCG TABS Take 1 tablet by mouth daily              ALLERGIES     Ativan [lorazepam], Levaquin [levofloxacin in d5w], Xarelto [rivaroxaban], and Morphine    FAMILY HISTORY       Family History   Problem Relation Age of Onset    Diabetes Mother     Heart Disease Mother     Heart Failure Mother     Diabetes Sister     Heart Disease Sister     High Blood Pressure Sister     Colon Cancer Sister     Liver Disease Sister     Cirrhosis Sister     Colon Cancer Maternal Aunt     Colon Polyps Neg Hx     Esophageal Cancer Neg Hx           SOCIAL HISTORY       Social History     Socioeconomic History    Marital status:      Spouse name: None    Number of children: None    Years of education: None    Highest education level: None   Occupational History    None   Tobacco Use    Smoking status: Former Smoker     Years: 2.00     Types: Cigarettes     Quit date: 1980     Years since quittin.4    Smokeless tobacco: Never Used   Vaping Use    Vaping Use: Never used   Substance and Sexual Activity    Alcohol use: Yes     Comment: rarely maybe twice a year    Drug use: No    Sexual activity: Not Currently     Partners: Male   Other Topics Concern    None   Social History Narrative    None     Social Determinants of Health     Financial Resource Strain:     Difficulty of Paying Living Expenses: Not on file   Food Insecurity:     Worried About Running Out of Food in the Last Year: Not on file    Ward of Food in the Last Year: Not on file   Transportation Needs:     Lack of Transportation (Medical): Not on file    Lack of Transportation (Non-Medical):  Not on file   Physical Activity:     Days of Exercise per Week: Not on file    Minutes of Exercise per Session: Not on file   Stress:     Feeling of Stress : Not on file   Social Connections:     Frequency of Communication with Friends and Family: Not on file    Frequency of Social Gatherings with Friends and Family: Not on file    Attends Temple Services: Not on file    Active Member of [address] [place] [address] or Organizations: Not on file    Attends Club or Organization Meetings: Not on file    Marital Status: Not on file   Intimate Partner Violence:     Fear of Current or Ex-Partner: Not on file    Emotionally Abused: Not on file    Physically Abused: Not on file    Sexually Abused: Not on file   Housing Stability:     Unable to Pay for Housing in the Last Year: Not on file    Number of [place] in the Last Year: Not on file    Unstable Housing in the Last Year: Not on file       SCREENINGS    [place] Coma Scale  Eye Opening: Spontaneous  Best Verbal Response: Oriented  Best Motor Response: Obeys commands  [place] Coma Scale Score: 15        PHYSICAL EXAM    (up to 7 for level 4, 8 or more for level 5)     ED Triage Vitals [22 1000]   BP Temp Temp Source Heart Rate Resp SpO2 Height Weight -- 98.7 °F (37.1 °C) Oral 100 20 94 % -- --       Physical Exam  Vitals and nursing note reviewed. Constitutional:       General: She is not in acute distress. Appearance: She is well-developed. She is not diaphoretic. Interventions: Nasal cannula in place. HENT:      Head: Normocephalic and atraumatic. Eyes:      General: No scleral icterus. Neck:      Vascular: No JVD. Cardiovascular:      Rate and Rhythm: Normal rate and regular rhythm. Pulses:           Radial pulses are 2+ on the right side and 2+ on the left side. Dorsalis pedis pulses are 2+ on the right side and 2+ on the left side. Heart sounds: Normal heart sounds. No murmur heard. No friction rub. No gallop. Pulmonary:      Effort: Tachypnea and accessory muscle usage present. Breath sounds: Normal breath sounds. No stridor. No decreased breath sounds, wheezing, rhonchi or rales. Chest:      Chest wall: No tenderness. Abdominal:      General: There is no distension. Palpations: Abdomen is soft. Tenderness: There is no abdominal tenderness. There is no guarding or rebound. Musculoskeletal:         General: No deformity. Normal range of motion. Right lower leg: 3+ Edema present. Left lower leg: 3+ Edema present. Skin:     General: Skin is warm and dry. Coloration: Skin is not pale. Findings: No erythema. Neurological:      Mental Status: She is alert and oriented to person, place, and time. GCS: GCS eye subscore is 4. GCS verbal subscore is 5. GCS motor subscore is 6. Cranial Nerves: No cranial nerve deficit. Motor: No abnormal muscle tone.       Coordination: Coordination normal.   Psychiatric:         Behavior: Behavior normal.         Judgment: Judgment normal.         DIAGNOSTIC RESULTS     EKG: All EKG's are interpreted by the Emergency Department Physician who either signs or Co-signs this chart in the absence of a cardiologist.        RADIOLOGY: Non-plain film images such as CT, Ultrasound and MRI are read by the radiologist. Jordan Bang images are visualized and preliminarily interpreted by the emergency physician with the below findings:        Interpretation per the Radiologist below, if available at the time of this note:    XR CHEST PORTABLE   Final Result   Progressive infiltrate and effusion left lung base   New alveolar infiltrates right upper and lower lobes with effusion.    Recommendation: Follow up short term     Electronically Signed by Eugenio Martinez MD at 20-Jun-2022 12:43:34 PM                     ED BEDSIDE ULTRASOUND:   Performed by ED Physician - none    LABS:  Labs Reviewed   CBC WITH AUTO DIFFERENTIAL - Abnormal; Notable for the following components:       Result Value    WBC 15.9 (*)     RBC 2.65 (*)     Hemoglobin 7.5 (*)     Hematocrit 25.9 (*)     MCHC 29.0 (*)     RDW 16.4 (*)     Neutrophils % 89.3 (*)     Lymphocytes % 2.5 (*)     Neutrophils Absolute 14.2 (*)     Lymphocytes Absolute 0.4 (*)     Monocytes Absolute 1.00 (*)     All other components within normal limits   COMPREHENSIVE METABOLIC PANEL W/ REFLEX TO MG FOR LOW K - Abnormal; Notable for the following components:    CO2 30 (*)     Glucose 150 (*)     BUN 29 (*)     CREATININE 1.0 (*)     GFR Non-African American 54 (*)     Calcium 8.1 (*)     Total Protein 4.6 (*)     Albumin 2.3 (*)     Alkaline Phosphatase 171 (*)     AST 33 (*)     All other components within normal limits   BRAIN NATRIURETIC PEPTIDE - Abnormal; Notable for the following components:    Pro-BNP 18,884 (*)     All other components within normal limits   URINALYSIS WITH REFLEX TO CULTURE - Abnormal; Notable for the following components:    Glucose, Ur 100 (*)     All other components within normal limits   VENOUS BLD GAS - Abnormal; Notable for the following components:    pH, Ruddy 7.49 (*)     HCO3, Venous 33 (*)     All other components within normal limits   MICROSCOPIC URINALYSIS - Abnormal; Notable for the following components:    Bacteria, UA NEGATIVE (*)     Crystals, UA NEG (*)     All other components within normal limits   IRON AND TIBC - Abnormal; Notable for the following components:    Iron 20 (*)     TIBC 150 (*)     Iron Saturation 13 (*)     All other components within normal limits   COVID-19, RAPID   HEMOGLOBIN A1C       All other labs were within normal range or not returned as of this dictation.     Medications   sodium chloride flush 0.9 % injection 5-40 mL (has no administration in time range)   sodium chloride flush 0.9 % injection 5-40 mL (has no administration in time range)   0.9 % sodium chloride infusion (has no administration in time range)   ondansetron (ZOFRAN-ODT) disintegrating tablet 4 mg (has no administration in time range)     Or   ondansetron (ZOFRAN) injection 4 mg (has no administration in time range)   polyethylene glycol (GLYCOLAX) packet 17 g (has no administration in time range)   acetaminophen (TYLENOL) tablet 650 mg (has no administration in time range)     Or   acetaminophen (TYLENOL) suppository 650 mg (has no administration in time range)   enoxaparin Sodium (LOVENOX) injection 30 mg (30 mg SubCUTAneous Given 6/20/22 1600)   potassium chloride (KLOR-CON M) extended release tablet 40 mEq (has no administration in time range)     Or   potassium bicarb-citric acid (EFFER-K) effervescent tablet 40 mEq (has no administration in time range)     Or   potassium chloride 10 mEq/100 mL IVPB (Peripheral Line) (has no administration in time range)   magnesium sulfate 2000 mg in 50 mL IVPB premix (has no administration in time range)   albuterol (PROVENTIL) nebulizer solution 1.25 mg (has no administration in time range)   amLODIPine (NORVASC) tablet 5 mg (has no administration in time range)   Arformoterol Tartrate (BROVANA) nebulizer solution 15 mcg (has no administration in time range)   atorvastatin (LIPITOR) tablet 40 mg (has no administration in time range) budesonide (PULMICORT) nebulizer suspension 500 mcg (has no administration in time range)   clopidogrel (PLAVIX) tablet 75 mg (75 mg Oral Not Given 6/20/22 1542)   guaiFENesin (MUCINEX) extended release tablet 1,200 mg (has no administration in time range)   ipratropium-albuterol (DUONEB) nebulizer solution 3 mL (3 mLs Inhalation Given 6/20/22 1604)   isosorbide mononitrate (IMDUR) extended release tablet 30 mg (30 mg Oral Not Given 6/20/22 1542)   levothyroxine (SYNTHROID) tablet 25 mcg (25 mcg Oral Not Given 6/20/22 1543)   metoprolol tartrate (LOPRESSOR) tablet 50 mg (has no administration in time range)   pantoprazole (PROTONIX) tablet 40 mg (40 mg Oral Given 6/20/22 1600)   sildenafil (REVATIO) tablet 20 mg (20 mg Oral Given 6/20/22 1600)   bumetanide (BUMEX) injection 2 mg (has no administration in time range)   furosemide (LASIX) injection 60 mg (60 mg IntraVENous Given 6/20/22 1147)       EMERGENCY DEPARTMENT COURSE and DIFFERENTIALDIAGNOSIS/MDM:   Vitals:    Vitals:    06/20/22 1346 06/20/22 1503 06/20/22 1550 06/20/22 1655   BP:  (!) 145/58  120/62   Pulse:  92  (!) 108   Resp:  24  22   Temp:  98.7 °F (37.1 °C)  98.2 °F (36.8 °C)   TempSrc:  Oral  Temporal   SpO2: (!) 88% (!) 89%  (!) 89%   Weight:   107 lb 1.6 oz (48.6 kg)    Height:   5' 3\" (1.6 m)        [facility]    ED Course as of 06/20/22 1728   Mon Jun 20, 2022   1023 EKG shows sinus rhythm with a rate of 101. There are ST elevations in anterior/septal leads concerning for LVH or strain pattern. No findings of acute ischemia or infarction. Overall no changes compared to recent EKGs. Patient saturation 88 to 89% on 2 L nasal cannula here shortly after arrival [EDILIA]   1044 Pro-BNP(!): 50,318 [EDILIA]      ED Course User Index  [EDILIA] Xiang Valadez MD     Patient seemed to have improved after arrival here. BNP has increased significantly compared to recent values. Patient was given additional diuretic here.   During period of monitoring, patient had a

## 2022-06-20 NOTE — PROGRESS NOTES
4 Eyes Skin Assessment    Aria Landaverde is being assessed upon: Admission    I agree that Nori Lopez RN, along with Alycia Patricia RN have performed a thorough Head to Toe Skin Assessment on the patient. ALL assessment sites listed below have been assessed. Areas assessed by both nurses:     [x]   Head, Face, and Ears   [x]   Shoulders, Back, and Chest  [x]   Arms, Elbows, and Hands   [x]   Coccyx, Sacrum, and Ischium  [x]   Legs, Feet, and Heels    Does the Patient have Skin Breakdown? Yes, wound(s) noted upon assessment. It is the responsibility of the Primary Nurse to assure that the following documentation, preventions, orders, and consults are complete on the above noted wound(s): Wound LDA initiated. LDA Flowsheet Documentation includes the Denise-wound, Wound Assessment, Measurements, Dressing Treatment, Drainage, and Color.     Gabino Prevention initiated: Yes  Wound Care Orders initiated: No    WOC nurse consulted for Pressure Injury (Stage 3,4, Unstageable, DTI, NWPT, and Complex wounds) and New or Established Ostomies: No        Primary Nurse eSignature: Lincoln Villaseñor RN on 6/20/2022 at 4:04 PM      Co-Signer eSignature: Electronically signed by Lionel Mendoza RN on 6/20/22 at 5:01 PM CDT

## 2022-06-20 NOTE — CARE COORDINATION
Patient is current with [facility] for [address], PT, OT Services. Will follow. Please advise when patient discharges. WILL NEED RESUMPTION OF CARE ORDERS TO RESUME HH SERVICES WHEN PATIENT DISCHARGES. Thank you. [address] [place] [address] [phone]. [phone].     Laurita Acosta RN, Home Care Liaison  [phone] P  Electronically signed by Laurita Acosta on 6/20/2022 at 2:49 PM

## 2022-06-20 NOTE — PROGRESS NOTES
Initially on 3L when arrived to floor satting 88-90%, now has to be on 6L humidified to maintain 88-90%. Will continue to monitor for the opportunity to wean down.     Electronically signed by Lucero Soto RN on 6/20/2022 at 5:02 PM

## 2022-06-20 NOTE — CARE COORDINATION
Received a missed call from daughter. Called her back. She reported that patient is having issues this morning with low pulse ox readings in the 70s. She said she is at work so she does not know what else is going on, but feels that it may be fluid. She said patient does not want to go to the ED. She wanted her to call me instead to see what I think she should do. I told her that with patient's health history and as fragile as she has been as of late, she needs to go to the ED to be evaluated. [facility] said she would let her know. I did phone Dr. Khadra So office and spoke with the phone nurse and made her aware. I also phoned [address] [place] [address] and spoke with [facility] and made her aware.

## 2022-06-20 NOTE — TELEPHONE ENCOUNTER
PT daughter called stating the bumex isn't working. Pt is in ER now. Pt daughter would like a call from you .      Her number [phone]

## 2022-06-20 NOTE — PROGRESS NOTES
Noted pt had 2 RM stents in Aug. 2021. .. under the situation Cardiology could be consulted and since it has been greater than 6 mos since stent placement pt maybe allowed to hold Plavix to have endo/colonoscopy that is needed.

## 2022-06-21 PROBLEM — Z90.5 SOLITARY KIDNEY, ACQUIRED: Status: ACTIVE | Noted: 2022-01-01

## 2022-06-21 PROBLEM — I70.1 ATHEROSCLEROTIC RAS (RENAL ARTERY STENOSIS), UNILATERAL (HCC): Status: ACTIVE | Noted: 2022-01-01

## 2022-06-21 NOTE — PLAN OF CARE
Problem: Discharge Planning  Goal: Discharge to home or other facility with appropriate resources  6/21/2022 1451 by Toby Powell RN  Outcome: Progressing  6/21/2022 0331 by Cayla Aquino RN  Outcome: Progressing  Flowsheets  Taken 6/20/2022 2030 by Cayla Aquino RN  Discharge to home or other facility with appropriate resources: Identify barriers to discharge with patient and caregiver  Taken 6/20/2022 1546 by Sakshi Godinez RN  Discharge to home or other facility with appropriate resources:   Identify barriers to discharge with patient and caregiver   Identify discharge learning needs (meds, wound care, etc)   Arrange for needed discharge resources and transportation as appropriate     Problem: Chronic Conditions and Co-morbidities  Goal: Patient's chronic conditions and co-morbidity symptoms are monitored and maintained or improved  6/21/2022 1451 by Toby Powell RN  Outcome: Progressing  6/21/2022 0331 by Cayla Aquino RN  Outcome: Progressing  Flowsheets (Taken 6/20/2022 2030)  Care Plan - Patient's Chronic Conditions and Co-Morbidity Symptoms are Monitored and Maintained or Improved: Monitor and assess patient's chronic conditions and comorbid symptoms for stability, deterioration, or improvement     Problem: Safety - Adult  Goal: Free from fall injury  6/21/2022 1451 by Toby Powell RN  Outcome: Progressing  Flowsheets (Taken 6/21/2022 0332 by Cayla Aquino RN)  Free From Fall Injury: Instruct family/caregiver on patient safety  6/21/2022 0331 by Cayla Aquino RN  Outcome: Progressing     Problem: ABCDS Injury Assessment  Goal: Absence of physical injury  6/21/2022 1451 by Toby Powell RN  Outcome: Progressing  Flowsheets (Taken 6/21/2022 0332 by Cayla qAuino RN)  Absence of Physical Injury: Implement safety measures based on patient assessment  6/21/2022 0331 by Cayla Aquino RN  Outcome: Progressing  Flowsheets (Taken 6/20/2022 1537 by Cheryl Malone RN)  Absence of Physical Injury: Implement safety measures based on patient assessment     Problem: Skin/Tissue Integrity  Goal: Absence of new skin breakdown  Description: 1. Monitor for areas of redness and/or skin breakdown  2. Assess vascular access sites hourly  3. Every 4-6 hours minimum:  Change oxygen saturation probe site  4. Every 4-6 hours:  If on nasal continuous positive airway pressure, respiratory therapy assess nares and determine need for appliance change or resting period.   6/21/2022 1451 by Price Jordan RN  Outcome: Progressing  6/21/2022 0331 by Avery Mccormick RN  Outcome: Progressing     Problem: Nutrition Deficit:  Goal: Optimize nutritional status  Outcome: Progressing

## 2022-06-21 NOTE — CARE COORDINATION
Patient Contact Information:  [address]  [phone] (home)   Above information verified? [x]   Yes  []   No    Had HOME OXYGEN prior to admission:  Yes   [address]:   [place]     Has a pulse oximetry unit at home:   [x]   Yes  []   No    Informed of need to bring portable home O2 tank to hospital on day of DISCHARGE:    [x]   Yes  []   No    Name of person committed to bringing portable tank at discharge:       Have you been vaccinated for COVID-19 (SARS-CoV-2)? [x]   Yes  []   No                   If so, when? Which :  []   Tink  []   Moderna  []   Carrie Bard  []   Other:       Pharmacy:    [facility], [address] [place] [phone] - [phone]  [address] [facility]  [address] [address]  Phone: [phone] Fax: [phone]      Active with HD/PD prior to admission:           []   Yes  [x]   No  HD Center:       Financial:  Payor: Ralph Bartlett / Plan: Sena Harm / Product Type: *No Product type* /     Pre-Cert required for SNF:   [x]   Yes  []   No    Patient Deficits:  []   Yes   [x]   No    If yes:  []   Confusion/Memory  []   Visual  []   Motor/Sensory         []   Right arm         []   Right leg         []   Left arm         []   Left leg  []   Language/Speech         []   Aphasia         []   Dysarthria         []   Swallow         [place] Coma Scale  Eye Opening: Spontaneous  Best Verbal Response: Oriented  Best Motor Response: Obeys commands  [place] Coma Scale Score: 15    Patient Deficit Notes: Additional CM/SW Notes:   Readmission assessment completed with pt.      [id number] [facility] S[facility] My [facility] NOW       06/21/22 1035   Service Assessment   Patient Orientation Alert and Oriented   Cognition Alert   History Provided By Patient   Primary Caregiver Self   Support Systems Spouse/Significant Other;Children   Patient's Healthcare Decision Maker is: Named in [facility]   PCP Verified by CM Yes   Prior Functional Level Independent in ADLs/IADLs   Current Functional Level Independent in ADLs/IADLs   Can patient return to prior living arrangement Yes   Ability to make needs known: Good   Family able to assist with home care needs: Yes   Would you like for me to discuss the discharge plan with any other family members/significant others, and if so, who? No   Social/Functional History   Lives With Spouse   Type of Home House   ADL Assistance Independent   Homemaking Assistance Independent   Ambulation Assistance Independent   Transfer Assistance Independent   Active  Yes   Discharge Planning   Type of [address] Prior To Admission Oxygen Therapy   Potential Assistance Needed N/A   DME Ordered? No   Potential Assistance Purchasing Medications No   Type of Home Care Services PT;OT;Nursing Services   Patient expects to be discharged to: House   History of falls? 0   Services At/After Discharge   Transition of Care Consult (CM Consult) [facility]   Mode of Transport at Discharge Other (see comment)  (daughter)   Condition of Participation: Discharge Planning   The Patient and/or Patient Representative was provided with a Choice of Provider? Patient   The Patient and/Or Patient Representative agree with the Discharge Plan?  Yes

## 2022-06-21 NOTE — TELEPHONE ENCOUNTER
EST. PT  ROOM 537  DX: PER NURSE,WAS TOLD TO LET US KNOW THAT PT NEEDS TO BE SEEN IN HOUSE  CONSULT: [facility]  [address]

## 2022-06-21 NOTE — PROGRESS NOTES
Pt very tachypenic and short of breath despite breathing tx, pt spo2 on 5L 79%. Placed pt on hfnc 50L/.50 for work of breathing.  spo2 90% on hf with work of breathing looking a little better

## 2022-06-21 NOTE — CONSULTS
Comprehensive Nutrition Assessment    Type and Reason for Visit:  Initial,Consult    Nutrition Recommendations/Plan:   1. Start ONS     Malnutrition Assessment:  Malnutrition Status:  Severe malnutrition (06/21/22 1423)    Context:  Chronic Illness     Findings of the 6 clinical characteristics of malnutrition:  Energy Intake:  Mild decrease in energy intake (Comment)  Weight Loss:  No significant weight loss     Body Fat Loss:  Severe body fat loss Orbital,Buccal region   Muscle Mass Loss:  Severe muscle mass loss Temples (temporalis),Hand (interosseous)  Fluid Accumulation:  Severe Extremities   Strength:  Not Performed    Nutrition Assessment:    Received consult for CHF education. Pt was just recently admitted/discharged from this facility. Diet hx reavealed that pt does not use a salt shaker and is not one tho use many high sodium foods. Pt meets criteria for severe malnutrition and 1 Ensure High Protein will be sent daily. Nutrition Related Findings:    +2, +3 edema Wound Type: Open Wounds       Current Nutrition Intake & Therapies:    Average Meal Intake: 26-50%,51-75%  Average Supplements Intake: None Ordered  ADULT DIET; Regular; Low Sodium (2 gm)    Anthropometric Measures:  Height: 5' 3\" (160 cm)  Ideal Body Weight (IBW): 115 lbs (52 kg)    Admission Body Weight: 108 lb 4.8 oz (49.1 kg)  Current Body Weight: 108 lb 4.8 oz (49.1 kg), 94.2 % IBW.     Current BMI (kg/m2): 19.2  Usual Body Weight: 88 lb (39.9 kg) (5/2022)  % Weight Change (Calculated): 23.1  BMI Categories: Underweight (BMI less than 22) age over 72    Estimated Daily Nutrient Needs:  Energy Requirements Based On: Kcal/kg  Weight Used for Energy Requirements: Current  Energy (kcal/day): 4476-8689 kcals  (25-30 kcals/kg)  Weight Used for Protein Requirements: Current  Protein (g/day): 49-98g  Method Used for Fluid Requirements: 1 ml/kcal  Fluid (ml/day): 982-1227 ml (20-25 ml/kg)    Nutrition Diagnosis:   · Inadequate oral intake related to acute injury/trauma,impaired respiratory function as evidenced by intake 26-50%,intake 51-75%,severe loss of subcutaneous fat,wounds,BMI      Nutrition Interventions:   Food and/or Nutrient Delivery: Continue Current Diet,Start Oral Nutrition Supplement  Nutrition Education/Counseling: No recommendation at this time  Coordination of Nutrition Care: Continue to monitor while inpatient  Plan of Care discussed with: pt    Goals:     Goals: PO intake 50% or greater,Meet at least 75% of estimated needs       Nutrition Monitoring and Evaluation:   Behavioral-Environmental Outcomes: None Identified  Food/Nutrient Intake Outcomes: Food and Nutrient Intake,Supplement Intake  Physical Signs/Symptoms Outcomes: Biochemical Data,Fluid Status or Edema,Weight,Skin    Discharge Planning:    Continue current diet     Chino Wolff MS, RD, LD  Contact: [phone]

## 2022-06-21 NOTE — CONSULTS
MEDICAL ONCOLOGY CONSULTATION    Pt Name: Luis Fuentes  MRN: [id number]  YOB: 1945  Date of evaluation: 6/21/2022    REASON FOR CONSULTATION: Anemia  REQUESTING PHYSICIAN:    History Obtained From:    patient, electronic medical record    HISTORY OF PRESENT ILLNESS:  I am being consulted this patient for anemia. I was consulted due to the fact the patient was established with us. The patient is not established with us. She was seen in consultation during last admission. She had iron deficiency anemia. She has been on Plavix and was also on aspirin. She had occult blood positive in the stool. GI was consulted during last admission but decided not to do any intervention due to the patient overall respiratory status. She was discharged recently. Her hemoglobin at discharge was 8.8. She presented to the hospital yesterday with hemoglobin 7.5. She has received IV Venofer to a total of 1000 mg. She has received transfusional support during last visit. Her anemia is multifactorial to include iron deficiency anemia and also anemia of inflammation, anemia of chronic kidney disease. Occult blood is + 6/7/2022. She was admitted again with short of breath.  proBNP was quite elevated. She was hypoxemic at home. She was brought by EMS. Prior hematology history  The patient is a 79 y. o. female with PMH CAD with CABG, carotid endarterectomy, COPD with continuous home O2 at 2L, HTN, HLD, CHF with diastolic dysfunction and pulmonary hypertension, and multiple  other comorbiditieswho presented to [facility]/4/2022 complaining of shortness of breath and CP.    Hematology consultation was requested regarding severe anemia.  The patient was first seen by Dr. Laura Hernandez on 6/7/2022 during patient was positioned [facility]. Nisreen Jacques has multiple comorbidities including history of coronary artery disease status post CABG, carotid endarterectomy, advanced COPD on continuous O2 supplementation, history of congestive heart failure, hyperlipidemia.  The patient has had prior admissions in the past for exacerbation of COPD and heart failure.  She presented on 6/4/2022 with complaints of worsening shortness of breath.  She was admitted for concern for COPD exacerbation/heart failure exacerbation.  Work-up in the emergency showed elevated white blood cell count with severe anemia with hemoglobin 5.0/MCV 94, RDW 16.4, thrombocytosis platelet count 571,397.  Iron profile showed ferritin 110, iron saturation 40, iron 89, TIBC 220, folate 20, vitamin B12 574.  The patient denied any overt GI bleed or hematuria.  Of note, she has been on aspirin and Plavix for a history of CAD.  She was transfused 2 units PRBCs with a hemoglobin recovery of 8.0.  Her hemoglobin is trended down again this morning on 6/7/2022 down to 6.8.  She had a normal TSH.  I do not see a hemolytic panel.  Review of past CBCs showed that the patient has been anemic since at least May 2016.  She never had a normal hemoglobin.  Most of the time her hemoglobin is in the range of 7-10.  She was admitted here in April 2022 with a hemoglobin 10.2 at admission and left with a hemoglobin 7.9.       Past Medical History:    Past Medical History:   Diagnosis Date    Aortic stenosis     Arthritis     Atherosclerosis of native arteries of the extremities with intermittent claudication 07/25/2011    Blood circulation, collateral     bilat stent lower extremities    CAD (coronary artery disease)     Carotid aneurysm, right ([address]     Carotid artery occlusion     CHF (congestive heart failure) ([address]     COPD (chronic obstructive pulmonary disease) ([address]     History of blood transfusion 2016    Post op, was taking blood thinner    Hyperlipidemia     Hypertension     MI (myocardial infarction) ([address] 2009    x2    Mitral valve stenosis     Movement disorder     arthritis    Pneumonia     Post-menopausal     PUD (peptic ulcer disease) 2009    Renal artery stenosis ([facility] 08/10/2021    s/p stenting to L    Renal failure 2009    after ulcer perforation sepsis.    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 AND REPAIR WITH REVERSED LEFT GREATER SAPHENOUS VEIN INTERPOSITIONAL BYPASS GRAFT performed by Franko Millard [facility] at [facility]    long ago    CATARACT REMOVAL WITH IMPLANT Bilateral      SECTION  1972    COLONOSCOPY  2012    Dr Rolle Keepers:  HP    CORONARY ANGIOPLASTY WITH STENT PLACEMENT  08/10/2021    RM- RCA    CORONARY ANGIOPLASTY WITH STENT PLACEMENT      CORONARY ARTERY BYPASS GRAFT  2016    DIAGNOSTIC CARDIAC CATH LAB PROCEDURE      DILATION AND CURETTAGE OF UTERUS      ILIO-FEMORAL BYPASS GRAFT N/A 2016    OPEN TRANSLUMINAL BALLOON ANGIOPLASTY AND STENTING OF RIGHT COMMON AND EXTERNAL  ILIAC ARTERIES; RIGHT FEMORAL ENDARTERECTOMY WITH VEIN PATCH ANGIOPLASTY performed by Franko Millard MD at [address] [place] [address] N/A 2016    MITRAL VALVE  REPLACEMENT LUONG-MAZE ABLATION WITH CRYO PROCEDURE, CORONARY ARTERY BYPASS GRAFT X 1 WITH ENDOSCOPIC VEIN HARVESTING WITH PERFUSION TRANSESOPHAGEAL ECHOCARDIOGRAM performed by Mihaela Bacon MD at [address] [place] [address]  2016    PERIPHERAL PERCUTANEOUS ARTERIAL INTERVENTION Left 08/10/2021    RM to L renal artery    RI REOPER, CAROTID ENDARTEC>1 MON Left 2018    REMOVAL OF HEMATOMA, LEFT CAROTID ARTERY performed by [facility]ard [facility] at [facility]    WI THROMBOENDARTECTMY NECK,NECK INCIS Left 01/11/2018    LEFT CAROTID ENDARTERECTOMY WITH EEG MONITORING AND COMPLETION DUPLEX ULTRASOUND performed by César Zambrano MD at [facility]      SKIN GRAFT Right 07/22/2016    Skin graft split thickness foot,ankle,and leg. Right leg 26x8cm and 12x6cm total area 280cm squared. TJR    UPPER GASTROINTESTINAL ENDOSCOPY  07/14/2015    Dr Audi Tamayo: normal    UPPER GASTROINTESTINAL ENDOSCOPY  03/15/2016    Dr Betsy Pena: normal    UPPER GASTROINTESTINAL ENDOSCOPY  05/27/2015    Dr Audi Tamayo:  paul neg, multiple gastric antial and duodenal ulcers    VASCULAR SURGERY  5/27/16 TJR    Aortagram and right leg runoff,right leg runoff,right common iliac artery selection for right leg run off views.  VASCULAR SURGERY      . Open transluminal angioplasty and stenting of the external iliac artery. TJR    VASCULAR SURGERY  07/11/2019    T[facility]. 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 Social History:    The patient currently lives at home with spouse  Tobacco:   reports that she quit smoking about 42 years ago. Her smoking use included cigarettes. She quit after 2.00 years of use. She has never used smokeless tobacco.  Alcohol:   reports current alcohol use.   Illicit Drugs: Unknown    Family History:   Family History   Problem Relation Age of Onset    Diabetes Mother     Heart Disease Mother     Heart Failure Mother     Diabetes Sister     Heart Disease Sister     High Blood Pressure Sister     Colon Cancer Sister     Liver Disease Sister     Cirrhosis Sister     Colon Cancer Maternal Aunt     Colon Polyps Neg Hx     Esophageal Cancer Neg Hx        Current Hospital Medications:    Current Facility-Administered Medications   Medication Dose Route Frequency Provider Last solution 15 mcg  15 mcg Nebulization BID Marnehilda Robinhilda, APRN - CNP   15 mcg at 06/21/22 0618    atorvastatin (LIPITOR) tablet 40 mg  40 mg Oral Nightly Marnehilda Robinhilda APRN - CNP   40 mg at 06/20/22 2029    budesonide (PULMICORT) nebulizer suspension 500 mcg  0.5 mg Nebulization BID Marnehilda Robinhilda APRN - CNP   500 mcg at 06/21/22 4830    clopidogrel (PLAVIX) tablet 75 mg  75 mg Oral Daily Marmaxime Trivedi APRN - CNP   75 mg at 06/21/22 0932    guaiFENesin (MUCINEX) extended release tablet 1,200 mg  1,200 mg Oral BID Marnehilda Robinhilda, APRN - CNP   1,200 mg at 06/21/22 0932    ipratropium-albuterol (DUONEB) nebulizer solution 3 mL  3 mL Inhalation 4x daily Marmaxime Trivedi APRN - CNP   3 mL at 06/21/22 1411    isosorbide mononitrate (IMDUR) extended release tablet 30 mg  30 mg Oral Daily Marnehilda Farooq APRN - CNP   30 mg at 06/21/22 4190    levothyroxine (SYNTHROID) tablet 25 mcg  25 mcg Oral Daily Marnehilda Farooq, APRN - CNP   25 mcg at 06/21/22 0630    metoprolol tartrate (LOPRESSOR) tablet 50 mg  50 mg Oral BID Marnehilda Robinhilda, APRN - CNP   50 mg at 06/21/22 0932    pantoprazole (PROTONIX) tablet 40 mg  40 mg Oral BID AC Cayla Trivedi APRN - CNP   40 mg at 06/21/22 1628    sildenafil (REVATIO) tablet 20 mg  20 mg Oral TID Marmaxime Trivedi APRN - CNP   20 mg at 06/21/22 1358    bumetanide (BUMEX) injection 2 mg  2 mg IntraVENous BID Marmaxime Trivedi APRN - CNP   2 mg at 06/21/22 0932       Allergies: Allergies   Allergen Reactions    Ativan [Lorazepam] Hallucinations    Levaquin [Levofloxacin In D5w] Nausea And Vomiting    Xarelto [Rivaroxaban] Other (See Comments)     Made anemic. CANNOT HAVE DUE TO HEART VALVE REPLACEMENT.     Morphine Itching and Rash         Subjective   REVIEW OF SYSTEMS:   Constitutional / general:  Denies fever / chills / sweats / +fatigue +activity change +appetite change  ENT: Denies sore throat / hoarseness / nasal drainage / ear pain  CV:  Denies chest pain / palpitations/ orthopnea   Respiratory:  Denies cough / sputum / hemoptysis / +SOB  GI: Denies nausea / vomiting / abdominal pain / diarrhea / constipation  :  Denies dysuria / hesitancy / urgency / hematuria   Neuro: Denies paralysis / syncope / seizure / dysphagia / headache / paresthesias  Musculoskeletal:  Denies muscle weakness /joint stiffness / pain  Vascular: Denies edema / claudication / varicosities  Heme / endocrine: Denies easy bruising / bleeding / excessive sweating / heat or cold intolerance  Psychiatric:  Denies depression / insomnia / mood changes / +atarax  Skin:  Denies new rashes / lesions / skin hair or nail changes    Objective   BP (!) 103/51   Pulse (!) 120   Temp 99.5 °F (37.5 °C)   Resp 30   Ht 5' 3\" (1.6 m)   Wt 108 lb 4.8 oz (49.1 kg)   SpO2 93%   BMI 19.18 kg/m²     PHYSICAL EXAM:  CONSTITUTIONAL: Alert, appropriate, looks ill-appearing, mild respiratory distress, decreased activity  EYES: Non icteric, EOM intact, pupils equal round   ENT: Mucus membranes moist,external inspection of ears and nose are normal  NECK: Supple, no masses. No palpable thyroid mass  CHEST/LUNGS: On BiPAP. Decreased breath sounds bilaterally  CARDIOVASCULAR: Tachycardic, no murmurs. Bilateral lower extremity edema  ABDOMEN: soft non-tender, active bowel sounds, no HSM. No palpable masses  EXTREMITIES: warm, full ROM in all 4 extremities, no focal weakness.   SKIN: warm, dry with no rashes or lesions  LYMPH: No cervical, clavicular, axillary, or inguinal lymphadenopathy  NEUROLOGIC: follows commands, non focal         LABORATORY RESULTS REVIEWED/ANALYZED BY ME:  Recent Labs     06/21/22  0157 06/20/22  1007 06/14/22  0107   WBC 13.4* 15.9* 17.3*   HGB 7.4* 7.5* 8.8*   HCT 25.2* 25.9* 29.9*   MCV 96.9 97.7 98.7    315 266       Lab Results   Component Value Date     06/21/2022    K 3.7 06/21/2022    CL 97 (L) 06/21/2022    CO2 28 06/21/2022    BUN 32 (H) 06/21/2022    CREATININE 1.3 (H) 06/21/2022 GLUCOSE 109 06/21/2022    CALCIUM 8.1 (L) 06/21/2022    PROT 4.6 (L) 06/20/2022    LABALBU 2.3 (L) 06/20/2022    BILITOT 0.3 06/20/2022    ALKPHOS 171 (H) 06/20/2022    AST 33 (H) 06/20/2022    ALT 31 06/20/2022    LABGLOM 40 (A) 06/21/2022    GFRAA 48 (L) 06/21/2022    GLOB 4.6 08/29/2016       Lab Results   Component Value Date    INR 1.25 (H) 06/04/2022    INR 1.17 04/15/2022    INR 1.14 03/26/2021    PROTIME 15.7 (H) 06/04/2022    PROTIME 14.9 (H) 04/15/2022    PROTIME 14.6 03/26/2021       RADIOLOGY STUDIES REPORT/REVIEWED AND INTERPRETED BY ME:  CT ABDOMEN PELVIS WO CONTRAST Additional Contrast? None    Result Date: 6/14/2022  NO PRIOR REPORT AVAILABLE Exam: CT OF THE ABDOMEN/PELVIS WITHOUT CONTRAST Clinical data: Abdominal pain. Technique: Axial CT images were acquired through the abdomen and pelvis without contrast using soft tissue and bone algorithms. Reformatted/MPR images were performed. Radiation dose: CTDIvol =16.41 mGy, DLP =701 mGy x cm. Limitations: Lack of intravenous contrast limits evaluation of solid viscera. Lack of oral contrast limits evaluation of the bowel loops. Prior Studies: Renal arterial ultrasound dated 7/30/2020 images. Findings: Lung bases:Trace pleural effusion is noted bilaterally. Mild subsegmental atelectasis is noted in the included bilateral lungs. Liver:Unremarkable size andcontour. Normal density. No evidence of mass. No evidence of dilated ducts. Gallbladder Fossa: Cholelithiasis without evidence of acute cholecystitis. Spleen: Grossly unremarkable. Pancreas/adrenal glands: Grossly unremarkable size, contour and density. Kidneys: In anatomic position. There is mild atrophy of the right kidney and compensatory hypertrophy of the left kidney. Bilateral non-obstructing renal calculi, measuring approximately 2 mm in the upper pole of right kidney, 1 mm and 5 mm in the upper pole of left kidney. No ureteral calculi.  No evidence of a renal mass or cyst. Perinephric space is unremarkable. Retroperitoneum: No enlarged retroperitoneal lymphadenopathy. The IVC appears unremarkable. Severe calcific atherosclerotic changes noted in the aorta and its branches. Peritoneal cavity: No evidence of free air. Trace ascites is noted. Gastrointestinal tract: Small inguinal hernia is noted on right, it contains portion of small bowel without evidence of obstruction. Tiny fat containing inguinal hernia is noted on left. Tiny hiatal hernia is noted. Changes of mild constipation. There are twisted mesenteric vessels in the right lower quadrant without evidence of obstruction. Large amount of stool and gas in the colon. Appendix is not visualized. Pelvis: Solid and hollow viscera grossly unremarkable. Osseous structures: No acute or destructive bony process identified. Mild scoliosis. Degenerative changes noted in the spine and pelvis    1. Small inguinal hernia is noted on right, it contains portion of small bowel without evidence of obstruction. Tiny fat containing inguinal hernia is noted on left. 2. Tiny hiatal hernia is noted. 3. There are twisted mesenteric vessels in the right lower quadrant without evidence of obstruction. Changes of mild constipation. 4. Trace pleural effusion is noted bilaterally. Mild subsegmental atelectasis is noted in the included bilateral lungs. 5. Cholelithiasis without evidence of acute cholecystitis. 6. There is mild atrophy of the right kidney and compensatory hypertrophy of the left kidney. Bilateral non-obstructing renal calculi. 7. Generalized anasarca. Trace ascites is noted. 8. Moderately large amount of stool in the colon, please correlate with constipation. Recommendation: Follow up as clinically indicated. All CT scans at this facility utilize dose modulation, iterative reconstruction, and/or weight based dosing when appropriate to reduce radiation dose to as low as reasonably achievable.  Electronically Signed by Mely Madrid MD at 14-Jun-2022 01:21:42 AM XR CHEST (2 VW)    Result Date: 6/12/2022  NO PRIOR REPORT AVAILABLE Exam: X-RAYS OF THE CHEST Clinical data:COPD/CHF/pneumonia. Technique: PA and lateral views of the chest. Prior studies: Radiograph of the chest dated 06/04/2022. NM lung scan dated 04/15/2022. Findings:Hyperinflated emphysematous lungs. Bilateral pleural effusions. Slightly enlarged cardiac silhouette. Hyperinflated emphysematous lungs. Bilateral pleural effusions. Slightly enlarged cardiac silhouette. Recommendation: Follow up as clinically indicated. Electronically Signed by Geovanni Lanier MD at 12-Jun-2022 10:47:39 PM             XR CHEST PORTABLE    Result Date: 6/20/2022  NO PRIOR REPORT AVAILABLE [facility] Clinical data:Short of air, edema. Technique:Single view of the chest. Prior studies: Radiograph of the chest dated 06/11/2022. Findings: The lungs are grossly clear; noevidence of acute infiltrate or pleural effusion. Cardiac silhouette is within normal limits. No acute osseous abnormality is detected. There is progression of infiltrate and effusion left base and new alveolar infiltrates in the right upper and lower lobes. Right pleural effusion also suspected. Median sternotomy wires again noted. Progressive infiltrate and effusion left lung base New alveolar infiltrates right upper and lower lobes with effusion. Recommendation: Follow up short term  Electronically Signed by Cristine Brunner MD at 20-Jun-2022 12:43:34 PM             XR CHEST PORTABLE    Result Date: 6/4/2022  NO PRIOR REPORT AVAILABLE Exam: X-RAY OF THE CHEST Clinical data: COPD, respiratory distress. Technique: Single view of the chest. Prior studies: Radiograph of the chest dated 04/20/2022. Findings:  COPD. Interval worsening bilateral lower lobe infiltrates. Enlarging small bilateral  pleural effusions. Cardiac silhouette is within normal limits. No acute osseous abnormality is detected. No other change. COPD. Interval worsening bilateral lower lobe infiltrates. Enlarging small bilateral  pleural effusions. Recommendation: Follow up as clinically indicated. Electronically Signed by Daisy Chávez MD at 04-Jun-2022 04:59:51 PM                 ASSESSMENT:  #Normocytic anemia-  -Iron profile compatible with anemia of chronic disease  -Probable GI blood loss: Patient has on Plavix/aspirin which makes her high risk for GI bleed  -Normal TSH  -Haptoglobin: 86, LDH: 303 ()  -Patient has been anemic for a long time.  Hemoglobin in the range of 7-there for the last 5 years  -Transfused 2 units PRBCs during  admission last with hemoglobin trended down again.  -Patient on aspirin and Plavix which increases risk for GI bleed  -Patients with heart failure and chronic anemia and may benefit from IV iron infusion  -S/p Venofer 500 mg IV x2 doses  -Intolerant to oral iron replacement due to GI upset  -Patient could not undergo EGD/colonoscopy at this time. Please arrange some sort of GI follow-up as outpatient  -Hemoglobin 8.8/MCV 98.7 on 6/145/2022     PLAN:  Continue current supportive care  Continue to monitor hemoglobin  Occult blood stools    I have seen, examined and reviewed this patient medication list, appropriate labs and imaging studies. I reviewed relevant medical records and others physicians notes. I discussed the plans of care with the patient. I answered all the questions to the patients satisfaction. I have also reviewed the chief complaint (CC) and part of the history (History of Present Illness (HPI), Past Family Social History [facility]), or Review of Systems (ROS) and made changes when appropriated.        (Please note that portions of this note were completed with a voice recognition program. Efforts were made to edit the dictations but occasionally words are mis-transcribed.)    Paty Ortiz MD    06/21/22  5:42 PM

## 2022-06-21 NOTE — PROGRESS NOTES
[facility]      [facility] Note    Patient:  Dawson Salinas  YOB: 1945  Date of Service: 6/21/2022  MRN: [id number]   Acct: [de-identified]   Primary Care Physician: Kalyani Black MD  Advance Directive: Full Code  Admit Date: 6/20/2022       Hospital Day: 1    Portions of this note have been copied forward, however, updated to reflect the most current clinical status of this patient. CHIEF COMPLAINT shortness of breath    SUBJECTIVE: Sitting up in bed struggling at present. Respiratory to switch her over to high flow nasal cannula. Patient very anxious    CUMULATIVE HOSPITAL COURSE:     Dawson Salinas is an 68 y.o. female with past medical history of aortic stenosis, CHF, ASCVD, COPD, hyperlipidemia, hypertension, history of MI, mitral valve stenosis, and arthritis. Patient was recently discharged on  with complaint of dyspnea. Patient states she did well for the first few days after discharge at home. Then her daughter reports she has gained about a pound a day since fourth day post discharge. Has become increasingly short of breath her pulse ox dropping to as much as in the 50s at home. Confirmed by EMS. Patient denies chest pain , significantly more short of breath. ER evaluation shows progressive infiltrate and effusion left lung base with new alveolar infiltrates in the right upper and lower lobes with effusion. Chemistry BUN 29 creatinine 1 glucose 150 BNP 18,884 white count is 15.9 hemoglobin 7.5 hematocrit 25.9 platelets 090  PT 8.19 PCO2 43 PO2 30 bicarb 33. Be admitted to hospitalist services with cardiology consult she is an established Dr. Bogdan Singer patient. Been in contact with the office and she was instructed to come yesterday but wanted to wait. Cardiology has seen the patient and feel that medical management is her only option. Palliative care consult called.   Last admission she did very well with Atarax for her anxiety and I will add that back  Review of Systems   Constitutional: Negative. HENT: Negative. Eyes: Negative. Respiratory: Positive for shortness of breath. Cardiovascular: Negative. Gastrointestinal: Negative. Endocrine: Negative. Musculoskeletal: Negative. Allergic/Immunologic: Negative. Neurological: Negative. Hematological: Negative. Psychiatric/Behavioral: Negative. Anxious        Objective:   VITALS:  BP (!) 118/55   Pulse 97   Temp 99 °F (37.2 °C) (Temporal)   Resp 24   Ht 5' 3\" (1.6 m)   Wt 108 lb 4.8 oz (49.1 kg)   SpO2 90%   BMI 19.18 kg/m²   24HR INTAKE/OUTPUT:    Intake/Output Summary (Last 24 hours) at 6/21/2022 1319  Last data filed at 6/21/2022 1240  Gross per 24 hour   Intake 480 ml   Output 250 ml   Net 230 ml           Physical Exam  Vitals and nursing note reviewed. HENT:      Head: Normocephalic. Nose: Nose normal.      Mouth/Throat:      Mouth: Mucous membranes are dry. Eyes:      Extraocular Movements: Extraocular movements intact. Neck:      Comments: JVD  Cardiovascular:      Rate and Rhythm: Tachycardia present. Heart sounds: Murmur heard. Pulmonary:      Breath sounds: Wheezing and rales present. Abdominal:      General: Bowel sounds are normal.      Palpations: Abdomen is soft. Musculoskeletal:      Right lower leg: Edema present. Left lower leg: Edema present. Skin:     General: Skin is warm and dry. Coloration: Skin is pale. Neurological:      General: No focal deficit present. Mental Status: She is alert.    Psychiatric:      Comments: anxious            Medications:      sodium chloride        albuterol        sodium chloride flush  5-40 mL IntraVENous 2 times per day    enoxaparin  30 mg SubCUTAneous Daily    amLODIPine  5 mg Oral BID    Arformoterol Tartrate  15 mcg Nebulization BID    atorvastatin  40 mg Oral Nightly    budesonide  0.5 mg Nebulization BID    clopidogrel  75 mg Oral Daily    guaiFENesin  1,200 mg Oral BID  ipratropium-albuterol  3 mL Inhalation 4x daily    isosorbide mononitrate  30 mg Oral Daily    levothyroxine  25 mcg Oral Daily    metoprolol  50 mg Oral BID    pantoprazole  40 mg Oral BID AC    sildenafil  20 mg Oral TID    bumetanide  2 mg IntraVENous BID     hydrOXYzine HCl, sodium chloride flush, sodium chloride, ondansetron **OR** ondansetron, polyethylene glycol, acetaminophen **OR** acetaminophen, potassium chloride **OR** potassium alternative oral replacement **OR** potassium chloride, magnesium sulfate, albuterol  ADULT DIET; Regular; Low Sodium (2 gm)     Lab and other Data:     Recent Labs     06/20/22 1007 06/21/22  0157   WBC 15.9* 13.4*   HGB 7.5* 7.4*    318     Recent Labs     06/20/22 1007 06/21/22  0157    137   K 4.0 3.7   CL 98 97*   CO2 30* 28   BUN 29* 32*   CREATININE 1.0* 1.3*   GLUCOSE 150* 109     Recent Labs     06/20/22 1007   AST 33*   ALT 31   BILITOT 0.3   ALKPHOS 171*     Troponin T:   Recent Labs     06/20/22 2027 06/21/22 0157 06/21/22  0845   TROPONINI 0.12* 0.12* 0.08*     Pro-BNP: No results for input(s): BNP in the last 72 hours. INR: No results for input(s): INR in the last 72 hours. UA:  Recent Labs     06/20/22  1233   COLORU YELLOW   PHUR 7.0   WBCUA 2   RBCUA 3   BACTERIA NEGATIVE*   CLARITYU Clear   SPECGRAV 1.012   LEUKOCYTESUR Negative   UROBILINOGEN 0.2   BILIRUBINUR Negative   BLOODU Negative   GLUCOSEU 100*     A1C:   Recent Labs     06/20/22  1737   LABA1C 5.6     ABG:No results for input(s): PHART, AXL7DZU, PO2ART, SLP1ZQS, BEART, HGBAE, F6XZYTLE, CARBOXHGBART in the last 72 hours. RAD:   XR CHEST PORTABLE    Result Date: 6/20/2022  NO PRIOR REPORT AVAILABLE [facility] Clinical data:Short of air, edema. Technique:Single view of the chest. Prior studies: Radiograph of the chest dated 06/11/2022. Findings: The lungs are grossly clear; noevidence of acute infiltrate or pleural effusion.  Cardiac silhouette is within normal limits. No acute osseous abnormality is detected. There is progression of infiltrate and effusion left base and new alveolar infiltrates in the right upper and lower lobes. Right pleural effusion also suspected. Median sternotomy wires again noted. Progressive infiltrate and effusion left lung base New alveolar infiltrates right upper and lower lobes with effusion. Recommendation: Follow up short term  Electronically Signed by Silviano Gannon MD at 20-Jun-2022 12:43:34 PM                      Assessment/Plan     Principal Problem:    Hypoxemic respiratory failure, chronic ([facility])              O2 to keep pulse ox above 90              Diurese              Strict intake and output              ABGs for acute hypoxic episodes  Active Problems:    Mitral valve insufficiency              Noted    Acute on chronic diastolic heart failure (HCC)-end stage              Lasix 60 IV given in ER              strict intake and output              Serial labs              Telemetry              Continue sildenafil              Daily weight   Pallative care-goals of care and code status    Anemia of chronic disease              Infuse if hemoglobin less than 7  COPD              Continue home neb treatments              Supplemental O2 to keep SPO2 between 88 and 92%  Diabetes              Accu-Cheks before meals and at bedtime              Hemoglobin A1C-5.6              Is not on home medication for diabetes              Recently on steroids  Hypothyroid             Continue Synthroid as at home  Hypertension               Continue home medication              Monitor for need to adjust  Anxiety   Atarax TID prn     Resolved Problems:  . * No resolved hospital problems. *      DVT Prophylaxis: Lovenox    Discharge planning: TBD      Further Orders per Clinical course/attending.      Electronically signed by BEBO Kaur CNP on 6/21/2022 at 1:19 PM       EMR Dragon/Transcription disclaimer:   Much of this encounter note is an electronic transcription/translation of spoken language to printed text.  The electronic translation of spoken language may permit erroneous, or at times, nonsensical words or phrases to be inadvertently transcribed; although attempts have made to review the note for such errors, some may still exist.

## 2022-06-21 NOTE — ACP (ADVANCE CARE PLANNING)
Advance Care Planning     Advance Care Planning (ACP) Physician/NP/PA (Provider) Conversation      Date of ACP Conversation: 6/21/2022    Conversation Conducted with: Competent patient and family at [facility]:    Current Designated Health Care Decision Maker:    Primary Decision Maker: Tawanna Grey - Child - [phone]    Secondary Decision Maker: Darling Sampson Spouse - [phone]    Care Preferences:    Ventilation: \"If you were in your present state of health and suddenly became very ill and were unable to breathe on your own, what would your preference be about the use of a ventilator (breathing machine) if it was available to you? \"  YES    \"If your health worsens and it becomes clear that your chance of recovery is unlikely, what would your preference be about the use of a ventilator (breathing machine) if it was available to you? \"   NO    Resuscitation:  \"CPR works best to restart the heart when there is a sudden event, like a heart attack, in someone who is otherwise healthy. Unfortunately, CPR does not typically restart the heart for people who have serious health conditions or who are very sick. \"    \"In the event your heart stopped as a result of an underlying serious health condition, would you want attempts to be made to restart your heart (answer \"yes\" for attempt to resuscitate) or would you prefer a natural death (answer \"no\" for do not attempt to resuscitate)? \"   NO    Conversation Outcomes / Follow-Up Plan:   Patient confirms at this time she wishes to remain a full code in the event of cardiac/respiratory arrest.  She reports that she would not want prolonged life-sustaining measures if there appeared to be no chance of recovery or quality of life.     Length of Voluntary ACP Conversation in minutes:  13 minutes    Al Medina, APRN - CNP

## 2022-06-21 NOTE — CONSULTS
Palliative Care Consult Note    6/21/2022 12:07 PM  Subjective:  Admit Date: 6/20/2022  PCP: Dimitrios Valencia MD    Date of Service: 6/21/2022    Reason for Consultation:  Goals of Care, Code Status, Family Support     History Obtained From: EMR/Patient and their Family    History Of Present Illness: The patient is a 68 y.o. female with PMH COPD on continuous home O2 at 2L, CAD s/p CABG, HTN, HLD, CHF with diastolic dysfunction and pulmonary HTN, and multiple other comorbidities  who presented to [facility] ED 6/20/2022 complaining of worsening SOB and hypoxia. She is known to Palliative Care and was recently discharged on 6/14/22 following treatment of COPD exacerbation with possible pneumonia and acute on chronic anemia with GI bleed. Appears she was instructed to come to ED day before admission but pt wanted to wait. Daughter reported about 1 lb weight gain each day since being home from hospital. Patient reported O2 reading in 50s at home which was confirmed on initial arribal of EMS. She improved en route with NRB mask. ER evaluation shows progressive infiltrate and effusion left lung base with new alveolar infiltrates in the right upper and lower lobes with effusion. Chemistry BUN 29 creatinine 1 glucose 150 BNP 18,884 white count is 15.9 hemoglobin 7.5 hematocrit 25.9 platelets 035, PT 4.20, PCO2 43 PO2 30, and bicarb 33. She was admitted under hospitalist services and initiated on diuretics with cardiology evaluation. Palliative care is consulted for goals of care, code status discussion, family support, and symptom management.      Past Medical History:        Diagnosis Date    Aortic stenosis     Arthritis     Atherosclerosis of native arteries of the extremities with intermittent claudication 07/25/2011    Blood circulation, collateral     bilat stent lower extremities    CAD (coronary artery disease)     Carotid aneurysm, right (HCC)     Carotid artery occlusion     CHF (congestive heart failure) ([address]  COPD [facility]ronic obstructive pulmonary disease) [facility]     History of blood transfusion 2016    Post op, was taking blood thinner    Hyperlipidemia     Hypertension     MI (myocardial infarction) ([facility] 2009    x2    Mitral valve stenosis     Movement disorder     arthritis    Pneumonia     Post-menopausal     PUD (peptic ulcer disease) 2009    Renal artery stenosis ([address] 08/10/2021    s/p stenting to L    Renal failure 2009    after ulcer perforation sepsis.     Severe malnutrition ([facility]     Thyroid disease     Wound infection after surgery     right foot infection after cabg       Past Surgical History:        Procedure Laterality Date    ABDOMEN SURGERY  2009    perforated ulcer resection of 1/3 stomach    CARDIAC SURGERY      artificial valve and bypass    CAROTID ENDARTERECTOMY      Right  with Dacron patch angioplasty    CAROTID ENDARTERECTOMY Left     CAROTID ENDARTERECTOMY Right 2019    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 AND REPAIR WITH REVERSED LEFT GREATER SAPHENOUS VEIN INTERPOSITIONAL BYPASS GRAFT performed by Kelly Maravilla MD at [place] [address] Right early 's    long ago    CATARACT REMOVAL WITH IMPLANT Bilateral      SECTION  1972    COLONOSCOPY  2012    Dr Andrea Sensing:  HP    CORONARY ANGIOPLASTY WITH STENT PLACEMENT  08/10/2021    RM- RCA    CORONARY ANGIOPLASTY WITH STENT PLACEMENT      CORONARY ARTERY BYPASS GRAFT  2016    DIAGNOSTIC CARDIAC CATH LAB PROCEDURE      DILATION AND CURETTAGE OF UTERUS      ILIO-FEMORAL BYPASS GRAFT N/A 2016    OPEN TRANSLUMINAL BALLOON ANGIOPLASTY AND STENTING OF RIGHT COMMON AND EXTERNAL  ILIAC ARTERIES; RIGHT FEMORAL ENDARTERECTOMY WITH VEIN PATCH ANGIOPLASTY performed by Kelly Maravilla MD at [address] [place] [address] N/A 2016    MITRAL VALVE  REPLACEMENT LUONG-MAZE ABLATION WITH CRYO PROCEDURE, CORONARY ARTERY BYPASS GRAFT X 1 WITH ENDOSCOPIC VEIN HARVESTING WITH supper   Yes Historical Provider, MD   clopidogrel (PLAVIX) 75 MG tablet Take 75 mg by mouth daily    Historical Provider, MD   sildenafil (REVATIO) 20 MG tablet Take 1 tablet by mouth 3 times daily 6/14/22 7/14/22  Cuong Perrin MD   predniSONE (DELTASONE) 5 MG tablet Take 1 tablet by mouth daily for 10 days 6/15/22 6/25/22  Cuong Perrin MD   bumetanide (BUMEX) 2 MG tablet Take 1 tablet by mouth 2 times daily  Patient taking differently: Take 2 mg by mouth 2 times daily Dr. Emre Bundy instructed patient to take 3 mg once daily prior to admission and to end on 6/20/22. 6/14/22 7/14/22  Burnice Holter, MD   potassium chloride (KLOR-CON M) 20 MEQ extended release tablet Take 1 tablet by mouth 2 times daily 6/14/22 7/14/22  Cuong Perrin MD   pantoprazole (PROTONIX) 40 MG tablet Take 1 tablet by mouth 2 times daily (before meals) 6/14/22 7/14/22  Cuong Perrin MD   guaiFENesin (MUCINEX) 600 MG extended release tablet Take 2 tablets by mouth 2 times daily 4/20/22   BEBO Mccann - WIN   isosorbide mononitrate (IMDUR) 30 MG extended release tablet Take 60 mg by mouth in the morning and at bedtime     Historical Provider, MD   amLODIPine (NORVASC) 5 MG tablet Take 1 tablet by mouth 2 times daily 7/30/21   Yassine Cooley MD   metoprolol (LOPRESSOR) 100 MG tablet Take 0.5 tablets by mouth 2 times daily 50 mg in the AM & 50 mg in the PM 6/30/21   BEBO Recinos   albuterol (ACCUNEB) 1.25 MG/3ML nebulizer solution Inhale 1 ampule into the lungs every 6 hours as needed  1/22/21   Historical Provider, MD   budesonide (PULMICORT) 0.5 MG/2ML nebulizer suspension Inhale 0.5 mg into the lungs at bedtime  2/2/21   Historical Provider, MD   ipratropium-albuterol (DUONEB) 0.5-2.5 (3) MG/3ML SOLN nebulizer solution Inhale 3 mLs into the lungs every 4 hours While Awake 2/2/21   Historical Provider, MD   Probiotic Product (PROBIOTIC DAILY PO) Take 1 tablet by mouth daily    Historical Provider, MD   levothyroxine (SYNTHROID) 25 MCG tablet urgency / hematuria   Neuro: Denies paralysis / syncope / seizure / dysphagia / headache / paresthesias  Musculoskeletal:  Denies muscle weakness /joint stiffness / pain  Vascular: Denies edema / claudication / varicosities  Heme / endocrine: Denies easy bruising / bleeding / excessive sweating / heat or cold intolerance  Psychiatric:  Denies depression / insomnia / mood changes / +atarax  Skin:  Denies new rashes / lesions / skin hair or nail changes    14 point review of systems is negative except as specifically addressed above. Physical Examination:  BP (!) 118/55   Pulse 97   Temp 99 °F (37.2 °C) (Temporal)   Resp 24   Ht 5' 3\" (1.6 m)   Wt 108 lb 4.8 oz (49.1 kg)   SpO2 90%   BMI 19.18 kg/m²      General appearance: 69 yo female, chronically ill appearing, respiratory distress noted  Head: Normocephalic, without obvious abnormality, atraumatic, dry mucous membranes  Eyes: conjunctivae/corneas clear. PERRL, EOM's intact.    Ears: normal external ears and nose  Neck: no JVD, supple, symmetrical, trachea midline   Lungs: rales and expiratory wheeze noted to ausculation bilaterally, shallow inspiration, HF NC in place, accessory muscle use  Heart: tachycardic and regular rhythm, S1, S2 normal, murmur heard  Abdomen: soft, non-tender; non-distended, normal bowel sounds   Extremities: RLE 1+ and LLE 2+ edema,  No erythema, no tenderness to palpation  Skin: Warm, dry, pale  Neurologic: Alert and oriented X 3, generalized weakness and normal tone, no focal deficits  Psychiatric: Anxious, cooperative    Diagnostic Data:  CBC:  Recent Labs     06/20/22  1007 06/21/22  0157   WBC 15.9* 13.4*   HGB 7.5* 7.4*   HCT 25.9* 25.2*    318     BMP:  Recent Labs     06/20/22  1007 06/21/22  0157    137   K 4.0 3.7   CL 98 97*   CO2 30* 28   BUN 29* 32*   CREATININE 1.0* 1.3*   CALCIUM 8.1* 8.1*     Recent Labs     06/20/22  1007   AST 33*   ALT 31   BILITOT 0.3   ALKPHOS 171*     XR CHEST PORTABLE  Result Date: 6/20/2022  Progressive infiltrate and effusion left lung base New alveolar infiltrates right upper and lower lobes with effusion. Recommendation: Follow up short term  Electronically Signed by Mc Benjamin MD at 20-Jun-2022 12:43:34 PM             Palliative Performance Scale:  [x] 40% Mainly in bed  Extensive disease  Mainly assistance  Normal/reduced intake  LOC full/confusion    Palliative Review of Advance Directives:     Surrogate Decision Maker: Param Suarezxena, daughter/HCS    Durable Power of : No    Advanced Directives/Living Aguiar: Yes, copy on file     Out of hospital medical orders in place to reflect resuscitation status (MOLST/POLST): No    Information Sharing:  Patient's awareness of illness:  [] Terminal [x] Life-Threatening [] Serious [] Non life-threatening [] Not serious   [] Not discussed    Family awareness of illness:   [] Terminal [x] Life-Threatening [] Serious [] Nonlife-threatening [] Not serious   [] Not discussed    Assessment/Plan:  Principal Problem:    Hypoxemic respiratory failure, chronic ([address]  Active Problems:    Mitral valve insufficiency    Acute on chronic diastolic heart failure (HCC)    Anemia of chronic disease    Palliative care patient    Solitary kidney, acquired    Atherosclerotic PATRICK (renal artery stenosis), unilateral ([address]    Coronary artery disease involving native coronary artery of native heart    Pulmonary hypertension    Diastolic dysfunction    CHF (congestive heart failure) ([address]  Resolved Problems:    * No resolved hospital problems. *       Visit Summary:  Chart reviewed, patient discussed with nursing staff. Reviewed health issues, work up and treatment plan as well as factors that lead to hospitalization. Ms. Isaias Kenny is seen at bedside with her daughter and various other family members present. She endorses continued dyspnea with HF O2 and plans for BiPAP placement per respiratory this afternoon.   We discussed her current status and work-up in ED as well as cardiology recommendation for medical management as they cannot offer invasive treatment at this point. I explained diuresis ordered for her and addition of Atarax to assist with anxiety due to respiratory distress. We had a long discussion regarding patient's chronic illnesses and likelihood of needing intubation if her respiratory status continues to decline. At this point patient confirms she wishes to remain a full code and attempt intubation if needed but wishes it to be last resort. Patient/family and I discussed the potential for her to do poorly on ventilator in the setting of her chronic comorbidities and they verbalized understanding. They remain hopeful that patient will improve. I encouraged use of BiPAP this evening with little interruptions and focusing on breathing/relaxation. Opportunity for questions and emotional support provided. Will continue to follow. Recommendations:     1. Palliative Care- GOC continue all medical treatments and monitor for improvement. Agreeable to intubation if needed but as last resort. Will have continued goals of care conversations with pt/family if no improvement or further decline in condition. Code status- FULL CODE  2. Acute on chronic hypoxemic respiratory failure- mgmt per hospitalist. Diuresis with lasix. O2 support, wean as tolerated. Strict I/Os  3. Acute on chronic diastolic heart failure with mitral valve insufficiency and severe pulmonary HTN- Cardiology following and recommending medical management. Continue sildenafil and diuresis  4. COPD- continue home neb treatments. Supportive care  5. Anemia of chronic disease with suspected GI bleed- positive OBS last stay, not candidate for scoping at that time pr GI. Hgb stable currently. Transfusional support if <7  6.  Anxiety- atarax TID prn per hospitalist    Thank you for consulting palliative care and allowing us to participate in the care of the patient.     CounselingTopics: Goals of care, Code Status, Disease process education, pt/family support  Time Spent Counseling > 50%:  YES                                   Total Time Spent with patient/family counseling, workup/treatment review, counseling and placement of orders/preparation of this note: 77 minutes    Electronically signed by BEBO Rios CNP on 6/21/2022 at 12:07 PM    (Please note that portions of this note were completed with a voice recognition program.  Efforts were made to edit the dictations but occasionally words are mis-transcribed.)

## 2022-06-21 NOTE — CONSULTS
Atherosclerosis of native arteries of right leg with ulceration of other part of lower right leg 06/05/2016     Priority: Low     Overview Note:     Replacing Inactive Diagnoses      Atherosclerosis of native arteries of right leg with ulceration of other part of foot 06/05/2016     Priority: Low     Overview Note:     Replacing Inactive Diagnoses      Nonhealing ulcer of right lower leg with fat layer exposed ([address] 06/05/2016     Priority: Low    Non-pressure chronic ulcer of right ankle with fat layer exposed ([address] 06/05/2016     Priority: Low    Neuropathic ulcer of right foot with fat layer exposed ([address] 06/05/2016     Priority: Low    Hypervolemia      Priority: Low    Nonhealing nonsurgical wound with fat layer exposed 06/03/2016     Priority: Low    Atherosclerosis of native arteries of left leg with ulceration of calf (HCC)      Priority: Low    Severe malnutrition (HCC)      Priority: Low    Diastolic dysfunction      Priority: Low    Anemia in chronic kidney disease (CKD)      Priority: Low    Non-pressure chronic ulcer of right calf with fat layer exposed ([address] 05/27/2016     Priority: Low    Decubitus ulcer of right buttock, stage 3 ([address] 05/27/2016     Priority: Low    Nocturnal hypoxia 03/30/2016     Priority: Low     Overview Note:     With associated mitral stenosis / regurgitation and pulmonary hypertension      Pulmonary hypertension 03/29/2016     Priority: Low    Leukocytosis      Priority: Low    Calculus of gallbladder without cholecystitis without obstruction      Priority: Low    Carotid artery stenosis 02/08/2012     Priority: Low    Atherosclerosis of native artery of extremity with intermittent claudication ([facility] 07/25/2011     Priority: Low    Atherosclerosis of native artery of extremity with intermittent claudication ([facility] 07/25/2011     Priority: Low     Overview Note:     Replacing Inactive Diagnoses      Mixed hyperlipidemia      Priority: Low    Arthritis Priority: Low    Coronary artery disease involving native coronary artery of native heart      Priority: Low     Overview Note:     3/16/2016  Echo  Severe MS, moderate to severe MR, RVSP 73 mmHg, normal LVFX  3/31/2016  Cath  70% osteal RCA, severe MR, MVA 1.9, normal LVFX  4/7//2016   MVR (23 mm [employer] Mosaic) VG-PDAIrven Grumbles)  5/7/2016  Echo  Normal LVFX, large pleural effusion, echolucency near preserved posterior Mitral leaflet, likely chordae, RVSP 72 mmHg       1.  Coronary artery disease status post CABG with one-vessel bypass, SVG to RCA 2016 with bioprosthetic mitral valve replacement/maze, severely stenotic SVG to RCA by catheterization 11/15/2019 with PCI to proximal to mid RCA and circumflex, repeat PCI 7/30/2020 ostial RCA (3.5 x 9 mm resolute), with restenosis and repeat PCI 8/10/2021 (4.0 x 12 mm), residual ostial circumflex 60% stenosis, normal LV ejection fraction, mild aortic stenosis, severe pulmonary hypertension. 2.  Chronic kidney disease with occluded right renal artery, solitary left kidney with severely stenotic left renal artery status post endovascular intervention 7/30/2020 (5.0 x 22 mm resolute Roverto stent), repeat PCI for restenosis 8/10/2021 (5.0 x 12 mm resolute [place]). 3.  Severe hypertension. 4.  COPD. 5.  Peripheral arterial disease with prior iliac stents, bilateral carotid endarterectomy, possible bilateral subclavian artery stenosis with differential blood pressures and bruits. 6.  Severe anemia with transfusion requirements and severe hypoalbuminemia. PRESENTATION: Rafael Matthews is a 68y.o. year old female who presents with increasing shortness of breath and leg swelling that has been ongoing with recent admission 6/4/2022 managed with diuresis, associated with severe anemia requiring transfusions. No chest pain reported. Rising proBNP at 18,884. Previously 11,935. Was [id number] 4/2022. Hemoglobin 7.5. Albumin 2.3.   EKG with sinus tach, septal and high lateral Q waves with LVH which is unchanged. REVIEW OF SYSTEMS:  Review of Systems   Constitutional: Negative for activity change, fatigue and fever. HENT: Negative for ear pain, hearing loss and tinnitus. Eyes: Negative for discharge and visual disturbance. Respiratory: Positive for shortness of breath. Negative for cough and wheezing. Cardiovascular: Positive for leg swelling. Negative for chest pain and palpitations. Gastrointestinal: Negative for abdominal distention, blood in stool, constipation, diarrhea and vomiting. Endocrine: Negative for cold intolerance, heat intolerance, polydipsia and polyuria. Genitourinary: Negative for dysuria and hematuria. Musculoskeletal: Negative for arthralgias, back pain and myalgias. Skin: Negative for pallor and rash. Neurological: Negative for seizures, syncope, weakness and headaches. Psychiatric/Behavioral: Negative for behavioral problems and dysphoric mood. Past Medical History:      Diagnosis Date    Aortic stenosis     Arthritis     Atherosclerosis of native arteries of the extremities with intermittent claudication 07/25/2011    Blood circulation, collateral     bilat stent lower extremities    CAD (coronary artery disease)     [facility]ot[facility]     Carotid artery occlusion     CHF (congestive heart failure) [facility]     COPD (chronic obstructive pulmonary disease) ([address]     History of blood transfusion 2016    Post op, was taking blood thinner    Hyperlipidemia     Hypertension     MI (myocardial infarction) ([address] 2009    x2    Mitral valve stenosis     Movement disorder     arthritis    Pneumonia     Post-menopausal     PUD (peptic ulcer disease) 2009    Renal artery stenosis ([address] 08/10/2021    s/p stenting to L    Renal failure 2009    after ulcer perforation sepsis.    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 AND REPAIR WITH REVERSED LEFT GREATER SAPHENOUS VEIN INTERPOSITIONAL BYPASS GRAFT performed by Mervin Mccain MD at [address] [facility] Right early 's    long ago    CATARACT REMOVAL WITH IMPLANT Bilateral      SECTION  1972    COLONOSCOPY  2012    Dr Jeremy Cho:  HP    CORONARY ANGIOPLASTY WITH STENT PLACEMENT  08/10/2021    RM- RCA    CORONARY ANGIOPLASTY WITH STENT PLACEMENT      CORONARY ARTERY BYPASS GRAFT  2016    DIAGNOSTIC CARDIAC CATH LAB PROCEDURE      DILATION AND CURETTAGE OF UTERUS      ILIO-FEMORAL BYPASS GRAFT N/A 2016    OPEN TRANSLUMINAL BALLOON ANGIOPLASTY AND STENTING OF RIGHT COMMON AND EXTERNAL  ILIAC ARTERIES; RIGHT FEMORAL ENDARTERECTOMY WITH VEIN PATCH ANGIOPLASTY performed by Mervin Mccain MD at [address] N/A 2016    MITRAL VALVE  REPLACEMENT LUONG-MAZE ABLATION WITH CRYO PROCEDURE, CORONARY ARTERY BYPASS GRAFT X 1 WITH ENDOSCOPIC VEIN HARVESTING WITH PERFUSION TRANSESOPHAGEAL ECHOCARDIOGRAM performed by Mathew Abrams MD at [facility]  2016    PERIPHERAL PERCUTANEOUS ARTERIAL INTERVENTION Left 08/10/2021    RM to L renal artery    VT REOPER, CAROTID ENDARTEC>1 MON Left 2018    REMOVAL OF HEMATOMA, LEFT CAROTID ARTERY performed by Mervin Mccain MD at [address] Left 2018    LEFT CAROTID ENDARTERECTOMY WITH EEG MONITORING AND COMPLETION DUPLEX ULTRASOUND performed by Mervin Mccain MD at [address]      SKIN GRAFT Right 2016    Skin graft split thickness foot,ankle,and leg. Right leg 26x8cm and 12x6cm total area 280cm squared. TJR    UPPER GASTROINTESTINAL ENDOSCOPY  2015    Dr Audi Tamayo: normal    UPPER GASTROINTESTINAL ENDOSCOPY  03/15/2016    Dr Betsy Pena: normal    UPPER GASTROINTESTINAL ENDOSCOPY  2015    Dr Audi Tamayo:  paul neg, multiple gastric antial and duodenal ulcers    VASCULAR SURGERY  16 TJR    Aortagram and right leg runoff,right leg runoff,right common iliac artery selection for right leg run off views.  VASCULAR SURGERY      . Open transluminal angioplasty and stenting of the external iliac artery. TJR    VASCULAR SURGERY  2019    TJR. 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        Allergies:  Ativan [lorazepam], Levaquin [levofloxacin in d5w], Xarelto [rivaroxaban], and Morphine    Past Social History:  Social History     Socioeconomic History    Marital status:      Spouse name: Not on file    Number of children: Not on file    Years of education: Not on file    Highest education level: Not on file   Occupational History    Not on file   Tobacco Use    Smoking status: Former Smoker     Years: 2.00     Types: Cigarettes     Quit date: 1980     Years since quittin.4    Smokeless tobacco: Never Used   Vaping Use    Vaping Use: Never used   Substance and Sexual Activity    Alcohol use: Yes     Comment: rarely maybe twice a year    Drug use: No    Sexual activity: Not Currently     Partners: Male   Other Topics Concern    Not on file   Social History Narrative    Not on file     Social Determinants of Health     Financial Resource Strain:     Difficulty of Paying Living Expenses: Not on file   Food Insecurity:     Worried About [address] in the Last Year: Not on file    [address] in the Last Year: Not on file   Transportation Needs:     Lack of Transportation (Medical): Not on file    Lack of Transportation (Non-Medical): Not on file   Physical Activity:     Days of Exercise per Week: Not on file    Minutes of Exercise per Session: Not on file   Stress:     Feeling of Stress : Not on file   Social Connections:     Frequency of Communication with Friends and Family: Not on file    Frequency of Social Gatherings with Friends and Family: Not on file    Attends Gnosticist Services: Not on file    Active Member of Clubs or Organizations: Not on file    Attends Club or Organization Meetings: Not on file    Marital Status: Not on file   Intimate Partner Violence:     Fear of Current or Ex-Partner: Not on file    Emotionally Abused: Not on file    Physically Abused: Not on file    Sexually Abused: Not on file   Housing Stability:     Unable to Pay for Housing in the Last Year: Not on file    Number of [place] in the Last Year: Not on file    Unstable Housing in the Last Year: Not on file       Family History:       Problem Relation Age of Onset    Diabetes Mother     Heart Disease Mother     Heart Failure Mother     Diabetes Sister     Heart Disease Sister     High Blood Pressure Sister     Colon Cancer Sister     Liver Disease Sister     Cirrhosis Sister     Colon Cancer Maternal Aunt     Colon Polyps Neg Hx     Esophageal Cancer Neg Hx        Home Meds:  Prior to Admission medications    Medication Sig Start Date End Date Taking? Authorizing Provider   Multiple Vitamins-Minerals (THERAPEUTIC MULTIVITAMIN-MINERALS) tablet Take 1 tablet by mouth daily   Yes Historical Provider, MD   HYDROcodone-acetaminophen (NORCO) 5-325 MG per tablet Take 1 tablet by mouth every 6 hours as needed for Pain.    Yes Historical Provider, MD   Calcium Polycarbophil (FIBER-CAPS PO) Take 1 capsule by mouth Daily with supper   Yes Historical Provider, MD   clopidogrel (PLAVIX) 75 MG tablet Take 75 mg by mouth daily    Historical Provider, MD   sildenafil (REVATIO) 20 MG tablet Take 1 tablet by mouth 3 times daily 6/14/22 7/14/22  Cuong Perrin MD   predniSONE (DELTASONE) 5 MG tablet Take 1 tablet by mouth daily for 10 days 6/15/22 6/25/22  Cuong Perrin MD   bumetanide (BUMEX) 2 MG tablet Take 1 tablet by mouth 2 times daily  Patient taking differently: Take 2 mg by mouth 2 times daily Dr. Amelia Tran instructed patient to take 3 mg once daily prior to admission and to end on 6/20/22. 6/14/22 7/14/22  Gabriele De La Cruz MD   potassium chloride (KLOR-CON M) 20 MEQ extended release tablet Take 1 tablet by mouth 2 times daily 6/14/22 7/14/22  Cuong Perrin MD   pantoprazole (PROTONIX) 40 MG tablet Take 1 tablet by mouth 2 times daily (before meals) 6/14/22 7/14/22  Cuong Perrin MD   guaiFENesin (MUCINEX) 600 MG extended release tablet Take 2 tablets by mouth 2 times daily 4/20/22   Dionicio Krabbe, APRN - WIN   isosorbide mononitrate (IMDUR) 30 MG extended release tablet Take 60 mg by mouth in the morning and at bedtime     Historical Provider, MD   amLODIPine (NORVASC) 5 MG tablet Take 1 tablet by mouth 2 times daily 7/30/21   Carly Eli MD   metoprolol (LOPRESSOR) 100 MG tablet Take 0.5 tablets by mouth 2 times daily 50 mg in the AM & 50 mg in the PM 6/30/21   BEBO Humphrey   albuterol (ACCUNEB) 1.25 MG/3ML nebulizer solution Inhale 1 ampule into the lungs every 6 hours as needed  1/22/21   Historical Provider, MD   budesonide (PULMICORT) 0.5 MG/2ML nebulizer suspension Inhale 0.5 mg into the lungs at bedtime  2/2/21   Historical Provider, MD   ipratropium-albuterol (DUONEB) 0.5-2.5 (3) MG/3ML SOLN nebulizer solution Inhale 3 mLs into the lungs every 4 hours While Awake 2/2/21   Historical Provider, MD   Probiotic Product (PROBIOTIC DAILY PO) Take 1 tablet by mouth daily    Historical Provider, MD   levothyroxine (SYNTHROID) 25 MCG tablet Take 25 mcg by mouth Daily  11/7/19   Historical Provider, MD   Fexofenadine HCl [facility] ALLERGY PO) Take 1 tablet by mouth 2 times daily     Historical Provider, MD   Arformoterol Tartrate (BROVANA) 15 MCG/2ML NEBU Inhale 15 mcg into the lungs 2 times daily  1/2/20   Historical Provider, MD   OXYGEN Inhale 2 L into the lungs daily     Historical Provider, MD   atorvastatin (LIPITOR) 40 MG tablet Take 1 tablet by mouth daily  Patient taking differently: Take 40 mg by mouth nightly PT TAKES AT NIGHT. 11/20/19   Carly Eli MD   Biotin 5000 MCG TABS Take 1 tablet by mouth daily     Historical Provider, MD       Current Meds:   sodium chloride flush  5-40 mL IntraVENous 2 times per day    enoxaparin  30 mg SubCUTAneous Daily    amLODIPine  5 mg Oral BID    Arformoterol Tartrate  15 mcg Nebulization BID    atorvastatin  40 mg Oral Nightly    budesonide  0.5 mg Nebulization BID    clopidogrel  75 mg Oral Daily    guaiFENesin  1,200 mg Oral BID    ipratropium-albuterol  3 mL Inhalation 4x daily    isosorbide mononitrate  30 mg Oral Daily    levothyroxine  25 mcg Oral Daily    metoprolol  50 mg Oral BID    pantoprazole  40 mg Oral BID AC    sildenafil  20 mg Oral TID    [START ON 6/21/2022] bumetanide  2 mg IntraVENous BID       Current Infused Meds:   sodium chloride         Physical Exam:  Vitals:    06/20/22 1942   BP: (!) 103/58   Pulse: (!) 109   Resp: 22   Temp: 98.5 °F (36.9 °C)   SpO2: 90%       Intake/Output Summary (Last 24 hours) at 6/20/2022 1952  Last data filed at 6/20/2022 1806  Gross per 24 hour   Intake 240 ml   Output --   Net 240 ml     Estimated body mass index is 18.97 kg/m² as calculated from the following:    Height as of this encounter: 5' 3\" (1.6 m). Weight as of this encounter: 107 lb 1.6 oz (48.6 kg). Physical Exam  Constitutional:       General: She is not in acute distress. Appearance: She is not diaphoretic. Comments: Appears emaciated   HENT:      Mouth/Throat:      Pharynx: No oropharyngeal exudate.    Eyes:      General: No scleral icterus. Right eye: No discharge. Left eye: No discharge. Neck:      Thyroid: No thyromegaly. Vascular: No JVD. Cardiovascular:      Rate and Rhythm: Normal rate and regular rhythm. No extrasystoles are present. Heart sounds: Normal heart sounds, S1 normal and S2 normal. No murmur heard. No systolic murmur is present. No diastolic murmur is present. No friction rub. No gallop. No S3 or S4 sounds. Comments: Jugular venous distention with JVP around 10 cm  2+ pitting edema  Systolic murmur appreciated in the mitral and aortic area. Pulmonary:      Effort: Pulmonary effort is normal. No respiratory distress. Breath sounds: Rales present. No wheezing. Comments: Diminished air entry bilaterally with crepitations  Chest:      Chest wall: No tenderness. Abdominal:      General: Bowel sounds are normal. There is no distension. Palpations: Abdomen is soft. There is no mass. Tenderness: There is no abdominal tenderness. There is no guarding or rebound. Hernia: No hernia is present. Comments: Soft, nontender  Midline bruit noted   Musculoskeletal:         General: Normal range of motion. Skin:     General: Skin is warm. Coloration: Skin is not pale. Findings: No rash. Neurological:      Mental Status: She is alert and oriented to person, place, and time. Cranial Nerves: No cranial nerve deficit. Deep Tendon Reflexes: Reflexes normal.           Labs:  Recent Labs     06/20/22  1007   WBC 15.9*   HGB 7.5*          Recent Labs     06/20/22  1007      K 4.0   CL 98   CO2 30*   BUN 29*   CREATININE 1.0*   LABGLOM 54*   CALCIUM 8.1*       CK, CKMB, Troponin: @LABRCNT (CKTOTAL:3, CKMB:3, TROPONINI:3)@    Last 3 BNP:          IMAGING:  CT ABDOMEN PELVIS WO CONTRAST Additional Contrast? None    Result Date: 6/14/2022  NO PRIOR REPORT AVAILABLE Exam: CT OF THE ABDOMEN/PELVIS WITHOUT CONTRAST Clinical data: Abdominal pain. Technique: Axial CT images were acquired through the abdomen and pelvis without contrast using soft tissue and bone algorithms. Reformatted/MPR images were performed. Radiation dose: CTDIvol =16.41 mGy, DLP =701 mGy x cm. Limitations: Lack of intravenous contrast limits evaluation of solid viscera. Lack of oral contrast limits evaluation of the bowel loops. Prior Studies: Renal arterial ultrasound dated 7/30/2020 images. Findings: Lung bases:Trace pleural effusion is noted bilaterally. Mild subsegmental atelectasis is noted in the included bilateral lungs. Liver:Unremarkable size andcontour. Normal density. No evidence of mass. No evidence of dilated ducts. Gallbladder Fossa: Cholelithiasis without evidence of acute cholecystitis. Spleen: Grossly unremarkable. Pancreas/adrenal glands: Grossly unremarkable size, contour and density. Kidneys: In anatomic position. There is mild atrophy of the right kidney and compensatory hypertrophy of the left kidney. Bilateral non-obstructing renal calculi, measuring approximately 2 mm in the upper pole of right kidney, 1 mm and 5 mm in the upper pole of left kidney. No ureteral calculi. No evidence of a renal mass or cyst. Perinephric space is unremarkable. Retroperitoneum: No enlarged retroperitoneal lymphadenopathy. The IVC appears unremarkable. Severe calcific atherosclerotic changes noted in the aorta and its branches. Peritoneal cavity: No evidence of free air. Trace ascites is noted. Gastrointestinal tract: Small inguinal hernia is noted on right, it contains portion of small bowel without evidence of obstruction. Tiny fat containing inguinal hernia is noted on left. Tiny hiatal hernia is noted. Changes of mild constipation. There are twisted mesenteric vessels in the right lower quadrant without evidence of obstruction. Large amount of stool and gas in the colon. Appendix is not visualized. Pelvis: Solid and hollow viscera grossly unremarkable.  Osseous structures: No acute or destructive bony process identified. Mild scoliosis. Degenerative changes noted in the spine and pelvis    1. Small inguinal hernia is noted on right, it contains portion of small bowel without evidence of obstruction. Tiny fat containing inguinal hernia is noted on left. 2. Tiny hiatal hernia is noted. 3. There are twisted mesenteric vessels in the right lower quadrant without evidence of obstruction. Changes of mild constipation. 4. Trace pleural effusion is noted bilaterally. Mild subsegmental atelectasis is noted in the included bilateral lungs. 5. Cholelithiasis without evidence of acute cholecystitis. 6. There is mild atrophy of the right kidney and compensatory hypertrophy of the left kidney. Bilateral non-obstructing renal calculi. 7. Generalized anasarca. Trace ascites is noted. 8. Moderately large amount of stool in the colon, please correlate with constipation. Recommendation: Follow up as clinically indicated. All CT scans at this facility utilize dose modulation, iterative reconstruction, and/or weight based dosing when appropriate to reduce radiation dose to as low as reasonably achievable. Electronically Signed by Neville Roach MD at 14-Jun-2022 01:21:42 AM             XR CHEST (2 VW)    Result Date: 6/12/2022  NO PRIOR REPORT AVAILABLE Exam: X-RAYS OF THE CHEST Clinical data:COPD/CHF/pneumonia. Technique: PA and lateral views of the chest. Prior studies: Radiograph of the chest dated 06/04/2022. NM lung scan dated 04/15/2022. Findings:Hyperinflated emphysematous lungs. Bilateral pleural effusions. Slightly enlarged cardiac silhouette. Hyperinflated emphysematous lungs. Bilateral pleural effusions. Slightly enlarged cardiac silhouette. Recommendation: Follow up as clinically indicated.   Electronically Signed by Jg Lowery MD at 12-Jun-2022 10:47:39 PM             XR CHEST PORTABLE    Result Date: 6/20/2022  NO PRIOR REPORT AVAILABLE Exam: X-RAY OF [facility] hospital Clinical data:Short of air, edema. Technique:Single view of the chest. Prior studies: Radiograph of the chest dated 06/11/2022. Findings: The lungs are grossly clear; noevidence of acute infiltrate or pleural effusion. Cardiac silhouette is within normal limits. No acute osseous abnormality is detected. There is progression of infiltrate and effusion left base and new alveolar infiltrates in the right upper and lower lobes. Right pleural effusion also suspected. Median sternotomy wires again noted. Progressive infiltrate and effusion left lung base New alveolar infiltrates right upper and lower lobes with effusion. Recommendation: Follow up short term  Electronically Signed by Ilsa Camejo MD at 20-Jun-2022 12:43:34 PM             XR CHEST PORTABLE    Result Date: 6/4/2022  NO PRIOR REPORT AVAILABLE Exam: X-RAY OF THE CHEST Clinical data: COPD, respiratory distress. Technique: Single view of the chest. Prior studies: Radiograph of the chest dated 04/20/2022. Findings:  COPD. Interval worsening bilateral lower lobe infiltrates. Enlarging small bilateral  pleural effusions. Cardiac silhouette is within normal limits. No acute osseous abnormality is detected. No other change. COPD. Interval worsening bilateral lower lobe infiltrates. Enlarging small bilateral  pleural effusions. Recommendation: Follow up as clinically indicated. Electronically Signed by Jaspal Muhammad MD at 04-Jun-2022 04:59:51 PM                   Assessment and Plan:     This is a 68y.o. year old female with past medical history of extensive vasculopathy, coronary artery disease with prior one-vessel CABG 2016 with SVG to RCA (severely diseased), bioprosthetic mitral valve replacement with maze procedure, PCI to RCA and circumflex 11/15/2019, repeat PCI to ostial RCA 7/30/2020 with restenosis and repeat PCI 8/10/2021, chronic kidney disease stage III with solitary left kidney with severe left renal artery stenosis, status post endovascular intervention 7/30/2020 with restenosis and repeat intervention 8/10/2021, normal LV ejection fraction, severe renovascular hypertension, COPD, peripheral arterial disease with prior bilateral carotid endarterectomy, iliac stents and possible subclavian stenosis admitted with recurrent acute on chronic diastolic heart failure presentation. 1.  High likelihood of cardiac/renovascular etiology of recurrent heart failure presentation. Solitary left kidney with restenosis of ostial stent previously treated 8/2021. Patient is in poor physical condition with severe recurrent anemia with multiple transfusion requirements as well as severe malnutrition with hypoalbuminemia. Currently being diuresed with noted significant increase in proBNP. 2.  Hypertensive management. Arm blood pressures could possibly be erroneously low due to subclavian stenosis. Continue medical management. Will reevaluate as regards to further intervention going forward.       Electronically signed by Adelaide Reddy MD on 6/20/2022 at 7:52 PM

## 2022-06-22 NOTE — PROGRESS NOTES
[facility]      [facility] Note    Patient:  Maddie Locke  YOB: 1945  Date of Service: 6/22/2022  MRN: [id number]   Acct: [de-identified]   Primary Care Physician: Guille Frost MD  Advance Directive: DNR  Admit Date: 6/20/2022       Hospital Day: 2    Portions of this note have been copied forward, however, updated to reflect the most current clinical status of this patient. CHIEF COMPLAINT shortness of breath    SUBJECTIVE: Struggling and anxious. Cardiology had seen and advised her he cannot do anything further for her cardiac status. We discussed ventilator support since she is having difficulty maintaining her oxygen saturation. She does not want to be intubated or coded. Code status changed and Pallative notified and they are working with her and family about goals of care. CUMULATIVE HOSPITAL COURSE:     Maddie Locke is an 68 y.o. female with past medical history of aortic stenosis, CHF, ASCVD, COPD, hyperlipidemia, hypertension, history of MI, mitral valve stenosis, and arthritis. Patient was recently discharged on  with complaint of dyspnea. Patient states she did well for the first few days after discharge at home. Then her daughter reports she has gained about a pound a day since fourth day post discharge. Has become increasingly short of breath her pulse ox dropping to as much as in the 50s at home. Confirmed by EMS. Patient denies chest pain , significantly more short of breath. ER evaluation shows progressive infiltrate and effusion left lung base with new alveolar infiltrates in the right upper and lower lobes with effusion. Chemistry BUN 29 creatinine 1 glucose 150 BNP 18,884 white count is 15.9 hemoglobin 7.5 hematocrit 25.9 platelets 503  PT 8.93 PCO2 43 PO2 30 bicarb 33. Be admitted to hospitalist services with cardiology consult she is an established Dr. Bill Tripathi patient.   Been in contact with the office and she was instructed to come yesterday but wanted to wait. Cardiology has seen the patient and feel that medical management is her only option. Palliative care consult called. Metoprolol increased to help with tachycardia and b/p. Review of Systems   Constitutional: Negative. HENT: Negative. Eyes: Negative. Respiratory: Positive for shortness of breath. Cardiovascular: Negative. Gastrointestinal: Negative. Endocrine: Negative. Musculoskeletal: Negative. Allergic/Immunologic: Negative. Neurological: Negative. Hematological: Negative. Psychiatric/Behavioral: Negative. Anxious        Objective:   VITALS:  BP (!) 153/67   Pulse (!) 130   Temp 100.2 °F (37.9 °C) (Temporal)   Resp 28   Ht 5' 3\" (1.6 m)   Wt 108 lb 4.8 oz (49.1 kg)   SpO2 90%   BMI 19.18 kg/m²   24HR INTAKE/OUTPUT:      Intake/Output Summary (Last 24 hours) at 6/22/2022 0919  Last data filed at 6/21/2022 2322  Gross per 24 hour   Intake 180 ml   Output 475 ml   Net -295 ml           Physical Exam  Vitals and nursing note reviewed. HENT:      Head: Normocephalic. Nose: Nose normal.      Mouth/Throat:      Mouth: Mucous membranes are dry. Eyes:      Extraocular Movements: Extraocular movements intact. Neck:      Comments: JVD  Cardiovascular:      Rate and Rhythm: Tachycardia present. Heart sounds: Murmur heard. Pulmonary:      Breath sounds: Wheezing and rales present. Abdominal:      General: Bowel sounds are normal.      Palpations: Abdomen is soft. Musculoskeletal:      Right lower leg: Edema present. Left lower leg: Edema present. Skin:     General: Skin is warm and dry. Coloration: Skin is pale. Neurological:      General: No focal deficit present. Mental Status: She is alert.    Psychiatric:      Comments: anxious            Medications:      sodium chloride        bumetanide  2 mg Oral BID    metoprolol  100 mg Oral BID    sodium chloride flush  5-40 mL IntraVENous 2 times per day    enoxaparin  30 mg SubCUTAneous Daily    amLODIPine  5 mg Oral BID    Arformoterol Tartrate  15 mcg Nebulization BID    atorvastatin  40 mg Oral Nightly    budesonide  0.5 mg Nebulization BID    clopidogrel  75 mg Oral Daily    guaiFENesin  1,200 mg Oral BID    ipratropium-albuterol  3 mL Inhalation 4x daily    isosorbide mononitrate  30 mg Oral Daily    levothyroxine  25 mcg Oral Daily    pantoprazole  40 mg Oral BID AC    sildenafil  20 mg Oral TID     hydrOXYzine HCl, sodium chloride flush, sodium chloride, ondansetron **OR** ondansetron, polyethylene glycol, acetaminophen **OR** acetaminophen, potassium chloride **OR** potassium alternative oral replacement **OR** potassium chloride, magnesium sulfate, albuterol  ADULT DIET; Regular; Low Sodium (2 gm)  ADULT ORAL NUTRITION SUPPLEMENT; Lunch; Low Calorie/High Protein Oral Supplement     Lab and other Data:     Recent Labs     06/20/22  1007 06/21/22  0157   WBC 15.9* 13.4*   HGB 7.5* 7.4*    318     Recent Labs     06/20/22  1007 06/21/22  0157 06/22/22  0427    137 136   K 4.0 3.7 3.6   CL 98 97* 97*   CO2 30* 28 27   BUN 29* 32* 30*   CREATININE 1.0* 1.3* 1.1*   GLUCOSE 150* 109 105     Recent Labs     06/20/22  1007   AST 33*   ALT 31   BILITOT 0.3   ALKPHOS 171*     Troponin T:   Recent Labs     06/21/22  0157 06/21/22  0845 06/21/22  1401   TROPONINI 0.12* 0.08* 0.09*     Pro-BNP: No results for input(s): BNP in the last 72 hours. INR: No results for input(s): INR in the last 72 hours. UA:  Recent Labs     06/20/22  1233   COLORU YELLOW   PHUR 7.0   WBCUA 2   RBCUA 3   BACTERIA NEGATIVE*   CLARITYU Clear   SPECGRAV 1.012   LEUKOCYTESUR Negative   UROBILINOGEN 0.2   BILIRUBINUR Negative   BLOODU Negative   GLUCOSEU 100*     A1C:   Recent Labs     06/20/22  1737   LABA1C 5.6     ABG:No results for input(s): PHART, SOO5UYW, PO2ART, RKB8QAL, BEART, HGBAE, F1IIPPHD, CARBOXHGBART in the last 72 hours.     RAD:   XR CHEST PORTABLE    Result Date: 6/20/2022  NO PRIOR REPORT AVAILABLE Exam: X-RAY OF [facility] Clinical data:Short of air, edema. Technique:Single view of the chest. Prior studies: Radiograph of the chest dated 06/11/2022. Findings: The lungs are grossly clear; noevidence of acute infiltrate or pleural effusion. Cardiac silhouette is within normal limits. No acute osseous abnormality is detected. There is progression of infiltrate and effusion left base and new alveolar infiltrates in the right upper and lower lobes. Right pleural effusion also suspected. Median sternotomy wires again noted. Progressive infiltrate and effusion left lung base New alveolar infiltrates right upper and lower lobes with effusion.  Recommendation: Follow up short term  Electronically Signed by Gayle Ramirez MD at 20-Jun-2022 12:43:34 PM                      Assessment/Plan     Principal Problem:    Hypoxemic respiratory failure, chronic (HCC)              O2 to keep pulse ox above 90              Diurese              Strict intake and output              ABGs for acute hypoxic episodes  Active Problems:    Mitral valve insufficiency              Noted    Acute on chronic diastolic heart failure (HCC)-end stage              Lasix 60 IV given in ER/now p.o. bumex              strict intake and output              Serial labs              Telemetry              Continue sildenafil              Daily weight   Pallative care-goals of care and code status    Anemia of chronic disease              Infuse if hemoglobin less than 7  COPD              Continue home neb treatments              Supplemental O2 to keep SPO2 between 88 and 92%  Diabetes              Accu-Cheks before meals and at bedtime              Hemoglobin A1C-5.6              Is not on home medication for diabetes              Recently on steroids  Hypothyroid             Continue Synthroid as at home  Hypertension               Continue home medication              Monitor for need to adjust  Anxiety   Atarax TID prn   Pallative will try one time dose of MS and benadryl to help with severe anxiety     Resolved Problems:  . * No resolved hospital problems. *      DVT Prophylaxis: Lovenox    Discharge planning: TBD      Further Orders per Clinical course/attending. Electronically signed by BEBO Kennedy CNP on 6/22/2022 at 9:19 AM       EMR Dragon/Transcription disclaimer:   Much of this encounter note is an electronic transcription/translation of spoken language to printed text.  The electronic translation of spoken language may permit erroneous, or at times, nonsensical words or phrases to be inadvertently transcribed; although attempts have made to review the note for such errors, some may still exist.

## 2022-06-22 NOTE — PROGRESS NOTES
MEDICAL ONCOLOGY PROGRESS NOTE    Pt Name: Jamison Kelley  MRN: [id number]  YOB: 1945  Date of evaluation: 6/22/2022    Subjective: Feels slightly better this morning regarding her breathing. She is on nasal cannula. HISTORY OF PRESENT ILLNESS:  I am being consulted this patient for anemia. I was consulted due to the fact the patient was established with us. The patient is not established with us. She was seen in consultation during last admission. She had iron deficiency anemia. She has been on Plavix and was also on aspirin. She had occult blood positive in the stool. GI was consulted during last admission but decided not to do any intervention due to the patient overall respiratory status. She was discharged recently. Her hemoglobin at discharge was 8.8. She presented to the hospital yesterday with hemoglobin 7.5. She has received IV Venofer to a total of 1000 mg. She has received transfusional support during last visit. Her anemia is multifactorial to include iron deficiency anemia and also anemia of inflammation, anemia of chronic kidney disease. Occult blood is + 6/7/2022. She was admitted again with short of breath.  proBNP was quite elevated. She was hypoxemic at home. She was brought by EMS. Prior hematology history  The patient is a 68 y.o. female with PMH CAD with CABG, carotid endarterectomy, COPD with continuous home O2 at 2L, HTN, HLD, CHF with diastolic dysfunction and pulmonary hypertension, and multiple  other comorbiditieswho presented to [facility] ED 6/4/2022 complaining of shortness of breath and CP. Hematology consultation was requested regarding severe anemia.   The patient was first seen by Dr. Melinda Chakraborty on 6/7/2022 during patient was positioned [facility].  She has multiple comorbidities including history of coronary artery disease status post CABG, carotid endarterectomy, advanced COPD on continuous O2 supplementation, history of congestive heart stenting to L    Renal failure 2009    after ulcer perforation sepsis.     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 AND REPAIR WITH REVERSED LEFT GREATER SAPHENOUS VEIN INTERPOSITIONAL BYPASS GRAFT performed by Jennie Oviedo MD at [place] [id number] Right early 1990's    long ago    CATARACT REMOVAL WITH IMPLANT Bilateral     [address]    COLONOSCOPY  01/13/2012    Dr Amezcua Scot:  HP    CORONARY ANGIOPLASTY WITH STENT PLACEMENT  08/10/2021    RM- RCA    CORONARY ANGIOPLASTY WITH STENT PLACEMENT      CORONARY ARTERY BYPASS GRAFT  2016    DIAGNOSTIC CARDIAC CATH LAB PROCEDURE      DILATION AND CURETTAGE OF UTERUS      ILIO-FEMORAL BYPASS GRAFT N/A 06/02/2016    OPEN TRANSLUMINAL BALLOON ANGIOPLASTY AND STENTING OF RIGHT COMMON AND EXTERNAL  ILIAC ARTERIES; RIGHT FEMORAL ENDARTERECTOMY WITH VEIN PATCH ANGIOPLASTY performed by Jennie Oviedo MD at [address] [place] [address] N/A 04/07/2016    MITRAL VALVE  REPLACEMENT LUONG-MAZE ABLATION WITH CRYO PROCEDURE, CORONARY ARTERY BYPASS GRAFT X 1 WITH ENDOSCOPIC VEIN HARVESTING WITH PERFUSION TRANSESOPHAGEAL ECHOCARDIOGRAM performed by Dolores Herbert MD at [address] [place] [address]  2016    PERIPHERAL PERCUTANEOUS ARTERIAL INTERVENTION Left 08/10/2021    RM to L renal artery    UT REOPER, CAROTID ENDARTEC>1 MON Left 01/11/2018    REMOVAL OF HEMATOMA, LEFT CAROTID ARTERY performed by Jennie Oviedo MD at [facility] Left 01/11/2018    LEFT CAROTID ENDARTERECTOMY WITH EEG MONITORING AND COMPLETION DUPLEX ULTRASOUND performed by Bob Spicer MD at [address] OR    PTCA      SKIN GRAFT Right 07/22/2016    Skin graft split thickness foot,ankle,and leg. Right leg 26x8cm and 12x6cm total area 280cm squared. T[facility]    UPPER GASTROINTESTINAL ENDOSCOPY  07/14/2015    Dr Claudene Flatness: normal    UPPER GASTROINTESTINAL ENDOSCOPY  03/15/2016    Dr Shahida West: normal    UPPER GASTROINTESTINAL ENDOSCOPY  05/27/2015    Dr Claudene Flatness:  paul neg, multiple gastric antial and duodenal ulcers    VASCULAR SURGERY  5/27/16 [facility]    Aortagram and right leg runoff,right leg runoff,right common iliac artery selection for right leg run off views. VASCULAR SURGERY      . Open transluminal angioplasty and stenting of the external iliac artery. TJR    VASCULAR SURGERY  07/11/2019    [facility]. 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 Social History:    The patient currently lives at home with spouse  Tobacco:   reports that she quit smoking about 42 years ago. Her smoking use included cigarettes. She quit after 2.00 years of use. She has never used smokeless tobacco.  Alcohol:   reports current alcohol use.   Illicit Drugs: Unknown    Family History:   Family History   Problem Relation Age of Onset    Diabetes Mother     Heart Disease Mother     Heart Failure Mother     Diabetes Sister     Heart Disease Sister     High Blood Pressure Sister     Colon Cancer Sister     Liver Disease Sister     Cirrhosis Sister     Colon Cancer Maternal Aunt     Colon Polyps Neg Hx     Esophageal Cancer Neg Hx        Current Hospital Medications:    Current Facility-Administered Medications   Medication Dose Route Frequency Provider Last Rate Last Admin    hydrOXYzine HCl (ATARAX) tablet 10 mg  10 mg Oral TID Mary Cain - CNP   40 mg at 06/21/22 2016    budesonide (PULMICORT) nebulizer suspension 500 mcg  0.5 mg Nebulization BID BEBO Yin CNP   500 mcg at 06/22/22 3545    clopidogrel (PLAVIX) tablet 75 mg  75 mg Oral Daily BEBO Yin - CNP   75 mg at 06/21/22 0932    guaiFENesin (MUCINEX) extended release tablet 1,200 mg  1,200 mg Oral BID BEBO Yin CNP   1,200 mg at 06/21/22 2016    ipratropium-albuterol (DUONEB) nebulizer solution 3 mL  3 mL Inhalation 4x daily BEBO Yin CNP   3 mL at 06/22/22 2914    isosorbide mononitrate (IMDUR) extended release tablet 30 mg  30 mg Oral Daily BEBO Yin - CNP   30 mg at 06/21/22 0931    levothyroxine (SYNTHROID) tablet 25 mcg  25 mcg Oral Daily BEBO Yin - CNP   25 mcg at 06/22/22 0535    metoprolol tartrate (LOPRESSOR) tablet 50 mg  50 mg Oral BID BEBO Yin CNP   50 mg at 06/21/22 2016    pantoprazole (PROTONIX) tablet 40 mg  40 mg Oral BID AC BEBO Yin CNP   40 mg at 06/22/22 0535    sildenafil (REVATIO) tablet 20 mg  20 mg Oral TID BEBO Yin CNP   20 mg at 06/21/22 2016    bumetanide (BUMEX) injection 2 mg  2 mg IntraVENous BID BEBO Yin CNP   2 mg at 06/21/22 2015       Allergies: Allergies   Allergen Reactions    Ativan [Lorazepam] Hallucinations    Levaquin [Levofloxacin In D5w] Nausea And Vomiting    Xarelto [Rivaroxaban] Other (See Comments)     Made anemic. CANNOT HAVE DUE TO HEART VALVE REPLACEMENT. Morphine Itching and Rash         Objective   /63   Pulse (!) 117   Temp 99.1 °F (37.3 °C) (Temporal)   Resp 30   Ht 5' 3\" (1.6 m)   Wt 108 lb 4.8 oz (49.1 kg)   SpO2 93%   BMI 19.18 kg/m²     PHYSICAL EXAM:  CONSTITUTIONAL: Alert, appropriate, looks ill-appearing, mild respiratory distress,   ENT: Mucus membranes moist,external inspection of ears and nose are normal  NECK: Supple, no masses.   No palpable thyroid mass  CHEST/LUNGS: On nasal cannula O2 supplementation  CARDIOVASCULAR: Remains tachycardic, no murmurs. Bilateral lower extremity edema  ABDOMEN: soft non-tender, active bowel sounds, no HSM. No palpable masses  EXTREMITIES: warm, full ROM in all 4 extremities, no focal weakness. SKIN: warm, dry with no rashes or lesions  LYMPH: No cervical, clavicular, axillary, or inguinal lymphadenopathy  NEUROLOGIC: follows commands, non focal         LABORATORY RESULTS REVIEWED/ANALYZED BY ME:  Recent Labs     06/21/22  0157 06/20/22  1007 06/14/22  0107   WBC 13.4* 15.9* 17.3*   HGB 7.4* 7.5* 8.8*   HCT 25.2* 25.9* 29.9*   MCV 96.9 97.7 98.7    315 266       Lab Results   Component Value Date     06/22/2022    K 3.6 06/22/2022    CL 97 (L) 06/22/2022    CO2 27 06/22/2022    BUN 30 (H) 06/22/2022    CREATININE 1.1 (H) 06/22/2022    GLUCOSE 105 06/22/2022    CALCIUM 8.1 (L) 06/22/2022    PROT 4.6 (L) 06/20/2022    LABALBU 2.3 (L) 06/20/2022    BILITOT 0.3 06/20/2022    ALKPHOS 171 (H) 06/20/2022    AST 33 (H) 06/20/2022    ALT 31 06/20/2022    LABGLOM 48 (A) 06/22/2022    GFRAA 58 (L) 06/22/2022    GLOB 4.6 08/29/2016       Lab Results   Component Value Date    INR 1.25 (H) 06/04/2022    INR 1.17 04/15/2022    INR 1.14 03/26/2021    PROTIME 15.7 (H) 06/04/2022    PROTIME 14.9 (H) 04/15/2022    PROTIME 14.6 03/26/2021       RADIOLOGY STUDIES REPORT/REVIEWED AND INTERPRETED BY ME:  CT ABDOMEN PELVIS WO CONTRAST Additional Contrast? None    Result Date: 6/14/2022  NO PRIOR REPORT AVAILABLE Exam: CT OF THE ABDOMEN/PELVIS WITHOUT CONTRAST Clinical data: Abdominal pain. Technique: Axial CT images were acquired through the abdomen and pelvis without contrast using soft tissue and bone algorithms. Reformatted/MPR images were performed. Radiation dose: CTDIvol =16.41 mGy, DLP =701 mGy x cm. Limitations: Lack of intravenous contrast limits evaluation of solid viscera. Lack of oral contrast limits evaluation of the bowel loops.  Prior Studies: Renal arterial ultrasound dated 7/30/2020 images. Findings: Lung bases:Trace pleural effusion is noted bilaterally. Mild subsegmental atelectasis is noted in the included bilateral lungs. Liver:Unremarkable size andcontour. Normal density. No evidence of mass. No evidence of dilated ducts. Gallbladder Fossa: Cholelithiasis without evidence of acute cholecystitis. Spleen: Grossly unremarkable. Pancreas/adrenal glands: Grossly unremarkable size, contour and density. Kidneys: In anatomic position. There is mild atrophy of the right kidney and compensatory hypertrophy of the left kidney. Bilateral non-obstructing renal calculi, measuring approximately 2 mm in the upper pole of right kidney, 1 mm and 5 mm in the upper pole of left kidney. No ureteral calculi. No evidence of a renal mass or cyst. Perinephric space is unremarkable. Retroperitoneum: No enlarged retroperitoneal lymphadenopathy. The IVC appears unremarkable. Severe calcific atherosclerotic changes noted in the aorta and its branches. Peritoneal cavity: No evidence of free air. Trace ascites is noted. Gastrointestinal tract: Small inguinal hernia is noted on right, it contains portion of small bowel without evidence of obstruction. Tiny fat containing inguinal hernia is noted on left. Tiny hiatal hernia is noted. Changes of mild constipation. There are twisted mesenteric vessels in the right lower quadrant without evidence of obstruction. Large amount of stool and gas in the colon. Appendix is not visualized. Pelvis: Solid and hollow viscera grossly unremarkable. Osseous structures: No acute or destructive bony process identified. Mild scoliosis. Degenerative changes noted in the spine and pelvis    1. Small inguinal hernia is noted on right, it contains portion of small bowel without evidence of obstruction. Tiny fat containing inguinal hernia is noted on left. 2. Tiny hiatal hernia is noted.  3. There are twisted mesenteric vessels in the right lower quadrant without evidence of obstruction. Changes of mild constipation. 4. Trace pleural effusion is noted bilaterally. Mild subsegmental atelectasis is noted in the included bilateral lungs. 5. Cholelithiasis without evidence of acute cholecystitis. 6. There is mild atrophy of the right kidney and compensatory hypertrophy of the left kidney. Bilateral non-obstructing renal calculi. 7. Generalized anasarca. Trace ascites is noted. 8. Moderately large amount of stool in the colon, please correlate with constipation. Recommendation: Follow up as clinically indicated. All CT scans at this facility utilize dose modulation, iterative reconstruction, and/or weight based dosing when appropriate to reduce radiation dose to as low as reasonably achievable. Electronically Signed by Ирина Mckeon MD at 14-Jun-2022 01:21:42 AM             XR CHEST (2 VW)    Result Date: 6/12/2022  NO PRIOR REPORT AVAILABLE Exam: X-RAYS OF THE CHEST Clinical data:COPD/CHF/pneumonia. Technique: PA and lateral views of the chest. Prior studies: Radiograph of the chest dated 06/04/2022. NM lung scan dated 04/15/2022. Findings:Hyperinflated emphysematous lungs. Bilateral pleural effusions. Slightly enlarged cardiac silhouette. Hyperinflated emphysematous lungs. Bilateral pleural effusions. Slightly enlarged cardiac silhouette. Recommendation: Follow up as clinically indicated. Electronically Signed by Belem Day MD at 12-Jun-2022 10:47:39 PM             XR CHEST PORTABLE    Result Date: 6/20/2022  NO PRIOR REPORT AVAILABLE Exam: X-RAY OF THECHEST Clinical data:Short of air, edema. Technique:Single view of the chest. Prior studies: Radiograph of the chest dated 06/11/2022. Findings: The lungs are grossly clear; noevidence of acute infiltrate or pleural effusion. Cardiac silhouette is within normal limits. No acute osseous abnormality is detected.   There is progression of infiltrate and effusion left base and new alveolar infiltrates in the right upper and lower lobes. Right pleural effusion also suspected. Median sternotomy wires again noted. Progressive infiltrate and effusion left lung base New alveolar infiltrates right upper and lower lobes with effusion. Recommendation: Follow up short term  Electronically Signed by Lew Gilford MD at 20-Jun-2022 12:43:34 PM             XR CHEST PORTABLE    Result Date: 6/4/2022  NO PRIOR REPORT AVAILABLE Exam: X-RAY OF THE CHEST Clinical data: COPD, respiratory distress. Technique: Single view of the chest. Prior studies: Radiograph of the chest dated 04/20/2022. Findings:  COPD. Interval worsening bilateral lower lobe infiltrates. Enlarging small bilateral  pleural effusions. Cardiac silhouette is within normal limits. No acute osseous abnormality is detected. No other change. COPD. Interval worsening bilateral lower lobe infiltrates. Enlarging small bilateral  pleural effusions. Recommendation: Follow up as clinically indicated. Electronically Signed by Salvador Eldridge MD at 04-Jun-2022 04:59:51 PM                 ASSESSMENT:  #Normocytic anemia-  -Iron profile compatible with anemia of chronic disease  -Probable GI blood loss: Patient has on Plavix/aspirin which makes her high risk for GI bleed  -Normal TSH  -Haptoglobin: 86, LDH: 303 ()  -Patient has been anemic for a long time. Hemoglobin in the range of 7-there for the last 5 years  -Transfused 2 units PRBCs during  admission last with hemoglobin trended down again.  -Patient on aspirin and Plavix which increases risk for GI bleed  -Patients with heart failure and chronic anemia and may benefit from IV iron infusion  -S/p Venofer 500 mg IV x2 doses  -Intolerant to oral iron replacement due to GI upset  -Patient could not undergo EGD/colonoscopy at this time.   Please arrange some sort of GI follow-up as outpatient  -Hemoglobin 8.8/MCV 98.7 on 6/145/2022     PLAN:  Continue current supportive care  Continue to monitor

## 2022-06-22 NOTE — PROGRESS NOTES
Cardiology Progress Note Cailin Bolton MD      Patient:  Juliet Blakely  [id number]    Patient Active Problem List    Diagnosis Date Noted    Unstable angina [facility])      Priority: High    Acute on chronic diastolic heart failure ([address]      Priority: High    Bradycardia      Priority: High    Acute superficial venous thrombosis of right lower extremity 04/25/2016     Priority: High     Overview Note:     With large RLE bulla lateral foot skin violaceous discoloration, acutely POA (venous backpressure)      Mitral valve stenosis 03/29/2016     Priority: High    Mitral valve insufficiency 03/29/2016     Priority: High    Solitary kidney, acquired 06/21/2022     Priority: Medium    Atherosclerotic PATRICK (renal artery stenosis), unilateral ([address] 06/21/2022     Priority: Medium    Acute and chronic respiratory failure with hypoxia ([address]      Priority: Medium    Hypoxemic respiratory failure, chronic ([address] 06/20/2022     Priority: Medium    Personal history of noncompliance with medical treatment and regimen 06/14/2022     Priority: Medium    History of gastric ulcer      Priority: Medium    History of duodenal ulcer      Priority: Medium    History of colon polyps      Priority: Medium    Palliative care patient 06/07/2022     Priority: Medium    Anemia of chronic disease 06/05/2022     Priority: Medium    Near syncope 06/04/2022     Priority: Medium    Elevated troponin 04/25/2016     Priority: Medium     Overview Note:     Consider NSTMI, POA      PUD (peptic ulcer disease) 03/29/2016     Priority: Medium    COPD exacerbation ([address] 04/14/2022     Priority: Low    Acute on chronic anemia 04/02/2021     Priority: Low    Occult GI bleeding 04/02/2021     Priority: Low    Chronic atrial fibrillation ([address] 10/06/2020     Priority: Low    History of coronary artery stent placement 01/29/2020     Priority: Low     Overview Note:     11/15/ 2 stents to RCA and 1 to circumflex - Dr. Shaq Cervantes Atherosclerosis of native artery of right lower extremity with ulceration of ankle ([facility] 06/05/2016     Priority: Low    Atherosclerosis of native arteries of right leg with ulceration of other part of lower right leg 06/05/2016     Priority: Low     Overview Note:     Replacing Inactive Diagnoses      Atherosclerosis of native arteries of right leg with ulceration of other part of foot 06/05/2016     Priority: Low     Overview Note:     Replacing Inactive Diagnoses      Nonhealing ulcer of right lower leg with fat layer exposed ([facility] 06/05/2016     Priority: Low    Non-pressure chronic ulcer of right ankle with fat layer exposed ([address] 06/05/2016     Priority: Low    Neuropathic ulcer of right foot with fat layer exposed ([address] 06/05/2016     Priority: Low    Hypervolemia      Priority: Low    Nonhealing nonsurgical wound with fat layer exposed 06/03/2016     Priority: Low    Atherosclerosis of native arteries of left leg with ulceration of calf (HCC)      Priority: Low    Severe malnutrition (HCC)      Priority: Low    Diastolic dysfunction      Priority: Low    Anemia in chronic kidney disease (CKD)      Priority: Low    Non-pressure chronic ulcer of right calf with fat layer exposed ([facility] 05/27/2016     Priority: Low    Decubitus ulcer of right buttock, stage 3 ([address] 05/27/2016     Priority: Low    Nocturnal hypoxia 03/30/2016     Priority: Low     Overview Note:     With associated mitral stenosis / regurgitation and pulmonary hypertension      Pulmonary hypertension 03/29/2016     Priority: Low    Leukocytosis      Priority: Low    Calculus of gallbladder without cholecystitis without obstruction      Priority: Low    Carotid artery stenosis 02/08/2012     Priority: Low    Atherosclerosis of native artery of extremity with intermittent claudication ([facility] 07/25/2011     Priority: Low    Atherosclerosis of native artery of extremity with intermittent claudication ([facility] 07/25/2011     Priority: Low     Overview Note:     Replacing Inactive Diagnoses      Mixed hyperlipidemia      Priority: Low    Arthritis      Priority: Low    Coronary artery disease involving native coronary artery of native heart      Priority: Low     Overview Note:     3/16/2016  Echo  Severe MS, moderate to severe MR, RVSP 73 mmHg, normal LVFX  3/31/2016  Cath  70% osteal RCA, severe MR, MVA 1.9, normal LVFX  4/7//2016   MVR (23 mm [employer] Mosaic) VG-PDAFonnie Lick)  5/7/2016  Echo  Normal LVFX, large pleural effusion, echolucency near preserved posterior Mitral leaflet, likely chordae, RVSP 72 mmHg         Admit Date:  6/20/2022    Admission Problem List: Present on Admission:   Hypoxemic respiratory failure, chronic (HCC)   Acute on chronic diastolic heart failure ([address]   Mitral valve insufficiency   Anemia of chronic disease   CHF (congestive heart failure) [facility]   Diastolic dysfunction   Palliative care patient   Coronary artery disease involving native coronary artery of native heart   Pulmonary hypertension   Solitary kidney, acquired   Atherosclerotic PATRICK (renal artery stenosis), unilateral (HCC)   Severe malnutrition (HCC)   Acute and chronic respiratory failure with hypoxia (HCC)      Cardiac Specific Data:  Specialty Problems        Cardiology Problems    Mitral valve insufficiency        Mitral valve stenosis        Acute superficial venous thrombosis of right lower extremity        Bradycardia        Acute on chronic diastolic heart failure (HCC)        Unstable angina (HCC)        Near syncope        Atherosclerotic PATRICK (renal artery stenosis), unilateral (HCC)        Atherosclerosis of native artery of extremity with intermittent claudication (HCC)        Atherosclerosis of native artery of extremity with intermittent claudication (HCC)        Coronary artery disease involving native coronary artery of native heart        Mixed hyperlipidemia        Carotid artery stenosis        Pulmonary hypertension Atherosclerosis of native arteries of left leg with ulceration of calf ([facility])        Atherosclerosis of native arteries of right leg with ulceration of other part of foot        Atherosclerosis of native artery of right lower extremity with ulceration of ankle ([facility])        CHF (congestive heart failure) ([facility])        PVD (peripheral vascular disease) ([facility])        Bilateral carotid artery stenosis        Obstruction of left carotid artery        NSTEMI (non-ST elevated myocardial infarction) ([facility]        Essential hypertension        Mild aortic stenosis        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[facility])        Carotid aneurysm, right ([facility])        Chest pain        PAF (paroxysmal atrial fibrillation) ([facility])        Chronic atrial fibrillation ([facility]            1.  Coronary artery disease status post CABG with one-vessel bypass, SVG to RCA 2016 with bioprosthetic mitral valve replacement/maze, severely stenotic SVG to RCA by catheterization 11/15/2019 with PCI to proximal to mid RCA and circumflex, repeat PCI 7/30/2020 ostial RCA (3.5 x 9 mm resolute), with restenosis and repeat PCI 8/10/2021 (4.0 x 12 mm), residual ostial circumflex 60% stenosis, normal LV ejection fraction, mild aortic stenosis, severe pulmonary hypertension. 2.  Chronic kidney disease with occluded right renal artery, solitary left kidney with severely stenotic left renal artery status post endovascular intervention 7/30/2020 (5.0 x 22 mm resolute [place] stent), repeat PCI for restenosis 8/10/2021 (5.0 x 12 mm resolute [place]). 3.  Severe hypertension. 4.  COPD. 5.  Peripheral arterial disease with prior iliac stents, bilateral carotid endarterectomy, possible bilateral subclavian artery stenosis with differential blood pressures and bruits. 6.  Severe anemia with transfusion requirements and severe hypoalbuminemia. Subjective:  Ms. Papito [place] is currently on BiPAP and feels slightly better. Still short of breath. On IV Bumex.   Mild increase in creatinine noted. Objective:   BP (!) 103/51   Pulse (!) 120   Temp 99.5 °F (37.5 °C)   Resp 30   Ht 5' 3\" (1.6 m)   Wt 108 lb 4.8 oz (49.1 kg)   SpO2 93%   BMI 19.18 kg/m²       Intake/Output Summary (Last 24 hours) at 6/21/2022 1940  Last data filed at 6/21/2022 1553  Gross per 24 hour   Intake 240 ml   Output 475 ml   Net -235 ml       Prior to Admission medications    Medication Sig Start Date End Date Taking? Authorizing Provider   Multiple Vitamins-Minerals (THERAPEUTIC MULTIVITAMIN-MINERALS) tablet Take 1 tablet by mouth daily   Yes Historical Provider, MD   HYDROcodone-acetaminophen (NORCO) 5-325 MG per tablet Take 1 tablet by mouth every 6 hours as needed for Pain.    Yes Historical Provider, MD   Calcium Polycarbophil (FIBER-CAPS PO) Take 1 capsule by mouth Daily with supper   Yes Historical Provider, MD   clopidogrel (PLAVIX) 75 MG tablet Take 75 mg by mouth daily    Historical Provider, MD   sildenafil (REVATIO) 20 MG tablet Take 1 tablet by mouth 3 times daily 6/14/22 7/14/22  Cuong Perrin MD   predniSONE (DELTASONE) 5 MG tablet Take 1 tablet by mouth daily for 10 days 6/15/22 6/25/22  Cuong Perrin MD   bumetanide (BUMEX) 2 MG tablet Take 1 tablet by mouth 2 times daily  Patient taking differently: Take 2 mg by mouth 2 times daily Dr. Paradise Olivo instructed patient to take 3 mg once daily prior to admission and to end on 6/20/22. 6/14/22 7/14/22  Cuong Perrin MD   potassium chloride (KLOR-CON M) 20 MEQ extended release tablet Take 1 tablet by mouth 2 times daily 6/14/22 7/14/22  Cuong Perrin MD   pantoprazole (PROTONIX) 40 MG tablet Take 1 tablet by mouth 2 times daily (before meals) 6/14/22 7/14/22  Cuong Perrin MD   guaiFENesin (MUCINEX) 600 MG extended release tablet Take 2 tablets by mouth 2 times daily 4/20/22   BEBO Restrepo - CNP   isosorbide mononitrate (IMDUR) 30 MG extended release tablet Take 60 mg by mouth in the morning and at bedtime     Historical Provider, MD   amLODIPine (NORVASC) 5 MG tablet Take 1 tablet by mouth 2 times daily 7/30/21   Malini Aguila MD   metoprolol (LOPRESSOR) 100 MG tablet Take 0.5 tablets by mouth 2 times daily 50 mg in the AM & 50 mg in the PM 6/30/21   BEBO Barreto   albuterol (ACCUNEB) 1.25 MG/3ML nebulizer solution Inhale 1 ampule into the lungs every 6 hours as needed  1/22/21   Historical Provider, MD   budesonide (PULMICORT) 0.5 MG/2ML nebulizer suspension Inhale 0.5 mg into the lungs at bedtime  2/2/21   Historical Provider, MD   ipratropium-albuterol (DUONEB) 0.5-2.5 (3) MG/3ML SOLN nebulizer solution Inhale 3 mLs into the lungs every 4 hours While Awake 2/2/21   Historical Provider, MD   Probiotic Product (PROBIOTIC DAILY PO) Take 1 tablet by mouth daily    Historical Provider, MD   levothyroxine (SYNTHROID) 25 MCG tablet Take 25 mcg by mouth Daily  11/7/19   Historical Provider, MD   Fexofenadine HCl (MUCINEX ALLERGY PO) Take 1 tablet by mouth 2 times daily     Historical Provider, MD   Arformoterol Tartrate (BROVANA) 15 MCG/2ML NEBU Inhale 15 mcg into the lungs 2 times daily  1/2/20   Historical Provider, MD   OXYGEN Inhale 2 L into the lungs daily     Historical Provider, MD   atorvastatin (LIPITOR) 40 MG tablet Take 1 tablet by mouth daily  Patient taking differently: Take 40 mg by mouth nightly PT TAKES AT NIGHT.  11/20/19   Malini Aguila MD   Biotin 5000 MCG TABS Take 1 tablet by mouth daily     Historical Provider, MD        albuterol        sodium chloride flush  5-40 mL IntraVENous 2 times per day    enoxaparin  30 mg SubCUTAneous Daily    amLODIPine  5 mg Oral BID    Arformoterol Tartrate  15 mcg Nebulization BID    atorvastatin  40 mg Oral Nightly    budesonide  0.5 mg Nebulization BID    clopidogrel  75 mg Oral Daily    guaiFENesin  1,200 mg Oral BID    ipratropium-albuterol  3 mL Inhalation 4x daily    isosorbide mononitrate  30 mg Oral Daily    levothyroxine  25 mcg Oral Daily    metoprolol  50 mg Oral BID    pantoprazole 06/21/22  0157    137   K 4.0 3.7   CL 98 97*   CO2 30* 28   BUN 29* 32*   CREATININE 1.0* 1.3*     LIVER PROFILE:   Recent Labs     06/20/22  1007   AST 33*   ALT 31   BILITOT 0.3   ALKPHOS 171*     PT/INR: No results for input(s): PROTIME, INR in the last 72 hours. APTT: No results for input(s): APTT in the last 72 hours. BNP:  No results for input(s): BNP in the last 72 hours. CK, CKMB, Troponin: @LABRCNT (CKTOTAL:3, CKMB:3, TROPONINI:3)@    IMAGING:  CT ABDOMEN PELVIS WO CONTRAST Additional Contrast? None    Result Date: 6/14/2022  NO PRIOR REPORT AVAILABLE Exam: CT OF THE ABDOMEN/PELVIS WITHOUT CONTRAST Clinical data: Abdominal pain. Technique: Axial CT images were acquired through the abdomen and pelvis without contrast using soft tissue and bone algorithms. Reformatted/MPR images were performed. Radiation dose: CTDIvol =16.41 mGy, DLP =701 mGy x cm. Limitations: Lack of intravenous contrast limits evaluation of solid viscera. Lack of oral contrast limits evaluation of the bowel loops. Prior Studies: Renal arterial ultrasound dated 7/30/2020 images. Findings: Lung bases:Trace pleural effusion is noted bilaterally. Mild subsegmental atelectasis is noted in the included bilateral lungs. Liver:Unremarkable size andcontour. Normal density. No evidence of mass. No evidence of dilated ducts. Gallbladder Fossa: Cholelithiasis without evidence of acute cholecystitis. Spleen: Grossly unremarkable. Pancreas/adrenal glands: Grossly unremarkable size, contour and density. Kidneys: In anatomic position. There is mild atrophy of the right kidney and compensatory hypertrophy of the left kidney. Bilateral non-obstructing renal calculi, measuring approximately 2 mm in the upper pole of right kidney, 1 mm and 5 mm in the upper pole of left kidney. No ureteral calculi. No evidence of a renal mass or cyst. Perinephric space is unremarkable. Retroperitoneum: No enlarged retroperitoneal lymphadenopathy.  The IVC appears unremarkable. Severe calcific atherosclerotic changes noted in the aorta and its branches. Peritoneal cavity: No evidence of free air. Trace ascites is noted. Gastrointestinal tract: Small inguinal hernia is noted on right, it contains portion of small bowel without evidence of obstruction. Tiny fat containing inguinal hernia is noted on left. Tiny hiatal hernia is noted. Changes of mild constipation. There are twisted mesenteric vessels in the right lower quadrant without evidence of obstruction. Large amount of stool and gas in the colon. Appendix is not visualized. Pelvis: Solid and hollow viscera grossly unremarkable. Osseous structures: No acute or destructive bony process identified. Mild scoliosis. Degenerative changes noted in the spine and pelvis    1. Small inguinal hernia is noted on right, it contains portion of small bowel without evidence of obstruction. Tiny fat containing inguinal hernia is noted on left. 2. Tiny hiatal hernia is noted. 3. There are twisted mesenteric vessels in the right lower quadrant without evidence of obstruction. Changes of mild constipation. 4. Trace pleural effusion is noted bilaterally. Mild subsegmental atelectasis is noted in the included bilateral lungs. 5. Cholelithiasis without evidence of acute cholecystitis. 6. There is mild atrophy of the right kidney and compensatory hypertrophy of the left kidney. Bilateral non-obstructing renal calculi. 7. Generalized anasarca. Trace ascites is noted. 8. Moderately large amount of stool in the colon, please correlate with constipation. Recommendation: Follow up as clinically indicated. All CT scans at this facility utilize dose modulation, iterative reconstruction, and/or weight based dosing when appropriate to reduce radiation dose to as low as reasonably achievable.  Electronically Signed by Guillermo Collazo MD at 14-Jun-2022 01:21:42 AM             XR CHEST (2 VW)    Result Date: 6/12/2022  NO PRIOR REPORT AVAILABLE Exam: X-RAYS OF THE CHEST Clinical data:COPD/CHF/pneumonia. Technique: PA and lateral views of the chest. Prior studies: Radiograph of the chest dated 06/04/2022. NM lung scan dated 04/15/2022. Findings:Hyperinflated emphysematous lungs. Bilateral pleural effusions. Slightly enlarged cardiac silhouette. Hyperinflated emphysematous lungs. Bilateral pleural effusions. Slightly enlarged cardiac silhouette. Recommendation: Follow up as clinically indicated. Electronically Signed by Merlin Doss MD at 12-Jun-2022 10:47:39 PM             XR CHEST PORTABLE    Result Date: 6/20/2022  NO PRIOR REPORT AVAILABLE [facility] Clinical data:Short of air, edema. Technique:Single view of the chest. Prior studies: Radiograph of the chest dated 06/11/2022. Findings: The lungs are grossly clear; noevidence of acute infiltrate or pleural effusion. Cardiac silhouette is within normal limits. No acute osseous abnormality is detected. There is progression of infiltrate and effusion left base and new alveolar infiltrates in the right upper and lower lobes. Right pleural effusion also suspected. Median sternotomy wires again noted. Progressive infiltrate and effusion left lung base New alveolar infiltrates right upper and lower lobes with effusion. Recommendation: Follow up short term  Electronically Signed by Madonna Peoples MD at 20-Jun-2022 12:43:34 PM             XR CHEST PORTABLE    Result Date: 6/4/2022  NO PRIOR REPORT AVAILABLE Exam: X-RAY OF THE CHEST Clinical data: COPD, respiratory distress. Technique: Single view of the chest. Prior studies: Radiograph of the chest dated 04/20/2022. Findings:  COPD. Interval worsening bilateral lower lobe infiltrates. Enlarging small bilateral  pleural effusions. Cardiac silhouette is within normal limits. No acute osseous abnormality is detected. No other change. COPD. Interval worsening bilateral lower lobe infiltrates.   Enlarging small bilateral  pleural effusions. Recommendation: Follow up as clinically indicated. Electronically Signed by Charly Simon MD at 04-Jun-2022 04:59:51 PM                 Assessment and Plan: This is a 68y.o. year old female with past medical history of extensive vasculopathy, coronary artery disease with prior one-vessel CABG 2016 with SVG to RCA (severely diseased), bioprosthetic mitral valve replacement with maze procedure, PCI to RCA and circumflex 11/15/2019, repeat PCI to ostial RCA 7/30/2020 with restenosis and repeat PCI 8/10/2021, chronic kidney disease stage III with solitary left kidney with severe left renal artery stenosis, status post endovascular intervention 7/30/2020 with restenosis and repeat intervention 8/10/2021, normal LV ejection fraction, severe renovascular hypertension, COPD, peripheral arterial disease with prior bilateral carotid endarterectomy, iliac stents and possible subclavian stenosis admitted with recurrent acute on chronic diastolic heart failure presentation.     1. Remains anemic with hemoglobin 7.4, stable from yesterday, mild increase in creatinine noted at 1.3. Currently on IV Bumex 2 mg twice daily. Can transition to oral Bumex at similar dosing. Blood pressures well controlled. 2.  Noted troponin elevation at 0.12-0.12-0.08-0.09 that appears flat. Can repeat BNP. Significant debility with anemia and malnutrition. We will continue to monitor patient at this time.       Malini Aguila MD, MD 6/21/2022 7:40 PM

## 2022-06-22 NOTE — PROGRESS NOTES
Palliative Care Progress Note  6/22/2022 8:21 AM    Patient:  Farzaneh Abbott  YOB: 1945  Primary Care Physician: Roya Kendrick MD  Advance Directive: Full Code  Admit Date: 6/20/2022       Hospital Day: 2  Portions of this note have been copied forward, however, changed to reflect the most current clinical status of this patient. CHIEF COMPLAINT/REASON FOR CONSULTATION goals of care, code status discussion, family support, and symptom management    SUBJECTIVE:  Ms. Juan Booker is c/o increased work of breathing and anxiety this morning. She is struggling since conversation with cardiology and has updated her code status to DNR with hospitalist.     Review of Systems:   14 point review of systems is negative except as specifically addressed above. Objective:   VITALS:  BP (!) 153/67   Pulse (!) 130   Temp 100.2 °F (37.9 °C) (Temporal)   Resp 28   Ht 5' 3\" (1.6 m)   Wt 108 lb 4.8 oz (49.1 kg)   SpO2 90%   BMI 19.18 kg/m²   24HR INTAKE/OUTPUT:    Intake/Output Summary (Last 24 hours) at 6/22/2022 3565  Last data filed at 6/21/2022 2322  Gross per 24 hour   Intake 420 ml   Output 475 ml   Net -55 ml     General appearance: 69 yo female, chronically ill appearing, respiratory distress noted  Head: Normocephalic, without obvious abnormality, atraumatic, dry mucous membranes  Eyes: conjunctivae/corneas clear. PERRL, EOM's intact.    Ears: normal external ears and nose  Neck: no JVD, supple, symmetrical, trachea midline   Lungs: rales and expiratory wheeze noted to ausculation bilaterally, shallow inspiration, HF NC in place, accessory muscle use  Heart: tachycardic and regular rhythm, S1, S2 normal, murmur heard  Abdomen: soft, non-tender; non-distended, normal bowel sounds   Extremities: RLE 1+ and LLE 2+ edema,  No erythema, no tenderness to palpation  Skin: Warm, dry, pale  Neurologic: Alert and oriented X 3, generalized weakness and normal tone, no focal deficits  Psychiatric: Anxious, cooperative    Medications:      sodium chloride        bumetanide  2 mg Oral BID    sodium chloride flush  5-40 mL IntraVENous 2 times per day    enoxaparin  30 mg SubCUTAneous Daily    amLODIPine  5 mg Oral BID    Arformoterol Tartrate  15 mcg Nebulization BID    atorvastatin  40 mg Oral Nightly    budesonide  0.5 mg Nebulization BID    clopidogrel  75 mg Oral Daily    guaiFENesin  1,200 mg Oral BID    ipratropium-albuterol  3 mL Inhalation 4x daily    isosorbide mononitrate  30 mg Oral Daily    levothyroxine  25 mcg Oral Daily    metoprolol  50 mg Oral BID    pantoprazole  40 mg Oral BID AC    sildenafil  20 mg Oral TID     hydrOXYzine HCl, sodium chloride flush, sodium chloride, ondansetron **OR** ondansetron, polyethylene glycol, acetaminophen **OR** acetaminophen, potassium chloride **OR** potassium alternative oral replacement **OR** potassium chloride, magnesium sulfate, albuterol  ADULT DIET; Regular; Low Sodium (2 gm)  ADULT ORAL NUTRITION SUPPLEMENT; Lunch; Low Calorie/High Protein Oral Supplement     Lab and other Data:     Recent Labs     06/20/22  1007 06/21/22  0157   WBC 15.9* 13.4*   HGB 7.5* 7.4*    318     Recent Labs     06/20/22  1007 06/21/22  0157 06/22/22  0427    137 136   K 4.0 3.7 3.6   CL 98 97* 97*   CO2 30* 28 27   BUN 29* 32* 30*   CREATININE 1.0* 1.3* 1.1*   GLUCOSE 150* 109 105     Recent Labs     06/20/22  1007   AST 33*   ALT 31   BILITOT 0.3   ALKPHOS 171*       RAD:   XR CHEST PORTABLE  Result Date: 6/20/2022  Progressive infiltrate and effusion left lung base New alveolar infiltrates right upper and lower lobes with effusion.  Recommendation: Follow up short term  Electronically Signed by Royce Fabian MD at 20-Jun-2022 12:43:34 PM             Assessment/Plan   Principal Problem:    Hypoxemic respiratory failure, chronic (HCC)  Active Problems:    Mitral valve insufficiency    Acute on chronic diastolic heart failure (HCC)    Anemia of chronic disease    Palliative care patient    Solitary kidney, acquired    Atherosclerotic PATRICK (renal artery stenosis), unilateral (HCC)    Acute and chronic respiratory failure with hypoxia (HCC)    Coronary artery disease involving native coronary artery of native heart    Pulmonary hypertension    Severe malnutrition (HCC)    Diastolic dysfunction    CHF (congestive heart failure) ([facility] [address]  Resolved Problems:    * No resolved hospital problems. *      Visit Summary:  Chart reviewed, patient discussed with nursing staff. Reviewed health issues, work up and treatment plan as well as factors that lead to hospitalization. Ms. Telma Christianson is seen at bedside with family present. She is in respiratory distress this morning with increased work of breathing, anxiety, and tachycardia. Hospitalist has increased metoprolol dose and had a conversation regarding code status and patient has verbalized wanting to be a DNR. RN administered as needed Atarax for anxiety and patient willing to try one-time dose of IVP morphine to help with air hunger. She reports this is caused severe itching and occasionally hallucinations in the past.  Her daughter reports she tolerated morphine okay however confirms it did confirm itching. I ordered 1 mg IV morphine one-time with 12.5 mg IV Benadryl. I rounded about 30 minutes after with reported improvement in symptoms and patient's daughter, spouse, and other family members present. We had a long discussion regarding cardiology recommendations and patient's current status. I provided information regarding hospice care and additional information regarding process of resuscitation/intubation and the likelihood that patient would not survive intubation. I explained that if patient chose to pursue hospice care she could be evaluated for the hospice care center here at the hospital with a goal to wean O2 in order to transition to outpatient hospice at home.   Family and patient would like additional time to consider goals and options. I have ordered morphine and Benadryl as needed to assist with patient's symptom management since there has been improvement with one-time dose. Opportunity for questions and emotional support provided. Will continue to follow. Recommendations:   1. Palliative Care- GOC continue all medical treatments at this time but does not want intubation. Pt/family discussing transition to hospice but would like additional time. Code status- DNR    2. Acute on chronic hypoxemic respiratory failure- mgmt per hospitalist. Diuresis with PO bumex. O2 support, still requiring large amounts of O2. Morphine 1 mg Q3h prn for air hunger initiated with prn benadryl available for itching. 3. Acute on chronic diastolic heart failure with mitral valve insuffiencey and severe pulmonary HTN- cardiology following and recommending medical management with no further invasive treatments that can be offered. Continue sildenafil and diuresis    4. COPD- continue home neb treatments. Supportive care    5. Anemia of chronic disease with suspected GI bleed- positive OBS last stay, not a candidate for scoping a that time per GI. Transfusional support if <7    6. Anxiety- atarax TID prn per hospitalist.    Thank you for consulting Palliative Care and allowing us to participate in the care of this patient.    Time Spent Counseling > 50%:  YES                                   Total Time Spent with patient/family counseling, workup/treatment review, counseling and placement of orders/preparation of this note: 37 minutes plus an additional 33 minutes of ACP discussion with pt/family    Electronically signed by BEBO Walters CNP on 6/22/2022 at 8:21 AM    (Please note that portions of this note were completed with a voice recognition program.  Mahogany Seymour made to edit the dictations but occasionally words are mis-transcribed.)

## 2022-06-22 NOTE — PROGRESS NOTES
01/29/2020     Priority: Low    Pain in both lower extremities 10/30/2019     Priority: Low    Status post Maze operation for atrial fibrillation 09/25/2019     Priority: Low     Overview Note:     4/18/16      PAF (paroxysmal atrial fibrillation) ([facility] 09/25/2019     Priority: Low    S/P coronary artery bypass graft x 1 09/25/2019     Priority: Low     Overview Note:     4/8/16 SVT to RCA      S/P mitral valve replacement 09/25/2019     Priority: Low     Overview Note:     4/8/16 MVR 23 mm mosaic porcine by Dr. So Wong Chest pain 09/25/2019     Priority: Low    JONES (dyspnea on exertion) 09/25/2019     Priority: Low    Fatigue 09/25/2019     Priority: Low    Postoperative anemia due to acute blood loss 07/12/2019     Priority: Low    Carotid aneurysm, right ([facility] 07/11/2019     Priority: Low    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[facility] 05/21/2019     Priority: Low    Mild aortic stenosis 02/20/2018     Priority: Low    Essential hypertension      Priority: Low    NSTEMI (non-ST elevated myocardial infarction) ([facility] 01/14/2018     Priority: Low    HCAP (healthcare-associated pneumonia) 01/14/2018     Priority: Low    Acute renal failure (ARF) ([facility] 01/14/2018     Priority: Low    Syncope and collapse      Priority: Low    Sepsis with organ dysfunction ([facility] 01/13/2018     Priority: Low    Obstruction of left carotid artery 01/11/2018     Priority: Low    Bilateral carotid artery stenosis 11/19/2017     Priority: Low    Wound of sacral region 06/16/2016     Priority: Low    Elevated TSH 06/16/2016     Priority: Low    GI bleed 06/15/2016     Priority: Low    CHF (congestive heart failure) ([facility] 06/15/2016     Priority: Low    CKD (chronic kidney disease), stage III ([facility] 06/15/2016     Priority: Low    Pulmonary emphysema ([facility] 06/15/2016     Priority: Low    Chronic obstructive pulmonary disease (HCC)      Priority: Low    PVD (peripheral vascular disease) ([facility]      Priority: Low    Atherosclerosis of native artery of right lower extremity with ulceration of ankle ([facility] 06/05/2016     Priority: Low    Atherosclerosis of native arteries of right leg with ulceration of other part of lower right leg 06/05/2016     Priority: Low     Overview Note:     Replacing Inactive Diagnoses      Atherosclerosis of native arteries of right leg with ulceration of other part of foot 06/05/2016     Priority: Low     Overview Note:     Replacing Inactive Diagnoses      Nonhealing ulcer of right lower leg with fat layer exposed ([facility] 06/05/2016     Priority: Low    Non-pressure chronic ulcer of right ankle with fat layer exposed ([address] 06/05/2016     Priority: Low    Neuropathic ulcer of right foot with fat layer exposed ([address] 06/05/2016     Priority: Low    Hypervolemia      Priority: Low    Nonhealing nonsurgical wound with fat layer exposed 06/03/2016     Priority: Low    Atherosclerosis of native arteries of left leg with ulceration of calf (HCC)      Priority: Low    Severe malnutrition (HCC)      Priority: Low    Diastolic dysfunction      Priority: Low    Anemia in chronic kidney disease (CKD)      Priority: Low    Non-pressure chronic ulcer of right calf with fat layer exposed ([facility] 05/27/2016     Priority: Low    Decubitus ulcer of right buttock, stage 3 ([address] 05/27/2016     Priority: Low    Nocturnal hypoxia 03/30/2016     Priority: Low     Overview Note:     With associated mitral stenosis / regurgitation and pulmonary hypertension      Pulmonary hypertension 03/29/2016     Priority: Low    Leukocytosis      Priority: Low    Calculus of gallbladder without cholecystitis without obstruction      Priority: Low    Carotid artery stenosis 02/08/2012     Priority: Low    Atherosclerosis of native artery of extremity with intermittent claudication ([facility] 07/25/2011     Priority: Low    Atherosclerosis of native artery of extremity with intermittent claudication ([facility] 07/25/2011     Priority: Low     Overview Note:     Replacing Inactive Diagnoses      Mixed hyperlipidemia      Priority: Low    Arthritis      Priority: Low    Coronary artery disease involving native coronary artery of native heart      Priority: Low     Overview Note:     3/16/2016  Echo  Severe MS, moderate to severe MR, RVSP 73 mmHg, normal LVFX  3/31/2016  Cath  70% osteal RCA, severe MR, MVA 1.9, normal LVFX  4/7//2016   MVR (23 mm [employer] Mosaic) VG-PDAReinhold Cheko)  5/7/2016  Echo  Normal LVFX, large pleural effusion, echolucency near preserved posterior Mitral leaflet, likely chordae, RVSP 72 mmHg         Admit Date:  6/20/2022    Admission Problem List: Present on Admission:   Hypoxemic respiratory failure, chronic (HCC)   Acute on chronic diastolic heart failure ([address]   Mitral valve insufficiency   Anemia of chronic disease   CHF (congestive heart failure) ([facility]   Diastolic dysfunction   Palliative care patient   Coronary artery disease involving native coronary artery of native heart   Pulmonary hypertension   Solitary kidney, acquired   Atherosclerotic PATRICK (renal artery stenosis), unilateral (HCC)   Severe malnutrition (HCC)   Acute and chronic respiratory failure with hypoxia (HCC)      Cardiac Specific Data:  Specialty Problems        Cardiology Problems    Mitral valve insufficiency        Mitral valve stenosis        Acute superficial venous thrombosis of right lower extremity        Bradycardia        Acute on chronic diastolic heart failure (HCC)        Unstable angina (HCC)        Near syncope        Atherosclerotic PATRICK (renal artery stenosis), unilateral (HCC)        Atherosclerosis of native artery of extremity with intermittent claudication (HCC)        Atherosclerosis of native artery of extremity with intermittent claudication (HCC)        Coronary artery disease involving native coronary artery of native heart        Mixed hyperlipidemia        Carotid artery stenosis        Pulmonary hypertension Atherosclerosis of native arteries of left leg with ulceration of calf ([facility])        Atherosclerosis of native arteries of right leg with ulceration of other part of foot        Atherosclerosis of native artery of right lower extremity with ulceration of ankle ([facility])        CHF (congestive heart failure) ([facility])        PVD (peripheral vascular disease) ([facility])        Bilateral carotid artery stenosis        Obstruction of left carotid artery        NSTEMI (non-ST elevated myocardial infarction) ([facility]        Essential hypertension        Mild aortic stenosis        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[facility])        Carotid aneurysm, right ([facility])        Chest pain        PAF (paroxysmal atrial fibrillation) ([facility])        Chronic atrial fibrillation ([facility]            1.  Coronary artery disease status post CABG with one-vessel bypass, SVG to RCA 2016 with bioprosthetic mitral valve replacement/maze, severely stenotic SVG to RCA by catheterization 11/15/2019 with PCI to proximal to mid RCA and circumflex, repeat PCI 7/30/2020 ostial RCA (3.5 x 9 mm resolute), with restenosis and repeat PCI 8/10/2021 (4.0 x 12 mm), residual ostial circumflex 60% stenosis, normal LV ejection fraction, mild aortic stenosis, severe pulmonary hypertension. 2.  Chronic kidney disease with occluded right renal artery, solitary left kidney with severely stenotic left renal artery status post endovascular intervention 7/30/2020 (5.0 x 22 mm resolute [place] stent), repeat PCI for restenosis 8/10/2021 (5.0 x 12 mm resolute [place]). 3.  Severe hypertension. 4.  COPD. 5.  Peripheral arterial disease with prior iliac stents, bilateral carotid endarterectomy, possible bilateral subclavian artery stenosis with differential blood pressures and bruits. 6.  Severe anemia with transfusion requirements and severe hypoalbuminemia. Subjective:  Ms. Clara Hansen is struggling this morning.   Removed her BiPAP as she could not breathe and was placed on facemask with significant accessory muscle use and shortness of breath but seems to be better with high flow oxygen. On IV diuresis but rising BNP noted. Objective:   BP (!) 114/53   Pulse 97   Temp 98.8 °F (37.1 °C) (Temporal)   Resp 28   Ht 5' 3\" (1.6 m)   Wt 108 lb 4.8 oz (49.1 kg)   SpO2 95%   BMI 19.18 kg/m²       Intake/Output Summary (Last 24 hours) at 6/22/2022 1237  Last data filed at 6/22/2022 0919  Gross per 24 hour   Intake 380 ml   Output 475 ml   Net -95 ml       Prior to Admission medications    Medication Sig Start Date End Date Taking? Authorizing Provider   Multiple Vitamins-Minerals (THERAPEUTIC MULTIVITAMIN-MINERALS) tablet Take 1 tablet by mouth daily   Yes Historical Provider, MD   HYDROcodone-acetaminophen (NORCO) 5-325 MG per tablet Take 1 tablet by mouth every 6 hours as needed for Pain.    Yes Historical Provider, MD   Calcium Polycarbophil (FIBER-CAPS PO) Take 1 capsule by mouth Daily with supper   Yes Historical Provider, MD   clopidogrel (PLAVIX) 75 MG tablet Take 75 mg by mouth daily    Historical Provider, MD   sildenafil (REVATIO) 20 MG tablet Take 1 tablet by mouth 3 times daily 6/14/22 7/14/22  Cuong Perrin MD   predniSONE (DELTASONE) 5 MG tablet Take 1 tablet by mouth daily for 10 days 6/15/22 6/25/22  Cuong Perrin MD   bumetanide (BUMEX) 2 MG tablet Take 1 tablet by mouth 2 times daily  Patient taking differently: Take 2 mg by mouth 2 times daily Dr. Monet Dimas instructed patient to take 3 mg once daily prior to admission and to end on 6/20/22. 6/14/22 7/14/22  Cuong Perrin MD   potassium chloride (KLOR-CON M) 20 MEQ extended release tablet Take 1 tablet by mouth 2 times daily 6/14/22 7/14/22  Cuong Perrin MD   pantoprazole (PROTONIX) 40 MG tablet Take 1 tablet by mouth 2 times daily (before meals) 6/14/22 7/14/22  Cuong Perrin MD   guaiFENesin (MUCINEX) 600 MG extended release tablet Take 2 tablets by mouth 2 times daily 4/20/22   Mancel Foil, APRN - CNP   isosorbide mononitrate (IMDUR) 30 MG extended release tablet Take 60 mg by mouth in the morning and at bedtime     Historical Provider, MD   amLODIPine (NORVASC) 5 MG tablet Take 1 tablet by mouth 2 times daily 7/30/21   Syeda Rand MD   metoprolol (LOPRESSOR) 100 MG tablet Take 0.5 tablets by mouth 2 times daily 50 mg in the AM & 50 mg in the PM 6/30/21   BEBO Castillo   albuterol (ACCUNEB) 1.25 MG/3ML nebulizer solution Inhale 1 ampule into the lungs every 6 hours as needed  1/22/21   Historical Provider, MD   budesonide (PULMICORT) 0.5 MG/2ML nebulizer suspension Inhale 0.5 mg into the lungs at bedtime  2/2/21   Historical Provider, MD   ipratropium-albuterol (DUONEB) 0.5-2.5 (3) MG/3ML SOLN nebulizer solution Inhale 3 mLs into the lungs every 4 hours While Awake 2/2/21   Historical Provider, MD   Probiotic Product (PROBIOTIC DAILY PO) Take 1 tablet by mouth daily    Historical Provider, MD   levothyroxine (SYNTHROID) 25 MCG tablet Take 25 mcg by mouth Daily  11/7/19   Historical Provider, MD   Fexofenadine HCl (MUCINEX ALLERGY PO) Take 1 tablet by mouth 2 times daily     Historical Provider, MD   Arformoterol Tartrate (BROVANA) 15 MCG/2ML NEBU Inhale 15 mcg into the lungs 2 times daily  1/2/20   Historical Provider, MD   OXYGEN Inhale 2 L into the lungs daily     Historical Provider, MD   atorvastatin (LIPITOR) 40 MG tablet Take 1 tablet by mouth daily  Patient taking differently: Take 40 mg by mouth nightly PT TAKES AT NIGHT.  11/20/19   Syeda Rand MD   Biotin 5000 MCG TABS Take 1 tablet by mouth daily     Historical Provider, MD        bumetanide  2 mg Oral BID    metoprolol  100 mg Oral BID    sodium chloride flush  5-40 mL IntraVENous 2 times per day    enoxaparin  30 mg SubCUTAneous Daily    amLODIPine  5 mg Oral BID    Arformoterol Tartrate  15 mcg Nebulization BID    atorvastatin  40 mg Oral Nightly    budesonide  0.5 mg Nebulization BID    clopidogrel  75 mg Oral Daily    guaiFENesin  1,200 mg Oral BID    ipratropium-albuterol  3 mL Inhalation 4x daily    isosorbide mononitrate  30 mg Oral Daily    levothyroxine  25 mcg Oral Daily    pantoprazole  40 mg Oral BID AC    sildenafil  20 mg Oral TID       TELEMETRY: Sinus     Physical Exam:      Physical Exam  Constitutional:       General: She is in acute distress. Appearance: She is ill-appearing. She is not diaphoretic. Comments: In significant respiratory distress with accessory muscle use on facemask. Appears settling down with high flow oxygen  Emaciated   HENT:      Mouth/Throat:      Pharynx: No oropharyngeal exudate. Eyes:      General: No scleral icterus. Right eye: No discharge. Left eye: No discharge. Neck:      Thyroid: No thyromegaly. Vascular: No JVD. Cardiovascular:      Rate and Rhythm: Normal rate and regular rhythm. No extrasystoles are present. Heart sounds: Normal heart sounds, S1 normal and S2 normal. No murmur heard. No systolic murmur is present. No diastolic murmur is present. No friction rub. No gallop. No S3 or S4 sounds. Comments: JVP difficult to discern with mild jugulovenous distention in the setting of accessory muscle use. No edema  Extremities are warm  Systolic murmur    Pulmonary:      Effort: Pulmonary effort is normal. No respiratory distress. Breath sounds: Rales present. No wheezing. Comments: Diminished air entry with crepitations  Chest:      Chest wall: No tenderness. Abdominal:      General: Bowel sounds are normal. There is no distension. Palpations: Abdomen is soft. There is no mass. Tenderness: There is no abdominal tenderness. There is no guarding or rebound. Hernia: No hernia is present. Comments: Soft, nontender  No palpable organomegaly   Musculoskeletal:         General: Normal range of motion. Skin:     General: Skin is warm. Coloration: Skin is not pale. Findings: No rash.    Neurological:      Mental Status: She is kidney and compensatory hypertrophy of the left kidney. Bilateral non-obstructing renal calculi, measuring approximately 2 mm in the upper pole of right kidney, 1 mm and 5 mm in the upper pole of left kidney. No ureteral calculi. No evidence of a renal mass or cyst. Perinephric space is unremarkable. Retroperitoneum: No enlarged retroperitoneal lymphadenopathy. The IVC appears unremarkable. Severe calcific atherosclerotic changes noted in the aorta and its branches. Peritoneal cavity: No evidence of free air. Trace ascites is noted. Gastrointestinal tract: Small inguinal hernia is noted on right, it contains portion of small bowel without evidence of obstruction. Tiny fat containing inguinal hernia is noted on left. Tiny hiatal hernia is noted. Changes of mild constipation. There are twisted mesenteric vessels in the right lower quadrant without evidence of obstruction. Large amount of stool and gas in the colon. Appendix is not visualized. Pelvis: Solid and hollow viscera grossly unremarkable. Osseous structures: No acute or destructive bony process identified. Mild scoliosis. Degenerative changes noted in the spine and pelvis    1. Small inguinal hernia is noted on right, it contains portion of small bowel without evidence of obstruction. Tiny fat containing inguinal hernia is noted on left. 2. Tiny hiatal hernia is noted. 3. There are twisted mesenteric vessels in the right lower quadrant without evidence of obstruction. Changes of mild constipation. 4. Trace pleural effusion is noted bilaterally. Mild subsegmental atelectasis is noted in the included bilateral lungs. 5. Cholelithiasis without evidence of acute cholecystitis. 6. There is mild atrophy of the right kidney and compensatory hypertrophy of the left kidney. Bilateral non-obstructing renal calculi. 7. Generalized anasarca. Trace ascites is noted. 8. Moderately large amount of stool in the colon, please correlate with constipation.  Recommendation: Follow up as clinically indicated. All CT scans at this facility utilize dose modulation, iterative reconstruction, and/or weight based dosing when appropriate to reduce radiation dose to as low as reasonably achievable. Electronically Signed by Alfredo Bruner MD at 14-Jun-2022 01:21:42 AM             XR CHEST (2 VW)    Result Date: 6/12/2022  NO PRIOR REPORT AVAILABLE Exam: X-RAYS OF THE CHEST Clinical data:COPD/CHF/pneumonia. Technique: PA and lateral views of the chest. Prior studies: Radiograph of the chest dated 06/04/2022. NM lung scan dated 04/15/2022. Findings:Hyperinflated emphysematous lungs. Bilateral pleural effusions. Slightly enlarged cardiac silhouette. Hyperinflated emphysematous lungs. Bilateral pleural effusions. Slightly enlarged cardiac silhouette. Recommendation: Follow up as clinically indicated. Electronically Signed by Yamilet Ardon MD at 12-Jun-2022 10:47:39 PM             XR CHEST PORTABLE    Result Date: 6/20/2022  NO PRIOR REPORT AVAILABLE Exam: X-RAY OF THECHEST Clinical data:Short of air, edema. Technique:Single view of the chest. Prior studies: Radiograph of the chest dated 06/11/2022. Findings: The lungs are grossly clear; noevidence of acute infiltrate or pleural effusion. Cardiac silhouette is within normal limits. No acute osseous abnormality is detected. There is progression of infiltrate and effusion left base and new alveolar infiltrates in the right upper and lower lobes. Right pleural effusion also suspected. Median sternotomy wires again noted. Progressive infiltrate and effusion left lung base New alveolar infiltrates right upper and lower lobes with effusion. Recommendation: Follow up short term  Electronically Signed by Vu Darby MD at 20-Jun-2022 12:43:34 PM             XR CHEST PORTABLE    Result Date: 6/4/2022  NO PRIOR REPORT AVAILABLE Exam: X-RAY OF THE CHEST Clinical data: COPD, respiratory distress.  Technique: Single view of the chest. Prior studies: Radiograph of the chest dated 04/20/2022. Findings:  COPD. Interval worsening bilateral lower lobe infiltrates. Enlarging small bilateral  pleural effusions. Cardiac silhouette is within normal limits. No acute osseous abnormality is detected. No other change. COPD. Interval worsening bilateral lower lobe infiltrates. Enlarging small bilateral  pleural effusions. Recommendation: Follow up as clinically indicated. Electronically Signed by Jaspal Muhammad MD at 04-Jun-2022 04:59:51 PM                 Assessment and Plan: This is a 79 y.o. year old female with past medical history of extensive vasculopathy, coronary artery disease with prior one-vessel CABG 2016 with SVG to RCA (severely diseased), bioprosthetic mitral valve replacement with maze procedure, PCI to RCA and circumflex 11/15/2019, repeat PCI to ostial RCA 7/30/2020 with restenosis and repeat PCI 8/10/2021, chronic kidney disease stage III with solitary left kidney with severe left renal artery stenosis, status post endovascular intervention 7/30/2020 with restenosis and repeat intervention 8/10/2021, normal LV ejection fraction, severe renovascular hypertension, COPD, peripheral arterial disease with prior bilateral carotid endarterectomy, iliac stents and possible subclavian stenosis admitted with recurrent acute on chronic diastolic heart failure presentation. 1.  Appears to be in significant distress. If worsening status might require intubation. Repeat echocardiogram to assess aortic valve and bioprosthetic mitral valve. Noted severe pulmonary hypertension with severe LAE with previously preserved EF. Rising BNP despite diuresis. Renal functions remain stable. Would switch p.o. Bumex to IV Lasix at 10 mg an hour. Consider transfer to higher level of care either PCU/ICU.       Brenda Dorado MD, MD 6/22/2022 12:37 PM

## 2022-06-23 NOTE — PROGRESS NOTES
Cardiology Progress Note Mary Ware MD      Patient:  Aria Landaverde  [id number]    Patient Active Problem List    Diagnosis Date Noted    Unstable angina [facility])      Priority: High    Acute on chronic diastolic heart failure ([address]      Priority: High    Bradycardia      Priority: High    Acute superficial venous thrombosis of right lower extremity 04/25/2016     Priority: High     Overview Note:     With large RLE bulla lateral foot skin violaceous discoloration, acutely POA (venous backpressure)      Mitral valve stenosis 03/29/2016     Priority: High    Mitral valve insufficiency 03/29/2016     Priority: High    Solitary kidney, acquired 06/21/2022     Priority: Medium    Atherosclerotic PATRICK (renal artery stenosis), unilateral ([address] 06/21/2022     Priority: Medium    Acute and chronic respiratory failure with hypoxia ([address]      Priority: Medium    Hypoxemic respiratory failure, chronic ([address] 06/20/2022     Priority: Medium    Personal history of noncompliance with medical treatment and regimen 06/14/2022     Priority: Medium    History of gastric ulcer      Priority: Medium    History of duodenal ulcer      Priority: Medium    History of colon polyps      Priority: Medium    Palliative care patient 06/07/2022     Priority: Medium    Anemia of chronic disease 06/05/2022     Priority: Medium    Near syncope 06/04/2022     Priority: Medium    Elevated troponin 04/25/2016     Priority: Medium     Overview Note:     Consider NSTMI, POA      PUD (peptic ulcer disease) 03/29/2016     Priority: Medium    COPD exacerbation ([address] 04/14/2022     Priority: Low    Acute on chronic anemia 04/02/2021     Priority: Low    Occult GI bleeding 04/02/2021     Priority: Low    Chronic atrial fibrillation ([address] 10/06/2020     Priority: Low    History of coronary artery stent placement 01/29/2020     Priority: Low     Overview Note:     11/15/ 2 stents to RCA and 1 to circumflex - Dr. Samreen Messer 01/29/2020     Priority: Low    Pain in both lower extremities 10/30/2019     Priority: Low    Status post Maze operation for atrial fibrillation 09/25/2019     Priority: Low     Overview Note:     4/18/16      PAF (paroxysmal atrial fibrillation) ([facility] 09/25/2019     Priority: Low    S/P coronary artery bypass graft x 1 09/25/2019     Priority: Low     Overview Note:     4/8/16 SVT to RCA      S/P mitral valve replacement 09/25/2019     Priority: Low     Overview Note:     4/8/16 MVR 23 mm mosaic porcine by Dr. Simpson Schilder Chest pain 09/25/2019     Priority: Low    JONES (dyspnea on exertion) 09/25/2019     Priority: Low    Fatigue 09/25/2019     Priority: Low    Postoperative anemia due to acute blood loss 07/12/2019     Priority: Low    Carotid aneurysm, right ([facility] 07/11/2019     Priority: Low    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[facility] 05/21/2019     Priority: Low    Mild aortic stenosis 02/20/2018     Priority: Low    Essential hypertension      Priority: Low    NSTEMI (non-ST elevated myocardial infarction) ([facility] 01/14/2018     Priority: Low    HCAP (healthcare-associated pneumonia) 01/14/2018     Priority: Low    Acute renal failure (ARF) ([facility] 01/14/2018     Priority: Low    Syncope and collapse      Priority: Low    Sepsis with organ dysfunction ([facility] 01/13/2018     Priority: Low    Obstruction of left carotid artery 01/11/2018     Priority: Low    Bilateral carotid artery stenosis 11/19/2017     Priority: Low    Wound of sacral region 06/16/2016     Priority: Low    Elevated TSH 06/16/2016     Priority: Low    GI bleed 06/15/2016     Priority: Low    CHF (congestive heart failure) ([facility] 06/15/2016     Priority: Low    CKD (chronic kidney disease), stage III ([facility] 06/15/2016     Priority: Low    Pulmonary emphysema ([facility] 06/15/2016     Priority: Low    Chronic obstructive pulmonary disease (HCC)      Priority: Low    PVD (peripheral vascular disease) ([facility]      Priority: Low    Atherosclerosis of native artery of right lower extremity with ulceration of ankle ([facility] 06/05/2016     Priority: Low    Atherosclerosis of native arteries of right leg with ulceration of other part of lower right leg 06/05/2016     Priority: Low     Overview Note:     Replacing Inactive Diagnoses      Atherosclerosis of native arteries of right leg with ulceration of other part of foot 06/05/2016     Priority: Low     Overview Note:     Replacing Inactive Diagnoses      Nonhealing ulcer of right lower leg with fat layer exposed ([facility] 06/05/2016     Priority: Low    Non-pressure chronic ulcer of right ankle with fat layer exposed ([address] 06/05/2016     Priority: Low    Neuropathic ulcer of right foot with fat layer exposed ([address] 06/05/2016     Priority: Low    Hypervolemia      Priority: Low    Nonhealing nonsurgical wound with fat layer exposed 06/03/2016     Priority: Low    Atherosclerosis of native arteries of left leg with ulceration of calf (HCC)      Priority: Low    Severe malnutrition (HCC)      Priority: Low    Diastolic dysfunction      Priority: Low    Anemia in chronic kidney disease (CKD)      Priority: Low    Non-pressure chronic ulcer of right calf with fat layer exposed ([facility] 05/27/2016     Priority: Low    Decubitus ulcer of right buttock, stage 3 ([address] 05/27/2016     Priority: Low    Nocturnal hypoxia 03/30/2016     Priority: Low     Overview Note:     With associated mitral stenosis / regurgitation and pulmonary hypertension      Pulmonary hypertension 03/29/2016     Priority: Low    Leukocytosis      Priority: Low    Calculus of gallbladder without cholecystitis without obstruction      Priority: Low    Carotid artery stenosis 02/08/2012     Priority: Low    Atherosclerosis of native artery of extremity with intermittent claudication ([facility] 07/25/2011     Priority: Low    Atherosclerosis of native artery of extremity with intermittent claudication ([facility] 07/25/2011     Priority: Low     Overview Note:     Replacing Inactive Diagnoses      Mixed hyperlipidemia      Priority: Low    Arthritis      Priority: Low    Coronary artery disease involving native coronary artery of native heart      Priority: Low     Overview Note:     3/16/2016  Echo  Severe MS, moderate to severe MR, RVSP 73 mmHg, normal LVFX  3/31/2016  Cath  70% osteal RCA, severe MR, MVA 1.9, normal LVFX  4/7//2016   MVR (23 mm [employer] Mosaic) VG-PDAWandra Conception)  5/7/2016  Echo  Normal LVFX, large pleural effusion, echolucency near preserved posterior Mitral leaflet, likely chordae, RVSP 72 mmHg         Admit Date:  6/20/2022    Admission Problem List: Present on Admission:   Hypoxemic respiratory failure, chronic (HCC)   Acute on chronic diastolic heart failure ([address]   Mitral valve insufficiency   Anemia of chronic disease   CH[facility]congestive heart failure) [facility]   Diastolic dysfunction   Palliative care patient   Coronary artery disease involving native coronary artery of native heart   Pulmonary hypertension   Solitary kidney, acquired   Atherosclerotic PATRICK (renal artery stenosis), unilateral (HCC)   Severe malnutrition (HCC)   Acute and chronic respiratory failure with hypoxia (HCC)      Cardiac Specific Data:  Specialty Problems        Cardiology Problems    Mitral valve insufficiency        Mitral valve stenosis        Acute superficial venous thrombosis of right lower extremity        Bradycardia        Acute on chronic diastolic heart failure (HCC)        Unstable angina (HCC)        Near syncope        Atherosclerotic PATRICK (renal artery stenosis), unilateral (HCC)        Atherosclerosis of native artery of extremity with intermittent claudication (HCC)        Atherosclerosis of native artery of extremity with intermittent claudication (HCC)        Coronary artery disease involving native coronary artery of native heart        Mixed hyperlipidemia        Carotid artery stenosis        Pulmonary hypertension Atherosclerosis of native arteries of left leg with ulceration of calf ([facility])        Atherosclerosis of native arteries of right leg with ulceration of other part of foot        Atherosclerosis of native artery of right lower extremity with ulceration of ankle ([facility])        CHF (congestive heart failure) ([facility])        PVD (peripheral vascular disease) ([facility])        Bilateral carotid artery stenosis        Obstruction of left carotid artery        NSTEMI (non-ST elevated myocardial infarction) ([facility]        Essential hypertension        Mild aortic stenosis        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[facility])        Carotid aneurysm, right ([facility])        Chest pain        PAF (paroxysmal atrial fibrillation) ([facility])        Chronic atrial fibrillation ([facility]            1.  Coronary artery disease status post CABG with one-vessel bypass, SVG to RCA 2016 with bioprosthetic mitral valve replacement/maze, severely stenotic SVG to RCA by catheterization 11/15/2019 with PCI to proximal to mid RCA and circumflex, repeat PCI 7/30/2020 ostial RCA (3.5 x 9 mm resolute), with restenosis and repeat PCI 8/10/2021 (4.0 x 12 mm), residual ostial circumflex 60% stenosis, normal LV ejection fraction, mild aortic stenosis, severe pulmonary hypertension. 2.  Chronic kidney disease with occluded right renal artery, solitary left kidney with severely stenotic left renal artery status post endovascular intervention 7/30/2020 (5.0 x 22 mm resolute [place] stent), repeat PCI for restenosis 8/10/2021 (5.0 x 12 mm resolute [place]). 3.  Severe hypertension. 4.  COPD. 5.  Peripheral arterial disease with prior iliac stents, bilateral carotid endarterectomy, possible bilateral subclavian artery stenosis with differential blood pressures and bruits. 6.  Severe anemia with transfusion requirements and severe hypoalbuminemia.      Subjective:  Ms. Ashley Durand remains short of breath but says slight improvement on high flow oxygen.   proBNP however rising to 32,000 despite IV diuresis. Limited urine output. No chest pain. Noted drop in hemoglobin to 6.9 and received a unit of transfusion. Patient made DNR and is contemplating hospice. Objective:   BP (!) 132/55   Pulse 97   Temp 98 °F (36.7 °C) (Temporal)   Resp 24   Ht 5' 3\" (1.6 m)   Wt 108 lb 4.8 oz (49.1 kg)   SpO2 90%   BMI 19.18 kg/m²       Intake/Output Summary (Last 24 hours) at 6/23/2022 1615  Last data filed at 6/23/2022 1410  Gross per 24 hour   Intake 1392.67 ml   Output 1600 ml   Net -207.33 ml       Prior to Admission medications    Medication Sig Start Date End Date Taking? Authorizing Provider   Multiple Vitamins-Minerals (THERAPEUTIC MULTIVITAMIN-MINERALS) tablet Take 1 tablet by mouth daily   Yes Historical Provider, MD   HYDROcodone-acetaminophen (NORCO) 5-325 MG per tablet Take 1 tablet by mouth every 6 hours as needed for Pain.    Yes Historical Provider, MD   Calcium Polycarbophil (FIBER-CAPS PO) Take 1 capsule by mouth Daily with supper   Yes Historical Provider, MD   clopidogrel (PLAVIX) 75 MG tablet Take 75 mg by mouth daily    Historical Provider, MD   sildenafil (REVATIO) 20 MG tablet Take 1 tablet by mouth 3 times daily 6/14/22 7/14/22  Cuong Perrin MD   predniSONE (DELTASONE) 5 MG tablet Take 1 tablet by mouth daily for 10 days 6/15/22 6/25/22  Cuong Perrin MD   bumetanide (BUMEX) 2 MG tablet Take 1 tablet by mouth 2 times daily  Patient taking differently: Take 2 mg by mouth 2 times daily Dr. Monet Dimas instructed patient to take 3 mg once daily prior to admission and to end on 6/20/22. 6/14/22 7/14/22  Cuong Perrin MD   potassium chloride (KLOR-CON M) 20 MEQ extended release tablet Take 1 tablet by mouth 2 times daily 6/14/22 7/14/22  Cuong Perrin MD   pantoprazole (PROTONIX) 40 MG tablet Take 1 tablet by mouth 2 times daily (before meals) 6/14/22 7/14/22  Cuong Perrin MD   guaiFENesin (MUCINEX) 600 MG extended release tablet Take 2 tablets by mouth 2 times daily 4/20/22   Magalis Penaloza BEBO Toussaint - WIN   isosorbide mononitrate (IMDUR) 30 MG extended release tablet Take 60 mg by mouth in the morning and at bedtime     Historical Provider, MD   amLODIPine (NORVASC) 5 MG tablet Take 1 tablet by mouth 2 times daily 7/30/21   Artemio Linder MD   metoprolol (LOPRESSOR) 100 MG tablet Take 0.5 tablets by mouth 2 times daily 50 mg in the AM & 50 mg in the PM 6/30/21   BEBO Gr   albuterol (ACCUNEB) 1.25 MG/3ML nebulizer solution Inhale 1 ampule into the lungs every 6 hours as needed  1/22/21   Historical Provider, MD   budesonide (PULMICORT) 0.5 MG/2ML nebulizer suspension Inhale 0.5 mg into the lungs at bedtime  2/2/21   Historical Provider, MD   ipratropium-albuterol (DUONEB) 0.5-2.5 (3) MG/3ML SOLN nebulizer solution Inhale 3 mLs into the lungs every 4 hours While Awake 2/2/21   Historical Provider, MD   Probiotic Product (PROBIOTIC DAILY PO) Take 1 tablet by mouth daily    Historical Provider, MD   levothyroxine (SYNTHROID) 25 MCG tablet Take 25 mcg by mouth Daily  11/7/19   Historical Provider, MD   Fexofenadine HCl (MUCINEX ALLERGY PO) Take 1 tablet by mouth 2 times daily     Historical Provider, MD   Arformoterol Tartrate (BROVANA) 15 MCG/2ML NEBU Inhale 15 mcg into the lungs 2 times daily  1/2/20   Historical Provider, MD   OXYGEN Inhale 2 L into the lungs daily     Historical Provider, MD   atorvastatin (LIPITOR) 40 MG tablet Take 1 tablet by mouth daily  Patient taking differently: Take 40 mg by mouth nightly PT TAKES AT NIGHT.  11/20/19   Artemio Linder MD   Biotin 5000 MCG TABS Take 1 tablet by mouth daily     Historical Provider, MD Giulia Prasad by provider] bumetanide  2 mg Oral BID    metoprolol  100 mg Oral BID    sodium chloride flush  5-40 mL IntraVENous 2 times per day    enoxaparin  30 mg SubCUTAneous Daily    amLODIPine  5 mg Oral BID    Arformoterol Tartrate  15 mcg Nebulization BID    atorvastatin  40 mg Oral Nightly    budesonide  0.5 mg Nebulization BID    clopidogrel  75 mg Oral Daily    guaiFENesin  1,200 mg Oral BID    ipratropium-albuterol  3 mL Inhalation 4x daily    isosorbide mononitrate  30 mg Oral Daily    levothyroxine  25 mcg Oral Daily    pantoprazole  40 mg Oral BID AC    sildenafil  20 mg Oral TID       TELEMETRY: Sinus     Physical Exam:      Physical Exam  Constitutional:       General: She is not in acute distress. Appearance: She is not diaphoretic. Comments: Emaciated  Reduced accessory muscle use comparative to yesterday. On high flow oxygen. HENT:      Mouth/Throat:      Pharynx: No oropharyngeal exudate. Eyes:      General: No scleral icterus. Right eye: No discharge. Left eye: No discharge. Neck:      Thyroid: No thyromegaly. Vascular: No JVD. Cardiovascular:      Rate and Rhythm: Normal rate and regular rhythm. No extrasystoles are present. Heart sounds: S1 normal and S2 normal. Murmur heard. No systolic murmur is present. No diastolic murmur is present. No friction rub. No gallop. No S3 or S4 sounds. Comments: JVP difficult to discern but appears elevated  No edema  Extremities are still lukewarm  Systolic murmur appreciated  Pulmonary:      Effort: Pulmonary effort is normal. No respiratory distress. Breath sounds: Normal breath sounds. No wheezing or rales. Chest:      Chest wall: No tenderness. Abdominal:      General: Bowel sounds are normal. There is no distension. Palpations: Abdomen is soft. There is no mass. Tenderness: There is no abdominal tenderness. There is no guarding or rebound. Hernia: No hernia is present. Comments: Soft, nontender  No palpable organomegaly   Musculoskeletal:         General: Normal range of motion. Skin:     General: Skin is warm. Coloration: Skin is not pale. Findings: No rash. Neurological:      Mental Status: She is alert and oriented to person, place, and time.       Cranial Nerves: No cranial nerve deficit. Deep Tendon Reflexes: Reflexes normal.                 Lab Data:  CBC:   Recent Labs     06/21/22  0157 06/23/22  0254   WBC 13.4* 12.5*   HGB 7.4* 6.9*   HCT 25.2* 23.0*   MCV 96.9 94.3    347     BMP:   Recent Labs     06/21/22  0157 06/22/22  0427 06/23/22  0254    136 135*   K 3.7 3.6 3.3*   CL 97* 97* 96*   CO2 28 27 27   BUN 32* 30* 33*   CREATININE 1.3* 1.1* 1.3*     LIVER PROFILE: No results for input(s): AST, ALT, LIPASE, BILIDIR, BILITOT, ALKPHOS in the last 72 hours. Invalid input(s): AMYLASE,  ALB  PT/INR: No results for input(s): PROTIME, INR in the last 72 hours. APTT: No results for input(s): APTT in the last 72 hours. BNP:  No results for input(s): BNP in the last 72 hours. CK, CKMB, Troponin: @LABRCNT (CKTOTAL:3, CKMB:3, TROPONINI:3)@    IMAGING:  CT ABDOMEN PELVIS WO CONTRAST Additional Contrast? None    Result Date: 6/14/2022  NO PRIOR REPORT AVAILABLE Exam: CT OF THE ABDOMEN/PELVIS WITHOUT CONTRAST Clinical data: Abdominal pain. Technique: Axial CT images were acquired through the abdomen and pelvis without contrast using soft tissue and bone algorithms. Reformatted/MPR images were performed. Radiation dose: CTDIvol =16.41 mGy, DLP =701 mGy x cm. Limitations: Lack of intravenous contrast limits evaluation of solid viscera. Lack of oral contrast limits evaluation of the bowel loops. Prior Studies: Renal arterial ultrasound dated 7/30/2020 images. Findings: Lung bases:Trace pleural effusion is noted bilaterally. Mild subsegmental atelectasis is noted in the included bilateral lungs. Liver:Unremarkable size andcontour. Normal density. No evidence of mass. No evidence of dilated ducts. Gallbladder Fossa: Cholelithiasis without evidence of acute cholecystitis. Spleen: Grossly unremarkable. Pancreas/adrenal glands: Grossly unremarkable size, contour and density. Kidneys: In anatomic position.  There is mild atrophy of the right kidney and compensatory hypertrophy of the left kidney. Bilateral non-obstructing renal calculi, measuring approximately 2 mm in the upper pole of right kidney, 1 mm and 5 mm in the upper pole of left kidney. No ureteral calculi. No evidence of a renal mass or cyst. Perinephric space is unremarkable. Retroperitoneum: No enlarged retroperitoneal lymphadenopathy. The IVC appears unremarkable. Severe calcific atherosclerotic changes noted in the aorta and its branches. Peritoneal cavity: No evidence of free air. Trace ascites is noted. Gastrointestinal tract: Small inguinal hernia is noted on right, it contains portion of small bowel without evidence of obstruction. Tiny fat containing inguinal hernia is noted on left. Tiny hiatal hernia is noted. Changes of mild constipation. There are twisted mesenteric vessels in the right lower quadrant without evidence of obstruction. Large amount of stool and gas in the colon. Appendix is not visualized. Pelvis: Solid and hollow viscera grossly unremarkable. Osseous structures: No acute or destructive bony process identified. Mild scoliosis. Degenerative changes noted in the spine and pelvis    1. Small inguinal hernia is noted on right, it contains portion of small bowel without evidence of obstruction. Tiny fat containing inguinal hernia is noted on left. 2. Tiny hiatal hernia is noted. 3. There are twisted mesenteric vessels in the right lower quadrant without evidence of obstruction. Changes of mild constipation. 4. Trace pleural effusion is noted bilaterally. Mild subsegmental atelectasis is noted in the included bilateral lungs. 5. Cholelithiasis without evidence of acute cholecystitis. 6. There is mild atrophy of the right kidney and compensatory hypertrophy of the left kidney. Bilateral non-obstructing renal calculi. 7. Generalized anasarca. Trace ascites is noted. 8. Moderately large amount of stool in the colon, please correlate with constipation. Recommendation: Follow up as clinically indicated.  All CT scans at this facility utilize dose modulation, iterative reconstruction, and/or weight based dosing when appropriate to reduce radiation dose to as low as reasonably achievable. Electronically Signed by Torito Aguirre MD at 14-Jun-2022 01:21:42 AM             XR CHEST (2 VW)    Result Date: 6/12/2022  NO PRIOR REPORT AVAILABLE Exam: X-RAYS OF THE CHEST Clinical data:COPD/CHF/pneumonia. Technique: PA and lateral views of the chest. Prior studies: Radiograph of the chest dated 06/04/2022. NM lung scan dated 04/15/2022. Findings:Hyperinflated emphysematous lungs. Bilateral pleural effusions. Slightly enlarged cardiac silhouette. Hyperinflated emphysematous lungs. Bilateral pleural effusions. Slightly enlarged cardiac silhouette. Recommendation: Follow up as clinically indicated. Electronically Signed by Nic Crews MD at 12-Jun-2022 10:47:39 PM             XR CHEST PORTABLE    Result Date: 6/20/2022  NO PRIOR REPORT AVAILABLE [facility] Clinical data:Short of air, edema. Technique:Single view of the chest. Prior studies: Radiograph of the chest dated 06/11/2022. Findings: The lungs are grossly clear; noevidence of acute infiltrate or pleural effusion. Cardiac silhouette is within normal limits. No acute osseous abnormality is detected. There is progression of infiltrate and effusion left base and new alveolar infiltrates in the right upper and lower lobes. Right pleural effusion also suspected. Median sternotomy wires again noted. Progressive infiltrate and effusion left lung base New alveolar infiltrates right upper and lower lobes with effusion. Recommendation: Follow up short term  Electronically Signed by Silviano Gannon MD at 20-Jun-2022 12:43:34 PM             XR CHEST PORTABLE    Result Date: 6/4/2022  NO PRIOR REPORT AVAILABLE Exam: X-RAY OF THE CHEST Clinical data: COPD, respiratory distress.  Technique: Single view of the chest. Prior studies: Radiograph of the chest dated 04/20/2022. Findings:  COPD. Interval worsening bilateral lower lobe infiltrates. Enlarging small bilateral  pleural effusions. Cardiac silhouette is within normal limits. No acute osseous abnormality is detected. No other change. COPD. Interval worsening bilateral lower lobe infiltrates. Enlarging small bilateral  pleural effusions. Recommendation: Follow up as clinically indicated. Electronically Signed by Melia Contreras MD at 04-Jun-2022 04:59:51 PM                 Assessment and Plan:       This is a 79 y.o. year old female with past medical history of extensive vasculopathy, coronary artery disease with prior one-vessel CABG 2016 with SVG to RCA (severely diseased), bioprosthetic mitral valve replacement with maze procedure, PCI to RCA and circumflex 11/15/2019, repeat PCI to ostial RCA 7/30/2020 with restenosis and repeat PCI 8/10/2021, chronic kidney disease stage III with solitary left kidney with severe left renal artery stenosis, status post endovascular intervention 7/30/2020 with restenosis and repeat intervention 8/10/2021, normal LV ejection fraction, severe renovascular hypertension, COPD, peripheral arterial disease with prior bilateral carotid endarterectomy, iliac stents and possible subclavian stenosis admitted with recurrent acute on chronic diastolic heart failure presentation. 1.  Rising proBNP despite IV diuresis. Echo reviewed. EF mildly reduced at around 45%. Moderate RVE with severe pulmonary hypertension. Mild to moderate aortic stenosis with mild mitral regurgitation. Currently DNR status with contemplation of hospice. Limited good options with severe anemia and recurrent transfusion requirements, malnutrition with hypoalbuminemia, with poor functional status. 2.  We will continue to follow.     Andrea Chao MD, MD 6/23/2022 4:15 PM

## 2022-06-23 NOTE — TELEPHONE ENCOUNTER
Ruth called and left a message wanting to talk to Dr Syed. Patient is in the hospital and it is really bad.

## 2022-06-23 NOTE — PROGRESS NOTES
Palliative Care Progress Note  6/23/2022 7:57 AM    Patient:  Rafael Matthews  YOB: 1945  Primary Care Physician: Trip Godinez MD  Advance Directive: DNR  Admit Date: 6/20/2022       Hospital Day: 3  Portions of this note have been copied forward, however, changed to reflect the most current clinical status of this patient. CHIEF COMPLAINT/REASON FOR CONSULTATION goals of care, code status discussion, family support, and symptom management    SUBJECTIVE:  Ms. Antony Fernandez c/o worsening dyspnea today. She continues to require large amounts of O2. Receiving 1 unit PRBCs today. Review of Systems:   14 point review of systems is negative except as specifically addressed above. Objective:   VITALS:  /71   Pulse 84   Temp 98.3 °F (36.8 °C) (Temporal)   Resp 26   Ht 5' 3\" (1.6 m)   Wt 108 lb 4.8 oz (49.1 kg)   SpO2 91%   BMI 19.18 kg/m²   24HR INTAKE/OUTPUT:      Intake/Output Summary (Last 24 hours) at 6/23/2022 0757  Last data filed at 6/23/2022 0346  Gross per 24 hour   Intake 760 ml   Output 1350 ml   Net -590 ml     General appearance: 67 yo female, chronically ill appearing, respiratory distress noted  Head: Normocephalic, without obvious abnormality, atraumatic, dry mucous membranes  Eyes: conjunctivae/corneas clear. PERRL, EOM's intact.    Ears: normal external ears and nose  Neck: no JVD, supple, symmetrical, trachea midline   Lungs: diminished to ausculation bilaterally, shallow inspiration, tachypneic HF NC in place, accessory muscle use  Heart: tachycardic and regular rhythm, S1, S2 normal, murmur heard  Abdomen: soft, non-tender; non-distended, normal bowel sounds   Extremities: BLE 1+ edema,  No erythema, no tenderness to palpation  Skin: Warm, dry, pale  Neurologic: Alert and oriented X 3, generalized weakness and normal tone, no focal deficits  Psychiatric: Anxious, irritable    Medications:      furosemide (LASIX) 1mg/ml infusion 10 mg/hr (06/23/22 0015)    sodium chloride        [Held by provider] bumetanide  2 mg Oral BID    metoprolol  100 mg Oral BID    sodium chloride flush  5-40 mL IntraVENous 2 times per day    enoxaparin  30 mg SubCUTAneous Daily    amLODIPine  5 mg Oral BID    Arformoterol Tartrate  15 mcg Nebulization BID    atorvastatin  40 mg Oral Nightly    budesonide  0.5 mg Nebulization BID    clopidogrel  75 mg Oral Daily    guaiFENesin  1,200 mg Oral BID    ipratropium-albuterol  3 mL Inhalation 4x daily    isosorbide mononitrate  30 mg Oral Daily    levothyroxine  25 mcg Oral Daily    pantoprazole  40 mg Oral BID AC    sildenafil  20 mg Oral TID     morphine, diphenhydrAMINE, hydrOXYzine HCl, sodium chloride flush, sodium chloride, ondansetron **OR** ondansetron, polyethylene glycol, acetaminophen **OR** acetaminophen, potassium chloride **OR** potassium alternative oral replacement **OR** potassium chloride, magnesium sulfate, albuterol  ADULT DIET; Regular; Low Sodium (2 gm)  ADULT ORAL NUTRITION SUPPLEMENT; Lunch; Low Calorie/High Protein Oral Supplement     Lab and other Data:     Recent Labs     06/20/22  1007 06/21/22  0157 06/23/22  0254   WBC 15.9* 13.4* 12.5*   HGB 7.5* 7.4* 6.9*    318 347     Recent Labs     06/21/22  0157 06/22/22  0427 06/23/22  0254    136 135*   K 3.7 3.6 3.3*   CL 97* 97* 96*   CO2 28 27 27   BUN 32* 30* 33*   CREATININE 1.3* 1.1* 1.3*   GLUCOSE 109 105 144*     Recent Labs     06/20/22  1007   AST 33*   ALT 31   BILITOT 0.3   ALKPHOS 171*     RAD:   XR CHEST PORTABLE  Result Date: 6/20/2022  Progressive infiltrate and effusion left lung base New alveolar infiltrates right upper and lower lobes with effusion.  Recommendation: Follow up short term  Electronically Signed by Franklyn Ruiz MD at 20-Jun-2022 12:43:34 PM             Assessment/Plan   Principal Problem:    Hypoxemic respiratory failure, chronic (HCC)  Active Problems:    Mitral valve insufficiency    Acute on chronic diastolic heart failure (HCC)    Anemia of chronic disease    Palliative care patient    Solitary kidney, acquired    Atherosclerotic PATRICK (renal artery stenosis), unilateral (HCC)    Acute and chronic respiratory failure with hypoxia (HCC)    Coronary artery disease involving native coronary artery of native heart    Pulmonary hypertension    Severe malnutrition (HCC)    Diastolic dysfunction    CHF (congestive heart failure) ([facility] [address]  Resolved Problems:    * No resolved hospital problems. *      Visit Summary:  Chart reviewed. Ms. Khari Mcclain is seen at bedside this morning with nursing staff and her daughter, Sean, present. Patient continues to require large amounts of O2 with respiratory distress. She is utilizing as needed morphine for air hunger which does help when she takes it. Atarax and Benadryl are ordered as needed for itching/anxiety. She is receiving blood today for drop in Hgb and cardiology is planning on repeating echo. I had a long discussion with patient and her daughter regarding her current status. We discussed continued decline in condition and I provided additional information regarding hospice care. Patient is experiencing increased denial and anger regarding her health and position today. She wants to be in charge of making decisions for herself and states that she will \"go home, I know not today, but I will get back home. \"  Patient's daughter is allowing her to make decisions for herself as long as she can and attempting to be supportive. We discussed the hospice care center and I explained this option is still available with the goal when she gets there to continue to attempt to wean O2 so she can go home with outpatient hospice. We discussed the likelihood that patient will not leave the hospital but she does remain hopeful. We discussed symptom management and I explained that the hospice care center would be able to help control symptoms more and help reduce suffering.  I will continue to check in on pt throughout today and follow up tomorrow. If patient decides they wish to pursue hospice and I am not present a hospice consult can be ordered for evaluation to transfer to the care center. I will increase patient's Benadryl to every 4 hours to help with symptom control and encouraged continued morphine every 3 hours for dyspnea. Opportunity for questions and emotional support provided. Recommendations:   1. Palliative Care- GOC continue all medical treatments at this time but does not want intubation. Pt declining transition to hospice at this time and hopeful she will improve. Will continue goals of care conversations. Code status- DNR    2. Acute on chronic hypoxemic respiratory failure- mgmt per hospitalist. Diuresis with PO bumex. O2 support, still requiring large amounts of O2. Morphine 1 mg Q3h prn for air hunger with prn benadryl available for itching. 3. Acute on chronic diastolic heart failure with mitral valve insuffiencey and severe pulmonary HTN- cardiology following and recommending medical management with no further invasive treatments that can be offered. Continue sildenafil and diuresis. Echo today pending    4. COPD- continue home neb treatments. Supportive care    5. Anemia of chronic disease with suspected GI bleed- positive OBS last stay, not a candidate for scoping a that time per GI. 1 Unit PRBCs today. Transfusional support if <7    6. Anxiety- atarax TID prn per hospitalist.    Thank you for consulting Palliative Care and allowing us to participate in the care of this patient.    Time Spent Counseling > 50%:  YES                                   Total Time Spent with patient/family counseling, workup/treatment review, counseling and placement of orders/preparation of this note: 36 minutes    Electronically signed by BEBO Hicks CNP on 6/23/2022 at 7:56 AM    (Please note that portions of this note were completed with a voice recognition program.  Effie weller to edit the dictations but occasionally words are mis-transcribed.)

## 2022-06-23 NOTE — PROGRESS NOTES
[facility]      [facility]    Patient:  Dawson Salinas  YOB: 1945  Date of Service: 6/23/2022  MRN: [id number]   Acct: [de-identified]   Primary Care Physician: Kalyani Black MD  Advance Directive: DNR  Admit Date: 6/20/2022       Hospital Day: 3    Portions of this note have been copied forward, however, updated to reflect the most current clinical status of this patient. CHIEF COMPLAINT shortness of breath    SUBJECTIVE: Less anxious today. Cardiology had seen and advised her he cannot do anything further for her cardiac status. We discussed ventilator support since she is having difficulty maintaining her oxygen saturation. She does not want to be intubated or coded. Code status changed and Pallative notified and they are working with her and family about goals of care. Today she stated she felt pressure to make a decision about hospice. Reassure pt that it is her decision and no one is pressuring just trying to give options for her. She understands she is end stage but stated she wanted us to get her better enough to go home. I told her we would do all we could but that might not be possible. Daughter at bedside and reinforced to her mother what I told her. CUMULATIVE HOSPITAL COURSE:     Dawson Salinas is an 68 y.o. female with past medical history of aortic stenosis, CHF, ASCVD, COPD, hyperlipidemia, hypertension, history of MI, mitral valve stenosis, and arthritis. Patient was recently discharged on  with complaint of dyspnea. Patient states she did well for the first few days after discharge at home. Then her daughter reports she has gained about a pound a day since fourth day post discharge. Has become increasingly short of breath her pulse ox dropping to as much as in the 50s at home. Confirmed by EMS. Patient denies chest pain , significantly more short of breath.   ER evaluation shows progressive infiltrate and effusion left lung base with new alveolar infiltrates in the right upper and lower lobes with effusion. Chemistry BUN 29 creatinine 1 glucose 150 BNP 18,884 white count is 15.9 hemoglobin 7.5 hematocrit 25.9 platelets 430  PT 9.46 PCO2 43 PO2 30 bicarb 33. Be admitted to hospitalist services with cardiology consult she is an established Dr. Gloria Vanessa patient. Been in contact with the office and she was instructed to come yesterday but wanted to wait. Cardiology has seen the patient and feel that medical management is her only option. Palliative care consult called. Metoprolol increased to help with tachycardia and b/p. Review of Systems   Constitutional: Negative. HENT: Negative. Eyes: Negative. Respiratory: Positive for shortness of breath. Cardiovascular: Negative. Gastrointestinal: Negative. Endocrine: Negative. Musculoskeletal: Negative. Allergic/Immunologic: Negative. Neurological: Negative. Hematological: Negative. Psychiatric/Behavioral: Negative. Anxious        Objective:   VITALS:  BP (!) 132/55   Pulse 98   Temp 98 °F (36.7 °C) (Temporal)   Resp 30   Ht 5' 3\" (1.6 m)   Wt 108 lb 4.8 oz (49.1 kg)   SpO2 (!) 87%   BMI 19.18 kg/m²   24HR INTAKE/OUTPUT:      Intake/Output Summary (Last 24 hours) at 6/23/2022 1428  Last data filed at 6/23/2022 1410  Gross per 24 hour   Intake 1072.67 ml   Output 1050 ml   Net 22.67 ml           Physical Exam  Vitals and nursing note reviewed. HENT:      Head: Normocephalic. Nose: Nose normal.      Mouth/Throat:      Mouth: Mucous membranes are dry. Eyes:      Extraocular Movements: Extraocular movements intact. Neck:      Comments: JVD  Cardiovascular:      Rate and Rhythm: Tachycardia present. Heart sounds: Murmur heard. Pulmonary:      Breath sounds: Wheezing and rales present. Abdominal:      General: Bowel sounds are normal.      Palpations: Abdomen is soft. Musculoskeletal:      Right lower leg: Edema present.       Left lower leg: Edema present. Skin:     General: Skin is warm and dry. Coloration: Skin is pale. Neurological:      General: No focal deficit present. Mental Status: She is alert. Psychiatric:      Comments: anxious            Medications:      sodium chloride      furosemide (LASIX) 1mg/ml infusion 10 mg/hr (06/23/22 1048)    sodium chloride        [Held by provider] bumetanide  2 mg Oral BID    metoprolol  100 mg Oral BID    sodium chloride flush  5-40 mL IntraVENous 2 times per day    enoxaparin  30 mg SubCUTAneous Daily    amLODIPine  5 mg Oral BID    Arformoterol Tartrate  15 mcg Nebulization BID    atorvastatin  40 mg Oral Nightly    budesonide  0.5 mg Nebulization BID    clopidogrel  75 mg Oral Daily    guaiFENesin  1,200 mg Oral BID    ipratropium-albuterol  3 mL Inhalation 4x daily    isosorbide mononitrate  30 mg Oral Daily    levothyroxine  25 mcg Oral Daily    pantoprazole  40 mg Oral BID AC    sildenafil  20 mg Oral TID     sodium chloride, diphenhydrAMINE, saliva substitute, morphine, hydrOXYzine HCl, sodium chloride flush, sodium chloride, ondansetron **OR** ondansetron, polyethylene glycol, acetaminophen **OR** acetaminophen, potassium chloride **OR** potassium alternative oral replacement **OR** potassium chloride, magnesium sulfate, albuterol  ADULT DIET; Regular; Low Sodium (2 gm)  ADULT ORAL NUTRITION SUPPLEMENT; Lunch; Low Calorie/High Protein Oral Supplement     Lab and other Data:     Recent Labs     06/21/22  0157 06/23/22  0254   WBC 13.4* 12.5*   HGB 7.4* 6.9*    347     Recent Labs     06/21/22  0157 06/22/22  0427 06/23/22  0254    136 135*   K 3.7 3.6 3.3*   CL 97* 97* 96*   CO2 28 27 27   BUN 32* 30* 33*   CREATININE 1.3* 1.1* 1.3*   GLUCOSE 109 105 144*     No results for input(s): AST, ALT, ALB, BILITOT, ALKPHOS in the last 72 hours.   Troponin T:   Recent Labs     06/21/22  0157 06/21/22  0845 06/21/22  1401   TROPONINI 0.12* 0.08* 0.09* Pro-BNP: No results for input(s): BNP in the last 72 hours. INR: No results for input(s): INR in the last 72 hours. UA:  No results for input(s): NITRITE, COLORU, PHUR, LABCAST, WBCUA, RBCUA, MUCUS, TRICHOMONAS, YEAST, BACTERIA, CLARITYU, SPECGRAV, LEUKOCYTESUR, UROBILINOGEN, BILIRUBINUR, BLOODU, GLUCOSEU, AMORPHOUS in the last 72 hours. Invalid input(s): [place]  A1C:   Recent Labs     06/20/22  1737   LABA1C 5.6     ABG:No results for input(s): PHART, YIP6BGL, PO2ART, SNI3ZJA, BEART, HGBAE, A1WHRJLW, CARBOXHGBART in the last 72 hours. RAD:   XR CHEST PORTABLE    Result Date: 6/20/2022  NO PRIOR REPORT AVAILABLE Exam: X-RAY OF [facility] Clinical data:Short of air, edema. Technique:Single view of the chest. Prior studies: Radiograph of the chest dated 06/11/2022. Findings: The lungs are grossly clear; noevidence of acute infiltrate or pleural effusion. Cardiac silhouette is within normal limits. No acute osseous abnormality is detected. There is progression of infiltrate and effusion left base and new alveolar infiltrates in the right upper and lower lobes. Right pleural effusion also suspected. Median sternotomy wires again noted. Progressive infiltrate and effusion left lung base New alveolar infiltrates right upper and lower lobes with effusion.  Recommendation: Follow up short term  Electronically Signed by Torey Perez MD at 20-Jun-2022 12:43:34 PM                      Assessment/Plan     Principal Problem:    Hypoxemic respiratory failure, chronic (HCC)              O2 to keep pulse ox above 90              Diurese              Strict intake and output              ABGs for acute hypoxic episodes  Active Problems:    Mitral valve insufficiency              Noted    Acute on chronic diastolic heart failure (HCC)-end stage              Lasix gtt              strict intake and output              Serial labs              Telemetry              Continue sildenafil              Daily weight   Pallative care-goals of care and code status    Anemia of chronic disease              Infuse if hemoglobin less than 7-one unit today  COPD              Continue home neb treatments              Supplemental O2 to keep SPO2 between 88 and 92%  Diabetes              Accu-Cheks before meals and at bedtime              Hemoglobin A1C-5.6              Is not on home medication for diabetes              Recently on steroids  Hypothyroid             Continue Synthroid as at home  Hypertension               Continue home medication              Monitor for need to adjust  Anxiety   Atarax TID prn   Pallative MS 1 mg every 3 hours with benadryl has seemed to help.       Resolved Problems:  . * No resolved hospital problems. *      DVT Prophylaxis: Lovenox    Discharge planning: TBD      Further Orders per Clinical course/attending. Electronically signed by BEBO Andrews CNP on 6/23/2022 at 2:28 PM       EMR Dragon/Transcription disclaimer:   Much of this encounter note is an electronic transcription/translation of spoken language to printed text.  The electronic translation of spoken language may permit erroneous, or at times, nonsensical words or phrases to be inadvertently transcribed; although attempts have made to review the note for such errors, some may still exist.

## 2022-06-23 NOTE — PROGRESS NOTES
MEDICAL ONCOLOGY PROGRESS NOTE    Pt Name: Lucrecia Martin  MRN: [id number]  YOB: 1945  Date of evaluation: 6/23/2022    Subjective: Feeling better than she did yesterday. HISTORY OF PRESENT ILLNESS:  Hematology consultation requested consulted regarding anemia. She was seen in consultation during last admission. She had iron deficiency anemia. She has been on Plavix and was also on aspirin. She had occult blood positive in the stool. GI was consulted during last admission but decided not to do any intervention due to the patient overall respiratory status. She was discharged recently. Her hemoglobin at discharge was 8.8. She presented to the hospital yesterday with hemoglobin 7.5. She has received IV Venofer to a total of 1000 mg. She has received transfusional support during last visit. Her anemia is multifactorial to include iron deficiency anemia and also anemia of inflammation, anemia of chronic kidney disease. Occult blood is + 6/7/2022. She was admitted again with short of breath.  proBNP was quite elevated. She was hypoxemic at home. She was brought by EMS. Prior hematology history  The patient is a 68 y.o. female with PMH CAD with CABG, carotid endarterectomy, COPD with continuous home O2 at 2L, HTN, HLD, CHF with diastolic dysfunction and pulmonary hypertension, and multiple  other comorbiditieswho presented [facility] complaining of shortness of breath and CP. Hematology consultation was requested regarding severe anemia. The patient was first seen by Dr. Bishop Beltran on 6/7/2022 during patient was positioned [facility].  She has multiple comorbidities including history of coronary artery disease status post CABG, carotid endarterectomy, advanced COPD on continuous O2 supplementation, history of congestive heart failure, hyperlipidemia. The patient has had prior admissions in the past for exacerbation of COPD and heart failure.   She presented on 6/4/2022 with complaints of worsening shortness of breath. She was admitted for concern for COPD exacerbation/heart failure exacerbation. Work-up in the emergency showed elevated white blood cell count with severe anemia with hemoglobin 5.0/MCV 94, RDW 16.4, thrombocytosis platelet count 407,452. Iron profile showed ferritin 110, iron saturation 40, iron 89, TIBC 220, folate 20, vitamin B12 574. The patient denied any overt GI bleed or hematuria. Of note, she has been on aspirin and Plavix for a history of CAD. She was transfused 2 units PRBCs with a hemoglobin recovery of 8.0. Her hemoglobin is trended down again this morning on 6/7/2022 down to 6.8. She had a normal TSH. I do not see a hemolytic panel. Review of past CBCs showed that the patient has been anemic since at least May 2016. She never had a normal hemoglobin. Most of the time her hemoglobin is in the range of 7-10. She was admitted here in April 2022 with a hemoglobin 10.2 at admission and left with a hemoglobin 7.9. Past Medical History:    Past Medical History:   Diagnosis Date    Aortic stenosis     Arthritis     Atherosclerosis of native arteries of the extremities with intermittent claudication 07/25/2011    Blood circulation, collateral     bilat stent lower extremities    CAD (coronary artery disease)     Carotid aneurysm, right (HCC)     Carotid artery occlusion     CHF (congestive heart failure) ([facility]     COPD (chronic obstructive pulmonary disease) ([address]     History of blood transfusion 2016    Post op, was taking blood thinner    Hyperlipidemia     Hypertension     MI (myocardial infarction) ([address] 2009    x2    Mitral valve stenosis     Movement disorder     arthritis    Pneumonia     Post-menopausal     PUD (peptic ulcer disease) 2009    Renal artery stenosis ([address] 08/10/2021    s/p stenting to L    Renal failure 2009    after ulcer perforation sepsis.    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 AND REPAIR WITH REVERSED LEFT GREATER SAPHENOUS VEIN INTERPOSITIONAL BYPASS GRAFT performed by Franko Millard MD at [place] [address] Right early 's    long ago    CATARACT REMOVAL WITH IMPLANT Bilateral      SECTION  1972    COLONOSCOPY  2012    Dr Rolle Keepers:  HP    CORONARY ANGIOPLASTY WITH STENT PLACEMENT  08/10/2021    RM- RCA    CORONARY ANGIOPLASTY WITH STENT PLACEMENT      CORONARY ARTERY BYPASS GRAFT  2016    DIAGNOSTIC CARDIAC CATH LAB PROCEDURE      DILATION AND CURETTAGE OF UTERUS      ILIO-FEMORAL BYPASS GRAFT N/A 2016    OPEN TRANSLUMINAL BALLOON ANGIOPLASTY AND STENTING OF RIGHT COMMON AND EXTERNAL  ILIAC ARTERIES; RIGHT FEMORAL ENDARTERECTOMY WITH VEIN PATCH ANGIOPLASTY performed by Franko Millard MD at [facility] N/A 2016    MITRAL VALVE  REPLACEMENT LUONG-MAZE ABLATION WITH CRYO PROCEDURE, CORONARY ARTERY BYPASS GRAFT X 1 WITH ENDOSCOPIC VEIN HARVESTING WITH PERFUSION TRANSESOPHAGEAL ECHOCARDIOGRAM performed by Mihaela Bacon MD at [address] [place] [address]      PERIPHERAL PERCUTANEOUS ARTERIAL INTERVENTION Left 08/10/2021    RM to L renal artery    NH REOPER, CAROTID ENDARTEC>1 MON Left 2018    REMOVAL OF HEMATOMA, LEFT CAROTID ARTERY performed by Franko Millard MD at [facility] 2018    LEFT CAROTID ENDARTERECTOMY WITH EEG MONITORING AND COMPLETION DUPLEX ULTRASOUND performed by Rodrigo Chu MD Bhavesh at [address]      SKIN GRAFT Right 07/22/2016    Skin graft split thickness foot,ankle,and leg. Right leg 26x8cm and 12x6cm total area 280cm squared. TJR    UPPER GASTROINTESTINAL ENDOSCOPY  07/14/2015    Dr Darek Saldaña: normal    UPPER GASTROINTESTINAL ENDOSCOPY  03/15/2016    Dr Rebeca Collazo: normal    UPPER GASTROINTESTINAL ENDOSCOPY  05/27/2015    Dr Darek Saldaña:  paul neg, multiple gastric antial and duodenal ulcers    VASCULAR SURGERY  5/27/16 TJR    Aortagram and right leg runoff,right leg runoff,right common iliac artery selection for right leg run off views.  VASCULAR SURGERY      . Open transluminal angioplasty and stenting of the external iliac artery. TJR    VASCULAR SURGERY  07/11/2019    TJR. 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 Social History:    The patient currently lives at home with spouse  Tobacco:   reports that she quit smoking about 42 years ago. Her smoking use included cigarettes. She quit after 2.00 years of use. She has never used smokeless tobacco.  Alcohol:   reports current alcohol use.   Illicit Drugs: Unknown    Family History:   Family History   Problem Relation Age of Onset    Diabetes Mother     Heart Disease Mother     Heart Failure Mother     Diabetes Sister     Heart Disease Sister     High Blood Pressure Sister     Colon Cancer Sister     Liver Disease Sister     Cirrhosis Sister     Colon Cancer Maternal Aunt     Colon Polyps Neg Hx     Esophageal Cancer Neg Hx      Current Hospital Medications:    Current Facility-Administered Medications   Medication Dose Route Frequency Provider Last Rate Last Admin    [Held by provider] bumetanide (BUMEX) tablet 2 mg  2 mg Oral BID Deborah Cardozo MD   2 mg at 06/22/22 0914    metoprolol tartrate (LOPRESSOR) tablet 100 mg  100 mg Oral BID Mynor Martin APRN - CNP   100 mg at 06/22/22 2008    morphine (PF) injection 1 mg  1 mg IntraVENous Q3H PRN Sia Quiroz APRN - CNP   1 mg at 06/23/22 0552    diphenhydrAMINE (BENADRYL) injection 12.5 mg  12.5 mg IntraVENous Q6H PRN Sia Quiroz APRN - CNP   12.5 mg at 06/22/22 2331    furosemide (LASIX) 100 mg in dextrose 5 % 100 mL infusion  10 mg/hr IntraVENous Continuous Kimani Hannon MD 10 mL/hr at 06/23/22 0015 10 mg/hr at 06/23/22 0015    hydrOXYzine HCl (ATARAX) tablet 10 mg  10 mg Oral TID PRN Mynor Martin APRN - CNP   10 mg at 06/23/22 6219    sodium chloride flush 0.9 % injection 5-40 mL  5-40 mL IntraVENous 2 times per day Mynor Martin APRN - CNP   10 mL at 06/22/22 4552    sodium chloride flush 0.9 % injection 5-40 mL  5-40 mL IntraVENous PRN Mynor Martin APRN - CNP        0.9 % sodium chloride infusion   IntraVENous PRN BEBO Reid - WIN        ondansetron (ZOFRAN-ODT) disintegrating tablet 4 mg  4 mg Oral Q8H PRN Mynor Martin APRN - CNP        Or    ondansetron [facility]) injection 4 mg  4 mg IntraVENous Q6H PRN Mynor Martin APRN - CNP        polyethylene glycol (GLYCOLAX) packet 17 g  17 g Oral Daily PRN Mynor Martin APRN - CNP        acetaminophen (TYLENOL) tablet 650 mg  650 mg Oral Q6H PRN Mynor Martin APRN - CNP   650 mg at 06/22/22 2248    Or    acetaminophen (TYLENOL) suppository 650 mg  650 mg Rectal Q6H PRN Mynor Martin APRN - CNP        enoxaparin Sodium (LOVENOX) injection 30 mg  30 mg SubCUTAneous Daily Mynor Martin APRN - CNP   30 mg at 06/22/22 0915    potassium chloride (KLOR-CON M) extended release tablet 40 mEq  40 mEq Oral PRN Mynor Martin APRN - CNP        Or    potassium bicarb-citric acid (EFFER-K) effervescent tablet 40 mEq  40 mEq Oral PRN Dois Wilmington, APRN - CNP        Or    potassium chloride 10 mEq/100 mL IVPB (Peripheral Line)  10 mEq IntraVENous PRN Dois Wilmington, APRN - CNP        magnesium sulfate 2000 mg in 50 mL IVPB premix  2,000 mg IntraVENous PRN Lawence Pennant, APRN - CNP        albuterol (PROVENTIL) nebulizer solution 1.25 mg  1.25 mg Nebulization 4x Daily PRN Lawence Pennant, APRN - CNP   1.25 mg at 06/22/22 1243    amLODIPine (NORVASC) tablet 5 mg  5 mg Oral BID Lawence Pennant, APRN - CNP   5 mg at 06/22/22 2008    Arformoterol Tartrate (BROVANA) nebulizer solution 15 mcg  15 mcg Nebulization BID Lawence Pennant, APRN - CNP   15 mcg at 06/23/22 5678    atorvastatin (LIPITOR) tablet 40 mg  40 mg Oral Nightly Lawence Pennant, APRN - CNP   40 mg at 06/22/22 2008    budesonide (PULMICORT) nebulizer suspension 500 mcg  0.5 mg Nebulization BID Lawence Pennant, APRN - CNP   500 mcg at 06/23/22 2452    clopidogrel (PLAVIX) tablet 75 mg  75 mg Oral Daily Lawence Pennant, APRN - CNP   75 mg at 06/22/22 0914    guaiFENesin (MUCINEX) extended release tablet 1,200 mg  1,200 mg Oral BID Lawence Pennant, APRN - CNP   1,200 mg at 06/22/22 2008    ipratropium-albuterol (DUONEB) nebulizer solution 3 mL  3 mL Inhalation 4x daily Lawence Pennant, APRN - CNP   3 mL at 06/23/22 0558    isosorbide mononitrate (IMDUR) extended release tablet 30 mg  30 mg Oral Daily Lawence Pennant, APRN - CNP   30 mg at 06/22/22 0914    levothyroxine (SYNTHROID) tablet 25 mcg  25 mcg Oral Daily Lawence Pennant, APRN - CNP   25 mcg at 06/23/22 0552    pantoprazole (PROTONIX) tablet 40 mg  40 mg Oral BID AC Lawence Pennant, APRN - CNP   40 mg at 06/23/22 3185    sildenafil (REVATIO) tablet 20 mg  20 mg Oral TID Lawence Pennant, APRN - CNP   20 mg at 06/22/22 2008     Allergies: Allergies   Allergen Reactions    Ativan [Lorazepam] Hallucinations    Levaquin [Levofloxacin In D5w] Nausea And Vomiting    Xarelto [Rivaroxaban] Other (See Comments)     Made anemic. CANNOT HAVE DUE TO HEART VALVE REPLACEMENT.     Morphine Itching and Rash     Objective   BP (!) 121/51   Pulse 80   Temp 98.2 °F (36.8 °C) (Temporal)   Resp 24   Ht 5' 3\" (1.6 m)   Wt 108 lb 4.8 oz (49.1 kg)   SpO2 93%   BMI 19.18 kg/m²     PHYSICAL EXAM:  CONSTITUTIONAL: Alert, appropriate, looks ill-appearing, mild respiratory distress,   ENT: Mucus membranes dry,external inspection of ears and nose are normal  NECK: Supple, no masses. No palpable thyroid mass  CHEST/LUNGS: HFNC FiO2 80/40L. Mild distress with conversation, wheezes   CARDIOVASCULAR: RRR, no murmurs. Bilateral lower extremity edema  ABDOMEN: soft non-tender, active bowel sounds, no HSM. No palpable masses  EXTREMITIES: warm, full ROM in all 4 extremities  SKIN: warm, dry with no rashes or lesions, pale  NEUROLOGIC: follows commands, non focal     LABORATORY RESULTS REVIEWED/ANALYZED BY ME:  Recent Labs     06/23/22  0254 06/21/22  0157 06/20/22  1007   WBC 12.5* 13.4* 15.9*   HGB 6.9* 7.4* 7.5*   HCT 23.0* 25.2* 25.9*   MCV 94.3 96.9 97.7    318 315     Lab Results   Component Value Date     (L) 06/23/2022    K 3.3 (L) 06/23/2022    CL 96 (L) 06/23/2022    CO2 27 06/23/2022    BUN 33 (H) 06/23/2022    CREATININE 1.3 (H) 06/23/2022    GLUCOSE 144 (H) 06/23/2022    CALCIUM 7.8 (L) 06/23/2022    PROT 4.6 (L) 06/20/2022    LABALBU 2.3 (L) 06/20/2022    BILITOT 0.3 06/20/2022    ALKPHOS 171 (H) 06/20/2022    AST 33 (H) 06/20/2022    ALT 31 06/20/2022    LABGLOM 40 (A) 06/23/2022    GFRAA 48 (L) 06/23/2022    GLOB 4.6 08/29/2016     Lab Results   Component Value Date    INR 1.25 (H) 06/04/2022    INR 1.17 04/15/2022    INR 1.14 03/26/2021    PROTIME 15.7 (H) 06/04/2022    PROTIME 14.9 (H) 04/15/2022    PROTIME 14.6 03/26/2021     RADIOLOGY STUDIES REPORT/REVIEWED AND INTERPRETED BY ME:  CT ABDOMEN PELVIS WO CONTRAST Additional Contrast? None    Result Date: 6/14/2022  NO PRIOR REPORT AVAILABLE Exam: CT OF THE ABDOMEN/PELVIS WITHOUT CONTRAST Clinical data: Abdominal pain. Technique: Axial CT images were acquired through the abdomen and pelvis without contrast using soft tissue and bone algorithms. Reformatted/MPR images were performed. Radiation dose: CTDIvol =16.41 mGy, DLP =701 mGy x cm. Limitations: Lack of intravenous contrast limits evaluation of solid viscera. Lack of oral contrast limits evaluation of the bowel loops. Prior Studies: Renal arterial ultrasound dated 7/30/2020 images. Findings: Lung bases:Trace pleural effusion is noted bilaterally. Mild subsegmental atelectasis is noted in the included bilateral lungs. Liver:Unremarkable size andcontour. Normal density. No evidence of mass. No evidence of dilated ducts. Gallbladder Fossa: Cholelithiasis without evidence of acute cholecystitis. Spleen: Grossly unremarkable. Pancreas/adrenal glands: Grossly unremarkable size, contour and density. Kidneys: In anatomic position. There is mild atrophy of the right kidney and compensatory hypertrophy of the left kidney. Bilateral non-obstructing renal calculi, measuring approximately 2 mm in the upper pole of right kidney, 1 mm and 5 mm in the upper pole of left kidney. No ureteral calculi. No evidence of a renal mass or cyst. Perinephric space is unremarkable. Retroperitoneum: No enlarged retroperitoneal lymphadenopathy. The IVC appears unremarkable. Severe calcific atherosclerotic changes noted in the aorta and its branches. Peritoneal cavity: No evidence of free air. Trace ascites is noted. Gastrointestinal tract: Small inguinal hernia is noted on right, it contains portion of small bowel without evidence of obstruction. Tiny fat containing inguinal hernia is noted on left. Tiny hiatal hernia is noted. Changes of mild constipation. There are twisted mesenteric vessels in the right lower quadrant without evidence of obstruction. Large amount of stool and gas in the colon. Appendix is not visualized. Pelvis: Solid and hollow viscera grossly unremarkable. Osseous structures: No acute or destructive bony process identified. Mild scoliosis. Degenerative changes noted in the spine and pelvis    1.  Small inguinal hernia is noted on right, it contains portion of small bowel without evidence of obstruction. Tiny fat containing inguinal hernia is noted on left. 2. Tiny hiatal hernia is noted. 3. There are twisted mesenteric vessels in the right lower quadrant without evidence of obstruction. Changes of mild constipation. 4. Trace pleural effusion is noted bilaterally. Mild subsegmental atelectasis is noted in the included bilateral lungs. 5. Cholelithiasis without evidence of acute cholecystitis. 6. There is mild atrophy of the right kidney and compensatory hypertrophy of the left kidney. Bilateral non-obstructing renal calculi. 7. Generalized anasarca. Trace ascites is noted. 8. Moderately large amount of stool in the colon, please correlate with constipation. Recommendation: Follow up as clinically indicated. All CT scans at this facility utilize dose modulation, iterative reconstruction, and/or weight based dosing when appropriate to reduce radiation dose to as low as reasonably achievable. Electronically Signed by Sakshi Matt MD at 14-Jun-2022 01:21:42 AM             XR CHEST (2 VW)    Result Date: 6/12/2022  NO PRIOR REPORT AVAILABLE Exam: X-RAYS OF THE CHEST Clinical data:COPD/CHF/pneumonia. Technique: PA and lateral views of the chest. Prior studies: Radiograph of the chest dated 06/04/2022. NM lung scan dated 04/15/2022. Findings:Hyperinflated emphysematous lungs. Bilateral pleural effusions. Slightly enlarged cardiac silhouette. Hyperinflated emphysematous lungs. Bilateral pleural effusions. Slightly enlarged cardiac silhouette. Recommendation: Follow up as clinically indicated. Electronically Signed by Emmie Smith MD at 12-Jun-2022 10:47:39 PM             XR CHEST PORTABLE    Result Date: 6/20/2022  NO PRIOR REPORT AVAILABLE [facility] Clinical data:Short of air, edema. Technique:Single view of the chest. Prior studies: Radiograph of the chest dated 06/11/2022. Findings: The lungs are grossly clear; noevidence of acute infiltrate or pleural effusion. Cardiac silhouette is within normal limits. No acute osseous abnormality is detected. There is progression of infiltrate and effusion left base and new alveolar infiltrates in the right upper and lower lobes. Right pleural effusion also suspected. Median sternotomy wires again noted. Progressive infiltrate and effusion left lung base New alveolar infiltrates right upper and lower lobes with effusion. Recommendation: Follow up short term  Electronically Signed by Elvin Grove MD at 20-Jun-2022 12:43:34 PM             XR CHEST PORTABLE    Result Date: 6/4/2022  NO PRIOR REPORT AVAILABLE Exam: X-RAY OF THE CHEST Clinical data: COPD, respiratory distress. Technique: Single view of the chest. Prior studies: Radiograph of the chest dated 04/20/2022. Findings:  COPD. Interval worsening bilateral lower lobe infiltrates. Enlarging small bilateral  pleural effusions. Cardiac silhouette is within normal limits. No acute osseous abnormality is detected. No other change. COPD. Interval worsening bilateral lower lobe infiltrates. Enlarging small bilateral  pleural effusions. Recommendation: Follow up as clinically indicated. Electronically Signed by Carmen Lucia MD at 04-Jun-2022 04:59:51 PM             ASSESSMENT:  #Normocytic anemia-  -Iron profile compatible with anemia of chronic disease  -Probable GI blood loss: Patient has on Plavix/aspirin which makes her high risk for GI bleed  -Normal TSH  -Haptoglobin: 86, LDH: 303 ()  -Patient has been anemic for a long time. Hemoglobin in the range of 7-there for the last 5 years  -Transfused 2 units PRBCs during last admission with hemoglobin trended down again.  -Patients with heart failure and chronic anemia and may benefit from IV iron infusion  -S/p Venofer 500 mg IV x2 doses  -Intolerant to oral iron replacement due to GI upset  -Patient could not undergo EGD/colonoscopy at this time. Please arrange some sort of GI follow-up as outpatient  -Hemoglobin 6.9 with MCV 94.3 today, 6/23/2022     PLAN:  Continue current supportive care  Transfuse 1 unit pRBC today - R/B/E discussed with patient  Continue to monitor hemoglobin  Stool OB  Patient/family considering hospice    (Please note that portions of this note were completed with a voice recognition program. Efforts were made to edit the dictations but occasionally words are mis-transcribed.)    EBBO Crow    06/23/22  7:00 AM   Physician's attestation/substantial contribution:  I, Dr Nick Diaz, independently performed an evaluation on [facility]. I have reviewed relevant medical information/data to include but not limited to medication list, relevant appropriate labs and imaging when applicable. I reviewed other physician's notes, ancillary services and nurses assessment. I have reviewed the above documentation completed by the Nurse Practitioner or Physician Assistant. Please see my additional contributions to the history of present illness, physical examination, and assessment/medical decision-making that reflect my findings and impressions. I have seen and examined the patient and the key elements of all parts of the encounter have been performed by me. I agree with the assessment and plan as outlined by the ARNP/PA. Subjective-breathing slightly better. She is on high flow O2. Discussed bedside RN. I heard that the patient is now DNR. There will be a discussion about hospice this morning. I discussed with the patient that her hemoglobin 6.9. If no hospice, then transfuse 1 unit PRBC. Objective- acutely ill-appearing, cachectic, high flow O2 supplementation  Assessment/plan:  Refractory heart failure-cardiology following. Patient has end-stage heart failure. Consideration of hospice is in process. She has had frequent admissions. She has multifactorial anemia. Hemoglobin 6.9 today.   Could transfuse 1 unit PRBC if she is not interested in hospice.   She had positive occult blood in stools in the recent past

## 2022-06-23 NOTE — TELEPHONE ENCOUNTER
I did contact the patient's daughter and also actually spoke to the patient herself as her daughter was in the patient's hospital room at [facility].  The patient is having issues with what appears to be worsening heart failure and also anemia.  I will plan on stopping by [facility] to check on her personally later today.  Again I did have a conversation with both the patient and her daughter by phone today.

## 2022-06-24 PROBLEM — R04.0 NOSEBLEED: Status: ACTIVE | Noted: 2022-01-01

## 2022-06-24 NOTE — PROGRESS NOTES
Automatic Dose Adjustment of                Subcutaneous Anticoagulant for Prophylaxis    Lizette Bui is a 68 y.o. female. Recent Labs     06/23/22  0254 06/24/22  0359   CREATININE 1.3* 1.4*       Estimated Creatinine Clearance: 26 mL/min (A) (based on SCr of 1.4 mg/dL (H)). Weight:  Wt Readings from Last 1 Encounters:   06/21/22 108 lb 4.8 oz (49.1 kg)           Pharmacy has adjusted subcutaneous anticoagulant for prophylaxis to Heparin 5000 units SC twice daily based on the patient's weight and estimated CrCl per [facility][1] policy.                Electronically signed by VICENTE Jonas [facility] on 6/24/2022 at 11:27 AM

## 2022-06-24 NOTE — PROGRESS NOTES
MEDICAL ONCOLOGY PROGRESS NOTE    Pt Name: Lucrecia Martin  MRN: [id number]  YOB: 1945  Date of evaluation: 6/24/2022    Subjective: Air hunger. Mild nasal bleeding    HISTORY OF PRESENT ILLNESS:  Hematology consultation requested consulted regarding anemia. She was seen in consultation during last admission. She had iron deficiency anemia. She has been on Plavix and was also on aspirin. She had occult blood positive in the stool. GI was consulted during last admission but decided not to do any intervention due to the patient overall respiratory status. She was discharged recently. Her hemoglobin at discharge was 8.8. She presented to the hospital yesterday with hemoglobin 7.5. She has received IV Venofer to a total of 1000 mg. She has received transfusional support during last visit. Her anemia is multifactorial to include iron deficiency anemia and also anemia of inflammation, anemia of chronic kidney disease. Occult blood is + 6/7/2022. She was admitted again with short of breath.  proBNP was quite elevated. She was hypoxemic at home. She was brought by EMS. Prior hematology history  The patient is a 68 y.o. female with PMH CAD with CABG, carotid endarterectomy, COPD with continuous home O2 at 2L, HTN, HLD, CHF with diastolic dysfunction and pulmonary hypertension, and multiple  other comorbiditieswho presented to [facility] ED 6/4/2022 complaining of shortness of breath and CP. Hematology consultation was requested regarding severe anemia. The patient was first seen by Dr. Bishop Beltran on 6/7/2022 during patient was positioned [facility].  She has multiple comorbidities including history of coronary artery disease status post CABG, carotid endarterectomy, advanced COPD on continuous O2 supplementation, history of congestive heart failure, hyperlipidemia. The patient has had prior admissions in the past for exacerbation of COPD and heart failure.   She presented on 6/4/2022 with complaints of worsening shortness of breath. She was admitted for concern for COPD exacerbation/heart failure exacerbation. Work-up in the emergency showed elevated white blood cell count with severe anemia with hemoglobin 5.0/MCV 94, RDW 16.4, thrombocytosis platelet count 702,105. Iron profile showed ferritin 110, iron saturation 40, iron 89, TIBC 220, folate 20, vitamin B12 574. The patient denied any overt GI bleed or hematuria. Of note, she has been on aspirin and Plavix for a history of CAD. She was transfused 2 units PRBCs with a hemoglobin recovery of 8.0. Her hemoglobin is trended down again this morning on 6/7/2022 down to 6.8. She had a normal TSH. I do not see a hemolytic panel. Review of past CBCs showed that the patient has been anemic since at least May 2016. She never had a normal hemoglobin. Most of the time her hemoglobin is in the range of 7-10. She was admitted here in April 2022 with a hemoglobin 10.2 at admission and left with a hemoglobin 7.9. Past Medical History:    Past Medical History:   Diagnosis Date    Aortic stenosis     Arthritis     Atherosclerosis of native arteries of the extremities with intermittent claudication 07/25/2011    Blood circulation, collateral     bilat stent lower extremities    CAD (coronary artery disease)     Carotid aneurysm, right (HCC)     Carotid artery occlusion     CHF (congestive heart failure) ([facility])     COPD (chronic obstructive pulmonary disease) ([address]     History of blood transfusion 2016    Post op, was taking blood thinner    Hyperlipidemia     Hypertension     MI (myocardial infarction) ([address] 2009    x2    Mitral valve stenosis     Movement disorder     arthritis    Pneumonia     Post-menopausal     PUD (peptic ulcer disease) 2009    Renal artery stenosis ([address] 08/10/2021    s/p stenting to L    Renal failure 2009    after ulcer perforation sepsis.     Severe malnutrition (HCC)     Thyroid disease     Wound infection after surgery right foot infection after cabg     Past Surgical History:    Past Surgical History:   Procedure Laterality Date    ABDOMEN SURGERY  2009    perforated ulcer resection of 1/3 stomach    CARDIAC SURGERY      artificial valve and bypass    CAROTID ENDARTERECTOMY      Right  with Dacron patch angioplasty    CAROTID ENDARTERECTOMY Left     CAROTID ENDARTERECTOMY Right 07/11/2019    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 AND REPAIR WITH REVERSED LEFT GREATER SAPHENOUS VEIN INTERPOSITIONAL BYPASS GRAFT performed by Toy Peralta MD at [place] [address] Right early 1990's    long ago    CATARACT REMOVAL WITH IMPLANT Bilateral     [address]    COLONOSCOPY  01/13/2012    Dr Bobby Davis:  HP    CORONARY ANGIOPLASTY WITH STENT PLACEMENT  08/10/2021    RM- RCA    CORONARY ANGIOPLASTY WITH STENT PLACEMENT      CORONARY ARTERY BYPASS GRAFT  2016    DIAGNOSTIC CARDIAC CATH LAB PROCEDURE      DILATION AND CURETTAGE OF UTERUS      ILIO-FEMORAL BYPASS GRAFT N/A 06/02/2016    OPEN TRANSLUMINAL BALLOON ANGIOPLASTY AND STENTING OF RIGHT COMMON AND EXTERNAL  ILIAC ARTERIES; RIGHT FEMORAL ENDARTERECTOMY WITH VEIN PATCH ANGIOPLASTY performed by Toy Peralta MD at [address] [place] [address] N/A 04/07/2016    MITRAL VALVE  REPLACEMENT LUONG-MAZE ABLATION WITH CRYO PROCEDURE, CORONARY ARTERY BYPASS GRAFT X 1 WITH ENDOSCOPIC VEIN HARVESTING WITH PERFUSION TRANSESOPHAGEAL ECHOCARDIOGRAM performed by Zack Allen MD at [address] [place] [address]  2016    PERIPHERAL PERCUTANEOUS ARTERIAL INTERVENTION Left 08/10/2021    RM to L renal artery    LA REOPER, CAROTID ENDARTEC>1 MON Left 01/11/2018    REMOVAL OF HEMATOMA, LEFT CAROTID ARTERY performed by Toy Peralta MD at [address] Left 01/11/2018    LEFT CAROTID ENDARTERECTOMY WITH EEG MONITORING AND COMPLETION DUPLEX ULTRASOUND performed by Toy Peralta MD at [address] [facility] Right 07/22/2016    Skin graft split thickness foot,ankle,and leg. Right leg 26x8cm and 12x6cm total area 280cm squared. TJR    UPPER GASTROINTESTINAL ENDOSCOPY  07/14/2015    Dr Madison Huang: normal    UPPER GASTROINTESTINAL ENDOSCOPY  03/15/2016    Dr Viky Smith: normal    UPPER GASTROINTESTINAL ENDOSCOPY  05/27/2015    Dr Madison Huang:  paul neg, multiple gastric antial and duodenal ulcers    VASCULAR SURGERY  5/27/16 TJR    Aortagram and right leg runoff,right leg runoff,right common iliac artery selection for right leg run off views. VASCULAR SURGERY      . Open transluminal angioplasty and stenting of the external iliac artery. TJR    VASCULAR SURGERY  07/11/2019    TJR. 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 Social History:    The patient currently lives at home with spouse  Tobacco:   reports that she quit smoking about 42 years ago. Her smoking use included cigarettes. She quit after 2.00 years of use. She has never used smokeless tobacco.  Alcohol:   reports current alcohol use.   Illicit Drugs: Unknown    Family History:   Family History   Problem Relation Age of Onset    Diabetes Mother     Heart Disease Mother     Heart Failure Mother     Diabetes Sister     Heart Disease Sister     High Blood Pressure Sister     Colon Cancer Sister     Liver Disease Sister     Cirrhosis Sister     Colon Cancer Maternal Aunt     Colon Polyps Neg Hx     Esophageal Cancer Neg Hx      Current Hospital Medications:    Current Facility-Administered Medications   Medication Dose Route Frequency Provider Last Rate Last Admin    0.9 % sodium chloride infusion   IntraVENous PRN BEBO Villarreal        diphenhydrAMINE (BENADRYL) injection 12.5 mg  12.5 mg IntraVENous Q4H PRN BEBO Hicks - CNP   12.5 mg at 06/23/22 1409    saliva substitute (BIOTENE/MOUTH KOTE) liquid   Oral PRN Thamas Corporal, APRN - CNP        [Held by provider] bumetanide (BUMEX) tablet 2 mg  2 mg Oral BID Lyla Prieto MD   2 mg at 06/22/22 0914    metoprolol tartrate (LOPRESSOR) tablet 100 mg  100 mg Oral BID Sreekanth Simpers, APRN - CNP   100 mg at 06/23/22 0915    morphine (PF) injection 1 mg  1 mg IntraVENous Q3H PRN Thamas Corporal, APRN - CNP   1 mg at 06/24/22 0602    furosemide (LASIX) 100 mg in dextrose 5 % 100 mL infusion  10 mg/hr IntraVENous Continuous Mychal Covarrubias MD 10 mL/hr at 06/23/22 1048 10 mg/hr at 06/23/22 1048    hydrOXYzine HCl (ATARAX) tablet 10 mg  10 mg Oral TID PRN Sreekanth Simpers, APRN - CNP   10 mg at 06/23/22 5542    sodium chloride flush 0.9 % injection 5-40 mL  5-40 mL IntraVENous 2 times per day Sreekanth Simpers, APRN - CNP   10 mL at 06/23/22 0916    sodium chloride flush 0.9 % injection 5-40 mL  5-40 mL IntraVENous PRN Sreekanth Simpers, APRN - CNP        0.9 % sodium chloride infusion   IntraVENous PRN Sreekanth Simpers, APRN - CNP        ondansetron (ZOFRAN-ODT) disintegrating tablet 4 mg  4 mg Oral Q8H PRN Sreekanth Simpers, APRN - CNP        Or    ondansetron (ZOFRAN) injection 4 mg  4 mg IntraVENous Q6H PRN Sreekanth Simpers, APRN - CNP        polyethylene glycol (GLYCOLAX) packet 17 g  17 g Oral Daily PRN Sreekanth Simpers, APRN - CNP        acetaminophen (TYLENOL) tablet 650 mg  650 mg Oral Q6H PRN Sreekanth Simpers, APRN - CNP   650 mg at 06/22/22 2248    Or    acetaminophen (TYLENOL) suppository 650 mg  650 mg Rectal Q6H PRN Sreekanth Simpers, APRN - CNP        enoxaparin Sodium (LOVENOX) injection 30 mg  30 mg SubCUTAneous Daily Sreekanth Simpers, APRN - CNP   30 mg at 06/23/22 0915    potassium chloride (KLOR-CON M) extended release tablet 40 mEq  40 mEq Oral PRN Sreekanth Simpers, APRN - CNP   40 mEq at 06/23/22 0915    Or    potassium bicarb-citric acid (EFFER-K) effervescent tablet 40 mEq  40 mEq Oral PRN Sreekanth Simpers, APRN - CNP        Or    potassium chloride 10 mEq/100 mL IVPB (Peripheral Line)  10 mEq IntraVENous PRN Illatanya Maylin, APRN - CNP        magnesium sulfate 2000 mg in 50 mL IVPB premix  2,000 mg IntraVENous PRN Ilah Maylin, APRN - CNP        albuterol (PROVENTIL) nebulizer solution 1.25 mg  1.25 mg Nebulization 4x Daily PRN Ilah Maylin, APRN - CNP   1.25 mg at 06/23/22 1229    amLODIPine (NORVASC) tablet 5 mg  5 mg Oral BID Ilah Maylin, APRN - CNP   5 mg at 06/23/22 0915    Arformoterol Tartrate (BROVANA) nebulizer solution 15 mcg  15 mcg Nebulization BID Ilah Maylin, APRN - CNP   15 mcg at 06/23/22 1910    atorvastatin (LIPITOR) tablet 40 mg  40 mg Oral Nightly Ilah Maylin, APRN - CNP   40 mg at 06/23/22 2139    budesonide (PULMICORT) nebulizer suspension 500 mcg  0.5 mg Nebulization BID Melinda Maylin, APRN - CNP   500 mcg at 06/23/22 1910    clopidogrel (PLAVIX) tablet 75 mg  75 mg Oral Daily Ilah Maylin, APRN - CNP   75 mg at 06/23/22 0915    guaiFENesin (MUCINEX) extended release tablet 1,200 mg  1,200 mg Oral BID Ilah Maylin, APRN - CNP   1,200 mg at 06/23/22 2139    ipratropium-albuterol (DUONEB) nebulizer solution 3 mL  3 mL Inhalation 4x daily Ilah Maylin, APRN - CNP   3 mL at 06/23/22 1900    isosorbide mononitrate (IMDUR) extended release tablet 30 mg  30 mg Oral Daily Ilah Maylin, APRN - CNP   30 mg at 06/23/22 0915    levothyroxine (SYNTHROID) tablet 25 mcg  25 mcg Oral Daily Ilah Maylin, APRN - CNP   25 mcg at 06/24/22 0528    pantoprazole (PROTONIX) tablet 40 mg  40 mg Oral BID AC Ilah Maylin, APRN - CNP   40 mg at 06/24/22 3636    sildenafil (REVATIO) tablet 20 mg  20 mg Oral TID Ilah Maylin, APRN - CNP   20 mg at 06/23/22 1647     Allergies: Allergies   Allergen Reactions    Ativan [Lorazepam] Hallucinations    Levaquin [Levofloxacin In D5w] Nausea And Vomiting    Xarelto [Rivaroxaban] Other (See Comments)     Made anemic. CANNOT HAVE DUE TO HEART VALVE REPLACEMENT.     Morphine Itching and Rash Objective   /61   Pulse 97   Temp 99.1 °F (37.3 °C) (Temporal)   Resp 26   Ht 5' 3\" (1.6 m)   Wt 108 lb 4.8 oz (49.1 kg)   SpO2 (!) 89%   BMI 19.18 kg/m²     PHYSICAL EXAM:  CONSTITUTIONAL: Alert, appropriate, looks ill-appearing, mild respiratory distress, accessory muscle use  ENT: Mucus membranes dry, left nare bleeding, external inspection of ears and nose are normal  NECK: Supple, no masses. No JVD  CHEST/LUNGS: HFNC FiO2 90/50L (increased from 6/23/2022). Mild distress with conversation, wheezes throughout  CARDIOVASCULAR: RRR, no murmurs. Bilateral lower extremity edema  ABDOMEN: soft non-tender, active bowel sounds  EXTREMITIES: warm, full ROM in all 4 extremities. Muscle wasting  SKIN: warm, dry with no rashes or lesions, pale, scattered bruising  NEUROLOGIC: follows commands, non focal     LABORATORY RESULTS REVIEWED/ANALYZED BY ME:  Recent Labs     06/23/22  1700 06/23/22  0254 06/21/22  0157 06/20/22  1007 06/20/22  1007   WBC  --  12.5* 13.4*  --  15.9*   HGB 9.9* 6.9* 7.4*   < > 7.5*   HCT 32.7* 23.0* 25.2*   < > 25.9*   MCV  --  94.3 96.9  --  97.7   PLT  --  347 318  --  315    < > = values in this interval not displayed.      Lab Results   Component Value Date     (L) 06/24/2022    K 4.0 06/24/2022    CL 92 (L) 06/24/2022    CO2 27 06/24/2022    BUN 34 (H) 06/24/2022    CREATININE 1.4 (H) 06/24/2022    GLUCOSE 165 (H) 06/24/2022    CALCIUM 8.1 (L) 06/24/2022    PROT 4.6 (L) 06/20/2022    LABALBU 2.3 (L) 06/20/2022    BILITOT 0.3 06/20/2022    ALKPHOS 171 (H) 06/20/2022    AST 33 (H) 06/20/2022    ALT 31 06/20/2022    LABGLOM 36 (A) 06/24/2022    GFRAA 44 (L) 06/24/2022    GLOB 4.6 08/29/2016     Lab Results   Component Value Date    INR 1.25 (H) 06/04/2022    INR 1.17 04/15/2022    INR 1.14 03/26/2021    PROTIME 15.7 (H) 06/04/2022    PROTIME 14.9 (H) 04/15/2022    PROTIME 14.6 03/26/2021     RADIOLOGY STUDIES REPORT/REVIEWED AND INTERPRETED BY ME:  CT ABDOMEN PELVIS WO CONTRAST Additional Contrast? None    Result Date: 6/14/2022  NO PRIOR REPORT AVAILABLE Exam: CT OF THE ABDOMEN/PELVIS WITHOUT CONTRAST Clinical data: Abdominal pain. Technique: Axial CT images were acquired through the abdomen and pelvis without contrast using soft tissue and bone algorithms. Reformatted/MPR images were performed. Radiation dose: CTDIvol =16.41 mGy, DLP =701 mGy x cm. Limitations: Lack of intravenous contrast limits evaluation of solid viscera. Lack of oral contrast limits evaluation of the bowel loops. Prior Studies: Renal arterial ultrasound dated 7/30/2020 images. Findings: Lung bases:Trace pleural effusion is noted bilaterally. Mild subsegmental atelectasis is noted in the included bilateral lungs. Liver:Unremarkable size andcontour. Normal density. No evidence of mass. No evidence of dilated ducts. Gallbladder Fossa: Cholelithiasis without evidence of acute cholecystitis. Spleen: Grossly unremarkable. Pancreas/adrenal glands: Grossly unremarkable size, contour and density. Kidneys: In anatomic position. There is mild atrophy of the right kidney and compensatory hypertrophy of the left kidney. Bilateral non-obstructing renal calculi, measuring approximately 2 mm in the upper pole of right kidney, 1 mm and 5 mm in the upper pole of left kidney. No ureteral calculi. No evidence of a renal mass or cyst. Perinephric space is unremarkable. Retroperitoneum: No enlarged retroperitoneal lymphadenopathy. The IVC appears unremarkable. Severe calcific atherosclerotic changes noted in the aorta and its branches. Peritoneal cavity: No evidence of free air. Trace ascites is noted. Gastrointestinal tract: Small inguinal hernia is noted on right, it contains portion of small bowel without evidence of obstruction. Tiny fat containing inguinal hernia is noted on left. Tiny hiatal hernia is noted. Changes of mild constipation.  There are twisted mesenteric vessels in the right lower quadrant without evidence of obstruction. Large amount of stool and gas in the colon. Appendix is not visualized. Pelvis: Solid and hollow viscera grossly unremarkable. Osseous structures: No acute or destructive bony process identified. Mild scoliosis. Degenerative changes noted in the spine and pelvis    1. Small inguinal hernia is noted on right, it contains portion of small bowel without evidence of obstruction. Tiny fat containing inguinal hernia is noted on left. 2. Tiny hiatal hernia is noted. 3. There are twisted mesenteric vessels in the right lower quadrant without evidence of obstruction. Changes of mild constipation. 4. Trace pleural effusion is noted bilaterally. Mild subsegmental atelectasis is noted in the included bilateral lungs. 5. Cholelithiasis without evidence of acute cholecystitis. 6. There is mild atrophy of the right kidney and compensatory hypertrophy of the left kidney. Bilateral non-obstructing renal calculi. 7. Generalized anasarca. Trace ascites is noted. 8. Moderately large amount of stool in the colon, please correlate with constipation. Recommendation: Follow up as clinically indicated. All CT scans at this facility utilize dose modulation, iterative reconstruction, and/or weight based dosing when appropriate to reduce radiation dose to as low as reasonably achievable. Electronically Signed by Ashley Millre MD at 14-Jun-2022 01:21:42 AM             XR CHEST (2 VW)    Result Date: 6/12/2022  NO PRIOR REPORT AVAILABLE Exam: X-RAYS OF THE CHEST Clinical data:COPD/CHF/pneumonia. Technique: PA and lateral views of the chest. Prior studies: Radiograph of the chest dated 06/04/2022. NM lung scan dated 04/15/2022. Findings:Hyperinflated emphysematous lungs. Bilateral pleural effusions. Slightly enlarged cardiac silhouette. Hyperinflated emphysematous lungs. Bilateral pleural effusions. Slightly enlarged cardiac silhouette. Recommendation: Follow up as clinically indicated.   Electronically Signed by Snow Morton ROSCOE ALEXANDER at 12-Jun-2022 10:47:39 PM             XR CHEST PORTABLE    Result Date: 6/20/2022  NO PRIOR REPORT AVAILABLE Exam: X-RAY OF THECHEST Clinical data:Short of air, edema. Technique:Single view of the chest. Prior studies: Radiograph of the chest dated 06/11/2022. Findings: The lungs are grossly clear; noevidence of acute infiltrate or pleural effusion. Cardiac silhouette is within normal limits. No acute osseous abnormality is detected. There is progression of infiltrate and effusion left base and new alveolar infiltrates in the right upper and lower lobes. Right pleural effusion also suspected. Median sternotomy wires again noted. Progressive infiltrate and effusion left lung base New alveolar infiltrates right upper and lower lobes with effusion. Recommendation: Follow up short term  Electronically Signed by Allyson Rushing MD at 20-Jun-2022 12:43:34 PM             XR CHEST PORTABLE    Result Date: 6/4/2022  NO PRIOR REPORT AVAILABLE Exam: X-RAY OF THE CHEST Clinical data: COPD, respiratory distress. Technique: Single view of the chest. Prior studies: Radiograph of the chest dated 04/20/2022. Findings:  COPD. Interval worsening bilateral lower lobe infiltrates. Enlarging small bilateral  pleural effusions. Cardiac silhouette is within normal limits. No acute osseous abnormality is detected. No other change. COPD. Interval worsening bilateral lower lobe infiltrates. Enlarging small bilateral  pleural effusions. Recommendation: Follow up as clinically indicated. Electronically Signed by Chris Fraire MD at 04-Jun-2022 04:59:51 PM             ASSESSMENT:  #Normocytic anemia-  -Iron profile compatible with anemia of chronic disease  -Patient has been anemic for a long time.   Hemoglobin in the range of 7-there for the last 5 years    -also Probable GI blood loss: Patient has on Plavix/aspirin which makes her high risk for GI bleed  -Normal TSH  -Haptoglobin: 86, LDH: 303 ()    -Transfused 2 units PRBCs during last admission with hemoglobin trended down again.  -Patients with heart failure and chronic anemia and may benefit from IV iron infusion  -S/p Venofer 500 mg IV x2 doses (Intolerant to oral iron replacement due to GI upset)  -Patient could not undergo EGD/colonoscopy at this time. Please arrange some sort of GI follow-up as outpatient  -Hemoglobin 9.9 on 6/23/2022, s/p 1 unit pRBC     PLAN:  Continue current supportive care  Cardiology following regarding acute/chronic diastolic heart failure  -Declined hospice per medical record  -received diuresis  -Na+ 130, as per attending     -Last Hgb 9.9 on 6/23/2022  -monitor for bleeding, epistaxis. On Plavix, Lovenox 30 mg qd (creatinine 1.4/GFR 36)  -humidity added to O2    (Please note that portions of this note were completed with a voice recognition program. Efforts were made to edit the dictations but occasionally words are mis-transcribed.)    BEBO Ibarra    06/24/22  6:35 AM     Physician's attestation/substantial contribution:  I, Dr Chuck Cabral, independently performed an evaluation on [facility]. I have reviewed relevant medical information/data to include but not limited to medication list, relevant appropriate labs and imaging when applicable. I reviewed other physician's notes, ancillary services and nurses assessment. I have reviewed the above documentation completed by the Nurse Practitioner or Physician Assistant. Please see my additional contributions to the history of present illness, physical examination, and assessment/medical decision-making that reflect my findings and impressions. I have seen and examined the patient and the key elements of all parts of the encounter have been performed by me. I agree with the assessment and plan as outlined by the ARNP/PA.   Subjective-feels fatigued  Objective-chronically ill-appearing  Assessment/plan:  Multifactorial anemia-hemoglobin is 9.9 after 1 unit PRBC yesterday. Respiratory status still poor. Patient high risk for bleeding due to being on antiplatelet/Lovenox.   She have positive occult blood in the past.  Prognosis overall poor due to her advanced/refractory heart disease

## 2022-06-24 NOTE — PROGRESS NOTES
Palliative Care Progress Note  6/24/2022 8:14 AM    Patient:  Jo Lamar  YOB: 1945  Primary Care Physician: Jose Manuel Wood MD  Advance Directive: DNR  Admit Date: 6/20/2022       Hospital Day: 4  Portions of this note have been copied forward, however, changed to reflect the most current clinical status of this patient. CHIEF COMPLAINT/REASON FOR CONSULTATION goals of care, code status discussion, family support, and symptom management    SUBJECTIVE:  Ms. Paige Rodriguez remains on HHF and requiring morphine for air hunger. She c/o dry mouth/nose with intermittent epistaxis. Feeling more fatigued today. Review of Systems:   14 point review of systems is negative except as specifically addressed above. Objective:   VITALS:  /61   Pulse 92   Temp 99.1 °F (37.3 °C) (Temporal)   Resp (!) 32   Ht 5' 3\" (1.6 m)   Wt 108 lb 4.8 oz (49.1 kg)   SpO2 90%   BMI 19.18 kg/m²   24HR INTAKE/OUTPUT:      Intake/Output Summary (Last 24 hours) at 6/24/2022 9554  Last data filed at 6/24/2022 0429  Gross per 24 hour   Intake 1272.67 ml   Output 1250 ml   Net 22.67 ml     General appearance: 69 yo female, chronically ill appearing, respiratory distress noted  Head: Normocephalic, without obvious abnormality, atraumatic, dry mucous membranes  Eyes: conjunctivae/corneas clear. PERRL, EOM's intact.    Ears: normal external ears and nose  Neck: no JVD, supple, symmetrical, trachea midline   Lungs: inspiratory wheezes and diminished in bases to ausculation bilaterally, shallow inspiration, tachypneic HF NC in place, accessory muscle use  Heart: RRR, S1, S2 normal, murmur heard  Abdomen: soft, non-tender; non-distended, normal bowel sounds   Extremities: BLE 1+ edema,  No erythema, no tenderness to palpation  Skin: Warm, dry, pale  Neurologic: Alert and oriented X 3, generalized weakness and normal tone, no focal deficits  Psychiatric: Anxious, irritable    Medications:      sodium chloride      Problems:    Mitral valve insufficiency    Acute on chronic diastolic heart failure (HCC)    Anemia of chronic disease    Palliative care patient    Solitary kidney, acquired    Atherosclerotic PATRICK (renal artery stenosis), unilateral (HCC)    Acute and chronic respiratory failure with hypoxia (HCC)    Coronary artery disease involving native coronary artery of native heart    Pulmonary hypertension    Severe malnutrition (HCC)    Diastolic dysfunction    CHF (congestive heart failure) ([facility] [address]  Resolved Problems:    * No resolved hospital problems. *      Visit Summary:  Chart reviewed. Report obtained from RN with no acute events overnight. She is now requiring 50L/100% HHF O2. Hospitalist have placed an order for hospice consult and CM requesting PC meet with pt/family to readdress pts goals and provide additional information regarding hospice care and [facility] [address] Pt is seen at bedside with her , daughters, and additional family members present. Meeting conducted with myself and Masoud Brooks PA-C. We discussed pts current status and increased O2 demand. We provided information regarding irreversible nature of pts disease and options for care. We explained that d/t pts O2 requirements pt can not go home at this time, even with hospice. The most O2 that can be done in home is 10 L. We discussed the hospice care center and potential for HS team to attempt to continue to wean pts O2 as tolerated to reach her goal to d/c home but with outpatient hospice but also explained that it may not be a possibility d/t her respiratory status. Pt would like additional time to discuss this with her family and would like to make the decision in the morning. I have discussed pt with RN, hospitalist, and [facility] . If pt wishes to pursue hospice care center tomorrow then hospice on call nurse will need to be contacted to complete sign in and transfer. Will follow. Recommendations:   1.  Palliative Care- GOC continue all medical treatments at this time but does not want intubation. Considering transition to inpatient hospice with goal to wean O2 for home. Will continue goals of care conversations. Code status- DNR    2. Acute on chronic hypoxemic respiratory failure- mgmt per hospitalist. Still requiring large amounts of O2. Morphine 1 mg Q3h prn for air hunger with prn benadryl available for itching. 3. Acute on chronic diastolic heart failure with mitral valve insuffiencey and severe pulmonary HTN- cardiology following and recommending medical management with no further invasive treatments that can be offered. Continue sildenafil and diuresis w/ Lasix gtt. Echo 6/23/22 noted    4. COPD- continue home neb treatments. Supportive care    5. Anemia of chronic disease with suspected GI bleed- positive OBS last stay, not a candidate for scoping a that time per GI. 1 Unit PRBCs today. Transfusional support if <7    6. Anxiety- atarax TID prn per hospitalist.    Thank you for consulting Palliative Care and allowing us to participate in the care of this patient.    Time Spent Counseling > 50%:  YES                                   Total Time Spent with patient/family counseling, workup/treatment review, counseling and placement of orders/preparation of this note: 38 minutes    Electronically signed by BEBO Kim CNP on 6/24/2022 at 8:14 AM    (Please note that portions of this note were completed with a voice recognition program.  Shlomo Brooklyn made to edit the dictations but occasionally words are mis-transcribed.)

## 2022-06-24 NOTE — PROGRESS NOTES
Nutrition Assessment     Type and Reason for Visit: Reassess    Nutrition Recommendations/Plan:   1. Continue current POC     Malnutrition Assessment:  Malnutrition Status: Severe malnutrition    Nutrition Assessment:  Pt continues to be severely malnourished but has maintained good PO intake at >50% most meals. Pt continues to receive ONS once daily. Recommend continuing current POC and encourage PO intake. Estimated Daily Nutrient Needs:  Energy (kcal):  6579-3224 kcals  (25-30 kcals/kg) Weight Used for Energy Requirements: Current     Protein (g):  49-98g Weight Used for Protein Requirements: Current        Fluid (ml/day):  982-1227 ml (20-25 ml/kg) Method Used for Fluid Requirements: 1 ml/kcal    Nutrition Related Findings:   +2, +3 edema Wound Type: Open Wounds    Current Nutrition Therapies:    ADULT DIET; Regular; Low Sodium (2 gm)  ADULT ORAL NUTRITION SUPPLEMENT; Lunch;  Low Calorie/High Protein Oral Supplement    Anthropometric Measures:  · Height: 5' 3\" (160 cm)  · Current Body Wt: 108 lb 4.8 oz (49.1 kg)   · BMI: 19.2    Nutrition Diagnosis:   · Inadequate oral intake related to acute injury/trauma,impaired respiratory function as evidenced by intake 26-50%,intake 51-75%,severe loss of subcutaneous fat,wounds,BMI      Nutrition Interventions:   Food and/or Nutrient Delivery: Continue Current Diet,Continue Oral Nutrition Supplement  Nutrition Education/Counseling: No recommendation at this time  Coordination of Nutrition Care: Continue to monitor while inpatient  Plan of Care discussed with: muscle/fat loss; +1 BLE edema    Goals:  Previous Goal Met: Progressing toward Goal(s)  Goals: PO intake 50% or greater,Meet at least 75% of estimated needs       Nutrition Monitoring and Evaluation:   Behavioral-Environmental Outcomes: None Identified  Food/Nutrient Intake Outcomes: Food and Nutrient Intake,Supplement Intake  Physical Signs/Symptoms Outcomes: Biochemical Data,Fluid Status or Edema,Weight,Skin    Discharge Planning:    Continue current diet,Continue Oral Nutrition Supplement     Merline Julio RD, LD  Contact: [phone]

## 2022-06-24 NOTE — PROGRESS NOTES
Pharmacy Adjustment per [facility] protocol    Lizette Bui is a 68 y.o. female. Pharmacy has adjusted medications per [facility] protocol. Recent Labs     06/23/22  0254 06/24/22  0359   BUN 33* 34*       Recent Labs     06/23/22  0254 06/24/22  0359   CREATININE 1.3* 1.4*       Estimated Creatinine Clearance: 26 mL/min (A) (based on SCr of 1.4 mg/dL (H)). Height:   Ht Readings from Last 1 Encounters:   06/21/22 5' 3\" (1.6 m)     Weight:  Wt Readings from Last 1 Encounters:   06/21/22 108 lb 4.8 oz (49.1 kg)         Plan: Adjust the following medications based on [facility] protocol:           Change Zosyn 3.375 grams IV Q 8 hours standard infusion to Zosyn 4.5 grams IV x 1 dose over 30 minutes; followed by Zosyn 3.375 grams IV Q 8 hours extended infusion.     Electronically signed by VICENTE Dalton [facility] on 6/24/2022 at 12:12 PM

## 2022-06-24 NOTE — PROGRESS NOTES
[facility]      Progress Note    Patient:  Mercedes Sanchez  YOB: 1945  Date of Service: 6/24/2022  MRN: [id number]   Acct: [de-identified]   Primary Care Physician: Deloris Spencer MD  Advance Directive: DNR  Admit Date: 6/20/2022       Hospital Day: 4    Portions of this note have been copied forward, however, updated to reflect the most current clinical status of this patient. CHIEF COMPLAINT shortness of breath    SUBJECTIVE: Less anxious today. Cardiology had seen and advised her he cannot do anything further for her cardiac status. We discussed ventilator support since she is having difficulty maintaining her oxygen saturation. She does not want to be intubated or coded. Code status changed and Pallative notified and they are working with her and family about goals of care. Today she stated she felt pressure to make a decision about hospice. Reassure pt that it is her decision and no one is pressuring just trying to give options for her. She understands she is end stage but stated she wanted us to get her better enough to go home. I told her we would do all we could but that might not be possible. Daughter at bedside and reinforced to her mother what I told her. CUMULATIVE HOSPITAL COURSE:     Mercedes Sanchez is an 68 y.o. female with past medical history of aortic stenosis, CHF, ASCVD, COPD, hyperlipidemia, hypertension, history of MI, mitral valve stenosis, and arthritis. Patient was recently discharged on  with complaint of dyspnea. Patient states she did well for the first few days after discharge at home. Then her daughter reports she has gained about a pound a day since fourth day post discharge. Has become increasingly short of breath her pulse ox dropping to as much as in the 50s at home. Confirmed by EMS. Patient denies chest pain , significantly more short of breath.   ER evaluation shows progressive infiltrate and effusion left lung base with new Murmur heard. Pulmonary:      Breath sounds: Wheezing and rales present. Abdominal:      General: Bowel sounds are normal.      Palpations: Abdomen is soft. Musculoskeletal:      Right lower leg: Edema present. Left lower leg: Edema present. Skin:     General: Skin is warm and dry. Coloration: Skin is pale. Neurological:      General: No focal deficit present. Mental Status: She is alert. Psychiatric:      Comments: anxious            Medications:      sodium chloride      furosemide (LASIX) 1mg/ml infusion 10 mg/hr (06/24/22 1109)    sodium chloride        [START ON 6/25/2022] heparin (porcine)  5,000 Units SubCUTAneous BID    piperacillin-tazobactam  3,375 mg IntraVENous Q8H    [Held by provider] bumetanide  2 mg Oral BID    metoprolol  100 mg Oral BID    sodium chloride flush  5-40 mL IntraVENous 2 times per day    amLODIPine  5 mg Oral BID    Arformoterol Tartrate  15 mcg Nebulization BID    atorvastatin  40 mg Oral Nightly    budesonide  0.5 mg Nebulization BID    clopidogrel  75 mg Oral Daily    guaiFENesin  1,200 mg Oral BID    ipratropium-albuterol  3 mL Inhalation 4x daily    isosorbide mononitrate  30 mg Oral Daily    levothyroxine  25 mcg Oral Daily    pantoprazole  40 mg Oral BID AC    sildenafil  20 mg Oral TID     sodium chloride, diphenhydrAMINE, saliva substitute, morphine, hydrOXYzine HCl, sodium chloride flush, sodium chloride, ondansetron **OR** ondansetron, polyethylene glycol, acetaminophen **OR** acetaminophen, potassium chloride **OR** potassium alternative oral replacement **OR** potassium chloride, magnesium sulfate, albuterol  ADULT DIET; Regular; Low Sodium (2 gm)  ADULT ORAL NUTRITION SUPPLEMENT; Lunch;  Low Calorie/High Protein Oral Supplement     Lab and other Data:     Recent Labs     06/23/22  0254 06/23/22  1700   WBC 12.5*  --    HGB 6.9* 9.9*     --      Recent Labs     06/22/22  0427 06/23/22  0254 06/24/22  0359    135* 130*   K 3.6 3.3* 4.0   CL 97* 96* 92*   CO2 27 27 27   BUN 30* 33* 34*   CREATININE 1.1* 1.3* 1.4*   GLUCOSE 105 144* 165*     No results for input(s): AST, ALT, ALB, BILITOT, ALKPHOS in the last 72 hours. Troponin T:   No results for input(s): TROPONINI in the last 72 hours. Pro-BNP: No results for input(s): BNP in the last 72 hours. INR: No results for input(s): INR in the last 72 hours. UA:  No results for input(s): NITRITE, COLORU, PHUR, LABCAST, WBCUA, RBCUA, MUCUS, TRICHOMONAS, YEAST, BACTERIA, CLARITYU, SPECGRAV, LEUKOCYTESUR, UROBILINOGEN, BILIRUBINUR, BLOODU, GLUCOSEU, AMORPHOUS in the last 72 hours. Invalid input(s): [place]  A1C:   No results for input(s): LABA1C in the last 72 hours. ABG:No results for input(s): PHART, ONK8TZX, PO2ART, JPU2OCZ, BEART, HGBAE, X4IRKKFF, CARBOXHGBART in the last 72 hours. RAD:   XR CHEST PORTABLE    Result Date: 6/20/2022  NO PRIOR REPORT AVAILABLE Exam: X-RAY OF [facility] Clinical data:Short of air, edema. Technique:Single view of the chest. Prior studies: Radiograph of the chest dated 06/11/2022. Findings: The lungs are grossly clear; noevidence of acute infiltrate or pleural effusion. Cardiac silhouette is within normal limits. No acute osseous abnormality is detected. There is progression of infiltrate and effusion left base and new alveolar infiltrates in the right upper and lower lobes. Right pleural effusion also suspected. Median sternotomy wires again noted. Progressive infiltrate and effusion left lung base New alveolar infiltrates right upper and lower lobes with effusion.  Recommendation: Follow up short term  Electronically Signed by Renny Amaro MD at 20-Jun-2022 12:43:34 PM                      Assessment/Plan     Principal Problem:    Hypoxemic respiratory failure, chronic (HCC)              O2 to keep pulse ox above 90              Diurese              Strict intake and output              ABGs for acute hypoxic episodes  Active Problems:    Mitral valve insufficiency              Noted    Acute on chronic diastolic heart failure (HCC)-end stage              Lasix gtt              strict intake and output              Serial labs              Telemetry              Continue sildenafil              Daily weight   Pallative care-goals of care and code status    Anemia of chronic disease              Infuse if hemoglobin less than 7-one unit today  COPD              Continue home neb treatments              Supplemental O2 to keep SPO2 between 88 and 92%  Diabetes              Accu-Cheks before meals and at bedtime              Hemoglobin A1C-5.6              Is not on home medication for diabetes              Recently on steroids  Hypothyroid             Continue Synthroid as at home  Hypertension               Continue home medication              Monitor for need to adjust  Anxiety   Atarax TID prn   Pallative MS 1 mg every 3 hours with benadryl has seemed to help.     Nosebleed   Silver nitrate cautey used and sucessful   Neosporin ointment to naes BID  Resolved Problems:  . * No resolved hospital problems. *      DVT Prophylaxis: Lovenox    Discharge planning: TBD      Further Orders per Clinical course/attending. Electronically signed by BEBO Huang CNP on 6/24/2022 at 6:26 PM       EMR Dragon/Transcription disclaimer:   Much of this encounter note is an electronic transcription/translation of spoken language to printed text.  The electronic translation of spoken language may permit erroneous, or at times, nonsensical words or phrases to be inadvertently transcribed; although attempts have made to review the note for such errors, some may still exist.

## 2022-06-24 NOTE — PROGRESS NOTES
Cardiology Progress Note Josef García MD      Patient:  Cliff Castorena  [id number]    Patient Active Problem List    Diagnosis Date Noted    Unstable angina [facility])      Priority: High    Acute on chronic diastolic heart failure ([address]      Priority: High    Bradycardia      Priority: High    Acute superficial venous thrombosis of right lower extremity 04/25/2016     Priority: High     Overview Note:     With large RLE bulla lateral foot skin violaceous discoloration, acutely POA (venous backpressure)      Mitral valve stenosis 03/29/2016     Priority: High    Mitral valve insufficiency 03/29/2016     Priority: High    Solitary kidney, acquired 06/21/2022     Priority: Medium    Atherosclerotic PATRICK (renal artery stenosis), unilateral ([address] 06/21/2022     Priority: Medium    Acute and chronic respiratory failure with hypoxia ([address]      Priority: Medium    Hypoxemic respiratory failure, chronic ([address] 06/20/2022     Priority: Medium    Personal history of noncompliance with medical treatment and regimen 06/14/2022     Priority: Medium    History of gastric ulcer      Priority: Medium    History of duodenal ulcer      Priority: Medium    History of colon polyps      Priority: Medium    Palliative care patient 06/07/2022     Priority: Medium    Anemia of chronic disease 06/05/2022     Priority: Medium    Near syncope 06/04/2022     Priority: Medium    Elevated troponin 04/25/2016     Priority: Medium     Overview Note:     Consider NSTMI, POA      PUD (peptic ulcer disease) 03/29/2016     Priority: Medium    COPD exacerbation ([address] 04/14/2022     Priority: Low    Acute on chronic anemia 04/02/2021     Priority: Low    Occult GI bleeding 04/02/2021     Priority: Low    Chronic atrial fibrillation ([address] 10/06/2020     Priority: Low    History of coronary artery stent placement 01/29/2020     Priority: Low     Overview Note:     11/15/ 2 stents to RCA and 1 to circumflex - Dr. Vika Reyna 01/29/2020     Priority: Low    Pain in both lower extremities 10/30/2019     Priority: Low    Status post Maze operation for atrial fibrillation 09/25/2019     Priority: Low     Overview Note:     4/18/16      PAF (paroxysmal atrial fibrillation) ([facility] 09/25/2019     Priority: Low    S/P coronary artery bypass graft x 1 09/25/2019     Priority: Low     Overview Note:     4/8/16 SVT to RCA      S/P mitral valve replacement 09/25/2019     Priority: Low     Overview Note:     4/8/16 MVR 23 mm mosaic porcine by Dr. Christo Daniels Chest pain 09/25/2019     Priority: Low    JONES (dyspnea on exertion) 09/25/2019     Priority: Low    Fatigue 09/25/2019     Priority: Low    Postoperative anemia due to acute blood loss 07/12/2019     Priority: Low    Carotid aneurysm, right ([facility] 07/11/2019     Priority: Low    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[facility] 05/21/2019     Priority: Low    Mild aortic stenosis 02/20/2018     Priority: Low    Essential hypertension      Priority: Low    NSTEMI (non-ST elevated myocardial infarction) ([facility] 01/14/2018     Priority: Low    HCAP (healthcare-associated pneumonia) 01/14/2018     Priority: Low    Acute renal failure (ARF) ([facility] 01/14/2018     Priority: Low    Syncope and collapse      Priority: Low    Sepsis with organ dysfunction ([facility] 01/13/2018     Priority: Low    Obstruction of left carotid artery 01/11/2018     Priority: Low    Bilateral carotid artery stenosis 11/19/2017     Priority: Low    Wound of sacral region 06/16/2016     Priority: Low    Elevated TSH 06/16/2016     Priority: Low    GI bleed 06/15/2016     Priority: Low    CHF (congestive heart failure) ([facility] 06/15/2016     Priority: Low    CKD (chronic kidney disease), stage III ([facility] 06/15/2016     Priority: Low    Pulmonary emphysema ([facility] 06/15/2016     Priority: Low    Chronic obstructive pulmonary disease (HCC)      Priority: Low    PVD (peripheral vascular disease) ([facility]      Priority: Low    Atherosclerosis of native artery of right lower extremity with ulceration of ankle ([facility] 06/05/2016     Priority: Low    Atherosclerosis of native arteries of right leg with ulceration of other part of lower right leg 06/05/2016     Priority: Low     Overview Note:     Replacing Inactive Diagnoses      Atherosclerosis of native arteries of right leg with ulceration of other part of foot 06/05/2016     Priority: Low     Overview Note:     Replacing Inactive Diagnoses      Nonhealing ulcer of right lower leg with fat layer exposed ([facility] 06/05/2016     Priority: Low    Non-pressure chronic ulcer of right ankle with fat layer exposed ([address] 06/05/2016     Priority: Low    Neuropathic ulcer of right foot with fat layer exposed ([address] 06/05/2016     Priority: Low    Hypervolemia      Priority: Low    Nonhealing nonsurgical wound with fat layer exposed 06/03/2016     Priority: Low    Atherosclerosis of native arteries of left leg with ulceration of calf (HCC)      Priority: Low    Severe malnutrition (HCC)      Priority: Low    Diastolic dysfunction      Priority: Low    Anemia in chronic kidney disease (CKD)      Priority: Low    Non-pressure chronic ulcer of right calf with fat layer exposed ([facility] 05/27/2016     Priority: Low    Decubitus ulcer of right buttock, stage 3 ([address] 05/27/2016     Priority: Low    Nocturnal hypoxia 03/30/2016     Priority: Low     Overview Note:     With associated mitral stenosis / regurgitation and pulmonary hypertension      Pulmonary hypertension 03/29/2016     Priority: Low    Leukocytosis      Priority: Low    Calculus of gallbladder without cholecystitis without obstruction      Priority: Low    Carotid artery stenosis 02/08/2012     Priority: Low    Atherosclerosis of native artery of extremity with intermittent claudication ([facility] 07/25/2011     Priority: Low    Atherosclerosis of native artery of extremity with intermittent claudication ([facility] 07/25/2011     Priority: Low     Overview Note:     Replacing Inactive Diagnoses      Mixed hyperlipidemia      Priority: Low    Arthritis      Priority: Low    Coronary artery disease involving native coronary artery of native heart      Priority: Low     Overview Note:     3/16/2016  Echo  Severe MS, moderate to severe MR, RVSP 73 mmHg, normal LVFX  3/31/2016  Cath  70% osteal RCA, severe MR, MVA 1.9, normal LVFX  4/7//2016   MVR (23 mm [employer] Mosaic) [place])  5/7/2016  Echo  Normal LVFX, large pleural effusion, echolucency near preserved posterior Mitral leaflet, likely chordae, RVSP 72 mmHg         Admit Date:  6/20/2022    Admission Problem List: Present on Admission:   Hypoxemic respiratory failure, chronic (HCC)   Acute on chronic diastolic heart failure ([address]   Mitral valve insufficiency   Anemia of chronic disease   CHF [facility]congestive heart failure) [facility]   Diastolic dysfunction   Palliative care patient   Coronary artery disease involving native coronary artery of native heart   Pulmonary hypertension   Solitary kidney, acquired   Atherosclerotic PATRICK (renal artery stenosis), unilateral (HCC)   Severe malnutrition (HCC)   Acute and chronic respiratory failure with hypoxia (HCC)      Cardiac Specific Data:  Specialty Problems        Cardiology Problems    Mitral valve insufficiency        Mitral valve stenosis        Acute superficial venous thrombosis of right lower extremity        Bradycardia        Acute on chronic diastolic heart failure (HCC)        Unstable angina (HCC)        Near syncope        Atherosclerotic PATRICK (renal artery stenosis), unilateral (HCC)        Atherosclerosis of native artery of extremity with intermittent claudication (HCC)        Atherosclerosis of native artery of extremity with intermittent claudication (HCC)        Coronary artery disease involving native coronary artery of native heart        Mixed hyperlipidemia        Carotid artery stenosis        Pulmonary hypertension Atherosclerosis of native arteries of left leg with ulceration of calf ([facility])        Atherosclerosis of native arteries of right leg with ulceration of other part of foot        Atherosclerosis of native artery of right lower extremity with ulceration of ankle ([facility])        CHF (congestive heart failure) ([facility])        PVD (peripheral vascular disease) ([facility])        Bilateral carotid artery stenosis        Obstruction of left carotid artery        NSTEMI (non-ST elevated myocardial infarction) ([facility]        Essential hypertension        Mild aortic stenosis        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[facility])        Carotid aneurysm, right ([facility])        Chest pain        PAF (paroxysmal atrial fibrillation) ([facility])        Chronic atrial fibrillation ([facility]            1.  Coronary artery disease status post CABG with one-vessel bypass, SVG to RCA 2016 with bioprosthetic mitral valve replacement/maze, severely stenotic SVG to RCA by catheterization 11/15/2019 with PCI to proximal to mid RCA and circumflex, repeat PCI 7/30/2020 ostial RCA (3.5 x 9 mm resolute), with restenosis and repeat PCI 8/10/2021 (4.0 x 12 mm), residual ostial circumflex 60% stenosis, normal LV ejection fraction, mild aortic stenosis, severe pulmonary hypertension. 2.  Chronic kidney disease with occluded right renal artery, solitary left kidney with severely stenotic left renal artery status post endovascular intervention 7/30/2020 (5.0 x 22 mm resolute [place] stent), repeat PCI for restenosis 8/10/2021 (5.0 x 12 mm resolute [place]). 3.  Severe hypertension. 4.  COPD. 5.  Peripheral arterial disease with prior iliac stents, bilateral carotid endarterectomy, possible bilateral subclavian artery stenosis with differential blood pressures and bruits. 6.  Severe anemia with transfusion requirements and severe hypoalbuminemia.      Subjective:  Ms. Ang Godinez remains on high flow nasal oxygen. Some epistaxis noted. Still short of breath but slightly improved. Even balance despite being on IV Lasix drip. Does not wish to be intubated but not keen on hospice. Objective:   /82   Pulse (!) 101   Temp 99.3 °F (37.4 °C) (Temporal)   Resp 26   Ht 5' 3\" (1.6 m)   Wt 108 lb 4.8 oz (49.1 kg)   SpO2 (!) 84%   BMI 19.18 kg/m²       Intake/Output Summary (Last 24 hours) at 6/24/2022 1155  Last data filed at 6/24/2022 5132  Gross per 24 hour   Intake 1032.67 ml   Output 1000 ml   Net 32.67 ml       Prior to Admission medications    Medication Sig Start Date End Date Taking? Authorizing Provider   Multiple Vitamins-Minerals (THERAPEUTIC MULTIVITAMIN-MINERALS) tablet Take 1 tablet by mouth daily   Yes Historical Provider, MD   HYDROcodone-acetaminophen (NORCO) 5-325 MG per tablet Take 1 tablet by mouth every 6 hours as needed for Pain.    Yes Historical Provider, MD   Calcium Polycarbophil (FIBER-CAPS PO) Take 1 capsule by mouth Daily with supper   Yes Historical Provider, MD   clopidogrel (PLAVIX) 75 MG tablet Take 75 mg by mouth daily    Historical Provider, MD   sildenafil (REVATIO) 20 MG tablet Take 1 tablet by mouth 3 times daily 6/14/22 7/14/22  Cuong Perrin MD   predniSONE (DELTASONE) 5 MG tablet Take 1 tablet by mouth daily for 10 days 6/15/22 6/25/22  Cuong Perrin MD   bumetanide (BUMEX) 2 MG tablet Take 1 tablet by mouth 2 times daily  Patient taking differently: Take 2 mg by mouth 2 times daily Dr. Mayelin Jaime instructed patient to take 3 mg once daily prior to admission and to end on 6/20/22. 6/14/22 7/14/22  Cuong Perrin MD   potassium chloride (KLOR-CON M) 20 MEQ extended release tablet Take 1 tablet by mouth 2 times daily 6/14/22 7/14/22  Cuong Perrin MD   pantoprazole (PROTONIX) 40 MG tablet Take 1 tablet by mouth 2 times daily (before meals) 6/14/22 7/14/22  Cuong Perrin MD   guaiFENesin (MUCINEX) 600 MG extended release tablet Take 2 tablets by mouth 2 times daily 4/20/22   Randine Oxford, APRN - CNP   isosorbide mononitrate (IMDUR) 30 MG extended release tablet Take 60 mg by mouth in the morning and at bedtime     Historical Provider, MD   amLODIPine (NORVASC) 5 MG tablet Take 1 tablet by mouth 2 times daily 7/30/21   Luis Camejo MD   metoprolol (LOPRESSOR) 100 MG tablet Take 0.5 tablets by mouth 2 times daily 50 mg in the AM & 50 mg in the PM 6/30/21   BEBO Hay   albuterol (ACCUNEB) 1.25 MG/3ML nebulizer solution Inhale 1 ampule into the lungs every 6 hours as needed  1/22/21   Historical Provider, MD   budesonide (PULMICORT) 0.5 MG/2ML nebulizer suspension Inhale 0.5 mg into the lungs at bedtime  2/2/21   Historical Provider, MD   ipratropium-albuterol (DUONEB) 0.5-2.5 (3) MG/3ML SOLN nebulizer solution Inhale 3 mLs into the lungs every 4 hours While Awake 2/2/21   Historical Provider, MD   Probiotic Product (PROBIOTIC DAILY PO) Take 1 tablet by mouth daily    Historical Provider, MD   levothyroxine (SYNTHROID) 25 MCG tablet Take 25 mcg by mouth Daily  11/7/19   Historical Provider, MD   Fexofenadine HCl (MUCINEX ALLERGY PO) Take 1 tablet by mouth 2 times daily     Historical Provider, MD   Arformoterol Tartrate (BROVANA) 15 MCG/2ML NEBU Inhale 15 mcg into the lungs 2 times daily  1/2/20   Historical Provider, MD   OXYGEN Inhale 2 L into the lungs daily     Historical Provider, MD   atorvastatin (LIPITOR) 40 MG tablet Take 1 tablet by mouth daily  Patient taking differently: Take 40 mg by mouth nightly PT TAKES AT NIGHT.  11/20/19   Luis Camejo MD   Biotin 5000 MCG TABS Take 1 tablet by mouth daily     Historical Provider, MD        silver nitrate applicators  1 each Topical Once    [START ON 6/25/2022] heparin (porcine)  5,000 Units SubCUTAneous BID    [Held by provider] bumetanide  2 mg Oral BID    metoprolol  100 mg Oral BID    sodium chloride flush  5-40 mL IntraVENous 2 times per day    amLODIPine  5 mg Oral BID    Arformoterol Tartrate  15 mcg Nebulization BID    atorvastatin  40 mg Oral Nightly    budesonide  0.5 mg Nebulization BID    clopidogrel  75 mg Oral Daily    guaiFENesin  1,200 mg Oral BID    ipratropium-albuterol  3 mL Inhalation 4x daily    isosorbide mononitrate  30 mg Oral Daily    levothyroxine  25 mcg Oral Daily    pantoprazole  40 mg Oral BID AC    sildenafil  20 mg Oral TID       TELEMETRY: Sinus     Physical Exam:      Physical Exam  Constitutional:       General: She is in acute distress. Appearance: She is not diaphoretic. Comments: Emaciated  On high flow nasal oxygen with accessory muscle use   HENT:      Mouth/Throat:      Pharynx: No oropharyngeal exudate. Eyes:      General: No scleral icterus. Right eye: No discharge. Left eye: No discharge. Neck:      Thyroid: No thyromegaly. Vascular: No JVD. Cardiovascular:      Rate and Rhythm: Regular rhythm. Tachycardia present. No extrasystoles are present. Heart sounds: Normal heart sounds, S1 normal and S2 normal. No murmur heard. No systolic murmur is present. No diastolic murmur is present. No friction rub. No gallop. No S3 or S4 sounds. Comments: Jugular venous distention  Trace to 1+ edema noted  Systolic murmur  Pulmonary:      Effort: Respiratory distress present. Breath sounds: Normal breath sounds. Comments: Decreased air entry  Chest:      Chest wall: No tenderness. Abdominal:      General: Bowel sounds are normal. There is no distension. Palpations: Abdomen is soft. There is no mass. Tenderness: There is no abdominal tenderness. There is no guarding or rebound. Hernia: No hernia is present. Comments: Soft, nontender  No palpable organomegaly   Musculoskeletal:         General: Normal range of motion. Skin:     General: Skin is warm. Coloration: Skin is not pale. Findings: No rash. Neurological:      Mental Status: She is alert and oriented to person, place, and time. Cranial Nerves: No cranial nerve deficit.       Deep Tendon Reflexes: Reflexes normal. Lab Data:  CBC:   Recent Labs     06/23/22  0254 06/23/22  1700   WBC 12.5*  --    HGB 6.9* 9.9*   HCT 23.0* 32.7*   MCV 94.3  --      --      BMP:   Recent Labs     06/22/22  0427 06/23/22  0254 06/24/22  0359    135* 130*   K 3.6 3.3* 4.0   CL 97* 96* 92*   CO2 27 27 27   BUN 30* 33* 34*   CREATININE 1.1* 1.3* 1.4*     LIVER PROFILE: No results for input(s): AST, ALT, LIPASE, BILIDIR, BILITOT, ALKPHOS in the last 72 hours. Invalid input(s): AMYLASE,  ALB  PT/INR: No results for input(s): PROTIME, INR in the last 72 hours. APTT: No results for input(s): APTT in the last 72 hours. BNP:  No results for input(s): BNP in the last 72 hours. CK, CKMB, Troponin: @LABRCNT (CKTOTAL:3, CKMB:3, TROPONINI:3)@    IMAGING:  CT ABDOMEN PELVIS WO CONTRAST Additional Contrast? None    Result Date: 6/14/2022  NO PRIOR REPORT AVAILABLE Exam: CT OF THE ABDOMEN/PELVIS WITHOUT CONTRAST Clinical data: Abdominal pain. Technique: Axial CT images were acquired through the abdomen and pelvis without contrast using soft tissue and bone algorithms. Reformatted/MPR images were performed. Radiation dose: CTDIvol =16.41 mGy, DLP =701 mGy x cm. Limitations: Lack of intravenous contrast limits evaluation of solid viscera. Lack of oral contrast limits evaluation of the bowel loops. Prior Studies: Renal arterial ultrasound dated 7/30/2020 images. Findings: Lung bases:Trace pleural effusion is noted bilaterally. Mild subsegmental atelectasis is noted in the included bilateral lungs. Liver:Unremarkable size andcontour. Normal density. No evidence of mass. No evidence of dilated ducts. Gallbladder Fossa: Cholelithiasis without evidence of acute cholecystitis. Spleen: Grossly unremarkable. Pancreas/adrenal glands: Grossly unremarkable size, contour and density. Kidneys: In anatomic position. There is mild atrophy of the right kidney and compensatory hypertrophy of the left kidney.  Bilateral non-obstructing renal calculi, reconstruction, and/or weight based dosing when appropriate to reduce radiation dose to as low as reasonably achievable. Electronically Signed by Nico Brothers MD at 14-Jun-2022 01:21:42 AM             XR CHEST (2 VW)    Result Date: 6/12/2022  NO PRIOR REPORT AVAILABLE Exam: X-RAYS OF THE CHEST Clinical data:COPD/CHF/pneumonia. Technique: PA and lateral views of the chest. Prior studies: Radiograph of the chest dated 06/04/2022. NM lung scan dated 04/15/2022. Findings:Hyperinflated emphysematous lungs. Bilateral pleural effusions. Slightly enlarged cardiac silhouette. Hyperinflated emphysematous lungs. Bilateral pleural effusions. Slightly enlarged cardiac silhouette. Recommendation: Follow up as clinically indicated. Electronically Signed by Adam Allen MD at 12-Jun-2022 10:47:39 PM             XR CHEST PORTABLE    Result Date: 6/20/2022  NO PRIOR REPORT AVAILABLE Exam: X-RAY OF THECHEST Clinical data:Short of air, edema. Technique:Single view of the chest. Prior studies: Radiograph of the chest dated 06/11/2022. Findings: The lungs are grossly clear; noevidence of acute infiltrate or pleural effusion. Cardiac silhouette is within normal limits. No acute osseous abnormality is detected. There is progression of infiltrate and effusion left base and new alveolar infiltrates in the right upper and lower lobes. Right pleural effusion also suspected. Median sternotomy wires again noted. Progressive infiltrate and effusion left lung base New alveolar infiltrates right upper and lower lobes with effusion. Recommendation: Follow up short term  Electronically Signed by Eugenio Martinez MD at 20-Jun-2022 12:43:34 PM             XR CHEST PORTABLE    Result Date: 6/4/2022  NO PRIOR REPORT AVAILABLE Exam: X-RAY OF THE CHEST Clinical data: COPD, respiratory distress. Technique: Single view of the chest. Prior studies: Radiograph of the chest dated 04/20/2022. Findings:  COPD.   Interval worsening bilateral lower lobe infiltrates. Enlarging small bilateral  pleural effusions. Cardiac silhouette is within normal limits. No acute osseous abnormality is detected. No other change. COPD. Interval worsening bilateral lower lobe infiltrates. Enlarging small bilateral  pleural effusions. Recommendation: Follow up as clinically indicated. Electronically Signed by Raoul Perez MD at 04-Jun-2022 04:59:51 PM                 Assessment and Plan: This is a 79 y.o. year old female with past medical history of extensive vasculopathy, coronary artery disease with prior one-vessel CABG 2016 with SVG to RCA (severely diseased), bioprosthetic mitral valve replacement with maze procedure, PCI to RCA and circumflex 11/15/2019, repeat PCI to ostial RCA 7/30/2020 with restenosis and repeat PCI 8/10/2021, chronic kidney disease stage III with solitary left kidney with severe left renal artery stenosis, status post endovascular intervention 7/30/2020 with restenosis and repeat intervention 8/10/2021, normal LV ejection fraction, severe renovascular hypertension, COPD, peripheral arterial disease with prior bilateral carotid endarterectomy, iliac stents and possible subclavian stenosis admitted with recurrent acute on chronic diastolic heart failure presentation. 1.  Remains with poor pulmonary status. Does not wish to be intubated but currently declining hospice. Chest x-ray from admission reviewed on 6/20/2022. Noted right lower lobe infiltrate. Repeat chest x-ray and will empirically treat with IV Zosyn. Blood cultures requested. 2.  Currently on IV diuresis with even balance. Rising BNP despite diuresis. Renal functions fairly stable with creatinine at 1.3. Have discussed issues with patient in detail. She appears to understand.   Limited options with inability to proceed with any interventional approach from multitude of issues including respiratory distress that would require intubation to proceed with high likelihood of difficulty extubating, severe anemia with blood loss and severe malnutrition. 3.  We will continue to follow closely.     Cailin Bolton MD, MD 6/24/2022 11:55 AM

## 2022-06-24 NOTE — TELEPHONE ENCOUNTER
I did make an informal visit to see the patient at [facility] early yesterday evening.

## 2022-06-25 PROBLEM — I13.0 HYPERTENSIVE HEART AND RENAL DISEASE WITH CONGESTIVE HEART FAILURE (HCC): Status: ACTIVE | Noted: 2022-01-01

## 2022-06-25 NOTE — DISCHARGE SUMMARY
[place], [place], [address]    DEPARTMENT OF HOSPITALIST MEDICINE      DISCHARGE SUMMARY:      PATIENT NAME:  Jo Lamar  :  1945  MRN:  [id number]    Admission Date:   2022  9:58 AM Attending: Yolanda Lorenz MD   Discharge Date:   2022              PCP: Jose Manuel Wood MD  Length of Stay: 5 days     Chief Complaint on Admission:   Chief Complaint   Patient presents with    Shortness of Breath     Pt presents to ED with SOA, initial O2 in the 60s at home. Possible STEMI per EMS       Consultants:     IP CONSULT TO HEART FAILURE NURSE/COORDINATOR  IP CONSULT TO DIETITIAN  IP CONSULT TO CARDIOLOGY  PALLIATIVE CARE EVAL  IP CONSULT TO PALLIATIVE CARE  IP CONSULT TO ONCOLOGY  IP CONSULT TO HOSPICE  IP CONSULT TO HOSPICE  IP CONSULT TO HOSPICE  IP CONSULT TO HOSPICE       Discharge Problem List:   Principal Problem:    Hypoxemic respiratory failure, chronic (HCC)  Active Problems:    Mitral valve insufficiency    Acute on chronic diastolic heart failure (HCC)    Anemia of chronic disease    Palliative care patient    Solitary kidney, acquired    Atherosclerotic PATRICK (renal artery stenosis), unilateral (HCC)    Acute and chronic respiratory failure with hypoxia (HCC)    Nosebleed    Coronary artery disease involving native coronary artery of native heart    Pulmonary hypertension    Severe malnutrition (HCC)    Diastolic dysfunction    CHF (congestive heart failure) ([facility] [address]  Resolved Problems:    * No resolved hospital problems. *         Last dated Assessment and Plan . .. 2022      CUMULATIVE  HOSPITAL  COURSE  AND  TREATMENT:  Den Grey is an 68 y. o. female with past medical history of aortic stenosis, CHF, ASCVD, COPD, hyperlipidemia, hypertension, history of MI, mitral valve stenosis, and arthritis.  Patient was recently discharged on 200 with complaint of dyspnea.   Patient states she did well for the first few days after discharge at home. Yo Gilbertoromaine her daughter reports she has gained about a pound a day since fourth day post discharge.  Has become increasingly short of breath her pulse ox dropping to as much as in the 50s at home.  Confirmed by EMS.   Patient denies chest pain , significantly more short of breath.  ER evaluation shows progressive infiltrate and effusion left lung base with new alveolar infiltrates in the right upper and lower lobes with effusion.  Chemistry BUN 29 creatinine 1 glucose 150 BNP 18,884 white count is 15.9 hemoglobin 7.5 hematocrit 25.9 platelets 720  PT 7.87 PCO2 43 PO2 30 bicarb 33.  Be admitted to hospitalist services with cardiology consult she is an established Dr. Marie Chambers in contact with the office and she was instructed to come yesterday but wanted to wait.   Cardiology has seen the patient and feel that medical management is her only option. Palliative care consult called. Metoprolol increased to help with tachycardia and b/p. She and family have decided on comfort care/hospice. Met with family twice and this is their desire. She does not want intubation or CPR. OBJECTIVE:  BP (!) 117/52   Pulse 84   Temp 98.1 °F (36.7 °C) (Temporal)   Resp 18   Ht 5' 3\" (1.6 m)   Wt 108 lb 4.8 oz (49.1 kg)   SpO2 94%   BMI 19.18 kg/m²       Heart: RRR murmur noted   Lungs: Harsh throughout, crackles bases   Abdomen: Soft, non-tender   Extremities: No edema   Neurologic: Alert and oriented   Skin: Warm and dry          Laboratory Data:  Recent Labs     06/23/22  0254 06/23/22  1700 06/25/22  0903   WBC 12.5*  --  15.8*   HGB 6.9* 9.9* 9.0*     --  393     Recent Labs     06/23/22  0254 06/24/22  0359 06/25/22  0242   * 130* 132*   K 3.3* 4.0 4.1   CL 96* 92* 95*   CO2 27 27 25   BUN 33* 34* 38*   CREATININE 1.3* 1.4* 1.4*   GLUCOSE 144* 165* 128*     No results for input(s): AST, ALT, ALB, BILITOT, ALKPHOS in the last 72 hours. Troponin T: No results for input(s): TROPONINI in the last 72 hours.   Pro-BNP: No results for input(s): BNP in the last 72 hours. INR: No results for input(s): INR in the last 72 hours. UA:No results for input(s): NITRITE, COLORU, PHUR, LABCAST, WBCUA, RBCUA, MUCUS, TRICHOMONAS, YEAST, BACTERIA, CLARITYU, SPECGRAV, LEUKOCYTESUR, UROBILINOGEN, BILIRUBINUR, BLOODU, GLUCOSEU, AMORPHOUS in the last 72 hours. Invalid input(s): Rosmery Shirts  A1C: No results for input(s): LABA1C in the last 72 hours. ABG:No results for input(s): PHART, ACT7ZIH, PO2ART, YWS4ZFI, BEART, HGBAE, N6GSYYWN, CARBOXHGBART in the last 72 hours. Impressions of imaging performed in 48 hours before discharge:    XR CHEST PORTABLE    Result Date: 6/25/2022  Suspect bilateral lower lobe pneumonias likely superimposed upon CHF with effusions. Median sternotomy wires noted. Diffuse calcific aortitis seen without aneurysm. Recommendation: Follow up very short term. Electronically Signed by Ludin Morrell MD at 25-Jun-2022 12:32:30 PM                DISCHARGE  MEDICATIONS:  To be addressed by the accepting attending physician. Further medical management to be taken over and patient medications to be addressed by the accepting attending physician. Condition on Discharge: gradually worsening  Discharge Disposition: Hospice Facility    Recommended Follow Up:  [facility] [address] [facility]  [phone]    On 7/12/2022  1:45pm post hosptial follow up appointment    Followup Appointments Scheduled at Time of Discharge:  Future Appointments   Date Time Provider Andrade Draper   6/27/2022  2:15 PM SCHEDULE, [facility] MED ONC MA L MED ONC [facility]   6/27/2022  2:30 PM BEBO Paula PAD [facility]   7/12/2022  1:45 PM BEBO [facility] Cardio [facility]   7/20/2022 11:50 AM BEBO An Dort[facility]        Discharge Instructions:   Please see the discharge paperwork. Patient was seen at bedside today, and the examination shows improvement since yesterday.     Detailed discharge directions delivered to the patient by myself and our nursing staff, who verbalizes understanding and is very happy and satisfied with the plan. Patient has been advised to continue all medications as prescribed and advised, and f/u with PCP within 1 week. Patient is stable from medical standpoint to be discharged. Total time spent during patient evaluation and assessment, discussion with the nurse/family, addressing discharge medications/scripts and coordination of care for safe discharge was in excess of 35 minutes.       Signed Electronically:    BEBO Veronica CNP  3:21 PM 6/25/2022

## 2022-06-25 NOTE — PROGRESS NOTES
MEDICAL ONCOLOGY PROGRESS NOTE    Pt Name: Cliff Castorena  MRN: [id number]  YOB: 1945  Date of evaluation: 6/25/2022    Subjective: breathing \"maybe a little bit better\". No further nose bleed. Short of breath with little movement in bed. Has not been able to get out of bed. HISTORY OF PRESENT ILLNESS:  Hematology consultation requested consulted regarding anemia. She was seen in consultation during last admission. She had iron deficiency anemia. She has been on Plavix and was also on aspirin. She had occult blood positive in the stool. GI was consulted during last admission but decided not to do any intervention due to the patient overall respiratory status. She was discharged recently. Her hemoglobin at discharge was 8.8. She presented to the hospital yesterday with hemoglobin 7.5. She has received IV Venofer to a total of 1000 mg. She has received transfusional support during last visit. Her anemia is multifactorial to include iron deficiency anemia and also anemia of inflammation, anemia of chronic kidney disease. Occult blood is + 6/7/2022. She was admitted again with short of breath.  proBNP was quite elevated. She was hypoxemic at home. She was brought by EMS. Prior hematology history  The patient is a 68 y.o. female with PMH CAD with CABG, carotid endarterectomy, COPD with continuous home O2 at 2L, HTN, HLD, CHF with diastolic dysfunction and pulmonary hypertension, and multiple  other comorbiditieswho presented to [facility] ED 6/4/2022 complaining of shortness of breath and CP. Hematology consultation was requested regarding severe anemia. The patient was first seen by Dr. Jerome Guerra on 6/7/2022 during patient was positioned [facility].  She has multiple comorbidities including history of coronary artery disease status post CABG, carotid endarterectomy, advanced COPD on continuous O2 supplementation, history of congestive heart failure, hyperlipidemia.   The patient has had prior admissions in the past for exacerbation of COPD and heart failure. She presented on 6/4/2022 with complaints of worsening shortness of breath. She was admitted for concern for COPD exacerbation/heart failure exacerbation. Work-up in the emergency showed elevated white blood cell count with severe anemia with hemoglobin 5.0/MCV 94, RDW 16.4, thrombocytosis platelet count 246,547. Iron profile showed ferritin 110, iron saturation 40, iron 89, TIBC 220, folate 20, vitamin B12 574. The patient denied any overt GI bleed or hematuria. Of note, she has been on aspirin and Plavix for a history of CAD. She was transfused 2 units PRBCs with a hemoglobin recovery of 8.0. Her hemoglobin is trended down again this morning on 6/7/2022 down to 6.8. She had a normal TSH. I do not see a hemolytic panel. Review of past CBCs showed that the patient has been anemic since at least May 2016. She never had a normal hemoglobin. Most of the time her hemoglobin is in the range of 7-10. She was admitted here in April 2022 with a hemoglobin 10.2 at admission and left with a hemoglobin 7.9.      Past Medical History:    Past Medical History:   Diagnosis Date    Aortic stenosis     Arthritis     Atherosclerosis of native arteries of the extremities with intermittent claudication 07/25/2011    Blood circulation, collateral     bilat stent lower extremities    CAD (coronary artery disease)     Carotid aneurysm, right (HCC)     Carotid artery occlusion     CHF (congestive heart failure) ([facility])     COPD (chronic obstructive pulmonary disease) ([address]     History of blood transfusion 2016    Post op, was taking blood thinner    Hyperlipidemia     Hypertension     MI (myocardial infarction) ([address] 2009    x2    Mitral valve stenosis     Movement disorder     arthritis    Pneumonia     Post-menopausal     PUD (peptic ulcer disease) 2009    Renal artery stenosis ([address] 08/10/2021    s/p stenting to L    Renal failure 2009    after ulcer perforation sepsis.     Severe malnutrition ([address]     Thyroid disease     Wound infection after surgery     right foot infection after cabg     Past Surgical History:    Past Surgical History:   Procedure Laterality Date    ABDOMEN SURGERY  2009    perforated ulcer resection of 1/3 stomach    CARDIAC SURGERY      artificial valve and bypass    CAROTID ENDARTERECTOMY      Right  with Dacron patch angioplasty    CAROTID ENDARTERECTOMY Left     CAROTID ENDARTERECTOMY Right 07/11/2019    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 AND REPAIR WITH REVERSED LEFT GREATER SAPHENOUS VEIN INTERPOSITIONAL BYPASS GRAFT performed by Kelly Maravilla MD at [place] [address] Right early 1990's    long ago    CATARACT REMOVAL WITH IMPLANT Bilateral     [address]    COLONOSCOPY  01/13/2012    Dr Andrea Sensing:  [facility]    CORONARY ANGIOPLASTY WITH STENT PLACEMENT  08/10/2021    RM- RCA    CORONARY ANGIOPLASTY WITH STENT PLACEMENT      CORONARY ARTERY BYPASS GRAFT  2016    DIAGNOSTIC CARDIAC CATH LAB PROCEDURE      DILATION AND CURETTAGE OF UTERUS      ILIO-FEMORAL BYPASS GRAFT N/A 06/02/2016    OPEN TRANSLUMINAL BALLOON ANGIOPLASTY AND STENTING OF RIGHT COMMON AND EXTERNAL  ILIAC ARTERIES; RIGHT FEMORAL ENDARTERECTOMY WITH VEIN PATCH ANGIOPLASTY performed by Kelly Maravilla MD at [address] [place] [address] N/A 04/07/2016    MITRAL VALVE  REPLACEMENT LUONG-MAZE ABLATION WITH CRYO PROCEDURE, CORONARY ARTERY BYPASS GRAFT X 1 WITH ENDOSCOPIC VEIN HARVESTING WITH PERFUSION TRANSESOPHAGEAL ECHOCARDIOGRAM performed by Nico Rios MD at [address] [place] [address]  2016    PERIPHERAL PERCUTANEOUS ARTERIAL INTERVENTION Left 08/10/2021    RM to L renal artery    OH REOPER, CAROTID ENDARTEC>1 MON Left 01/11/2018    REMOVAL OF HEMATOMA, LEFT CAROTID ARTERY performed by Kelly Maravilla MD at [address] Left 01/11/2018    LEFT CAROTID ENDARTERECTOMY WITH EEG MONITORING AND COMPLETION DUPLEX ULTRASOUND performed by Caterina Lopez MD at [facility]    PTCA      SKIN GRAFT Right 07/22/2016    Skin graft split thickness foot,ankle,and leg. Right leg 26x8cm and 12x6cm total area 280cm squared. TJR    UPPER GASTROINTESTINAL ENDOSCOPY  07/14/2015    Dr Rhina Antoine: normal    UPPER GASTROINTESTINAL ENDOSCOPY  03/15/2016    Dr Lissa Cuellar: normal    UPPER GASTROINTESTINAL ENDOSCOPY  05/27/2015    Dr Rhina Antoine:  paul neg, multiple gastric antial and duodenal ulcers    VASCULAR SURGERY  5/27/16 TJR    Aortagram and right leg runoff,right leg runoff,right common iliac artery selection for right leg run off views. VASCULAR SURGERY      . Open transluminal angioplasty and stenting of the external iliac artery. TJR    VASCULAR SURGERY  07/11/2019    TJR. 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 Social History:    The patient currently lives at home with spouse  Tobacco:   reports that she quit smoking about 42 years ago. Her smoking use included cigarettes. She quit after 2.00 years of use. She has never used smokeless tobacco.  Alcohol:   reports current alcohol use.   Illicit Drugs: Unknown    Family History:   Family History   Problem Relation Age of Onset    Diabetes Mother     Heart Disease Mother     Heart Failure Mother     Diabetes Sister     Heart Disease Sister     High Blood Pressure Sister     Colon Cancer Sister     Liver Disease Sister     Cirrhosis Sister     Colon Cancer Maternal Aunt     Colon Polyps Neg Hx     Esophageal Cancer Neg Hx      Current Hospital Medications:    Current Facility-Administered Medications   Medication Dose Route Frequency Provider Last Rate Last Admin    heparin (porcine) injection 5,000 Units  5,000 Units SubCUTAneous BID BEBO Reid - CNP piperacillin-tazobactam (ZOSYN) 3,375 mg in dextrose 5 % 50 mL IVPB (mini-bag)  3,375 mg IntraVENous Allie Roy MD   Stopped at 06/25/22 0343    neomycin-bacitracin-polymyxin (NEOSPORIN) ointment   Topical BID PRN BEBO Olivia - CNP        0.9 % sodium chloride infusion   IntraVENous PRN BEBO Singh        diphenhydrAMINE (BENADRYL) injection 12.5 mg  12.5 mg IntraVENous Q4H PRN Jimmy Whitaker APRN - CNP   12.5 mg at 06/23/22 1409    saliva substitute (BIOTENE/MOUTH KOTE) liquid   Oral PRN BEBO Millan CNP        [Held by provider] bumetanide (BUMEX) tablet 2 mg  2 mg Oral BID Teresita Shaw MD   2 mg at 06/22/22 0914    metoprolol tartrate (LOPRESSOR) tablet 100 mg  100 mg Oral BID BEBO Olivia CNP   100 mg at 06/24/22 0829    morphine (PF) injection 1 mg  1 mg IntraVENous Q3H PRN Jimmy Whitaker APRN - CNP   1 mg at 06/24/22 0602    furosemide (LASIX) 100 mg in dextrose 5 % 100 mL infusion  10 mg/hr IntraVENous Continuous Rusty Myers MD 10 mL/hr at 06/24/22 2300 10 mg/hr at 06/24/22 2300    hydrOXYzine HCl (ATARAX) tablet 10 mg  10 mg Oral TID PRN BEBO Olivia - CNP   10 mg at 06/24/22 2056    sodium chloride flush 0.9 % injection 5-40 mL  5-40 mL IntraVENous 2 times per day BEBO Olivia - CNP   10 mL at 06/23/22 0916    sodium chloride flush 0.9 % injection 5-40 mL  5-40 mL IntraVENous PRN BEBO Olivia - CNP        0.9 % sodium chloride infusion   IntraVENous PRN BEBO Olivia - WIN        ondansetron (ZOFRAN-ODT) disintegrating tablet 4 mg  4 mg Oral Q8H PRN BEBO Olivia CNP        Or    ondansetron [facility]) injection 4 mg  4 mg IntraVENous Q6H PRN Hershell Setters, APRN - CNP        polyethylene glycol (GLYCOLAX) packet 17 g  17 g Oral Daily PRN Zaki Setters, APRN - CNP        acetaminophen (TYLENOL) tablet 650 mg  650 mg Oral Q6H PRN Zaki Setters, APRN - CNP   650 mg at 06/24/22 2056    Or    acetaminophen (TYLENOL) suppository 650 mg  650 mg Rectal Q6H PRN Roel Sea, APRN - CNP        potassium chloride (KLOR-CON M) extended release tablet 40 mEq  40 mEq Oral PRN Roel Sea, APRN - CNP   40 mEq at 06/23/22 0915    Or    potassium bicarb-citric acid (EFFER-K) effervescent tablet 40 mEq  40 mEq Oral PRN Roel Sea, APRN - CNP        Or    potassium chloride 10 mEq/100 mL IVPB (Peripheral Line)  10 mEq IntraVENous PRN Roel Sea, APRN - CNP        magnesium sulfate 2000 mg in 50 mL IVPB premix  2,000 mg IntraVENous PRN Roel Sea, APRN - CNP        albuterol (PROVENTIL) nebulizer solution 1.25 mg  1.25 mg Nebulization 4x Daily PRN Roel Sea, APRN - CNP   1.25 mg at 06/23/22 1229    amLODIPine (NORVASC) tablet 5 mg  5 mg Oral BID Roel Sea, APRN - CNP   5 mg at 06/24/22 0679    Arformoterol Tartrate (BROVANA) nebulizer solution 15 mcg  15 mcg Nebulization BID Roel Sea, APRN - CNP   15 mcg at 06/25/22 2968    atorvastatin (LIPITOR) tablet 40 mg  40 mg Oral Nightly Roel Sea, APRN - CNP   40 mg at 06/24/22 2056    budesonide (PULMICORT) nebulizer suspension 500 mcg  0.5 mg Nebulization BID Roel Sea, APRN - CNP   500 mcg at 06/25/22 8961    clopidogrel (PLAVIX) tablet 75 mg  75 mg Oral Daily Roel Sea, APRN - CNP   75 mg at 06/24/22 7767    guaiFENesin (MUCINEX) extended release tablet 1,200 mg  1,200 mg Oral BID Roel Sea, APRN - CNP   1,200 mg at 06/24/22 2055    ipratropium-albuterol (DUONEB) nebulizer solution 3 mL  3 mL Inhalation 4x daily Roel Sea, APRN - CNP   3 mL at 06/25/22 4242    isosorbide mononitrate (IMDUR) extended release tablet 30 mg  30 mg Oral Daily Roel Sea, APRN - CNP   30 mg at 06/24/22 4844    levothyroxine (SYNTHROID) tablet 25 mcg  25 mcg Oral Daily Roel Sea, APRN - CNP   25 mcg at 06/25/22 5758    pantoprazole (PROTONIX) tablet 40 mg  40 mg Oral BID  Roelramiro Guido APRN - CNP   40 mg at 06/25/22 8101    sildenafil (REVATIO) tablet 20 mg  20 mg Oral TID Cayla Trivedi BEBO Rodriguez CNP   20 mg at 06/24/22 1444     Allergies: Allergies   Allergen Reactions    Ativan [Lorazepam] Hallucinations    Levaquin [Levofloxacin In D5w] Nausea And Vomiting    Xarelto [Rivaroxaban] Other (See Comments)     Made anemic. CANNOT HAVE DUE TO HEART VALVE REPLACEMENT. Morphine Itching and Rash     Objective   BP (!) 123/58   Pulse 77   Temp 97.7 °F (36.5 °C) (Temporal)   Resp 14   Ht 5' 3\" (1.6 m)   Wt 108 lb 4.8 oz (49.1 kg)   SpO2 (!) 86%   BMI 19.18 kg/m²     PHYSICAL EXAM:  CONSTITUTIONAL: Alert, appropriate, looks ill-appearing, no respiratory distress  ENT: Mucus membranes dry, external inspection of ears and nose are normal  NECK: Supple, no masses. No JVD  CHEST/LUNGS: HFNC FiO2 90/50L. Inspiratory rales in bases. Diminished breath sounds   CARDIOVASCULAR: RRR, no murmurs. no edema  ABDOMEN: soft non-tender, active bowel sounds  EXTREMITIES: warm, full ROM in all 4 extremities. Muscle wasting  SKIN: warm, dry with no rashes or lesions, pale, scattered bruising  NEUROLOGIC: follows commands, non focal     LABORATORY RESULTS REVIEWED/ANALYZED BY ME:  Recent Labs     06/23/22  1700 06/23/22  0254 06/21/22  0157 06/20/22  1007 06/20/22  1007   WBC  --  12.5* 13.4*  --  15.9*   HGB 9.9* 6.9* 7.4*   < > 7.5*   HCT 32.7* 23.0* 25.2*   < > 25.9*   MCV  --  94.3 96.9  --  97.7   PLT  --  347 318  --  315    < > = values in this interval not displayed.      Lab Results   Component Value Date     (L) 06/25/2022    K 4.1 06/25/2022    CL 95 (L) 06/25/2022    CO2 25 06/25/2022    BUN 38 (H) 06/25/2022    CREATININE 1.4 (H) 06/25/2022    GLUCOSE 128 (H) 06/25/2022    CALCIUM 8.0 (L) 06/25/2022    PROT 4.6 (L) 06/20/2022    LABALBU 2.3 (L) 06/20/2022    BILITOT 0.3 06/20/2022    ALKPHOS 171 (H) 06/20/2022    AST 33 (H) 06/20/2022    ALT 31 06/20/2022    LABGLOM 36 (A) 06/25/2022    GFRAA 44 (L) 06/25/2022    GLOB 4.6 08/29/2016     Lab Results   Component Value Date    INR 1.25 (H) 06/04/2022    INR 1.17 04/15/2022    INR 1.14 03/26/2021    PROTIME 15.7 (H) 06/04/2022    PROTIME 14.9 (H) 04/15/2022    PROTIME 14.6 03/26/2021     RADIOLOGY STUDIES REPORT/REVIEWED AND INTERPRETED BY ME:  CT ABDOMEN PELVIS WO CONTRAST Additional Contrast? None    Result Date: 6/14/2022  NO PRIOR REPORT AVAILABLE Exam: CT OF THE ABDOMEN/PELVIS WITHOUT CONTRAST Clinical data: Abdominal pain. Technique: Axial CT images were acquired through the abdomen and pelvis without contrast using soft tissue and bone algorithms. Reformatted/MPR images were performed. Radiation dose: CTDIvol =16.41 mGy, DLP =701 mGy x cm. Limitations: Lack of intravenous contrast limits evaluation of solid viscera. Lack of oral contrast limits evaluation of the bowel loops. Prior Studies: Renal arterial ultrasound dated 7/30/2020 images. Findings: Lung bases:Trace pleural effusion is noted bilaterally. Mild subsegmental atelectasis is noted in the included bilateral lungs. Liver:Unremarkable size andcontour. Normal density. No evidence of mass. No evidence of dilated ducts. Gallbladder Fossa: Cholelithiasis without evidence of acute cholecystitis. Spleen: Grossly unremarkable. Pancreas/adrenal glands: Grossly unremarkable size, contour and density. Kidneys: In anatomic position. There is mild atrophy of the right kidney and compensatory hypertrophy of the left kidney. Bilateral non-obstructing renal calculi, measuring approximately 2 mm in the upper pole of right kidney, 1 mm and 5 mm in the upper pole of left kidney. No ureteral calculi. No evidence of a renal mass or cyst. Perinephric space is unremarkable. Retroperitoneum: No enlarged retroperitoneal lymphadenopathy. The IVC appears unremarkable. Severe calcific atherosclerotic changes noted in the aorta and its branches. Peritoneal cavity: No evidence of free air. Trace ascites is noted.  Gastrointestinal tract: Small inguinal hernia is noted on right, it contains portion of small bowel without evidence of obstruction. Tiny fat containing inguinal hernia is noted on left. Tiny hiatal hernia is noted. Changes of mild constipation. There are twisted mesenteric vessels in the right lower quadrant without evidence of obstruction. Large amount of stool and gas in the colon. Appendix is not visualized. Pelvis: Solid and hollow viscera grossly unremarkable. Osseous structures: No acute or destructive bony process identified. Mild scoliosis. Degenerative changes noted in the spine and pelvis    1. Small inguinal hernia is noted on right, it contains portion of small bowel without evidence of obstruction. Tiny fat containing inguinal hernia is noted on left. 2. Tiny hiatal hernia is noted. 3. There are twisted mesenteric vessels in the right lower quadrant without evidence of obstruction. Changes of mild constipation. 4. Trace pleural effusion is noted bilaterally. Mild subsegmental atelectasis is noted in the included bilateral lungs. 5. Cholelithiasis without evidence of acute cholecystitis. 6. There is mild atrophy of the right kidney and compensatory hypertrophy of the left kidney. Bilateral non-obstructing renal calculi. 7. Generalized anasarca. Trace ascites is noted. 8. Moderately large amount of stool in the colon, please correlate with constipation. Recommendation: Follow up as clinically indicated. All CT scans at this facility utilize dose modulation, iterative reconstruction, and/or weight based dosing when appropriate to reduce radiation dose to as low as reasonably achievable. Electronically Signed by Almaz Lora MD at 14-Jun-2022 01:21:42 AM             XR CHEST (2 VW)    Result Date: 6/12/2022  NO PRIOR REPORT AVAILABLE Exam: X-RAYS OF THE CHEST Clinical data:COPD/CHF/pneumonia. Technique: PA and lateral views of the chest. Prior studies: Radiograph of the chest dated 06/04/2022. NM lung scan dated 04/15/2022. Findings:Hyperinflated emphysematous lungs.   Bilateral pleural effusions. Slightly enlarged cardiac silhouette. Hyperinflated emphysematous lungs. Bilateral pleural effusions. Slightly enlarged cardiac silhouette. Recommendation: Follow up as clinically indicated. Electronically Signed by Belem Day MD at 12-Jun-2022 10:47:39 PM             XR CHEST PORTABLE    Result Date: 6/20/2022  NO PRIOR REPORT AVAILABLE Exam: X-RAY OF THECHEST Clinical data:Short of air, edema. Technique:Single view of the chest. Prior studies: Radiograph of the chest dated 06/11/2022. Findings: The lungs are grossly clear; noevidence of acute infiltrate or pleural effusion. Cardiac silhouette is within normal limits. No acute osseous abnormality is detected. There is progression of infiltrate and effusion left base and new alveolar infiltrates in the right upper and lower lobes. Right pleural effusion also suspected. Median sternotomy wires again noted. Progressive infiltrate and effusion left lung base New alveolar infiltrates right upper and lower lobes with effusion. Recommendation: Follow up short term  Electronically Signed by Lona Cosme MD at 20-Jun-2022 12:43:34 PM             XR CHEST PORTABLE    Result Date: 6/4/2022  NO PRIOR REPORT AVAILABLE Exam: X-RAY OF THE CHEST Clinical data: COPD, respiratory distress. Technique: Single view of the chest. Prior studies: Radiograph of the chest dated 04/20/2022. Findings:  COPD. Interval worsening bilateral lower lobe infiltrates. Enlarging small bilateral  pleural effusions. Cardiac silhouette is within normal limits. No acute osseous abnormality is detected. No other change. COPD. Interval worsening bilateral lower lobe infiltrates. Enlarging small bilateral  pleural effusions. Recommendation: Follow up as clinically indicated.  Electronically Signed by Cindy Zimmerman MD at 04-Jun-2022 04:59:51 PM             ASSESSMENT:  #Normocytic anemia-  -Iron profile compatible with anemia of chronic disease  -Patient has been anemic for a long time. Hemoglobin in the range of 7- for the last 5 years    -also Probable GI blood loss: Patient has on Plavix/aspirin which makes her high risk for GI bleed  -Normal TSH  -Haptoglobin: 86, LDH: 303 ()    -Transfused 2 units PRBCs during last admission with hemoglobin trended down again.  -Patients with heart failure and chronic anemia and may benefit from IV iron infusion  -S/p Venofer 500 mg IV x2 doses (Intolerant to oral iron replacement due to GI upset)  -Patient could not undergo EGD/colonoscopy at this time. Please arrange some sort of GI follow-up as outpatient  -Hemoglobin 9.9 on 6/23/2022, s/p 1 unit pRBC  -Hgb 9.0, MCV 93.2 today, 6/25/2022     PLAN:  Continue current supportive care  Cardiology following regarding acute/chronic diastolic heart failure  CXR 6/24/2022: Suspect bilateral lower lobe pneumonias, likely superimposed CHF with effusions    -Pulmonary support  -DNI, declined hospice   -received diuresis  -Na+ 132, as per attending     -Hgb 9.9 on 6/23/2022, Hgb 9.0 today, 6/25/2022 - follow  -s/p silver nitrate for nose bleed without further epistaxis. On Plavix, Lovenox 30 mg qd (creatinine 1.4/GFR 36)    (Please note that portions of this note were completed with a voice recognition program. Efforts were made to edit the dictations but occasionally words are mis-transcribed.)    Janelle Garzon, APRN    06/25/22  7:25 AM   Physician's attestation/substantial contribution:  I, Dr Pravin Lawson, independently performed an evaluation on Farzaneh Abbott. I have reviewed relevant medical information/data to include but not limited to medication list, relevant appropriate labs and imaging when applicable. I reviewed other physician's notes, ancillary services and nurses assessment. I have reviewed the above documentation completed by the Nurse Practitioner or Physician Assistant.  Please see my additional contributions to the history of present illness, physical examination, and assessment/medical decision-making that reflect my findings and impressions. I have seen and examined the patient and the key elements of all parts of the encounter have been performed by me. I agree with the assessment and plan as outlined by the ARNP/PA. Subjective-continues to complains of fatigue and short of breath. Short of breath slightly better. Objective-chronically appearing, cachectic, debilitated  Assessment/plan:  Hemoglobin is 9.0 today. Stable after transfusion. Continue current supportive care.

## 2022-06-25 NOTE — PROGRESS NOTES
Cardiology Progress Note Mary Ware MD      Patient:  Aria Landaverde  [id number]    Patient Active Problem List    Diagnosis Date Noted    Unstable angina [facility])      Priority: High    Acute on chronic diastolic heart failure ([address]      Priority: High    Bradycardia      Priority: High    Acute superficial venous thrombosis of right lower extremity 04/25/2016     Priority: High     Overview Note:     With large RLE bulla lateral foot skin violaceous discoloration, acutely POA (venous backpressure)      Mitral valve stenosis 03/29/2016     Priority: High    Mitral valve insufficiency 03/29/2016     Priority: High    Nosebleed 06/24/2022     Priority: Medium    Solitary kidney, acquired 06/21/2022     Priority: Medium    Atherosclerotic PATRICK (renal artery stenosis), unilateral ([address] 06/21/2022     Priority: Medium    Acute and chronic respiratory failure with hypoxia ([address]      Priority: Medium    Hypoxemic respiratory failure, chronic ([address] 06/20/2022     Priority: Medium    Personal history of noncompliance with medical treatment and regimen 06/14/2022     Priority: Medium    History of gastric ulcer      Priority: Medium    History of duodenal ulcer      Priority: Medium    History of colon polyps      Priority: Medium    Palliative care patient 06/07/2022     Priority: Medium    Anemia of chronic disease 06/05/2022     Priority: Medium    Near syncope 06/04/2022     Priority: Medium    Elevated troponin 04/25/2016     Priority: Medium     Overview Note:     Consider NSTMI, POA      PUD (peptic ulcer disease) 03/29/2016     Priority: Medium    COPD exacerbation ([address] 04/14/2022     Priority: Low    Acute on chronic anemia 04/02/2021     Priority: Low    Occult GI bleeding 04/02/2021     Priority: Low    Chronic atrial fibrillation ([address] 10/06/2020     Priority: Low    History of coronary artery stent placement 01/29/2020     Priority: Low     Overview Note:     11/15/ 2 stents to RCA and 1 to heidiflex - Dr. Dylan De La Torre Myalgia 01/29/2020     Priority: Low    Pain in both lower extremities 10/30/2019     Priority: Low    Status post Maze operation for atrial fibrillation 09/25/2019     Priority: Low     Overview Note:     4/18/16      PAF (paroxysmal atrial fibrillation) (HCC) 09/25/2019     Priority: Low    S/P coronary artery bypass graft x 1 09/25/2019     Priority: Low     Overview Note:     4/8/16 SVT to RCA      S/P mitral valve replacement 09/25/2019     Priority: Low     Overview Note:     4/8/16 MVR 23 mm mosaic porcine by Dr. Shelli Cornell Chest pain 09/25/2019     Priority: Low    JONES (dyspnea on exertion) 09/25/2019     Priority: Low    Fatigue 09/25/2019     Priority: Low    Postoperative anemia due to acute blood loss 07/12/2019     Priority: Low    Carotid aneurysm, right ([facility] 07/11/2019     Priority: Low    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[facility] 05/21/2019     Priority: Low    Mild aortic stenosis 02/20/2018     Priority: Low    Essential hypertension      Priority: Low    NSTEMI (non-ST elevated myocardial infarction) ([facility] 01/14/2018     Priority: Low    HCAP (healthcare-associated pneumonia) 01/14/2018     Priority: Low    Acute renal failure (ARF) ([facility] 01/14/2018     Priority: Low    Syncope and collapse      Priority: Low    Sepsis with organ dysfunction ([facility] 01/13/2018     Priority: Low    Obstruction of left carotid artery 01/11/2018     Priority: Low    Bilateral carotid artery stenosis 11/19/2017     Priority: Low    Wound of sacral region 06/16/2016     Priority: Low    Elevated TSH 06/16/2016     Priority: Low    GI bleed 06/15/2016     Priority: Low    CHF (congestive heart failure) ([facility] 06/15/2016     Priority: Low    CKD (chronic kidney disease), stage III ([facility] 06/15/2016     Priority: Low    Pulmonary emphysema ([facility] 06/15/2016     Priority: Low    Chronic obstructive pulmonary disease (HCC)      Priority: Low    PVD (peripheral vascular disease) [facility])      Priority: Low    Atherosclerosis of native artery of right lower extremity with ulceration of ankle ([address] 06/05/2016     Priority: Low    Atherosclerosis of native arteries of right leg with ulceration of other part of lower right leg 06/05/2016     Priority: Low     Overview Note:     Replacing Inactive Diagnoses      Atherosclerosis of native arteries of right leg with ulceration of other part of foot 06/05/2016     Priority: Low     Overview Note:     Replacing Inactive Diagnoses      Nonhealing ulcer of right lower leg with fat layer exposed ([address] 06/05/2016     Priority: Low    Non-pressure chronic ulcer of right ankle with fat layer exposed ([address] 06/05/2016     Priority: Low    Neuropathic ulcer of right foot with fat layer exposed ([address] 06/05/2016     Priority: Low    Hypervolemia      Priority: Low    Nonhealing nonsurgical wound with fat layer exposed 06/03/2016     Priority: Low    Atherosclerosis of native arteries of left leg with ulceration of calf (HCC)      Priority: Low    Severe malnutrition (HCC)      Priority: Low    Diastolic dysfunction      Priority: Low    Anemia in chronic kidney disease (CKD)      Priority: Low    Non-pressure chronic ulcer of right calf with fat layer exposed ([address] 05/27/2016     Priority: Low    Decubitus ulcer of right buttock, stage 3 ([address] 05/27/2016     Priority: Low    Nocturnal hypoxia 03/30/2016     Priority: Low     Overview Note:     With associated mitral stenosis / regurgitation and pulmonary hypertension      Pulmonary hypertension 03/29/2016     Priority: Low    Leukocytosis      Priority: Low    Calculus of gallbladder without cholecystitis without obstruction      Priority: Low    Carotid artery stenosis 02/08/2012     Priority: Low    Atherosclerosis of native artery of extremity with intermittent claudication ([address] 07/25/2011     Priority: Low    Atherosclerosis of native artery of extremity with intermittent claudication ([facility] 07/25/2011     Priority: Low     Overview Note:     Replacing Inactive Diagnoses      Mixed hyperlipidemia      Priority: Low    Arthritis      Priority: Low    Coronary artery disease involving native coronary artery of native heart      Priority: Low     Overview Note:     3/16/2016  Echo  Severe MS, moderate to severe MR, RVSP 73 mmHg, normal LVFX  3/31/2016  Cath  70% osteal RCA, severe MR, MVA 1.9, normal LVFX  4/7//2016   MVR (23 mm [employer] Mosaic) VG-PDANanine Denia)  5/7/2016  Echo  Normal LVFX, large pleural effusion, echolucency near preserved posterior Mitral leaflet, likely chordae, RVSP 72 mmHg         Admit Date:  6/20/2022    Admission Problem List: Present on Admission:   Hypoxemic respiratory failure, chronic (HCC)   Acute on chronic diastolic heart failure ([address]   Mitral valve insufficiency   Anemia of chronic disease   CHF (congestive heart failure) ([address]   Diastolic dysfunction   Palliative care patient   Coronary artery disease involving native coronary artery of native heart   Pulmonary hypertension   Solitary kidney, acquired   Atherosclerotic PATRICK (renal artery stenosis), unilateral (HCC)   Severe malnutrition (HCC)   Acute and chronic respiratory failure with hypoxia ([address]   Nosebleed      Cardiac Specific Data:  Specialty Problems        Cardiology Problems    Mitral valve insufficiency        Mitral valve stenosis        Acute superficial venous thrombosis of right lower extremity        Bradycardia        Acute on chronic diastolic heart failure (HCC)        Unstable angina (HCC)        Near syncope        Atherosclerotic PATRICK (renal artery stenosis), unilateral (HCC)        Atherosclerosis of native artery of extremity with intermittent claudication (HCC)        Atherosclerosis of native artery of extremity with intermittent claudication (HCC)        Coronary artery disease involving native coronary artery of native heart        Mixed hyperlipidemia Carotid artery stenosis        Pulmonary hypertension        Atherosclerosis of native arteries of left leg with ulceration of calf ([facility])        Atherosclerosis of native arteries of right leg with ulceration of other part of foot        Atherosclerosis of native artery of right lower extremity with ulceration of ankle (HCC)        CHF (congestive heart failure) ([facility])        PVD (peripheral vascular disease) ([facility])        Bilateral carotid artery stenosis        Obstruction of left carotid artery        NSTEMI (non-ST elevated myocardial infarction) ([facility]        Essential hypertension        Mild aortic stenosis        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[facility])        Carotid aneurysm, right ([facility])        Chest pain        PAF (paroxysmal atrial fibrillation) ([facility])        Chronic atrial fibrillation ([facility]            1.  Coronary artery disease status post CABG with one-vessel bypass, SVG to RCA 2016 with bioprosthetic mitral valve replacement/maze, severely stenotic SVG to RCA by catheterization 11/15/2019 with PCI to proximal to mid RCA and circumflex, repeat PCI 7/30/2020 ostial RCA (3.5 x 9 mm resolute), with restenosis and repeat PCI 8/10/2021 (4.0 x 12 mm), residual ostial circumflex 60% stenosis, normal LV ejection fraction, mild aortic stenosis, severe pulmonary hypertension. 2.  Chronic kidney disease with occluded right renal artery, solitary left kidney with severely stenotic left renal artery status post endovascular intervention 7/30/2020 (5.0 x 22 mm resolute [place] stent), repeat PCI for restenosis 8/10/2021 (5.0 x 12 mm resolute Roverto). 3.  Severe hypertension. 4.  COPD. 5.  Peripheral arterial disease with prior iliac stents, bilateral carotid endarterectomy, possible bilateral subclavian artery stenosis with differential blood pressures and bruits.   6.  Severe anemia with transfusion requirements and severe hypoalbuminemia.     Subjective:  Ms. Papito [place] appears to be doing slightly better since yesterday. Not utilizing accessory muscles as much. On high flow oxygen. Sluggish urine output. Objective:   BP (!) 117/52   Pulse 84   Temp 98.1 °F (36.7 °C) (Temporal)   Resp 18   Ht 5' 3\" (1.6 m)   Wt 108 lb 4.8 oz (49.1 kg)   SpO2 94%   BMI 19.18 kg/m²       Intake/Output Summary (Last 24 hours) at 6/25/2022 1229  Last data filed at 6/25/2022 6846  Gross per 24 hour   Intake 250 ml   Output 600 ml   Net -350 ml       Prior to Admission medications    Medication Sig Start Date End Date Taking? Authorizing Provider   Multiple Vitamins-Minerals (THERAPEUTIC MULTIVITAMIN-MINERALS) tablet Take 1 tablet by mouth daily   Yes Historical Provider, MD   HYDROcodone-acetaminophen (NORCO) 5-325 MG per tablet Take 1 tablet by mouth every 6 hours as needed for Pain.    Yes Historical Provider, MD   Calcium Polycarbophil (FIBER-CAPS PO) Take 1 capsule by mouth Daily with supper   Yes Historical Provider, MD   clopidogrel (PLAVIX) 75 MG tablet Take 75 mg by mouth daily    Historical Provider, MD   sildenafil (REVATIO) 20 MG tablet Take 1 tablet by mouth 3 times daily 6/14/22 7/14/22  Cuong Perrin MD   predniSONE (DELTASONE) 5 MG tablet Take 1 tablet by mouth daily for 10 days 6/15/22 6/25/22  Cuong Perrin MD   bumetanide (BUMEX) 2 MG tablet Take 1 tablet by mouth 2 times daily  Patient taking differently: Take 2 mg by mouth 2 times daily Dr. Monet Dimas instructed patient to take 3 mg once daily prior to admission and to end on 6/20/22. 6/14/22 7/14/22  Cuong Perrin MD   potassium chloride (KLOR-CON M) 20 MEQ extended release tablet Take 1 tablet by mouth 2 times daily 6/14/22 7/14/22  Cuong Perrin MD   pantoprazole (PROTONIX) 40 MG tablet Take 1 tablet by mouth 2 times daily (before meals) 6/14/22 7/14/22  Cuong Perrin MD   guaiFENesin (MUCINEX) 600 MG extended release tablet Take 2 tablets by mouth 2 times daily 4/20/22   Mancel Foil, APRN - CNP   isosorbide mononitrate (IMDUR) 30 MG extended release tablet Take 60 mg by mouth in the morning and at bedtime     Historical Provider, MD   amLODIPine (NORVASC) 5 MG tablet Take 1 tablet by mouth 2 times daily 7/30/21   eNha Ram MD   metoprolol (LOPRESSOR) 100 MG tablet Take 0.5 tablets by mouth 2 times daily 50 mg in the AM & 50 mg in the PM 6/30/21   BEBO Killian   albuterol (ACCUNEB) 1.25 MG/3ML nebulizer solution Inhale 1 ampule into the lungs every 6 hours as needed  1/22/21   Historical Provider, MD   budesonide (PULMICORT) 0.5 MG/2ML nebulizer suspension Inhale 0.5 mg into the lungs at bedtime  2/2/21   Historical Provider, MD   ipratropium-albuterol (DUONEB) 0.5-2.5 (3) MG/3ML SOLN nebulizer solution Inhale 3 mLs into the lungs every 4 hours While Awake 2/2/21   Historical Provider, MD   Probiotic Product (PROBIOTIC DAILY PO) Take 1 tablet by mouth daily    Historical Provider, MD   levothyroxine (SYNTHROID) 25 MCG tablet Take 25 mcg by mouth Daily  11/7/19   Historical Provider, MD   Fexofenadine HCl (MUCINEX ALLERGY PO) Take 1 tablet by mouth 2 times daily     Historical Provider, MD   Arformoterol Tartrate (BROVANA) 15 MCG/2ML NEBU Inhale 15 mcg into the lungs 2 times daily  1/2/20   Historical Provider, MD   OXYGEN Inhale 2 L into the lungs daily     Historical Provider, MD   atorvastatin (LIPITOR) 40 MG tablet Take 1 tablet by mouth daily  Patient taking differently: Take 40 mg by mouth nightly PT TAKES AT NIGHT.  11/20/19   Neha Ram MD   Biotin 5000 MCG TABS Take 1 tablet by mouth daily     Historical Provider, MD        heparin (porcine)  5,000 Units SubCUTAneous BID    piperacillin-tazobactam  3,375 mg IntraVENous Q8H    [Held by provider] bumetanide  2 mg Oral BID    metoprolol  100 mg Oral BID    sodium chloride flush  5-40 mL IntraVENous 2 times per day    amLODIPine  5 mg Oral BID    Arformoterol Tartrate  15 mcg Nebulization BID    atorvastatin  40 mg Oral Nightly    budesonide  0.5 mg Nebulization BID    clopidogrel  75 mg Oral Daily    guaiFENesin  1,200 mg Oral BID    ipratropium-albuterol  3 mL Inhalation 4x daily    isosorbide mononitrate  30 mg Oral Daily    levothyroxine  25 mcg Oral Daily    pantoprazole  40 mg Oral BID AC    sildenafil  20 mg Oral TID       TELEMETRY: Sinus     Physical Exam:      Physical Exam  Constitutional:       General: She is not in acute distress. Appearance: She is not diaphoretic. Comments: On high flow nasal oxygen  Less distressed today   HENT:      Mouth/Throat:      Pharynx: No oropharyngeal exudate. Eyes:      General: No scleral icterus. Right eye: No discharge. Left eye: No discharge. Neck:      Thyroid: No thyromegaly. Vascular: No JVD. Cardiovascular:      Rate and Rhythm: Normal rate and regular rhythm. No extrasystoles are present. Heart sounds: Normal heart sounds, S1 normal and S2 normal. No murmur heard. No systolic murmur is present. No diastolic murmur is present. No friction rub. No gallop. No S3 or S4 sounds. Comments: JVP around 8 cm  Trace to 1+ edema  Systolic murmur  Pulmonary:      Effort: Pulmonary effort is normal. No respiratory distress. Breath sounds: Rales present. No wheezing. Comments: Air entry present in both lung fields  Crepitations posteriorly  Chest:      Chest wall: No tenderness. Abdominal:      General: Bowel sounds are normal. There is no distension. Palpations: Abdomen is soft. There is no mass. Tenderness: There is no abdominal tenderness. There is no guarding or rebound. Hernia: No hernia is present. Comments: Soft, nontender     Musculoskeletal:         General: Normal range of motion. Skin:     General: Skin is warm. Coloration: Skin is not pale. Findings: No rash. Neurological:      Mental Status: She is alert and oriented to person, place, and time. Cranial Nerves: No cranial nerve deficit.       Deep Tendon Reflexes: Reflexes normal.                 Lab Data:  CBC:   Recent Labs     06/23/22  0254 06/23/22  1700 06/25/22  0903   WBC 12.5*  --  15.8*   HGB 6.9* 9.9* 9.0*   HCT 23.0* 32.7* 30.2*   MCV 94.3  --  93.2     --  393     BMP:   Recent Labs     06/23/22  0254 06/24/22  0359 06/25/22  0242   * 130* 132*   K 3.3* 4.0 4.1   CL 96* 92* 95*   CO2 27 27 25   BUN 33* 34* 38*   CREATININE 1.3* 1.4* 1.4*     LIVER PROFILE: No results for input(s): AST, ALT, LIPASE, BILIDIR, BILITOT, ALKPHOS in the last 72 hours. Invalid input(s): AMYLASE,  ALB  PT/INR: No results for input(s): PROTIME, INR in the last 72 hours. APTT: No results for input(s): APTT in the last 72 hours. BNP:  No results for input(s): BNP in the last 72 hours. CK, CKMB, Troponin: @LABRCNT (CKTOTAL:3, CKMB:3, TROPONINI:3)@    IMAGING:  CT ABDOMEN PELVIS WO CONTRAST Additional Contrast? None    Result Date: 6/14/2022  NO PRIOR REPORT AVAILABLE Exam: CT OF THE ABDOMEN/PELVIS WITHOUT CONTRAST Clinical data: Abdominal pain. Technique: Axial CT images were acquired through the abdomen and pelvis without contrast using soft tissue and bone algorithms. Reformatted/MPR images were performed. Radiation dose: CTDIvol =16.41 mGy, DLP =701 mGy x cm. Limitations: Lack of intravenous contrast limits evaluation of solid viscera. Lack of oral contrast limits evaluation of the bowel loops. Prior Studies: Renal arterial ultrasound dated 7/30/2020 images. Findings: Lung bases:Trace pleural effusion is noted bilaterally. Mild subsegmental atelectasis is noted in the included bilateral lungs. Liver:Unremarkable size andcontour. Normal density. No evidence of mass. No evidence of dilated ducts. Gallbladder Fossa: Cholelithiasis without evidence of acute cholecystitis. Spleen: Grossly unremarkable. Pancreas/adrenal glands: Grossly unremarkable size, contour and density. Kidneys: In anatomic position. There is mild atrophy of the right kidney and compensatory hypertrophy of the left kidney.  Bilateral non-obstructing renal calculi, measuring approximately 2 mm in the upper pole of right kidney, 1 mm and 5 mm in the upper pole of left kidney. No ureteral calculi. No evidence of a renal mass or cyst. Perinephric space is unremarkable. Retroperitoneum: No enlarged retroperitoneal lymphadenopathy. The IVC appears unremarkable. Severe calcific atherosclerotic changes noted in the aorta and its branches. Peritoneal cavity: No evidence of free air. Trace ascites is noted. Gastrointestinal tract: Small inguinal hernia is noted on right, it contains portion of small bowel without evidence of obstruction. Tiny fat containing inguinal hernia is noted on left. Tiny hiatal hernia is noted. Changes of mild constipation. There are twisted mesenteric vessels in the right lower quadrant without evidence of obstruction. Large amount of stool and gas in the colon. Appendix is not visualized. Pelvis: Solid and hollow viscera grossly unremarkable. Osseous structures: No acute or destructive bony process identified. Mild scoliosis. Degenerative changes noted in the spine and pelvis    1. Small inguinal hernia is noted on right, it contains portion of small bowel without evidence of obstruction. Tiny fat containing inguinal hernia is noted on left. 2. Tiny hiatal hernia is noted. 3. There are twisted mesenteric vessels in the right lower quadrant without evidence of obstruction. Changes of mild constipation. 4. Trace pleural effusion is noted bilaterally. Mild subsegmental atelectasis is noted in the included bilateral lungs. 5. Cholelithiasis without evidence of acute cholecystitis. 6. There is mild atrophy of the right kidney and compensatory hypertrophy of the left kidney. Bilateral non-obstructing renal calculi. 7. Generalized anasarca. Trace ascites is noted. 8. Moderately large amount of stool in the colon, please correlate with constipation. Recommendation: Follow up as clinically indicated.  All CT scans at this facility utilize dose modulation, iterative reconstruction, and/or weight based dosing when appropriate to reduce radiation dose to as low as reasonably achievable. Electronically Signed by Shira Downing MD at 14-Jun-2022 01:21:42 AM             XR CHEST (2 VW)    Result Date: 6/12/2022  NO PRIOR REPORT AVAILABLE Exam: X-RAYS OF THE CHEST Clinical data:COPD/CHF/pneumonia. Technique: PA and lateral views of the chest. Prior studies: Radiograph of the chest dated 06/04/2022. NM lung scan dated 04/15/2022. Findings:Hyperinflated emphysematous lungs. Bilateral pleural effusions. Slightly enlarged cardiac silhouette. Hyperinflated emphysematous lungs. Bilateral pleural effusions. Slightly enlarged cardiac silhouette. Recommendation: Follow up as clinically indicated. Electronically Signed by Vivienne Aguayo MD at 12-Jun-2022 10:47:39 PM             XR CHEST PORTABLE    Result Date: 6/25/2022  NO PRIOR REPORT AVAILABLE Exam: X-RAY OF [facility] Clinical data:Right lower lobe pneumonia. Technique:Single portable upright view of the chest. Prior studies: Radiograph of the chest dated 06/20/22. Nuclear medicine lung scan dated 04/15/22. Findings: The lungs show marked vascular congestion with consolidation in the bases and pleural effusions. Median sternotomy wires are noted as is diffuse calcific aortitis. CHF is suspected, though findings could be infectious only. Cardiac silhouette is obscured on the left. No acute osseous abnormality is detected. Suspect bilateral lower lobe pneumonias likely superimposed upon CHF with effusions. Median sternotomy wires noted. Diffuse calcific aortitis seen without aneurysm. Recommendation: Follow up very short term. Electronically Signed by Torey Perez MD at 25-Jun-2022 12:32:30 PM             XR CHEST PORTABLE    Result Date: 6/20/2022  NO PRIOR REPORT AVAILABLE Exam: X-RAY OF [facility] Clinical data:Short of air, edema.  Technique:Single view of the chest. Prior studies: kg/m^2  Procedure Type of Study   TTE procedure:ECHO NO CONTRAST WITH DOP/COLR. Study Location: Portable Technical Quality: Adequate visualization Patient Status: Inpatient HR: 110 bpm BP: 132/55 mmHg Indications:Dyspnea/SOB. Conclusions   Summary  LV is normal in size with preserved LV systolic function. LV ejection  fraction estimated at 55%. Moderate to severe concentric LVH. Probable grade 3 diastolic dysfunction (difficult to assess accurately due  to mitral valve disease). RV is moderately dilated with moderate to severely reduced RV systolic  function (TAPSE equals 1.04 cm). Moderate left atrial enlargement. Mild right atrial enlargement. Aortic valve is likely trileaflet but severely thickened and calcified  with markedly reduced mobility of the right and noncoronary leaflet. Probable mild to moderate aortic stenosis (mean gradient obtained at 12  mmHg only). No significant regurgitation. Bioprosthetic mitral valve with leaflets not well discerned but appear  thickened. Probable moderate mitral stenosis (mean gradient 11 mmHg). Moderate tricuspid regurgitation. Severe pulmonary hypertension (RVSP estimated at 70 to 75 mmHg)  Dilated IVC with mild constriction with inspiration. Small pericardial effusion. Signature   ----------------------------------------------------------------  Electronically signed by Mike Holly MD(Interpreting physician)  on 06/24/2022 01:21 PM  ----------------------------------------------------------------  Allergies   - Levaquin. - Morphine.   - Other allergy:(Xarelto).  2D Measurements and Calculations(cm)   LVIDd: 3.65 cm                        LVIDs: 2.9 cm  IVSd: 1.36 cm  LVPWd: 1.36 cm                        AO Root Dimension: 2.6 cm  % Ejection Fraction: 57 %             LA Dimension: 4 cm                                        LV Systolic dimension: 5.2HM  LV dimension: 3.65 cm                 LV PW diastolic: 6.47HH                                        LVOT diameter: 1.8 cm  LVEDV: 66.7 ml                        RA Systolic pressure: 3mmHg  LVESV:24.2 ml                         LVEDVI: 45 ml/m^2  Cardic Output (CO): 4.08l/min         LVESVI: 16 ml/m^2  Ascending Aorta: 2.5 cm  Doppler Measurements and Calculations   AV Peak Velocity:227 cm/s  AV Mean Velocity:168 cm/s                MV Mean velocity:  AV Peak Gradient: 20.61 mmHg  AV Mean Gradient: 12 mmHg                MV Mean gradient: 10 mmHg  AV Area (Continuity):0.7 cm^2  AV VTI:53.3 cm/s  TR Velocity:394 cm/s  TR Gradient:62.09 mmHg  Estimated RAP:3 mmHg  RVSP:65 mmHg   MV E' septal velocity: 4.57cm/s  MV E' lateral velocity:4.68 cm/s          Assessment and Plan:       This is [place] y.o. year old female with past medical history of extensive vasculopathy, coronary artery disease with prior one-vessel CABG 2016 with SVG to RCA (severely diseased), bioprosthetic mitral valve replacement with maze procedure, PCI to RCA and circumflex 11/15/2019, repeat PCI to ostial RCA 7/30/2020 with restenosis and repeat PCI 8/10/2021, chronic kidney disease stage III with solitary left kidney with severe left renal artery stenosis, status post endovascular intervention 7/30/2020 with restenosis and repeat intervention 8/10/2021, normal LV ejection fraction, severe renovascular hypertension, COPD, peripheral arterial disease with prior bilateral carotid endarterectomy, iliac stents and possible subclavian stenosis admitted with recurrent acute on chronic diastolic heart failure presentation. 1.  Apparent improvement noted overnight. Chest x-ray reviewed. Bilateral lower lobe infiltrates more on right side with associated pulmonary vascular congestion. Currently on Zosyn. Plan for possible hospice. Continue medical management. Would continue IV antibiotics. 2.  Decreasing urine output with mild worsening in renal function with creatinine at 1.4.     Teresita Robert MD, MD 6/25/2022 12:29 PM

## 2022-06-27 PROBLEM — R07.9 CHEST PAIN: Status: RESOLVED | Noted: 2019-09-25 | Resolved: 2022-01-01

## 2022-06-27 PROBLEM — R04.0 NOSEBLEED: Status: RESOLVED | Noted: 2022-01-01 | Resolved: 2022-01-01

## 2022-06-27 PROBLEM — Z51.5 PALLIATIVE CARE PATIENT: Status: RESOLVED | Noted: 2022-01-01 | Resolved: 2022-01-01

## 2022-06-27 PROBLEM — R19.5 OCCULT GI BLEEDING: Status: RESOLVED | Noted: 2021-04-02 | Resolved: 2022-01-01

## 2022-06-27 PROBLEM — R55 NEAR SYNCOPE: Status: RESOLVED | Noted: 2022-01-01 | Resolved: 2022-01-01

## 2022-06-27 PROBLEM — R06.09 DOE (DYSPNEA ON EXERTION): Status: RESOLVED | Noted: 2019-09-25 | Resolved: 2022-01-01

## 2022-06-27 PROBLEM — R53.83 FATIGUE: Status: RESOLVED | Noted: 2019-09-25 | Resolved: 2022-01-01

## 2022-06-27 PROBLEM — J44.1 COPD EXACERBATION (HCC): Status: RESOLVED | Noted: 2022-01-01 | Resolved: 2022-01-01

## 2022-06-27 PROBLEM — D64.9 ACUTE ON CHRONIC ANEMIA: Status: RESOLVED | Noted: 2021-04-02 | Resolved: 2022-01-01

## 2022-06-27 PROBLEM — J18.9 HCAP (HEALTHCARE-ASSOCIATED PNEUMONIA): Status: RESOLVED | Noted: 2018-01-14 | Resolved: 2022-01-01

## 2022-06-27 PROBLEM — M79.605 PAIN IN BOTH LOWER EXTREMITIES: Status: RESOLVED | Noted: 2019-10-30 | Resolved: 2022-01-01

## 2022-06-27 PROBLEM — M79.10 MYALGIA: Status: RESOLVED | Noted: 2020-01-29 | Resolved: 2022-01-01

## 2022-06-27 PROBLEM — M79.604 PAIN IN BOTH LOWER EXTREMITIES: Status: RESOLVED | Noted: 2019-10-30 | Resolved: 2022-01-01

## 2022-06-29 ENCOUNTER — TELEPHONE (OUTPATIENT)
Dept: PULMONOLOGY | Facility: CLINIC | Age: 77
End: 2022-06-29

## 2022-07-05 NOTE — TELEPHONE ENCOUNTER
Hospital records reviewed. [facility] [place]    CUMULATIVE  HOSPITAL  COURSE  AND  TREATMENT:  Rita Redmond is an 68 y.o. female with past medical history of aortic stenosis, CHF, ASCVD, COPD, hyperlipidemia, hypertension, history of MI, mitral valve stenosis, and arthritis. Patient was recently discharged on  with complaint of dyspnea. Patient states she did well for the first few days after discharge at home. Then her daughter reports she has gained about a pound a day since fourth day post discharge. Has become increasingly short of breath her pulse ox dropping to as much as in the 50s at home. Confirmed by EMS. Patient denies chest pain , significantly more short of breath. ER evaluation shows progressive infiltrate and effusion left lung base with new alveolar infiltrates in the right upper and lower lobes with effusion. Chemistry BUN 29 creatinine 1 glucose 150 BNP 18,884 white count is 15.9 hemoglobin 7.5 hematocrit 25.9 platelets 844  PT 2.51 PCO2 43 PO2 30 bicarb 33. Be admitted to hospitalist services with cardiology consult she is an established Dr. Germaine Butt patient. Been in contact with the office and she was instructed to come yesterday but wanted to wait. Cardiology has seen the patient and feel that medical management is her only option. Palliative care consult called. Metoprolol increased to help with tachycardia and b/p. She and family have decided on comfort care/hospice. Met with family twice and this is their desire. She does not want intubation or CPR.

## 2023-06-07 NOTE — TELEPHONE ENCOUNTER
5-10-21    Reminder call to pt about her blood work. She voiced understanding and will be by this week. How Many Mls Were Removed From The 40 Mg/Ml (1ml) Vial When Preparing The Injectable Solution?: 0 Consent: The risks of atrophy were reviewed with the patient. Administered By (Optional): Dr. Sheppard Treatment Number (Optional): 1 Detail Level: Detailed Medical Necessity Clause: This procedure was medically necessary because the lesions that were treated were: Concentration Of Solution Injected (Mg/Ml): 10.0 Validate Note Data When Using Inventory: Yes Kenalog Preparation: Kenalog Include Z78.9 (Other Specified Conditions Influencing Health Status) As An Associated Diagnosis?: No

## 2025-05-14 NOTE — PROGRESS NOTES
[facility]      Progress Note    Patient:  Samantha Russell  YOB: 1945  Date of Service: 4/20/2022  MRN: [id number]   Acct: [de-identified]   Primary Care Physician: Jeferson Bolanos MD  Advance Directive: Full Code  Admit Date: 4/14/2022       Hospital Day: 6    Portions of this note have been copied forward, however, updated to reflect the most current clinical status of this patient. CHIEF COMPLAINT shortness of breath    SUBJECTIVE: Ms. Khari Mcclain was resting comfortably in chair this morning. Talk to patient about discharge, patient needs home about  Discharge home today. Stated she has not been able to get up without shortness of breath. CUMULATIVE HOSPITAL COURSE:   The patient is a 79 y. o. female medical history of CAD, CHF, COPD, hyperlipidemia, hypertension, PUD, and thyroid disorder who presented to [address] [place] [address] ED via direction of her pulmonologist complaining of shortness of breath. The patient indicated for about a week prior to admission she had increasing fatigue, shortness of breath, cough, sputum production, and decreased appetite. Patient reported her daughter attempted to get her to go to the ED night before admission however she had a pulmonology appointment on day of admission and declined. Patient was seen by her pulmonologist in the office on day of admission and recommended going to the ED. Patient reported at home she had increased her oxygen up to 5 L. She reported she is normally on 2 L at home. She reported increase in frequency of her cough and increasing sputum production over the past 2 to 3 days. Arrmerle Leaver reported her sputum production was thick and yellow in nature. Patient's daughter at bedside reported decrease in activity and appetite.  The daughter was concerned that the patient had not been eating well for well over a week.  ED work-up indicated chest x-ray with COPD and slightly increasing right infrahilar hazy densities, venous CO2 42, calcium 12.1, proBNP [FreeTextEntry1] : Not fasting today so TGs will likely remain high HOWEVER will ensure fibrate not affecting liver. BP at goal. Continue med.s Start topical clinda for folliculitis, may need PO depending on efficacy. Check Vit D. Will likely need to supplement.  3581, WBC 19.1, arterial CO2 74, arterial PO2 69, arterial HCO3 49.1. Patient was admitted to the hospitalist service for COPD exacerbation. Placed on empiric antibiotic therapy of doxycycline and Rocephin, bronchodilators, and Solu-Medrol. Patient noted to have significant tachycardia overnight and slightly elevated D-dimer, VQ scan low probability for PE. Required increase up to 9 L of oxygen via high flow nasal cannula. Able to wean to 4 liters over night. Continues to slowly improve. Increased Xopenex treatments to every 4 hours scheduled. IV steroids transitioned to oral prednisone. Completed IV antibiotic therapy. Maintaining adequate oxygenation on 4L HF, wean O2 as able to. Attempted to discharge patient today, however patient stated she is unable to get around without shortness of breath. PT/OT consultations placed again        Review of Systems   Constitutional: Positive for appetite change and fatigue. Negative for activity change, chills and fever. HENT: Negative for sore throat, trouble swallowing and voice change. Respiratory: Positive for cough and shortness of breath. Negative for chest tightness and wheezing. Reports SOB and cough is improving    Cardiovascular: Negative for chest pain, palpitations and leg swelling. Gastrointestinal: Negative for abdominal pain, constipation, diarrhea, nausea and vomiting. Genitourinary: Negative for difficulty urinating, dysuria, frequency, hematuria and urgency. Musculoskeletal: Negative for arthralgias and myalgias. Skin: Negative for color change and wound. Neurological: Negative for dizziness and headaches. Psychiatric/Behavioral: Negative for agitation, behavioral problems and confusion. The patient is not nervous/anxious.            Objective:   VITALS:  BP (!) 123/53   Pulse 80   Temp 97.5 °F (36.4 °C) (Temporal)   Resp 20   Ht 5' 3\" (1.6 m)   Wt 78 lb (35.4 kg)   SpO2 97%   BMI 13.82 kg/m²   24HR INTAKE/OUTPUT: Intake/Output Summary (Last 24 hours) at 4/20/2022 1430  Last data filed at 4/20/2022 1359  Gross per 24 hour   Intake 480 ml   Output --   Net 480 ml       Physical Exam  Vitals reviewed. Constitutional:       Appearance: She is underweight. She is ill-appearing. HENT:      Head: Normocephalic. Mouth/Throat:      Mouth: Mucous membranes are dry. Pharynx: Oropharynx is clear. Eyes:      Pupils: Pupils are equal, round, and reactive to light. Cardiovascular:      Rate and Rhythm: Normal rate and regular rhythm. Pulses: Normal pulses. Heart sounds: Murmur heard. Pulmonary:      Effort: Pulmonary effort is normal.      Breath sounds: No wheezing or rales. Comments: Diminished breath sounds bilaterally   Abdominal:      General: Abdomen is flat. Bowel sounds are normal. There is no distension. Palpations: Abdomen is soft. Tenderness: There is no abdominal tenderness. There is no guarding. Musculoskeletal:         General: Normal range of motion. Cervical back: Normal range of motion and neck supple. Right lower leg: No edema. Left lower leg: No edema. Skin:     General: Skin is warm and dry. Capillary Refill: Capillary refill takes less than 2 seconds. Neurological:      General: No focal deficit present. Mental Status: She is alert and oriented to person, place, and time.             Medications:      sodium chloride        predniSONE  60 mg Oral Daily    levalbuterol  0.63 mg Nebulization Q4H    metoprolol succinate  50 mg Oral BID    sodium chloride flush  5-40 mL IntraVENous 2 times per day    amLODIPine  5 mg Oral BID    Arformoterol Tartrate  15 mcg Nebulization BID    aspirin EC  81 mg Oral Nightly    atorvastatin  40 mg Oral Nightly    budesonide  0.5 mg Nebulization BID    clopidogrel  75 mg Oral Daily    furosemide  40 mg Oral Daily    isosorbide mononitrate  30 mg Oral Daily    levothyroxine  25 mcg Oral Daily    - Encourage deep breathing and cough    - VQ scan- low probability for PE        Active Problems:    Mixed hyperlipidemia-    - Continue atorvastatin       Coronary artery disease involving native coronary artery of native heart-   - Continue Imdur    - Continue Atorvastatin and Plavix       Severe malnutrition (HCC)- noted       CHF (congestive heart failure) ([facility] [address]-    - Continue Home Lasix    - Monitor I's and O's    - Daily weight       CKD (chronic kidney disease), stage III ([facility] [address]-    - monitor labs closely    - Monitor I's and O's closely       PAF (paroxysmal atrial fibrillation) ([facility] [address]-    - monitor on telemetry            DVT Prophylaxis:  Lovenox    DC planning: Likely to DC home in am       Further Orders per Clinical course/attending. Electronically signed by BEBO Kruger CNP on 4/20/2022 at 2:30 PM       EMR Dragon/Transcription disclaimer:   Much of this encounter note is an electronic transcription/translation of spoken language to printed text.  The electronic translation of spoken language may permit erroneous, or at times, nonsensical words or phrases to be inadvertently transcribed; although attempts have made to review the note for such errors, some may still exist.

## (undated) DEVICE — SCANLAN® VASCU-STATT® II SINGLE-USE BULLDOG CLAMP W/FIRMER CLAMPING PRESS - MAXI ANGLED 45°(ORANGE), CLAMPING PRESSURE 165-175 G (2/STERILE PKG): Brand: SCANLAN® VASCU-STATT® II SINGLE-USE BULLDOG CLAMP W/FIRMER CLAMPING PRESS

## (undated) DEVICE — CAUTERY TIP POLISHER: Brand: DEVON

## (undated) DEVICE — BLADE OPHTH GRN ROUNDED TIP 1 SIDE SHRP GRINDLESS MINI-BLDE

## (undated) DEVICE — DISCONTINUED NO SUB IDED BF APPLICATOR COTTON TIP

## (undated) DEVICE — ROYAL SILK SURGICAL GOWN, XXL: Brand: CONVERTORS

## (undated) DEVICE — Z INACTIVE USE 2535480 CLIP LIG M BLU TI HRT SHP WIRE HORZ 180 PER BX

## (undated) DEVICE — CLIP INT SM WIDE RED TI TRNSVRS GRV CHEVRON SHP W/ PRECIS

## (undated) DEVICE — COVER US PRB W5XL96IN LTX W/ GEL

## (undated) DEVICE — Z DISCONTINUED USE 2429233 DRESSING FOAM W10XL10CM 5 LAYR SELF ADH VERSATILE SAFETAC

## (undated) DEVICE — SUTURE PROL SZ 6-0 L30IN NONABSORBABLE BLU L9.3MM BV-1 3/8 M8709

## (undated) DEVICE — TOWEL,OR,DSP,ST,BLUE,DLX,4/PK,20PK/CS: Brand: MEDLINE

## (undated) DEVICE — ASTOUND STANDARD SURGICAL GOWN, XXL: Brand: CONVERTORS

## (undated) DEVICE — 3M™ IOBAN™ 2 ANTIMICROBIAL INCISE DRAPE 6650EZ: Brand: IOBAN™ 2

## (undated) DEVICE — HYPODERMIC SAFETY NEEDLE: Brand: MAGELLAN

## (undated) DEVICE — DRAIN WND SIL FLAT RADIOPAQUE 10MM FULL FLUTED

## (undated) DEVICE — SUTURE PERMAHAND SZ 3-0 L18IN NONABSORBABLE BLK L26MM SH C013D

## (undated) DEVICE — SOLUTION IV 1000ML 0.9% SOD CHL PH 5 INJ USP VIAFLX PLAS

## (undated) DEVICE — CANNULA PERF L1.3IN TIP L2MM S STL POLYUR TB ARTOTMY BLB

## (undated) DEVICE — RESERVOIR,SUCTION,100CC,SILICONE: Brand: MEDLINE

## (undated) DEVICE — SUTURE PROL SZ 4-0 L36IN NONABSORBABLE BLU L26MM SH 1/2 CIR 8521H

## (undated) DEVICE — SUTURE VCRL SZ 3-0 L18IN ABSRB UD L26MM SH 1/2 CIR J864D

## (undated) DEVICE — AGENT HEMSTAT 3GM PURIFIED PLNT STARCH PWD ABSRB ARISTA AH

## (undated) DEVICE — SOLUTION IV 500ML 0.9% SOD CHL PH 5 INJ USP VIAFLX PLAS

## (undated) DEVICE — SPONGE LAP W18XL18IN WHT COT 4 PLY FLD STRUNG RADPQ DISP ST

## (undated) DEVICE — STANDARD HYPODERMIC NEEDLE,POLYPROPYLENE HUB: Brand: MONOJECT

## (undated) DEVICE — SUTURE VCRL SZ 4-0 L27IN ABSRB UD L19MM PS-2 3/8 CIR PRIM J426H

## (undated) DEVICE — SURGICAL PROCEDURE PACK VASC LOURDES HOSP

## (undated) DEVICE — Device: Brand: LEVEL 1

## (undated) DEVICE — DRESSING HEMSTAT W1X2IN ABSRB SURGCEL SNOW

## (undated) DEVICE — SUTURE PERMAHAND SZ 3-0 L30IN NONABSORBABLE BLK SILK BRAID A304H

## (undated) DEVICE — JACKSON-PRATT 100CC BULB RESERVOIR: Brand: CARDINAL HEALTH

## (undated) DEVICE — BLADE OPHTH 180DEG CUT SURF BLU STR SHRP DBL BVL GRINDLESS

## (undated) DEVICE — SUTURE PROL SZ 6-0 L18IN NONABSORBABLE BLU L13MM C-1 3/8 8718H

## (undated) DEVICE — SUTURE PERMAHAND SZ 4-0 L12X30IN NONABSORBABLE BLK SILK A303H

## (undated) DEVICE — DRAPE: INST MAG 16X20 20/CS: Brand: MEDICAL ACTION INDUSTRIES

## (undated) DEVICE — LARYNGOSCOPE BLDE MAC HNDL M SZ 35 ST CURAPLEX CURAVIEW LED

## (undated) DEVICE — SUTURE PROL SZ 6-0 L24IN NONABSORBABLE BLU L9.3MM BV-1 3/8 8805H

## (undated) DEVICE — DISPOSABLE BIPOLAR FORCEPS 7 3/4" (19.7CM) SCOVILLE BAYONET, INSULATED, 1.5MM TIP AND 12FT. (3.6M) CABLE: Brand: ADVANCED MED-SURG CONCEPTS

## (undated) DEVICE — DRESSING FOAM SELF ADH 20X10 CM ABSORBENT MEPILEX BORDER